# Patient Record
Sex: MALE | Race: WHITE | NOT HISPANIC OR LATINO | Employment: OTHER | URBAN - METROPOLITAN AREA
[De-identification: names, ages, dates, MRNs, and addresses within clinical notes are randomized per-mention and may not be internally consistent; named-entity substitution may affect disease eponyms.]

---

## 2022-03-26 ENCOUNTER — ANESTHESIA (OUTPATIENT)
Dept: SURGERY | Facility: CLINIC | Age: 72
DRG: 329 | End: 2022-03-26
Payer: COMMERCIAL

## 2022-03-26 ENCOUNTER — APPOINTMENT (OUTPATIENT)
Dept: GENERAL RADIOLOGY | Facility: CLINIC | Age: 72
End: 2022-03-26
Attending: EMERGENCY MEDICINE
Payer: COMMERCIAL

## 2022-03-26 ENCOUNTER — ANESTHESIA EVENT (OUTPATIENT)
Dept: SURGERY | Facility: CLINIC | Age: 72
DRG: 329 | End: 2022-03-26
Payer: COMMERCIAL

## 2022-03-26 ENCOUNTER — APPOINTMENT (OUTPATIENT)
Dept: CT IMAGING | Facility: CLINIC | Age: 72
End: 2022-03-26
Attending: EMERGENCY MEDICINE
Payer: COMMERCIAL

## 2022-03-26 ENCOUNTER — HOSPITAL ENCOUNTER (INPATIENT)
Facility: CLINIC | Age: 72
LOS: 9 days | Discharge: HOME HEALTH | DRG: 329 | End: 2022-04-05
Attending: SURGERY | Admitting: SURGERY
Payer: COMMERCIAL

## 2022-03-26 ENCOUNTER — HOSPITAL ENCOUNTER (EMERGENCY)
Facility: CLINIC | Age: 72
Discharge: ANOTHER HOSPITAL | End: 2022-03-26
Attending: EMERGENCY MEDICINE | Admitting: EMERGENCY MEDICINE
Payer: COMMERCIAL

## 2022-03-26 VITALS
SYSTOLIC BLOOD PRESSURE: 126 MMHG | BODY MASS INDEX: 18.2 KG/M2 | RESPIRATION RATE: 18 BRPM | HEART RATE: 71 BPM | DIASTOLIC BLOOD PRESSURE: 72 MMHG | OXYGEN SATURATION: 96 % | WEIGHT: 130 LBS | HEIGHT: 71 IN | TEMPERATURE: 98.3 F

## 2022-03-26 DIAGNOSIS — G89.18 ACUTE POST-OPERATIVE PAIN: ICD-10-CM

## 2022-03-26 DIAGNOSIS — K56.609 LARGE BOWEL OBSTRUCTION (H): ICD-10-CM

## 2022-03-26 DIAGNOSIS — Z93.3 S/P COLOSTOMY (H): Primary | ICD-10-CM

## 2022-03-26 DIAGNOSIS — K56.609 COLON OBSTRUCTION (H): Primary | ICD-10-CM

## 2022-03-26 DIAGNOSIS — K56.609 SMALL BOWEL OBSTRUCTION (H): ICD-10-CM

## 2022-03-26 DIAGNOSIS — K63.89 COLONIC MASS: ICD-10-CM

## 2022-03-26 DIAGNOSIS — C18.7 ADENOCARCINOMA OF SIGMOID COLON (H): ICD-10-CM

## 2022-03-26 LAB
ABO/RH(D): NORMAL
ALBUMIN SERPL-MCNC: 2.7 G/DL (ref 3.4–5)
ALP SERPL-CCNC: 62 U/L (ref 40–150)
ALT SERPL W P-5'-P-CCNC: 15 U/L (ref 0–70)
ANION GAP SERPL CALCULATED.3IONS-SCNC: 9 MMOL/L (ref 3–14)
ANTIBODY SCREEN: NEGATIVE
AST SERPL W P-5'-P-CCNC: 17 U/L (ref 0–45)
BASOPHILS # BLD AUTO: 0 10E3/UL (ref 0–0.2)
BASOPHILS NFR BLD AUTO: 0 %
BILIRUB DIRECT SERPL-MCNC: 0.2 MG/DL (ref 0–0.2)
BILIRUB SERPL-MCNC: 0.5 MG/DL (ref 0.2–1.3)
BUN SERPL-MCNC: 40 MG/DL (ref 7–30)
CALCIUM SERPL-MCNC: 9 MG/DL (ref 8.5–10.1)
CHLORIDE BLD-SCNC: 103 MMOL/L (ref 94–109)
CO2 SERPL-SCNC: 25 MMOL/L (ref 20–32)
CREAT SERPL-MCNC: 0.73 MG/DL (ref 0.66–1.25)
EOSINOPHIL # BLD AUTO: 0 10E3/UL (ref 0–0.7)
EOSINOPHIL NFR BLD AUTO: 0 %
ERYTHROCYTE [DISTWIDTH] IN BLOOD BY AUTOMATED COUNT: 13.8 % (ref 10–15)
GFR SERPL CREATININE-BSD FRML MDRD: >90 ML/MIN/1.73M2
GLUCOSE BLD-MCNC: 100 MG/DL (ref 70–99)
HCT VFR BLD AUTO: 40.5 % (ref 40–53)
HGB BLD-MCNC: 13.4 G/DL (ref 13.3–17.7)
HOLD SPECIMEN: NORMAL
HOLD SPECIMEN: NORMAL
IMM GRANULOCYTES # BLD: 0 10E3/UL
IMM GRANULOCYTES NFR BLD: 0 %
LYMPHOCYTES # BLD AUTO: 0.9 10E3/UL (ref 0.8–5.3)
LYMPHOCYTES NFR BLD AUTO: 7 %
MCH RBC QN AUTO: 30.4 PG (ref 26.5–33)
MCHC RBC AUTO-ENTMCNC: 33.1 G/DL (ref 31.5–36.5)
MCV RBC AUTO: 92 FL (ref 78–100)
MONOCYTES # BLD AUTO: 1 10E3/UL (ref 0–1.3)
MONOCYTES NFR BLD AUTO: 8 %
NEUTROPHILS # BLD AUTO: 10.5 10E3/UL (ref 1.6–8.3)
NEUTROPHILS NFR BLD AUTO: 85 %
NRBC # BLD AUTO: 0 10E3/UL
NRBC BLD AUTO-RTO: 0 /100
PLATELET # BLD AUTO: 290 10E3/UL (ref 150–450)
POTASSIUM BLD-SCNC: 4.3 MMOL/L (ref 3.4–5.3)
PROT SERPL-MCNC: 7 G/DL (ref 6.8–8.8)
RBC # BLD AUTO: 4.41 10E6/UL (ref 4.4–5.9)
SARS-COV-2 RNA RESP QL NAA+PROBE: NEGATIVE
SODIUM SERPL-SCNC: 137 MMOL/L (ref 133–144)
SPECIMEN EXPIRATION DATE: NORMAL
WBC # BLD AUTO: 12.5 10E3/UL (ref 4–11)

## 2022-03-26 PROCEDURE — 0W9G0ZX DRAINAGE OF PERITONEAL CAVITY, OPEN APPROACH, DIAGNOSTIC: ICD-10-PCS | Performed by: SURGERY

## 2022-03-26 PROCEDURE — 250N000011 HC RX IP 250 OP 636: Performed by: EMERGENCY MEDICINE

## 2022-03-26 PROCEDURE — 0DBB0ZZ EXCISION OF ILEUM, OPEN APPROACH: ICD-10-PCS | Performed by: SURGERY

## 2022-03-26 PROCEDURE — 0DBW0ZX EXCISION OF PERITONEUM, OPEN APPROACH, DIAGNOSTIC: ICD-10-PCS | Performed by: SURGERY

## 2022-03-26 PROCEDURE — 82248 BILIRUBIN DIRECT: CPT | Performed by: EMERGENCY MEDICINE

## 2022-03-26 PROCEDURE — 99285 EMERGENCY DEPT VISIT HI MDM: CPT | Performed by: INTERNAL MEDICINE

## 2022-03-26 PROCEDURE — 360N000076 HC SURGERY LEVEL 3, PER MIN: Performed by: SURGERY

## 2022-03-26 PROCEDURE — 36415 COLL VENOUS BLD VENIPUNCTURE: CPT | Performed by: FAMILY MEDICINE

## 2022-03-26 PROCEDURE — 272N000001 HC OR GENERAL SUPPLY STERILE: Performed by: SURGERY

## 2022-03-26 PROCEDURE — 88112 CYTOPATH CELL ENHANCE TECH: CPT | Mod: 26 | Performed by: PATHOLOGY

## 2022-03-26 PROCEDURE — 87205 SMEAR GRAM STAIN: CPT | Performed by: SURGERY

## 2022-03-26 PROCEDURE — 44320 COLOSTOMY: CPT | Mod: GC | Performed by: SURGERY

## 2022-03-26 PROCEDURE — 370N000017 HC ANESTHESIA TECHNICAL FEE, PER MIN: Performed by: SURGERY

## 2022-03-26 PROCEDURE — 250N000011 HC RX IP 250 OP 636: Performed by: NURSE ANESTHETIST, CERTIFIED REGISTERED

## 2022-03-26 PROCEDURE — 710N000010 HC RECOVERY PHASE 1, LEVEL 2, PER MIN: Performed by: SURGERY

## 2022-03-26 PROCEDURE — 87040 BLOOD CULTURE FOR BACTERIA: CPT | Performed by: EMERGENCY MEDICINE

## 2022-03-26 PROCEDURE — 258N000003 HC RX IP 258 OP 636: Performed by: NURSE ANESTHETIST, CERTIFIED REGISTERED

## 2022-03-26 PROCEDURE — 88342 IMHCHEM/IMCYTCHM 1ST ANTB: CPT | Mod: 26 | Performed by: PATHOLOGY

## 2022-03-26 PROCEDURE — 258N000003 HC RX IP 258 OP 636: Performed by: EMERGENCY MEDICINE

## 2022-03-26 PROCEDURE — 88341 IMHCHEM/IMCYTCHM EA ADD ANTB: CPT | Mod: 26 | Performed by: PATHOLOGY

## 2022-03-26 PROCEDURE — 250N000009 HC RX 250: Performed by: NURSE ANESTHETIST, CERTIFIED REGISTERED

## 2022-03-26 PROCEDURE — 0D1M0Z4 BYPASS DESCENDING COLON TO CUTANEOUS, OPEN APPROACH: ICD-10-PCS | Performed by: SURGERY

## 2022-03-26 PROCEDURE — 88307 TISSUE EXAM BY PATHOLOGIST: CPT | Mod: 26 | Performed by: PATHOLOGY

## 2022-03-26 PROCEDURE — 88305 TISSUE EXAM BY PATHOLOGIST: CPT | Mod: TC | Performed by: SURGERY

## 2022-03-26 PROCEDURE — 96374 THER/PROPH/DIAG INJ IV PUSH: CPT | Mod: 59 | Performed by: EMERGENCY MEDICINE

## 2022-03-26 PROCEDURE — 96375 TX/PRO/DX INJ NEW DRUG ADDON: CPT | Performed by: EMERGENCY MEDICINE

## 2022-03-26 PROCEDURE — 87075 CULTR BACTERIA EXCEPT BLOOD: CPT | Performed by: SURGERY

## 2022-03-26 PROCEDURE — 0DBN8ZX EXCISION OF SIGMOID COLON, VIA NATURAL OR ARTIFICIAL OPENING ENDOSCOPIC, DIAGNOSTIC: ICD-10-PCS | Performed by: SURGERY

## 2022-03-26 PROCEDURE — 99291 CRITICAL CARE FIRST HOUR: CPT | Mod: 25 | Performed by: EMERGENCY MEDICINE

## 2022-03-26 PROCEDURE — 74177 CT ABD & PELVIS W/CONTRAST: CPT

## 2022-03-26 PROCEDURE — 250N000009 HC RX 250: Performed by: EMERGENCY MEDICINE

## 2022-03-26 PROCEDURE — 85025 COMPLETE CBC W/AUTO DIFF WBC: CPT | Performed by: EMERGENCY MEDICINE

## 2022-03-26 PROCEDURE — 80053 COMPREHEN METABOLIC PANEL: CPT | Performed by: EMERGENCY MEDICINE

## 2022-03-26 PROCEDURE — 86850 RBC ANTIBODY SCREEN: CPT | Performed by: EMERGENCY MEDICINE

## 2022-03-26 PROCEDURE — 250N000025 HC SEVOFLURANE, PER MIN: Performed by: SURGERY

## 2022-03-26 PROCEDURE — 99223 1ST HOSP IP/OBS HIGH 75: CPT | Mod: AI | Performed by: SURGERY

## 2022-03-26 PROCEDURE — 88305 TISSUE EXAM BY PATHOLOGIST: CPT | Mod: 26 | Performed by: PATHOLOGY

## 2022-03-26 PROCEDURE — 88307 TISSUE EXAM BY PATHOLOGIST: CPT | Mod: TC | Performed by: SURGERY

## 2022-03-26 PROCEDURE — C9803 HOPD COVID-19 SPEC COLLECT: HCPCS | Performed by: EMERGENCY MEDICINE

## 2022-03-26 PROCEDURE — 999N000065 XR ABDOMEN PORT 1 VIEWS

## 2022-03-26 PROCEDURE — 45331 SIGMOIDOSCOPY AND BIOPSY: CPT | Mod: GC | Performed by: SURGERY

## 2022-03-26 PROCEDURE — 87077 CULTURE AEROBIC IDENTIFY: CPT | Performed by: SURGERY

## 2022-03-26 PROCEDURE — 99291 CRITICAL CARE FIRST HOUR: CPT | Performed by: EMERGENCY MEDICINE

## 2022-03-26 PROCEDURE — 85025 COMPLETE CBC W/AUTO DIFF WBC: CPT | Performed by: FAMILY MEDICINE

## 2022-03-26 PROCEDURE — 44120 REMOVAL OF SMALL INTESTINE: CPT | Mod: GC | Performed by: SURGERY

## 2022-03-26 PROCEDURE — 99284 EMERGENCY DEPT VISIT MOD MDM: CPT | Performed by: INTERNAL MEDICINE

## 2022-03-26 PROCEDURE — 87635 SARS-COV-2 COVID-19 AMP PRB: CPT | Performed by: EMERGENCY MEDICINE

## 2022-03-26 PROCEDURE — P9041 ALBUMIN (HUMAN),5%, 50ML: HCPCS | Performed by: NURSE ANESTHETIST, CERTIFIED REGISTERED

## 2022-03-26 PROCEDURE — 36415 COLL VENOUS BLD VENIPUNCTURE: CPT | Performed by: EMERGENCY MEDICINE

## 2022-03-26 RX ORDER — ONDANSETRON 2 MG/ML
4 INJECTION INTRAMUSCULAR; INTRAVENOUS ONCE
Status: COMPLETED | OUTPATIENT
Start: 2022-03-26 | End: 2022-03-26

## 2022-03-26 RX ORDER — MORPHINE SULFATE 2 MG/ML
2 INJECTION, SOLUTION INTRAMUSCULAR; INTRAVENOUS ONCE
Status: COMPLETED | OUTPATIENT
Start: 2022-03-26 | End: 2022-03-26

## 2022-03-26 RX ORDER — METRONIDAZOLE 500 MG/100ML
500 INJECTION, SOLUTION INTRAVENOUS
Status: CANCELLED | OUTPATIENT
Start: 2022-03-26

## 2022-03-26 RX ORDER — ONDANSETRON 4 MG/1
4 TABLET, ORALLY DISINTEGRATING ORAL EVERY 30 MIN PRN
Status: DISCONTINUED | OUTPATIENT
Start: 2022-03-26 | End: 2022-03-27 | Stop reason: HOSPADM

## 2022-03-26 RX ORDER — KETOROLAC TROMETHAMINE 15 MG/ML
15 INJECTION, SOLUTION INTRAMUSCULAR; INTRAVENOUS ONCE
Status: COMPLETED | OUTPATIENT
Start: 2022-03-26 | End: 2022-03-26

## 2022-03-26 RX ORDER — CEFAZOLIN SODIUM/WATER 2 G/20 ML
2 SYRINGE (ML) INTRAVENOUS SEE ADMIN INSTRUCTIONS
Status: DISCONTINUED | OUTPATIENT
Start: 2022-03-26 | End: 2022-03-27

## 2022-03-26 RX ORDER — CEFAZOLIN SODIUM 2 G/100ML
2 INJECTION, SOLUTION INTRAVENOUS SEE ADMIN INSTRUCTIONS
Status: CANCELLED | OUTPATIENT
Start: 2022-03-26

## 2022-03-26 RX ORDER — MORPHINE SULFATE 2 MG/ML
4 INJECTION, SOLUTION INTRAMUSCULAR; INTRAVENOUS ONCE
Status: DISCONTINUED | OUTPATIENT
Start: 2022-03-26 | End: 2022-03-26 | Stop reason: HOSPADM

## 2022-03-26 RX ORDER — CEFAZOLIN SODIUM/WATER 2 G/20 ML
2 SYRINGE (ML) INTRAVENOUS
Status: DISCONTINUED | OUTPATIENT
Start: 2022-03-26 | End: 2022-03-26

## 2022-03-26 RX ORDER — EPHEDRINE SULFATE 50 MG/ML
INJECTION, SOLUTION INTRAMUSCULAR; INTRAVENOUS; SUBCUTANEOUS PRN
Status: DISCONTINUED | OUTPATIENT
Start: 2022-03-26 | End: 2022-03-27

## 2022-03-26 RX ORDER — DIAZEPAM 10 MG/2ML
2.5 INJECTION, SOLUTION INTRAMUSCULAR; INTRAVENOUS
Status: DISCONTINUED | OUTPATIENT
Start: 2022-03-26 | End: 2022-03-27 | Stop reason: HOSPADM

## 2022-03-26 RX ORDER — IOPAMIDOL 755 MG/ML
70 INJECTION, SOLUTION INTRAVASCULAR ONCE
Status: COMPLETED | OUTPATIENT
Start: 2022-03-26 | End: 2022-03-26

## 2022-03-26 RX ORDER — SODIUM CHLORIDE, SODIUM GLUCONATE, SODIUM ACETATE, POTASSIUM CHLORIDE AND MAGNESIUM CHLORIDE 526; 502; 368; 37; 30 MG/100ML; MG/100ML; MG/100ML; MG/100ML; MG/100ML
INJECTION, SOLUTION INTRAVENOUS CONTINUOUS PRN
Status: DISCONTINUED | OUTPATIENT
Start: 2022-03-26 | End: 2022-03-27

## 2022-03-26 RX ORDER — HYDRALAZINE HYDROCHLORIDE 20 MG/ML
2.5-5 INJECTION INTRAMUSCULAR; INTRAVENOUS EVERY 10 MIN PRN
Status: DISCONTINUED | OUTPATIENT
Start: 2022-03-26 | End: 2022-03-27 | Stop reason: HOSPADM

## 2022-03-26 RX ORDER — HYDROMORPHONE HYDROCHLORIDE 1 MG/ML
0.2 INJECTION, SOLUTION INTRAMUSCULAR; INTRAVENOUS; SUBCUTANEOUS EVERY 5 MIN PRN
Status: DISCONTINUED | OUTPATIENT
Start: 2022-03-26 | End: 2022-03-27 | Stop reason: HOSPADM

## 2022-03-26 RX ORDER — ALBUMIN, HUMAN INJ 5% 5 %
SOLUTION INTRAVENOUS CONTINUOUS PRN
Status: DISCONTINUED | OUTPATIENT
Start: 2022-03-26 | End: 2022-03-27

## 2022-03-26 RX ORDER — ONDANSETRON 2 MG/ML
4 INJECTION INTRAMUSCULAR; INTRAVENOUS ONCE
Status: DISCONTINUED | OUTPATIENT
Start: 2022-03-26 | End: 2022-03-26 | Stop reason: HOSPADM

## 2022-03-26 RX ORDER — PROPOFOL 10 MG/ML
INJECTION, EMULSION INTRAVENOUS PRN
Status: DISCONTINUED | OUTPATIENT
Start: 2022-03-26 | End: 2022-03-27

## 2022-03-26 RX ORDER — LABETALOL HYDROCHLORIDE 5 MG/ML
10 INJECTION, SOLUTION INTRAVENOUS
Status: DISCONTINUED | OUTPATIENT
Start: 2022-03-26 | End: 2022-03-27 | Stop reason: HOSPADM

## 2022-03-26 RX ORDER — LIDOCAINE HYDROCHLORIDE 20 MG/ML
INJECTION, SOLUTION INFILTRATION; PERINEURAL PRN
Status: DISCONTINUED | OUTPATIENT
Start: 2022-03-26 | End: 2022-03-27

## 2022-03-26 RX ORDER — ONDANSETRON 2 MG/ML
4 INJECTION INTRAMUSCULAR; INTRAVENOUS EVERY 30 MIN PRN
Status: DISCONTINUED | OUTPATIENT
Start: 2022-03-26 | End: 2022-03-27 | Stop reason: HOSPADM

## 2022-03-26 RX ORDER — FENTANYL CITRATE 50 UG/ML
25 INJECTION, SOLUTION INTRAMUSCULAR; INTRAVENOUS EVERY 5 MIN PRN
Status: DISCONTINUED | OUTPATIENT
Start: 2022-03-26 | End: 2022-03-27 | Stop reason: HOSPADM

## 2022-03-26 RX ORDER — MEPERIDINE HYDROCHLORIDE 25 MG/ML
12.5 INJECTION INTRAMUSCULAR; INTRAVENOUS; SUBCUTANEOUS EVERY 5 MIN PRN
Status: DISCONTINUED | OUTPATIENT
Start: 2022-03-26 | End: 2022-03-27 | Stop reason: HOSPADM

## 2022-03-26 RX ORDER — SODIUM CHLORIDE, SODIUM LACTATE, POTASSIUM CHLORIDE, CALCIUM CHLORIDE 600; 310; 30; 20 MG/100ML; MG/100ML; MG/100ML; MG/100ML
INJECTION, SOLUTION INTRAVENOUS CONTINUOUS PRN
Status: DISCONTINUED | OUTPATIENT
Start: 2022-03-26 | End: 2022-03-27

## 2022-03-26 RX ORDER — DEXAMETHASONE SODIUM PHOSPHATE 4 MG/ML
INJECTION, SOLUTION INTRA-ARTICULAR; INTRALESIONAL; INTRAMUSCULAR; INTRAVENOUS; SOFT TISSUE PRN
Status: DISCONTINUED | OUTPATIENT
Start: 2022-03-26 | End: 2022-03-27

## 2022-03-26 RX ORDER — SODIUM CHLORIDE, SODIUM LACTATE, POTASSIUM CHLORIDE, CALCIUM CHLORIDE 600; 310; 30; 20 MG/100ML; MG/100ML; MG/100ML; MG/100ML
INJECTION, SOLUTION INTRAVENOUS CONTINUOUS
Status: DISCONTINUED | OUTPATIENT
Start: 2022-03-26 | End: 2022-03-27 | Stop reason: HOSPADM

## 2022-03-26 RX ORDER — CEFAZOLIN SODIUM 2 G/100ML
2 INJECTION, SOLUTION INTRAVENOUS
Status: CANCELLED | OUTPATIENT
Start: 2022-03-26

## 2022-03-26 RX ORDER — ONDANSETRON 2 MG/ML
4 INJECTION INTRAMUSCULAR; INTRAVENOUS ONCE
Status: CANCELLED | OUTPATIENT
Start: 2022-03-26 | End: 2022-03-26

## 2022-03-26 RX ORDER — ONDANSETRON 2 MG/ML
4 INJECTION INTRAMUSCULAR; INTRAVENOUS ONCE
Status: DISCONTINUED | OUTPATIENT
Start: 2022-03-26 | End: 2022-03-27

## 2022-03-26 RX ORDER — METRONIDAZOLE 500 MG/100ML
500 INJECTION, SOLUTION INTRAVENOUS
Status: DISCONTINUED | OUTPATIENT
Start: 2022-03-26 | End: 2022-03-26

## 2022-03-26 RX ORDER — HALOPERIDOL 5 MG/ML
1 INJECTION INTRAMUSCULAR
Status: DISCONTINUED | OUTPATIENT
Start: 2022-03-26 | End: 2022-03-27 | Stop reason: HOSPADM

## 2022-03-26 RX ORDER — FENTANYL CITRATE 50 UG/ML
INJECTION, SOLUTION INTRAMUSCULAR; INTRAVENOUS PRN
Status: DISCONTINUED | OUTPATIENT
Start: 2022-03-26 | End: 2022-03-27

## 2022-03-26 RX ADMIN — FENTANYL CITRATE 100 MCG: 50 INJECTION, SOLUTION INTRAMUSCULAR; INTRAVENOUS at 20:56

## 2022-03-26 RX ADMIN — PROPOFOL 60 MG: 10 INJECTION, EMULSION INTRAVENOUS at 20:57

## 2022-03-26 RX ADMIN — ALBUMIN (HUMAN): 12.5 SOLUTION INTRAVENOUS at 23:07

## 2022-03-26 RX ADMIN — Medication 2 G: at 21:05

## 2022-03-26 RX ADMIN — ENOXAPARIN SODIUM 40 MG: 40 INJECTION SUBCUTANEOUS at 21:01

## 2022-03-26 RX ADMIN — Medication 5 MG: at 21:13

## 2022-03-26 RX ADMIN — SODIUM CHLORIDE, POTASSIUM CHLORIDE, SODIUM LACTATE AND CALCIUM CHLORIDE: 600; 310; 30; 20 INJECTION, SOLUTION INTRAVENOUS at 20:42

## 2022-03-26 RX ADMIN — ALBUMIN (HUMAN): 12.5 SOLUTION INTRAVENOUS at 22:55

## 2022-03-26 RX ADMIN — ROCURONIUM BROMIDE 30 MG: 50 INJECTION, SOLUTION INTRAVENOUS at 21:21

## 2022-03-26 RX ADMIN — Medication 5 MG: at 21:32

## 2022-03-26 RX ADMIN — TAZOBACTAM SODIUM AND PIPERACILLIN SODIUM 3.38 G: 375; 3 INJECTION, SOLUTION INTRAVENOUS at 19:17

## 2022-03-26 RX ADMIN — IOPAMIDOL 70 ML: 755 INJECTION, SOLUTION INTRAVENOUS at 17:36

## 2022-03-26 RX ADMIN — Medication 100 MG: at 20:58

## 2022-03-26 RX ADMIN — SODIUM CHLORIDE, POTASSIUM CHLORIDE, SODIUM LACTATE AND CALCIUM CHLORIDE 1000 ML: 600; 310; 30; 20 INJECTION, SOLUTION INTRAVENOUS at 16:34

## 2022-03-26 RX ADMIN — SODIUM CHLORIDE 100 ML: 9 INJECTION, SOLUTION INTRAVENOUS at 17:36

## 2022-03-26 RX ADMIN — LIDOCAINE HYDROCHLORIDE 100 MG: 20 INJECTION, SOLUTION INFILTRATION; PERINEURAL at 20:56

## 2022-03-26 RX ADMIN — SODIUM CHLORIDE, SODIUM GLUCONATE, SODIUM ACETATE, POTASSIUM CHLORIDE AND MAGNESIUM CHLORIDE: 526; 502; 368; 37; 30 INJECTION, SOLUTION INTRAVENOUS at 21:56

## 2022-03-26 RX ADMIN — SODIUM CHLORIDE, SODIUM GLUCONATE, SODIUM ACETATE, POTASSIUM CHLORIDE AND MAGNESIUM CHLORIDE: 526; 502; 368; 37; 30 INJECTION, SOLUTION INTRAVENOUS at 23:56

## 2022-03-26 RX ADMIN — Medication 10 MG: at 21:01

## 2022-03-26 RX ADMIN — ONDANSETRON 4 MG: 2 INJECTION INTRAMUSCULAR; INTRAVENOUS at 17:22

## 2022-03-26 RX ADMIN — METRONIDAZOLE 500 MG: 500 INJECTION, SOLUTION INTRAVENOUS at 21:00

## 2022-03-26 RX ADMIN — SODIUM CHLORIDE, SODIUM GLUCONATE, SODIUM ACETATE, POTASSIUM CHLORIDE AND MAGNESIUM CHLORIDE: 526; 502; 368; 37; 30 INJECTION, SOLUTION INTRAVENOUS at 20:42

## 2022-03-26 RX ADMIN — MORPHINE SULFATE 2 MG: 2 INJECTION, SOLUTION INTRAMUSCULAR; INTRAVENOUS at 17:22

## 2022-03-26 RX ADMIN — KETOROLAC TROMETHAMINE 15 MG: 15 INJECTION, SOLUTION INTRAMUSCULAR; INTRAVENOUS at 16:34

## 2022-03-26 RX ADMIN — FENTANYL CITRATE 50 MCG: 50 INJECTION, SOLUTION INTRAMUSCULAR; INTRAVENOUS at 21:40

## 2022-03-26 RX ADMIN — ROCURONIUM BROMIDE 20 MG: 50 INJECTION, SOLUTION INTRAVENOUS at 22:16

## 2022-03-26 RX ADMIN — DEXAMETHASONE SODIUM PHOSPHATE 8 MG: 4 INJECTION, SOLUTION INTRA-ARTICULAR; INTRALESIONAL; INTRAMUSCULAR; INTRAVENOUS; SOFT TISSUE at 20:57

## 2022-03-26 RX ADMIN — Medication 10 MG: at 21:07

## 2022-03-26 RX ADMIN — Medication 10 MG: at 21:23

## 2022-03-26 RX ADMIN — PHENYLEPHRINE HYDROCHLORIDE 0.1 MCG/KG/MIN: 10 INJECTION INTRAVENOUS at 21:41

## 2022-03-26 ASSESSMENT — ENCOUNTER SYMPTOMS
ABDOMINAL DISTENTION: 1
CONSTIPATION: 1
BACK PAIN: 0
DIFFICULTY URINATING: 0
SHORTNESS OF BREATH: 0
CONFUSION: 0
TROUBLE SWALLOWING: 0
CONSTIPATION: 1
NAUSEA: 1
NUMBNESS: 0
HEADACHES: 0
NAUSEA: 1
FEVER: 0
NECK PAIN: 0
SHORTNESS OF BREATH: 0
FEVER: 0
ADENOPATHY: 0
APPETITE CHANGE: 1
COUGH: 0
CHILLS: 0
ABDOMINAL PAIN: 1
VOMITING: 0
WEAKNESS: 0
COUGH: 0
VOMITING: 0

## 2022-03-26 NOTE — ED PROVIDER NOTES
"  History     Chief Complaint   Patient presents with     Abdominal Pain     HPI  Kalin Shepard is a 71 year old male who presents with concern for bowel obstruction. No history of same. Pain started this Wednesday. Last BM was Tuesday.  Has abdominal pain.  No vomiting but has nausea.  No dysuria.  No fevers.  No chest pain.  No cough or shortness of breath.  He has no history of abdominal surgery. Generally healthy.         Allergies:  No Known Allergies    Problem List:    There are no problems to display for this patient.       Past Medical History:    No past medical history on file.    Past Surgical History:    No past surgical history on file.    Family History:    No family history on file.    Social History:  Marital Status:   [2]  Social History     Tobacco Use     Smoking status: Current Every Day Smoker     Packs/day: 0.50     Years: 50.00     Pack years: 25.00     Smokeless tobacco: Not on file   Substance Use Topics     Alcohol use: Not Currently     Drug use: Never        Medications:    No current outpatient medications on file.        Review of Systems   Constitutional: Negative for fever.   Respiratory: Negative for cough and shortness of breath.    Cardiovascular: Negative for chest pain.   Gastrointestinal: Positive for abdominal pain, constipation and nausea. Negative for vomiting.   Allergic/Immunologic: Negative for immunocompromised state.   All other systems reviewed and are negative.      Physical Exam   BP: 128/77  Pulse: 88  Temp: 98.3  F (36.8  C)  Resp: 18  Height: 180.3 cm (5' 11\")  Weight: 59 kg (130 lb)  SpO2: 100 %      Physical Exam  Vitals reviewed.   Constitutional:       General: He is not in acute distress.  HENT:      Head: Normocephalic.      Right Ear: External ear normal.      Left Ear: External ear normal.      Nose: No congestion.      Mouth/Throat:      Mouth: Mucous membranes are moist.   Eyes:      Comments: ?slight icterus   Cardiovascular:      Rate " and Rhythm: Normal rate.   Pulmonary:      Effort: No respiratory distress.   Abdominal:      General: Abdomen is flat.      Comments: +bs  Diffusely tender abdomen with guarding   Musculoskeletal:         General: No swelling.      Cervical back: No rigidity.   Skin:     General: Skin is warm.      Capillary Refill: Capillary refill takes less than 2 seconds.      Coloration: Skin is not jaundiced.      Comments: No jaundice   Neurological:      General: No focal deficit present.      Mental Status: He is alert.   Psychiatric:      Comments: Very nice         ED Course              ED Course as of 03/26/22 2125   Sat Mar 26, 2022   1637 Wbc count is 12.5.    1710 BUN 40. Lytes otherwise reassuring. Albumin 2.7. LFTs otherwise reassuring.    1807 Ct done   1819 1.  Nodular, masslike wall thickening in the sigmoid colon, concerning for malignancy. Resultant upstream small bowel and colonic obstruction.  2.  A 5 x 3.5 cm fluid collection adjacent to the sigmoid mass suspicious for an abscess and/or perforated malignancy.   3.  Linear peritoneal thickening and clustered appearance of small bowel loops in the pelvis is indeterminate, and could represent peritoneal carcinomatosis. Trace upper abdominal ascites.  4.  Mildly enlarged retroperitoneal lymph nodes, indeterminate and could be metastatic.  5.  Few subcentimeter lesions in the liver are too small to adequately characterize. Continued attention on follow-up.  6.  Abdominal aortic aneurysm measuring 3.1 cm.   1819 Starting Zosyn   1820 [facility]    1825 Spoke to colorectal surgery. Patient needs emergent surgery and needs to [facility] to [facility]. Put in NG tube.    1832 Accepted by Dr. Ureña at the [facility].    1832 Updated patient. Aware he needs surgery, likely tonight. Ok with NG tube.    1832 Not on blood thinners.   1832 Last colonoscopy approximately 20 years ago.      Procedures             Critical  Care time:  was 40 minutes for this patient excluding procedures.         Results for orders placed or performed during the hospital encounter of 03/26/22 (from the past 24 hour(s))   CBC with Platelets & Differential    Narrative    The following orders were created for panel order CBC with Platelets & Differential.  Procedure                               Abnormality         Status                     ---------                               -----------         ------                     CBC with platelets and d...[[id number]]  Abnormal            Final result                 Please view results for these tests on the individual orders.   Basic metabolic panel   Result Value Ref Range    Sodium 137 133 - 144 mmol/L    Potassium 4.3 3.4 - 5.3 mmol/L    Chloride 103 94 - 109 mmol/L    Carbon Dioxide (CO2) 25 20 - 32 mmol/L    Anion Gap 9 3 - 14 mmol/L    Urea Nitrogen 40 (H) 7 - 30 mg/dL    Creatinine 0.73 0.66 - 1.25 mg/dL    Calcium 9.0 8.5 - 10.1 mg/dL    Glucose 100 (H) 70 - 99 mg/dL    GFR Estimate >90 >60 mL/min/1.73m2   Hepatic function panel   Result Value Ref Range    Bilirubin Total 0.5 0.2 - 1.3 mg/dL    Bilirubin Direct 0.2 0.0 - 0.2 mg/dL    Protein Total 7.0 6.8 - 8.8 g/dL    Albumin 2.7 (L) 3.4 - 5.0 g/dL    Alkaline Phosphatase 62 40 - 150 U/L    AST 17 0 - 45 U/L    ALT 15 0 - 70 U/L   Denhoff Draw    Narrative    The following orders were created for panel order Denhoff Draw.  Procedure                               Abnormality         Status                     ---------                               -----------         ------                     Extra Blue Top Tube[[id number]]                              Final result               Extra Red Top Tube[[id number]]                               Final result                 Please view results for these tests on the individual orders.   CBC with platelets and differential   Result Value Ref Range    WBC Count 12.5 (H) 4.0 - 11.0 10e3/uL    RBC Count 4.41  4.40 - 5.90 10e6/uL    Hemoglobin 13.4 13.3 - 17.7 g/dL    Hematocrit 40.5 40.0 - 53.0 %    MCV 92 78 - 100 fL    MCH 30.4 26.5 - 33.0 pg    MCHC 33.1 31.5 - 36.5 g/dL    RDW 13.8 10.0 - 15.0 %    Platelet Count 290 150 - 450 10e3/uL    % Neutrophils 85 %    % Lymphocytes 7 %    % Monocytes 8 %    % Eosinophils 0 %    % Basophils 0 %    % Immature Granulocytes 0 %    NRBCs per 100 WBC 0 <1 /100    Absolute Neutrophils 10.5 (H) 1.6 - 8.3 10e3/uL    Absolute Lymphocytes 0.9 0.8 - 5.3 10e3/uL    Absolute Monocytes 1.0 0.0 - 1.3 10e3/uL    Absolute Eosinophils 0.0 0.0 - 0.7 10e3/uL    Absolute Basophils 0.0 0.0 - 0.2 10e3/uL    Absolute Immature Granulocytes 0.0 <=0.4 10e3/uL    Absolute NRBCs 0.0 10e3/uL   Extra Blue Top Tube   Result Value Ref Range    Hold Specimen JIC    Extra Red Top Tube   Result Value Ref Range    Hold Specimen JIC    CT Abdomen Pelvis w Contrast    Narrative    EXAM: CT ABDOMEN PELVIS W CONTRAST  LOCATION: [facility]  DATE/TIME: 3/26/2022 5:35 PM    INDICATION: Abdominal pain.  COMPARISON: None.  TECHNIQUE: CT scan of the abdomen and pelvis was performed following injection of IV contrast. Multiplanar reformats were obtained. Dose reduction techniques were used.  CONTRAST: 70 mL Isovue 370    FINDINGS:   LOWER CHEST: Normal.    HEPATOBILIARY: Subcentimeter hypodense lesions in the liver are too small to adequately characterize. For example, there is a 5 mm lesion in the right hepatic lobe at the dome (series 5, image 24) and a 4 mm lesion more inferiorly in the right lobe   (series 5, image 62). No calcified gallstones or biliary ductal dilatation.    PANCREAS: Normal.    SPLEEN: Normal.    ADRENAL GLANDS: Normal.    KIDNEYS/BLADDER: Left renal simple cyst requiring no specific follow-up. Mild right hydronephrosis. No calculi.    BOWEL: Several mildly dilated loops of small bowel dilated up to about 3 cm. Distended cecum measuring about 9 cm and mildly dilated  ascending colon, transverse colon and descending colon. Colonic distention is to the level of an area of masslike wall   thickening in the proximal sigmoid colon (series 5, image [id number] and series 3, image 59). The sigmoid colonic mass appears to extend extraluminally, best seen on the coronal image 59. Redundant sigmoid colon. Adjacent to the area of masslike wall   thickening in the sigmoid colon, there is a 5.0 x 3.5 cm collection of fluid and air (series 5, image 176) in the mid pelvis.    LYMPH NODES: Indeterminate retroperitoneal lymphadenopathy with a left para-aortic 1.5 x 1.1 cm lymph node (series 5, image 117).    VASCULATURE: Aneurysmal infrarenal abdominal aorta measuring 3.1 cm. Aortobiiliac atherosclerotic calcifications.    PELVIC ORGANS: Prostatomegaly. See discussion above regarding the sigmoid mass.    Other: Linear peritoneal thickening in the pelvis and mildly clustered appearance of small bowel loops in the pelvis are nonspecific, but could represent peritoneal carcinomatosis. Trace upper abdominal ascites.    MUSCULOSKELETAL: No suspicious lesions in the bones.      Impression    IMPRESSION:   1.  Nodular, masslike wall thickening in the sigmoid colon, concerning for malignancy. Resultant upstream small bowel and colonic obstruction.  2.  A 5 x 3.5 cm fluid collection adjacent to the sigmoid mass suspicious for an abscess and/or perforated malignancy.   3.  Linear peritoneal thickening and clustered appearance of small bowel loops in the pelvis is indeterminate, and could represent peritoneal carcinomatosis. Trace upper abdominal ascites.  4.  Mildly enlarged retroperitoneal lymph nodes, indeterminate and could be metastatic.  5.  Few subcentimeter lesions in the liver are too small to adequately characterize. Continued attention on follow-up.  6.  Abdominal aortic aneurysm measuring 3.1 cm.   Asymptomatic COVID-19 Virus (Coronavirus) by PCR Nose    Specimen: Nose; Swab   Result Value Ref  Range    SARS CoV2 PCR Negative Negative    Narrative    Testing was performed using the leonor  SARS-CoV-2 & Influenza A/B Assay on the leonor  [employer]ruti  System.  This test should be ordered for the detection of SARS-COV-2 in individuals who meet SARS-CoV-2 clinical and/or epidemiological criteria. Test performance is unknown in asymptomatic patients.  This test is for in vitro diagnostic use under the FDA EUA for laboratories certified under CLIA to perform moderate and/or high complexity testing. This test has not been FDA cleared or approved.  A negative test does not rule out the presence of PCR inhibitors in the specimen or target RNA in concentration below the limit of detection for the assay. The possibility of a false negative should be considered if the patient's recent exposure or clinical presentation suggests COVID-19.  [facility] are certified under the Clinical Laboratory Improvement Amendments of 1988 (CLIA-88) as qualified to perform moderate and/or high complexity laboratory testing.   ABO/Rh type and screen    Narrative    The following orders were created for panel order ABO/Rh type and screen.  Procedure                               Abnormality         Status                     ---------                               -----------         ------                     Adult Type and Screen[[id number]]                            Edited Result - FINAL        Please view results for these tests on the individual orders.   Adult Type and Screen   Result Value Ref Range    ABO/RH(D) A POS     Antibody Screen Negative Negative    SPECIMEN EXPIRATION DATE [id number]        Medications   ketorolac (TORADOL) injection 15 mg (15 mg Intravenous Given 3/26/22 1634)   iopamidol (ISOVUE-370) solution 70 mL (70 mLs Intravenous Given 3/26/22 1736)   sodium chloride 0.9 % bag 500mL for CT scan flush use (100 mLs Intravenous Given 3/26/22 1736)   morphine (PF) injection 2 mg (2 mg Intravenous Given  3/26/22 1722)   ondansetron (ZOFRAN) injection 4 mg (4 mg Intravenous Given 3/26/22 1722)   piperacillin-tazobactam (ZOSYN) infusion 3.375 g (3.375 g Intravenous New Bag 3/26/22 1917)       Assessments & Plan (with Medical Decision Making)     Strong suspicion for intra-abdominal pathology that may be surgical.  I am going to get a CT with contrast.  I am also can get LFTs, CBC, BMP.  Also get a UA.  New blood cultures at this time.  The patient has impressive exam I will monitor him closely.  We will also give pain control and fluids.    I have reviewed the nursing notes.    I have reviewed the findings, diagnosis, plan and need for follow up with the patient.     Critical Care Addendum    My initial assessment, based on my review of nursing observations, review of vital signs, focused history, physical exam, discussion with specialist and interpretation of labs and imaging , established that Kalin Shepard has a closed bowel obstruction with possible intraabdominal abscess which requires immediate intervention, and therefore he is critically ill.     After the initial assessment, the care team initiated multiple lab tests, initiated IV fluid administration, initiated medication therapy with antibiotics and consulted with surgery. Due to the critical nature of this patient, I reassessed vital signs, physical exam, interpretation of labs and and imaging  multiple times prior to his disposition.     Time also spent performing documentation, reviewing test results, discussion with consultants and coordination of care.     Critical care time (excluding teaching time and procedures): 40 minutes.     There are no discharge medications for this patient.      Final diagnoses:   Small bowel obstruction (H)       3/26/2022   [facility] EMERGENCY DEPT     Jeremy Johnson MD  03/26/22 2128

## 2022-03-26 NOTE — ED TRIAGE NOTES
Pt here with abdominal pain that started Wednesday. Pt states that he feels that it is a bowel obstruction. Pt's last BM was Tuesday. Pt states that he is not eating or drinking.

## 2022-03-27 PROBLEM — K56.609 LARGE BOWEL OBSTRUCTION (H): Status: ACTIVE | Noted: 2022-03-27

## 2022-03-27 PROBLEM — K63.89 COLONIC MASS: Status: ACTIVE | Noted: 2022-03-27

## 2022-03-27 LAB
ALBUMIN SERPL-MCNC: 2.2 G/DL (ref 3.4–5)
ALP SERPL-CCNC: 41 U/L (ref 40–150)
ALT SERPL W P-5'-P-CCNC: 13 U/L (ref 0–70)
ANION GAP SERPL CALCULATED.3IONS-SCNC: 11 MMOL/L (ref 3–14)
AST SERPL W P-5'-P-CCNC: 20 U/L (ref 0–45)
BILIRUB SERPL-MCNC: 0.5 MG/DL (ref 0.2–1.3)
BUN SERPL-MCNC: 32 MG/DL (ref 7–30)
CALCIUM SERPL-MCNC: 7.2 MG/DL (ref 8.5–10.1)
CEA SERPL-MCNC: 2.1 UG/L (ref 0–2.5)
CHLORIDE BLD-SCNC: 105 MMOL/L (ref 94–109)
CO2 SERPL-SCNC: 22 MMOL/L (ref 20–32)
CREAT SERPL-MCNC: 0.66 MG/DL (ref 0.66–1.25)
ERYTHROCYTE [DISTWIDTH] IN BLOOD BY AUTOMATED COUNT: 14.2 % (ref 10–15)
GFR SERPL CREATININE-BSD FRML MDRD: >90 ML/MIN/1.73M2
GLUCOSE BLD-MCNC: 106 MG/DL (ref 70–99)
GRAM STAIN RESULT: ABNORMAL
HCT VFR BLD AUTO: 39.2 % (ref 40–53)
HGB BLD-MCNC: 12.3 G/DL (ref 13.3–17.7)
MCH RBC QN AUTO: 30.4 PG (ref 26.5–33)
MCHC RBC AUTO-ENTMCNC: 31.4 G/DL (ref 31.5–36.5)
MCV RBC AUTO: 97 FL (ref 78–100)
PHOSPHATE SERPL-MCNC: 3.5 MG/DL (ref 2.5–4.5)
PLATELET # BLD AUTO: 268 10E3/UL (ref 150–450)
POTASSIUM BLD-SCNC: 4.5 MMOL/L (ref 3.4–5.3)
PROT SERPL-MCNC: 4.9 G/DL (ref 6.8–8.8)
RBC # BLD AUTO: 4.05 10E6/UL (ref 4.4–5.9)
SODIUM SERPL-SCNC: 138 MMOL/L (ref 133–144)
WBC # BLD AUTO: 13.2 10E3/UL (ref 4–11)

## 2022-03-27 PROCEDURE — 36415 COLL VENOUS BLD VENIPUNCTURE: CPT

## 2022-03-27 PROCEDURE — 80053 COMPREHEN METABOLIC PANEL: CPT | Performed by: SURGERY

## 2022-03-27 PROCEDURE — 250N000011 HC RX IP 250 OP 636: Performed by: SURGERY

## 2022-03-27 PROCEDURE — 85027 COMPLETE CBC AUTOMATED: CPT | Performed by: SURGERY

## 2022-03-27 PROCEDURE — 93005 ELECTROCARDIOGRAM TRACING: CPT

## 2022-03-27 PROCEDURE — 258N000003 HC RX IP 258 OP 636: Performed by: SURGERY

## 2022-03-27 PROCEDURE — 84100 ASSAY OF PHOSPHORUS: CPT

## 2022-03-27 PROCEDURE — 84134 ASSAY OF PREALBUMIN: CPT

## 2022-03-27 PROCEDURE — 82378 CARCINOEMBRYONIC ANTIGEN: CPT

## 2022-03-27 PROCEDURE — 250N000011 HC RX IP 250 OP 636: Performed by: NURSE ANESTHETIST, CERTIFIED REGISTERED

## 2022-03-27 PROCEDURE — 120N000002 HC R&B MED SURG/OB UMMC

## 2022-03-27 PROCEDURE — 250N000011 HC RX IP 250 OP 636: Performed by: ANESTHESIOLOGY

## 2022-03-27 PROCEDURE — 88305 TISSUE EXAM BY PATHOLOGIST: CPT | Mod: TC | Performed by: SURGERY

## 2022-03-27 PROCEDURE — 36415 COLL VENOUS BLD VENIPUNCTURE: CPT | Performed by: SURGERY

## 2022-03-27 PROCEDURE — 93010 ELECTROCARDIOGRAM REPORT: CPT | Performed by: INTERNAL MEDICINE

## 2022-03-27 PROCEDURE — 250N000009 HC RX 250: Performed by: NURSE ANESTHETIST, CERTIFIED REGISTERED

## 2022-03-27 PROCEDURE — 88305 TISSUE EXAM BY PATHOLOGIST: CPT | Mod: 26 | Performed by: PATHOLOGY

## 2022-03-27 RX ORDER — KETAMINE HYDROCHLORIDE 10 MG/ML
INJECTION INTRAMUSCULAR; INTRAVENOUS PRN
Status: DISCONTINUED | OUTPATIENT
Start: 2022-03-27 | End: 2022-03-27

## 2022-03-27 RX ORDER — SODIUM CHLORIDE 9 MG/ML
INJECTION, SOLUTION INTRAVENOUS CONTINUOUS
Status: DISCONTINUED | OUTPATIENT
Start: 2022-03-27 | End: 2022-03-30

## 2022-03-27 RX ORDER — NALOXONE HYDROCHLORIDE 0.4 MG/ML
0.2 INJECTION, SOLUTION INTRAMUSCULAR; INTRAVENOUS; SUBCUTANEOUS
Status: DISCONTINUED | OUTPATIENT
Start: 2022-03-27 | End: 2022-04-05 | Stop reason: HOSPADM

## 2022-03-27 RX ORDER — PIPERACILLIN SODIUM, TAZOBACTAM SODIUM 3; .375 G/15ML; G/15ML
3.38 INJECTION, POWDER, LYOPHILIZED, FOR SOLUTION INTRAVENOUS EVERY 6 HOURS
Status: COMPLETED | OUTPATIENT
Start: 2022-03-27 | End: 2022-03-29

## 2022-03-27 RX ORDER — OXYCODONE HYDROCHLORIDE 5 MG/1
5 TABLET ORAL EVERY 4 HOURS PRN
Status: DISCONTINUED | OUTPATIENT
Start: 2022-03-27 | End: 2022-03-27 | Stop reason: HOSPADM

## 2022-03-27 RX ORDER — ONDANSETRON 4 MG/1
4 TABLET, ORALLY DISINTEGRATING ORAL EVERY 6 HOURS PRN
Status: DISCONTINUED | OUTPATIENT
Start: 2022-03-27 | End: 2022-04-05 | Stop reason: HOSPADM

## 2022-03-27 RX ORDER — NALOXONE HYDROCHLORIDE 0.4 MG/ML
0.4 INJECTION, SOLUTION INTRAMUSCULAR; INTRAVENOUS; SUBCUTANEOUS
Status: DISCONTINUED | OUTPATIENT
Start: 2022-03-27 | End: 2022-04-05 | Stop reason: HOSPADM

## 2022-03-27 RX ORDER — ONDANSETRON 2 MG/ML
4 INJECTION INTRAMUSCULAR; INTRAVENOUS EVERY 6 HOURS PRN
Status: DISCONTINUED | OUTPATIENT
Start: 2022-03-27 | End: 2022-04-05 | Stop reason: HOSPADM

## 2022-03-27 RX ORDER — LIDOCAINE 40 MG/G
CREAM TOPICAL
Status: DISCONTINUED | OUTPATIENT
Start: 2022-03-27 | End: 2022-04-05 | Stop reason: HOSPADM

## 2022-03-27 RX ADMIN — PIPERACILLIN AND TAZOBACTAM 3.38 G: 3; .375 INJECTION, POWDER, LYOPHILIZED, FOR SOLUTION INTRAVENOUS at 22:16

## 2022-03-27 RX ADMIN — SODIUM CHLORIDE, SODIUM GLUCONATE, SODIUM ACETATE, POTASSIUM CHLORIDE AND MAGNESIUM CHLORIDE: 526; 502; 368; 37; 30 INJECTION, SOLUTION INTRAVENOUS at 01:39

## 2022-03-27 RX ADMIN — ENOXAPARIN SODIUM 40 MG: 40 INJECTION SUBCUTANEOUS at 19:36

## 2022-03-27 RX ADMIN — SODIUM CHLORIDE: 900 INJECTION INTRAVENOUS at 16:49

## 2022-03-27 RX ADMIN — Medication: at 02:40

## 2022-03-27 RX ADMIN — ROCURONIUM BROMIDE 10 MG: 50 INJECTION, SOLUTION INTRAVENOUS at 00:03

## 2022-03-27 RX ADMIN — ROCURONIUM BROMIDE 10 MG: 50 INJECTION, SOLUTION INTRAVENOUS at 01:13

## 2022-03-27 RX ADMIN — PIPERACILLIN AND TAZOBACTAM 3.38 G: 3; .375 INJECTION, POWDER, LYOPHILIZED, FOR SOLUTION INTRAVENOUS at 05:37

## 2022-03-27 RX ADMIN — SODIUM CHLORIDE: 900 INJECTION INTRAVENOUS at 02:42

## 2022-03-27 RX ADMIN — ONDANSETRON 4 MG: 2 INJECTION INTRAMUSCULAR; INTRAVENOUS at 01:30

## 2022-03-27 RX ADMIN — FENTANYL CITRATE 25 MCG: 50 INJECTION INTRAMUSCULAR; INTRAVENOUS at 02:51

## 2022-03-27 RX ADMIN — PIPERACILLIN AND TAZOBACTAM 3.38 G: 3; .375 INJECTION, POWDER, LYOPHILIZED, FOR SOLUTION INTRAVENOUS at 11:13

## 2022-03-27 RX ADMIN — Medication 40 MG: at 01:12

## 2022-03-27 RX ADMIN — SUGAMMADEX 200 MG: 100 INJECTION, SOLUTION INTRAVENOUS at 01:47

## 2022-03-27 RX ADMIN — Medication 2 G: at 01:05

## 2022-03-27 RX ADMIN — PIPERACILLIN AND TAZOBACTAM 3.38 G: 3; .375 INJECTION, POWDER, LYOPHILIZED, FOR SOLUTION INTRAVENOUS at 16:49

## 2022-03-27 ASSESSMENT — ACTIVITIES OF DAILY LIVING (ADL)
ADLS_ACUITY_SCORE: 11
ADLS_ACUITY_SCORE: 7
ADLS_ACUITY_SCORE: 11
ADLS_ACUITY_SCORE: 9
ADLS_ACUITY_SCORE: 11
ADLS_ACUITY_SCORE: 7
ADLS_ACUITY_SCORE: 11
ADLS_ACUITY_SCORE: 11

## 2022-03-27 NOTE — PLAN OF CARE
Admitted/transferred from: PACU   2 RN full skin assessment, except under colostomy appliance and incision dressings, completed by Starr Montgomery, LILLIAN and Cassandra Swenson RN.  Skin assessment finding: Blanchable redness on IT area, incisions, JPx2, NG, colostomy, jo cath. Scattered bruising on L arm and hand.     Interventions/actions: Mepilex placed on sacral area, pt positioned to offload pressure.    Will continue to monitor.

## 2022-03-27 NOTE — ANESTHESIA PROCEDURE NOTES
Airway       Patient location during procedure: OR       Procedure Start/Stop Times: 3/26/2022 8:58 PM  Staff -        CRNA: Marisol Berkowitz APRN CRNA       Performed By: CRNA  Consent for Airway        Urgency: elective  Indications and Patient Condition       Indications for airway management: april-procedural       Induction type:RSI       Mask difficulty assessment: 0 - not attempted    Final Airway Details       Final airway type: endotracheal airway       Successful airway: ETT - single  Endotracheal Airway Details        ETT size (mm): 7.5       Cuffed: yes       Successful intubation technique: direct laryngoscopy       DL Blade Type: MAC 3       Grade View of Cords: 2       Adjucts: stylet       Position: Right       Measured from: lips       Secured at (cm): 23       Bite block used: Soft    Post intubation assessment        Placement verified by: capnometry, equal breath sounds and chest rise        Number of attempts at approach: 1       Secured with: silk tape       Ease of procedure: easy       Dentition: Unchanged

## 2022-03-27 NOTE — PROGRESS NOTES
"Colorectal Surgery Progress Note  [facility]  March 27, 2022  POD#0      SUBJECTIVE  Returned from the OR early this morning. Today pain is controlled with PCA. No output from stoma. NG in place with brown/green output. Castro in place, yellow urine.    Of note, did have period of afib during case. Doesn't have any palpitations or tachycardia currently.     OBJECTIVE:    Vitals:  /63 (BP Location: Right arm)   Pulse 78   Temp 98.1  F (36.7  C) (Temporal)   Resp 12   Ht 1.778 m (5' 10\")   Wt 59 kg (130 lb)   SpO2 96%   BMI 18.65 kg/m    I/O:  I/O last 3 completed shifts:  In: 6301 [I.V.:5801]  Out: 1145 [Urine:640; Drains:105; Other:300; Blood:100]    Physical Exam:  Gen: AAOx3, NAD  Pulm: Non-labored breathing  CV:      RRR by RP, no sig lower extremity edema  Abd: Soft, moderately distended, appropriately tender, no guarding/rebound   Incision C/D/I    Stoma pink and viable no stool.    MORIS drains, serosanguinous   Ext:  Warm and well-perfused    BMPRecent Labs   Lab 03/27/22  0753 03/26/22  1554    137   POTASSIUM 4.5 4.3   CHLORIDE 105 103   CO2 22 25   BUN 32* 40*   CR 0.66 0.73   * 100*   PHOS 3.5  --      CBC  Recent Labs   Lab 03/27/22  0940 03/26/22  1554   WBC 13.2* 12.5*   HGB 12.3* 13.4   HCT 39.2* 40.5    290       ASSESSMENT:   71 year old male who presented to OSH ED with abdominal pain found to have 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 S/p exploratory laparotomy, small bowel resection, colostomy creation, flex sigmoidoscopy overnight on 3/26-3/27.     Plan:     Neuro/Pain: Dilaudid PCA  CV: RRR.  Briefly went into afib during OR, not noted since.   PULM: encourage IS.  Nutr/GI: NPO. NGT to LIS. Will consider PICC and TPN Monday. Will check prealbumin today  /Lytes/IVF: NS @ 75ml/hr. UOP adequate.   - Will check CEA. Daily labs x 3 days.   Heme: Hgb post op 12.3.   ID: IV zosyn " x 3 days.   Endocrine: No concerns   T/L/D: MORIS x2. Colostomy pink, no output.  Activity: as tolerated.  Ppx: lovenox      Patient was seen and discussed on rounds with Fellow, Dr. Groves, and staff Dr. Magdaleno.     Sharif Kenney MD  PGY-1, General Surgery  Pager: [phone]

## 2022-03-27 NOTE — PROVIDER NOTIFICATION
Surg on-call paged re: critical lab result from peritoneal fluid charlie. in OR: 4+ gram neg bacilli, 4+ gram pos cocci, 3+ gram pos bacilli, 4+ WBC seen, predominantly PMNs.

## 2022-03-27 NOTE — PLAN OF CARE
Arrived from PACU at 0400, s/p exploratory laparotomy, small bowel resection, colostomy creation, sigmoidoscopy flexible.  Lethargic, drowsy and oriented, AVSS, on 2L.  Irreg heartrate, MD aware, EKG done in PACU.  C/o abdominal pain especially with any movement.  Educated on and gave pt PCA button for 1 bump before repositioning, otherwise button has not been with patient d/t drowsiness, pt rating pain at 3, appearing to rest comfortably.  NPO, NG to LIS with red/brown drainage in tubing.   Abdominal incisions with marked dressings, JPx2, new colostomy with no output, jo cath with adequate output of tea colored urine.  Mepilex placed on sacrum with blanchable redness on ITs.  PLAN: Sit/stand at bedside, give PCA button to pt when he is not as sleepy, encourage IS/coughing.

## 2022-03-27 NOTE — ANESTHESIA CARE TRANSFER NOTE
Patient: Kalin Shepard    Procedure: Procedure(s):  exploratory laparotomy, small bowel resection, colostomy creation  Sigmoidoscopy flexible       Diagnosis: Colon obstruction (H) [K56.609]  Diagnosis Additional Information: No value filed.    Anesthesia Type:   General     Note:    Oropharynx: oropharynx clear of all foreign objects and spontaneously breathing  Level of Consciousness: drowsy  Oxygen Supplementation: face mask  Level of Supplemental Oxygen (L/min / FiO2): 10  Independent Airway: airway patency satisfactory and stable  Dentition: dentition unchanged  Vital Signs Stable: post-procedure vital signs reviewed and stable  Report to RN Given: handoff report given  Patient transferred to: PACU    Handoff Report: Identifed the Patient, Identified the Reponsible Provider, Reviewed the pertinent medical history, Discussed the surgical course, Reviewed Intra-OP anesthesia mangement and issues during anesthesia, Set expectations for post-procedure period and Allowed opportunity for questions and acknowledgement of understanding      Vitals:  Vitals Value Taken Time   /93 03/27/22 0220   Temp 36  C (96.8  F) 03/27/22 0225   Pulse 73 03/27/22 0226   Resp 22 03/27/22 0226   SpO2 100 % 03/27/22 0226   Vitals shown include unvalidated device data.    Electronically Signed By: Marisol Berkowitz CRNA, JULIUS BORJA  March 27, 2022  2:26 AM

## 2022-03-27 NOTE — ED NOTES
Bed: ED12  Expected date:   Expected time:   Means of arrival:   Comments:  Kalin Shepard  Colorectal CA  [phone]

## 2022-03-27 NOTE — BRIEF OP NOTE
[facility]    Brief Operative Note     Pre-operative diagnosis: Colon obstruction (H) [K56.609]  Post-operative diagnosis 1. Sigmoid colon obstruction secondary to fungating mass  2. Small bowel obstruction due to involved adjacent loop of terminal ileum  3. Pelvic abscess    Procedure: Procedure(s):  exploratory laparotomy, small bowel resection, colostomy creation  Sigmoidoscopy flexible  Surgeon: Surgeon(s) and Role:     * Riley Magdaleno MD - Primary   ELLA Olson Fellow  Anesthesia: General   Estimated Blood Loss: 100 mL from 3/26/2022  8:45 PM to 3/27/2022  2:16 AM      Drains: Mehran-Garcia x2 in pelvis  Specimens:   ID Type Source Tests Collected by Time Destination   1 : Peritoneal Fluid Peritoneal Fluid Peritoneum ANAEROBIC BACTERIAL CULTURE ROUTINE, GRAM STAIN, AEROBIC BACTERIAL CULTURE ROUTINE, NON-GYNECOLOGIC CYTOLOGY Riley Magdaleno MD 3/26/2022  9:41 PM    2 : Small Bowel Tissue Small Intestine SURGICAL PATHOLOGY EXAM Riley Magdaleno MD 3/26/2022 10:05 PM    3 : Peritoneal Biopsy Biopsy Peritoneum SURGICAL PATHOLOGY EXAM Riley Magdaleno MD 3/26/2022 11:10 PM    4 : Sigmoid Colon Mass Tissue Large Intestine, Colon, Sigmoid SURGICAL PATHOLOGY EXAM Riley Magdaleno MD 3/27/2022 12:00 AM      Findings:     1. 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 This required small bowel resected and primary anastomosis.  2. Tumor was too large and locally advanced to allow for an R0 resection. Also, the extent of invasion to critical anterior and inguinal structures was unable to be assessed fully, but highly concerning for extensive involvement.   3. No obvious metastatic disease, but pelvic and left lower quadrant involvement was extensive.   4. Drains x2 placed in pelvis at abscess cavity  site.    Complications: None.  Implants:  None    Received 5L IVF, 500 mL albumin. Had 340 mL urine output.     Riley Magdaleno MD  Division of Colon and Rectal Surgery  [facility]  [phone]

## 2022-03-27 NOTE — PLAN OF CARE
"Goal Outcome Evaluation:    Plan of Care Reviewed With: patient     Overall Patient Progress: improving     Reports pain is under control with dilaudid PCA. No complaints of nausea. Stood at bed side and ambulated once to door and once into [place] with assist of 2. Reports no gas or BM into new colostomy. 2 MORIS and incisions intact. Castro patent and intact with sufficient urine out. Tolerating NPO and ice chips. NG to LIS, flushed once every shift. PIV infusing normal saline. Mepilex on coccyx for prevention. Sleepy much of day. Capno on, IS to 1500 while awake, using SCDs. Vitals stable. BP 98/64 (BP Location: Right arm)   Pulse 73   Temp (!) 96.7  F (35.9  C) (Oral)   Resp 8   Ht 1.778 m (5' 10\")   Wt 59 kg (130 lb)   SpO2 93%   BMI 18.65 kg/m      Urine output trending down to 125 ml in last 4 hour. Dr. Christina notified. No orders or changes at this time.      "

## 2022-03-27 NOTE — ED PROVIDER NOTES
"ED Provider Note  [facility]      History     Chief Complaint   Patient presents with     Abdominal Pain     HPI  Kalin Shepard is a 71 year old male who presents on transfer from [facility] ED with closed loop bowel obstruction associated with obstructing sigmoid mass. He has been symptomatic for 4 days with nausea, abdominal distension and abdominal pain. He has not been passing gas or stool. He has not been able to eat or drink for 4 days. He had NG placed at [facility] and feels improved after decompression. He was transferred for urgent surgery by colon and rectal surgery.    No past medical history on file.    No past surgical history on file.    No family history on file.    Social History     Tobacco Use     Smoking status: Current Every Day Smoker     Packs/day: 0.50     Years: 50.00     Pack years: 25.00     Smokeless tobacco: Not on file   Substance Use Topics     Alcohol use: Not Currently          Review of Systems   Constitutional: Positive for appetite change. Negative for chills and fever.   HENT: Negative for congestion and trouble swallowing.    Eyes: Negative for visual disturbance.   Respiratory: Negative for cough and shortness of breath.    Cardiovascular: Negative for leg swelling.   Gastrointestinal: Positive for abdominal distention, constipation and nausea. Negative for vomiting.   Genitourinary: Negative for difficulty urinating.   Musculoskeletal: Negative for back pain and neck pain.   Skin: Negative for rash.   Neurological: Negative for weakness, numbness and headaches.   Hematological: Negative for adenopathy.   Psychiatric/Behavioral: Negative for confusion.         Physical Exam   BP: 111/74  Pulse: 61  Temp: 97.9  F (36.6  C)  Resp: 15  Height: 177.8 cm (5' 10\")  Weight: 59 kg (130 lb)  SpO2: 97 %  Physical Exam  Vitals and nursing note reviewed.   Constitutional:       General: He is not in acute distress.     Appearance: He is well-developed.   HENT: "      Head: Normocephalic and atraumatic.      Right Ear: External ear normal.      Left Ear: External ear normal.      Nose: Nose normal.      Mouth/Throat:      Mouth: Mucous membranes are dry.   Eyes:      Extraocular Movements: Extraocular movements intact.      Pupils: Pupils are equal, round, and reactive to light.   Cardiovascular:      Rate and Rhythm: Normal rate and regular rhythm.      Heart sounds: No murmur heard.  Pulmonary:      Effort: Pulmonary effort is normal.      Breath sounds: Normal breath sounds. No wheezing or rales.   Abdominal:      Tenderness: There is abdominal tenderness in the right lower quadrant and periumbilical area. There is guarding.   Musculoskeletal:      Cervical back: Normal range of motion.      Right lower leg: No edema.      Left lower leg: No edema.   Skin:     General: Skin is warm and dry.   Neurological:      General: No focal deficit present.      Mental Status: He is alert and oriented to person, place, and time.      Cranial Nerves: No cranial nerve deficit.   Psychiatric:         Mood and Affect: Mood normal.         Behavior: Behavior normal.         ED Course      Procedures                     Results for orders placed or performed during the hospital encounter of 03/26/22   CT Abdomen Pelvis w Contrast     Status: None    Narrative    EXAM: CT ABDOMEN PELVIS W CONTRAST  LOCATION: [facility]  DATE/TIME: 3/26/2022 5:35 PM    INDICATION: Abdominal pain.  COMPARISON: None.  TECHNIQUE: CT scan of the abdomen and pelvis was performed following injection of IV contrast. Multiplanar reformats were obtained. Dose reduction techniques were used.  CONTRAST: 70 mL Isovue 370    FINDINGS:   LOWER CHEST: Normal.    HEPATOBILIARY: Subcentimeter hypodense lesions in the liver are too small to adequately characterize. For example, there is a 5 mm lesion in the right hepatic lobe at the dome (series 5, image 24) and a 4 mm lesion more inferiorly in the  right lobe   (series 5, image 62). No calcified gallstones or biliary ductal dilatation.    PANCREAS: Normal.    SPLEEN: Normal.    ADRENAL GLANDS: Normal.    KIDNEYS/BLADDER: Left renal simple cyst requiring no specific follow-up. Mild right hydronephrosis. No calculi.    BOWEL: Several mildly dilated loops of small bowel dilated up to about 3 cm. Distended cecum measuring about 9 cm and mildly dilated ascending colon, transverse colon and descending colon. Colonic distention is to the level of an area of masslike wall   thickening in the proximal sigmoid colon (series 5, image 187-180 and series 3, image 59). The sigmoid colonic mass appears to extend extraluminally, best seen on the coronal image 59. Redundant sigmoid colon. Adjacent to the area of masslike wall   thickening in the sigmoid colon, there is a 5.0 x 3.5 cm collection of fluid and air (series 5, image 176) in the mid pelvis.    LYMPH NODES: Indeterminate retroperitoneal lymphadenopathy with a left para-aortic 1.5 x 1.1 cm lymph node (series 5, image 117).    VASCULATURE: Aneurysmal infrarenal abdominal aorta measuring 3.1 cm. Aortobiiliac atherosclerotic calcifications.    PELVIC ORGANS: Prostatomegaly. See discussion above regarding the sigmoid mass.    Other: Linear peritoneal thickening in the pelvis and mildly clustered appearance of small bowel loops in the pelvis are nonspecific, but could represent peritoneal carcinomatosis. Trace upper abdominal ascites.    MUSCULOSKELETAL: No suspicious lesions in the bones.      Impression    IMPRESSION:   1.  Nodular, masslike wall thickening in the sigmoid colon, concerning for malignancy. Resultant upstream small bowel and colonic obstruction.  2.  A 5 x 3.5 cm fluid collection adjacent to the sigmoid mass suspicious for an abscess and/or perforated malignancy.   3.  Linear peritoneal thickening and clustered appearance of small bowel loops in the pelvis is indeterminate, and could represent peritoneal  carcinomatosis. Trace upper abdominal ascites.  4.  Mildly enlarged retroperitoneal lymph nodes, indeterminate and could be metastatic.  5.  Few subcentimeter lesions in the liver are too small to adequately characterize. Continued attention on follow-up.  6.  Abdominal aortic aneurysm measuring 3.1 cm.   Basic metabolic panel     Status: Abnormal   Result Value Ref Range    Sodium 137 133 - 144 mmol/L    Potassium 4.3 3.4 - 5.3 mmol/L    Chloride 103 94 - 109 mmol/L    Carbon Dioxide (CO2) 25 20 - 32 mmol/L    Anion Gap 9 3 - 14 mmol/L    Urea Nitrogen 40 (H) 7 - 30 mg/dL    Creatinine 0.73 0.66 - 1.25 mg/dL    Calcium 9.0 8.5 - 10.1 mg/dL    Glucose 100 (H) 70 - 99 mg/dL    GFR Estimate >90 >60 mL/min/1.73m2   Hepatic function panel     Status: Abnormal   Result Value Ref Range    Bilirubin Total 0.5 0.2 - 1.3 mg/dL    Bilirubin Direct 0.2 0.0 - 0.2 mg/dL    Protein Total 7.0 6.8 - 8.8 g/dL    Albumin 2.7 (L) 3.4 - 5.0 g/dL    Alkaline Phosphatase 62 40 - 150 U/L    AST 17 0 - 45 U/L    ALT 15 0 - 70 U/L   [place] Draw     Status: None    Narrative    The following orders were created for panel order [place] Draw.  Procedure                               Abnormality         Status                     ---------                               -----------         ------                     Extra Blue Top Tube[[id number]]                              Final result               Extra Red Top Tube[[id number]]                               Final result                 Please view results for these tests on the individual orders.   CBC with platelets and differential     Status: Abnormal   Result Value Ref Range    WBC Count 12.5 (H) 4.0 - 11.0 10e3/uL    RBC Count 4.41 4.40 - 5.90 10e6/uL    Hemoglobin 13.4 13.3 - 17.7 g/dL    Hematocrit 40.5 40.0 - 53.0 %    MCV 92 78 - 100 fL    MCH 30.4 26.5 - 33.0 pg    MCHC 33.1 31.5 - 36.5 g/dL    RDW 13.8 10.0 - 15.0 %    Platelet Count 290 150 - 450 10e3/uL    % Neutrophils 85 %     % Lymphocytes 7 %    % Monocytes 8 %    % Eosinophils 0 %    % Basophils 0 %    % Immature Granulocytes 0 %    NRBCs per 100 WBC 0 <1 /100    Absolute Neutrophils 10.5 (H) 1.6 - 8.3 10e3/uL    Absolute Lymphocytes 0.9 0.8 - 5.3 10e3/uL    Absolute Monocytes 1.0 0.0 - 1.3 10e3/uL    Absolute Eosinophils 0.0 0.0 - 0.7 10e3/uL    Absolute Basophils 0.0 0.0 - 0.2 10e3/uL    Absolute Immature Granulocytes 0.0 <=0.4 10e3/uL    Absolute NRBCs 0.0 10e3/uL   Extra Blue Top Tube     Status: None   Result Value Ref Range    Hold Specimen JIC    Extra Red Top Tube     Status: None   Result Value Ref Range    Hold Specimen JIC    Asymptomatic COVID-19 Virus (Coronavirus) by PCR Nose     Status: Normal    Specimen: Nose; Swab   Result Value Ref Range    SARS CoV2 PCR Negative Negative    Narrative    Testing was performed using the leonor  SARS-CoV-2 & Influenza A/B Assay on the leonor  Shruti  System.  This test should be ordered for the detection of SARS-COV-2 in individuals who meet SARS-CoV-2 clinical and/or epidemiological criteria. Test performance is unknown in asymptomatic patients.  This test is for in vitro diagnostic use under the FDA EUA for laboratories certified under CLIA to perform moderate and/or high complexity testing. This test has not been FDA cleared or approved.  A negative test does not rule out the presence of PCR inhibitors in the specimen or target RNA in concentration below the limit of detection for the assay. The possibility of a false negative should be considered if the patient's recent exposure or clinical presentation suggests COVID-19.  [facility] are certified under the Clinical Laboratory Improvement Amendments of 1988 (CLIA-88) as qualified to perform moderate and/or high complexity laboratory testing.   CBC with Platelets & Differential     Status: Abnormal    Narrative    The following orders were created for panel order CBC with Platelets & Differential.  Procedure                                Abnormality         Status                     ---------                               -----------         ------                     CBC with platelets and d...[[id number]]  Abnormal            Final result                 Please view results for these tests on the individual orders.   ABO/Rh type and screen     Status: None (In process)    Narrative    The following orders were created for panel order ABO/Rh type and screen.  Procedure                               Abnormality         Status                     ---------                               -----------         ------                     Adult Type and Screen[[id number]]                            In process                   Please view results for these tests on the individual orders.     Medications - No data to display     Assessments & Plan (with Medical Decision Making)   Impression:  Older middle aged man with 4 days of abdominal distension, pain and bloating with decreased passage of gas and stool. He has a mass in the proximal sigmoid with pericolic fluid collection and proximal bowel distension. The CT is concerning for sigmoid malignancy with possible abscess and possible peritoneal carcinomatosis. He is expected by colorectal surgery and will be taken to the OR for urgent surgical management.    I have reviewed the nursing notes. I have reviewed the findings, diagnosis, plan and need for follow up with the patient.    New Prescriptions    No medications on file       Final diagnoses:   Colonic mass   Large bowel obstruction (H)       --  Chevy Duval  [facility] EMERGENCY DEPARTMENT  3/26/2022     Chevy Duval MD  03/26/22 2033

## 2022-03-27 NOTE — ANESTHESIA POSTPROCEDURE EVALUATION
Patient: Kalin Shepard    Procedure: Procedure(s):  exploratory laparotomy, small bowel resection, colostomy creation  Sigmoidoscopy flexible       Anesthesia Type:  General    Note:  Disposition: Inpatient   Postop Pain Control: Uneventful            Sign Out: Well controlled pain   PONV: No   Neuro/Psych: Uneventful            Sign Out: Acceptable/Baseline neuro status   Airway/Respiratory: Uneventful            Sign Out: Acceptable/Baseline resp. status   CV/Hemodynamics: Uneventful            Sign Out: Acceptable CV status; No obvious hypovolemia; No obvious fluid overload   Other NRE: NONE   DID A NON-ROUTINE EVENT OCCUR? No           Last vitals:  Vitals Value Taken Time   /78 03/27/22 0300   Temp 36.2  C (97.16  F) 03/27/22 0303   Pulse 76 03/27/22 0303   Resp 12 03/27/22 0303   SpO2 96 % 03/27/22 0303   Vitals shown include unvalidated device data.    Electronically Signed By: Francisco Srivastava MD  March 27, 2022  3:04 AM

## 2022-03-27 NOTE — OR NURSING
Patient started having frequent ectopy, EKG ordered and Dr. Srivastava reviewed. Ok to transfer to floor.

## 2022-03-27 NOTE — H&P
Attestation:  I have reviewed vital signs, medications, lab work, imaging. This patient has been seen and evaluated by me on rounds. I agree with the findings and plan in this note.    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 Last colonoscopy 20 years ago.  -To OR emergently for ex lap. Findings and plan discussed with patient, who agrees to proceed.    Riley Magdaleno MD  Division of Colon and Rectal Surgery  [facility]  [phone]           Colorectal Surgery Consult  2022    Kalin Shepard  : 1950    Date of Service: 3/26/2022 7:02 PM    Assessment and Plan:  Kalin Shepard is a 71 year old male who presented to [facility] ED with abdominal pain found to have 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 Also with concern for adjacent fluid collection/abscess.    -To OR for stat ex lap, likely colostomy creation.    Discussed with Dr. Rasta Bee MD  PGY-3, General Surgery    --------------------------------------------------------------------  History of Present Illness:    Kalin Shepard is a 71 year old male that presents with abdominal pain that started on 3/23. Initially concerned that he had a bowel obstruction. His last BM was Tuesday and has had minimal PO intake.     In ED, he was afebrile and hemodynamically normal. Labs were significant for WBC 12.5. He underwent CT AP with contrast and was found to have an obstructing sigmoid mass with adjacent 5 x 3.5 fluid collection concerning for perforated malignancy.     Past Medical History:  None    Past Surgical History  None    Family History:  Noncontributory    Social History:  Social History     Socioeconomic History     Marital status:      Spouse name: Not on file     Number of children: Not on file  "    Years of education: Not on file     Highest education level: Not on file   Occupational History     Not on file   Tobacco Use     Smoking status: Not on file     Smokeless tobacco: Not on file   Substance and Sexual Activity     Alcohol use: Not on file     Drug use: Not on file     Sexual activity: Not on file   Other Topics Concern     Not on file   Social History Narrative     Not on file     Social Determinants of Health     Financial Resource Strain: Not on file   Food Insecurity: Not on file   Transportation Needs: Not on file   Physical Activity: Not on file   Stress: Not on file   Social Connections: Not on file   Intimate Partner Violence: Not on file   Housing Stability: Not on file       Medications:  No current outpatient medications on file.       Allergies:   No Known Allergies    Review of Symptoms:  A 10 point review of symptoms has been conducted and is negative except for that mentioned in the above HPI.    Physical Exam:    /74   Pulse 61   Temp 97.9  F (36.6  C) (Oral)   Resp 15   Ht 1.778 m (5' 10\")   Wt 59 kg (130 lb)   SpO2 97%   BMI 18.65 kg/m    Gen:    Lying in bed in NAD, A&OX3  CV:  RRR  Pulm:  Non-labored breathing  Abd:  Distended diffusely tender, no incisions.  Ext:  Warm and well perfused, no obvious deformities    Labs:  CBC RESULTS:   Recent Labs   Lab Test 03/26/22  1554   WBC 12.5*   RBC 4.41   HGB 13.4   HCT 40.5   MCV 92   MCH 30.4   MCHC 33.1   RDW 13.8        Last Basic Metabolic Panel:  Lab Results   Component Value Date     03/26/2022      Lab Results   Component Value Date    POTASSIUM 4.3 03/26/2022     Lab Results   Component Value Date    CHLORIDE 103 03/26/2022     Lab Results   Component Value Date    ROXIE 9.0 03/26/2022     Lab Results   Component Value Date    CO2 25 03/26/2022     Lab Results   Component Value Date    BUN 40 03/26/2022     Lab Results   Component Value Date    CR 0.73 03/26/2022     Lab Results   Component Value Date    "  03/26/2022       Imaging:  CT AP  IMPRESSION:   1.  Nodular, masslike wall thickening in the sigmoid colon, concerning for malignancy. Resultant upstream small bowel and colonic obstruction.  2.  A 5 x 3.5 cm fluid collection adjacent to the sigmoid mass suspicious for an abscess and/or perforated malignancy.   3.  Linear peritoneal thickening and clustered appearance of small bowel loops in the pelvis is indeterminate, and could represent peritoneal carcinomatosis. Trace upper abdominal ascites.  4.  Mildly enlarged retroperitoneal lymph nodes, indeterminate and could be metastatic.  5.  Few subcentimeter lesions in the liver are too small to adequately characterize. Continued attention on follow-up.  6.  Abdominal aortic aneurysm measuring 3.1 cm.

## 2022-03-27 NOTE — ANESTHESIA PREPROCEDURE EVALUATION
Anesthesia Pre-Procedure Evaluation    Patient: Kalin Shepard   MRN: [id number] : 1950        Procedure : Procedure(s):  exploratory laparotomy, possible bowel resection, possible colostomy          No past medical history on file.   No past surgical history on file.   No Known Allergies   Social History     Tobacco Use     Smoking status: Not on file     Smokeless tobacco: Not on file   Substance Use Topics     Alcohol use: Not on file      Wt Readings from Last 1 Encounters:   22 59 kg (130 lb)        Anesthesia Evaluation            ROS/MED HX  ENT/Pulmonary:       Neurologic:       Cardiovascular:       METS/Exercise Tolerance:     Hematologic:       Musculoskeletal:       GI/Hepatic: Comment: Obstructing sigmoid mass, probable perforation and abscess      Renal/Genitourinary:       Endo:       Psychiatric/Substance Use:       Infectious Disease:       Malignancy: Comment: Probable colon cancer      Other:               OUTSIDE LABS:  CBC:   Lab Results   Component Value Date    WBC 12.5 (H) 2022    HGB 13.4 2022    HCT 40.5 2022     2022     BMP:   Lab Results   Component Value Date     2022    POTASSIUM 4.3 2022    CHLORIDE 103 2022    CO2 25 2022    BUN 40 (H) 2022    CR 0.73 2022     (H) 2022     COAGS: No results found for: PTT, INR, FIBR  POC: No results found for: BGM, HCG, HCGS  HEPATIC:   Lab Results   Component Value Date    ALBUMIN 2.7 (L) 2022    PROTTOTAL 7.0 2022    ALT 15 2022    AST 17 2022    ALKPHOS 62 2022    BILITOTAL 0.5 2022     OTHER:   Lab Results   Component Value Date    ROXIE 9.0 2022       Anesthesia Plan    ASA Status:  3, emergent    NPO Status:  ELEVATED Aspiration Risk/Unknown    Anesthesia Type: General.     - Airway: ETT   Induction: RSI, Intravenous, Propofol.   Maintenance: Balanced.   Techniques and Equipment:     -  Lines/Monitors: 2nd IV     Consents    Anesthesia Plan(s) and associated risks, benefits, and realistic alternatives discussed. Questions answered and patient/representative(s) expressed understanding.    - Discussed:     - Discussed with:  Patient      - Extended Intubation/Ventilatory Support Discussed: Yes.      - Patient is DNR/DNI Status: No    Use of blood products discussed: Yes.     - Discussed with: Patient.     - Consented: consented to blood products            Reason for refusal: other.     Postoperative Care    Pain management: IV analgesics.   PONV prophylaxis: Ondansetron (or other 5HT-3), Dexamethasone or Solumedrol     Comments:           H&P reviewed: Unable to attach H&P to encounter due to EHR limitations. H&P Update: appropriate H&P reviewed, patient examined. No interval changes since H&P (within 30 days).         Francisco Srivastava MD

## 2022-03-27 NOTE — OP NOTE
[facility] Colorectal Surgery Operative Report  March 27, 2022    PREOPERATIVE DIAGNOSIS:  1. Large bowel obstruction    POSTOPERATIVE DIAGNOSIS:   1. Sigmoid colon obstruction secondary to fungating mass  2. Small bowel obstruction due to involved adjacent loop of terminal ileum  3. Pelvic abscess    PROCEDURE:  1. Exploratory laparotomy  2. Small bowel resection  3. Flexible sigmoidoscopy  4. Loop descending colostomy     ANESTHESIA: General endotracheal anesthesia    SURGEON:  Riley Magdaleno M.D.    ASSISTANT(S): ELLA Olson Fellow    INDICATIONS FOR PROCEDURE  Kalin Shepard is a 71 year old male who was emergently transferred from [facility] with large bowel obstruction that was concerning for a perforated and obstructing colon cancer with cecal dilation to 9 cm. Thus, I transferred him emergently, and he was taken to the operating room for exploration. I thoroughly discussed the risks, benefits, and alternatives of operative treatment with the patient and he agreed to proceed.    General risks related to abdominal surgery were reviewed with the patient. These include, but are not limited to, death, myocardial infarction, pneumonia, urinary tract infection, deep venous thrombosis with or without pulmonary embolus, abdominal infection from bowel injury or abscess, fistula, anastomotic leak that may require reoperation and a stoma, ureteral injury, bowel obstruction, wound infection, and bleeding.    OPERATIVE PROCEDURE:  After obtaining informed consent, the patient was brought to the operating room and placed in the lithotomy position. Appropriate preoperative mechanical and chemical deep venous thrombosis prophylaxis, as well as preoperative prophylactic parenteral antibiotics were given. General endotracheal anesthesia was gently induced. Bilateral lower extremity pneumatic compression devices were applied and all pressure points were cushioned. A Castro catheter was inserted without difficulty. The  "abdomen was then preped and draped in the standard sterile fashion. After a \"time-out\" was performed, the peritoneal cavity was entered through a vertical midline incision. Upon entry, we did identify some murky fluid. This was aspirated, and sent for cytology, gram stain, aerobic and anaerobic cultures. The large bowel and small bowel was all diffusely dilated. The Uziel retractor was placed for visualization. There was a clear area of tethering at the terminal ileum to a mass in the sigmoid colon. The small bowel proximal to this site was very dilated, and distal was decompressed. This was hindering our visualization, thus, its antimesenteric attachment to the pelvic mass and abscess was bluntly broken, at which time the 5 cm abscess corresponding to the CT scan was seen. The tethered small bowel segment was clearly nonviable, thus this segment was resected. A clear healthy area proximal and distal to the attached segment was selected, mesenteric windows made, and each side transected with the ALYX 75 mm blue load stapler. The corresponding mesentery was ligated with the impact ligasure, and the specimen was passed off. This was approximately a 20 cm segment of terminal ileum. We then milked the proximal dilated small bowel with its contents up to the stomach which decompressed the small intestine to allow for improved visualization. The small bowel was run in its entirety, the stomach, liver and spleen were palpated, and there was no gross evidence of any metastatic disease present. It is also important to note that he had several uncommon congenital mesenteric attachments. Most notably, his mid to distal sigmoid colon was densely attached to the terminal ileum. These attachments were taken down to allow for adequate evaluation of the sigmoid colon.    Next, we turned our attention to the colon, which we could now evaluate. The proximal sigmoid colon was densely adhered to the left inguinal area just anterior and " medial to the left external iliac artery. The mass was approximately baseball sized, and more concerning was its depth of invasion into anterior structures. Specifically, I was concerned about potential femoral structures as well as bladder involvement. Adjacent to this mass was the 5 cm pelvic abscess that was situated at the anterior peritoneal reflection, with purulent murky drainage. This was thoroughly irrigated with warm water. We then performed a limited colonic mobilization to allow for more complete evaluation of the sigmoid colon. We performed a lateral to medial mobilization, and identified and protected the left ureter. Upon doing so. it was apparent that given the size and depth of the invasion, an R0 resection at this time was unlikely, and potentially dangerous given the depth and proximity of the mass to critical structures in the left lower quadrant. The peritoneum and tissue adjacent to the colonic mass did not appear normal, but was thickened and inflamed. This tissue was also biopsied to rule out malignancy.    To obtain visualization of the presumed malignancy, we performed a flexible sigmoidoscopy. The colonoscope was inserted in the rectum which was filed with inspissated stool. With copious irrigation and assistance from the abdomen, we were able to navigate the tortuous sigmoid colon, to identify the mass at ~60 cm from the anal verge by colonoscopy. Upon direct visualization, it appeared to be a large, obstructing, fungating cancer. The mass was extensively biopsied with endoscopic cold forceps. Several pictures were taken as well, uploaded to Epic. The endoscopy was then terminated.    The abdomen was reinspected. Notably the colon was inspected for continued viability. Given more congenital bands over the cecum and ascending colon, these were taken down and the right colon was mobilized to evaluate the cecum and ascending colon. These appeared healthy and viable. Upon inspecting the  transverse colon, there was a serosal tear, likely from the extent of dilation. This was repaired with several 3-0 PDS in Lembert fashion. The remaining colon was viable. A site at the descending colon was selected for our colostomy. A pursestring suture was placed and a colotomy was made. The colon was then decompressed. The small bowel ends were then anastomosed in side to side, functional end to end configuration. The bowel was tacked together at the anti-mesenteric aspect.  Enterotomies were made on each piece of bowel and a 75 mm Blue load ALYX stapler was fired to create the enteroenterostomy. Mesentery was not incorporated in the anastomosis. The anastomosis was evaluated without any evidence of bleeding. The common enterotomy defect was closed using TA 90 blue blue load endoGIA stapler. The crotch of the anastomosis was reinforced with 3-0 PDS x2.  Areas of bleeding were reinforced with 3-0 PDS.     Peritoneal lavage was performed using 3 L of water. The abdomen was inspected for hemostasis.  Two 19 F MORIS drains were then positioned through the right lower quadrant into the pelvic abscess cavity adjacent to the tumor. A site in the right lower quadrant was selected for the loop colostomy. A circular incision was made and skin resected. Dissection was carried down, the anterior fascia incised, muscle bluntly dissected, and posterior fascia incised to accommodate at least two finger breadths. The loop of colon was brought comfortably through the skin. The fascia was closed using 0 PDS and the subcutaneous tissue was irrigated with saline.  The skin was closed loosely with skin staples.     The loop colostomy was then matured in standard Nelia fashion, and was confirmed to be widely patent through both limbs easily down through the fascia.     Sterile dressings were applied as well as an ostomy appliance.  The patient tolerated the procedure well. I was scrubbed for all critical components of the operation. All  sponge and needle counts were correct x 2 at the end of the procedure.      COMPLICATIONS: none.    ESTIMATED BLOOD LOSS: 100 mL.    REPLACEMENT:     - Crystalloid: 5000 mL.     - Colloid: 500 mL.     - Blood products: 0.    URINE OUTPUT: 340 mL    SPECIMEN(S): small bowel, colon mass biopsies, peritoneal biopsies, peritoneal fluid    OPERATIVE COUNT: Complete.    OPERATIVE FINDINGS:   1. 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 This required small bowel resected and primary anastomosis.  2. Tumor was too large and locally advanced to allow for an R0 resection. Also, the extent of invasion to critical anterior and inguinal structures was unable to be assessed fully, but highly concerning for extensive involvement.   3. No obvious metastatic disease, but pelvic and left lower quadrant involvement was extensive.   4. Drains x2 placed in pelvis at abscess cavity site.    Riley Magdaleno MD  Division of Colon and Rectal Surgery  [facility]  [phone]

## 2022-03-27 NOTE — PROGRESS NOTES
peritoneal fluid from colo/rect surgery completed this am.  Positive for 1+ lactose fermenting gm neg bacilli and in broth only gm pos bacilli and gm pos cocci.  Paged to Dr Casey [phone]. No new orders at this time.

## 2022-03-28 ENCOUNTER — APPOINTMENT (OUTPATIENT)
Dept: OCCUPATIONAL THERAPY | Facility: CLINIC | Age: 72
DRG: 329 | End: 2022-03-28
Attending: SURGERY
Payer: COMMERCIAL

## 2022-03-28 ENCOUNTER — APPOINTMENT (OUTPATIENT)
Dept: GENERAL RADIOLOGY | Facility: CLINIC | Age: 72
DRG: 329 | End: 2022-03-28
Payer: COMMERCIAL

## 2022-03-28 LAB
ANION GAP SERPL CALCULATED.3IONS-SCNC: 7 MMOL/L (ref 3–14)
ATRIAL RATE - MUSE: 80 BPM
BUN SERPL-MCNC: 36 MG/DL (ref 7–30)
CALCIUM SERPL-MCNC: 8.3 MG/DL (ref 8.5–10.1)
CHLORIDE BLD-SCNC: 109 MMOL/L (ref 94–109)
CO2 SERPL-SCNC: 24 MMOL/L (ref 20–32)
CREAT SERPL-MCNC: 0.71 MG/DL (ref 0.66–1.25)
DIASTOLIC BLOOD PRESSURE - MUSE: NORMAL MMHG
ERYTHROCYTE [DISTWIDTH] IN BLOOD BY AUTOMATED COUNT: 14.5 % (ref 10–15)
GFR SERPL CREATININE-BSD FRML MDRD: >90 ML/MIN/1.73M2
GLUCOSE BLD-MCNC: 94 MG/DL (ref 70–99)
GLUCOSE BLDC GLUCOMTR-MCNC: 88 MG/DL (ref 70–99)
GLUCOSE BLDC GLUCOMTR-MCNC: 94 MG/DL (ref 70–99)
HCT VFR BLD AUTO: 35.7 % (ref 40–53)
HGB BLD-MCNC: 11.1 G/DL (ref 13.3–17.7)
INTERPRETATION ECG - MUSE: NORMAL
MAGNESIUM SERPL-MCNC: 2.6 MG/DL (ref 1.6–2.3)
MCH RBC QN AUTO: 29.9 PG (ref 26.5–33)
MCHC RBC AUTO-ENTMCNC: 31.1 G/DL (ref 31.5–36.5)
MCV RBC AUTO: 96 FL (ref 78–100)
P AXIS - MUSE: 81 DEGREES
PHOSPHATE SERPL-MCNC: 2.4 MG/DL (ref 2.5–4.5)
PLATELET # BLD AUTO: 315 10E3/UL (ref 150–450)
POTASSIUM BLD-SCNC: 4.5 MMOL/L (ref 3.4–5.3)
PR INTERVAL - MUSE: 152 MS
PREALB SERPL IA-MCNC: 4 MG/DL (ref 15–45)
QRS DURATION - MUSE: 108 MS
QT - MUSE: 416 MS
QTC - MUSE: 480 MS
R AXIS - MUSE: 95 DEGREES
RBC # BLD AUTO: 3.71 10E6/UL (ref 4.4–5.9)
SODIUM SERPL-SCNC: 140 MMOL/L (ref 133–144)
SYSTOLIC BLOOD PRESSURE - MUSE: NORMAL MMHG
T AXIS - MUSE: 80 DEGREES
TRIGL SERPL-MCNC: 97 MG/DL
VENTRICULAR RATE- MUSE: 80 BPM
WBC # BLD AUTO: 11.9 10E3/UL (ref 4–11)

## 2022-03-28 PROCEDURE — 97530 THERAPEUTIC ACTIVITIES: CPT | Mod: GO

## 2022-03-28 PROCEDURE — 258N000003 HC RX IP 258 OP 636: Performed by: SURGERY

## 2022-03-28 PROCEDURE — 272N000451 HC KIT SHRLOCK 5FR POWER PICC DOUBLE LUMEN

## 2022-03-28 PROCEDURE — G0463 HOSPITAL OUTPT CLINIC VISIT: HCPCS

## 2022-03-28 PROCEDURE — 272N000278 HC DEVICE 5FR SECURACATH

## 2022-03-28 PROCEDURE — 272N000019 HC KIT OPEN ENDED DOUBLE LUMEN

## 2022-03-28 PROCEDURE — 250N000009 HC RX 250: Performed by: SURGERY

## 2022-03-28 PROCEDURE — 83735 ASSAY OF MAGNESIUM: CPT

## 2022-03-28 PROCEDURE — 82310 ASSAY OF CALCIUM: CPT

## 2022-03-28 PROCEDURE — 272N000201 ZZ HC ADHESIVE SKIN CLOSURE, DERMABOND

## 2022-03-28 PROCEDURE — 999N000065 XR CHEST PORT 1 VIEW

## 2022-03-28 PROCEDURE — 250N000011 HC RX IP 250 OP 636: Performed by: SURGERY

## 2022-03-28 PROCEDURE — 36415 COLL VENOUS BLD VENIPUNCTURE: CPT

## 2022-03-28 PROCEDURE — C9113 INJ PANTOPRAZOLE SODIUM, VIA: HCPCS | Performed by: SURGERY

## 2022-03-28 PROCEDURE — 250N000009 HC RX 250

## 2022-03-28 PROCEDURE — 999N000199 HC STATISTIC WOC PT EDUCATION, 31-45 MIN

## 2022-03-28 PROCEDURE — 120N000002 HC R&B MED SURG/OB UMMC

## 2022-03-28 PROCEDURE — 71045 X-RAY EXAM CHEST 1 VIEW: CPT | Mod: 26 | Performed by: RADIOLOGY

## 2022-03-28 PROCEDURE — 97165 OT EVAL LOW COMPLEX 30 MIN: CPT | Mod: GO

## 2022-03-28 PROCEDURE — 272N000103 HC INTRODUCER MICRO SET

## 2022-03-28 PROCEDURE — 258N000003 HC RX IP 258 OP 636

## 2022-03-28 PROCEDURE — 84100 ASSAY OF PHOSPHORUS: CPT

## 2022-03-28 PROCEDURE — 85027 COMPLETE CBC AUTOMATED: CPT

## 2022-03-28 PROCEDURE — 3E0436Z INTRODUCTION OF NUTRITIONAL SUBSTANCE INTO CENTRAL VEIN, PERCUTANEOUS APPROACH: ICD-10-PCS | Performed by: SURGERY

## 2022-03-28 PROCEDURE — 84478 ASSAY OF TRIGLYCERIDES: CPT

## 2022-03-28 PROCEDURE — 36569 INSJ PICC 5 YR+ W/O IMAGING: CPT

## 2022-03-28 RX ORDER — LIDOCAINE 40 MG/G
CREAM TOPICAL
Status: ACTIVE | OUTPATIENT
Start: 2022-03-28 | End: 2022-03-31

## 2022-03-28 RX ORDER — LIDOCAINE 4 G/G
1 PATCH TOPICAL
Status: DISCONTINUED | OUTPATIENT
Start: 2022-03-28 | End: 2022-04-05 | Stop reason: HOSPADM

## 2022-03-28 RX ORDER — DEXTROSE MONOHYDRATE 100 MG/ML
INJECTION, SOLUTION INTRAVENOUS CONTINUOUS PRN
Status: DISCONTINUED | OUTPATIENT
Start: 2022-03-28 | End: 2022-04-05 | Stop reason: HOSPADM

## 2022-03-28 RX ADMIN — PANTOPRAZOLE SODIUM 40 MG: 40 INJECTION, POWDER, FOR SOLUTION INTRAVENOUS at 15:47

## 2022-03-28 RX ADMIN — PIPERACILLIN AND TAZOBACTAM 3.38 G: 3; .375 INJECTION, POWDER, LYOPHILIZED, FOR SOLUTION INTRAVENOUS at 03:41

## 2022-03-28 RX ADMIN — PIPERACILLIN AND TAZOBACTAM 3.38 G: 3; .375 INJECTION, POWDER, LYOPHILIZED, FOR SOLUTION INTRAVENOUS at 09:50

## 2022-03-28 RX ADMIN — ENOXAPARIN SODIUM 40 MG: 40 INJECTION SUBCUTANEOUS at 20:34

## 2022-03-28 RX ADMIN — I.V. FAT EMULSION 250 ML: 20 EMULSION INTRAVENOUS at 20:34

## 2022-03-28 RX ADMIN — SODIUM CHLORIDE: 900 INJECTION INTRAVENOUS at 08:03

## 2022-03-28 RX ADMIN — PIPERACILLIN AND TAZOBACTAM 3.38 G: 3; .375 INJECTION, POWDER, LYOPHILIZED, FOR SOLUTION INTRAVENOUS at 22:26

## 2022-03-28 RX ADMIN — SODIUM CHLORIDE, POTASSIUM CHLORIDE, SODIUM LACTATE AND CALCIUM CHLORIDE 1000 ML: 600; 310; 30; 20 INJECTION, SOLUTION INTRAVENOUS at 17:46

## 2022-03-28 RX ADMIN — PIPERACILLIN AND TAZOBACTAM 3.38 G: 3; .375 INJECTION, POWDER, LYOPHILIZED, FOR SOLUTION INTRAVENOUS at 15:51

## 2022-03-28 RX ADMIN — POTASSIUM PHOSPHATE, MONOBASIC AND POTASSIUM PHOSPHATE, DIBASIC 9 MMOL: 224; 236 INJECTION, SOLUTION, CONCENTRATE INTRAVENOUS at 13:11

## 2022-03-28 RX ADMIN — MAGNESIUM SULFATE HEPTAHYDRATE: 500 INJECTION, SOLUTION INTRAMUSCULAR; INTRAVENOUS at 20:23

## 2022-03-28 ASSESSMENT — ACTIVITIES OF DAILY LIVING (ADL)
ADLS_ACUITY_SCORE: 11

## 2022-03-28 NOTE — CONSULTS
Care Management Initial Consult    General Information  Assessment completed with: Care Team Member, LORI-chart review,    Type of CM/SW Visit: Initial Assessment    Primary Care Provider verified and updated as needed: Yes   Readmission within the last 30 days:        Reason for Consult: discharge planning  Advance Care Planning:          Communication Assessment  Patient's communication style: spoken language (English or Bilingual)    Hearing Difficulty or Deaf: no   Wear Glasses or Blind: no    Cognitive  Cognitive/Neuro/Behavioral: WDL  Level of Consciousness: lethargic                 Living Environment:   People in home: spouse (Irina)     Current living Arrangements: house      Able to return to prior arrangements: yes     Family/Social Support:  Supportive per report  Care provided by:    Provides care for:    Marital Status:    (Spouse, family and friends.)          Description of Support System: Supportive       Current Resources:   Patient receiving home care services: No     Employment/Financial:  Employment Status:          Financial Concerns:   None mentioned or discussed.      Lifestyle & Psychosocial Needs:  (From Chart Flow Sheet)  Social Determinants of Health     Tobacco Use: High Risk     Smoking Tobacco Use: Current Every Day Smoker     Smokeless Tobacco Use: Unknown   Alcohol Use: Not on file   Financial Resource Strain: Not on file   Food Insecurity: Not on file   Transportation Needs: Not on file   Physical Activity: Not on file   Stress: Not on file   Social Connections: Not on file   Intimate Partner Violence: Not on file   Depression: Not on file   Housing Stability: Not on file     Functional Status:  Prior to admission patient needed assistance: independent      Mental Health Status:  Mental Health Status: No Current Concerns       Chemical Dependency Status: refer to flow sheet above prn.        Values/Beliefs:  Spiritual, Cultural Beliefs, Bahai Practices, Values that affect  care: no               Additional Information:   Inpatient cares continue per MD team plans of care. WOC RN Consult Team pending for new colostomy education reinforcement.  Care Management Dept will continue to follow to assist with community home care support for new ostomy education support in home setting once stable for discharge this post operative period.    VEL Samuels.PETR., R.N., P.H.N..  Care Coordinator     Pager   [facility]

## 2022-03-28 NOTE — PLAN OF CARE
POD 1 s/p exploratory laparotomy, small bowel resection, colostomy creation, sigmoidoscopy flexible.  Lethargic, AVSS, not OOB overnight.  Abdominal pain somewhat managed with PCA.  Abdomen flat, hypo faint bowel sounds, new colostomy with serosang drainage and no gas. JPx2 with serosang drainage.  NPO+ice, MIVF at 75/hr, NG to LIS with green/brown output, flushed q shift.   Denies nausea.  Castro with marginal output, on-call MD paged, no new orders received, ok with UOP, will continue to monitor.  PLAN: Monitor I/Os, await ROBF.

## 2022-03-28 NOTE — PROCEDURES
[facility]    Double Lumen PICC Placement    Date/Time: 3/28/2022 12:32 PM  Performed by: Arnulfo Bautista RN  Authorized by: Riley Magdaleno MD   Indications: vascular access      UNIVERSAL PROTOCOL   Site Marked: Yes  Prior Images Obtained and Reviewed:  Yes  Required items: Required blood products, implants, devices and special equipment available    Patient identity confirmed:  Verbally with patient and arm band  NA - No sedation, light sedation, or local anesthesia  Confirmation Checklist:  Patient's identity using two indicators, relevant allergies, procedure was appropriate and matched the consent or emergent situation and correct equipment/implants were available  Time out: Immediately prior to the procedure a time out was called    Universal Protocol: the Joint Commission Universal Protocol was followed    Preparation: Patient was prepped and draped in usual sterile fashion       ANESTHESIA    Anesthesia: Local infiltration  Local Anesthetic:  Lidocaine 1% without epinephrine  Anesthetic Total (mL):  5      SEDATION    Patient Sedated: No        Preparation: skin prepped with ChloraPrep  Skin prep agent: skin prep agent completely dried prior to procedure  Sterile barriers: maximum sterile barriers were used: cap, mask, sterile gown, sterile gloves, and large sterile sheet  Hand hygiene: hand hygiene performed prior to central venous catheter insertion  Type of line used: PICC and Power PICC  Catheter type: double lumen  Lumen type: non-valved  Catheter size: 5 Fr  Brand: [employer]  Lot number: [id number]  Placement method: venipuncture, MST, ultrasound and tip confirmation system  Number of attempts: 1  Successful placement: yes  Orientation: left  Location: brachial vein (medial) (vd 0.53 cm)  Arm circumference: adults 10 cm  Extremity circumference: 22  Visible catheter length: 3  Total catheter length: 49  Dressing and securement: blood cleaned  with CHG, blood removed, chlorhexidine disc applied, dressing applied, glue, line secured, securement device, site cleaned and sterile dressing applied  Post procedure assessment: blood return through all ports and free fluid flow (3cg technology)  PROCEDURE   Patient Tolerance:  Patient tolerated the procedure well with no immediate complicationsDescribe Procedure: PICC ok to use

## 2022-03-28 NOTE — PLAN OF CARE
Patient ambulated in halls with 1 assist, Castro catheter discontinued at appx 12:45, no void yet. Had a PICC line placed today, will start TPN this evening. The WOC nurse changed patients colostomy bag this morning, it has no gas or stool yet. NG to LIS. 2 MORIS's to bulb suction. Sats in the 91-93 range on room air, on 1L sats are 97%, encouraged IS. PCA is managing pain. K phos being replaced.

## 2022-03-28 NOTE — PLAN OF CARE
Goal Outcome Evaluation:    Plan of Care Reviewed With: patient     Patient to start TPN with lipids this evening. Patient states that the addition of the lidocaine patch seems to be helping his pain. When asked where his pain was, patient stated near his new abdominal incision, however, patient had writer place his lidocaine patch behind his right shoulder blade. Patient is still receiving pain medication through his PCA. Patient's NG is to LIS, no complaints- will continue to monitor.

## 2022-03-28 NOTE — PROVIDER NOTIFICATION
Paged surg on-call re: low UOP.  Spoke with YADY Christina MD, ok with UOP, no new orders received.

## 2022-03-28 NOTE — PROGRESS NOTES
"Colorectal Surgery Progress Note  [facility]  March 27, 2022  POD#1      SUBJECTIVE Pain controlled with PCA. Using abdominal binder. Bowel sweat in ostomy bag only, no bowel movements or gas. NG in place with brown/green output, flushed. Castro with good urine output.      OBJECTIVE:    Vitals:  BP 99/48 (BP Location: Left arm)   Pulse 76   Temp 97.6  F (36.4  C) (Temporal)   Resp 14   Ht 1.778 m (5' 10\")   Wt 59 kg (130 lb)   SpO2 97%, 1.5 LPM NC   BMI 18.65 kg/m    I/O:  I/O last 3 completed shifts:  In: 2116 [P.O.:30; I.V.:2026; NG/GT:60]  Out: 1450 [Urine:770; Emesis/NG output:550; Drains:130]    Physical Exam:  Gen: AAOx3, NAD  Pulm: Non-labored breathing  CV:      RRR by RP, no sig lower extremity edema  Abd: Soft, moderately distended, tender, no guarding/rebound. Abdominal binder removed for exam,    Incision C/D/I    Stoma pink and viable, no stool.    MORIS drains, serosanguinous   Ext:  Warm and well-perfused    BMP  Recent Labs   Lab 03/27/22  0753 03/26/22  1554    137   POTASSIUM 4.5 4.3   CHLORIDE 105 103   CO2 22 25   BUN 32* 40*   CR 0.66 0.73   * 100*   PHOS 3.5  --      CBC  Recent Labs   Lab 03/27/22  0940 03/26/22  1554   WBC 13.2* 12.5*   HGB 12.3* 13.4   HCT 39.2* 40.5    290       ASSESSMENT:   71 year old male who presented to OSH ED with abdominal pain found to have 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 S/p exploratory laparotomy, small bowel resection, colostomy creation, flex sigmoidoscopy overnight on 3/26-3/27. Path results pending. Hemodynamically stable on POD#1. Will plan to start nutritional support today in setting of 40lb weight loss and likely ileus.     Plan:     Neuro/Pain: Dilaudid PCA  CV: RRR.  Briefly went into afib during OR, not noted since. Continue to monitor, EKG if chest pain or palpitations.   PULM: encourage IS.  Nutr/GI: NPO. NGT to LIS. Ordered PICC " and TPN   - Prealbumin pending  /Lytes/IVF: NS @ 75ml/hr. UOP adequate, continue jo.   - CEA 2.1  - Daily labs x 3 days.   Heme: Hgb post op 12.3.   ID: IV zosyn x 3 days.   Endocrine: No concerns   T/L/D: MORIS x2. Colostomy pink, no stool/gas output.  Activity: as tolerated.  Ppx: lovenox  Dispo: Pending return of bowel function, TPN, ability to tolerate PO.       Patient was seen and discussed on rounds with resident Dr. Kenney and fellow Dr. Groves who will discuss with staff.     Reema Dominique  Medical Student  [facility]     Resident Attestation   I, Sharif Kenney, was present with the medical student who participated in the service and in the documentation of the note. I have verified the history and personally performed the physical exam and medical decision making. Addenda have been made to the note as appropriate. I agree with the assessment and plan of care as documented in the note.      Sharif Kenney MD  General Surgery PGY1  Date of Service: 03/28/2022

## 2022-03-28 NOTE — PROGRESS NOTES
03/28/22 1100   Quick Adds   Type of Visit Initial Occupational Therapy Evaluation       Present no   Living Environment   People in Home child(luna), dependent;spouse   Current Living Arrangements house   Home Accessibility wheelchair accessible   Transportation Anticipated agency   Living Environment Comments [place] with his wife and two teenage children. There are no stairs to enter or within the house and there are tub showers and walk-in showers in the bathrooms.   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 He reports utilizing a driving agency to get to work. He is responsible for splitting and transporting wood and meal preparation. His wife takes care of the animals. Pt and his wife share responsibilities for other IADLs. Wife is able to provide some assistance with heavier ADLs as needed throughout recovery.    General Information   Onset of Illness/Injury or Date of Surgery 03/26/22   Referring Physician Riley Magdaleno MD   Patient/Family Therapy Goal Statement (OT) Regain ADL IND and return home safely.   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 S/p exploratory  laparotomy, small bowel resection, colostomy creation, flex sigmoidoscopy overnight on 3/26-3/27. Path results pending. Hemodynamically stable on POD#1. Will plan to start nutritional support today in setting of 40lb weight loss and likely ileus.    Existing Precautions/Restrictions abdominal   Left Upper Extremity (Weight-bearing Status) full weight-bearing (FWB)   Right Upper Extremity (Weight-bearing Status) full weight-bearing (FWB)   Left Lower Extremity (Weight-bearing Status) full weight-bearing (FWB)   Right Lower Extremity (Weight-bearing Status) full weight-bearing (FWB)   Cognitive Status Examination   Orientation Status orientation to person, place and time   Affect/Mental Status (Cognitive) WNL   Follows Commands WNL   Cognitive Status Comments Pt reports brain fog with hospitalization and a few days prior to admission.   Visual Perception   Visual Impairment/Limitations corrective lenses for reading   Sensory   Sensory Quick Adds No deficits were identified   Pain Assessment   Patient Currently in Pain Yes, see Vital Sign flowsheet   Integumentary/Edema   Integumentary/Edema no deficits were identifed   Posture   Posture not impaired   Range of Motion Comprehensive   General Range of Motion no range of motion deficits identified   Strength Comprehensive (MMT)   General Manual Muscle Testing (MMT) Assessment no strength deficits identified   Comment, General Manual Muscle Testing (MMT) Assessment Not formally assessed 2/2 abdominal precautions but appears to be WNL upon observation.   Muscle Tone Assessment   Muscle Tone Quick Adds No deficits were identified   Coordination   Upper Extremity Coordination No deficits were identified   Coordination Comments Not tested   Bed Mobility   Comment (Bed Mobility) SBA with vc for log roll technique   Transfers   Transfer Comments SBA with vc for adherance to abd prec   Balance   Balance Comments SBA   Activities of Daily Living   BADL Assessment/Intervention  bathing;lower body dressing;grooming;toileting   Bathing Assessment/Intervention   Comment, (Bathing) Ax1 within abd prec   Lower Body Dressing Assessment/Training   Comment, (Lower Body Dressing) Ax1 within abd prec   Grooming Assessment/Training   Comment, (Grooming) Ax1, unable to perform OOB this date   Toileting   Comment, (Toileting) Ax1 within abd prec   Clinical Impression   Criteria for Skilled Therapeutic Interventions Met (OT) Yes, treatment indicated   OT Diagnosis Decreased ADL/IADL IND   OT Problem List-Impairments impacting ADL problems related to;activity tolerance impaired;pain;post-surgical precautions   Assessment of Occupational Performance 5 or more Performance Deficits   Identified Performance Deficits LB dressing, LB bathing, grooming/hygiene, toileting, functional mobility, home mgmt   Planned Therapy Interventions (OT) ADL retraining;IADL retraining;home program guidelines;progressive activity/exercise;bed mobility training;transfer training   Clinical Decision Making Complexity (OT) low complexity   Anticipated Equipment Needs Upon Discharge (OT) shower chair   Risk & Benefits of therapy have been explained evaluation/treatment results reviewed;care plan/treatment goals reviewed;risks/benefits reviewed;current/potential barriers reviewed;participants voiced agreement with care plan;participants included;patient   OT Discharge Planning   OT Discharge Recommendation (DC Rec) Transitional Care Facility;home with assist   OT Rationale for DC Rec Pt below baseline level for ADL IND and functional mobility, is primarily limited by pain and post-surgical precautions this date. Pt would benefit from continued IP therapies this hospital stay to support return to PLOF for functional ax tolerance, mobility, and ADL/IADL IND. Anticipate that with continued IP therapies, pt may be able to return home safely with A for heavier IADL.   OT Brief overview of current status Ax1   Total Evaluation Time  (Minutes)   Total Evaluation Time (Minutes) 5   OT Goals   Therapy Frequency (OT) Daily   OT Predicated Duration/Target Date for Goal Attainment 04/04/22   OT Goals Hygiene/Grooming;Lower Body Dressing;Upper Body Bathing;Toilet Transfer/Toileting;Lower Body Bathing;Bed Mobility;Transfers;Meal Preparation;Home Management   OT: Hygiene/Grooming independent;while standing   OT: Lower Body Dressing Independent;Modified independent;within precautions   OT: Upper Body Bathing Independent   OT: Lower Body Bathing Modified independent   OT: Bed Mobility Independent   OT: Transfer Independent   OT: Toilet Transfer/Toileting Independent   OT: Meal Preparation Independent;with simple meal preparation   OT: Home Management Independent;with light demand household tasks

## 2022-03-28 NOTE — CONSULTS
"  WO Nurse Inpatient [facility]   WO Nurse Inpatient Adult     Initial Assessment   Assessment of new loop Colostomy Stoma complication(s) none   Mucocutaneous junction; intact   Peristomal complication(s) none   Pouch wear time:24-48 hours  Following today's visit:Patient   is  able to demonstrate;       1. How to empty their pouch? No, discussed and demonstrated how this is done      2. How to change their pouch?  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 S/p exploratory laparotomy, small bowel resection, colostomy creation, flex sigmoidoscopy overnight on 3/26-3/27. Path results pending. Hemodynamically stable on POD#1. Will plan to start nutritional support today in setting of 40lb weight loss and likely ileus.     Current Diet/Nutrition: Orders Placed This Encounter      NPO for Medical/Clinical Reasons Except for: Meds, Ice Chips     TPN no   I/O last 3 completed shifts:  In: 2116 [P.O.:30; I.V.:2026; NG/GT:60]  Out: 1450 [Urine:770; Emesis/NG output:550; Drains:130]  Labs:  Recent Labs   Lab 03/28/22  0900 03/27/22  0940 03/27/22  0753   ALBUMIN  --   --  2.2*   HGB 11.1*   < >  --    WBC 11.9*   < >  --     < > = values in this interval not displayed.        Physical Exam:  Current pouching system:Antoinette 70 falt, 2 piece fecal pouch - post op pouch  Reason for pouch change today: ostomy education  Stoma appearance: healthy, pink and protruberant  Stoma size; 1 1/2\"   Peristomal skin: intact  Stoma output :serosanguinous- bowel sweat  Abdominal  Assessment  firm , NG still in place? Yes   Surgical Site: dressing dry and intact  Pain: Dull ache  Is patient still on a PCA Yes    Interventions:  Patient's chart evaluated.  Focus of " "today's visit: initial fitting, pouch change demonstration  and verbal instruction    Participant of teaching session today patient   Orders: Written  Change made with ostomy management today: Yes- placed in Antoinette flat fecal pouch- 1 piece with 2\" adapt barrier ring  Patient/family: observing  Supplies:at bedside- will need additional 2 week supply prior to discharge    Plan:  Learning needs: refitting of appliance, pouch change return demonstration, verbal instruction , diet and hydration , pouch emptying, odor/flatus management, lifestyle adjustments and discharge instructions     Preparation for discharge: No discharge preparations started,     Needed for discharge: Supplies ordered, Completed supply list, Registered for samples from , Prescriptions or note left on chart for MD to sign/complete, Prepared for discharge to home with homecare, Discussed making a WOC Nurse outpatient appointment upon discharge, Discussed when to follow up with a WOC Nurse in the future and Discussed how/ where to order supplies  Recommend home care? yes and by home WOC nurse if possible    Discussed plan of care with Patient  Nursing to notify the Provider(s) and re-consult the WOC Nurse if new ostomy concerns or discharge planned before next planned WOC visit.    WOC Nurse will return: Daily (Mon. - Fri.)  Face to face time: 45 minutes    Amanda Baum RN, CWOCN              "

## 2022-03-28 NOTE — PROGRESS NOTES
CLINICAL NUTRITION SERVICES - ASSESSMENT NOTE     Nutrition Prescription    RECOMMENDATIONS FOR MDs/PROVIDERS TO ORDER:  - Alert RD or PharmD for TPN adjustments to volume  - Adjust or titrate IV fluids when TPN starts tonight (TPN to rule at 60 mL/hr)    Malnutrition Status:    Severe malnutrition in the context of acute illness    Recommendations already ordered by Registered Dietitian (RD):  1. Updated weight    2. Parenteral Nutrition/IV Fluids - Initiate   --Use dosing weight 55 kg  --Begin TPN, goal volume 1440 ml/day with initial 120g Dex daily (408 kcal, GIR 1.5), 83g AA daily (332 kcal), and 250 ml 20% IV lipids x 2 days/week.  Micro/Rx: multivitamin, thiamine 100 mg x 7days  --ONLY once pt receives ~100% of initial continuous PN volume with K+/Mg++/Phos WNL, advance PN dex by 50 g every 1-2 days (pending lytes/Glu and Pharm D/RD discretion) to initial goal of 265g Dex (901 kcal) to increase provisions to 1376 kcals (25 kcal/kg/day), 1.5 g PRO/kg/day, GIR 3.3 with 10% kcals from Fat.    Future/Additional Recommendations:  - Monitor TPN advancement  - Monitor labs (Mag, phos, K+) and BG, TG, LFTs  - Monitor weight trends     REASON FOR ASSESSMENT  Kalin Shepard is a/an 71 year old male assessed by the dietitian for Pharmacy/Nutrition to Start and Manage PN  - Line type: Central Line NOT in place (Line has been ordered, but not yet inserted)  - Indication for PN: Not tolerating enteral nutrition, Recent 40lb weight loss    NUTRITION HISTORY  No PMH  - Per provider notes, pt has not been able to eat or drink for 4 days PTA (since 3/22)    - Per discussion with pt (3/28), pt reported he has not been eating very much PTA because of nausea/vomiting. He reported he does not like to eat because he knows it will make him throw up. Pt reported his last meal was Tuesday (3/22). Pt reported he usually follows a regular diet high in protein. Pt reported he still has a good appetite.     CURRENT NUTRITION  "ORDERS  Diet: NPO    Intake/Tolerance: NPO since admit    LABS  Labs reviewed  - sodium: 138 (WNL, stable)  - K+: 4.5 (WNL, stable  - BUN: 32 (H, trending down)  - creatinine: 0.66 (WNL, trending down)  - magnesium: 2.6 (H)  - phos: 2.4 (L, trending down), replacement protocol ordered  - B (H, stable)    MEDICATIONS  Medications reviewed  - IV fluids @ 75 mL/hr    ANTHROPOMETRICS  Height: 177.8 cm (5' 10\")  Most Recent Weight: 55 kg (121 lb 4.8 oz)    IBW: 75.5 kg (73% IBW)  BMI: Underweight BMI <18.5  Weight History: difficult to assess weight hx without recent weights  22 : 55 kg (121 lb 4.8 oz)  22 : 59 kg (130 lb)    - Per discussion with pt (3/28), pt reported a UBW of 160# and now weighs 130#. Pt reported he has lost about 30# over 1 year (~19%).    Dosing Weight: 55 kg (actual, lowest doc weight this admit on 3/28)    ASSESSED NUTRITION NEEDS  Estimated Energy Needs: [id number] kcals/day (25 - 30 - 35 kcals/kg )  Justification: Post-op and Repletion - aim on lower end for TPN  Estimated Protein Needs: 66-83 grams protein/day (1.2 - 1.5 grams of pro/kg)  Justification: Post-op and Repletion  Estimated Fluid Needs: (1 mL/kcal)   Justification: Maintenance and Per provider pending fluid status    PHYSICAL FINDINGS  See malnutrition section below.  - Missing dentition     MALNUTRITION  % Intake: </= 50% for >/= 5 days (severe)  % Weight Loss: >20% in 1 year (severe) - 25% weight loss in 1 year, ~40# in 1 year  Subcutaneous Fat Loss: Facial region:  Mild - visual exam, pt with other cares during visit  Muscle Loss: Temporal:  moderate, Facial & jaw region:  moderate, Scapular bone:  mild and Dorsal hand:  Moderate - visual exam, pt with other cares during visit  Fluid Accumulation/Edema: None noted  Malnutrition Diagnosis: Severe malnutrition in the context of acute illness    NUTRITION DIAGNOSIS  Inadequate oral intake related to nausea/vomiting and now NPO 2/2 likely ileus as evidenced by " </= 50% for >/= 5 days (severe) food intake, 25% weight loss in 1 year, BMI of 17.40, need to initiate TPN to meet estimated nutrition needs    INTERVENTIONS  Implementation  Nutrition Education: Provided education on role of RD in care, start of TPN for nutrition   Collaboration with other providers - rounds.  Paged primary team re: above IV fluid recs  Multivitamin/mineral supplement therapy - see above  Parenteral Nutrition/IV Fluids - Initiate     Goals  Total avg nutritional intake to meet a minimum of 25 kcal/kg and 1.2 g PRO/kg daily (per dosing wt 55 kg).     Monitoring/Evaluation  Progress toward goals will be monitored and evaluated per protocol.    Thuy Goss   Dietetic Intern    I have read and agree with the above nutrition note, recs, and interventions.  I did not personally see this patient today, chart review only.     Shoshana Marie, RD, , LD  Weekday Pager: [phone]  Weekday Units covered: 7C (all beds) and 5A (beds 5201 through 5211-2)  Weekend/Holiday RD Pager: [phone]

## 2022-03-29 ENCOUNTER — APPOINTMENT (OUTPATIENT)
Dept: OCCUPATIONAL THERAPY | Facility: CLINIC | Age: 72
DRG: 329 | End: 2022-03-29
Payer: COMMERCIAL

## 2022-03-29 LAB
ALBUMIN SERPL-MCNC: 2.2 G/DL (ref 3.4–5)
ALP SERPL-CCNC: 40 U/L (ref 40–150)
ALT SERPL W P-5'-P-CCNC: 14 U/L (ref 0–70)
ANION GAP SERPL CALCULATED.3IONS-SCNC: 6 MMOL/L (ref 3–14)
AST SERPL W P-5'-P-CCNC: 22 U/L (ref 0–45)
BACTERIA PRT CULT: ABNORMAL
BACTERIA PRT CULT: ABNORMAL
BILIRUB DIRECT SERPL-MCNC: <0.1 MG/DL (ref 0–0.2)
BILIRUB SERPL-MCNC: 0.3 MG/DL (ref 0.2–1.3)
BUN SERPL-MCNC: 28 MG/DL (ref 7–30)
CALCIUM SERPL-MCNC: 7.9 MG/DL (ref 8.5–10.1)
CHLORIDE BLD-SCNC: 111 MMOL/L (ref 94–109)
CO2 SERPL-SCNC: 27 MMOL/L (ref 20–32)
CREAT SERPL-MCNC: 0.58 MG/DL (ref 0.66–1.25)
ERYTHROCYTE [DISTWIDTH] IN BLOOD BY AUTOMATED COUNT: 14.4 % (ref 10–15)
GFR SERPL CREATININE-BSD FRML MDRD: >90 ML/MIN/1.73M2
GLUCOSE BLD-MCNC: 107 MG/DL (ref 70–99)
GLUCOSE BLDC GLUCOMTR-MCNC: 105 MG/DL (ref 70–99)
GLUCOSE BLDC GLUCOMTR-MCNC: 115 MG/DL (ref 70–99)
HCT VFR BLD AUTO: 34.7 % (ref 40–53)
HGB BLD-MCNC: 10.8 G/DL (ref 13.3–17.7)
INR PPP: 1.13 (ref 0.85–1.15)
MAGNESIUM SERPL-MCNC: 2.2 MG/DL (ref 1.6–2.3)
MCH RBC QN AUTO: 30.2 PG (ref 26.5–33)
MCHC RBC AUTO-ENTMCNC: 31.1 G/DL (ref 31.5–36.5)
MCV RBC AUTO: 97 FL (ref 78–100)
PATH REPORT.COMMENTS IMP SPEC: NORMAL
PATH REPORT.FINAL DX SPEC: NORMAL
PATH REPORT.GROSS SPEC: NORMAL
PATH REPORT.MICROSCOPIC SPEC OTHER STN: NORMAL
PATH REPORT.RELEVANT HX SPEC: NORMAL
PHOSPHATE SERPL-MCNC: 1.1 MG/DL (ref 2.5–4.5)
PHOSPHATE SERPL-MCNC: 1.4 MG/DL (ref 2.5–4.5)
PLATELET # BLD AUTO: 296 10E3/UL (ref 150–450)
POTASSIUM BLD-SCNC: 3.6 MMOL/L (ref 3.4–5.3)
PROT SERPL-MCNC: 5.6 G/DL (ref 6.8–8.8)
RBC # BLD AUTO: 3.58 10E6/UL (ref 4.4–5.9)
SODIUM SERPL-SCNC: 144 MMOL/L (ref 133–144)
WBC # BLD AUTO: 10.8 10E3/UL (ref 4–11)

## 2022-03-29 PROCEDURE — 84100 ASSAY OF PHOSPHORUS: CPT

## 2022-03-29 PROCEDURE — 250N000009 HC RX 250: Performed by: SURGERY

## 2022-03-29 PROCEDURE — 85014 HEMATOCRIT: CPT

## 2022-03-29 PROCEDURE — 83735 ASSAY OF MAGNESIUM: CPT

## 2022-03-29 PROCEDURE — 97530 THERAPEUTIC ACTIVITIES: CPT | Mod: GO

## 2022-03-29 PROCEDURE — 250N000013 HC RX MED GY IP 250 OP 250 PS 637

## 2022-03-29 PROCEDURE — 120N000002 HC R&B MED SURG/OB UMMC

## 2022-03-29 PROCEDURE — 82248 BILIRUBIN DIRECT: CPT | Performed by: SURGERY

## 2022-03-29 PROCEDURE — 250N000011 HC RX IP 250 OP 636: Performed by: SURGERY

## 2022-03-29 PROCEDURE — 258N000003 HC RX IP 258 OP 636

## 2022-03-29 PROCEDURE — C9113 INJ PANTOPRAZOLE SODIUM, VIA: HCPCS | Performed by: SURGERY

## 2022-03-29 PROCEDURE — 80053 COMPREHEN METABOLIC PANEL: CPT

## 2022-03-29 PROCEDURE — 36592 COLLECT BLOOD FROM PICC: CPT | Performed by: SURGERY

## 2022-03-29 PROCEDURE — 250N000011 HC RX IP 250 OP 636

## 2022-03-29 PROCEDURE — 999N000198 HC STATISTIC WOC PT EDUCATION, 16-30 MIN

## 2022-03-29 PROCEDURE — 85610 PROTHROMBIN TIME: CPT | Performed by: SURGERY

## 2022-03-29 PROCEDURE — 84100 ASSAY OF PHOSPHORUS: CPT | Performed by: SURGERY

## 2022-03-29 PROCEDURE — 258N000003 HC RX IP 258 OP 636: Performed by: SURGERY

## 2022-03-29 RX ORDER — POTASSIUM CHLORIDE 7.45 MG/ML
10 INJECTION INTRAVENOUS ONCE
Status: COMPLETED | OUTPATIENT
Start: 2022-03-29 | End: 2022-03-29

## 2022-03-29 RX ORDER — POTASSIUM PHOS IN 0.9 % NACL 15MMOL/250
15 PLASTIC BAG, INJECTION (ML) INTRAVENOUS ONCE
Status: DISCONTINUED | OUTPATIENT
Start: 2022-03-29 | End: 2022-03-29

## 2022-03-29 RX ADMIN — PIPERACILLIN AND TAZOBACTAM 3.38 G: 3; .375 INJECTION, POWDER, LYOPHILIZED, FOR SOLUTION INTRAVENOUS at 03:36

## 2022-03-29 RX ADMIN — PIPERACILLIN AND TAZOBACTAM 3.38 G: 3; .375 INJECTION, POWDER, LYOPHILIZED, FOR SOLUTION INTRAVENOUS at 21:37

## 2022-03-29 RX ADMIN — SODIUM CHLORIDE: 900 INJECTION INTRAVENOUS at 06:49

## 2022-03-29 RX ADMIN — POTASSIUM CHLORIDE 10 MEQ: 7.46 INJECTION, SOLUTION INTRAVENOUS at 10:35

## 2022-03-29 RX ADMIN — POTASSIUM PHOSPHATE, MONOBASIC POTASSIUM PHOSPHATE, DIBASIC: 224; 236 INJECTION, SOLUTION, CONCENTRATE INTRAVENOUS at 20:26

## 2022-03-29 RX ADMIN — PANTOPRAZOLE SODIUM 40 MG: 40 INJECTION, POWDER, FOR SOLUTION INTRAVENOUS at 08:10

## 2022-03-29 RX ADMIN — Medication: at 15:39

## 2022-03-29 RX ADMIN — PIPERACILLIN AND TAZOBACTAM 3.38 G: 3; .375 INJECTION, POWDER, LYOPHILIZED, FOR SOLUTION INTRAVENOUS at 15:59

## 2022-03-29 RX ADMIN — POTASSIUM PHOSPHATE, MONOBASIC AND POTASSIUM PHOSPHATE, DIBASIC 15 MMOL: 224; 236 INJECTION, SOLUTION, CONCENTRATE INTRAVENOUS at 11:42

## 2022-03-29 RX ADMIN — PIPERACILLIN AND TAZOBACTAM 3.38 G: 3; .375 INJECTION, POWDER, LYOPHILIZED, FOR SOLUTION INTRAVENOUS at 10:21

## 2022-03-29 RX ADMIN — ENOXAPARIN SODIUM 40 MG: 40 INJECTION SUBCUTANEOUS at 20:26

## 2022-03-29 RX ADMIN — NICOTINE 1 PATCH: 7 PATCH, EXTENDED RELEASE TRANSDERMAL at 00:28

## 2022-03-29 ASSESSMENT — ACTIVITIES OF DAILY LIVING (ADL)
ADLS_ACUITY_SCORE: 13
ADLS_ACUITY_SCORE: 13
ADLS_ACUITY_SCORE: 11
ADLS_ACUITY_SCORE: 13
ADLS_ACUITY_SCORE: 11
ADLS_ACUITY_SCORE: 13
ADLS_ACUITY_SCORE: 11
ADLS_ACUITY_SCORE: 13
ADLS_ACUITY_SCORE: 11
ADLS_ACUITY_SCORE: 13

## 2022-03-29 NOTE — PROGRESS NOTES
"Colorectal Surgery Progress Note  [facility]  March 27, 2022  POD#2      SUBJECTIVE No acute events overnight. Pain controlled with PCA. Using lidocaine patches as well.  No ostomy output yet. NG in place with brown/green output; 3L output yesterday. Jo removed yesterday. Initially, low UOP after removal of jo but adequate UOP after 1L LR bolus yesterday evening. PICC placed yesterday and TPN started.     OBJECTIVE:    Vitals:  BP 99/48 (BP Location: Left arm)   Pulse 76   Temp 97.6  F (36.4  C) (Temporal)   Resp 14   Ht 1.778 m (5' 10\")   Wt 59 kg (130 lb)   SpO2 97%, on RA  BMI 18.65 kg/m    I/O:  I/O last 3 completed shifts:  In: 2971.99 [I.V.:1498.75; NG/GT:30]  Out: 4155 [Urine:930; Emesis/NG output:3200; Drains:25]    Physical Exam:  Gen: AAOx3, NAD  Pulm: Non-labored breathing on RA  CV:      RRR by RP, no sig lower extremity edema  Abd: Soft, moderately distended, tender, no guarding/rebound. Abdominal binder removed for exam,    Incision C/D/I    Stoma pink and viable, no stool. Bowel sweat in bag.    MORIS drains, serosanguinous   Ext:  Warm and well-perfused  Lines: MORIS, and PICC in place    BMP  Recent Labs   Lab 03/29/22  0349 03/28/22  2239 03/28/22  1607 03/28/22  0900 03/27/22  0753 03/26/22  1554   NA  --   --   --  140 138 137   POTASSIUM  --   --   --  4.5 4.5 4.3   CHLORIDE  --   --   --  109 105 103   CO2  --   --   --  24 22 25   BUN  --   --   --  36* 32* 40*   CR  --   --   --  0.71 0.66 0.73   * 94 88 94 106* 100*   MAG  --   --   --  2.6*  --   --    PHOS  --   --   --  2.4* 3.5  --      CBC  Recent Labs   Lab 03/28/22  0900 03/27/22  0940 03/26/22  1554   WBC 11.9* 13.2* 12.5*   HGB 11.1* 12.3* 13.4   HCT 35.7* 39.2* 40.5    268 290       ASSESSMENT:   71 year old male who presented to [facility] ED with abdominal pain found to have 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 S/p " exploratory laparotomy, small bowel resection, colostomy creation, flex sigmoidoscopy overnight on 3/26-3/27. Path results pending. Hemodynamically stable. TPN initiated yesterday to support nourishment in setting of 40lb weight loss and ileus.     Plan:     Neuro/Pain: Dilaudid PCA, increased dose to 0.3 with 10 minute lockout. Lidocaine patches.   CV: RRR.  Briefly went into afib during OR, not noted since. Continue to monitor, EKG if chest pain or palpitations.   PULM: encourage IS.  Nutr/GI:   - Continue NPO. NGT to LIS.  - PICC placed on 3/28. CXR with adequate positioning of PICC.   - Severe protein malnutrition. TPN @ 60 ml/hr; initiated on 3/28 evening. Appreciate dietician assistance.  - IV Protonix   - Triglycerides wnl on 3/28  - Prealbumin @ 4 on 3/27  /Lytes/IVF:   - NS IVF + TPN to goal of 100 ml/hr. NS currently @ 40cc/hr.   - Castro discontinued on 3/28. Initially, low UOP after removal but adequate after 1L LR bolus.   - CEA 2.1 on 3/27  - Monitor electrolytes  Heme: Hgb dip at 11.1 on 3/28 (12.3). Likely dilutional.    ID: IV zosyn x 3 days (to stop today - 3/29)  Endocrine: No concerns   T/L/D: MORIS x2. Colostomy pink, no stool/gas output. PICC.  Activity: as tolerated.  Ppx: lovenox  Dispo: Pending return of bowel function, ability to tolerate PO.       Patient was seen and discussed on rounds with resident Dr. Kenney and fellow Dr. Groves who will discuss with staff.     Benjamin Allred MS4  [facility]    Resident Attestation   I, Sharif Kenney, was present with the medical student who participated in the service and in the documentation of the note. I have verified the history and personally performed the physical exam and medical decision making. Addenda have been made to the note as appropriate. I agree with the assessment and plan of care as documented in the note.      Sharif Kenney MD  General Surgery PGY1  Date of Service: 03/29/2022

## 2022-03-29 NOTE — DISCHARGE INSTRUCTIONS
[facility]     Name: Kalin Shepard  Date: 3/29/2022    To order your ostomy supplies    The ostomy Supplier needs this supply list  to process your order. You will need to fax/deliver this list, along with your Insurance information. Your home care nurse can assist with this process.    List of Ostomy Distributors      [facility]  Ph. [phone] Fax # [phone]  [facility] [employer]   Ph. [phone]  [facility]   Ph. [phone]  [facility]   Ph. [phone]  Or Call your insurance provider for their preferred supplier    Your Medical Supplier will need your surgeon's name, phone and fax number    Clinic:                     Phone                            Fax  Colorectal Surgery:    [phone]    Verbal Order for ostomy supplies for 1 Month per:                                          Amanda Baum, RN, CWOCN                                                   Authorizing MD: Dr. Riley Magdaleno    Quantity of pouches: 20/m    Request the following supplies:      Antoinette    1 piece flat fecal with filter #[id number]      Accessories  2  barrier ring #7805 (1 per pouch)    Adapt powder #7906    No sting film barrier # 3345                          Adapt odor eliminator and lubricant 236ml bottle # [id number]     M-9 Spray room deodorizer #0828                              Change your pouch two to three times a week, more often if leaking.    If you are cutting a hole in the wafer of your pouch, recheck stoma size and adjust pouch opening as needed every week    . Call the Ostomy Nurse at [facility]   Schedule a follow-up visit in 2 to 4 weeks after your surgery, sooner if having problems Bring a complete set of pouch-changing supplies to this visit      [address] [facility], [place] : [phone]   Or    [facility] department  [address] [place]  [address]   number- [phone]      Problems you should Report  - The stoma turns blue or darker in color.  - Cuts or sores around the stoma.  - Red, raw or painful skin around the stoma.  - Any bulging of the skin around the stoma.  - A pouch that leaks every day.  - Problems making the right size hole in the pouch wafer.    Please call with any questions or concerns.

## 2022-03-29 NOTE — PLAN OF CARE
NG continues to LIS, voiding adequate urine. Colostomy starting to have gas, no stool. 2 MORIS's to bulb suction. PCA managing pain. Ambulated in halls with stand by assist. TPN and NS infusing via PICC. Potassium and phosphorus replaced per protocol.

## 2022-03-29 NOTE — PROGRESS NOTES
"  WO Nurse Inpatient [facility]   WO Nurse Inpatient Adult     Initial Assessment   Assessment of new loop Colostomy Stoma complication(s) none   Mucocutaneous junction; intact   Peristomal complication(s) none   Pouch wear time:24-48 hours  Following today's visit:Patient   is  able to demonstrate;       1. How to empty their pouch? No, discussed and demonstrated how this is done      2. How to change their pouch?  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 S/p exploratory laparotomy, small bowel resection, colostomy creation, flex sigmoidoscopy overnight on 3/26-3/27. Path results pending. Hemodynamically stable on POD#1. Will plan to start nutritional support today in setting of 40lb weight loss and likely ileus.     Current Diet/Nutrition: Orders Placed This Encounter      NPO for Medical/Clinical Reasons Except for: Meds, Ice Chips     TPN no   I/O last 3 completed shifts:  In: 2971.99 [I.V.:1498.75; NG/GT:30]  Out: 4155 [Urine:930; Emesis/NG output:3200; Drains:25]  Labs:    Recent Labs   Lab 03/29/22  0838   ALBUMIN 2.2*   HGB 10.8*   INR 1.13   WBC 10.8        Physical Exam:  Current pouching system:[place]  1 piece flat fecal pouch with 2\" Adapt barrier ring  Reason for pouch change today: pouch not changed today  Stoma appearance: healthy, pink and protruberant  Stoma size; 1 1/2\"   Peristomal skin: intact  Stoma output :serosanguinous- bowel sweat, some gas per patient  Abdominal  Assessment  firm , NG still in place? Yes   Surgical Site: dressing dry and intact  Pain: Dull ache  Is patient still on a PCA Yes    Interventions:  Patient's chart evaluated.  Focus of today's visit: verbal instruction , diet and hydration , odor/flatus " "management and lifestyle adjustments   Participant of teaching session today patient  and spouse  Orders: Reviewed  Change made with ostomy management today: No- placed in Antoinette flat fecal pouch- 1 piece with 2\" adapt barrier ring  Patient/family: observing  Supplies:at bedside- will need additional 2 week supply prior to discharge    Plan:  Learning needs: refitting of appliance, pouch change return demonstration, pouch emptying and discharge instructions     Preparation for discharge:  Prescriptions or note left on chart for MD to sign/complete, patient did not want to be registered for samples,  Discussed how/ where to order supplies      Needed for discharge: Supplies ordered, Completed supply list, Prepared for discharge to home with homecare, Discussed making a WOC Nurse outpatient appointment upon discharge, Discussed when to follow up with a WOC Nurse in the future and    Recommend home care? no and patient reports that he does not think he will need home care, and feels comfortable so far with ostomy education    Discussed plan of care with Patient and Family  Nursing to notify the Provider(s) and re-consult the WOC Nurse if new ostomy concerns or discharge planned before next planned WOC visit.    WOC Nurse will return: Daily (Mon. - Fri.)  Face to face time: 30 minutes    Amanda Baum RN, CWOCN              "

## 2022-03-30 ENCOUNTER — APPOINTMENT (OUTPATIENT)
Dept: OCCUPATIONAL THERAPY | Facility: CLINIC | Age: 72
DRG: 329 | End: 2022-03-30
Payer: COMMERCIAL

## 2022-03-30 LAB
ALBUMIN SERPL-MCNC: 2.3 G/DL (ref 3.4–5)
ALP SERPL-CCNC: 47 U/L (ref 40–150)
ALT SERPL W P-5'-P-CCNC: 17 U/L (ref 0–70)
ANION GAP SERPL CALCULATED.3IONS-SCNC: 8 MMOL/L (ref 3–14)
AST SERPL W P-5'-P-CCNC: 25 U/L (ref 0–45)
BILIRUB SERPL-MCNC: 0.4 MG/DL (ref 0.2–1.3)
BUN SERPL-MCNC: 14 MG/DL (ref 7–30)
CALCIUM SERPL-MCNC: 8.2 MG/DL (ref 8.5–10.1)
CHLORIDE BLD-SCNC: 106 MMOL/L (ref 94–109)
CO2 SERPL-SCNC: 24 MMOL/L (ref 20–32)
CREAT SERPL-MCNC: 0.48 MG/DL (ref 0.66–1.25)
ERYTHROCYTE [DISTWIDTH] IN BLOOD BY AUTOMATED COUNT: 14 % (ref 10–15)
GFR SERPL CREATININE-BSD FRML MDRD: >90 ML/MIN/1.73M2
GLUCOSE BLD-MCNC: 124 MG/DL (ref 70–99)
GLUCOSE BLDC GLUCOMTR-MCNC: 112 MG/DL (ref 70–99)
GLUCOSE BLDC GLUCOMTR-MCNC: 117 MG/DL (ref 70–99)
GLUCOSE BLDC GLUCOMTR-MCNC: 124 MG/DL (ref 70–99)
GLUCOSE BLDC GLUCOMTR-MCNC: 131 MG/DL (ref 70–99)
HCT VFR BLD AUTO: 35.7 % (ref 40–53)
HGB BLD-MCNC: 11.6 G/DL (ref 13.3–17.7)
MAGNESIUM SERPL-MCNC: 2 MG/DL (ref 1.6–2.3)
MCH RBC QN AUTO: 30.9 PG (ref 26.5–33)
MCHC RBC AUTO-ENTMCNC: 32.5 G/DL (ref 31.5–36.5)
MCV RBC AUTO: 95 FL (ref 78–100)
PHOSPHATE SERPL-MCNC: 2.1 MG/DL (ref 2.5–4.5)
PHOSPHATE SERPL-MCNC: 2.3 MG/DL (ref 2.5–4.5)
PLATELET # BLD AUTO: 334 10E3/UL (ref 150–450)
POTASSIUM BLD-SCNC: 3.6 MMOL/L (ref 3.4–5.3)
PROT SERPL-MCNC: 5.8 G/DL (ref 6.8–8.8)
RBC # BLD AUTO: 3.75 10E6/UL (ref 4.4–5.9)
SODIUM SERPL-SCNC: 138 MMOL/L (ref 133–144)
WBC # BLD AUTO: 11.5 10E3/UL (ref 4–11)

## 2022-03-30 PROCEDURE — 97530 THERAPEUTIC ACTIVITIES: CPT | Mod: GO

## 2022-03-30 PROCEDURE — 258N000003 HC RX IP 258 OP 636: Performed by: SURGERY

## 2022-03-30 PROCEDURE — 250N000009 HC RX 250: Performed by: SURGERY

## 2022-03-30 PROCEDURE — 250N000013 HC RX MED GY IP 250 OP 250 PS 637

## 2022-03-30 PROCEDURE — 84100 ASSAY OF PHOSPHORUS: CPT

## 2022-03-30 PROCEDURE — 36592 COLLECT BLOOD FROM PICC: CPT | Performed by: SURGERY

## 2022-03-30 PROCEDURE — 84100 ASSAY OF PHOSPHORUS: CPT | Performed by: SURGERY

## 2022-03-30 PROCEDURE — 85027 COMPLETE CBC AUTOMATED: CPT

## 2022-03-30 PROCEDURE — 120N000002 HC R&B MED SURG/OB UMMC

## 2022-03-30 PROCEDURE — 83735 ASSAY OF MAGNESIUM: CPT

## 2022-03-30 PROCEDURE — 250N000011 HC RX IP 250 OP 636

## 2022-03-30 PROCEDURE — 999N000196 HC STATISTIC WOC PT EDUCATION, > 60 MIN

## 2022-03-30 PROCEDURE — 80053 COMPREHEN METABOLIC PANEL: CPT

## 2022-03-30 PROCEDURE — 250N000011 HC RX IP 250 OP 636: Performed by: SURGERY

## 2022-03-30 PROCEDURE — 36592 COLLECT BLOOD FROM PICC: CPT

## 2022-03-30 RX ORDER — OXYCODONE HYDROCHLORIDE 5 MG/1
5 TABLET ORAL EVERY 4 HOURS PRN
Status: DISCONTINUED | OUTPATIENT
Start: 2022-03-30 | End: 2022-04-05 | Stop reason: HOSPADM

## 2022-03-30 RX ORDER — POTASSIUM CHLORIDE 7.45 MG/ML
10 INJECTION INTRAVENOUS ONCE
Status: COMPLETED | OUTPATIENT
Start: 2022-03-30 | End: 2022-03-30

## 2022-03-30 RX ORDER — MAGNESIUM SULFATE HEPTAHYDRATE 40 MG/ML
2 INJECTION, SOLUTION INTRAVENOUS ONCE
Status: COMPLETED | OUTPATIENT
Start: 2022-03-30 | End: 2022-03-30

## 2022-03-30 RX ORDER — HEPARIN SODIUM (PORCINE) LOCK FLUSH IV SOLN 100 UNIT/ML 100 UNIT/ML
500 SOLUTION INTRAVENOUS EVERY 8 HOURS PRN
Status: DISCONTINUED | OUTPATIENT
Start: 2022-03-30 | End: 2022-03-31

## 2022-03-30 RX ORDER — HYDROMORPHONE HCL IN WATER/PF 6 MG/30 ML
.2-.3 PATIENT CONTROLLED ANALGESIA SYRINGE INTRAVENOUS EVERY 4 HOURS PRN
Status: DISCONTINUED | OUTPATIENT
Start: 2022-03-30 | End: 2022-03-31

## 2022-03-30 RX ADMIN — ENOXAPARIN SODIUM 40 MG: 40 INJECTION SUBCUTANEOUS at 20:13

## 2022-03-30 RX ADMIN — OXYCODONE HYDROCHLORIDE 5 MG: 5 TABLET ORAL at 18:41

## 2022-03-30 RX ADMIN — OXYCODONE HYDROCHLORIDE 5 MG: 5 TABLET ORAL at 14:38

## 2022-03-30 RX ADMIN — POTASSIUM CHLORIDE 10 MEQ: 7.46 INJECTION, SOLUTION INTRAVENOUS at 10:49

## 2022-03-30 RX ADMIN — POTASSIUM PHOSPHATE, MONOBASIC AND POTASSIUM PHOSPHATE, DIBASIC 15 MMOL: 224; 236 INJECTION, SOLUTION, CONCENTRATE INTRAVENOUS at 00:41

## 2022-03-30 RX ADMIN — OXYCODONE HYDROCHLORIDE 5 MG: 5 TABLET ORAL at 22:29

## 2022-03-30 RX ADMIN — LIDOCAINE 1 PATCH: 560 PATCH PERCUTANEOUS; TOPICAL; TRANSDERMAL at 20:12

## 2022-03-30 RX ADMIN — Medication 500 UNITS: at 22:59

## 2022-03-30 RX ADMIN — NICOTINE 1 PATCH: 7 PATCH, EXTENDED RELEASE TRANSDERMAL at 08:47

## 2022-03-30 RX ADMIN — LIDOCAINE 1 PATCH: 560 PATCH PERCUTANEOUS; TOPICAL; TRANSDERMAL at 08:48

## 2022-03-30 RX ADMIN — POTASSIUM PHOSPHATE, MONOBASIC POTASSIUM PHOSPHATE, DIBASIC: 224; 236 INJECTION, SOLUTION, CONCENTRATE INTRAVENOUS at 20:13

## 2022-03-30 RX ADMIN — MAGNESIUM SULFATE IN WATER 2 G: 40 INJECTION, SOLUTION INTRAVENOUS at 14:02

## 2022-03-30 RX ADMIN — NICOTINE 1 PATCH: 7 PATCH, EXTENDED RELEASE TRANSDERMAL at 22:36

## 2022-03-30 RX ADMIN — POTASSIUM PHOSPHATE, MONOBASIC AND POTASSIUM PHOSPHATE, DIBASIC 9 MMOL: 224; 236 INJECTION, SOLUTION, CONCENTRATE INTRAVENOUS at 12:12

## 2022-03-30 ASSESSMENT — ACTIVITIES OF DAILY LIVING (ADL)
ADLS_ACUITY_SCORE: 7
DIFFICULTY_EATING/SWALLOWING: NO
ADLS_ACUITY_SCORE: 7
ADLS_ACUITY_SCORE: 7
DOING_ERRANDS_INDEPENDENTLY_DIFFICULTY: NO
DRESSING/BATHING_DIFFICULTY: NO
ADLS_ACUITY_SCORE: 7
CONCENTRATING,_REMEMBERING_OR_MAKING_DECISIONS_DIFFICULTY: NO
ADLS_ACUITY_SCORE: 7
TOILETING_ISSUES: NO
FALL_HISTORY_WITHIN_LAST_SIX_MONTHS: NO
ADLS_ACUITY_SCORE: 7
ADLS_ACUITY_SCORE: 11
ADLS_ACUITY_SCORE: 7
WALKING_OR_CLIMBING_STAIRS_DIFFICULTY: NO
ADLS_ACUITY_SCORE: 7
WEAR_GLASSES_OR_BLIND: NO
ADLS_ACUITY_SCORE: 7

## 2022-03-30 NOTE — PROGRESS NOTES
"Colorectal Surgery Progress Note  [facility]  March 27, 2022  POD#3    SUBJECTIVE Patient pulled out NGT this morning @ 0530. Desires to leave. In discussion with nursing team, phosphate infusion clamped, TPN shut off, PCA disconnected this morning.Would like to try oral pain meds. Concerned about being given lasix. No ostomy output or gas yet. Discussed medical indications for remaining in the hospital until return of bowel function and meeting post-op goals including tolerating PO intake, ambulation, pain control.     OBJECTIVE:    Vitals:  BP 99/48 (BP Location: Left arm)   Pulse 76   Temp 97.6  F (36.4  C) (Temporal)   Resp 14   Ht 1.778 m (5' 10\")   Wt 59 kg (130 lb)   SpO2 97%, on RA  BMI 18.65 kg/m    I/O:  I/O last 3 completed shifts:  In: 1933 [I.V.:966]  Out: 4365 [Urine:3950; Emesis/NG output:350; Drains:65]    Physical Exam:  Gen: AAOx3, NAD  Pulm: Non-labored breathing on RA  CV:      RRR by RP, no sig lower extremity edema  Abd: Soft, mildly distended, tender, no guarding/rebound. No longer using abdominal binder.    Incision C/D/I with bandage.    Stoma pink and viable, no stool, no gas.    2 MORIS drains, serosanguinous collections.    Ext:  Warm and well-perfused  Lines: MORIS, and PICC in place    BMP  Recent Labs   Lab 03/30/22  0648 03/30/22  0412 03/29/22  2213 03/29/22  1752 03/29/22  0838 03/28/22  1607 03/28/22  0900 03/27/22  0753     --   --   --  144  --  140 138   POTASSIUM 3.6  --   --   --  3.6  --  4.5 4.5   CHLORIDE 106  --   --   --  111*  --  109 105   CO2 24  --   --   --  27 --  24 22   BUN 14  --   --   --  28  --  36* 32*   CR 0.48*  --   --   --  0.58*  --  0.71 0.66   * 112*  --  115* 107*   < > 94 106*   MAG 2.0  --   --   --  2.2  --  2.6*  --    PHOS 2.3*  --  1.4*  --  1.1*  --  2.4* 3.5    < > = values in this interval not displayed.     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 S/p exploratory laparotomy, small bowel resection, colostomy creation, flex sigmoidoscopy overnight on 3/26-3/27. Path results pending. Hemodynamically stable. TPN initiated yesterday to support nourishment in setting of 40lb weight loss and ileus. He is adamant about wanting to go home with increase in behaviors related to this. Discussed plan with nursing, will try to minimize # of IVs/tubes, ok to trial without NG at this time, stop IVF, start PO pain management, but should remain admitted until at least bowel function resumes with stable ostomy output.      Plan:     Neuro/Pain: Stop dilaudid PCA, start PO pain regimen   - Lidocaine patches.   CV: RRR.  Briefly went into afib during OR, not noted since. Continue to monitor, EKG if chest pain or palpitations.   PULM: encourage IS.  Nutr/GI:   - Continue NPO, monitor s/p NGT removal, appreciate nursing assistance.   - PICC placed on 3/28. CXR with adequate positioning of PICC.   - Severe protein malnutrition. TPN @ 60 ml/hr; initiated on 3/28 evening. Appreciate dietician assistance.   - IV Protonix   - Triglycerides wnl on 3/28  - Prealbumin @ 4 on 3/27  /Lytes/IVF:   - Stop IVF  - Continue TPN   - CEA 2.1 on 3/27  - Monitor electrolytes  Heme: Hgb dip at 11.1 on 3/28 (12.3). Likely dilutional.    ID: No concerns.    Endocrine: No concerns   T/L/D: MORIS x2. Colostomy pink, no stool/gas output. PICC.  Activity: encourage ambulation as tolerated.   Ppx: lovenox  Dispo: Pending return of bowel function, ability to tolerate PO, pain controlled. 2-3 days estimate.       Patient was seen and discussed on rounds with resident Dr. Kenney and CHEIKH Mccloud who discussed with  "staff.     Reema Dominique  Medical Student  [facility]      Addendum:  Pt was seen and examined independent of the medical student.     S: Sitting in bed, NAD.  Would like to discharge home.  Explained reasoning behind NGT and our concerns (awaiting return of bowel function and supportive cares to ensure safety during this time), pt stated that he understood.  Asked if something had change/made pt upset or if there is something that we can do to help make his stay better, pt reported \"this is all disturbing, I'm not going to point fingers, personnel, but you figure it out.\"  RN reports pt suspicious that he has been given lasix.  No lasix given.   Pt declined team to call/update wife.     O:  Vitals, I&Os reviewed  NAD  Norm resp effort  abd soft, mildly distended.  Stoma edemtous.  One small lump of hard stool present in bag.  Some stool residue at os.    MORIS drain x2 with serosang output.   Periph IV in place  Right arm PICC line in place  No tremulousness, diaphoresis.        A/P:    - pt self-stopped IVF, PCA.  Tylenol and prn oxycodone with IV dilaudid prn break thru.   - will not replace NGT at this time  - ice chips for now  - made very good urine, will stop IVF for now  - continue TPN if pt allows for severe malnutrition  - WOCN for continued ostomy teaching  - will discuss Chest CT prior to discharge for staging.  Will need outpatient MRI.  - continue to monitor closely for infection, adverse effects of medications  Ppx:  lovenox ppx.  Stop IV protonix.   Dispo:  Pending return of bowel function, ostomy education, lovenox ppx education and generalized well being.     Doris Mccloud PA-C ..................3/30/2022   8:52 AM  Colon and Rectal Surgery    The above plan of care was performed and communicated to me by Dr. Magdaleno.                "

## 2022-03-30 NOTE — PROGRESS NOTES
"  WO Nurse Inpatient [facility]   WO Nurse Inpatient Adult     Initial Assessment   Assessment of new loop Colostomy Stoma complication(s) none   Mucocutaneous junction; intact   Peristomal complication(s) none   Pouch wear time:24-48 hours  Following today's visit:Patient   is  able to demonstrate;       1. How to empty their pouch? yes      2. How to change their pouch?  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 S/p exploratory laparotomy, small bowel resection, colostomy creation, flex sigmoidoscopy overnight on 3/26-3/27. Path results pending. Hemodynamically stable on POD#1. Will plan to start nutritional support today in setting of 40lb weight loss and likely ileus.     Current Diet/Nutrition: Orders Placed This Encounter      NPO for Medical/Clinical Reasons Except for: Meds, Ice Chips     TPN -yes  I/O last 3 completed shifts:  In: 1933 [I.V.:966]  Out: 4365 [Urine:3950; Emesis/NG output:350; Drains:65]  Labs:    Recent Labs   Lab 03/30/22  0648 03/29/22  0838   ALBUMIN 2.3* 2.2*   HGB 11.6* 10.8*   INR  --  1.13   WBC 11.5* 10.8        Physical Exam:  Current pouching system:[place]  1 piece flat fecal pouch with 2\" Adapt barrier ring  Reason for pouch change today: ostomy education  Stoma appearance: healthy, pink and protruberant  Stoma size; 1 1/2\" oval shape  Peristomal skin: intact  Stoma output :serosanguinous- bowel sweat, some gas per patient  Abdominal  Assessment  firm , NG still in place? Yes   Surgical Site: dressing dry and intact  Pain: Dull ache  Is patient still on a PCA No    Interventions:  Patient's chart evaluated.  Focus of today's visit: verbal instruction , diet and hydration , odor/flatus management and lifestyle adjustments   Participant of " "teaching session today patient  and spouse  Orders: Reviewed  Change made with ostomy management today: No- placed in Antoinette flat fecal pouch- 1 piece with 2\" adapt barrier ring  Patient/family: observing  Supplies:at bedside- 2 week supply at bedside    Plan:  Learning needs: all topics covered, would benefit from additional practice with pouch change.      Preparation for discharge:  Prescriptions or note left on chart for MD to sign/complete, patient did not want to be registered for samples,  Discussed how/ where to order supplies, Supplies ordered, Completed supply list, Discussed making a WOC Nurse outpatient appointment upon discharge, and Discussed when to follow up with a WOC Nurse in the future    Recommend home care? no and patient reports that he does not think he will need home care, and feels comfortable so far with ostomy education    Discussed plan of care with Patient and Family  Nursing to notify the Provider(s) and re-consult the WOC Nurse if new ostomy concerns or discharge planned before next planned WOC visit.    WOC Nurse will return: Tuesday/ Friday  Face to face time: > 60 minutes    Amanda Baum RN, [facility]     Name: Kalin Shepard  Date: 3/29/2022    To order your ostomy supplies    The ostomy Supplier needs this supply list  to process your order. You will need to fax/deliver this list, along with your Insurance information. Your home care nurse can assist with this process.    List of Ostomy Distributors      [facility]  Ph. [phone] Fax # [phone]  [facility]   Ph. [phone] [phone]  [employer] [facility]   Ph. [phone]  [facility]   Ph. [phone] [phone]  Or Call your insurance provider for their preferred supplier    Your Medical Supplier will need your surgeon's name, phone and fax number    Clinic:                     Phone                            Fax  Colorectal " Surgery:    [phone]    Verbal Order for ostomy supplies for 1 Month per:                                          Amanda Baum, RN, CWOCN                                                   Authorizing MD: Dr. Riley Magdaleno    Quantity of pouches: 20/m    Request the following supplies:      [place]    1 piece flat fecal with filter #8331      Accessories  2  barrier ring #9005 (1 per pouch)    Adapt powder #7906    No sting film barrier # 3345                          Adapt odor eliminator and lubricant 236ml bottle # 53467     M-9 Spray room deodorizer #7741                              Change your pouch two to three times a week, more often if leaking.    If you are cutting a hole in the wafer of your pouch, recheck stoma size and adjust pouch opening as needed every week    . Call the Ostomy Nurse at [facility]   Schedule a follow-up visit in 2 to 4 weeks after your surgery, sooner if having problems Bring a complete set of pouch-changing supplies to this visit      [address] [place], [place] : [phone]   Or    [facility] department  Formerly named [facility] [place] [address]  Ph number- [phone]      Problems you should Report  - The stoma turns blue or darker in color.  - Cuts or sores around the stoma.  - Red, raw or painful skin around the stoma.  - Any bulging of the skin around the stoma.  - A pouch that leaks every day.  - Problems making the right size hole in the pouch wafer.    Please call with any questions or concerns.

## 2022-03-30 NOTE — PLAN OF CARE
Plan of Care Note    Reason for Admission: Large bowel obstruction  Procedures: 03/26/22: exploratory laparotomy, small bowel resection, colostomy creation (N/A Abdomen); Sigmoidoscopy flexible   IV Access/Incisions/Drains/Wounds:   Peripheral IV 03/26/22 Right Lower forearm (Active)   Site Assessment [facility] 03/30/22 0100   Line Status Infusing 03/30/22 0100   Phlebitis Scale 0-->no symptoms 03/30/22 0100   Infiltration Scale 0 03/29/22 0340   Infiltration Site Treatment Method  None 03/30/22 0100   Number of days: 4       PICC Double Lumen 03/28/22 Left Brachial vein medial (Active)   Site Assessment [facility] 03/30/22 0100   External Cath Length (cm) 2 cm 03/28/22 1242   Extremity Circumference (cm) 22 cm 03/28/22 1242   Dressing Intervention Chlorhexidine patch;Transparent;Securing device 03/29/22 0340   Dressing Change Due 04/04/22 03/29/22 0900   [place]raham - Cap Change Due 03/30/22 03/30/22 0100   Purple - Status infusing 03/30/22 0100   Purple - Cap Change Due 03/30/22 03/30/22 0100   Red - Status infusing 03/30/22 0100   PICC Comment PICC ok to use 03/28/22 1242   Extravasation? No 03/30/22 0100   Line Necessity Yes, meets criteria 03/30/22 0100   Number of days: 2       Closed/Suction Drain 1 Right RLQ Bulb 19 [place] (Active)   Site Description [facility] 03/30/22 0100   Dressing Status Other (comment) 03/30/22 0100   Drainage Appearance Serosanguenous 03/30/22 0100   Status Suction-low intermittent 03/30/22 0100   Output (ml) 10 ml 03/30/22 0659   Number of days: 3       Closed/Suction Drain 2 Right RLQ Bulb 19 [place]ian (Active)   Site Description [facility] 03/30/22 0100   Dressing Status Other (comment) 03/30/22 0100   Drainage Appearance Serosanguenous 03/30/22 0100   Status To bulb suction 03/30/22 0100   Output (ml) 10 ml 03/30/22 0659   Number of days: 3       NG/OG/NJ Tube Nasogastric 18 fr Right nostril (Active)   Site Description [facility] 03/30/22 0100   Status Suction-low intermittent 03/30/22 0100   Drainage Appearance Green  "03/30/22 0100   Placement Confirmation [place] unchanged 03/30/22 0100   [place] (cm marking) at nare/mouth 63 cm 03/29/22 1600   Flush/Free Water (mL) 30 mL 03/29/22 0500   Container Amount 550 mL 03/30/22 0100   Output (ml) 0 ml 03/30/22 0100   Number of days: 4       Colostomy (Active)   Stomal Appliance Intact 03/30/22 0100   Stoma Assessment Protruding;Pink 03/30/22 0100   Peristomal Assessment UTV 03/30/22 0100   Stool Amount Other (Comment) 03/30/22 0100   Output (ml) 0 ml 03/30/22 0659   Number of days: 3       Incision/Surgical Site 03/26/22 Abdomen (Active)   Incision Assessment UTV 03/30/22 0100   Akiko-Incision Assessment UTV 03/30/22 0100   Closure MIKE 03/30/22 0100   Incision Drainage Amount Small 03/30/22 0100   Drainage Description Serosanguinous 03/30/22 0100   Dressing Intervention Dried drainage 03/30/22 0100   Number of days: 4   IVF: NS @40 ml/hr and TPN  VS: /72 (BP Location: Right arm)   Pulse 100   Temp 99.2  F (37.3  C) (Oral)   Resp 16   Ht 1.778 m (5' 10\")   Wt 55 kg (121 lb 4.8 oz)   SpO2 96%   BMI 17.40 kg/m    Diet: parenteral nutrition - ADULT compounded formula  NPO for Medical/Clinical Reasons Except for: Meds, Ice Chips    Activity: SBA  Pain Management: PCA (until he disconnected tubing)  GI/: Voiding spontaneously, -BM (via Colostomy), +Flatus (via Colostomy), -Nausea  Neuro: A&Ox4  Team: Colorectal  Pertinent Labs: None      Shift Summary  Patient slept intermittently on shift. Patient pulled out NG tube, clamped and disconnected tubing, pulled off pulse oximetry, shut off alarms on monitors. Patient had a conversation with write and Colorectal team about his care. A safety note was submitted for incidents and education on not touching things that are for staff only. Patient has agreed to stay in the hospital with hesitancy and distrust in nurses and doctors about his care. Continue POC.   "

## 2022-03-30 NOTE — PLAN OF CARE
PT-7C: PT eval & treat orders received; pt working with OT staff to address deconditioning issues - pt completing mobility tasks with SBA at this time; no skilled PT needs identified. Will complete PT orders as skilled intervention not presently indicated

## 2022-03-30 NOTE — PROGRESS NOTES
POD2 of an XL, small bowel resection, flex sig, loop descending colostomy. A&Ox4, VSS on room air. Pain is controlled with a PCA of dilaudid. LS insp/exp wheezes RML, RLL, clear/diminished in others. Cough produces clear/white sputum. IS at 1250. Phos replaced, recheck at 2200. TPN and IVM running in PICC with IV antx to complete tonight. Midline incision is CDI. Colostomy has gas, no stool. JPs have scant serosanguinous output. NG to LIS with green output. Pt up SBA with lines, ambulated x3 today. Likely clamp the NG tomorrow morning. Waiting for ROBF.

## 2022-03-31 ENCOUNTER — APPOINTMENT (OUTPATIENT)
Dept: OCCUPATIONAL THERAPY | Facility: CLINIC | Age: 72
DRG: 329 | End: 2022-03-31
Payer: COMMERCIAL

## 2022-03-31 LAB
ANION GAP SERPL CALCULATED.3IONS-SCNC: 8 MMOL/L (ref 3–14)
BACTERIA BLD CULT: NO GROWTH
BACTERIA BLD CULT: NO GROWTH
BACTERIA PRT CULT: ABNORMAL
BUN SERPL-MCNC: 17 MG/DL (ref 7–30)
CALCIUM SERPL-MCNC: 7.8 MG/DL (ref 8.5–10.1)
CHLORIDE BLD-SCNC: 110 MMOL/L (ref 94–109)
CO2 SERPL-SCNC: 21 MMOL/L (ref 20–32)
CREAT SERPL-MCNC: 0.46 MG/DL (ref 0.66–1.25)
GFR SERPL CREATININE-BSD FRML MDRD: >90 ML/MIN/1.73M2
GLUCOSE BLD-MCNC: 109 MG/DL (ref 70–99)
GLUCOSE BLDC GLUCOMTR-MCNC: 116 MG/DL (ref 70–99)
HOLD SPECIMEN: NORMAL
MAGNESIUM SERPL-MCNC: 2.3 MG/DL (ref 1.6–2.3)
PATH REPORT.ADDENDUM SPEC: ABNORMAL
PATH REPORT.COMMENTS IMP SPEC: ABNORMAL
PATH REPORT.COMMENTS IMP SPEC: YES
PATH REPORT.FINAL DX SPEC: ABNORMAL
PATH REPORT.GROSS SPEC: ABNORMAL
PATH REPORT.MICROSCOPIC SPEC OTHER STN: ABNORMAL
PATH REPORT.RELEVANT HX SPEC: ABNORMAL
PHOSPHATE SERPL-MCNC: 3 MG/DL (ref 2.5–4.5)
PHOTO IMAGE: ABNORMAL
POTASSIUM BLD-SCNC: 3.8 MMOL/L (ref 3.4–5.3)
POTASSIUM BLD-SCNC: 3.8 MMOL/L (ref 3.4–5.3)
SODIUM SERPL-SCNC: 139 MMOL/L (ref 133–144)

## 2022-03-31 PROCEDURE — 99223 1ST HOSP IP/OBS HIGH 75: CPT | Performed by: INTERNAL MEDICINE

## 2022-03-31 PROCEDURE — 250N000013 HC RX MED GY IP 250 OP 250 PS 637

## 2022-03-31 PROCEDURE — 97535 SELF CARE MNGMENT TRAINING: CPT | Mod: GO

## 2022-03-31 PROCEDURE — 120N000002 HC R&B MED SURG/OB UMMC

## 2022-03-31 PROCEDURE — 83735 ASSAY OF MAGNESIUM: CPT

## 2022-03-31 PROCEDURE — 84100 ASSAY OF PHOSPHORUS: CPT

## 2022-03-31 PROCEDURE — 250N000011 HC RX IP 250 OP 636: Performed by: SURGERY

## 2022-03-31 PROCEDURE — 250N000009 HC RX 250: Performed by: SURGERY

## 2022-03-31 PROCEDURE — 80048 BASIC METABOLIC PNL TOTAL CA: CPT

## 2022-03-31 PROCEDURE — 80048 BASIC METABOLIC PNL TOTAL CA: CPT | Performed by: SURGERY

## 2022-03-31 PROCEDURE — 36592 COLLECT BLOOD FROM PICC: CPT

## 2022-03-31 RX ORDER — HEPARIN SODIUM,PORCINE 10 UNIT/ML
5-20 VIAL (ML) INTRAVENOUS
Status: DISCONTINUED | OUTPATIENT
Start: 2022-03-31 | End: 2022-04-05 | Stop reason: HOSPADM

## 2022-03-31 RX ORDER — POTASSIUM CHLORIDE 7.45 MG/ML
10 INJECTION INTRAVENOUS ONCE
Status: COMPLETED | OUTPATIENT
Start: 2022-03-31 | End: 2022-03-31

## 2022-03-31 RX ORDER — HEPARIN SODIUM,PORCINE 10 UNIT/ML
5-20 VIAL (ML) INTRAVENOUS EVERY 24 HOURS
Status: DISCONTINUED | OUTPATIENT
Start: 2022-03-31 | End: 2022-04-05 | Stop reason: HOSPADM

## 2022-03-31 RX ADMIN — OXYCODONE HYDROCHLORIDE 5 MG: 5 TABLET ORAL at 08:27

## 2022-03-31 RX ADMIN — POTASSIUM CHLORIDE 10 MEQ: 7.46 INJECTION, SOLUTION INTRAVENOUS at 10:19

## 2022-03-31 RX ADMIN — OXYCODONE HYDROCHLORIDE 5 MG: 5 TABLET ORAL at 13:30

## 2022-03-31 RX ADMIN — ENOXAPARIN SODIUM 40 MG: 40 INJECTION SUBCUTANEOUS at 21:51

## 2022-03-31 RX ADMIN — I.V. FAT EMULSION 250 ML: 20 EMULSION INTRAVENOUS at 21:48

## 2022-03-31 RX ADMIN — POTASSIUM PHOSPHATE, MONOBASIC POTASSIUM PHOSPHATE, DIBASIC: 224; 236 INJECTION, SOLUTION, CONCENTRATE INTRAVENOUS at 21:48

## 2022-03-31 RX ADMIN — OXYCODONE HYDROCHLORIDE 5 MG: 5 TABLET ORAL at 21:51

## 2022-03-31 RX ADMIN — OXYCODONE HYDROCHLORIDE 5 MG: 5 TABLET ORAL at 17:43

## 2022-03-31 RX ADMIN — LIDOCAINE 1 PATCH: 560 PATCH PERCUTANEOUS; TOPICAL; TRANSDERMAL at 21:52

## 2022-03-31 RX ADMIN — OXYCODONE HYDROCHLORIDE 5 MG: 5 TABLET ORAL at 02:30

## 2022-03-31 ASSESSMENT — ACTIVITIES OF DAILY LIVING (ADL)
ADLS_ACUITY_SCORE: 7

## 2022-03-31 NOTE — PLAN OF CARE
Assumed cares from [id number]. VSS on RA, A&Ox4. Up with SBA, remaining in bed throughout shift. Reporting abdominal discomfort managed with PRN Oxycodone x1. Denied nausea throughout shift, currently NPO with continuous TPN through PICC. PIV CDI and saline locked. Abdomen flat, tender, bowel sounds hypoactive. Colostomy CDI with no gas or stool out. MORIS x2 to bulb suction with minimal serosanguineous output. Voiding spontaneously into urinal with adequate UOP. Continue with POC.

## 2022-03-31 NOTE — PHARMACY-ADMISSION MEDICATION HISTORY
Admission Medication History Completed by Pharmacy    See [facility] Admission Navigator for allergy information, preferred outpatient pharmacy, prior to admission medications and immunization status.     Medication History Sources:     Nurse completed patient interview, surecripts, careeverwhere    Changes made to PTA medication list (reason):    Added: None    Deleted: None    Changed: None    Additional Information:    Patient reports taking no medications    Prior to Admission medications    Not on File       Date completed: 03/31/22    Medication history completed by: Annika Aguila, PharmD

## 2022-03-31 NOTE — PROGRESS NOTES
"Colorectal Surgery Progress Note  [facility]  March 27, 2022  POD#4    SUBJECTIVE   Overall feels well. Pain is controlled. Walked 3x today yesterday. Has had some sips of water, would like to advance diet. UOP good. No gas or stool in ostomy.     OBJECTIVE:    Vitals:  BP 99/48 (BP Location: Left arm)   Pulse 76   Temp 97.6  F (36.4  C) (Temporal)   Resp 14   Ht 1.778 m (5' 10\")   Wt 59 kg (130 lb)   SpO2 97%, on RA  BMI 18.65 kg/m    I/O:  I/O last 3 completed shifts:  In: 1380 [I.V.:420]  Out: 3840 [Urine:3825; Drains:15]    Physical Exam:  Gen: AAOx3, NAD  Pulm: Non-labored breathing on RA  CV:      RRR by RP, no sig lower extremity edema  Abd: Soft, mildly distended, tender, no guarding/rebound. No longer using abdominal binder.    Incision C/D/I, staples. Bandage removed.    Stoma pink and viable, no stool, no gas.    2 MORIS drains, serosanguinous collections.    Ext:  Warm and well-perfused  Lines:  MORIS, and PICC in place     BMP  Recent Labs   Lab 03/31/22  0656 03/31/22  0156 03/30/22 2018 03/30/22  1827 03/30/22  1310 03/30/22  1112 03/30/22  0648 03/30/22  0412 03/29/22  2213 03/29/22  1752 03/29/22  0838 03/28/22  1607 03/28/22  0900     --   --   --   --   --  138  --   --   --  144  --  140   POTASSIUM 3.8  3.8  --   --   --   --   --  3.6  --   --   --  3.6  --  4.5   CHLORIDE 110*  --   --   --   --   --  106  --   --   --  111*  --  109   CO2 21  --   --   --   --   --  24  --   --   --  27  --  24   BUN 17  --   --   --   --   --  14  --   --   --  28  --  36*   CR 0.46*  --   --   --   --   --  0.48*  --   --   --  0.58*  --  0.71   * 116* 124* 117*   < >  --  124*   < >  --    < > 107*   < > 94   MAG 2.3  --   --   --   --   --  2.0  --   --   --  2.2  --  2.6*   PHOS 3.0  --   --   --   --  2.1* 2.3*  --  1.4*  --  1.1*  --  2.4*    < > = values in this interval not displayed.     CBC  Recent Labs   Lab 03/30/22  0648 03/29/22  0838 " 03/28/22  0900 03/27/22  0940   WBC 11.5* 10.8 11.9* 13.2*   HGB 11.6* 10.8* 11.1* 12.3*   HCT 35.7* 34.7* 35.7* 39.2*    296 315 268       ASSESSMENT:   71 year old male who presented to [facility] ED with abdominal pain found to have 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 S/p exploratory laparotomy, small bowel resection, colostomy creation, flex sigmoidoscopy overnight on 3/26-3/27. Path results pending. Hemodynamically stable. TPN initiated to support nourishment in setting of 40lb weight loss and ileus. Discussed plan with nursing, will try to minimize # of IVs/tubes, ok to trial without NG at this time, stop IVF, start PO pain management, but should remain admitted until at least bowel function resumes with stable ostomy output.      Plan:     Neuro/Pain: prn oxycodone, prn dilaudid, lidocaine patches.   CV: RRR.  Briefly went into afib during OR, not noted since. Continue to monitor, EKG if chest pain or palpitations.   PULM: encourage IS.  Nutr/GI:   - Start CLD   - Monitor s/p NGT removal, appreciate nursing assistance.   - PICC placed on 3/28. CXR with adequate positioning of PICC.   - Severe protein malnutrition. TPN @ 60 ml/hr; initiated on 3/28 evening. Appreciate dietician assistance.   - IV Protonix   - Triglycerides wnl on 3/28  - Prealbumin @ 4 on 3/27  /Lytes/IVF:   - Continue TPN   - CEA 2.1 on 3/27  - Monitor electrolytes  Heme: Hgb dip at 11.1 on 3/28 (12.3). Likely dilutional.    ID: No concerns.    Endocrine: No concerns   T/L/D: MORIS x2. Colostomy pink, no stool/gas output. PICC.  Activity: encourage ambulation as tolerated.   Ppx: lovenox  Dispo: Pending return of bowel function, ability to tolerate diet advancement, pain controlled. 2-3 days estimate.     Patient was seen and discussed on rounds with resident Dr. Kenney and CHEIKH Mccloud who discussed with staff.     Reema Dominique  Medical Student  [facility]    Resident  Attestation   I, Sharif Kenney, was present with the medical student who participated in the service and in the documentation of the note. I have verified the history and personally performed the physical exam and medical decision making. Addenda have been made to the note as appropriate. I agree with the assessment and plan of care as documented in the note.      Sharif Kenney MD  General Surgery PGY1  Date of Service: 03/31/2022

## 2022-03-31 NOTE — PLAN OF CARE
"Goal Outcome Evaluation:    Plan of Care Reviewed With: patient     Overall Patient Progress: improving    Reports pain well controlled with po oxycodone. No complaints of nausea. Tolerating NPO. Ambulated in [place] with standby assist. Colostomy intact with no output. JPs x2 intact with no output. Continues high volume urine output. TPN infusing in PICC. PIV saline locked. Nicotine patch on left shoulder and lidoderm patch on right scapula. Awaiting learning center apt for PICC and TPN at home. Vitals stable.  BP (!) 151/86 (BP Location: Right arm)   Pulse 85   Temp 97.6  F (36.4  C) (Oral)   Resp 16   Ht 1.778 m (5' 10\")   Wt 55 kg (121 lb 4.8 oz)   SpO2 96%   BMI 17.40 kg/m             "

## 2022-03-31 NOTE — CONSULTS
"  Hematology Consult Note   Date of Service: 03/31/2022    Patient: Kalin Shepard  MRN: [id number]  Admission Date: 3/26/2022  Hospital Day # 4   Primary Outpatient Oncologist: N/A    Reason for Consult: \"LBO 2/2 sigmoid mass. s/p ex lap, small bowel resection, colostomy.  mass not resectable. 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 He reports being otherwise healthy and without significant medical problems. He presented to [facility] ED on 3/26 with progressive abdominal pain and was found to have an obstructive sigmoid colon mass. Given concern for closed-loop obstruction and possible abscess vs perforated malignancy, he was transferred to [facility] for intervention. He underwent exploratory laparotomy (3/26) with small bowel resection and loop descending colostomy formation. He has been started on TPN given drastic weight loss prior to admission and ongoing ileus.     On visitation today, he reports feeling overall well and pain is adequately controlled. He reports scant flatus. No prominent nausea/vomiting at this time. He reports unintentional and progressive weight loss over the past year, estimated to be 30-40 pounds. He denies night sweats, fever, chills, new lumps or bumps, changes in dietary habits, changes in stools, or SOB. He reports actively smoking up to 1/2 ppd. He reports he has not seen a physician in at least 25 years and has been otherwise healthy. He lives in [place], [place] and actively practices as an outpatient psychiatrist.     Oncologic History:  - No prior personal oncologic history  - Paternal colon cancer (details unclear)     Review of Systems: Pertinent positive and negative systems described in HPI; the remainder of the 14 systems are negative    Past Medical History:  No past medical history on file.    Past Surgical History:  Past " "Surgical History:   Procedure Laterality Date    LAPAROTOMY EXPLORATORY N/A 3/26/2022    Procedure: exploratory laparotomy, small bowel resection, colostomy creation;  Surgeon: Riley Magdaleno MD;  Location: [facility] OR    PICC INSERTION - DOUBLE LUMEN Left 03/28/2022    left medial brachial 5 fr dl picc 49 cm    SIGMOIDOSCOPY FLEXIBLE N/A 3/26/2022    Procedure: Sigmoidoscopy flexible;  Surgeon: Riley Magdaleno MD;  Location: [facility] OR     Social History:  Social History     Socioeconomic History    Marital status:      Spouse name: Not on file    Number of children: Not on file    Years of education: Not on file    Highest education level: Not on file   Occupational History    Not on file   Tobacco Use    Smoking status: Current Every Day Smoker     Packs/day: 0.50     Years: 50.00     Pack years: 25.00    Smokeless tobacco: Not on file   Substance and Sexual Activity    Alcohol use: Not Currently    Drug use: Never    Sexual activity: Not on file   Other Topics Concern    Not on file   Social History Narrative    Not on file     Social Determinants of Health     Financial Resource Strain: Not on file   Food Insecurity: Not on file   Transportation Needs: Not on file   Physical Activity: Not on file   Stress: Not on file   Social Connections: Not on file   Intimate Partner Violence: Not on file   Housing Stability: Not on file      Family History  - Reports paternal colon cancer    Physical Exam:    /87 (BP Location: Right arm)   Pulse 92   Temp 99.4  F (37.4  C) (Temporal)   Resp 16   Ht 1.778 m (5' 10\")   Wt 55 kg (121 lb 4.8 oz)   SpO2 98%   BMI 17.40 kg/m    Gen: Chronically-ill appearing, mildly cachectic, sitting up in bed, in NAD  HEENT: EOMI, PERRL, MMM, poor dentition  CV: nontachycardic  Pulm: comfortable and normal work of breathing on room air  Abd: Soft, mildly distended, stoma present  Ext: Warm and well perfused. No lower extremity edema. TPN infusing via PICC.  Skin: No rash, cyanosis " or petechial lesion  Neuro: Alert and answering questions appropriately. Moving all extremities without issue or focal neurologic deficits.    Labs & Studies: I personally reviewed the following studies:  ROUTINE LABS (Last four results):  CMP  Recent Labs   Lab 03/31/22  0656 03/31/22  0156 03/30/22 2018 03/30/22  1827 03/30/22  1310 03/30/22  1112 03/30/22  0648 03/30/22  0412 03/29/22  2213 03/29/22  1752 03/29/22  0838 03/28/22  1607 03/28/22  0900 03/27/22  0753 03/26/22  1554 03/26/22  1554     --   --   --   --   --  138  --   --   --  144  --  140 138  --  137   POTASSIUM 3.8  3.8  --   --   --   --   --  3.6  --   --   --  3.6  --  4.5 4.5  --  4.3   CHLORIDE 110*  --   --   --   --   --  106  --   --   --  111*  --  109 105  --  103   CO2 21  --   --   --   --   --  24  --   --   --  27  --  24 22  --  25   ANIONGAP 8  --   --   --   --   --  8  --   --   --  6  --  7 11  --  9   * 116* 124* 117*   < >  --  124*   < >  --    < > 107*   < > 94 106*  --  100*   BUN 17  --   --   --   --   --  14  --   --   --  28  --  36* 32*  --  40*   CR 0.46*  --   --   --   --   --  0.48*  --   --   --  0.58*  --  0.71 0.66  --  0.73   GFRESTIMATED >90  --   --   --   --   --  >90  --   --   --  >90  --  >90 >90  --  >90   ROXIE 7.8*  --   --   --   --   --  8.2*  --   --   --  7.9*  --  8.3* 7.2*  --  9.0   MAG 2.3  --   --   --   --   --  2.0  --   --   --  2.2  --  2.6*  --   --   --    PHOS 3.0  --   --   --   --  2.1* 2.3*  --  1.4*  --  1.1*  --  2.4* 3.5   < >  --    PROTTOTAL  --   --   --   --   --   --  5.8*  --   --   --  5.6*  --   --  4.9*  --  7.0   ALBUMIN  --   --   --   --   --   --  2.3*  --   --   --  2.2*  --   --  2.2*  --  2.7*   BILITOTAL  --   --   --   --   --   --  0.4  --   --   --  0.3  --   --  0.5  --  0.5   ALKPHOS  --   --   --   --   --   --  47  --   --   --  40  --   --  41  --  62   AST  --   --   --   --   --   --  25  --   --   --  22  --   --  20  --  17   ALT  --   --    --   --   --   --  17  --   --   --  14  --   --  13  --  15    < > = values in this interval not displayed.     CBC  Recent Labs   Lab 03/30/22  0648 03/29/22  0838 03/28/22  0900 03/27/22  0940   WBC 11.5* 10.8 11.9* 13.2*   RBC 3.75* 3.58* 3.71* 4.05*   HGB 11.6* 10.8* 11.1* 12.3*   HCT 35.7* 34.7* 35.7* 39.2*   MCV 95 97 96 97   MCH 30.9 30.2 29.9 30.4   MCHC 32.5 31.1* 31.1* 31.4*   RDW 14.0 14.4 14.5 14.2    296 315 268     - CEA 2.1      Pathology report (3/26):   Case Report   Surgical Pathology Report                         Case: [id number]                                   Authorizing Provider:  Riley Magdaleno MD       Collected:           03/26/2022 10:05 PM      Ordering Location:      MAIN OR                 Received:            03/28/2022 08:52 AM           Pathologist:           Ceci Forbes MD                                                            Specimens:   A) - Small Intestine, Small Bowel                                                                    B) - Peritoneum, Peritoneal Biopsy                                                                   C) - Large Intestine, Colon, Sigmoid, Sigmoid Colon Mass                                Final Diagnosis   A. SMALL BOWEL, RESECTION:  Serosal adhesions and acute serositis consistent with peritonitis:   -Small bowel wall with congestion & edema; no dysplasia or malignancy    B. PERITONEUM, BIOPSY:   Positive for malignancy: intestinal-type adenocarcinoma:   -Associated acute inflammatory exudate in keeping with peritonitis   -Report of MMR (MLH1, PMS2, MSH2 & MSH6) immunohistochemistry to follow   (See Comment)    C. SIGMOID COLON MASS, BIOPSY:  Colonic mucosa with mild nonspecific inflammation but negative for malignancy  (See Comment)   Electronically signed by Ceci Forbes MD on 3/30/2022 at 10:53 AM   Comment   [facility]   There is an adenocarcinoma in the peritoneal biopsy associated with acute peritonitis. The sigmoid  "biopsy presumed directed at a sigmoid fungating mass is however negative.  The features of the peritoneal tumor are those of intestinal type adenocarcinoma and supported by positive CDX2 and CK20 and negative CK7 on appropriately controlled immunohistochemistry stains. It is therefore likely that the sigmoid mass may not have been representatively sampled in part C, with the most likely situation being peritoneal involvement by this (sigmoid mass). However, metastasis to the peritoneum from elsewhere in the large intestine or less likely, extra-colorectal, site cannot be ruled out.      The presence of peritonitis could also imply intestinal perforation either by tumor or other sites, although the resected small intestine itself shows no evidence of perforation. It appears the clinical obstruction at the level of the small intestine could be due to serosal adhesions.       Clinical Information   [facility]   Procedure: exploratory laparotomy, small bowel resection, colostomy creation  Sigmoidoscopy flexible  Pre-op Diagnosis: Colon obstruction (H) [K56.609]  Post-op Diagnosis: K56.609 - Colon obstruction (H) [ICD-10-CM]   Gross Description   [facility]   A(2). Small Intestine, Small Bowel:  The specimen is received fresh with proper patient identification labeled \"small bowel\".  The specimen consists of a a 10.5 cm in length by 3.2 cm in diameter segment of bowel received stapled at each end.  Minimal adipose tissue is received attached.  The outer surface is covered in yellow-tan exudate for 70% of the surface.  No perforation is identified.  The specimen is opened to reveal edematous mucosa with no polyps, masses, or perforations identified.  The wall is focally thinned to less than 0.1 cm.  Representative sections are submitted.    [id number] margins, en face  [id number] mucosa     B(3). Peritoneum, Peritoneal Biopsy:  The specimen is received in formalin with proper patient identification, labeled " "\"peritoneal biopsy\".  The specimen consists of 4 pieces of white-tan soft tissue ranging in size from 0.5 to 1.0 cm in greatest dimension.  The largest piece is bisected.  All pieces exhibit diffuse white-tan discoloration.  The specimen is entirely submitted in cassette B1.                   C(4). Large Intestine, Colon, Sigmoid, Sigmoid Colon Mass:  The specimen is received in formalin with proper patient identification, labeled \"sigmoid colon mass\".  The specimen consists of a 0.5 x 0.5 x 0.1 cm aggregate of red-brown soft tissue, which is filtered, wrapped, and entirely submitted in cassette C1.         Imaging:  EXAM: CT ABDOMEN PELVIS W CONTRAST  LOCATION: [facility]  DATE/TIME: 3/26/2022 5:35 PM  INDICATION: Abdominal pain.  COMPARISON: None.  TECHNIQUE: CT scan of the abdomen and pelvis was performed following injection of IV contrast. Multiplanar reformats were obtained. Dose reduction techniques were used.  CONTRAST: 70 mL Isovue 370     FINDINGS:   LOWER CHEST: Normal.  HEPATOBILIARY: Subcentimeter hypodense lesions in the liver are too small to adequately characterize. For example, there is a 5 mm lesion in the right hepatic lobe at the dome (series 5, image 24) and a 4 mm lesion more inferiorly in the right lobe   (series 5, image 62). No calcified gallstones or biliary ductal dilatation.  PANCREAS: Normal.  SPLEEN: Normal.  ADRENAL GLANDS: Normal.  KIDNEYS/BLADDER: Left renal simple cyst requiring no specific follow-up. Mild right hydronephrosis. No calculi.  BOWEL: Several mildly dilated loops of small bowel dilated up to about 3 cm. Distended cecum measuring about 9 cm and mildly dilated ascending colon, transverse colon and descending colon. Colonic distention is to the level of an area of masslike wall   thickening in the proximal sigmoid colon (series 5, image 187-180 and series 3, image 59). The sigmoid colonic mass appears to extend extraluminally, best seen on the " coronal image 59. Redundant sigmoid colon. Adjacent to the area of masslike wall   thickening in the sigmoid colon, there is a 5.0 x 3.5 cm collection of fluid and air (series 5, image 176) in the mid pelvis.  LYMPH NODES: Indeterminate retroperitoneal lymphadenopathy with a left para-aortic 1.5 x 1.1 cm lymph node (series 5, image 117).  VASCULATURE: Aneurysmal infrarenal abdominal aorta measuring 3.1 cm. Aortobiiliac atherosclerotic calcifications.  PELVIC ORGANS: Prostatomegaly. See discussion above regarding the sigmoid mass.  Other: Linear peritoneal thickening in the pelvis and mildly clustered appearance of small bowel loops in the pelvis are nonspecific, but could represent peritoneal carcinomatosis. Trace upper abdominal ascites.  MUSCULOSKELETAL: No suspicious lesions in the bones.                                                               IMPRESSION:   1.  Nodular, masslike wall thickening in the sigmoid colon, concerning for malignancy. Resultant upstream small bowel and colonic obstruction.  2.  A 5 x 3.5 cm fluid collection adjacent to the sigmoid mass suspicious for an abscess and/or perforated malignancy.   3.  Linear peritoneal thickening and clustered appearance of small bowel loops in the pelvis is indeterminate, and could represent peritoneal carcinomatosis. Trace upper abdominal ascites.  4.  Mildly enlarged retroperitoneal lymph nodes, indeterminate and could be metastatic.  5.  Few subcentimeter lesions in the liver are too small to adequately characterize. Continued attention on follow-up.  6.  Abdominal aortic aneurysm measuring 3.1 cm.      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 Per Colorectal Surgery, the primary sigmoid mass was not amenable to resection. CEA 2.1. MMR IHC is pending. MSI was not initially obtained.     His sigmoid mass is highly suspicious for malignancy, however, the biopsy results were likely non-representative. Given that peritoneal biopsy shows intestinal-type adenocarcinoma, this is suggestive of metastatic spread from the presumed sigmoid primary. It remains unclear if there is distant spread to lymph nodes or other organs at this time. Completion of formal staging and MSI testing will help guide future therapy options, which the patient is eager to pursue.    Recommendations:   - Follow path for MMR IHC, pending  - MSI requested for existing peritoneal biopsy/path (ordered)  - Will need PET/CT to complete staging, likely to be obtained as outpatient  - Following staging, will need outpatient Oncology clinic follow-up to discuss possible chemotherapy +/- immunotherapy options. This may be accomplished with Dr. Vallejo, or possibly a different provider as in [place], [place] closer to patient's home (patient's preference, pending availability/coordination).     Patient was seen and plan of care was discussed with attending physician Dr. Stockton.    We will continue to follow this patient. Please don't hesitate to contact the Fellow On-Call with questions.    Baldomero Young MD  Internal Medicine PGY3  Hematology/Oncology Consult Service        ------------------------------------------------------------------------------------  ATTENDING PHYSICIAN ATTESTATION     I, Hardeep Stockton, saw and evaluated Kalin Shepard as part of a shared visit.  I have reviewed and discussed with the fellow/ advanced practice provider their history, physical and plan.     I personally reviewed the vital signs, medications, labs and imaging.  I have ascertained key findings from the history, and I have performed key aspects  of the physical examination.      Key management decisions made by me:  He is a 71-year-old man with a new diagnosis of sigmoid mass.  He underwent surgical exploration, and peritoneal seeding was found.  He underwent diverting colostomy, as well as several intra-operative biopsies.  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  He is not a candidate for definitive surgical resection.  Management will rely on systemic therapy.  Before this begins, he will require staging with a PET scan.  We will also help to coordinate outpatient treatment in our oncology clinic with Dr Vallejo.  We will also seek testing for MMR deficiency and microsatellite instability.    I spent a total of 120 minutes on this patient on the day of the encounter, of which more than 50% of this time was used for counselling and coordination of care.     Hardeep Stockton M.D.  Date of Service (when I saw the patient): 03/31/2022

## 2022-03-31 NOTE — PROGRESS NOTES
Care Management Follow Up    Length of Stay (days): 4  Expected Discharge Date: 04/03/2022  Concerns to be Addressed:discharge planning     Patient plan of care discussed at interdisciplinary rounds: Yes    Anticipated Discharge Disposition:  Home  Anticipated Discharge Services:  BAYRON (RN - ostomy)  Anticipated Discharge DME:      Additional Information:  Per team patient ready likely this weekend. He is currently on TPN - this will be weaned prior to discharge. Email sent to [facility] to update on potential upcoming discharge       Guadalupe Fritz RN, [place]  Float Care Coordinator  Covering 7C RNCC   Pager: [phone]

## 2022-03-31 NOTE — PLAN OF CARE
Occupational Therapy Discharge Summary    Reason for therapy discharge:    All goals and outcomes met, no further needs identified.  Patient/family request discontinuation of services.    Progress towards therapy goal(s). See goals on Care Plan in [facility] electronic health record for goal details.  Goals met    Therapy recommendation(s):    No further therapy is recommended. Pt near functional baseline as he is completing all ADL independently and mobilizing in hallway with SBA and no AD, ax tolerance continues to be limited by pain and post-surgical precautions. Pt reporting no further skilled OT needs.     Goal Outcome Evaluation:

## 2022-03-31 NOTE — PLAN OF CARE
Vital signs stable. Pt up with stand by assist. Tolerating clear liquid diet. Pain controlled with oxycodone x3. Ostomy with no gas or stool. MORIS's with small amount of serosang output. Voids spont with adequate urine out put. Midline incision cloased with staples IRENE. Continue plan of care.

## 2022-04-01 LAB
ANION GAP SERPL CALCULATED.3IONS-SCNC: 7 MMOL/L (ref 3–14)
BUN SERPL-MCNC: 20 MG/DL (ref 7–30)
CALCIUM SERPL-MCNC: 8.3 MG/DL (ref 8.5–10.1)
CHLORIDE BLD-SCNC: 110 MMOL/L (ref 94–109)
CO2 SERPL-SCNC: 22 MMOL/L (ref 20–32)
CREAT SERPL-MCNC: 0.44 MG/DL (ref 0.66–1.25)
GFR SERPL CREATININE-BSD FRML MDRD: >90 ML/MIN/1.73M2
GLUCOSE BLD-MCNC: 100 MG/DL (ref 70–99)
GLUCOSE BLDC GLUCOMTR-MCNC: 105 MG/DL (ref 70–99)
HOLD SPECIMEN: NORMAL
MAGNESIUM SERPL-MCNC: 2.2 MG/DL (ref 1.6–2.3)
PHOSPHATE SERPL-MCNC: 3.4 MG/DL (ref 2.5–4.5)
POTASSIUM BLD-SCNC: 4.5 MMOL/L (ref 3.4–5.3)
SODIUM SERPL-SCNC: 139 MMOL/L (ref 133–144)

## 2022-04-01 PROCEDURE — 250N000013 HC RX MED GY IP 250 OP 250 PS 637

## 2022-04-01 PROCEDURE — 80048 BASIC METABOLIC PNL TOTAL CA: CPT

## 2022-04-01 PROCEDURE — 84100 ASSAY OF PHOSPHORUS: CPT

## 2022-04-01 PROCEDURE — 36592 COLLECT BLOOD FROM PICC: CPT

## 2022-04-01 PROCEDURE — 81301 MICROSATELLITE INSTABILITY: CPT | Performed by: INTERNAL MEDICINE

## 2022-04-01 PROCEDURE — 120N000002 HC R&B MED SURG/OB UMMC

## 2022-04-01 PROCEDURE — G0463 HOSPITAL OUTPT CLINIC VISIT: HCPCS

## 2022-04-01 PROCEDURE — 83735 ASSAY OF MAGNESIUM: CPT

## 2022-04-01 PROCEDURE — 250N000009 HC RX 250: Performed by: SURGERY

## 2022-04-01 PROCEDURE — 250N000011 HC RX IP 250 OP 636: Performed by: SURGERY

## 2022-04-01 RX ORDER — METHOCARBAMOL 500 MG/1
500 TABLET, FILM COATED ORAL 4 TIMES DAILY PRN
Status: DISCONTINUED | OUTPATIENT
Start: 2022-04-01 | End: 2022-04-05 | Stop reason: HOSPADM

## 2022-04-01 RX ADMIN — OXYCODONE HYDROCHLORIDE 5 MG: 5 TABLET ORAL at 02:25

## 2022-04-01 RX ADMIN — Medication 5 ML: at 08:22

## 2022-04-01 RX ADMIN — POTASSIUM PHOSPHATE, MONOBASIC POTASSIUM PHOSPHATE, DIBASIC: 224; 236 INJECTION, SOLUTION, CONCENTRATE INTRAVENOUS at 20:14

## 2022-04-01 RX ADMIN — OXYCODONE HYDROCHLORIDE 5 MG: 5 TABLET ORAL at 15:53

## 2022-04-01 RX ADMIN — Medication 5 ML: at 07:58

## 2022-04-01 RX ADMIN — NICOTINE 1 PATCH: 7 PATCH, EXTENDED RELEASE TRANSDERMAL at 08:27

## 2022-04-01 RX ADMIN — OXYCODONE HYDROCHLORIDE 5 MG: 5 TABLET ORAL at 23:31

## 2022-04-01 RX ADMIN — LIDOCAINE 1 PATCH: 560 PATCH PERCUTANEOUS; TOPICAL; TRANSDERMAL at 20:14

## 2022-04-01 RX ADMIN — ENOXAPARIN SODIUM 40 MG: 40 INJECTION SUBCUTANEOUS at 20:14

## 2022-04-01 RX ADMIN — OXYCODONE HYDROCHLORIDE 5 MG: 5 TABLET ORAL at 11:16

## 2022-04-01 RX ADMIN — OXYCODONE HYDROCHLORIDE 5 MG: 5 TABLET ORAL at 06:25

## 2022-04-01 ASSESSMENT — ACTIVITIES OF DAILY LIVING (ADL)
ADLS_ACUITY_SCORE: 5
ADLS_ACUITY_SCORE: 5
ADLS_ACUITY_SCORE: 7
ADLS_ACUITY_SCORE: 5
ADLS_ACUITY_SCORE: 7
ADLS_ACUITY_SCORE: 5
ADLS_ACUITY_SCORE: 5

## 2022-04-01 NOTE — PROGRESS NOTES
Hematology / Oncology  Daily Progress Note   Date of Service: 04/01/2022  Patient: Kalin Shepard  MRN: [id number]  Admission Date: 3/26/2022  Hospital Day # 5  Cancer Diagnosis: Intestinal-type Adenocarcinoma  Primary Outpatient Oncologist: CINTHIA  Current Treatment Plan: TBD    Recommendations:   - MMR IHC is normal   - MSI pending but low probably to be high  - Will need PET/CT to complete staging, requested to be obtained in outpatient  - Following staging, will need outpatient Oncology clinic follow-up to discuss possible chemotherapy +/- immunotherapy options. This may be accomplished with Dr. Vallejo, or possibly a different provider as in [facility] [place] closer to patient's home (patient's preference is to be closer to home, pending availability/coordination).      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     # Sigmoid Mass  # Peritoneal biopsy - shows intestinal type adenocarcinoma  He presented to [facility] ED on 3/26 with progressive abdominal pain and was found to have an obstructive sigmoid colon mass. Given concern for closed-loop obstruction and possible abscess vs perforated malignancy, he was transferred to [facility] for intervention. He underwent exploratory laparotomy (3/26) with small bowel resection and loop descending colostomy formation. He has been started on TPN given drastic weight loss prior to admission and ongoing ileus.   Per Colorectal Surgery, the primary sigmoid mass was not amenable to resection.  - CEA 2.1.   - MMR IHC is normal   - MSI pending though low probably to be high given normal MMR.   - His sigmoid mass  "is highly suspicious for malignancy, however, the biopsy results were likely non-representative. Given that peritoneal biopsy shows intestinal-type adenocarcinoma, this is suggestive of metastatic spread from the presumed sigmoid primary. It remains unclear if there is distant spread to lymph nodes or other organs at this time. Completion of formal staging and MSI testing will help guide future therapy options, which the patient is eager to pursue.  - Will need PET scan (ordered for outpatient)  - Will need follow up with oncologist, requested      Oncologic History:  - No prior personal oncologic history  - Paternal colon cancer (details unclear)      Patient was seen and plan of care was discussed with attending physician Dr. Stockton.    Thank you for the opportunity to partake in this patients plan of care. Please do not hesitate to page with questions. We will continue to follow with you.     Jocelyne Dutta PA-C ([place])   Hematology/Oncology   Pager: [phone]     I spent >20 minutes face-to-face and/or coordinating or discussing care plan. Over 50% of our time on the unit was spent counseling the patient and/or coordinating care    ___________________________________________________________________    Subjective & Interval History:    No acute events noted overnight.  Min is feeling better today, but still struggling to get comfortable \"with all the tubes\". States that he is uncomfortable sitting and he's uncomfortable lying. But generally feeling a little better. No new concerns or symptoms. Pain is better controlled  Denies fever, chills, mouth sores, SOB, cough, abdominal pain, diarrhea, constipation, nausea, vomiting      Physical Exam:    Blood pressure 125/84, pulse 59, temperature (!) 96.3  F (35.7  C), temperature source Temporal, resp. rate 17, height 1.778 m (5' 10\"), weight 55.4 kg (122 lb 3.2 oz), SpO2 97 %.    Constitutional: Pleasant male sitting in chair. Awake and conversational. Non- " toxic appearing. No acute distress.  HEENT: Normocephalic, atraumatic. Sclerae anicteric.   Respiratory: Breathing comfortable with no increased work on room air. No signs of respiratory distress or accessory muscle use.  Skin: Skin is clean, dry, intact. No jaundice appreciated.   Musculoskeletal/ Extremities: No redness, warmth, or swelling of the joints appreciated on limited exam  Neurologic: Alert and oriented. Speech normal. Grossly nonfocal. Memory and thought process preserved.   Neuropsychiatric: Calm, affect congruent to situation. Appropriate mood and affect. Good judgment and insight. No visual/auditory hallucinations.       Labs & Studies: I personally reviewed the following studies:  ROUTINE LABS (Last four results):  [facility]  Recent Labs   Lab 04/01/22  0803 03/31/22  0656 03/31/22  0156 03/30/22 2018 03/30/22  1310 03/30/22  1112 03/30/22  0648 03/29/22  1752 03/29/22  0838 03/28/22  0900 03/27/22  0753 03/26/22  1554    139  --   --   --   --  138  --  144   < > 138 137   POTASSIUM 4.5 3.8  3.8  --   --   --   --  3.6  --  3.6   < > 4.5 4.3   CHLORIDE 110* 110*  --   --   --   --  106  --  111*   < > 105 103   CO2 22 21  --   --   --   --  24  --  27   < > 22 25   ANIONGAP 7 8  --   --   --   --  8  --  6   < > 11 9   * 109* 116* 124*   < >  --  124*   < > 107*   < > 106* 100*   BUN 20 17  --   --   --   --  14  --  28   < > 32* 40*   CR 0.44* 0.46*  --   --   --   --  0.48*  --  0.58*   < > 0.66 0.73   GFRESTIMATED >90 >90  --   --   --   --  >90  --  >90   < > >90 >90   ROXIE 8.3* 7.8*  --   --   --   --  8.2*  --  7.9*   < > 7.2* 9.0   MAG 2.2 2.3  --   --   --   --  2.0  --  2.2   < >  --   --    PHOS 3.4 3.0  --   --   --  2.1* 2.3*   < > 1.1*   < > 3.5  --    PROTTOTAL  --   --   --   --   --   --  5.8*  --  5.6*  --  4.9* 7.0   ALBUMIN  --   --   --   --   --   --  2.3*  --  2.2*  --  2.2* 2.7*   BILITOTAL  --   --   --   --   --   --  0.4  --  0.3  --  0.5 0.5   ALKPHOS  --   --    --   --   --   --  47  --  40  --  41 62   AST  --   --   --   --   --   --  25  --  22  --  20 17   ALT  --   --   --   --   --   --  17  --  14  --  13 15    < > = values in this interval not displayed.     CBC  Recent Labs   Lab 03/30/22  0648 03/29/22  0838 03/28/22  0900 03/27/22  0940   WBC 11.5* 10.8 11.9* 13.2*   RBC 3.75* 3.58* 3.71* 4.05*   HGB 11.6* 10.8* 11.1* 12.3*   HCT 35.7* 34.7* 35.7* 39.2*   MCV 95 97 96 97   MCH 30.9 30.2 29.9 30.4   MCHC 32.5 31.1* 31.1* 31.4*   RDW 14.0 14.4 14.5 14.2    296 315 268     INR  Recent Labs   Lab 03/29/22  0838   INR 1.13       Medications list for reference:  Current Facility-Administered Medications   Medication    benzocaine-menthol (CEPACOL EXTRA STRENGTH) 15-2.6 MG lozenge 1 lozenge    dextrose 10% infusion    enoxaparin ANTICOAGULANT (LOVENOX) injection 40 mg    heparin lock flush 10 UNIT/ML injection 5-20 mL    heparin lock flush 10 UNIT/ML injection 5-20 mL    Lidocaine (LIDOCARE) 4 % Patch 1 patch    And    lidocaine patch in PLACE    lidocaine (LMX4) cream    lidocaine 1 % 0.1-1 mL    lipids (INTRALIPID) 20 % infusion 250 mL    methocarbamol (ROBAXIN) tablet 500 mg    naloxone (NARCAN) injection 0.2 mg    Or    naloxone (NARCAN) injection 0.4 mg    Or    naloxone (NARCAN) injection 0.2 mg    Or    naloxone (NARCAN) injection 0.4 mg    nicotine (NICODERM CQ) 7 MG/24HR 24 hr patch 1 patch    nicotine Patch in Place    ondansetron (ZOFRAN-ODT) ODT tab 4 mg    Or    ondansetron (ZOFRAN) injection 4 mg    oxyCODONE (ROXICODONE) tablet 5 mg    parenteral nutrition - ADULT compounded formula    parenteral nutrition - ADULT compounded formula    sodium chloride (PF) 0.9% PF flush 10-20 mL    sodium chloride (PF) 0.9% PF flush 10-40 mL    sodium chloride (PF) 0.9% PF flush 3 mL    sodium chloride (PF) 0.9% PF flush 3 mL           ------------------------------------------------------------------------------------  ATTENDING ATTESTATION     I agree with the  above.     Hardeep Stockton M.D.  04/01/2022

## 2022-04-01 NOTE — DISCHARGE SUMMARY
[facility]  Discharge Summary  Colon and Rectal Surgery     Kalin Shepard MRN# [id number]   YOB: 1950 Age: 71 year old     Date of Admission:  3/26/2022  Date of Discharge::  4/5/2022  Admitting Physician:  Riley Magdaleno MD  Discharge Physician:  Riley Magdaleno MD  Primary Care Physician:        [facility]          Admission Diagnoses:   Large bowel obstruction (H) [K56.609]  Colonic mass [K63.89]  Tobacco use          Discharge Diagnosis:   Large bowel obstruction  Sigmoid colon obstruction due to fungating mass  Small bowel obstruction due to involved adjacent loop of terminal ileum  Pelvic abscess  Note to have some atrial fibrillation intra-op  Post-operative ileus  Severe malnutrition in the context of acute illness  hypophosphatemia         Procedures:   3/26/2022:  PROCEDURE:  1. Exploratory laparotomy  2. Small bowel resection  3. Flexible sigmoidoscopy  4. Loop descending colostomy     ANESTHESIA: General endotracheal anesthesi          Consultations:   WOUND OSTOMY CONTINENCE NURSE  IP CONSULT  OCCUPATIONAL THERAPY ADULT IP CONSULT  PHYSICAL THERAPY ADULT IP CONSULT  CARE MANAGEMENT / SOCIAL WORK IP CONSULT  VASCULAR ACCESS FOR PICC PLACEMENT ADULT IP CONSULT  PHARMACY/NUTRITION TO START AND MANAGE TPN  PHARMACY IP CONSULT  CARE MANAGEMENT / SOCIAL WORK IP CONSULT  PATIENT LEARNING CENTER IP CONSULT  ONCOLOGY ADULT IP CONSULT         Imaging Studies:     Results for orders placed or performed during the hospital encounter of 03/26/22   XR Chest Port 1 View    Narrative    EXAM: XR CHEST PORT 1 VIEW  3/28/2022 5:06 PM     HISTORY:  Confirm PICC placement       COMPARISON:  None available    FINDINGS:     Portable upright view of the chest. Left upper extremity PICC tip  projects over the cavoatrial junction. Feeding tube tip courses below  the diaphragm and beyond the field of view.     Trachea is midline.  Cardiomediastinal silhouette is within normal  limits. Mild basilar atelectasis. No consolidative airspace opacity,  pleural effusion or appreciable pneumothorax. Aortic knob  calcifications.    No acute osseous abnormality. Visualized upper abdomen is  unremarkable.        Impression    IMPRESSION:   1. Left upper extremity PICC tip projects over the cavoatrial  junction.   2. No acute cardiopulmonary disease.    I have personally reviewed the examination and initial interpretation  and I agree with the findings.    CITLALI FISHMAN MD         SYSTEM ID:  [id number]            Medications Prior to Admission:     No medications prior to admission.              Discharge Medications:     Current Discharge Medication List      START taking these medications    Details   acetaminophen (TYLENOL) 500 MG tablet Take 2 tablets (1,000 mg) by mouth 4 times daily  Qty: 100 tablet, Refills: 0    Associated Diagnoses: S/P colostomy (H)      enoxaparin ANTICOAGULANT (LOVENOX) 40 MG/0.4ML syringe Inject 0.4 mLs (40 mg) Subcutaneous every 24 hours for 20 days  Qty: 8 mL, Refills: 0    Associated Diagnoses: S/P colostomy (H); Adenocarcinoma of sigmoid colon (H)      ibuprofen (ADVIL/MOTRIN) 400 MG tablet Take 1 tablet (400 mg) by mouth 3 times daily for 10 days  Qty: 30 tablet, Refills: 0    Associated Diagnoses: S/P colostomy (H)      Lidocaine (LIDOCARE) 4 % Patch Place 1 patch onto the skin every 24 hours To prevent lidocaine toxicity, patient should be patch free for 12 hrs daily.  Qty: 5 patch, Refills: 0    Associated Diagnoses: Acute post-operative pain      methocarbamol (ROBAXIN) 500 MG tablet Take 1 tablet (500 mg) by mouth 3 times daily as needed for muscle spasms  Qty: 15 tablet, Refills: 0    Associated Diagnoses: Acute post-operative pain      oxyCODONE (ROXICODONE) 5 MG tablet Take 1 tablet (5 mg) by mouth every 8 hours as needed for moderate to severe pain  Qty: 15 tablet, Refills: 0    Associated Diagnoses: Acute  post-operative pain      polyethylene glycol (MIRALAX) 17 GM/Dose powder Take 17 g by mouth daily  Qty: 510 g, Refills: 0    Associated Diagnoses: S/P colostomy (H)                      Brief History of Illness:   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  Pt was transferred to [facility] and underwent emergent exploratory laparotomy, small bowel resection, loop descending colostomy creation, flexible sigmoidoscopy overnight on 3/26-3/27.  Final pathology showed sigmoid mass that was negative for malignancy although likely sampling is non-representative and a peritoneal biopsy that is positive for malignancy - intestinal type adenocarcinoma.             Hospital Course:   Post-operatively pt was gently fluid resuscitated.  Of note, pt did have a brief episode of atrial fibrillation intra-operatively but none noted since.  Castro was removed and pt was able to void.  NGT was continued post-operatively.  PICC and TPN was started due to severe malnutrition in the context of acute illness and post-operative ileus.  Pt self-discontinue NGT.  Pt had eventual return of bowel function and was able to tolerate small amounts of a low fiber diet including nutritional supplements.  TPN was weaned off and PICC line was removed.      Post-operative MORIS drains were eventually removed.  Pt was seen by WOCN and taught how to manage his new ostomy. Pt was seen by PT/OT and deemed appropriate for discharge home.  Pt was taught how to self-inject post-operative prophylactic lovenox for which he is to do for a total of 30 days.     Oncology team was consulted.  Oncology will coordinate outpatient follow up and PET scan with [facility].     Post-operative pain was controlled with  "scheduled tylenol, ibuprofen, robaxin and prn oxycodone.  Pt was also given miralax.     Patient is to follow up in the Colon and Rectal Surgery Clinic in 2-3 week with Karla Birmingham NP and then with Dr. Miller in 2-3 weeks after.          Day of Discharge Physical Exam:   Blood pressure 110/60, pulse 57, temperature 97.5  F (36.4  C), temperature source Temporal, resp. rate 18, height 1.778 m (5' 10\"), weight 58.1 kg (128 lb 1.6 oz), SpO2 98 %.    Gen: AAOx3, NAD  Pulm: Non-labored breathing  Abd: Soft, appropriately tender, no guarding/rebound   Incision C/D/I with staples   colostomy pink and viable with thick stool and gas in bag   MORIS drain serosang  Ext:  Warm and well-perfused         Final Pathology Result:     Case Report   Surgical Pathology Report                         Case: [id number]                                   Authorizing Provider:  Riley Magdaleno MD       Collected:           03/26/2022 10:05 PM           Ordering Location:     [facility] MAIN OR                 Received:            03/28/2022 08:52 AM           Pathologist:           Ceci Forbes MD                                                            Specimens:   A) - Small Intestine, Small Bowel                                                                    B) - Peritoneum, Peritoneal Biopsy                                                                   C) - Large Intestine, Colon, Sigmoid, Sigmoid Colon Mass                                   Addendum   B. PERITONEUM, BIOPSY:      This addendum reports the results of MMR immunohistochemistry as follows:     Diagnosis:  Normal pattern of mismatch repair protein expression      NORMAL MISMATCH REPAIR PROTEIN EXPRESSION:     hMLH1    hMSH2    hMSH6    hPMS2      Present   Present    Present   Present       Low probability of MSI-H tumor status      COMMENT .  All four DNA mismatch repair proteins are expressed in the tumor nuclei, which is the pattern of normal tissue " and which strongly diminishes the likelihood of Alexis Syndrome (Hereditary Nonpolyposis Colorectal Cancer; HNPCC) due to defective DNA mismatch repair. Microsatellite instability (MSI) testing by PCR (performed by the [facility], [email or web address]) is recommended if the patient's family history meets the [place] guidelines or Revised [place] criteria for possible Alexis syndrome. If the tumor is MSI high this could indicate a false negative IHC test (reported to occur in about 2% of tested cases) or the possibility of an abnormality in one of the other genes involved in DNA mismatch repair. The possibility that the tumor in this patient is due to an inherited defect in another gene not involved in mismatch repair cannot be ruled out by negative results by either IHC or MSI testing.   Addendum electronically signed by Ceci Forbes MD on 3/31/2022 at 11:02 AM   Final Diagnosis   A. SMALL BOWEL, RESECTION:  Serosal adhesions and acute serositis consistent with peritonitis:   -Small bowel wall with congestion & edema; no dysplasia or malignancy    B. PERITONEUM, BIOPSY:   Positive for malignancy: intestinal-type adenocarcinoma:   -Associated acute inflammatory exudate in keeping with peritonitis   -Report of MMR (MLH1, PMS2, MSH2 & MSH6) immunohistochemistry to follow   (See Comment)    C. SIGMOID COLON MASS, BIOPSY:  Colonic mucosa with mild nonspecific inflammation but negative for malignancy  (See Comment)               Discharge Instructions and Follow-Up:     Discharge Procedure Orders   PET Oncology (Eyes to Thighs)   Standing Status: Future Standing Exp. Date: 03/31/23     Order Specific Question Answer Comments   Clinical Info for the Interpreting Provider new diagnosis sigmoid colon cancer    Is patient Diabetic? Not Diabetic    PET/CT with Diagnostic CT? (Contrast is used, reading provided) Chest/Abd/Pelvis CT      Oncology/Hematology Adult Referral    Standing Status: Future   Referral Priority: Routine Referral Type: Consultation   Requested Specialty: Medical Oncology   Number of Visits Requested: 1     Home Care Referral   Referral Priority: Routine: Next available opening Referral Type: Home Health Therapies & Aides   Number of Visits Requested: 1     Reason for your hospital stay   Order Comments: 3/26/2022:  PROCEDURE:  1. Exploratory laparotomy  2. Small bowel resection  3. Flexible sigmoidoscopy  4. Loop descending colostomy     ANESTHESIA: General endotracheal anesthesia     Activity   Order Comments: Your activity upon discharge:   ACTIVITY  -No lifting, pushing, pulling greater than 10 lbs and no strenuous exercise for 6 weeks   -No driving while on narcotic analgesics (i.e. Percocet, oxycodone, Vicodin)  -No driving until you are able to fully twist to both sides or slam on brakes quickly and without any pain  -We encourage walking 4-5 times per day     Order Specific Question Answer Comments   Is discharge order? Yes      Tubes and drains   Order Comments: You are going home with the following tubes or drains:   1.  MORIS drain of the abdomen.  Gently strip and empty and record total daily outputs.  Keep a record/log of total daily MORIS drain outputs and bring this log with you to your first post-operative clinic appointment.     Adult [facility] Follow-up and recommended labs and tests   Order Comments: WOUND CARE  -Inspect your wounds daily for signs of infection (increased redness, drainage, pain)  -Keep your wound clean and dry  -You may shower, but do not soak in tub or pool  -If you have staples, they will be removed in the Colorectal Clinic    NOTIFY  Please contact Rachel Hunt RN or Dory Bynum RN at [phone] for problems after discharge such as:  -Temperature > 101F, chills, rigors, dizziness  -Redness around or purulent drainage from wound  -Inability to tolerate diet, nausea or vomiting  -You stop passing gas (or your stoma stops  "functioning), develop significant bloating, abdominal pain  -Have blood in stools/vomit  -Have severe diarrhea/constipation  -Any other questions or concerns.  - At nights (after 4:30pm), on weekends, or if urgent, call [phone] and ask the  to speak with the on-call Colorectal Surgery resident or fellow      Medication Instructions  Some of your medications may have changed. Please take only prescribed and resumed medications     FOLLOW-UP  1.  You will need to follow-up with Karla Birmingham NP in the Colon and Rectal Surgery clinic in 2-3 week(s) and then with CRS Staff: Dr. Riley Magdaleno in 2-3 weeks after.  Please contact our clinic scheduler (phone # [phone]) if you have not heard from our clinic in 3 business days afer discharge to schedule a follow-up appointment.  Your staples and MORIS drain will be removed in clinic.     2.  Follow up with Oncology that is still to be re-scheduled with Dr Colorado at [facility].  We recommend an outpatient PET scan.     3.  We recommend establishing care with a primary care provider in a 1-2 weeks after discharge from hospital.       Appointments on [place] and/or [facility] (with [facility] or [facility] provider or service). Call [phone] if you haven't heard regarding these appointments within 7 days of discharge.     Miscellaneous DME Order   Order Comments: Your Medical Supplier will need your surgeon's name, phone and fax number     Clinic:                                                      Phone                                   Fax  Colorectal Surgery:                             [phone]                                                  Authorizing MD: Dr. Riley Magdaleno     Quantity of pouches: 20/m     Request the following supplies:      [place]            1 piece flat fecal with filter #1099                            Accessories     2\" barrier ring #8291 (1 per pouch)  Adapt powder " #9518  No sting film barrier # 9319  Adapt odor eliminator and lubricant 236ml bottle # [id number]   M-9 Spray room deodorizer #8758       Change your pouch two to three times a week, more often if leaking.      DME Documentation:   Describe the reason for need to support medical necessity: s/p colostomy    I, the undersigned, certify that the above prescribed supplies are medically necessary for this patient and is both reasonable and necessary in reference to accepted standards of medical and necessary in reference to accepted standards of medical practice in the treatment of this patient's condition and is not prescribed as a convenience.     Order Specific Question Answer Comments   [facility]    DME Item Needed: s/p colostomy    Length of Need: 12 months      Diet   Order Comments: DIET  -Low Residue Diet for at least 4-6 weeks unless cleared by Colorectal surgery.  No raw vegetables, fruit skins, fibrous foods that require a lot of chewing, nuts, seeds, corn, popcorn.   -Eat slowly, chew thoroughly, eat small frequent meals   -Stay well hydrated  -Recommend 2-3 nutritional supplements per day (protein premier, protein powder to make your own shakes, or whichever supplement is available to you)     Order Specific Question Answer Comments   Is discharge order? Yes             Home Health Care:     Arranged           Discharge Disposition:     Discharged to home      Condition at discharge: Stable    Doris Mccloud PA-C ..................4/5/2022   12:16 PM  Colon and Rectal Surgery    Pt seen and discussed with fellow Dr. Groves.     The above plan of care was performed and communicated to me by Dr. Magdaleno.

## 2022-04-01 NOTE — PLAN OF CARE
Goal Outcome Evaluation:    Plan of Care Reviewed With: patient      Patient was able to tolerate his full liquid breakfast this morning. Patient is receiving Oxycodone PRN for pain relief. Patient is on continuous TPN @ 60 ml/hr and Lipids on Mon and Friday's. Patient states that he has not had any stool since the small amount that was noted on the 30th- patient is having a lot of gas. VSS will continue to monitor

## 2022-04-01 NOTE — PLAN OF CARE
"Pt is Aox4, VSS, RA. Pain is managed w/ intermittent PO Oxycodone. Spouse visited. PICC to DEEPIKA CDI, no complications. TPN is running well. Ostomy dressing CDI, only gas output, stoma is pink. Midline incision is IRENE, CDI, no s/s of infection. Pt tolerating liquid diet. /77 (BP Location: Right arm)   Pulse 67   Temp 97.7  F (36.5  C) (Oral)   Resp 16   Ht 1.778 m (5' 10\")   Wt 55.4 kg (122 lb 3.2 oz)   SpO2 98%   BMI 17.53 kg/m      "

## 2022-04-01 NOTE — PROGRESS NOTES
RECORDS STATUS - ALL OTHER DIAGNOSIS      RECORDS RECEIVED FROM: [facility]   DATE RECEIVED: 4/5/2022   NOTES STATUS DETAILS   OFFICE NOTE from referring provider Complete [facility]   OFFICE NOTE from medical oncologist Complete See Hospital note below   DISCHARGE SUMMARY from hospital Complete [facility] 3/26/2022 S/P colostomy, colonic Mass    DISCHARGE REPORT from the ER Complete [facility]- See note above    OPERATIVE REPORT Complete [facility]- 3/26/2022 colostomy   MEDICATION LIST Complete [facility]   CLINICAL TRIAL TREATMENTS TO DATE     LABS     PATHOLOGY REPORTS Complete 3/26/2022    ANYTHING RELATED TO DIAGNOSIS Complete Labs last updated on 4/1/2022   GENONOMIC TESTING     TYPE:     IMAGING (NEED IMAGES & REPORT)     CT SCANS Complete CT Abdomen Pelvis 3/26/2022   Xray Chest Complete 3/28/2022   Xray abdomen  Complete 3/26/2022    MRI     MAMMO     ULTRASOUND     PET

## 2022-04-01 NOTE — PROGRESS NOTES
"Colorectal Surgery Progress Note  [facility]  March 27, 2022  POD#5    SUBJECTIVE   No acute events overnight.  Overall doing well. Reports has seen gas in ostomy. No output from ostomy yet. Tolerating clear liquid diet. Denies nausea and vomiting. Pain well controlled. Does not feel bloated. 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 Saw oncology yesterday.     OBJECTIVE:    Vitals:  BP 99/48 (BP Location: Left arm)   Pulse 76   Temp 97.6  F (36.4  C) (Temporal)   Resp 14   Ht 1.778 m (5' 10\")   Wt 59 kg (130 lb)   SpO2 97%, on RA  BMI 18.65 kg/m    I/O:  I/O last 3 completed shifts:  In: 960 [P.O.:480]  Out: 1620 [Urine:1600; Drains:20]    Physical Exam:  Gen: AAOx3, NAD  Pulm: Non-labored breathing on RA  CV:      RRR by RP, no sig lower extremity edema  Abd: Soft, mildly distended, tender, no guarding/rebound.    Incision C/D/I, staples   Stoma pink and viable, no stool, minimal amount of gas. Some ostomy sweat.   2 MORIS drains, serosanguinous collections.    Ext:  Warm and well-perfused  Lines:  MORIS, and PICC in place     BMP  Recent Labs   Lab 03/31/22  0656 03/31/22  0156 03/30/22 2018 03/30/22  1827 03/30/22  1310 03/30/22  1112 03/30/22  0648 03/30/22  0412 03/29/22  2213 03/29/22  1752 03/29/22  0838 03/28/22  1607 03/28/22  0900     --   --   --   --   --  138  --   --   --  144  --  140   POTASSIUM 3.8  3.8  --   --   --   --   --  3.6  --   --   --  3.6  --  4.5   CHLORIDE 110*  --   --   --   --   --  106  --   --   --  111*  --  109   CO2 21  --   --   --   --   --  24  --   --   --  27  --  24   BUN 17  --   --   --   --   --  14  --   --   --  28  --  36*   CR 0.46*  --   --   --   --   --  0.48*  --   --   --  0.58*  --  0.71   * 116* 124* 117*   < >  --  124*   < >  --    < > 107*   < > 94   MAG 2.3  --   --   --   --   --  2.0  --   --   --  2.2  --  2.6*   PHOS 3.0  --   --   --   --  2.1* 2.3*  --  " 1.4*  --  1.1*  --  2.4*    < > = values in this interval not displayed.     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 S/p exploratory laparotomy, small bowel resection, colostomy creation, flex sigmoidoscopy overnight on 3/26-3/27. 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 TPN initiated to support nourishment in setting of 40lb weight loss and ileus. Minimizing  # of IVs/tubes, pain well controlled with PO pain management. Should remain admitted. Awaiting for ROBF and stable ostomy output at this point.     Plan:     Neuro/Pain: prn oxycodone, prn dilaudid, lidocaine patches.   CV: RRR.  Briefly went into afib during OR, not noted since. Continue to monitor, EKG if chest pain or palpitations.   PULM: encourage IS.  Nutr/GI:   - Advance diet to FLD  - PICC placed on 3/28. CXR with adequate positioning of PICC.   - Severe protein malnutrition. TPN @ 60 ml/hr; initiated on 3/28 evening. Appreciate dietician assistance.   - Triglycerides wnl on 3/28  - Prealbumin @ 4 on 3/27  /Lytes/IVF:   - Continue TPN   - CEA 2.1 on 3/27  - Monitor electrolytes  Heme: Hgb dip at 11.1 on 3/28 (12.3). Likely dilutional.    ID: No concerns.    Endocrine: No concerns   T/L/D: MORIS x2. Colostomy pink, no stool output. PICC.  Activity: encourage ambulation as tolerated.   Ppx: lovenox  Dispo: Pending return of bowel function, ability to tolerate diet advancement, pain controlled. 2-3 days estimate.     Patient was seen and discussed on rounds with resident Dr. Kenney and  who discussed with staff.     Benjamin Allred  Medical Student  [facility]    Resident  Attestation   I, Sharif Kenney, was present with the medical student who participated in the service and in the documentation of the note. I have verified the history and personally performed the physical exam and medical decision making. Addenda have been made to the note as appropriate. I agree with the assessment and plan of care as documented in the note.      Sharif Kenney MD  General Surgery PGY1  Date of Service: 04/01/2022

## 2022-04-01 NOTE — PROGRESS NOTES
"  WO Nurse Inpatient [facility]   WO Nurse Inpatient Adult     Re Assessment   Assessment of new loop Colostomy Stoma complication(s) none   Mucocutaneous junction; intact   Peristomal complication(s) none   Pouch wear time:48+ hous  Following today's visit:Patient   is  able to demonstrate;       1. How to empty their pouch? yes      2. How to change their pouch?  Yes, with min. 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 S/p exploratory laparotomy, small bowel resection, colostomy creation, flex sigmoidoscopy overnight on 3/26-3/27.  Recent 40lb weight loss and likely ileus.     Current Diet/Nutrition: Orders Placed This Encounter      Diet      Full Liquid Diet     TPN -yes  I/O last 3 completed shifts:  In: 250 [P.O.:240; I.V.:10]  Out: 1720 [Urine:1700; Drains:20]  Labs:    Recent Labs   Lab 03/30/22  0648 03/29/22  0838   ALBUMIN 2.3* 2.2*   HGB 11.6* 10.8*   INR  --  1.13   WBC 11.5* 10.8        Physical Exam:  Current pouching system:Antoinette  1 piece flat fecal pouch with 2\" Adapt barrier ring  Reason for pouch change today: ostomy education  Stoma appearance: healthy, pink and protruberant  Stoma size; 1 3/8\" oval shape  Peristomal skin: intact  Stoma output : no serous or fecal,  some gas per patient  Abdominal  Assessment  firm , NG still in place? No  Surgical Site: open to air  Pain: Dull ache  Is patient still on a PCA No    Interventions:  Patient's chart evaluated.  Focus of today's visit: pouch change return demonstration, verbal instruction  and odor/flatus management   Preparation for discharge previously completed:  Prescriptions or note left on chart for MD to sign/complete, patient did not want to be registered for samples,  Discussed how/ where to order supplies, " "Supplies ordered, Completed supply list, Discussed making a WO Nurse outpatient appointment upon discharge, and Discussed when to follow up with a WO Nurse in the future  Participant of teaching session today patient   Orders: Reviewed  Change made with ostomy management today: No-  Antoinette flat fecal pouch- 1 piece with 2\" adapt barrier ring  Patient/family: active participation and performed with verbal cues and hands on assist   Supplies:at bedside- 2 week supply at bedside    Plan:  Learning needs: all topics covered, would benefit from additional practice with pouch change.    Preparation for discharge:  complete    Recommend home care? no and patient reports that he does not think he will need home care, and feels comfortable so far with ostomy education    Discussed plan of care with Patient  Nursing to notify the Provider(s) and re-consult the WOC Nurse if new ostomy concerns or discharge planned before next planned WOC visit.    WOC Nurse will return: Tuesday/ Friday  Face to face time: 35 minutes      [facility]     Name: Kalin Shepard  Date: 3/29/2022    To order your ostomy supplies    The ostomy Supplier needs this supply list  to process your order. You will need to fax/deliver this list, along with your Insurance information. Your home care nurse can assist with this process.    List of Ostomy Distributors      [facility]  Ph. [phone] Fax # [phone]  [facility]   Ph. [phone] [phone]  [facility]   Ph. [phone]  [facility]   Ph. [phone] [phone]  Or Call your insurance provider for their preferred supplier    Your Medical Supplier will need your surgeon's name, phone and fax number    Clinic:                     Phone                            Fax  Colorectal Surgery:    [phone]    Verbal Order for ostomy supplies for 1 Month per:                                         "  Amanda Baum, RN, CWOCN                                                   Authorizing MD: Dr. Riley Magdaleno    Quantity of pouches: 20/m    Request the following supplies:      Antoinette    1 piece flat fecal with filter #8313      Accessories  2  barrier ring #9028 (1 per pouch)    Adapt powder #7964    No sting film barrier # 3056                          Adapt odor eliminator and lubricant 236ml bottle # 22434     M-9 Spray room deodorizer #7737                              Change your pouch two to three times a week, more often if leaking.    If you are cutting a hole in the wafer of your pouch, recheck stoma size and adjust pouch opening as needed every week    . Call the Ostomy Nurse at [facility]   Schedule a follow-up visit in 2 to 4 weeks after your surgery, sooner if having problems Bring a complete set of pouch-changing supplies to this visit      [address], [place] : [phone]   Or    [facility] department  [address] [place] [address]   number- [phone]      Problems you should Report  - The stoma turns blue or darker in color.  - Cuts or sores around the stoma.  - Red, raw or painful skin around the stoma.  - Any bulging of the skin around the stoma.  - A pouch that leaks every day.  - Problems making the right size hole in the pouch wafer.    Please call with any questions or concerns.

## 2022-04-02 LAB
GLUCOSE BLDC GLUCOMTR-MCNC: 107 MG/DL (ref 70–99)
MAGNESIUM SERPL-MCNC: 2.1 MG/DL (ref 1.6–2.3)
PHOSPHATE SERPL-MCNC: 3.6 MG/DL (ref 2.5–4.5)
PLATELET # BLD AUTO: 430 10E3/UL (ref 150–450)
POTASSIUM BLD-SCNC: 4.4 MMOL/L (ref 3.4–5.3)

## 2022-04-02 PROCEDURE — 84132 ASSAY OF SERUM POTASSIUM: CPT | Performed by: SURGERY

## 2022-04-02 PROCEDURE — 250N000011 HC RX IP 250 OP 636: Performed by: SURGERY

## 2022-04-02 PROCEDURE — 99233 SBSQ HOSP IP/OBS HIGH 50: CPT | Performed by: INTERNAL MEDICINE

## 2022-04-02 PROCEDURE — 250N000009 HC RX 250: Performed by: SURGERY

## 2022-04-02 PROCEDURE — 85049 AUTOMATED PLATELET COUNT: CPT | Performed by: SURGERY

## 2022-04-02 PROCEDURE — 84100 ASSAY OF PHOSPHORUS: CPT | Performed by: SURGERY

## 2022-04-02 PROCEDURE — 250N000013 HC RX MED GY IP 250 OP 250 PS 637: Performed by: COLON & RECTAL SURGERY

## 2022-04-02 PROCEDURE — 36592 COLLECT BLOOD FROM PICC: CPT | Performed by: SURGERY

## 2022-04-02 PROCEDURE — 120N000002 HC R&B MED SURG/OB UMMC

## 2022-04-02 PROCEDURE — 83735 ASSAY OF MAGNESIUM: CPT | Performed by: SURGERY

## 2022-04-02 PROCEDURE — 250N000013 HC RX MED GY IP 250 OP 250 PS 637

## 2022-04-02 RX ORDER — ACETAMINOPHEN 500 MG
1000 TABLET ORAL 4 TIMES DAILY
Status: DISCONTINUED | OUTPATIENT
Start: 2022-04-02 | End: 2022-04-05 | Stop reason: HOSPADM

## 2022-04-02 RX ORDER — IBUPROFEN 800 MG/1
800 TABLET, FILM COATED ORAL 3 TIMES DAILY
Status: DISCONTINUED | OUTPATIENT
Start: 2022-04-02 | End: 2022-04-03

## 2022-04-02 RX ADMIN — ENOXAPARIN SODIUM 40 MG: 40 INJECTION SUBCUTANEOUS at 19:49

## 2022-04-02 RX ADMIN — ACETAMINOPHEN 1000 MG: 500 TABLET ORAL at 12:26

## 2022-04-02 RX ADMIN — POTASSIUM PHOSPHATE, MONOBASIC POTASSIUM PHOSPHATE, DIBASIC: 224; 236 INJECTION, SOLUTION, CONCENTRATE INTRAVENOUS at 19:49

## 2022-04-02 RX ADMIN — IBUPROFEN 800 MG: 800 TABLET, FILM COATED ORAL at 13:59

## 2022-04-02 RX ADMIN — OXYCODONE HYDROCHLORIDE 5 MG: 5 TABLET ORAL at 06:44

## 2022-04-02 RX ADMIN — ACETAMINOPHEN 1000 MG: 500 TABLET ORAL at 19:49

## 2022-04-02 RX ADMIN — Medication 5 ML: at 08:39

## 2022-04-02 RX ADMIN — IBUPROFEN 800 MG: 800 TABLET, FILM COATED ORAL at 19:48

## 2022-04-02 RX ADMIN — OXYCODONE HYDROCHLORIDE 5 MG: 5 TABLET ORAL at 16:02

## 2022-04-02 RX ADMIN — LIDOCAINE 1 PATCH: 560 PATCH PERCUTANEOUS; TOPICAL; TRANSDERMAL at 19:49

## 2022-04-02 RX ADMIN — ACETAMINOPHEN 1000 MG: 500 TABLET ORAL at 15:58

## 2022-04-02 ASSESSMENT — ACTIVITIES OF DAILY LIVING (ADL)
ADLS_ACUITY_SCORE: 5
ADLS_ACUITY_SCORE: 6
ADLS_ACUITY_SCORE: 5
ADLS_ACUITY_SCORE: 6
ADLS_ACUITY_SCORE: 6
ADLS_ACUITY_SCORE: 5
ADLS_ACUITY_SCORE: 6
ADLS_ACUITY_SCORE: 5
ADLS_ACUITY_SCORE: 6
ADLS_ACUITY_SCORE: 6
ADLS_ACUITY_SCORE: 5
ADLS_ACUITY_SCORE: 6
ADLS_ACUITY_SCORE: 5
ADLS_ACUITY_SCORE: 6

## 2022-04-02 NOTE — PLAN OF CARE
Colostomy with gas, no stool, started small amount of low fiber diet, on calorie counts, PICC with TPN, voiding spont. One MORIS removed by colo-rectal team this am, one left on abdomen.Tylenol/Ibuprofen for abdominal pain with relief, up ad alexandrea.

## 2022-04-02 NOTE — PROGRESS NOTES
"  Hematology Consult Note   Date of Service: 04/02/2022    Patient: Kalin Shepard  MRN: [id number]  Admission Date: 3/26/2022  Hospital Day # 6   Primary Outpatient Oncologist: N/A    Reason for Consult: \"LBO 2/2 sigmoid mass. s/p ex lap, small bowel resection, colostomy.  mass not resectable. 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 apprec eval.\"    Interval History:    On visitation today, he reports feeling overall well and pain is adequately controlled.He has not been able to have a bowel movement yet. No prominent nausea/vomiting at this time. He reports unintentional and progressive weight loss over the past year, estimated to be 30-40 pounds. He denies night sweats, fever, chills, new lumps or bumps, changes in dietary habits or SOB.     Oncologic History:  - No prior personal oncologic history  - Paternal colon cancer (details unclear)     Review of Systems: Pertinent positive and negative systems described in HPI; the remainder of the 14 systems are negative    Past Medical History:  No past medical history on file.    Past Surgical History:  Past Surgical History:   Procedure Laterality Date    LAPAROTOMY EXPLORATORY N/A 3/26/2022    Procedure: exploratory laparotomy, small bowel resection, colostomy creation;  Surgeon: Riley Magdaleno MD;  Location: [facility] OR    PICC INSERTION - DOUBLE LUMEN Left 03/28/2022    left medial brachial 5 fr dl picc 49 cm    SIGMOIDOSCOPY FLEXIBLE N/A 3/26/2022    Procedure: Sigmoidoscopy flexible;  Surgeon: Riley Magdaleno MD;  Location: [facility] OR     Social History:  Social History     Socioeconomic History    Marital status:      Spouse name: Not on file    Number of children: Not on file    Years of education: Not on file    Highest education level: Not on file   Occupational History    Not on file   Tobacco Use    Smoking status: Current Every Day Smoker     Packs/day: 0.50     Years: 50.00     Pack years: 25.00    Smokeless tobacco: Not on file " "  Substance and Sexual Activity    Alcohol use: Not Currently    Drug use: Never    Sexual activity: Not on file   Other Topics Concern    Not on file   Social History Narrative    Not on file     Social Determinants of Health     Financial Resource Strain: Not on file   Food Insecurity: Not on file   Transportation Needs: Not on file   Physical Activity: Not on file   Stress: Not on file   Social Connections: Not on file   Intimate Partner Violence: Not on file   Housing Stability: Not on file      Family History  - Reports paternal colon cancer    Physical Exam:    /78 (BP Location: Right arm)   Pulse 69   Temp 98.5  F (36.9  C) (Temporal)   Resp 16   Ht 1.778 m (5' 10\")   Wt 55.1 kg (121 lb 6.4 oz)   SpO2 96%   BMI 17.42 kg/m    Gen: Chronically-ill appearing, mildly cachectic, sitting up in bed, in NAD  HEENT: EOMI, PERRL, MMM, poor dentition  CV: nontachycardic  Pulm: comfortable and normal work of breathing on room air  Abd: Soft, mildly distended, stoma present  Ext: Warm and well perfused. No lower extremity edema. TPN infusing via PICC.  Skin: No rash, cyanosis or petechial lesion  Neuro: Alert and answering questions appropriately. Moving all extremities without issue or focal neurologic deficits.    Labs & Studies: I personally reviewed the following studies:  ROUTINE LABS (Last four results):  [facility]  Recent Labs   Lab 04/02/22  0735 04/01/22  1755 04/01/22  1306 04/01/22  0803 03/31/22  0656 03/30/22  1310 03/30/22  1112 03/30/22  0648 03/29/22  1752 03/29/22  0838 03/28/22  0900 03/27/22  0753 03/26/22  1554   NA  --   --   --  139 139  --   --  138  --  144   < > 138 137   POTASSIUM 4.4  --   --  4.5 3.8  3.8  --   --  3.6  --  3.6   < > 4.5 4.3   CHLORIDE  --   --   --  110* 110*  --   --  106  --  111*   < > 105 103   CO2  --   --   --  22 21  --   --  24  --  27   < > 22 25   ANIONGAP  --   --   --  7 8  --   --  8  --  6   < > 11 9   GLC  --  107* 105* 100* 109*   < >  --  124*   < > " 107*   < > 106* 100*   BUN  --   --   --  20 17  --   --  14  --  28   < > 32* 40*   CR  --   --   --  0.44* 0.46*  --   --  0.48*  --  0.58*   < > 0.66 0.73   GFRESTIMATED  --   --   --  >90 >90  --   --  >90  --  >90   < > >90 >90   ROXIE  --   --   --  8.3* 7.8*  --   --  8.2*  --  7.9*   < > 7.2* 9.0   MAG 2.1  --   --  2.2 2.3  --   --  2.0  --  2.2   < >  --   --    PHOS 3.6  --   --  3.4 3.0  --  2.1* 2.3*   < > 1.1*   < > 3.5  --    PROTTOTAL  --   --   --   --   --   --   --  5.8*  --  5.6*  --  4.9* 7.0   ALBUMIN  --   --   --   --   --   --   --  2.3*  --  2.2*  --  2.2* 2.7*   BILITOTAL  --   --   --   --   --   --   --  0.4  --  0.3  --  0.5 0.5   ALKPHOS  --   --   --   --   --   --   --  47  --  40  --  41 62   AST  --   --   --   --   --   --   --  25  --  22  --  20 17   ALT  --   --   --   --   --   --   --  17  --  14  --  13 15    < > = values in this interval not displayed.     CBC  Recent Labs   Lab 04/02/22  0735 03/30/22  0648 03/29/22  0838 03/28/22  0900 03/27/22  0940   WBC  --  11.5* 10.8 11.9* 13.2*   RBC  --  3.75* 3.58* 3.71* 4.05*   HGB  --  11.6* 10.8* 11.1* 12.3*   HCT  --  35.7* 34.7* 35.7* 39.2*   MCV  --  95 97 96 97   MCH  --  30.9 30.2 29.9 30.4   MCHC  --  32.5 31.1* 31.1* 31.4*   RDW  --  14.0 14.4 14.5 14.2    334 296 315 268     - CEA 2.1      Pathology report (3/26):   Case Report   Surgical Pathology Report                         Case: [id number]                                   Authorizing Provider:  Riley Magdaleno MD       Collected:           03/26/2022 10:05 PM      Ordering Location:      MAIN OR                 Received:            03/28/2022 08:52 AM           Pathologist:           Ceci Forbes MD                                                            Specimens:   A) - Small Intestine, Small Bowel                                                                    B) - Peritoneum, Peritoneal Biopsy                                                                    C) - Large Intestine, Colon, Sigmoid, Sigmoid Colon Mass                                Final Diagnosis   A. SMALL BOWEL, RESECTION:  Serosal adhesions and acute serositis consistent with peritonitis:   -Small bowel wall with congestion & edema; no dysplasia or malignancy    B. PERITONEUM, BIOPSY:   Positive for malignancy: intestinal-type adenocarcinoma:   -Associated acute inflammatory exudate in keeping with peritonitis   -Report of MMR (MLH1, PMS2, MSH2 & MSH6) immunohistochemistry to follow   (See Comment)    C. SIGMOID COLON MASS, BIOPSY:  Colonic mucosa with mild nonspecific inflammation but negative for malignancy  (See Comment)   Electronically signed by Ceci Forbes MD on 3/30/2022 at 10:53 AM   Comment   [facility]   There is an adenocarcinoma in the peritoneal biopsy associated with acute peritonitis. The sigmoid biopsy presumed directed at a sigmoid fungating mass is however negative.  The features of the peritoneal tumor are those of intestinal type adenocarcinoma and supported by positive CDX2 and CK20 and negative CK7 on appropriately controlled immunohistochemistry stains. It is therefore likely that the sigmoid mass may not have been representatively sampled in part C, with the most likely situation being peritoneal involvement by this (sigmoid mass). However, metastasis to the peritoneum from elsewhere in the large intestine or less likely, extra-colorectal, site cannot be ruled out.      The presence of peritonitis could also imply intestinal perforation either by tumor or other sites, although the resected small intestine itself shows no evidence of perforation. It appears the clinical obstruction at the level of the small intestine could be due to serosal adhesions.       Clinical Information   [facility]   Procedure: exploratory laparotomy, small bowel resection, colostomy creation  Sigmoidoscopy flexible  Pre-op Diagnosis: Colon obstruction (H) [K56.609]  Post-op Diagnosis:  "K56.609 - Colon obstruction (H) [ICD-10-CM]   Gross Description   TARSHA SPENCER(2). Small Intestine, Small Bowel:  The specimen is received fresh with proper patient identification labeled \"small bowel\".  The specimen consists of a a 10.5 cm in length by 3.2 cm in diameter segment of bowel received stapled at each end.  Minimal adipose tissue is received attached.  The outer surface is covered in yellow-tan exudate for 70% of the surface.  No perforation is identified.  The specimen is opened to reveal edematous mucosa with no polyps, masses, or perforations identified.  The wall is focally thinned to less than 0.1 cm.  Representative sections are submitted.    H7-T2-hymipki margins, en face  N9-N6-sgirkcmmrugcsy mucosa     B(3). Peritoneum, Peritoneal Biopsy:  The specimen is received in formalin with proper patient identification, labeled \"peritoneal biopsy\".  The specimen consists of 4 pieces of white-tan soft tissue ranging in size from 0.5 to 1.0 cm in greatest dimension.  The largest piece is bisected.  All pieces exhibit diffuse white-tan discoloration.  The specimen is entirely submitted in cassette B1.                   C(4). Large Intestine, Colon, Sigmoid, Sigmoid Colon Mass:  The specimen is received in formalin with proper patient identification, labeled \"sigmoid colon mass\".  The specimen consists of a 0.5 x 0.5 x 0.1 cm aggregate of red-brown soft tissue, which is filtered, wrapped, and entirely submitted in cassette C1.         Imaging:  EXAM: CT ABDOMEN PELVIS W CONTRAST  LOCATION: [facility]  DATE/TIME: 3/26/2022 5:35 PM  INDICATION: Abdominal pain.  COMPARISON: None.  TECHNIQUE: CT scan of the abdomen and pelvis was performed following injection of IV contrast. Multiplanar reformats were obtained. Dose reduction techniques were used.  CONTRAST: 70 mL Isovue 370     FINDINGS:   LOWER CHEST: Normal.  HEPATOBILIARY: Subcentimeter hypodense lesions in the liver are too small " to adequately characterize. For example, there is a 5 mm lesion in the right hepatic lobe at the dome (series 5, image 24) and a 4 mm lesion more inferiorly in the right lobe   (series 5, image 62). No calcified gallstones or biliary ductal dilatation.  PANCREAS: Normal.  SPLEEN: Normal.  ADRENAL GLANDS: Normal.  KIDNEYS/BLADDER: Left renal simple cyst requiring no specific follow-up. Mild right hydronephrosis. No calculi.  BOWEL: Several mildly dilated loops of small bowel dilated up to about 3 cm. Distended cecum measuring about 9 cm and mildly dilated ascending colon, transverse colon and descending colon. Colonic distention is to the level of an area of masslike wall   thickening in the proximal sigmoid colon (series 5, image 187-180 and series 3, image 59). The sigmoid colonic mass appears to extend extraluminally, best seen on the coronal image 59. Redundant sigmoid colon. Adjacent to the area of masslike wall   thickening in the sigmoid colon, there is a 5.0 x 3.5 cm collection of fluid and air (series 5, image 176) in the mid pelvis.  LYMPH NODES: Indeterminate retroperitoneal lymphadenopathy with a left para-aortic 1.5 x 1.1 cm lymph node (series 5, image 117).  VASCULATURE: Aneurysmal infrarenal abdominal aorta measuring 3.1 cm. Aortobiiliac atherosclerotic calcifications.  PELVIC ORGANS: Prostatomegaly. See discussion above regarding the sigmoid mass.  Other: Linear peritoneal thickening in the pelvis and mildly clustered appearance of small bowel loops in the pelvis are nonspecific, but could represent peritoneal carcinomatosis. Trace upper abdominal ascites.  MUSCULOSKELETAL: No suspicious lesions in the bones.                                                               IMPRESSION:   1.  Nodular, masslike wall thickening in the sigmoid colon, concerning for malignancy. Resultant upstream small bowel and colonic obstruction.  2.  A 5 x 3.5 cm fluid collection adjacent to the sigmoid mass suspicious for  an abscess and/or perforated malignancy.   3.  Linear peritoneal thickening and clustered appearance of small bowel loops in the pelvis is indeterminate, and could represent peritoneal carcinomatosis. Trace upper abdominal ascites.  4.  Mildly enlarged retroperitoneal lymph nodes, indeterminate and could be metastatic.  5.  Few subcentimeter lesions in the liver are too small to adequately characterize. Continued attention on follow-up.  6.  Abdominal aortic aneurysm measuring 3.1 cm.      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 Per Colorectal Surgery, the primary sigmoid mass was not amenable to resection. CEA 2.1. MMR IHC is pending. MSI was not initially obtained.     His sigmoid mass is highly suspicious for malignancy, however, the biopsy results were likely non-representative. Given that peritoneal biopsy shows intestinal-type adenocarcinoma, this is suggestive of metastatic spread from the presumed sigmoid primary. It remains unclear if there is distant spread to lymph nodes or other organs at this time. Completion of formal staging and MSI testing will help guide future therapy options, which the patient is eager to pursue.Patient still has not been able to have bowel movement which has held discharge.    Recommendations:   - Follow path for MMR IHC, pending  - MSI requested for existing peritoneal biopsy/path (ordered)  - Will need PET/CT to complete staging, likely to be obtained as outpatient following discharge  - Following staging, will need outpatient Oncology clinic follow-up to discuss possible chemotherapy +/- immunotherapy options.  This will be accomplished with Dr. Vallejo, or possibly a different provider as in [place] closer to patient's home (patient's preference, pending availability/coordination).     Patient was seen and plan of care was discussed with attending physician Dr. Stockton.    We will continue to follow this patient. Please don't hesitate to contact the Fellow On-Call with questions.    Dipika Tong MD  Hem-Onc Fellow  Pager: [phone]  Plan was discussed with attending          ------------------------------------------------------------------------------------  ATTENDING PHYSICIAN ATTESTATION     I, Hardeep Stockton, saw and evaluated Kalin Shepard as part of a shared visit.  I have reviewed and discussed with the fellow/ advanced practice provider their history, physical and plan.     I personally reviewed the vital signs, medications, labs and imaging.  I have ascertained key findings from the history, and I have performed key aspects of the physical examination.      Key management decisions made by me:  He is a 70yo man with a new diagnosis of advanced colorectal cancer with peritoneal carcinomatosis.  He has a diverting colostomy in place, and did not undergo resection of the sigmoid mass due to the presence of peritoneal seeding.  Treatment will likely involve systemic therapy alone.  Full staging evaluation is still lacking, and he will undergo a PET scan next week.  After that, we will coordinate follow-up in the colon cancer outpatient clinic for discussion of optimal chemotherapy options; this will likely involve FOLFOX.  He was given the opportunity to ask several questions today, all of which were answered to his satisfaction.     I spent a total of 60 minutes on this patient on the day of the encounter, of which more than 50% of this time was used for counselling and coordination of care.     Hardeep Stockton M.D.  Date of Service (when I saw the patient): 04/02/2022

## 2022-04-02 NOTE — PLAN OF CARE
Plan of Care Note     Reason for Admission: Large bowel obstruction  Procedures: 03/26/22: exploratory laparotomy, small bowel resection, colostomy creation (N/A Abdomen); Sigmoidoscopy flexible   IV Access/Incisions/Drains/Wounds:   Peripheral IV 03/26/22 Right Lower forearm (Active)   Site Assessment [facility] 04/01/22 2000   Line Status Saline locked 04/01/22 2000   Phlebitis Scale 0-->no symptoms 04/01/22 2000   Infiltration Scale 0 04/01/22 2000   Infiltration Site Treatment Method  None 04/01/22 1500   Number of days: 7       PICC Double Lumen 03/28/22 Left Brachial vein medial (Active)   Site Assessment [facility] 04/01/22 2000   External Cath Length (cm) 2 cm 03/28/22 1242   Extremity Circumference (cm) 22 cm 03/28/22 1242   Dressing Intervention Chlorhexidine patch;Transparent 04/01/22 2000   Dressing Change Due 04/04/22 03/29/22 0900   [place] - Cap Change Due 03/30/22 03/30/22 0100   Purple - Status infusing 04/01/22 2000   Purple - Cap Change Due 03/30/22 03/30/22 0100   Red - Status heparin locked 04/01/22 2000   PICC Comment PICC ok to use 03/28/22 1242   Extravasation? No 04/01/22 2000   Line Necessity Yes, meets criteria 04/01/22 2000   Number of days: 5       Closed/Suction Drain 1 Right RLQ Bulb 19 [place] (Active)   Site Description [facility] 04/01/22 2000   Dressing Status Normal: Clean, Dry & Intact 04/01/22 2000   Drainage Appearance Serous 04/01/22 2000   Status To bulb suction 04/01/22 2000   Output (ml) 10 ml 04/01/22 2000   Number of days: 6       Closed/Suction Drain 2 Right RLQ Bulb 19 [place] (Active)   Site Description [facility] 04/01/22 2000   Dressing Status Normal: Clean, Dry & Intact 04/01/22 2000   Drainage Appearance Serous 04/01/22 2000   Status To bulb suction 04/01/22 2000   Output (ml) 10 ml 04/01/22 2000   Number of days: 6       Colostomy (Active)   Stomal Appliance Intact;1 piece 04/01/22 2000   Stoma Assessment [place] 04/01/22 2000   Peristomal Assessment Intact 04/01/22 2000   Stool Amount Other  "(Comment) 04/01/22 2000   Output (ml) 0 ml 04/01/22 2000   Number of days: 6       Incision/Surgical Site 03/26/22 Abdomen (Active)   Incision Assessment WDL 04/01/22 2000   Akiko-Incision Assessment Warm 04/01/22 2000   Closure Staples 04/01/22 2000   Incision Drainage Amount None 04/01/22 2000   Drainage Description Serosanguinous 03/31/22 2130   Dressing Intervention Open to air / No Dressing 04/01/22 2000   Number of days: 7   IVF: TPN @ 60 ml/hr  VS: /69 (BP Location: Right arm)   Pulse 83   Temp (!) 96.7  F (35.9  C) (Oral)   Resp 19   Ht 1.778 m (5' 10\")   Wt 55.4 kg (122 lb 3.2 oz)   SpO2 97%   BMI 17.53 kg/m      Diet: Low Fiber/parenteral nutrition - ADULT compounded formula  Activity: SBA/Independent  Pain Management: Oxycodone  GI/: Voiding spontaneously, -BM (via Colostomy), +Flatus (via Colostomy), -Nausea  Neuro: A&Ox4  Team: Colorectal  Pertinent Labs: None        Shift Summary  Patient slept well on shift. Colostomy with gas and patient is burping it. Patient administered Lovenox shot on his own since he will be discharging with it. Plan for patient to discharge home once he has ROBF. Continue POC.   "

## 2022-04-02 NOTE — PROGRESS NOTES
"Colorectal Surgery Progress Note  [facility]  March 27, 2022  POD#6    SUBJECTIVE   No acute events overnight.  Overall doing well. Pain well controlled. No ostomy output yet but reports gas. No n/v. Tolerated 1x MORIS drain removal today appropriately.     OBJECTIVE:    Vitals:  BP 99/48 (BP Location: Left arm)   Pulse 76   Temp 97.6  F (36.4  C) (Temporal)   Resp 14   Ht 1.778 m (5' 10\")   Wt 59 kg (130 lb)   SpO2 97%, on RA  BMI 18.65 kg/m    I/O:  I/O last 3 completed shifts:  In: 10 [I.V.:10]  Out: 1545 [Urine:1525; Drains:20]    Physical Exam:  Gen: AAOx3, NAD  Pulm: Non-labored breathing on RA  CV:      RRR by RP, no sig lower extremity edema  Abd: Soft, mildly distended, tender, no guarding/rebound.    Incision C/D/I, staples   Stoma pink and viable, no stool, minimal amount of gas. Ostomy sweat.    2 MORIS drains, serosanguinous collections.  1x MORIS removed today.  Ext:  Warm and well-perfused  Lines:  MORIS, and PICC in place     BMP  Recent Labs   Lab 04/01/22  1306 04/01/22  0803 03/31/22  0656 03/31/22  0156 03/30/22  1310 03/30/22  1112 03/30/22  0648 03/29/22  1752 03/29/22  0838   NA  --  139 139  --   --   --  138  --  144   POTASSIUM  --  4.5 3.8  3.8  --   --   --  3.6  --  3.6   CHLORIDE  --  110* 110*  --   --   --  106  --  111*   CO2  --  22 21  --   --   --  24  --  27   BUN  --  20 17  --   --   --  14  --  28   CR  --  0.44* 0.46*  --   --   --  0.48*  --  0.58*   * 100* 109* 116*   < >  --  124*   < > 107*   MAG  --  2.2 2.3  --   --   --  2.0  --  2.2   PHOS  --  3.4 3.0  --   --  2.1* 2.3*   < > 1.1*    < > = values in this interval not displayed.     CBC  Recent Labs   Lab 03/30/22  0648 03/29/22  0838 03/28/22  0900 03/27/22  0940   WBC 11.5* 10.8 11.9* 13.2*   HGB 11.6* 10.8* 11.1* 12.3*   HCT 35.7* 34.7* 35.7* 39.2*    296 315 268       ASSESSMENT:   71 year old male who presented to [facility] ED with abdominal pain found to have obstructing " sigmoid colon mass with competent ileocecal valve resulting in closed loop obstruction. S/p exploratory laparotomy, small bowel resection, colostomy creation, flex sigmoidoscopy overnight on 3/26-3/27. 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 Oncology following and establishing care. TPN initiated to support nourishment in setting of 40lb weight loss and ileus. Minimizing  # of IVs/tubes, pain well controlled with PO pain management. Should remain admitted. Awaiting for ROBF and stable ostomy output at this point.     Plan:     Neuro/Pain: prn oxycodone, prn dilaudid, lidocaine patches.   CV: RRR.  Briefly went into afib during OR, not noted since. Continue to monitor, EKG if chest pain or palpitations.   PULM: encourage IS.  Nutr/GI:   - Advance diet to LFD.  - Ensure shakes ordered  - Calorie counts ordered.   - Severe protein malnutrition. TPN @ 60 ml/hr; initiated on 3/28 evening. Appreciate dietician assistance.   - Triglycerides wnl on 3/28  - Prealbumin @ 4 on 3/27  - PICC placed on 3/28. CXR with adequate positioning of PICC.   /Lytes/IVF:   - Continue TPN for now  - CEA 2.1 on 3/27  Heme: Hgb dip at 11.1 on 3/28 (12.3). Likely dilutional. Stable postop.  ID: No concerns.    Endocrine: No concerns   T/L/D: MORIS x1. Colostomy pink, no stool output. PICC. One MORIS removed on 4/2.  Activity: encourage ambulation as tolerated.   Ppx: lovenox  Dispo: Pending return of bowel function, ability to tolerate diet advancement, pain controlled. 2-3 days estimate.     Patient was seen and discussed on rounds with Dr. Elizabeth.    Benjamin Allred  Medical Student  [facility]    Attestation:  I, Reji Elizabeth MD, saw and evaluated Kalin Shepard as part of a shared visit. I have reviewed and discussed with the advanced practice provider their history, physical and plan.  I personally reviewed the vital signs, medications and labs.  My key history or physical exam findings:  Feeling better. Stoma with lots of gas, no stool yet.  Key management decisions made by me: LR diet (go slow), PO pain meds, removed 1 MORIS (medial one), stoma cares, DVT proph+teaching, cont TPN for now + CC (unlikely to need home TPN).    Reji Elizabeth M.D., M.Sc.    Colon and Rectal Surgery  Pager: [phone].    April 2, 2022

## 2022-04-03 LAB
ANION GAP SERPL CALCULATED.3IONS-SCNC: 6 MMOL/L (ref 3–14)
BUN SERPL-MCNC: 29 MG/DL (ref 7–30)
CALCIUM SERPL-MCNC: 8.1 MG/DL (ref 8.5–10.1)
CHLORIDE BLD-SCNC: 110 MMOL/L (ref 94–109)
CO2 SERPL-SCNC: 22 MMOL/L (ref 20–32)
CREAT SERPL-MCNC: 0.46 MG/DL (ref 0.66–1.25)
GFR SERPL CREATININE-BSD FRML MDRD: >90 ML/MIN/1.73M2
GLUCOSE BLD-MCNC: 100 MG/DL (ref 70–99)
HOLD SPECIMEN: NORMAL
MAGNESIUM SERPL-MCNC: 2.1 MG/DL (ref 1.6–2.3)
PHOSPHATE SERPL-MCNC: 3.4 MG/DL (ref 2.5–4.5)
POTASSIUM BLD-SCNC: 4.4 MMOL/L (ref 3.4–5.3)
SODIUM SERPL-SCNC: 138 MMOL/L (ref 133–144)
TRIGL SERPL-MCNC: 76 MG/DL

## 2022-04-03 PROCEDURE — 36592 COLLECT BLOOD FROM PICC: CPT | Performed by: SURGERY

## 2022-04-03 PROCEDURE — 84478 ASSAY OF TRIGLYCERIDES: CPT | Performed by: SURGERY

## 2022-04-03 PROCEDURE — 80048 BASIC METABOLIC PNL TOTAL CA: CPT | Performed by: SURGERY

## 2022-04-03 PROCEDURE — 120N000002 HC R&B MED SURG/OB UMMC

## 2022-04-03 PROCEDURE — 250N000009 HC RX 250: Performed by: SURGERY

## 2022-04-03 PROCEDURE — 250N000013 HC RX MED GY IP 250 OP 250 PS 637: Performed by: SURGERY

## 2022-04-03 PROCEDURE — 250N000011 HC RX IP 250 OP 636: Performed by: SURGERY

## 2022-04-03 PROCEDURE — 250N000013 HC RX MED GY IP 250 OP 250 PS 637

## 2022-04-03 PROCEDURE — 83735 ASSAY OF MAGNESIUM: CPT | Performed by: SURGERY

## 2022-04-03 PROCEDURE — 250N000013 HC RX MED GY IP 250 OP 250 PS 637: Performed by: COLON & RECTAL SURGERY

## 2022-04-03 PROCEDURE — 84100 ASSAY OF PHOSPHORUS: CPT | Performed by: SURGERY

## 2022-04-03 RX ORDER — IBUPROFEN 200 MG
400 TABLET ORAL 3 TIMES DAILY
Status: DISCONTINUED | OUTPATIENT
Start: 2022-04-03 | End: 2022-04-05 | Stop reason: HOSPADM

## 2022-04-03 RX ADMIN — IBUPROFEN 400 MG: 200 TABLET, FILM COATED ORAL at 20:04

## 2022-04-03 RX ADMIN — ACETAMINOPHEN 1000 MG: 500 TABLET ORAL at 08:40

## 2022-04-03 RX ADMIN — OXYCODONE HYDROCHLORIDE 5 MG: 5 TABLET ORAL at 04:35

## 2022-04-03 RX ADMIN — OXYCODONE HYDROCHLORIDE 5 MG: 5 TABLET ORAL at 19:46

## 2022-04-03 RX ADMIN — ACETAMINOPHEN 1000 MG: 500 TABLET ORAL at 19:46

## 2022-04-03 RX ADMIN — ENOXAPARIN SODIUM 40 MG: 40 INJECTION SUBCUTANEOUS at 19:46

## 2022-04-03 RX ADMIN — Medication 5 ML: at 08:45

## 2022-04-03 RX ADMIN — IBUPROFEN 800 MG: 800 TABLET, FILM COATED ORAL at 08:40

## 2022-04-03 RX ADMIN — LIDOCAINE 1 PATCH: 560 PATCH PERCUTANEOUS; TOPICAL; TRANSDERMAL at 19:52

## 2022-04-03 RX ADMIN — ACETAMINOPHEN 1000 MG: 500 TABLET ORAL at 12:41

## 2022-04-03 RX ADMIN — IBUPROFEN 800 MG: 800 TABLET, FILM COATED ORAL at 14:21

## 2022-04-03 RX ADMIN — ACETAMINOPHEN 1000 MG: 500 TABLET ORAL at 16:12

## 2022-04-03 RX ADMIN — POTASSIUM PHOSPHATE, MONOBASIC POTASSIUM PHOSPHATE, DIBASIC: 224; 236 INJECTION, SOLUTION, CONCENTRATE INTRAVENOUS at 19:51

## 2022-04-03 ASSESSMENT — ACTIVITIES OF DAILY LIVING (ADL)
ADLS_ACUITY_SCORE: 5

## 2022-04-03 NOTE — PLAN OF CARE
Colostomy with gas and small amount of stool. Tolerating low fiber diet, denies nausea, voiding spont, up ad alexandrea. MORIS taken out by colo-rectal team this shift. PICC with TPN, on calorie counts. Tylenol and Ibuprofen for incisional pain with relief.

## 2022-04-03 NOTE — PROGRESS NOTES
Calorie Count  Intake recorded for: 4/2  Total Kcals: 1011 Total Protein: 44g  Kcals from Hospital Food: 1011   Protein: 44g  Kcals from Outside Food (average):0  Protein: 0g  # Meals Ordered from Kitchen: 3  # Meals Recorded: 1 meal - 100% mashed potatoes w/ gravy, applesauce, banana, ice cream, whole milk, 50% beef pot roast w/ gravy, wheat dinner roll  # Supplements Recorded: 100% of 1 Ensure Enlive

## 2022-04-03 NOTE — PLAN OF CARE
"Plan of Care Note     Reason for Admission: Large bowel obstruction  Procedures: 03/26/22: exploratory laparotomy, small bowel resection, colostomy creation (N/A Abdomen); Sigmoidoscopy flexible   IV Access/Incisions/Drains/Wounds:   PICC Double Lumen 03/28/22 Left Brachial vein medial (Active)   Site Assessment WDL 04/03/22 0025   External Cath Length (cm) 2 cm 03/28/22 1242   Extremity Circumference (cm) 22 cm 03/28/22 1242   Dressing Intervention Chlorhexidine patch;Transparent 04/01/22 2000   Dressing Change Due 04/04/22 03/29/22 0900   Blanket - Cap Change Due 03/30/22 03/30/22 0100   Purple - Status infusing 04/03/22 0025   Purple - Cap Change Due 03/30/22 03/30/22 0100   Red - Status heparin locked 04/03/22 0025   PICC Comment PICC ok to use 03/28/22 1242   Extravasation? No 04/03/22 0025   Line Necessity Yes, meets criteria 04/03/22 0025   Number of days: 6       Closed/Suction Drain 2 Right RLQ Bulb 19 Persian (Active)   Site Description WDL 04/03/22 0025   Dressing Status Normal: Clean, Dry & Intact 04/03/22 0025   Drainage Appearance Serosanguenous 04/03/22 0025   Status To bulb suction 04/03/22 0025   Output (ml) 0 ml 04/03/22 0025   Number of days: 7       Colostomy (Active)   Stomal Appliance Intact 04/03/22 0015   Stoma Assessment Pink 04/03/22 0015   Peristomal Assessment Intact 04/03/22 0015   Stool Amount Small 04/03/22 0015   Output (ml) 0 ml 04/02/22 2243   Number of days: 7       Incision/Surgical Site 03/26/22 Abdomen (Active)   Incision Assessment WDL 04/03/22 0025   Akiko-Incision Assessment Warm 04/03/22 0025   Closure Staples 04/03/22 0025   Incision Drainage Amount None 04/03/22 0025   Drainage Description Serosanguinous 04/03/22 0025   Dressing Intervention Open to air / No Dressing 04/03/22 0025   Number of days: 8   IVF: TPN @ 60 ml/hr  VS: /70 (BP Location: Right arm)   Pulse 67   Temp 98.2  F (36.8  C) (Oral)   Resp 16   Ht 1.778 m (5' 10\")   Wt 55.1 kg (121 lb 6.4 oz)   SpO2 " 97%   BMI 17.42 kg/m       Diet: Low Fiber/parenteral nutrition - ADULT compounded formula  Activity: Independent  Pain Management: Oxycodone (on shift), Ibuprofen, and Tylenol  GI/: Voiding spontaneously, +BM (via Colostomy), +Flatus (via Colostomy), -Nausea  Neuro: A&Ox4  Team: Colorectal  Pertinent Labs: None        Shift Summary  Patient slept well on shift. Colostomy with gas and small amount of soft formed stool. Continue POC.

## 2022-04-03 NOTE — PLAN OF CARE
"/70 (BP Location: Right arm)   Pulse 67   Temp 98.2  F (36.8  C) (Oral)   Resp 16   Ht 1.778 m (5' 10\")   Wt 55.1 kg (121 lb 6.4 oz)   SpO2 97%   BMI 17.42 kg/m      Goal Outcome Evaluation:    Plan of care reviewed with:patient    Overall patient progress:improving    Time: 1425-0217  Status:POD# 6 s/p ex lap, small bowel resection, colostomy creation due to LBO 2/2 sigmoid mass.   Neuro/Pain:  A&Ox4, c/o generalized and incisional pain. PRN Oxycodone 5 mg w/Tylenol scheduled x 1 and scheduled Ibuprofen and Tylenol at bedtime. Pain managed with the current regimen.   Activity: independent   Cardiac/vs: WNL, afebrile   Respiratory: WNL  GI/: no output from colostomy yet, smear bm noted. Active bowel sound. Passing gas. Voiding spontaneously using bedside urinal.   Diet: regular, TPN 60 ml/hr continuous. Good appetite.   Skin: midline abd incision staples IRENE without s/s of infection. Colostomy on LLQ of abdomen. Blanchable redness noted on his coccyx, mepilex dressing applied.   LDAs: PICC double lumen infusing TPN and hep locked.   Labs/Imaging: reviewed.   Change this shift: none   Plan: continue on javier count, monitor intake, continuous TPN, pain management.             "

## 2022-04-03 NOTE — PROGRESS NOTES
"Colorectal Surgery Progress Note  [facility]  March 27, 2022  POD#7    SUBJECTIVE   No acute events overnight.  1000 calories PO yesterday. Denies nausea or vomiting. MORIS removed yesterday. Second MORIS with 5 mL serosanguinous output. Stool per colostomy starting.    OBJECTIVE:    Vitals:  BP 99/48 (BP Location: Left arm)   Pulse 76   Temp 97.6  F (36.4  C) (Temporal)   Resp 14   Ht 1.778 m (5' 10\")   Wt 59 kg (130 lb)   SpO2 97%, on RA  BMI 18.65 kg/m    I/O:  I/O last 3 completed shifts:  In: 1976 [P.O.:1076]  Out: 1730 [Urine:1725; Drains:5]    Physical Exam:  Gen: AAOx3, NAD  Pulm: Non-labored breathing on RA  CV:      RRR by RP, no sig lower extremity edema  Abd: Soft, mildly distended, tender, no guarding/rebound.    Incision C/D/I, staples   Stoma pink and viable, no stool, minimal amount of gas. Trace stool in appliance.   1 MORIS drains, serosanguinous.  Ext:  Warm and well-perfused  Lines:  MORIS, and PICC in place     BMP  Recent Labs   Lab 04/03/22  0916 04/02/22  0735 04/01/22  1755 04/01/22  1306 04/01/22  0803 03/31/22  0656 03/30/22  1112 03/30/22  0648     --   --   --  139 139  --  138   POTASSIUM 4.4 4.4  --   --  4.5 3.8  3.8  --  3.6   CHLORIDE 110*  --   --   --  110* 110*  --  106   CO2 22  --   --   --  22 21  --  24   BUN 29  --   --   --  20 17  --  14   CR 0.46*  --   --   --  0.44* 0.46*  --  0.48*   *  --  107* 105* 100* 109*   < > 124*   MAG 2.1 2.1  --   --  2.2 2.3  --  2.0   PHOS 3.4 3.6  --   --  3.4 3.0   < > 2.3*    < > = values in this interval not displayed.     CBC  Recent Labs   Lab 04/02/22  0735 03/30/22  0648 03/29/22  0838 03/28/22  0900   WBC  --  11.5* 10.8 11.9*   HGB  --  11.6* 10.8* 11.1*   HCT  --  35.7* 34.7* 35.7*    334 296 315       ASSESSMENT:   71 year old male who presented to OSH ED with abdominal pain found to have obstructing sigmoid colon mass with competent ileocecal valve resulting in closed loop " obstruction. S/p exploratory laparotomy, small bowel resection, colostomy creation, flex sigmoidoscopy overnight on 3/26-3/27. 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 Oncology following and establishing care. TPN initiated to support nourishment in setting of 40lb weight loss and ileus. Minimizing  # of IVs/tubes, pain well controlled with PO pain management. Should remain admitted. Awaiting for ROBF and stable ostomy output at this point.     Plan:     Neuro/Pain: prn oxycodone, prn dilaudid, lidocaine patches.   CV: RRR.  Briefly went into afib during OR, not noted since. Continue to monitor, EKG if chest pain or palpitations.   PULM: encourage IS.  Nutr/GI:   - LFD  - Ensure shakes ordered  - Calorie counts ordered.   - Severe protein malnutrition. Let TPN run out and do not renew. Appreciate dietician assistance.   - Triglycerides wnl on 3/28  - Prealbumin @ 4 on 3/27  - PICC placed on 3/28. CXR with adequate positioning of PICC.   /Lytes/IVF:   - Continue TPN for now  - CEA 2.1 on 3/27  Heme: Hgb dip at 11.1 on 3/28 (12.3). Likely dilutional. Stable postop.  ID: No concerns.    Endocrine: No concerns   T/L/D: MORIS x1. Colostomy pink, no stool output. PICC. One MORIS removed on 4/2. Removing second MORIS today.  Activity: encourage ambulation as tolerated.   Ppx: lovenox  Dispo: Pending return of bowel function, ability to tolerate diet advancement, pain controlled. Likely ready tomorrow.     Discussed with Dr. Elizabeth.    Bobbi Cope MD  Colon & Rectal Surgery Fellow  [facility]  04/03/22  10:24 AM

## 2022-04-04 LAB
ALBUMIN SERPL-MCNC: 2.3 G/DL (ref 3.4–5)
ALP SERPL-CCNC: 76 U/L (ref 40–150)
ALT SERPL W P-5'-P-CCNC: 58 U/L (ref 0–70)
ANION GAP SERPL CALCULATED.3IONS-SCNC: 6 MMOL/L (ref 3–14)
AST SERPL W P-5'-P-CCNC: 36 U/L (ref 0–45)
BILIRUB SERPL-MCNC: 0.2 MG/DL (ref 0.2–1.3)
BUN SERPL-MCNC: 28 MG/DL (ref 7–30)
CALCIUM SERPL-MCNC: 8.1 MG/DL (ref 8.5–10.1)
CHLORIDE BLD-SCNC: 110 MMOL/L (ref 94–109)
CO2 SERPL-SCNC: 24 MMOL/L (ref 20–32)
CREAT SERPL-MCNC: 0.49 MG/DL (ref 0.66–1.25)
GFR SERPL CREATININE-BSD FRML MDRD: >90 ML/MIN/1.73M2
GLUCOSE BLD-MCNC: 100 MG/DL (ref 70–99)
HOLD SPECIMEN: NORMAL
INR PPP: 1.01 (ref 0.85–1.15)
MAGNESIUM SERPL-MCNC: 2.3 MG/DL (ref 1.6–2.3)
PHOSPHATE SERPL-MCNC: 3.2 MG/DL (ref 2.5–4.5)
POTASSIUM BLD-SCNC: 4.4 MMOL/L (ref 3.4–5.3)
PREALB SERPL IA-MCNC: 20 MG/DL (ref 15–45)
PROT SERPL-MCNC: 5.8 G/DL (ref 6.8–8.8)
SARS-COV-2 RNA RESP QL NAA+PROBE: NEGATIVE
SODIUM SERPL-SCNC: 140 MMOL/L (ref 133–144)

## 2022-04-04 PROCEDURE — 250N000009 HC RX 250: Performed by: SURGERY

## 2022-04-04 PROCEDURE — 84100 ASSAY OF PHOSPHORUS: CPT

## 2022-04-04 PROCEDURE — 80053 COMPREHEN METABOLIC PANEL: CPT | Performed by: SURGERY

## 2022-04-04 PROCEDURE — 250N000013 HC RX MED GY IP 250 OP 250 PS 637: Performed by: SURGERY

## 2022-04-04 PROCEDURE — 250N000013 HC RX MED GY IP 250 OP 250 PS 637: Performed by: COLON & RECTAL SURGERY

## 2022-04-04 PROCEDURE — U0003 INFECTIOUS AGENT DETECTION BY NUCLEIC ACID (DNA OR RNA); SEVERE ACUTE RESPIRATORY SYNDROME CORONAVIRUS 2 (SARS-COV-2) (CORONAVIRUS DISEASE [COVID-19]), AMPLIFIED PROBE TECHNIQUE, MAKING USE OF HIGH THROUGHPUT TECHNOLOGIES AS DESCRIBED BY CMS-2020-01-R: HCPCS | Performed by: SURGERY

## 2022-04-04 PROCEDURE — 999N000007 HC SITE CHECK

## 2022-04-04 PROCEDURE — 36592 COLLECT BLOOD FROM PICC: CPT | Performed by: SURGERY

## 2022-04-04 PROCEDURE — 250N000011 HC RX IP 250 OP 636: Performed by: SURGERY

## 2022-04-04 PROCEDURE — 999N000044 HC STATISTIC CVC DRESSING CHANGE

## 2022-04-04 PROCEDURE — 85610 PROTHROMBIN TIME: CPT | Performed by: SURGERY

## 2022-04-04 PROCEDURE — 250N000013 HC RX MED GY IP 250 OP 250 PS 637

## 2022-04-04 PROCEDURE — 83735 ASSAY OF MAGNESIUM: CPT

## 2022-04-04 PROCEDURE — 120N000002 HC R&B MED SURG/OB UMMC

## 2022-04-04 PROCEDURE — 84134 ASSAY OF PREALBUMIN: CPT | Performed by: SURGERY

## 2022-04-04 RX ORDER — POLYETHYLENE GLYCOL 3350 17 G/17G
17 POWDER, FOR SOLUTION ORAL DAILY
Status: DISCONTINUED | OUTPATIENT
Start: 2022-04-04 | End: 2022-04-05 | Stop reason: HOSPADM

## 2022-04-04 RX ADMIN — ACETAMINOPHEN 1000 MG: 500 TABLET ORAL at 20:27

## 2022-04-04 RX ADMIN — OXYCODONE HYDROCHLORIDE 5 MG: 5 TABLET ORAL at 09:11

## 2022-04-04 RX ADMIN — POLYETHYLENE GLYCOL 3350 17 G: 17 POWDER, FOR SOLUTION ORAL at 13:24

## 2022-04-04 RX ADMIN — I.V. FAT EMULSION 250 ML: 20 EMULSION INTRAVENOUS at 20:38

## 2022-04-04 RX ADMIN — Medication 5 ML: at 09:12

## 2022-04-04 RX ADMIN — ENOXAPARIN SODIUM 40 MG: 40 INJECTION SUBCUTANEOUS at 20:27

## 2022-04-04 RX ADMIN — ACETAMINOPHEN 1000 MG: 500 TABLET ORAL at 09:10

## 2022-04-04 RX ADMIN — LIDOCAINE 1 PATCH: 560 PATCH PERCUTANEOUS; TOPICAL; TRANSDERMAL at 20:27

## 2022-04-04 RX ADMIN — ACETAMINOPHEN 1000 MG: 500 TABLET ORAL at 13:25

## 2022-04-04 RX ADMIN — IBUPROFEN 400 MG: 200 TABLET, FILM COATED ORAL at 13:24

## 2022-04-04 RX ADMIN — METHOCARBAMOL 500 MG: 500 TABLET ORAL at 17:49

## 2022-04-04 RX ADMIN — OXYCODONE HYDROCHLORIDE 5 MG: 5 TABLET ORAL at 21:46

## 2022-04-04 RX ADMIN — IBUPROFEN 400 MG: 200 TABLET, FILM COATED ORAL at 09:10

## 2022-04-04 RX ADMIN — IBUPROFEN 400 MG: 200 TABLET, FILM COATED ORAL at 20:27

## 2022-04-04 ASSESSMENT — ACTIVITIES OF DAILY LIVING (ADL)
ADLS_ACUITY_SCORE: 5
ADLS_ACUITY_SCORE: 7
ADLS_ACUITY_SCORE: 5
ADLS_ACUITY_SCORE: 7
ADLS_ACUITY_SCORE: 5
ADLS_ACUITY_SCORE: 7
ADLS_ACUITY_SCORE: 5
ADLS_ACUITY_SCORE: 7
ADLS_ACUITY_SCORE: 7
ADLS_ACUITY_SCORE: 5
ADLS_ACUITY_SCORE: 5
ADLS_ACUITY_SCORE: 7

## 2022-04-04 NOTE — PLAN OF CARE
"Plan of Care Note     Reason for Admission: Large bowel obstruction  Procedures: 03/26/22: exploratory laparotomy, small bowel resection, colostomy creation (N/A Abdomen); Sigmoidoscopy flexible   IV Access/Incisions/Drains/Wounds:   PICC Double Lumen 03/28/22 Left Brachial vein medial (Active)   Site Assessment WDL 04/04/22 0000   External Cath Length (cm) 2 cm 03/28/22 1242   Extremity Circumference (cm) 22 cm 03/28/22 1242   Dressing Intervention Chlorhexidine patch;Transparent 04/04/22 0000   Dressing Change Due 04/04/22 04/04/22 0000   Orange - Cap Change Due 03/30/22 03/30/22 0100   Purple - Status infusing 04/04/22 0000   Purple - Cap Change Due 04/06/22 04/04/22 0000   Red - Status heparin locked 04/04/22 0000   Red - Cap Change Due 04/06/22 04/04/22 0000   PICC Comment PICC ok to use 03/28/22 1242   Extravasation? No 04/04/22 0000   Line Necessity Yes, meets criteria 04/04/22 0000   Number of days: 7       Colostomy (Active)   Stomal Appliance Intact 04/04/22 0000   Stoma Assessment Pink 04/04/22 0000   Peristomal Assessment Intact 04/04/22 0000   Stool Amount Small 04/04/22 0000   Output (ml) 10 ml 04/03/22 2000   Number of days: 8       Incision/Surgical Site 03/26/22 Abdomen (Active)   Incision Assessment WDL 04/04/22 0000   Akiko-Incision Assessment Warm 04/04/22 0000   Closure [place] 04/04/22 0000   Incision Drainage Amount None 04/04/22 0000   Drainage Description Serosanguinous 04/03/22 0025   Dressing Intervention Open to air / No Dressing 04/04/22 0000   Number of days: 9   IVF: TPN @ 60 ml/hr  VS: /68 (BP Location: Right arm)   Pulse 61   Temp 98.1  F (36.7  C) (Temporal)   Resp 18   Ht 1.778 m (5' 10\")   Wt 56 kg (123 lb 8 oz)   SpO2 98%   BMI 17.72 kg/m      Diet: Low Fiber/parenteral nutrition - ADULT compounded formula  Activity: Independent  Pain Management: Oxycodone (on shift), Ibuprofen, and Tylenol  GI/: Voiding spontaneously, +BM (via Colostomy), +Flatus (via Colostomy), " -Nausea  Neuro: A&Ox4  Team: Colorectal  Pertinent Labs: None        Shift Summary  Patient slept well on shift. Colostomy with gas and small amount of soft formed stool. Plan for probable discharge today. Continue POC.

## 2022-04-04 NOTE — PLAN OF CARE
15:30-23:30    Goal Outcome Evaluation:    Plan of Care Reviewed With: patient     Overall Patient Progress: improving    Temp: 98.1  F (36.7  C) Temp src: Temporal BP: 120/68 Pulse: 61   Resp: 18 SpO2: 98 % O2 Device: None (Room air)      Status: S/p exploratory laparotomy, small bowel resection, colostomy creation, flex sigmoidoscopy overnight on 3/26-3/27 ( MD note )    -pain well-controlled with Oxycodone, Ibuprofen and Tylenol  -Lido patch applied on right upper back  -on low fiber with fair appetite and without nausea  -also on continuous TPN   -voiding adequate amount  -colostomy with small amount of loose stool ( too small to empty )  -mid line incision with staples IRENE  -Mipelex on coccyx due to blanchable redness  -up ad alexandrea    Continue with plan of care.

## 2022-04-04 NOTE — CONSULTS
Went to patient's room to do PICC and TPN teaching, but patient informed writer that TPN will be done after today's bag and PICC will come out.    Paged the primary team and they verified this information.    Class canceled and order discontinued.    Please reorder PLC if needed in the future.

## 2022-04-04 NOTE — PROGRESS NOTES
"Colorectal Surgery Progress Note  [facility]  POD#8    SUBJECTIVE   No acute events overnight. Denies nausea or vomiting.. Stool per colostomy. Eating well.     OBJECTIVE:    Vitals:  /66 (BP Location: Right arm)   Pulse 67   Temp 97.1  F (36.2  C) (Temporal)   Resp 18   Ht 1.778 m (5' 10\")   Wt 56.9 kg (125 lb 8 oz)   SpO2 97%   BMI 18.01 kg/m      I/O:  I/O last 3 completed shifts:  In: 1920 [P.O.:960]  Out: 1835 [Urine:1825; Stool:10]    Physical Exam:  Gen: AAOx3, NAD  Pulm: Non-labored breathing on RA  CV:      RRR by RP, no sig lower extremity edema  Abd: Soft, nondistended, tender, no guarding/rebound.    Incision C/D/I, staples   Stoma pink and viable, +stool and flatus   Ext:  Warm and well-perfused  Lines:  PICC in place     BMP  Recent Labs   Lab 04/04/22  0628 04/03/22  0916 04/02/22  0735 04/01/22  1755 04/01/22  1306 04/01/22  0803 03/31/22  0656    138  --   --   --  139 139   POTASSIUM 4.4 4.4 4.4  --   --  4.5 3.8  3.8   CHLORIDE 110* 110*  --   --   --  110* 110*   CO2 24 22  --   --   --  22 21   BUN 28 29  --   --   --  20 17   CR 0.49* 0.46*  --   --   --  0.44* 0.46*   * 100*  --  107* 105* 100* 109*   MAG 2.3 2.1 2.1  --   --  2.2 2.3   PHOS 3.2 3.4 3.6  --   --  3.4 3.0     CBC  Recent Labs   Lab 04/02/22  0735 03/30/22  0648 03/29/22  0838   WBC  --  11.5* 10.8   HGB  --  11.6* 10.8*   HCT  --  35.7* 34.7*    334 296       ASSESSMENT:   71 year old male who presented to OSH ED with abdominal pain found to have 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 S/p exploratory laparotomy, small bowel resection, colostomy creation, flex sigmoidoscopy overnight on 3/26-3/27. 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 Oncology following and establishing care. TPN initiated to support nourishment in setting of 40lb weight loss and ileus. Minimizing  # of " IVs/tubes, pain well controlled with PO pain management. Should remain admitted. Awaiting for ROBF and stable ostomy output at this point.     Plan:     Neuro/Pain: prn oxycodone, prn dilaudid, lidocaine patches.   CV: RRR.  Briefly went into afib during OR, not noted since. Continue to monitor, EKG if chest pain or palpitations.   PULM: encourage IS.  Nutr/GI:   - LFD  - Ensure shakes ordered  - Calorie counts ordered.   - Severe protein malnutrition. Let TPN run out and do not renew. Appreciate dietician assistance.   - Triglycerides wnl on 3/28  - Prealbumin @ 4 on 3/27  - PICC placed on 3/28. CXR with adequate positioning of PICC.   /Lytes/IVF:   - Continue TPN for now  - CEA 2.1 on 3/27  Heme: Hgb dip at 11.1 on 3/28 (12.3). Likely dilutional. Stable postop.  ID: No concerns.    Endocrine: No concerns   T/L/D: Both MORIS removed  Activity: encourage ambulation as tolerated.   Ppx: lovenox 40 every day, WILL NEED EXTENDED LOVENOX ON DISCHARGE.   Dispo: Pending return of bowel function, ability to tolerate diet advancement, pain controlled. Planning for discharge to home 4/5. No TPN needed.   Robert Groves MD  Fellow in Colon and Rectal Surgery  [facility]  Pager: [phone]

## 2022-04-04 NOTE — PROGRESS NOTES
"CLINICAL NUTRITION SERVICES - REASSESSMENT NOTE     Nutrition Prescription    RECOMMENDATIONS FOR MDs/PROVIDERS TO ORDER:  - None at this time    Malnutrition Status:    Severe malnutrition in the context of acute illness    Recommendations already ordered by Registered Dietitian (RD):  - None additional at this time    Future/Additional Recommendations:  - Monitor PO intake and diet tolerance, wt     EVALUATION OF THE PROGRESS TOWARD GOALS   Diet: Low Fiber, Ensure Enlive between meals    Nutrition Support:  3/28-___: TPN, goal volume 1440 ml/day with initial 120g Dex daily (408 kcal, GIR 1.5), 83g AA daily (332 kcal), and 250 ml 20% IV lipids x 2 days/week.  Micro/Rx: multivitamin, thiamine 100 mg x 7days   - goal of 265g Dex (901 kcal) to increase provisions to 1376 kcals (25 kcal/kg/day), 1.5 g PRO/kg/day, GIR 3.3 with 10% kcals from Fat.   3/29: held dextrose advancement d/t low phos, added more phos to TPN   3/31: advanced to 170 g Dex   4/1: advanced to 220 g   4/2: at goal dex 265 g     - Per provider note (4/3), Let TPN run out and do not renew.     PN Intake:   - Per chart review pt received TPN well with no interruptions with exception of 3/30 - TPN was shut off in AM.    PO Intake:  - Per discussion with pt (4/4), pt reported he has been eating well now that his diet has advanced over the past 2 days. Pt reported his appetite is \"pretty good\" though still smaller than it was a year ago. Pt reported he likes the ensure \"very well.\" Pt reported no N/V or diarrhea/constipation.  - Per food record, pt consuming % of meals  - Per health touch, pt receiving 2-3 good meals/day + 1 ensure supplement/day since 4/1.  - Per kcal counts (4/2-4/4),  4/3: 1918 kcal and 80 g protein (3 meals, 2 supplements)  4/2: 1011 kcal and 44 g protein (3 meals, 1 supplements)     NEW FINDINGS   Weight:  - weight gain of +1.9 kg over past week, fluctuations between 55-56.9 kg: suspect true weight trends may be hidden by fluid " shifts    Labs:  - sodium: 140 (WNL, stable)  - K: 4.4 (WNL, stable)  - BUN: 28 (WNL, stable)  - creatinine: 0.49 (L, trending up)  - magnesium: 2.3 (WNL, stable)  - phos: 3.2 (WNL, stable)  - alk phos: 76 (WNL)  - ALT: 58 (WNL)  - AST: 36 (WNL)  - direct bili: <0.1 (WNL, 3/29)  - T (WNL, 4/3)  - B (H, stable)    Medications:  - reviewed    GI:  - 0 mL/day colostomy output with exception of 10 mL on 4/3    Skin: WOC following  - assessed colostomy stoma       MALNUTRITION  % Intake: No decreased intake noted - pt meeting estimated nutrition needs via TPN  % Weight Loss: >20% in 1 year (severe) - 25% weight loss in 1 year, ~40# in 1 year  Subcutaneous Fat Loss: Facial region:  Mild - visual exam  Muscle Loss: Temporal:  severe, Facial & jaw region:  severe, and Thoracic region (clavicle, acromium bone, deltoid, trapezius, pectoral):  Moderate - visual exam  Fluid Accumulation/Edema: None noted  Malnutrition Diagnosis: Severe malnutrition in the context of acute illness    Previous Goals   Total avg nutritional intake to meet a minimum of 25 kcal/kg and 1.2 g PRO/kg daily (per dosing wt 55 kg).  Evaluation: Met    Previous Nutrition Diagnosis  Inadequate oral intake related to nausea/vomiting and now NPO 2/2 likely ileus as evidenced by </= 50% for >/= 5 days (severe) food intake, 25% weight loss in 1 year, BMI of 17.40, need to initiate TPN to meet estimated nutrition needs  Evaluation: Improving    CURRENT NUTRITION DIAGNOSIS  Predicted inadequate nutrient intake (protein, energy) related to decreased appetite, continued reliance on TPN with plan to discontinue on     INTERVENTIONS  Implementation  Collaboration with other providers - rounds, discussed plan to stop TPN after current bag runs out with pharmacist    Goals  Total avg nutritional intake to meet a minimum of 25 kcal/kg and 1.2 g PRO/kg daily (per dosing wt 55 kg).    Monitoring/Evaluation  Progress toward goals will be monitored and  evaluated per protocol.    Thuy Goss   Dietetic Intern    I have read and agree with the above nutrition note, recs, and interventions.  I did not personally see this patient today, chart review only.    Shoshana Marie RD, , LD  Weekday Pager: [phone]  Weekday Units covered: 7C (all beds) and 5A (beds 5201 through 5211-2)  Weekend/Holiday RD Pager: [phone]

## 2022-04-05 ENCOUNTER — PATIENT OUTREACH (OUTPATIENT)
Dept: ONCOLOGY | Facility: CLINIC | Age: 72
End: 2022-04-05
Payer: COMMERCIAL

## 2022-04-05 ENCOUNTER — PRE VISIT (OUTPATIENT)
Dept: ONCOLOGY | Facility: CLINIC | Age: 72
End: 2022-04-05

## 2022-04-05 VITALS
HEART RATE: 57 BPM | OXYGEN SATURATION: 98 % | HEIGHT: 70 IN | SYSTOLIC BLOOD PRESSURE: 110 MMHG | DIASTOLIC BLOOD PRESSURE: 60 MMHG | TEMPERATURE: 97.5 F | BODY MASS INDEX: 18.34 KG/M2 | RESPIRATION RATE: 18 BRPM | WEIGHT: 128.1 LBS

## 2022-04-05 PROCEDURE — 250N000013 HC RX MED GY IP 250 OP 250 PS 637: Performed by: SURGERY

## 2022-04-05 PROCEDURE — 999N000197 HC STATISTIC WOC PT EDUCATION, 0-15 MIN

## 2022-04-05 PROCEDURE — 250N000013 HC RX MED GY IP 250 OP 250 PS 637

## 2022-04-05 PROCEDURE — 250N000013 HC RX MED GY IP 250 OP 250 PS 637: Performed by: COLON & RECTAL SURGERY

## 2022-04-05 RX ORDER — POLYETHYLENE GLYCOL 3350 17 G/17G
17 POWDER, FOR SOLUTION ORAL DAILY
Qty: 510 G | Refills: 0 | Status: ON HOLD | OUTPATIENT
Start: 2022-04-05 | End: 2023-02-13

## 2022-04-05 RX ORDER — IBUPROFEN 400 MG/1
400 TABLET, FILM COATED ORAL 3 TIMES DAILY
Qty: 30 TABLET | Refills: 0 | Status: SHIPPED | OUTPATIENT
Start: 2022-04-05 | End: 2022-04-15

## 2022-04-05 RX ORDER — METHOCARBAMOL 500 MG/1
500 TABLET, FILM COATED ORAL 3 TIMES DAILY PRN
Qty: 15 TABLET | Refills: 0 | Status: SHIPPED | OUTPATIENT
Start: 2022-04-05 | End: 2022-06-20

## 2022-04-05 RX ORDER — ACETAMINOPHEN 500 MG
1000 TABLET ORAL 4 TIMES DAILY
Qty: 100 TABLET | Refills: 0 | Status: SHIPPED | OUTPATIENT
Start: 2022-04-05 | End: 2023-01-27

## 2022-04-05 RX ORDER — LIDOCAINE 4 G/G
1 PATCH TOPICAL EVERY 24 HOURS
Qty: 5 PATCH | Refills: 0 | Status: SHIPPED | OUTPATIENT
Start: 2022-04-05 | End: 2022-10-24

## 2022-04-05 RX ORDER — OXYCODONE HYDROCHLORIDE 5 MG/1
5 TABLET ORAL EVERY 8 HOURS PRN
Qty: 15 TABLET | Refills: 0 | Status: SHIPPED | OUTPATIENT
Start: 2022-04-05 | End: 2022-04-11

## 2022-04-05 RX ADMIN — IBUPROFEN 400 MG: 200 TABLET, FILM COATED ORAL at 07:56

## 2022-04-05 RX ADMIN — ACETAMINOPHEN 1000 MG: 500 TABLET ORAL at 07:56

## 2022-04-05 RX ADMIN — OXYCODONE HYDROCHLORIDE 5 MG: 5 TABLET ORAL at 10:12

## 2022-04-05 RX ADMIN — POLYETHYLENE GLYCOL 3350 17 G: 17 POWDER, FOR SOLUTION ORAL at 07:55

## 2022-04-05 ASSESSMENT — ACTIVITIES OF DAILY LIVING (ADL)
ADLS_ACUITY_SCORE: 7

## 2022-04-05 NOTE — PROGRESS NOTES
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  Pt was transferred to [facility] and underwent emergent exploratory laparotomy, small bowel resection, loop descending colostomy creation, flexible sigmoidoscopy overnight on 3/26-3/27.  Final pathology showed sigmoid mass that was negative for malignancy although likely sampling is non-representative and a peritoneal biopsy that is positive for malignancy - intestinal type adenocarcinoma.        Records Location (Care Everywhere, Media, etc.): [facility]     Records Needed: per protocol     Additional testing needed prior to consult: PET- 4/7/22 @ [facility]    I called to discuss referral with Kalin.  He was just released from the hospital today. I helped coordinate the scheduling of the PET scan on 4/7/22.  Plan for him to follow up with Dr. Colorado in consult on 4/12/22, in [place].  I gave him the clinic contact number as well as my phone number.  No other questions at this time.  I will forward on to NPS to finalize.

## 2022-04-05 NOTE — PLAN OF CARE
"Pt Aox4, VSS, RA. Pt reported cramping pain to abdomen this AM, resolved w/ PRN Oxycodone. Pt stays would prefer to manage pain w/o use of opioids and opted for PRN Robaxin in the afternoon. Midline incision CDI. Ostomy dressing intact w/ medium firm formed BM, merilax given to loose stool a little. Pt tolerating meals, and liquids PO. Pt emptied ostomy poach w/ supervision. /66 (BP Location: Right arm)   Pulse 107   Temp 97.4  F (36.3  C) (Temporal)   Resp 18   Ht 1.778 m (5' 10\")   Wt 56.9 kg (125 lb 8 oz)   SpO2 95%   BMI 18.01 kg/m      "

## 2022-04-05 NOTE — PLAN OF CARE
"Pt Aox4, VSS, RA. Pain is managed w/ Tylenol and Ibuprofen. Pt tolerating diet. Pt confident w/ taking care of Ostomy and has C w/ [facility] setup on discharge, orders faxed to [facility] agency.  PICC line to be removed prior to discharge. Voiding w/o difficulty. Midline incision CDI, approximated. Old MORIS site CDI, no drainage or s/s of infection. Pt educated on use of Lovenox injections and s/s to look out for. Discharge instructions explained w/ teach back to the patient. Discharge medications sent to pt's preferred pharmacy. Wife to pickup. /60 (BP Location: Right arm)   Pulse 57   Temp 97.5  F (36.4  C) (Temporal)   Resp 18   Ht 1.778 m (5' 10\")   Wt 58.1 kg (128 lb 1.6 oz)   SpO2 98%   BMI 18.38 kg/m        "

## 2022-04-05 NOTE — PROGRESS NOTES
Calorie Count  Intake recorded for: 4/4  Total Kcals: 0 Total Protein: 0g  Kcals from Hospital Food: 0   Protein: 0g  Kcals from Outside Food (average):0 Protein: 0g  # Meals Ordered from Kitchen: 3 meals   # Meals Recorded: no intake recorded.   # Supplements Recorded: no intake recorded.

## 2022-04-05 NOTE — PROGRESS NOTES
"WO Nurse Inpatient [facility]   WO Nurse Inpatient Adult     Re Assessment   Assessment of new loop Colostomy Stoma complication(s) none   Mucocutaneous junction; intact   Peristomal complication(s) none   Pouch wear time:48+ hous  Following today's visit:Patient  is  able to demonstrate;       1. How to empty their pouch? yes      2. How to change their pouch?  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 S/p exploratory laparotomy, small bowel resection, colostomy creation, flex sigmoidoscopy overnight on 3/26-3/27.  Recent 40lb weight loss and likely ileus.     Current Diet/Nutrition: Orders Placed This Encounter      Low Fiber Diet      Diet     TPN -yes  I/O last 3 completed shifts:  In: 2507 [P.O.:1384; I.V.:20]  Out: 1840 [Urine:1840]  Labs:    Recent Labs   Lab 04/04/22  0628 03/30/22  0648   ALBUMIN 2.3* 2.3*   HGB  --  11.6*   INR 1.01  --    WBC  --  11.5*        Physical Exam:  Current pouching system:Antoinette  1 piece flat fecal pouch with 2\" Adapt barrier ring  Reason for pouch change today patient discharged already and ready to go home.  Recommended he empty his pouch before he leave and change it when he gets home.   Stoma appearance: healthy, pink and protruberant  Stoma size; 1 3/8\" oval shape  Peristomal skin: intact  Stoma output : narrow soft linear stool in pouch   Abdominal  Assessment  firm , NG still in place? No  Surgical Site: open to air  Pain: Dull ache when he moves   Is patient still on a PCA No    Interventions:  Patient's chart evaluated.  Focus of today's visit: showering, travel and to check with his insurance company to make sure he knows who his in network insurance after home care has discharged him.  Preparation for discharge previously " "completed:  Prescriptions or note left on chart for MD to sign/complete, patient did not want to be registered for samples,  Discussed how/ where to order supplies, Supplies ordered, Completed supply list, Discussed making a WOC Nurse outpatient appointment upon discharge, and Discussed when to follow up with a WOC Nurse in the future  Participant of teaching session today patient   Orders: Reviewed  Change made with ostomy management today: No-  Antoinette flat fecal pouch- 1 piece with 2\" adapt barrier ring  Patient/family: demonstrated understanding of all teaching and is confident in his management of his ostomy  Supplies:at bedside- 2 week supply at bedside    Plan:  Learning needs: refused an offer to change his pouch before discharge.   Preparation for discharge:  complete    Recommend home care? no and patient reports that he does not think he will need home care, and feels comfortable so far with ostomy education    Discussed plan of care with Patient  Nursing to notify the Provider(s) and re-consult the WOC Nurse if new ostomy concerns or discharge planned before next planned WOC visit.    WOC Nurse will return: Tuesday/ Friday  Face to face time: 20 minutes       [facility]     Name: Kalin Shepard  Date: 3/29/2022    To order your ostomy supplies    The ostomy Supplier needs this supply list  to process your order. You will need to fax/deliver this list, along with your Insurance information. Your home care nurse can assist with this process.    List of Ostomy Distributors      [facility]  Ph. [phone] Fax # [phone]  [facility]   Ph. [phone] [phone]  [employer] [facility]   Ph. [phone]  [facility]   Ph. [phone]  Or Call your insurance provider for their preferred supplier    Your Medical Supplier will need your surgeon's name, phone and fax number    Clinic:                     Phone                "             Fax  Colorectal Surgery:    [phone]    Verbal Order for ostomy supplies for 1 Month per:                                          Amanda Baum, RN, CWOCN                                                   Authorizing MD: Dr. Riley Magdaleno    Quantity of pouches: 20/m    Request the following supplies:      Antoinette    1 piece flat fecal with filter #3932      Accessories  2  barrier ring #1055 (1 per pouch)    Adapt powder #7906    No sting film barrier # 3345                          Adapt odor eliminator and lubricant 236ml bottle # 69120     M-9 Spray room deodorizer #7769                              Change your pouch two to three times a week, more often if leaking.    If you are cutting a hole in the wafer of your pouch, recheck stoma size and adjust pouch opening as needed every week    . Call the Ostomy Nurse at [facility]   Schedule a follow-up visit in 2 to 4 weeks after your surgery, sooner if having problems Bring a complete set of pouch-changing supplies to this visit      [address] [facility] [place] : [phone]   Or    [facility] department  [facility] [place] [address]   number- [phone]      Problems you should Report  - The stoma turns blue or darker in color.  - Cuts or sores around the stoma.  - Red, raw or painful skin around the stoma.  - Any bulging of the skin around the stoma.  - A pouch that leaks every day.  - Problems making the right size hole in the pouch wafer.    Please call with any questions or concerns.

## 2022-04-05 NOTE — PROGRESS NOTES
"Lt upper arm PICC line was removed w/o complications, length of catheter was 49cm which matches the documented length. Pressure dressing was applied to the site. Pt laid flat for 30 mins after. /60 (BP Location: Right arm)   Pulse 57   Temp 97.5  F (36.4  C) (Temporal)   Resp 18   Ht 1.778 m (5' 10\")   Wt 58.1 kg (128 lb 1.6 oz)   SpO2 98%   BMI 18.38 kg/m      "

## 2022-04-05 NOTE — PLAN OF CARE
"/63 (BP Location: Right arm)   Pulse 60   Temp 98.9  F (37.2  C) (Temporal)   Resp 18   Ht 1.778 m (5' 10\")   Wt 56.9 kg (125 lb 8 oz)   SpO2 97%   BMI 18.01 kg/m       Alert and oriented x4. VSS on RA. Pain was managed with scheduled pain meds and prn oxycodone x1. TPN discontinued. Lipids infusing via L PICC line. Patient refused nicotine patch. Colostomy with medium BM. Patient would empty it later when he get up. Midline incision IRENE, CDI. Voiding spontaneously using urinal. Patient rested well overnight. Continue with plan of care.  "

## 2022-04-06 ENCOUNTER — TELEPHONE (OUTPATIENT)
Dept: FAMILY MEDICINE | Facility: CLINIC | Age: 72
End: 2022-04-06
Payer: COMMERCIAL

## 2022-04-06 ENCOUNTER — PATIENT OUTREACH (OUTPATIENT)
Dept: CARE COORDINATION | Facility: CLINIC | Age: 72
End: 2022-04-06
Payer: COMMERCIAL

## 2022-04-06 DIAGNOSIS — Z71.89 OTHER SPECIFIED COUNSELING: ICD-10-CM

## 2022-04-06 LAB
LAB DIRECTOR COMMENTS: NORMAL
LAB DIRECTOR DISCLAIMER: NORMAL
LAB DIRECTOR INTERPRETATION: NORMAL
LAB DIRECTOR METHODOLOGY: NORMAL
LAB DIRECTOR RESULTS: NORMAL
SPECIMEN DESCRIPTION: NORMAL

## 2022-04-06 PROCEDURE — 81301 MICROSATELLITE INSTABILITY: CPT | Performed by: SURGERY

## 2022-04-06 PROCEDURE — G0452 MOLECULAR PATHOLOGY INTERPR: HCPCS | Mod: 26 | Performed by: PATHOLOGY

## 2022-04-06 NOTE — TELEPHONE ENCOUNTER
Reason for Call:  Home Health Care    Sujata with [facility] called regarding (reason for call): Verbal orders    Orders are needed for this patient. Skilled nursing          Skilled Nursing: Pt will be establishing care with Dr Bentley on Monday 4/11/22. He has na new colon cancer diagnosis and new ostomy  2x a week for 2 weeks  1x a week for 2 weeks  1 every other week for 4     Pt Provider: Mc    Phone Number Homecare Nurse can be reached at:  [phone]    Can we leave a detailed message on this number? YES    Call taken on 4/6/2022 at 1:48 PM by Sherita Bernal

## 2022-04-06 NOTE — PROGRESS NOTES
Clinic Care Coordination Contact    Background: Care Coordination referral placed from [facility] discharge report for reason of patient meeting criteria for a TCM outreach call by [facility] team.    Assessment: Upon chart review, [facility] Team member will cancel/close the referral for TCM outreach due to reason below:    Patient had a follow up call with a RN from the [facility] yesterday for hospital discharge    Plan: Care Coordination referral for TCM outreach canceled.    Suyapa Ramsay MA  [facility] [place], [facility]

## 2022-04-07 ENCOUNTER — HOSPITAL ENCOUNTER (OUTPATIENT)
Dept: PET IMAGING | Facility: HOSPITAL | Age: 72
Discharge: HOME | End: 2022-04-07
Attending: PHYSICIAN ASSISTANT | Admitting: PHYSICIAN ASSISTANT
Payer: COMMERCIAL

## 2022-04-07 ENCOUNTER — PATIENT OUTREACH (OUTPATIENT)
Dept: SURGERY | Facility: CLINIC | Age: 72
End: 2022-04-07

## 2022-04-07 DIAGNOSIS — C18.7 ADENOCARCINOMA OF SIGMOID COLON (H): ICD-10-CM

## 2022-04-07 LAB — GLUCOSE BLDC GLUCOMTR-MCNC: 101 MG/DL (ref 70–99)

## 2022-04-07 PROCEDURE — 78816 PET IMAGE W/CT FULL BODY: CPT | Mod: 26 | Performed by: STUDENT IN AN ORGANIZED HEALTH CARE EDUCATION/TRAINING PROGRAM

## 2022-04-07 PROCEDURE — 74177 CT ABD & PELVIS W/CONTRAST: CPT | Mod: 26 | Performed by: STUDENT IN AN ORGANIZED HEALTH CARE EDUCATION/TRAINING PROGRAM

## 2022-04-07 PROCEDURE — 78816 PET IMAGE W/CT FULL BODY: CPT | Mod: PI

## 2022-04-07 PROCEDURE — 343N000001 HC RX 343: Performed by: PHYSICIAN ASSISTANT

## 2022-04-07 PROCEDURE — A9552 F18 FDG: HCPCS | Performed by: PHYSICIAN ASSISTANT

## 2022-04-07 PROCEDURE — 71260 CT THORAX DX C+: CPT | Mod: 26 | Performed by: STUDENT IN AN ORGANIZED HEALTH CARE EDUCATION/TRAINING PROGRAM

## 2022-04-07 PROCEDURE — 250N000011 HC RX IP 250 OP 636: Performed by: PHYSICIAN ASSISTANT

## 2022-04-07 PROCEDURE — 82962 GLUCOSE BLOOD TEST: CPT | Mod: GZ

## 2022-04-07 PROCEDURE — 74177 CT ABD & PELVIS W/CONTRAST: CPT

## 2022-04-07 RX ORDER — IOPAMIDOL 755 MG/ML
63 INJECTION, SOLUTION INTRAVASCULAR ONCE
Status: COMPLETED | OUTPATIENT
Start: 2022-04-07 | End: 2022-04-07

## 2022-04-07 RX ADMIN — FLUDEOXYGLUCOSE F-18 10.2 MCI.: 500 INJECTION, SOLUTION INTRAVENOUS at 13:17

## 2022-04-07 RX ADMIN — IOPAMIDOL 63 ML: 755 INJECTION, SOLUTION INTRAVENOUS at 14:34

## 2022-04-07 NOTE — PROGRESS NOTES
Post Op Note     Called to check on patient postoperatively after hospital discharge.       Patient is s/p ex lap with colostomy creation with Dr. Riley Magdaleno for malignant LBO causing SBO.   Admitted 3/26 and discharged on 4/5/22.      Pain is well controlled with tylenol, oxy once a day.    Patient is eating and drinking normally. Patient is on a low residue diet.  Encouraged patient to drink 8-10 glasses of water a day.     Patient's colostomy has had soft stool output.      Taking Miralax daily    Patient denies pouching difficulties    If any pouching difficulties reported, please contact [facility] nurse  to schedule appointment.     Patient does not require supplies.     Patient is voiding normally and urine is light in color.    Patient is set up with home care. Patient reports being contacted by the home care service. Patient has been seen by someone from home care.     Patient Denies nausea and vomiting.    Patient Denies any fevers or chills.    Patient's incision is stapled. Patient reminded NOT to remove any dressings over their incisions.     Patient is on a activity restriction. Lifting 10 pounds for 6 weeks.     Patient Denies needing any forms completed.     Follow up is set up with Dr. Riley Magdaleno on 4/27.  Encouraged the patient to contact the clinic in the meantime with any fevers, chills, nausea, vomiting, increased colostomy output, no colostomy output, dizziness, lightheadedness, uncontrolled pain, changes to the incisions, or with any questions or concerns.  -Special Concerns:     1.  Lovenox ppx x30 days    3.  Staples in place   4.  2-3 nutritional supplement  5.  Outpatient PET and follow up with Onc at [facility] per [facility]     Patient's questions were answered to their stated satisfaction and they are in agreement with this plan.    Dory, [phone]  Colon & Rectal Surgery Clinic  [facility]     In setting of severe malnutrition s/p post-op TPN but TPN  now discontinued.

## 2022-04-07 NOTE — PROGRESS NOTES
Action    Action Taken 4/7/2022 7:28AM LUISA      RECORDS STATUS - ALL OTHER DIAGNOSIS      RECORDS RECEIVED FROM: [facility]   DATE RECEIVED: 4/12/2022   NOTES STATUS DETAILS   OFFICE NOTE from referring provider Complete Epic   Idania Munson PA-C   OFFICE NOTE from medical oncologist Complete See Hospital Note below    DISCHARGE SUMMARY from hospital Complete [facility] 3/26/2022 S/P Colostomy, Colonic Mass    DISCHARGE REPORT from the ER Complete See note above    OPERATIVE REPORT Complete See Biopsy Report Below    MEDICATION LIST Complete [facility]   CLINICAL TRIAL TREATMENTS TO DATE     LABS     PATHOLOGY REPORTS Complete 3/26/2022   A. SMALL BOWEL, RESECTION:  Serosal adhesions and acute serositis consistent with peritonitis:   -Small bowel wall with congestion & edema; no dysplasia or malignancy    B. PERITONEUM, BIOPSY:   Positive for malignancy: intestinal-type adenocarcinoma:   -Associated acute inflammatory exudate in keeping with peritonitis   -Report of MMR (MLH1, PMS2, MSH2 & MSH6) immunohistochemistry to follow   (See Comment)    C. SIGMOID COLON MASS, BIOPSY:  Colonic mucosa with mild nonspecific inflammation but negative for malignancy  (See Comment)      ANYTHING RELATED TO DIAGNOSIS Complete Labs last updated on 4/6/2022   GENONOMIC TESTING     TYPE:     IMAGING (NEED IMAGES & REPORT)     CT SCANS Complete CT Abdomen Pelvis 3/26/2022   MRI     Xray Chest Complete 3/28/2022   MAMMO     Xray abdomen  Complete 3/26/2022   ULTRASOUND     PET Scheduled

## 2022-04-11 ENCOUNTER — OFFICE VISIT (OUTPATIENT)
Dept: FAMILY MEDICINE | Facility: CLINIC | Age: 72
End: 2022-04-11
Payer: COMMERCIAL

## 2022-04-11 VITALS
SYSTOLIC BLOOD PRESSURE: 112 MMHG | HEART RATE: 70 BPM | HEIGHT: 70 IN | DIASTOLIC BLOOD PRESSURE: 70 MMHG | BODY MASS INDEX: 18.32 KG/M2 | TEMPERATURE: 98.7 F | RESPIRATION RATE: 18 BRPM | OXYGEN SATURATION: 100 % | WEIGHT: 128 LBS

## 2022-04-11 DIAGNOSIS — Z93.3 COLOSTOMY PRESENT (H): ICD-10-CM

## 2022-04-11 DIAGNOSIS — G89.18 ACUTE POST-OPERATIVE PAIN: ICD-10-CM

## 2022-04-11 DIAGNOSIS — R64 CACHEXIA (H): ICD-10-CM

## 2022-04-11 DIAGNOSIS — C18.9 COLON ADENOCARCINOMA (H): ICD-10-CM

## 2022-04-11 PROBLEM — K63.89 COLONIC MASS: Status: RESOLVED | Noted: 2022-03-27 | Resolved: 2022-04-11

## 2022-04-11 PROBLEM — K56.609 LARGE BOWEL OBSTRUCTION (H): Status: RESOLVED | Noted: 2022-03-27 | Resolved: 2022-04-11

## 2022-04-11 PROCEDURE — 99495 TRANSJ CARE MGMT MOD F2F 14D: CPT | Performed by: FAMILY MEDICINE

## 2022-04-11 RX ORDER — OXYCODONE HYDROCHLORIDE 5 MG/1
5 TABLET ORAL EVERY 8 HOURS PRN
Qty: 60 TABLET | Refills: 0 | Status: SHIPPED | OUTPATIENT
Start: 2022-04-11 | End: 2022-05-10

## 2022-04-11 ASSESSMENT — PAIN SCALES - GENERAL: PAINLEVEL: NO PAIN (0)

## 2022-04-11 NOTE — PROGRESS NOTES
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     Has lost a lot of weight leading up to this.  Able to eat now.      Meeting with oncology soon.     Post op pain, abd pain, persistent.  Bid on oxycodone helps.      Hospital Follow-up Visit:    Hospital/Nursing Home/IP Rehab Facility: [facility]  Date of Admission: 03/26/2022  Date of Discharge: 4/5/2022   Reason(s) for Admission: Colon and Rectal Surgery       Was your hospitalization related to COVID-19? No   Problems taking medications regularly:  None  Medication changes since discharge:            START taking these medications     Details   acetaminophen (TYLENOL) 500 MG tablet Take 2 tablets (1,000 mg) by mouth 4 times daily  Qty: 100 tablet, Refills: 0     Associated Diagnoses: S/P colostomy (H)       enoxaparin ANTICOAGULANT (LOVENOX) 40 MG/0.4ML syringe Inject 0.4 mLs (40 mg) Subcutaneous every 24 hours for 20 days  Qty: 8 mL, Refills: 0     Associated Diagnoses: S/P colostomy (H); Adenocarcinoma of sigmoid colon (H)       ibuprofen (ADVIL/MOTRIN) 400 MG tablet Take 1 tablet (400 mg) by mouth 3 times daily for 10 days  Qty: 30 tablet, Refills: 0     Associated Diagnoses: S/P colostomy (H)       Lidocaine (LIDOCARE) 4 % Patch Place 1 patch onto the skin every 24 hours To prevent lidocaine toxicity, patient should be patch free for 12 hrs daily.  Qty: 5 patch, Refills: 0     Associated Diagnoses: Acute post-operative pain       methocarbamol (ROBAXIN) 500 MG tablet Take 1 tablet (500 mg) by mouth 3 times daily as needed for muscle spasms  Qty: 15 tablet, Refills: 0     Associated Diagnoses: Acute post-operative pain       oxyCODONE (ROXICODONE) 5 MG tablet Take 1 tablet (5 mg) by mouth every 8 hours as  "needed for moderate to severe pain  Qty: 15 tablet, Refills: 0     Associated Diagnoses: Acute post-operative pain       polyethylene glycol (MIRALAX) 17 GM/Dose powder Take 17 g by mouth daily  Qty: 510 g, Refills: 0     Associated Diagnoses: S/P colostomy (H)                Problems adhering to non-medication therapy:  None    Summary of hospitalization:  [facility] discharge summary reviewed  Diagnostic Tests/Treatments reviewed.  Follow up needed: oncology  Other Healthcare Providers Involved in Patient s Care:         oncology and surgery  Update since discharge: stable.       Post Discharge Medication Reconciliation: discharge medications reconciled and changed, per note/orders.  Plan of care communicated with patient and family                      Objective    /70   Pulse 70   Temp 98.7  F (37.1  C) (Tympanic)   Resp 18   Ht 1.778 m (5' 10\")   Wt 58.1 kg (128 lb)   SpO2 100%   BMI 18.37 kg/m    Body mass index is 18.37 kg/m .   GEN: Alert and oriented, in no acute distress  CV: RRR, no murmur  RESP: lungs clear bilaterally, good effort  EXT: no edema or lesions noted in lower extremities  Incision on abd looks good staples in still, as does colostomy.    Very frail, slender.  BMI under 20.     A; colon cancer       Colostomy         abd pain        Cachexia    P: work on nutrition.  60 tabs of bid oxycodone, unclear on long term needs right now .    F/u scheduled with oncology.   Long road ahead.           "

## 2022-04-11 NOTE — H&P (VIEW-ONLY)
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     Has lost a lot of weight leading up to this.  Able to eat now.      Meeting with oncology soon.     Post op pain, abd pain, persistent.  Bid on oxycodone helps.      Hospital Follow-up Visit:    Hospital/Nursing Home/IP Rehab Facility: [facility]  Date of Admission: 03/26/2022  Date of Discharge: 4/5/2022   Reason(s) for Admission: Colon and Rectal Surgery       Was your hospitalization related to COVID-19? No   Problems taking medications regularly:  None  Medication changes since discharge:            START taking these medications     Details   acetaminophen (TYLENOL) 500 MG tablet Take 2 tablets (1,000 mg) by mouth 4 times daily  Qty: 100 tablet, Refills: 0     Associated Diagnoses: S/P colostomy (H)       enoxaparin ANTICOAGULANT (LOVENOX) 40 MG/0.4ML syringe Inject 0.4 mLs (40 mg) Subcutaneous every 24 hours for 20 days  Qty: 8 mL, Refills: 0     Associated Diagnoses: S/P colostomy (H); Adenocarcinoma of sigmoid colon (H)       ibuprofen (ADVIL/MOTRIN) 400 MG tablet Take 1 tablet (400 mg) by mouth 3 times daily for 10 days  Qty: 30 tablet, Refills: 0     Associated Diagnoses: S/P colostomy (H)       Lidocaine (LIDOCARE) 4 % Patch Place 1 patch onto the skin every 24 hours To prevent lidocaine toxicity, patient should be patch free for 12 hrs daily.  Qty: 5 patch, Refills: 0     Associated Diagnoses: Acute post-operative pain       methocarbamol (ROBAXIN) 500 MG tablet Take 1 tablet (500 mg) by mouth 3 times daily as needed for muscle spasms  Qty: 15 tablet, Refills: 0     Associated Diagnoses: Acute post-operative pain       oxyCODONE (ROXICODONE) 5 MG tablet Take 1 tablet (5 mg) by mouth every 8 hours as  "needed for moderate to severe pain  Qty: 15 tablet, Refills: 0     Associated Diagnoses: Acute post-operative pain       polyethylene glycol (MIRALAX) 17 GM/Dose powder Take 17 g by mouth daily  Qty: 510 g, Refills: 0     Associated Diagnoses: S/P colostomy (H)                Problems adhering to non-medication therapy:  None    Summary of hospitalization:  [facility] discharge summary reviewed  Diagnostic Tests/Treatments reviewed.  Follow up needed: oncology  Other Healthcare Providers Involved in Patient s Care:         oncology and surgery  Update since discharge: stable.       Post Discharge Medication Reconciliation: discharge medications reconciled and changed, per note/orders.  Plan of care communicated with patient and family                      Objective    /70   Pulse 70   Temp 98.7  F (37.1  C) (Tympanic)   Resp 18   Ht 1.778 m (5' 10\")   Wt 58.1 kg (128 lb)   SpO2 100%   BMI 18.37 kg/m    Body mass index is 18.37 kg/m .   GEN: Alert and oriented, in no acute distress  CV: RRR, no murmur  RESP: lungs clear bilaterally, good effort  EXT: no edema or lesions noted in lower extremities  Incision on abd looks good staples in still, as does colostomy.    Very frail, slender.  BMI under 20.     A; colon cancer       Colostomy         abd pain        Cachexia    P: work on nutrition.  60 tabs of bid oxycodone, unclear on long term needs right now .    F/u scheduled with oncology.   Long road ahead.           "

## 2022-04-12 ENCOUNTER — PATIENT OUTREACH (OUTPATIENT)
Dept: ONCOLOGY | Facility: CLINIC | Age: 72
End: 2022-04-12
Payer: COMMERCIAL

## 2022-04-12 ENCOUNTER — ONCOLOGY VISIT (OUTPATIENT)
Dept: ONCOLOGY | Facility: CLINIC | Age: 72
End: 2022-04-12
Attending: PHYSICIAN ASSISTANT
Payer: COMMERCIAL

## 2022-04-12 ENCOUNTER — PRE VISIT (OUTPATIENT)
Dept: ONCOLOGY | Facility: CLINIC | Age: 72
End: 2022-04-12

## 2022-04-12 VITALS
DIASTOLIC BLOOD PRESSURE: 68 MMHG | SYSTOLIC BLOOD PRESSURE: 123 MMHG | HEART RATE: 64 BPM | RESPIRATION RATE: 12 BRPM | TEMPERATURE: 97.8 F | BODY MASS INDEX: 18.9 KG/M2 | OXYGEN SATURATION: 96 % | HEIGHT: 70 IN | WEIGHT: 132 LBS

## 2022-04-12 DIAGNOSIS — C18.7 ADENOCARCINOMA OF SIGMOID COLON (H): ICD-10-CM

## 2022-04-12 PROCEDURE — 99417 PROLNG OP E/M EACH 15 MIN: CPT | Performed by: INTERNAL MEDICINE

## 2022-04-12 PROCEDURE — 99215 OFFICE O/P EST HI 40 MIN: CPT | Performed by: INTERNAL MEDICINE

## 2022-04-12 PROCEDURE — G0463 HOSPITAL OUTPT CLINIC VISIT: HCPCS

## 2022-04-12 ASSESSMENT — PAIN SCALES - GENERAL: PAINLEVEL: SEVERE PAIN (7)

## 2022-04-12 NOTE — PATIENT INSTRUCTIONS
Please schedule an MRI of the liver and chest port placement. RTC in 2 weeks with labs prior, cycle 1 FOLFOX with tentative avastin (this may change based on mutation testing) to follow the same day.

## 2022-04-12 NOTE — PROGRESS NOTES
Stopped in room with patient and wife to introduce self and give Chemotherapy information packet with my personal card and direct line.     Plan for MRI, NGS testing and port placement.    Will follow up next week to go thru the education and complete assessments.     Danna Collins RN on 4/12/2022 at 12:13 PM

## 2022-04-12 NOTE — PROGRESS NOTES
"Oncology Rooming Note    April 12, 2022 11:09 AM   Kalin Shepard is a 71 year old male who presents for:    Chief Complaint   Patient presents with     Oncology Clinic Visit     New - Sigmoid adenocarcinoma      Initial Vitals: /68 (BP Location: Right arm, Patient Position: Sitting, Cuff Size: Adult Regular)   Pulse 64   Temp 97.8  F (36.6  C) (Tympanic)   Resp 12   Ht 1.79 m (5' 10.47\")   Wt 59.9 kg (132 lb)   SpO2 96%   BMI 18.69 kg/m   Estimated body mass index is 18.69 kg/m  as calculated from the following:    Height as of this encounter: 1.79 m (5' 10.47\").    Weight as of this encounter: 59.9 kg (132 lb). Body surface area is 1.73 meters squared.  Severe Pain (7) Comment: Data Unavailable   No LMP for male patient.  Allergies reviewed: Yes  Medications reviewed: Yes    Medications: Medication refills not needed today.  Pharmacy name entered into [facility]:    [id number] - [place], [place] - [address] [facility] [address]  [place], [place] - [address] [place] [address]    Clinical concerns:  None      Sara Almanzar CMA            "

## 2022-04-12 NOTE — LETTER
"    4/12/2022         RE: Kalin Shepard  [address] [facility] [address]        Dear Colleague,    Thank you for referring your patient, Kalin Shepard, to the [facility] [place]. Please see a copy of my visit note below.    Oncology Rooming Note    April 12, 2022 11:09 AM   Kalin Shepard is a 71 year old male who presents for:    Chief Complaint   Patient presents with     Oncology Clinic Visit     New - Sigmoid adenocarcinoma      Initial Vitals: /68 (BP Location: Right arm, Patient Position: Sitting, Cuff Size: Adult Regular)   Pulse 64   Temp 97.8  F (36.6  C) (Tympanic)   Resp 12   Ht 1.79 m (5' 10.47\")   Wt 59.9 kg (132 lb)   SpO2 96%   BMI 18.69 kg/m   Estimated body mass index is 18.69 kg/m  as calculated from the following:    Height as of this encounter: 1.79 m (5' 10.47\").    Weight as of this encounter: 59.9 kg (132 lb). Body surface area is 1.73 meters squared.  Severe Pain (7) Comment: Data Unavailable   No LMP for male patient.  Allergies reviewed: Yes  Medications reviewed: Yes    Medications: Medication refills not needed today.  Pharmacy name entered into [employer]:    [id number] - [place], [place] - [address]  [facility] [place], [place] - [address] [place] [address]    Clinical concerns:  None      Sara Almanzar, [facility] Hematology and Oncology Consult Note    Patient: Kalin Shepard  MRN: [id number]  Date of Service: 04/12/2022      Reason for Visit    Chief Complaint   Patient presents with     Oncology Clinic Visit     New - Sigmoid adenocarcinoma          Assessment/Plan    Problem List Items Addressed This Visit        Digestive    Adenocarcinoma of sigmoid colon (H)    Relevant Orders    MR Liver wo & w Contrast    Adult General Surg Referral    Colorectal Expanded NGS Panel        Metastatic sigmoid colon cancer, 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  Presented with obstructed sigmoid colon mass with possible perforation present laparotomy with small bowel resection and ostomy placement.  Peritoneal biopsy positive for adenocarcinoma.  There is also evidence of peritonitis which signifies possible perforation.  Sigmoid colon mass biopsy however was negative for malignancy but this could have been an sampling error.  MMR proteins intact.  This makes MSI high status unlikely.  He has not had testing for KRAS, NRAS or BRAF mutations yet.  I will add on.  PET scan showing some indeterminate liver and lung lesions.  This will be important in the future.  So I recommended getting an MRI of the liver to further evaluate liver lesions before we start treatment.  He has known synchronous peritoneal mets.  Primary surgery is not indicated.  I recommended starting treatment with chemotherapy and see how he responds to it.  Considering that he has intact mismatch proteins, FOLFOX with a VEGF on EGFR inhibitor is preferable.  MSI testing is still pending, so in case if he were to have MSI high status then we can always switch to immunotherapy.    If testing for K-aldo NRAS and BRAF takes time that I will start with FOLFOX with bevacizumab and subsequently substitute bevacizumab with status if he is K-aldo BRAF and NRAS wild-type.  I do not think he is a candidate for triple therapy with FOLFOXIRI or FOLFIRINOX considering his frail health status at this point in time.    Potentially there could be a oncologic surgery in the future but all depends upon how he responds to it.    I reviewed the potential side effects and complications associated with FOLFOX with bevacizumab today.  This includes risk of infection, perforation, bleeding, clotting, peripheral neuropathy, coronary vasospasm etc.  He understands and is agreeable to proceed with the treatment.  I encouraged him to improve oral intake with  protein supplements like boost or Ensure.  During plenty of water.    Patient Instructions   Please schedule an MRI of the liver and chest port placement. RTC in 2 weeks with labs prior, cycle 1 FOLFOX with tentative avastin (this may change based on mutation testing) to follow the same day.               ECOG Performance    1 - Can't do physically strenuous work, but fully ambyulatory and can do light sedentary work    Problem List    Patient Active Problem List   Diagnosis     Colon adenocarcinoma (H)     Colostomy present (H)     Cachexia (H)     Adenocarcinoma of sigmoid colon (H)     ______________________________________________________________________________    Staging History    Cancer Staging  No matching staging information was found for the patient.      History of presenting illness:  Kalin Shepard is a 39-year-old male, a psychiatrist by occupation previously, with recent diagnosis of metastatic sigmoid colon cancer here to discuss further management of the same.    He had significant weight loss of about 30 to 40 pounds over the last year.  He was seen in the emergency room with abdominal pain.  CT scan revealed an obstructing sigmoid colon mass along with evidence of small bowel obstruction.  He was transferred to [facility] underwent emergent exploratory laparotomy with small bowel resection and descending loop colostomy.  He also underwent flexible sigmoidoscopy on the same day which again showed an obstructing sigmoid colon mass.  Biopsy of the sigmoid colon mass was negative for malignancy but it was thought to be secondary to linger.  However peritoneal biopsy was positive for intestinal type adenocarcinoma along with evidence of peritonitis.  CEA was 2.1.  MMR proteins were intact by IHC.  He was started on TPN for significant weight loss.  A PET scan was ordered as an outpatient which showed hypermetabolic sigmoid colon mass and multiple indeterminant liver and lung lesions concerning for  metastatic disease.    He has a history of long-term tobacco use.  Denies any fever chills or night sweats.  Denies any blood in the ostomy bag.  He has some skin irritation but overall feeling okay.  Has some mild tenderness around the surgical scar.  No signs of infection.    Does have a family history of colon cancer in his father.    Path report:  Final Diagnosis   A. SMALL BOWEL, RESECTION:  Serosal adhesions and acute serositis consistent with peritonitis:   -Small bowel wall with congestion & edema; no dysplasia or malignancy    B. PERITONEUM, BIOPSY:   Positive for malignancy: intestinal-type adenocarcinoma:   -Associated acute inflammatory exudate in keeping with peritonitis   -Report of MMR (MLH1, PMS2, MSH2 & MSH6) immunohistochemistry to follow   (See Comment)    C. SIGMOID COLON MASS, BIOPSY:  Colonic mucosa with mild nonspecific inflammation but negative for malignancy  (See Comment)     Normal mismatch repair protein expression    Past History    Past Medical History:   Diagnosis Date     Cancer (H)     No family history on file.   Past Surgical History:   Procedure Laterality Date     INSERT PORT VASCULAR ACCESS Right 4/21/2022    Procedure: INSERTION, VASCULAR ACCESS PORT;  Surgeon: Marianne Schroeder MD;  Location: [facility]     LAPAROTOMY EXPLORATORY N/A 3/26/2022    Procedure: exploratory laparotomy, small bowel resection, colostomy creation;  Surgeon: Riley Magdaleno MD;  Location: [facility] OR     PICC INSERTION - DOUBLE LUMEN Left 03/28/2022    left medial brachial 5 fr dl picc 49 cm     SIGMOIDOSCOPY FLEXIBLE N/A 3/26/2022    Procedure: Sigmoidoscopy flexible;  Surgeon: Riley Magdaleno MD;  Location: [facility] OR    Social History     Socioeconomic History     Marital status:      Spouse name: Not on file     Number of children: Not on file     Years of education: Not on file     Highest education level: Not on file   Occupational History     Not on file   Tobacco Use     Smoking status: Current Every  Day Smoker     Packs/day: 0.50     Years: 50.00     Pack years: 25.00     Smokeless tobacco: Never Used   Vaping Use     Vaping Use: Never used   Substance and Sexual Activity     Alcohol use: Not Currently     Drug use: Never     Sexual activity: Not on file   Other Topics Concern     Not on file   Social History Narrative     Not on file     Social Determinants of Health     Financial Resource Strain: Not on file   Food Insecurity: Not on file   Transportation Needs: Not on file   Physical Activity: Not on file   Stress: Not on file   Social Connections: Not on file   Intimate Partner Violence: Not on file   Housing Stability: Not on file        Allergies    No Known Allergies    Review of Systems    Pertinent items are noted in HPI.      Physical Exam    Oncology Vitals 4/27/2022   Height 178 cm   Weight 63.458 kg   BSA (m2) 1.77 m2   /70   Pulse 55   Temp -   Temp src -   SpO2 99   Pain Score 0   Pain Loc -   Some recent data might be hidden       General:  HEENT:  Lungs:  CVS:  Abdomen:  Lymphatic system:  Skin:  Breast:  Neuro:    Lab Results    Recent Results (from the past 168 hour(s))   Creatinine   Result Value Ref Range    Creatinine 0.65 (L) 0.66 - 1.25 mg/dL    GFR Estimate >90 >60 mL/min/1.73m2   Bilirubin  total   Result Value Ref Range    Bilirubin Total 0.2 0.2 - 1.3 mg/dL   CBC with platelets and differential   Result Value Ref Range    WBC Count 10.2 4.0 - 11.0 10e3/uL    RBC Count 3.98 (L) 4.40 - 5.90 10e6/uL    Hemoglobin 12.3 (L) 13.3 - 17.7 g/dL    Hematocrit 38.8 (L) 40.0 - 53.0 %    MCV 98 78 - 100 fL    MCH 30.9 26.5 - 33.0 pg    MCHC 31.7 31.5 - 36.5 g/dL    RDW 16.1 (H) 10.0 - 15.0 %    Platelet Count 291 150 - 450 10e3/uL    % Neutrophils 58 %    % Lymphocytes 23 %    % Monocytes 10 %    % Eosinophils 7 %    % Basophils 2 %    % Immature Granulocytes 0 %    NRBCs per 100 WBC 0 <1 /100    Absolute Neutrophils 5.9 1.6 - 8.3 10e3/uL    Absolute Lymphocytes 2.3 0.8 - 5.3 10e3/uL     Absolute Monocytes 1.0 0.0 - 1.3 10e3/uL    Absolute Eosinophils 0.8 (H) 0.0 - 0.7 10e3/uL    Absolute Basophils 0.2 0.0 - 0.2 10e3/uL    Absolute Immature Granulocytes 0.0 <=0.4 10e3/uL    Absolute NRBCs 0.0 10e3/uL   Protein qualitative urine   Result Value Ref Range    Protein Albumin Urine Negative Negative mg/dL       Imaging Results    CT Chest/Abdomen/Pelvis w Contrast    Result Date: 4/7/2022  Combined Report of:    PET and CT on  4/7/2022 2:56 PM : 1. PET of the neck, chest, abdomen, and pelvis. 2. PET CT Fusion for Attenuation Correction and Anatomical Localization:  3. Diagnostic CT scan of the chest, abdomen, and pelvis with intravenous contrast for interpretation. 3. CT of the chest, abdomen and pelvis obtained for diagnostic interpretation. 4. 3D MIP and PET-CT fused images were processed on an independent workstation and archived to PACS and reviewed by a radiologist. Technique: 1. PET: The patient received 10.2 mCi of F-18-FDG; the serum glucose was 101 prior to administration, body weight was 58.1 kg. Images were evaluated in the axial, sagittal, and coronal planes as well as the rotational whole body MIP. Images were acquired from the Vertex to the Feet. UPTAKE WAS MEASURED AT 68 MINUTES. BACKGROUND:  Liver SUV max= 2.2,   Aorta Blood SUV Max: 1.5. 2. CT: Volumetric acquisition for clinical interpretation of the chest, abdomen, and pelvis acquired at 3 mm sections . The chest, abdomen, and pelvis were evaluated at 5 mm sections in bone, soft tissue, and lung windows.  The patient received 63 cc of Isovue 370 intravenously for the examination.  3. 3D MIP and PET-CT fused images were processed on an independent workstation and archived to PACS and reviewed by a radiologist. INDICATION: initial workup; new diagnosis sigmoid colon cancer ADDITIONAL INFORMATION OBTAINED FROM EMR: 71-year-old male with recently diagnosed sigmoid colon adenocarcinoma status post expiratory laparotomy, small bowel  resection, and loop descending colostomy due to malignant large bowel obstruction. Positive peritoneal biopsy. COMPARISON: CT abdomen and pelvis 3/26/2022 FINDINGS: HEAD/NECK: Focal FDG avid focus in the left cervical musculature at the C1 level (series 3 image 91), with no underlying findings on CT, presumed physiologic related to muscle contraction. Scattered mucosal thickening in the paranasal sinuses with layering air-fluid level in the left maxillary sinus. The mastoid air cells are clear. No pathologically enlarged lymph nodes are evident. The thyroid gland is unremarkable. CHEST: No focal suspicious FDG uptake. No cardiomegaly or significant pericardial effusion. Moderate coronary artery calcifications. Normal caliber main pulmonary artery with enlarged right (31 mm and left (28 mm) pulmonary arteries, which can be seen in pulmonary hypertension. Normal caliber thoracic aorta. Aortic atherosclerosis. Small sliding-type hiatal hernia. No suspicious lymphadenopathy. No pneumothorax or pleural effusion. Bibasilar atelectasis. Peripheral wedgelike reticular opacities in the inferolateral right upper lobe with low levels of uptake, SUV max 1.7. Few small scattered pulmonary nodules, for example a 3 mm part solid nodule in the right upper lobe (series 8 image 40), a 2 mm solid nodule in the right lower lobe (series 8 image 92) ABDOMEN AND PELVIS: Focal FDG uptake in the known sigmoid mass in the low pelvis with pericolonic stranding (series 4 image 424) and max SUV 18.0. Mild remaining diffuse bowel uptake, likely physiologic. Mild expected uptake along the laparotomy site. No other focal suspicious uptake. Similar few scattered subcentimeter hepatic hypodensities, too small to characterize, as seen on comparison CT 3/26/2022. Mild intrahepatic biliary ductal dilatation without significant extrahepatic dilatation (for age). Prominent main pancreatic duct without distal obstructing lesion. The gallbladder, spleen,  and adrenals are unremarkable. Simple-appearing left renal cyst. Circumferential bladder wall thickening, likely chronic outlet obstruction in the setting of prostatomegaly. Postoperative changes of partial small bowel resection in loop descending colostomy. Diffuse air-fluid levels throughout the small bowel without significant distention or focal transition point. No definite pneumoperitoneum. Trace postoperative subcutaneous emphysema and extraperitoneal air in the left lower quadrant/left groin. Suspect trace pelvic free fluid. No definite well-organized fluid collection. Focal infrarenal abdominal ectasia measuring 2.7 x 2.9 cm proximal to the bifurcation (series 4 image 353; coronal series 12 image 37). Scattered atherosclerotic calcifications of the abdominal aorta and major branch vessels. Focal high-grade atherosclerotic narrowing of the right external iliac artery (series 4 image 413). No definite suspicious lymphadenopathy. LOWER EXTREMITIES: No hypermetabolic lesions. BONES: There is no abnormal FDG uptake in the skeleton. There are no suspicious lytic or blastic osseous lesions. Mild degenerative changes of the spine.     IMPRESSION: In this patient with recently diagnosed sigmoid colon adenocarcinoma: 1. Hypermetabolic sigmoid mass consistent with known primary malignancy. There is surrounding pericolonic stranding suggestive of locally invasive disease, with poor delineation of the mass from multiple other loops of bowel and the left posterolateral bladder wall. 2. Indeterminant small scattered pulmonary nodules and subcentimeter hepatic hypodensities, too small to fully characterize. Recommend comparison with older studies if available, otherwise attention on follow-up. Liver MRI can be considered to further characterize the small hypodensities. 3. Postoperative changes of partial small bowel resection and descending loop colostomy. Multiple air-fluid levels throughout the small bowel without other  evidence of obstruction, favor residual ongoing adynamic ileus or resolving patulous bowel from previous obstruction. 4. Air-fluid level in the left maxillary sinus, can be seen with current or recent acute sinusitis. 5. Subpleural groundglass and reticulonodular opacity in the right upper lobe with low levels of uptake, favor infectious/inflammatory etiology, possible scarring. I have personally reviewed the examination and initial interpretation and I agree with the findings. WINIFRED CALLOWAY MD   SYSTEM ID:  [id number]    XR Chest Port 1 View    Result Date: 4/21/2022  EXAM: XR CHEST PORT 1 VIEW LOCATION: [facility] DATE/TIME: 4/21/2022 5:48 PM INDICATION: Evaluate right IJ central line placement. COMPARISON: 03/28/2022     IMPRESSION: Heart size and pulmonary vascularity normal. Calcified granuloma right base. Nodular opacity mid left lung not present on recent CT PET scan and should represent a prominent left nipple shadow. Lungs otherwise clear. No acute infiltrates or effusions. Right IJ Port-A-Cath tip in the mid SVC. No pneumothorax.    XR Surgery SUZIE Fluoro L/T 5 Min w Stills    Result Date: 4/22/2022  This exam was marked as non-reportable because it will not be read by a radiologist or a [facility] non-radiologist provider.     PET Oncology Whole Body    Result Date: 4/7/2022  Combined Report of:    PET and CT on  4/7/2022 2:56 PM : 1. PET of the neck, chest, abdomen, and pelvis. 2. PET CT Fusion for Attenuation Correction and Anatomical Localization:  3. Diagnostic CT scan of the chest, abdomen, and pelvis with intravenous contrast for interpretation. 3. CT of the chest, abdomen and pelvis obtained for diagnostic interpretation. 4. 3D MIP and PET-CT fused images were processed on an independent workstation and archived to PACS and reviewed by a radiologist. Technique: 1. PET: The patient received 10.2 mCi of F-18-FDG; the serum glucose was 101 prior to administration, body  weight was 58.1 kg. Images were evaluated in the axial, sagittal, and coronal planes as well as the rotational whole body MIP. Images were acquired from the Vertex to the Feet. UPTAKE WAS MEASURED AT 68 MINUTES. BACKGROUND:  Liver SUV max= 2.2,   Aorta Blood SUV Max: 1.5. 2. CT: Volumetric acquisition for clinical interpretation of the chest, abdomen, and pelvis acquired at 3 mm sections . The chest, abdomen, and pelvis were evaluated at 5 mm sections in bone, soft tissue, and lung windows.  The patient received 63 cc of Isovue 370 intravenously for the examination.  3. 3D MIP and PET-CT fused images were processed on an independent workstation and archived to PACS and reviewed by a radiologist. INDICATION: initial workup; new diagnosis sigmoid colon cancer ADDITIONAL INFORMATION OBTAINED FROM EMR: 71-year-old male with recently diagnosed sigmoid colon adenocarcinoma status post expiratory laparotomy, small bowel resection, and loop descending colostomy due to malignant large bowel obstruction. Positive peritoneal biopsy. COMPARISON: CT abdomen and pelvis 3/26/2022 FINDINGS: HEAD/NECK: Focal FDG avid focus in the left cervical musculature at the C1 level (series 3 image 91), with no underlying findings on CT, presumed physiologic related to muscle contraction. Scattered mucosal thickening in the paranasal sinuses with layering air-fluid level in the left maxillary sinus. The mastoid air cells are clear. No pathologically enlarged lymph nodes are evident. The thyroid gland is unremarkable. CHEST: No focal suspicious FDG uptake. No cardiomegaly or significant pericardial effusion. Moderate coronary artery calcifications. Normal caliber main pulmonary artery with enlarged right (31 mm and left (28 mm) pulmonary arteries, which can be seen in pulmonary hypertension. Normal caliber thoracic aorta. Aortic atherosclerosis. Small sliding-type hiatal hernia. No suspicious lymphadenopathy. No pneumothorax or pleural effusion.  Bibasilar atelectasis. Peripheral wedgelike reticular opacities in the inferolateral right upper lobe with low levels of uptake, SUV max 1.7. Few small scattered pulmonary nodules, for example a 3 mm part solid nodule in the right upper lobe (series 8 image 40), a 2 mm solid nodule in the right lower lobe (series 8 image 92) ABDOMEN AND PELVIS: Focal FDG uptake in the known sigmoid mass in the low pelvis with pericolonic stranding (series 4 image 424) and max SUV 18.0. Mild remaining diffuse bowel uptake, likely physiologic. Mild expected uptake along the laparotomy site. No other focal suspicious uptake. Similar few scattered subcentimeter hepatic hypodensities, too small to characterize, as seen on comparison CT 3/26/2022. Mild intrahepatic biliary ductal dilatation without significant extrahepatic dilatation (for age). Prominent main pancreatic duct without distal obstructing lesion. The gallbladder, spleen, and adrenals are unremarkable. Simple-appearing left renal cyst. Circumferential bladder wall thickening, likely chronic outlet obstruction in the setting of prostatomegaly. Postoperative changes of partial small bowel resection in loop descending colostomy. Diffuse air-fluid levels throughout the small bowel without significant distention or focal transition point. No definite pneumoperitoneum. Trace postoperative subcutaneous emphysema and extraperitoneal air in the left lower quadrant/left groin. Suspect trace pelvic free fluid. No definite well-organized fluid collection. Focal infrarenal abdominal ectasia measuring 2.7 x 2.9 cm proximal to the bifurcation (series 4 image 353; coronal series 12 image 37). Scattered atherosclerotic calcifications of the abdominal aorta and major branch vessels. Focal high-grade atherosclerotic narrowing of the right external iliac artery (series 4 image 413). No definite suspicious lymphadenopathy. LOWER EXTREMITIES: No hypermetabolic lesions. BONES: There is no abnormal  FDG uptake in the skeleton. There are no suspicious lytic or blastic osseous lesions. Mild degenerative changes of the spine.     IMPRESSION: In this patient with recently diagnosed sigmoid colon adenocarcinoma: 1. Hypermetabolic sigmoid mass consistent with known primary malignancy. There is surrounding pericolonic stranding suggestive of locally invasive disease, with poor delineation of the mass from multiple other loops of bowel and the left posterolateral bladder wall. 2. Indeterminant small scattered pulmonary nodules and subcentimeter hepatic hypodensities, too small to fully characterize. Recommend comparison with older studies if available, otherwise attention on follow-up. Liver MRI can be considered to further characterize the small hypodensities. 3. Postoperative changes of partial small bowel resection and descending loop colostomy. Multiple air-fluid levels throughout the small bowel without other evidence of obstruction, favor residual ongoing adynamic ileus or resolving patulous bowel from previous obstruction. 4. Air-fluid level in the left maxillary sinus, can be seen with current or recent acute sinusitis. 5. Subpleural groundglass and reticulonodular opacity in the right upper lobe with low levels of uptake, favor infectious/inflammatory etiology, possible scarring. I have personally reviewed the examination and initial interpretation and I agree with the findings. WINIFRED CALLOWAY MD   SYSTEM ID:  [id number]      A total of 70 minutes was spent today on this visit including face to face conversation with the patient, EMR review (labs, imaging studies, pathology reports and outside records), counseling and care co-ordination and documentation.    Signed by: Pamela Colorado MD        Again, thank you for allowing me to participate in the care of your patient.        Sincerely,        Pamela Colorado MD

## 2022-04-13 PROBLEM — C18.7 ADENOCARCINOMA OF SIGMOID COLON (H): Status: ACTIVE | Noted: 2022-04-13

## 2022-04-13 NOTE — PROGRESS NOTES
Colon and Rectal Surgery Clinic Note    RE: Kalin Shepard.  : 1950.  REGGIE: 2022.    Reason for visit: Post op emergent colostomy 3/26/22.     HPI: Kalin Shepard is a 71 year old male  who presented to [facility] ED on 3/26/22 with abdominal pain, he was found to have obstructing sigmoid colon mass and associated small bowel obstruction in the setting of unintentional weight loss over the last year, approximately 30-40 pounds. He then underwent emergent exploratory laparotomy, small bowel resection, loop descending colostomy creation, flexible sigmoidoscopy with me. Final pathology showed sigmoid mass that was negative for malignancy although likely sampling is non-representative and a peritoneal biopsy that is positive for malignancy - intestinal type adenocarcinoma. He followed up with Pamela Colorado MD with [facility] who recommended chemotherapy as anticipated. He has since started, and is doing well. No complaints. Healing well.         Surgical Pathology (3/26/22):          PET/ CT CAP (22):  IMPRESSION:   In this patient with recently diagnosed sigmoid colon adenocarcinoma:     1. Hypermetabolic sigmoid mass consistent with known primary  malignancy. There is surrounding pericolonic stranding suggestive of  locally invasive disease, with poor delineation of the mass from  multiple other loops of bowel and the left posterolateral bladder  wall.      2. Indeterminant small scattered pulmonary nodules and subcentimeter  hepatic hypodensities, too small to fully characterize. Recommend  comparison with older studies if available, otherwise attention on  follow-up. Liver MRI can be considered to further characterize the  small hypodensities.     3. Postoperative changes of partial small bowel resection and  descending loop colostomy. Multiple air-fluid levels throughout the  small bowel without other evidence of obstruction, favor residual  ongoing adynamic ileus or resolving patulous  bowel from previous  obstruction.     4. Air-fluid level in the left maxillary sinus, can be seen with  current or recent acute sinusitis.     5. Subpleural groundglass and reticulonodular opacity in the right  upper lobe with low levels of uptake, favor infectious/inflammatory  etiology, possible scarring.      Medical history:  None    Surgical history:  Past Surgical History:   Procedure Laterality Date     LAPAROTOMY EXPLORATORY N/A 3/26/2022    Procedure: exploratory laparotomy, small bowel resection, colostomy creation;  Surgeon: Riley Magdaleno MD;  Location: [facility] OR     PICC INSERTION - DOUBLE LUMEN Left 03/28/2022    left medial brachial 5 fr dl picc 49 cm     SIGMOIDOSCOPY FLEXIBLE N/A 3/26/2022    Procedure: Sigmoidoscopy flexible;  Surgeon: Riley Magdaleno MD;  Location: [facility] OR       Family history:  No Hx of IBD or GI malignancy    Medications:  Current Outpatient Medications   Medication Sig Dispense Refill     acetaminophen (TYLENOL) 500 MG tablet Take 2 tablets (1,000 mg) by mouth 4 times daily 100 tablet 0     enoxaparin ANTICOAGULANT (LOVENOX) 40 MG/0.4ML syringe Inject 0.4 mLs (40 mg) Subcutaneous every 24 hours for 20 days 8 mL 0     ibuprofen (ADVIL/MOTRIN) 400 MG tablet Take 1 tablet (400 mg) by mouth 3 times daily for 10 days 30 tablet 0     Lidocaine (LIDOCARE) 4 % Patch Place 1 patch onto the skin every 24 hours To prevent lidocaine toxicity, patient should be patch free for 12 hrs daily. 5 patch 0     methocarbamol (ROBAXIN) 500 MG tablet Take 1 tablet (500 mg) by mouth 3 times daily as needed for muscle spasms (Patient not taking: Reported on 4/12/2022) 15 tablet 0     oxyCODONE (ROXICODONE) 5 MG tablet Take 1 tablet (5 mg) by mouth as needed in the morning and 1 tablet (5 mg) as needed at noon and 1 tablet (5 mg) as needed in the evening for moderate to severe pain. 60 tablet 0     polyethylene glycol (MIRALAX) 17 GM/Dose powder Take 17 g by mouth daily 510 g 0       Allergies:  No Known  "Allergies    Social history:   Social History     Tobacco Use     Smoking status: Current Every Day Smoker     Packs/day: 0.50     Years: 50.00     Pack years: 25.00     Smokeless tobacco: Never Used   Substance Use Topics     Alcohol use: Not Currently     Marital status: .    ROS:  A complete review of systems was performed with the patient and all systems negative except as per HPI.    Physical Examination:  /70 (BP Location: Left arm, Patient Position: Sitting, Cuff Size: Adult Regular)   Pulse 55   Ht 1.778 m (5' 10\")   Wt 63.5 kg (139 lb 14.4 oz)   SpO2 99%   BMI 20.07 kg/m    General: Well hydrated. No acute distress.  Abdomen: Soft, NT, nondistended, ostomy pink and healthy, staples removed. No inguinal adenopathy palpated.  Perianal external examination:  deferred    Laboratory values reviewed:  Recent Labs   Lab Test 04/04/22  0628 04/03/22  0916 04/02/22  0735 03/31/22  0656 03/30/22  0648   WBC  --   --   --   --  11.5*   HGB  --   --   --   --  11.6*   PLT  --   --  430  --  334   CR 0.49*   < >  --    < > 0.48*   ALBUMIN 2.3*  --   --   --  2.3*   BILITOTAL 0.2  --   --   --  0.4   ALKPHOS 76  --   --   --  47   ALT 58  --   --   --  17   AST 36  --   --   --  25   INR 1.01  --   --   --   --     < > = values in this interval not displayed.       ASSESSMENT  1. Tobacco use,   2. Locally advanced sigmoid cancer, s/p small bowel resection (suspected tumor involvement) and loop sigmoid colostomy due to unresectable nature of tumor.    PLAN  1. OK to be chemo in 1-2 weeks (he will be at least 5 weeks out from surgery by then).  2. MRI Abdomen and pelvis to further delineate tumor.  3. Follow up with me following chemotherapy with restaging CT CAP and MRI. I am highly suspicious of bladder and left inguinal canal structural involvement, which will likely require a multi visceral resection involving colleagues from other specialities for definitive resection.    Risks, benefits, and " alternatives of operative treatment were thoroughly discussed with the patient, he understands these well and agrees to proceed.    Time spent: 30 minutes,>50% spent in discussing, counseling and coordinating care.    Riley Magdaleno M.D    Division of Colon and Rectal Surgery  [facility]    Referring Provider:  No referring provider defined for this encounter.     Primary Care Provider:  Jordyn, [facility]

## 2022-04-15 ENCOUNTER — OFFICE VISIT (OUTPATIENT)
Dept: SURGERY | Facility: CLINIC | Age: 72
End: 2022-04-15
Attending: INTERNAL MEDICINE
Payer: COMMERCIAL

## 2022-04-15 VITALS
TEMPERATURE: 96.6 F | DIASTOLIC BLOOD PRESSURE: 76 MMHG | BODY MASS INDEX: 18.75 KG/M2 | SYSTOLIC BLOOD PRESSURE: 126 MMHG | OXYGEN SATURATION: 99 % | WEIGHT: 131 LBS | HEART RATE: 74 BPM | HEIGHT: 70 IN

## 2022-04-15 DIAGNOSIS — Z11.59 ENCOUNTER FOR SCREENING FOR OTHER VIRAL DISEASES: Primary | ICD-10-CM

## 2022-04-15 DIAGNOSIS — C18.7 ADENOCARCINOMA OF SIGMOID COLON (H): Primary | ICD-10-CM

## 2022-04-15 DIAGNOSIS — Z93.3 COLOSTOMY PRESENT (H): ICD-10-CM

## 2022-04-15 PROCEDURE — 99204 OFFICE O/P NEW MOD 45 MIN: CPT | Performed by: SURGERY

## 2022-04-15 PROCEDURE — G0452 MOLECULAR PATHOLOGY INTERPR: HCPCS | Mod: 26 | Performed by: STUDENT IN AN ORGANIZED HEALTH CARE EDUCATION/TRAINING PROGRAM

## 2022-04-15 ASSESSMENT — PAIN SCALES - GENERAL: PAINLEVEL: SEVERE PAIN (6)

## 2022-04-15 NOTE — H&P (VIEW-ONLY)
Assessment & Plan   Problem List Items Addressed This Visit        Digestive    Adenocarcinoma of sigmoid colon (H)         I discussed about a PowerPort placement and its indication.  Patient has metastatic colon cancer; I will plan to place the PowerPort on the right internal jugular.  I explained the risks, benefits, alternatives to the patient including pneumothorax, delayed pneumothorax, signs and symptoms of pneumothorax, bleeding, infection, injury to the neurovascular bundle with in the neck.  We also discussed a positioning of the port, the port not functioning secondary to occlusion from fibrin sheath formation, and the need to replace the PowerPort or other more invasive procedures.  All of questions were answered to his/her satisfaction.        45 minutes spent on the date of the encounter doing chart review, history and exam, documentation and further activities per the note       Tobacco Cessation:   reports that he has been smoking. He has a 25.00 pack-year smoking history. He has never used smokeless tobacco.    No follow-ups on file.    ChanoDmitri Schroeder MD  [facility]    Ambrose Gagnon is a 71 year old who presents for the following health issues  accompanied by his spouse.    Patient was recently hospitalized for a bowel obstruction 3 weeks ago.  It was noted that patient has a complete obstruction of the colon secondary to metastatic colon cancer.  It involved his small bowel thus requiring a segmental small bowel resection and a descending loop colostomy.  This was done at the [facility] approximately 3 weeks ago.  Patient was in the hospital for a long course.  Patient was placed on a PICC line on his left arm for TPN.  That since been removed.  No history of DVTs or SVTs in the upper extremities or neck.  Hx of DVT - no anticogulation for 3-6 months - many years ago.  Never had a PowerPort placed or any central line placed that patient can remember.   "Besides that PICC line.  Patient is currently on daily Lovenox for DVT prevention.  Never had a heart attack.  Never stroke.    He is here for a PowerPort placement for systemic chemotherapy for his metastatic colon cancer.         Review of Systems   Constitutional, HEENT, cardiovascular, pulmonary, GI, , musculoskeletal, neuro, skin, endocrine and psych systems are negative, except as otherwise noted.      Objective    /76 (BP Location: Right arm, Patient Position: Sitting, Cuff Size: Adult Regular)   Pulse 74   Temp (!) 96.6  F (35.9  C) (Tympanic)   Ht 1.778 m (5' 10\")   Wt 59.4 kg (131 lb)   SpO2 99%   BMI 18.80 kg/m    Body mass index is 18.8 kg/m .  Physical Exam  Vitals reviewed.   Eyes:      Conjunctiva/sclera: Conjunctivae normal.   Cardiovascular:      Pulses: Normal pulses.   Pulmonary:      Effort: Pulmonary effort is normal.   Abdominal:      Palpations: Abdomen is soft.   Musculoskeletal:         General: Normal range of motion.      Cervical back: Normal range of motion.   Skin:     General: Skin is warm.      Capillary Refill: Capillary refill takes less than 2 seconds.   Neurological:      General: No focal deficit present.      Mental Status: He is alert.   Psychiatric:         Mood and Affect: Mood normal.                "

## 2022-04-15 NOTE — NURSING NOTE
"Initial /76 (BP Location: Right arm, Patient Position: Sitting, Cuff Size: Adult Regular)   Pulse 74   Temp (!) 96.6  F (35.9  C) (Tympanic)   Ht 1.778 m (5' 10\")   Wt 59.4 kg (131 lb)   SpO2 99%   BMI 18.80 kg/m   Estimated body mass index is 18.8 kg/m  as calculated from the following:    Height as of this encounter: 1.778 m (5' 10\").    Weight as of this encounter: 59.4 kg (131 lb). .    Milly Maza LPN on 4/15/2022 at 1:55 PM    "

## 2022-04-15 NOTE — PROGRESS NOTES
Assessment & Plan   Problem List Items Addressed This Visit        Digestive    Adenocarcinoma of sigmoid colon (H)         I discussed about a PowerPort placement and its indication.  Patient has metastatic colon cancer; I will plan to place the PowerPort on the right internal jugular.  I explained the risks, benefits, alternatives to the patient including pneumothorax, delayed pneumothorax, signs and symptoms of pneumothorax, bleeding, infection, injury to the neurovascular bundle with in the neck.  We also discussed a positioning of the port, the port not functioning secondary to occlusion from fibrin sheath formation, and the need to replace the PowerPort or other more invasive procedures.  All of questions were answered to his/her satisfaction.        45 minutes spent on the date of the encounter doing chart review, history and exam, documentation and further activities per the note       Tobacco Cessation:   reports that he has been smoking. He has a 25.00 pack-year smoking history. He has never used smokeless tobacco.    No follow-ups on file.    ChanoDmitri Schroeder MD  [facility]    Ambrose Gagnon is a 71 year old who presents for the following health issues  accompanied by his spouse.    Patient was recently hospitalized for a bowel obstruction 3 weeks ago.  It was noted that patient has a complete obstruction of the colon secondary to metastatic colon cancer.  It involved his small bowel thus requiring a segmental small bowel resection and a descending loop colostomy.  This was done at the [facility] approximately 3 weeks ago.  Patient was in the hospital for a long course.  Patient was placed on a PICC line on his left arm for TPN.  That since been removed.  No history of DVTs or SVTs in the upper extremities or neck.  Hx of DVT - no anticogulation for 3-6 months - many years ago.  Never had a PowerPort placed or any central line placed that patient can remember.   "Besides that PICC line.  Patient is currently on daily Lovenox for DVT prevention.  Never had a heart attack.  Never stroke.    He is here for a PowerPort placement for systemic chemotherapy for his metastatic colon cancer.         Review of Systems   Constitutional, HEENT, cardiovascular, pulmonary, GI, , musculoskeletal, neuro, skin, endocrine and psych systems are negative, except as otherwise noted.      Objective    /76 (BP Location: Right arm, Patient Position: Sitting, Cuff Size: Adult Regular)   Pulse 74   Temp (!) 96.6  F (35.9  C) (Tympanic)   Ht 1.778 m (5' 10\")   Wt 59.4 kg (131 lb)   SpO2 99%   BMI 18.80 kg/m    Body mass index is 18.8 kg/m .  Physical Exam  Vitals reviewed.   Eyes:      Conjunctiva/sclera: Conjunctivae normal.   Cardiovascular:      Pulses: Normal pulses.   Pulmonary:      Effort: Pulmonary effort is normal.   Abdominal:      Palpations: Abdomen is soft.   Musculoskeletal:         General: Normal range of motion.      Cervical back: Normal range of motion.   Skin:     General: Skin is warm.      Capillary Refill: Capillary refill takes less than 2 seconds.   Neurological:      General: No focal deficit present.      Mental Status: He is alert.   Psychiatric:         Mood and Affect: Mood normal.                "

## 2022-04-15 NOTE — LETTER
4/15/2022         RE: Kalin Shepard  [address]  [place] [address]        Dear Colleague,    Thank you for referring your patient, Kalin Shepard, to the [facility]. Please see a copy of my visit note below.      Assessment & Plan   Problem List Items Addressed This Visit        Digestive    Adenocarcinoma of sigmoid colon (H)         I discussed about a PowerPort placement and its indication.  Patient has metastatic colon cancer; I will plan to place the PowerPort on the right internal jugular.  I explained the risks, benefits, alternatives to the patient including pneumothorax, delayed pneumothorax, signs and symptoms of pneumothorax, bleeding, infection, injury to the neurovascular bundle with in the neck.  We also discussed a positioning of the port, the port not functioning secondary to occlusion from fibrin sheath formation, and the need to replace the PowerPort or other more invasive procedures.  All of questions were answered to his/her satisfaction.        45 minutes spent on the date of the encounter doing chart review, history and exam, documentation and further activities per the note       Tobacco Cessation:   reports that he has been smoking. He has a 25.00 pack-year smoking history. He has never used smokeless tobacco.    No follow-ups on file.    Marianne Schroeder MD  [facility]    Ambrose Gagnon is a 71 year old who presents for the following health issues  accompanied by his spouse.    Patient was recently hospitalized for a bowel obstruction 3 weeks ago.  It was noted that patient has a complete obstruction of the colon secondary to metastatic colon cancer.  It involved his small bowel thus requiring a segmental small bowel resection and a descending loop colostomy.  This was done at the [facility] approximately 3 weeks ago.  Patient was in the hospital for a long course.  Patient was placed on a PICC line on his  "left arm for TPN.  That since been removed.  No history of DVTs or SVTs in the upper extremities or neck.  Hx of DVT - no anticogulation for 3-6 months - many years ago.  Never had a PowerPort placed or any central line placed that patient can remember.  Besides that PICC line.  Patient is currently on daily Lovenox for DVT prevention.  Never had a heart attack.  Never stroke.    He is here for a PowerPort placement for systemic chemotherapy for his metastatic colon cancer.         Review of Systems   Constitutional, HEENT, cardiovascular, pulmonary, GI, , musculoskeletal, neuro, skin, endocrine and psych systems are negative, except as otherwise noted.      Objective    /76 (BP Location: Right arm, Patient Position: Sitting, Cuff Size: Adult Regular)   Pulse 74   Temp (!) 96.6  F (35.9  C) (Tympanic)   Ht 1.778 m (5' 10\")   Wt 59.4 kg (131 lb)   SpO2 99%   BMI 18.80 kg/m    Body mass index is 18.8 kg/m .  Physical Exam  Vitals reviewed.   Eyes:      Conjunctiva/sclera: Conjunctivae normal.   Cardiovascular:      Pulses: Normal pulses.   Pulmonary:      Effort: Pulmonary effort is normal.   Abdominal:      Palpations: Abdomen is soft.   Musculoskeletal:         General: Normal range of motion.      Cervical back: Normal range of motion.   Skin:     General: Skin is warm.      Capillary Refill: Capillary refill takes less than 2 seconds.   Neurological:      General: No focal deficit present.      Mental Status: He is alert.   Psychiatric:         Mood and Affect: Mood normal.                    Again, thank you for allowing me to participate in the care of your patient.        Sincerely,        Marianne Schroeder MD    "

## 2022-04-18 LAB
INTERPRETATION: NORMAL
LAB DIRECTOR COMMENTS: NORMAL
LAB DIRECTOR DISCLAIMER: NORMAL
LAB DIRECTOR INTERPRETATION: NORMAL
LAB DIRECTOR METHODOLOGY: NORMAL
LAB DIRECTOR RESULTS: NORMAL
SIGNIFICANT RESULTS: NORMAL
SPECIMEN DESCRIPTION: NORMAL
SPECIMEN DESCRIPTION: NORMAL
TEST DETAILS, MDL: NORMAL

## 2022-04-18 PROCEDURE — 81445 SO NEO GSAP 5-50DNA/DNA&RNA: CPT | Performed by: SURGERY

## 2022-04-19 ENCOUNTER — ANESTHESIA EVENT (OUTPATIENT)
Dept: SURGERY | Facility: CLINIC | Age: 72
End: 2022-04-19
Payer: COMMERCIAL

## 2022-04-19 ENCOUNTER — LAB (OUTPATIENT)
Dept: LAB | Facility: CLINIC | Age: 72
End: 2022-04-19
Payer: COMMERCIAL

## 2022-04-19 DIAGNOSIS — Z11.59 ENCOUNTER FOR SCREENING FOR OTHER VIRAL DISEASES: ICD-10-CM

## 2022-04-19 PROCEDURE — U0003 INFECTIOUS AGENT DETECTION BY NUCLEIC ACID (DNA OR RNA); SEVERE ACUTE RESPIRATORY SYNDROME CORONAVIRUS 2 (SARS-COV-2) (CORONAVIRUS DISEASE [COVID-19]), AMPLIFIED PROBE TECHNIQUE, MAKING USE OF HIGH THROUGHPUT TECHNOLOGIES AS DESCRIBED BY CMS-2020-01-R: HCPCS

## 2022-04-19 PROCEDURE — U0005 INFEC AGEN DETEC AMPLI PROBE: HCPCS

## 2022-04-19 ASSESSMENT — LIFESTYLE VARIABLES: TOBACCO_USE: 1

## 2022-04-19 NOTE — ANESTHESIA PREPROCEDURE EVALUATION
Anesthesia Pre-Procedure Evaluation    Patient: Kalin Shepard   MRN: [id number] : 1950        Procedure : Procedure(s):  INSERTION, VASCULAR ACCESS PORT          No past medical history on file.   Past Surgical History:   Procedure Laterality Date     LAPAROTOMY EXPLORATORY N/A 3/26/2022    Procedure: exploratory laparotomy, small bowel resection, colostomy creation;  Surgeon: Riley Magdaleno MD;  Location: [facility] OR     PICC INSERTION - DOUBLE LUMEN Left 2022    left medial brachial 5 fr dl picc 49 cm     SIGMOIDOSCOPY FLEXIBLE N/A 3/26/2022    Procedure: Sigmoidoscopy flexible;  Surgeon: Riley Magdaleno MD;  Location: [facility] OR      No Known Allergies   Social History     Tobacco Use     Smoking status: Current Every Day Smoker     Packs/day: 0.50     Years: 50.00     Pack years: 25.00     Smokeless tobacco: Never Used   Substance Use Topics     Alcohol use: Not Currently      Wt Readings from Last 1 Encounters:   04/15/22 59.4 kg (131 lb)        Anesthesia Evaluation   Pt has had prior anesthetic. Type: General and MAC.        ROS/MED HX  ENT/Pulmonary:     (+) tobacco use, Current use,     Neurologic:       Cardiovascular:     (+) -----Previous cardiac testing   Echo: Date: Results:    Stress Test: Date: Results:    ECG Reviewed: Date: 3/27/22 Results:  SR with PACs  Rightward axis  Abnormal ECG    Cath: Date: Results:      METS/Exercise Tolerance:     Hematologic:       Musculoskeletal:   (+) arthritis,     GI/Hepatic: Comment: S/P small bowel resection with colostomy 3/26/22      Renal/Genitourinary:       Endo:       Psychiatric/Substance Use:       Infectious Disease:       Malignancy:   (+) Malignancy, History of GI.GI CA  Active status post Surgery.        Other:            Physical Exam    Airway        Mallampati: II   TM distance: > 3 FB   Neck ROM: full   Mouth opening: > 3 cm    Respiratory Devices and Support         Dental       (+) missing and loose      Cardiovascular    cardiovascular exam normal          Pulmonary   pulmonary exam normal                OUTSIDE LABS:  CBC:   Lab Results   Component Value Date    WBC 11.5 (H) 03/30/2022    WBC 10.8 03/29/2022    HGB 11.6 (L) 03/30/2022    HGB 10.8 (L) 03/29/2022    HCT 35.7 (L) 03/30/2022    HCT 34.7 (L) 03/29/2022     04/02/2022     03/30/2022     BMP:   Lab Results   Component Value Date     04/04/2022     04/03/2022    POTASSIUM 4.4 04/04/2022    POTASSIUM 4.4 04/03/2022    CHLORIDE 110 (H) 04/04/2022    CHLORIDE 110 (H) 04/03/2022    CO2 24 04/04/2022    CO2 22 04/03/2022    BUN 28 04/04/2022    BUN 29 04/03/2022    CR 0.49 (L) 04/04/2022    CR 0.46 (L) 04/03/2022     (H) 04/07/2022     (H) 04/04/2022     COAGS:   Lab Results   Component Value Date    INR 1.01 04/04/2022     POC: No results found for: BGM, HCG, HCGS  HEPATIC:   Lab Results   Component Value Date    ALBUMIN 2.3 (L) 04/04/2022    PROTTOTAL 5.8 (L) 04/04/2022    ALT 58 04/04/2022    AST 36 04/04/2022    ALKPHOS 76 04/04/2022    BILITOTAL 0.2 04/04/2022     OTHER:   Lab Results   Component Value Date    ROXIE 8.1 (L) 04/04/2022    PHOS 3.2 04/04/2022    MAG 2.3 04/04/2022       Anesthesia Plan    ASA Status:  3   NPO Status:  NPO Appropriate    Anesthesia Type: General.      Maintenance: Balanced.        Consents    Anesthesia Plan(s) and associated risks, benefits, and realistic alternatives discussed. Questions answered and patient/representative(s) expressed understanding.    - Discussed:     - Discussed with:  Patient         Postoperative Care       PONV prophylaxis: Ondansetron (or other 5HT-3), Dexamethasone or Solumedrol     Comments:                JULIUS Terry CRNA

## 2022-04-20 LAB — SARS-COV-2 RNA RESP QL NAA+PROBE: NEGATIVE

## 2022-04-21 ENCOUNTER — TELEPHONE (OUTPATIENT)
Dept: FAMILY MEDICINE | Facility: CLINIC | Age: 72
End: 2022-04-21
Payer: COMMERCIAL

## 2022-04-21 ENCOUNTER — HOSPITAL ENCOUNTER (OUTPATIENT)
Facility: CLINIC | Age: 72
Discharge: HOME | End: 2022-04-21
Attending: SURGERY | Admitting: SURGERY
Payer: COMMERCIAL

## 2022-04-21 ENCOUNTER — ANESTHESIA (OUTPATIENT)
Dept: SURGERY | Facility: CLINIC | Age: 72
End: 2022-04-21
Payer: COMMERCIAL

## 2022-04-21 ENCOUNTER — APPOINTMENT (OUTPATIENT)
Dept: GENERAL RADIOLOGY | Facility: CLINIC | Age: 72
End: 2022-04-21
Attending: SURGERY
Payer: COMMERCIAL

## 2022-04-21 VITALS
DIASTOLIC BLOOD PRESSURE: 63 MMHG | WEIGHT: 131 LBS | OXYGEN SATURATION: 97 % | HEIGHT: 70 IN | BODY MASS INDEX: 18.75 KG/M2 | TEMPERATURE: 97.6 F | SYSTOLIC BLOOD PRESSURE: 126 MMHG | HEART RATE: 84 BPM | RESPIRATION RATE: 18 BRPM

## 2022-04-21 PROCEDURE — 272N000001 HC OR GENERAL SUPPLY STERILE: Performed by: SURGERY

## 2022-04-21 PROCEDURE — 76937 US GUIDE VASCULAR ACCESS: CPT | Mod: 26 | Performed by: SURGERY

## 2022-04-21 PROCEDURE — 77001 FLUOROGUIDE FOR VEIN DEVICE: CPT | Mod: 26 | Performed by: SURGERY

## 2022-04-21 PROCEDURE — 258N000003 HC RX IP 258 OP 636: Performed by: NURSE ANESTHETIST, CERTIFIED REGISTERED

## 2022-04-21 PROCEDURE — 271N000001 HC OR GENERAL SUPPLY NON-STERILE: Performed by: SURGERY

## 2022-04-21 PROCEDURE — 999N000179 XR SURGERY CARM FLUORO LESS THAN 5 MIN W STILLS

## 2022-04-21 PROCEDURE — 250N000011 HC RX IP 250 OP 636: Performed by: NURSE ANESTHETIST, CERTIFIED REGISTERED

## 2022-04-21 PROCEDURE — 999N000065 XR CHEST PORT 1 VIEW

## 2022-04-21 PROCEDURE — 999N000141 HC STATISTIC PRE-PROCEDURE NURSING ASSESSMENT: Performed by: SURGERY

## 2022-04-21 PROCEDURE — 710N000012 HC RECOVERY PHASE 2, PER MINUTE: Performed by: SURGERY

## 2022-04-21 PROCEDURE — 370N000017 HC ANESTHESIA TECHNICAL FEE, PER MIN: Performed by: SURGERY

## 2022-04-21 PROCEDURE — 250N000011 HC RX IP 250 OP 636: Performed by: SURGERY

## 2022-04-21 PROCEDURE — 250N000009 HC RX 250: Performed by: SURGERY

## 2022-04-21 PROCEDURE — 258N000003 HC RX IP 258 OP 636: Performed by: SURGERY

## 2022-04-21 PROCEDURE — C1788 PORT, INDWELLING, IMP: HCPCS | Performed by: SURGERY

## 2022-04-21 PROCEDURE — 36561 INSERT TUNNELED CV CATH: CPT | Performed by: SURGERY

## 2022-04-21 PROCEDURE — 250N000013 HC RX MED GY IP 250 OP 250 PS 637: Performed by: NURSE ANESTHETIST, CERTIFIED REGISTERED

## 2022-04-21 PROCEDURE — 360N000082 HC SURGERY LEVEL 2 W/ FLUORO, PER MIN: Performed by: SURGERY

## 2022-04-21 DEVICE — CATH PORT MRI POWERPORT 8FR SL 1808000: Type: IMPLANTABLE DEVICE | Site: NECK | Status: FUNCTIONAL

## 2022-04-21 RX ORDER — LIDOCAINE 40 MG/G
CREAM TOPICAL
Status: DISCONTINUED | OUTPATIENT
Start: 2022-04-21 | End: 2022-04-21 | Stop reason: HOSPADM

## 2022-04-21 RX ORDER — PROPOFOL 10 MG/ML
INJECTION, EMULSION INTRAVENOUS CONTINUOUS PRN
Status: DISCONTINUED | OUTPATIENT
Start: 2022-04-21 | End: 2022-04-21

## 2022-04-21 RX ORDER — FENTANYL CITRATE 50 UG/ML
INJECTION, SOLUTION INTRAMUSCULAR; INTRAVENOUS PRN
Status: DISCONTINUED | OUTPATIENT
Start: 2022-04-21 | End: 2022-04-21

## 2022-04-21 RX ORDER — MAGNESIUM SULFATE HEPTAHYDRATE 40 MG/ML
2 INJECTION, SOLUTION INTRAVENOUS ONCE
Status: COMPLETED | OUTPATIENT
Start: 2022-04-21 | End: 2022-04-21

## 2022-04-21 RX ORDER — LIDOCAINE HYDROCHLORIDE AND EPINEPHRINE 10; 10 MG/ML; UG/ML
INJECTION, SOLUTION INFILTRATION; PERINEURAL PRN
Status: DISCONTINUED | OUTPATIENT
Start: 2022-04-21 | End: 2022-04-21 | Stop reason: HOSPADM

## 2022-04-21 RX ORDER — DEXAMETHASONE SODIUM PHOSPHATE 4 MG/ML
INJECTION, SOLUTION INTRA-ARTICULAR; INTRALESIONAL; INTRAMUSCULAR; INTRAVENOUS; SOFT TISSUE PRN
Status: DISCONTINUED | OUTPATIENT
Start: 2022-04-21 | End: 2022-04-21

## 2022-04-21 RX ORDER — IBUPROFEN 400 MG/1
400 TABLET, FILM COATED ORAL EVERY 6 HOURS PRN
COMMUNITY
End: 2022-06-06

## 2022-04-21 RX ORDER — ONDANSETRON 2 MG/ML
INJECTION INTRAMUSCULAR; INTRAVENOUS PRN
Status: DISCONTINUED | OUTPATIENT
Start: 2022-04-21 | End: 2022-04-21

## 2022-04-21 RX ORDER — SODIUM CHLORIDE, SODIUM LACTATE, POTASSIUM CHLORIDE, CALCIUM CHLORIDE 600; 310; 30; 20 MG/100ML; MG/100ML; MG/100ML; MG/100ML
INJECTION, SOLUTION INTRAVENOUS CONTINUOUS
Status: DISCONTINUED | OUTPATIENT
Start: 2022-04-21 | End: 2022-04-21 | Stop reason: HOSPADM

## 2022-04-21 RX ORDER — CEFAZOLIN SODIUM/WATER 2 G/20 ML
2 SYRINGE (ML) INTRAVENOUS
Status: COMPLETED | OUTPATIENT
Start: 2022-04-21 | End: 2022-04-21

## 2022-04-21 RX ORDER — GABAPENTIN 100 MG/1
100 CAPSULE ORAL
Status: COMPLETED | OUTPATIENT
Start: 2022-04-21 | End: 2022-04-21

## 2022-04-21 RX ORDER — BUPIVACAINE HYDROCHLORIDE AND EPINEPHRINE 2.5; 5 MG/ML; UG/ML
INJECTION, SOLUTION EPIDURAL; INFILTRATION; INTRACAUDAL; PERINEURAL PRN
Status: DISCONTINUED | OUTPATIENT
Start: 2022-04-21 | End: 2022-04-21 | Stop reason: HOSPADM

## 2022-04-21 RX ORDER — ACETAMINOPHEN 325 MG/1
975 TABLET ORAL ONCE
Status: COMPLETED | OUTPATIENT
Start: 2022-04-21 | End: 2022-04-21

## 2022-04-21 RX ORDER — HEPARIN SODIUM 1000 [USP'U]/ML
INJECTION, SOLUTION INTRAVENOUS; SUBCUTANEOUS PRN
Status: DISCONTINUED | OUTPATIENT
Start: 2022-04-21 | End: 2022-04-21 | Stop reason: HOSPADM

## 2022-04-21 RX ADMIN — MIDAZOLAM 1 MG: 1 INJECTION INTRAMUSCULAR; INTRAVENOUS at 16:45

## 2022-04-21 RX ADMIN — FENTANYL CITRATE 100 MCG: 50 INJECTION, SOLUTION INTRAMUSCULAR; INTRAVENOUS at 16:28

## 2022-04-21 RX ADMIN — ONDANSETRON 4 MG: 2 INJECTION INTRAMUSCULAR; INTRAVENOUS at 16:36

## 2022-04-21 RX ADMIN — DEXAMETHASONE SODIUM PHOSPHATE 8 MG: 4 INJECTION, SOLUTION INTRA-ARTICULAR; INTRALESIONAL; INTRAMUSCULAR; INTRAVENOUS; SOFT TISSUE at 16:36

## 2022-04-21 RX ADMIN — SODIUM CHLORIDE, POTASSIUM CHLORIDE, SODIUM LACTATE AND CALCIUM CHLORIDE: 600; 310; 30; 20 INJECTION, SOLUTION INTRAVENOUS at 14:32

## 2022-04-21 RX ADMIN — MAGNESIUM SULFATE HEPTAHYDRATE 2 G: 40 INJECTION, SOLUTION INTRAVENOUS at 16:35

## 2022-04-21 RX ADMIN — GABAPENTIN 100 MG: 100 CAPSULE ORAL at 14:29

## 2022-04-21 RX ADMIN — SODIUM CHLORIDE, POTASSIUM CHLORIDE, SODIUM LACTATE AND CALCIUM CHLORIDE: 600; 310; 30; 20 INJECTION, SOLUTION INTRAVENOUS at 14:31

## 2022-04-21 RX ADMIN — PROPOFOL 100 MCG/KG/MIN: 10 INJECTION, EMULSION INTRAVENOUS at 16:28

## 2022-04-21 RX ADMIN — SODIUM CHLORIDE, POTASSIUM CHLORIDE, SODIUM LACTATE AND CALCIUM CHLORIDE: 600; 310; 30; 20 INJECTION, SOLUTION INTRAVENOUS at 16:39

## 2022-04-21 RX ADMIN — MIDAZOLAM 2 MG: 1 INJECTION INTRAMUSCULAR; INTRAVENOUS at 16:28

## 2022-04-21 RX ADMIN — Medication 2 G: at 16:23

## 2022-04-21 RX ADMIN — ACETAMINOPHEN 975 MG: 325 TABLET ORAL at 14:29

## 2022-04-21 RX ADMIN — LIDOCAINE HYDROCHLORIDE 0.1 ML: 10 INJECTION, SOLUTION EPIDURAL; INFILTRATION; INTRACAUDAL; PERINEURAL at 14:42

## 2022-04-21 NOTE — ANESTHESIA CARE TRANSFER NOTE
Patient: Kalin Shepard    Procedure: Procedure(s):  INSERTION, VASCULAR ACCESS PORT       Diagnosis: Adenocarcinoma of sigmoid colon (H) [C18.7]  Diagnosis Additional Information: No value filed.    Anesthesia Type:   General     Note:    Oropharynx: oropharynx clear of all foreign objects and spontaneously breathing  Level of Consciousness: drowsy  Oxygen Supplementation: face mask  Level of Supplemental Oxygen (L/min / FiO2): 6  Independent Airway: airway patency satisfactory and stable  Dentition: dentition unchanged  Vital Signs Stable: post-procedure vital signs reviewed and stable  Report to RN Given: handoff report given  Patient transferred to: Phase II    Handoff Report: Identifed the Patient, Identified the Reponsible Provider, Reviewed the pertinent medical history, Discussed the surgical course, Reviewed Intra-OP anesthesia mangement and issues during anesthesia, Set expectations for post-procedure period and Allowed opportunity for questions and acknowledgement of understanding      Vitals:  Vitals Value Taken Time   BP     Temp     Pulse     Resp     SpO2         Electronically Signed By: JULIUS Daniel CRNA  April 21, 2022  5:28 PM

## 2022-04-21 NOTE — OP NOTE
OPERATIVE NOTE  [facility] SURGERY    DATE:   4/21/2022    SURGEON:   Marianne Schroeder DO    FIRST ASSIST:  Chevy Del Angel PA-C;(needed for retraction, suction, and closure)    PRE-OPERATIVE DIAGNOSIS:   Adenocarcinoma of sigmoid colon (H) [C18.7]    POSTOPERATIVE DIAGNOSIS:   same    OPERATION:  Placement of right intravascular tunneled Power Port.    ANESTHESIA: Monitored anesthesia care.     INDICATIONS FOR PROCEDURE: Kalin Shepard is a 71 year old male who presented with diagnosis of metastatic adenocarcinoma and was recommended for intravascular access for chemotherapy.    PROCEDURE:   The patient was brought to the operating room and placed in the supine position upon the operating table. Nursing and anesthesia assured proper positioning prior to the procedure. A time-out was taken to assure proper patient, site and procedure with all in the operating room. Ultrasound was utilized to localize the right internal jugular vein low in the neck. Local anesthesia was instilled and finder needle used to gain access. Wire was next threaded and visualized in the superior vena cava (SVC) utilizing fluoroscopy. My attention was next turned to the chest and transverse incision created in the superior lateral chest and pocket created for the port and up to the prior neck incision in the neck after injection of local. The power port was placed and catheter tunneled to the prior neck nick incision. Fluoroscopy was used and catheter cut to appropriate length to lay in the distal SVC/right atrium at 21cm. Dilator was placed followed by catheter insertion which was noted in appropriate position.  The port was secured with 2 prolene stitches. Final fluoroscopy picture was taken and noted the catheter tip at the appropiate place and no pneumothorax was noted.   Access from the port noted it flushes and draws well.  The tubing was then heparized. The chest incision re approximated deeply with 3-0 monocryl. Skin incisions  were closed with 4-0 Monocryl in a subcuticular fashion followed by exofin.  The patient tolerated the procedure well and was transferred to the postanesthesia recovery room in stable condition.    [facility] Schroeder, [facility]

## 2022-04-21 NOTE — DISCHARGE INSTRUCTIONS
Discharge Instructions   Following Port/Tunneled Catheter Placement    Today you had a Tunneled catheter placed.    Your site(s) has been closed with: absorbable suture and Dermabond (skin glue). This thin layer will slough off in 7-10 days., and dressed with Nothing    If there is any oozing or bleeding from the site, apply direct pressure for 5-10 minutes with a gauze pad. If bleeding continues after 10 minutes call your primary doctor. If bleeding cannot be controlled with direct pressure, call 911    Call your Doctor if:  Bleeding as above  Swelling in your neck or arm  Fever greater than 100.5 degrees F  Other signs of infection such as, redness, tenderness, or drainage from the  wound  If you were given sedation:  We recommend an adult stay with you for the first 24 hours  No driving or alcoholic beverages for 24 hours     ADDITIONAL INSTRUCTIONS   No heavy lifting greater than 10 lbs. for one week   No tub bath, hot tub, or swimming until Exofin (Skin Glue) is removed   It is OK to shower and get the incision web but immediately pat it dry   If you have shortness of breath, difficulty breathing, or pain with breathing, please go to the emergency department immediately.      Procedure Physician: [facility] Schroeder,  Date of Procedure: 04/21/22                      Same Day Surgery Discharge Instructions  Special Precautions After Surgery - Adult    It is not unusual to feel lightheaded or faint, up to 24 hours after surgery or while taking pain medication.  If you have these symptoms; sit for a few minutes before standing and have someone assist you when getting up.  You should rest and relax for the next 24 hours and must have someone stay with you for at least 24 hours after your discharge.  DO NOT DRIVE any vehicle or operate mechanical equipment for 24 hours following the end of your surgery.  DO NOT DRIVE while taking narcotic pain medications that have been prescribed by your physician.  If you had a limb  operated on, you must be able to use it fully to drive.  DO NOT drink alcoholic beverages for 24 hours following surgery or while taking prescription pain medication.  Drink clear liquids (apple juice, ginger ale, broth, 7-Up, etc.).  Progress to your regular diet as you feel able.  Any questions call your physician and do not make important decisions for 24 hours.    __________________________________________________________________________________________________________________________________  IMPORTANT NUMBERS:    [facility] Main Number:  [phone], [phone]  Pharmacy:  [phone]  Same Day Surgery:  [phone], Monday - Friday until 8:30 p.m.  Urgent Care:  [phone]  Emergency Room:  [phone]      Surgery Specialty Clinic:  [phone]

## 2022-04-21 NOTE — INTERVAL H&P NOTE
I have reviewed the surgical (or preoperative) H&P that is linked to this encounter, and examined the patient. There are no significant changes    Clinical Conditions Present on Arrival:  Clinically Significant Risk Factors Present on Admission                 # Coagulation Defect: home medication list includes an anticoagulant medication      [facility] leroy

## 2022-04-21 NOTE — TELEPHONE ENCOUNTER
Called back asking for clarification of what she was asking. Writer asked if they were asking if it was okay to missed visit for missed visit tomorrow, and stated is he due for Ostomy change tomorrow?  Asked for a returned call to verify what they were asking. Left contact information.  Ana Harris RN

## 2022-04-21 NOTE — TELEPHONE ENCOUNTER
Reason for Call:  Home Care Verbal Order      Detailed comments:   VO - Ok to miss tomorrow's visit per Pt's request.  Ostomy changes to check on him    .[facility]/ [facility]  - [facility]  Nurse Rachel     Tele # [phone]    Call taken on 4/21/2022 at 1:47 PM by Carol Galicia

## 2022-04-21 NOTE — TELEPHONE ENCOUNTER
Rachel from [facility] called back, verbal authorization for pt to miss home care visit tomorrow as pt has an appointment elsewhere, orders given per [facility] RN protocols. Irina Garcia RN     Irina Garcia RN

## 2022-04-21 NOTE — ANESTHESIA POSTPROCEDURE EVALUATION
Patient: Kalin Shepard    Procedure: Procedure(s):  INSERTION, VASCULAR ACCESS PORT       Anesthesia Type:  General    Note:  Disposition: Outpatient   Postop Pain Control: Uneventful            Sign Out: Well controlled pain   PONV: No   Neuro/Psych: Uneventful            Sign Out: Acceptable/Baseline neuro status   Airway/Respiratory: Uneventful            Sign Out: Acceptable/Baseline resp. status   CV/Hemodynamics: Uneventful            Sign Out: Acceptable CV status; No obvious hypovolemia; No obvious fluid overload   Other NRE: NONE   DID A NON-ROUTINE EVENT OCCUR? No           Last vitals:  Vitals Value Taken Time   /63 04/21/22 1815   Temp 36.4  C (97.6  F) 04/21/22 1730   Pulse     Resp 18 04/21/22 1815   SpO2 94 % 04/21/22 1830   Vitals shown include unvalidated device data.    Electronically Signed By: JULIUS Terry CRNA  April 21, 2022  6:41 PM

## 2022-04-25 ENCOUNTER — PATIENT OUTREACH (OUTPATIENT)
Dept: ONCOLOGY | Facility: CLINIC | Age: 72
End: 2022-04-25
Payer: COMMERCIAL

## 2022-04-25 NOTE — PROGRESS NOTES
[facility]: Cancer Care Plan of Care Education Note                                    Discussion with Patient:                                                      Called pt to review Chemotherapy education.     Antiemetics: Zorfan, compazine  Steroid use/side effect: Dexamethasone daily x 2 days starting day after chemo        Assessment:                                                      Assessment completed with:: Patient    Current living arrangement:: I live in a private home with spouse    Plan of Care Education   Yearly learning assessment completed?: Yes (see Education tab)  Diagnosis:: Colorectal cancer  Does patient understand diagnosis?: Yes  Tx plan/regimen:: Folfox +/- Avastin  Does patient understand treatment plan/regimen?: Yes  Preparing for treatment:: Reviewed treatment preparation information with patient (vascular access, day of chemo, visitor policy, what to bring, etc.)  Vascular access:: Port  Side effect education:: Diarrhea/Constipation, Fatigue, Infection, Lab value monitoring (anemia, neutropenia, thrombocytopenia), Mouth sores, Mylosuppression, Nausea/Vomiting, Neuropathy, Sexual health, Skin changes  Supportive services:: Home infusion  Transportation means:: None  Safety/self care at home reviewed with patient:: Yes  Informal Support system:: Family  Coping - concerns/fears reviewed with patient:: Yes  Plan of Care:: MD follow-up appointment, Treatment schedule, Lab appointment  When to call provider:: Bleeding, Increased shortness of breath, New/worsening pain, Shaking chills, Temperature >100.4F, Uncontrolled diarrhea/constipation, Uncontrolled nausea/vomiting  Reasons for deferring treatment reviewed with patient:: Yes    Evaluation of Learning  Patient Education Provided: Yes  Readiness:: Acceptance  Method:: Booklet/Handout, Explanation  Response:: Verbalizes understanding        Intervention/Education provided during outreach:                                                            Patient to follow up as scheduled at next apt tomorrow with Chemo therapy to follow.     Signature:  Danna Collins RN on 4/25/2022 at 11:11 AM

## 2022-04-26 ENCOUNTER — INFUSION THERAPY VISIT (OUTPATIENT)
Dept: INFUSION THERAPY | Facility: CLINIC | Age: 72
End: 2022-04-26
Attending: INTERNAL MEDICINE
Payer: COMMERCIAL

## 2022-04-26 ENCOUNTER — ONCOLOGY VISIT (OUTPATIENT)
Dept: ONCOLOGY | Facility: CLINIC | Age: 72
End: 2022-04-26
Attending: INTERNAL MEDICINE
Payer: COMMERCIAL

## 2022-04-26 VITALS
BODY MASS INDEX: 19.37 KG/M2 | SYSTOLIC BLOOD PRESSURE: 124 MMHG | RESPIRATION RATE: 12 BRPM | WEIGHT: 135 LBS | OXYGEN SATURATION: 99 % | DIASTOLIC BLOOD PRESSURE: 66 MMHG | TEMPERATURE: 97.6 F | HEART RATE: 75 BPM

## 2022-04-26 VITALS — SYSTOLIC BLOOD PRESSURE: 124 MMHG | DIASTOLIC BLOOD PRESSURE: 74 MMHG | HEART RATE: 66 BPM

## 2022-04-26 VITALS — BODY MASS INDEX: 19.37 KG/M2 | WEIGHT: 135 LBS

## 2022-04-26 DIAGNOSIS — C18.7 ADENOCARCINOMA OF SIGMOID COLON (H): Primary | ICD-10-CM

## 2022-04-26 LAB
ALBUMIN UR-MCNC: NEGATIVE MG/DL
BASOPHILS # BLD AUTO: 0.2 10E3/UL (ref 0–0.2)
BASOPHILS NFR BLD AUTO: 2 %
BILIRUB SERPL-MCNC: 0.2 MG/DL (ref 0.2–1.3)
CREAT SERPL-MCNC: 0.65 MG/DL (ref 0.66–1.25)
EOSINOPHIL # BLD AUTO: 0.8 10E3/UL (ref 0–0.7)
EOSINOPHIL NFR BLD AUTO: 7 %
ERYTHROCYTE [DISTWIDTH] IN BLOOD BY AUTOMATED COUNT: 16.1 % (ref 10–15)
GFR SERPL CREATININE-BSD FRML MDRD: >90 ML/MIN/1.73M2
HCT VFR BLD AUTO: 38.8 % (ref 40–53)
HGB BLD-MCNC: 12.3 G/DL (ref 13.3–17.7)
IMM GRANULOCYTES # BLD: 0 10E3/UL
IMM GRANULOCYTES NFR BLD: 0 %
LYMPHOCYTES # BLD AUTO: 2.3 10E3/UL (ref 0.8–5.3)
LYMPHOCYTES NFR BLD AUTO: 23 %
MCH RBC QN AUTO: 30.9 PG (ref 26.5–33)
MCHC RBC AUTO-ENTMCNC: 31.7 G/DL (ref 31.5–36.5)
MCV RBC AUTO: 98 FL (ref 78–100)
MONOCYTES # BLD AUTO: 1 10E3/UL (ref 0–1.3)
MONOCYTES NFR BLD AUTO: 10 %
NEUTROPHILS # BLD AUTO: 5.9 10E3/UL (ref 1.6–8.3)
NEUTROPHILS NFR BLD AUTO: 58 %
NRBC # BLD AUTO: 0 10E3/UL
NRBC BLD AUTO-RTO: 0 /100
PLATELET # BLD AUTO: 291 10E3/UL (ref 150–450)
RBC # BLD AUTO: 3.98 10E6/UL (ref 4.4–5.9)
WBC # BLD AUTO: 10.2 10E3/UL (ref 4–11)

## 2022-04-26 PROCEDURE — 82565 ASSAY OF CREATININE: CPT | Performed by: INTERNAL MEDICINE

## 2022-04-26 PROCEDURE — 96375 TX/PRO/DX INJ NEW DRUG ADDON: CPT

## 2022-04-26 PROCEDURE — 96367 TX/PROPH/DG ADDL SEQ IV INF: CPT

## 2022-04-26 PROCEDURE — 258N000003 HC RX IP 258 OP 636: Performed by: INTERNAL MEDICINE

## 2022-04-26 PROCEDURE — 96368 THER/DIAG CONCURRENT INF: CPT

## 2022-04-26 PROCEDURE — 96415 CHEMO IV INFUSION ADDL HR: CPT

## 2022-04-26 PROCEDURE — 96413 CHEMO IV INFUSION 1 HR: CPT

## 2022-04-26 PROCEDURE — G0463 HOSPITAL OUTPT CLINIC VISIT: HCPCS | Mod: 25

## 2022-04-26 PROCEDURE — 82247 BILIRUBIN TOTAL: CPT | Performed by: INTERNAL MEDICINE

## 2022-04-26 PROCEDURE — G0498 CHEMO EXTEND IV INFUS W/PUMP: HCPCS

## 2022-04-26 PROCEDURE — 250N000011 HC RX IP 250 OP 636: Performed by: INTERNAL MEDICINE

## 2022-04-26 PROCEDURE — 99214 OFFICE O/P EST MOD 30 MIN: CPT | Performed by: INTERNAL MEDICINE

## 2022-04-26 PROCEDURE — 96411 CHEMO IV PUSH ADDL DRUG: CPT

## 2022-04-26 PROCEDURE — 81003 URINALYSIS AUTO W/O SCOPE: CPT | Performed by: INTERNAL MEDICINE

## 2022-04-26 PROCEDURE — 85025 COMPLETE CBC W/AUTO DIFF WBC: CPT | Performed by: INTERNAL MEDICINE

## 2022-04-26 RX ORDER — EPINEPHRINE 1 MG/ML
0.3 INJECTION, SOLUTION, CONCENTRATE INTRAVENOUS EVERY 5 MIN PRN
Status: CANCELLED | OUTPATIENT
Start: 2022-04-26

## 2022-04-26 RX ORDER — MEPERIDINE HYDROCHLORIDE 25 MG/ML
25 INJECTION INTRAMUSCULAR; INTRAVENOUS; SUBCUTANEOUS EVERY 30 MIN PRN
Status: CANCELLED | OUTPATIENT
Start: 2022-04-26

## 2022-04-26 RX ORDER — HEPARIN SODIUM,PORCINE 10 UNIT/ML
5 VIAL (ML) INTRAVENOUS
Status: CANCELLED | OUTPATIENT
Start: 2022-04-28

## 2022-04-26 RX ORDER — ONDANSETRON 2 MG/ML
8 INJECTION INTRAMUSCULAR; INTRAVENOUS ONCE
Status: COMPLETED | OUTPATIENT
Start: 2022-04-26 | End: 2022-04-26

## 2022-04-26 RX ORDER — FLUOROURACIL 50 MG/ML
400 INJECTION, SOLUTION INTRAVENOUS ONCE
Status: COMPLETED | OUTPATIENT
Start: 2022-04-26 | End: 2022-04-26

## 2022-04-26 RX ORDER — DEXAMETHASONE 4 MG/1
8 TABLET ORAL DAILY
Qty: 4 TABLET | Refills: 2 | Status: SHIPPED | OUTPATIENT
Start: 2022-04-26 | End: 2022-07-05

## 2022-04-26 RX ORDER — NALOXONE HYDROCHLORIDE 0.4 MG/ML
0.2 INJECTION, SOLUTION INTRAMUSCULAR; INTRAVENOUS; SUBCUTANEOUS
Status: CANCELLED | OUTPATIENT
Start: 2022-04-26

## 2022-04-26 RX ORDER — ONDANSETRON 2 MG/ML
8 INJECTION INTRAMUSCULAR; INTRAVENOUS ONCE
Status: CANCELLED | OUTPATIENT
Start: 2022-04-26

## 2022-04-26 RX ORDER — METHYLPREDNISOLONE SODIUM SUCCINATE 125 MG/2ML
125 INJECTION, POWDER, LYOPHILIZED, FOR SOLUTION INTRAMUSCULAR; INTRAVENOUS
Status: CANCELLED
Start: 2022-04-26

## 2022-04-26 RX ORDER — DIPHENHYDRAMINE HYDROCHLORIDE 50 MG/ML
50 INJECTION INTRAMUSCULAR; INTRAVENOUS
Status: CANCELLED
Start: 2022-04-26

## 2022-04-26 RX ORDER — ONDANSETRON 8 MG/1
8 TABLET, FILM COATED ORAL EVERY 8 HOURS PRN
Qty: 30 TABLET | Refills: 2 | Status: SHIPPED | OUTPATIENT
Start: 2022-04-26 | End: 2022-05-10

## 2022-04-26 RX ORDER — ALBUTEROL SULFATE 0.83 MG/ML
2.5 SOLUTION RESPIRATORY (INHALATION)
Status: CANCELLED | OUTPATIENT
Start: 2022-04-26

## 2022-04-26 RX ORDER — HEPARIN SODIUM,PORCINE 10 UNIT/ML
5 VIAL (ML) INTRAVENOUS
Status: CANCELLED | OUTPATIENT
Start: 2022-04-26

## 2022-04-26 RX ORDER — HEPARIN SODIUM (PORCINE) LOCK FLUSH IV SOLN 100 UNIT/ML 100 UNIT/ML
5 SOLUTION INTRAVENOUS
Status: CANCELLED | OUTPATIENT
Start: 2022-04-28

## 2022-04-26 RX ORDER — HEPARIN SODIUM (PORCINE) LOCK FLUSH IV SOLN 100 UNIT/ML 100 UNIT/ML
5 SOLUTION INTRAVENOUS
Status: CANCELLED | OUTPATIENT
Start: 2022-04-26

## 2022-04-26 RX ORDER — LORAZEPAM 2 MG/ML
0.5 INJECTION INTRAMUSCULAR EVERY 4 HOURS PRN
Status: CANCELLED | OUTPATIENT
Start: 2022-04-26

## 2022-04-26 RX ORDER — HEPARIN SODIUM (PORCINE) LOCK FLUSH IV SOLN 100 UNIT/ML 100 UNIT/ML
5 SOLUTION INTRAVENOUS
Status: DISCONTINUED | OUTPATIENT
Start: 2022-04-26 | End: 2022-04-26 | Stop reason: HOSPADM

## 2022-04-26 RX ORDER — PROCHLORPERAZINE MALEATE 10 MG
10 TABLET ORAL EVERY 6 HOURS PRN
Qty: 30 TABLET | Refills: 2 | Status: SHIPPED | OUTPATIENT
Start: 2022-04-26 | End: 2022-09-02

## 2022-04-26 RX ORDER — FLUOROURACIL 50 MG/ML
400 INJECTION, SOLUTION INTRAVENOUS ONCE
Status: CANCELLED | OUTPATIENT
Start: 2022-04-26

## 2022-04-26 RX ORDER — ALBUTEROL SULFATE 90 UG/1
1-2 AEROSOL, METERED RESPIRATORY (INHALATION)
Status: CANCELLED
Start: 2022-04-26

## 2022-04-26 RX ADMIN — ONDANSETRON 8 MG: 2 INJECTION INTRAMUSCULAR; INTRAVENOUS at 09:48

## 2022-04-26 RX ADMIN — FAMOTIDINE 20 MG: 20 INJECTION, SOLUTION INTRAVENOUS at 09:51

## 2022-04-26 RX ADMIN — OXALIPLATIN 150 MG: 100 INJECTION, SOLUTION, CONCENTRATE INTRAVENOUS at 11:18

## 2022-04-26 RX ADMIN — DEXAMETHASONE SODIUM PHOSPHATE: 10 INJECTION, SOLUTION INTRAMUSCULAR; INTRAVENOUS at 10:06

## 2022-04-26 RX ADMIN — FLUOROURACIL 690 MG: 50 INJECTION, SOLUTION INTRAVENOUS at 13:47

## 2022-04-26 RX ADMIN — SODIUM CHLORIDE 300 MG: 9 INJECTION, SOLUTION INTRAVENOUS at 10:37

## 2022-04-26 RX ADMIN — SODIUM CHLORIDE 250 ML: 9 INJECTION, SOLUTION INTRAVENOUS at 09:44

## 2022-04-26 RX ADMIN — DEXTROSE MONOHYDRATE 250 ML: 50 INJECTION, SOLUTION INTRAVENOUS at 11:22

## 2022-04-26 RX ADMIN — LEUCOVORIN CALCIUM 692 MG: 350 INJECTION, POWDER, LYOPHILIZED, FOR SOLUTION INTRAMUSCULAR; INTRAVENOUS at 11:18

## 2022-04-26 ASSESSMENT — PAIN SCALES - GENERAL: PAINLEVEL: MODERATE PAIN (4)

## 2022-04-26 NOTE — PROGRESS NOTES
"Infusion Nursing Note:  Kalin Shepard presents today for FOLFOX and Zirabev.    Patient seen by provider today: Yes: Dr. [place]   present during visit today: Not Applicable.    Note: N/A.  Intravenous Access:  Implanted port    Treatment Conditions:  Results reviewed, labs MET treatment parameters, ok to proceed with treatment.      Post Infusion Assessment:  Patient tolerated infusion without incident.  Good blood return before and after chemo administration.    No evidence of extravasations.  Prior to discharge: Port is secured in place with tegaderm and flushed with 10cc NS with positive blood return noted.  Continuous home infusion pump connected.    All connectors secured in place and clamps taped open.    Pump started, \"running\" noted on display (CADD): Not Applicable.  Patient instructed to call our clinic or [facility] with any questions or concerns at home.  Patient verbalized understanding.    Patient set up for pump disconnect Thurs 4/28 at 1200.  Pt may decide to have home disconnect after he speaks to the .  Asked him to call infusion nurses station if he chooses that option.     Discharge Plan:   Patient discharged in stable condition accompanied by: self.  Departure Mode: Ambulatory.    Kat Chaves RN      "

## 2022-04-26 NOTE — PROGRESS NOTES
"Oncology Rooming Note    April 26, 2022 9:09 AM   Kalin Shepard is a 71 year old male who presents for:    Chief Complaint   Patient presents with     Oncology Clinic Visit     Adenocarcinoma of sigmoid colon - Labs provider and infusion     Initial Vitals: /66 (BP Location: Right arm, Patient Position: Sitting, Cuff Size: Adult Regular)   Pulse 75   Temp 97.6  F (36.4  C) (Tympanic)   Resp 12   Wt 61.2 kg (135 lb)   SpO2 99%   BMI 19.37 kg/m   Estimated body mass index is 19.37 kg/m  as calculated from the following:    Height as of 4/21/22: 1.778 m (5' 10\").    Weight as of this encounter: 61.2 kg (135 lb). Body surface area is 1.74 meters squared.  Moderate Pain (4) Comment: Data Unavailable   No LMP for male patient.  Allergies reviewed: Yes  Medications reviewed: Yes    Medications: Medication refills not needed today.  Pharmacy name entered into [facility]:    [id number], [place] - [address]  [facility], [place] - [address] [place] [address]    Clinical concerns:  None      Sara Almanzar CMA            "

## 2022-04-26 NOTE — PATIENT INSTRUCTIONS
Cycle 1 folfox with kranthi today. Pump discharge on Thursday. RTC in 2 weeks to see me with labs prior, cycle 2 to follow the same day.

## 2022-04-26 NOTE — LETTER
"    4/26/2022         RE: Kalin Shepard  [address] [facility] [address]        Dear Colleague,    Thank you for referring your patient, Kalin Shepard, to the [facility]. Please see a copy of my visit note below.    Oncology Rooming Note    April 26, 2022 9:09 AM   Kalin Shepard is a 71 year old male who presents for:    Chief Complaint   Patient presents with     Oncology Clinic Visit     Adenocarcinoma of sigmoid colon - Labs provider and infusion     Initial Vitals: /66 (BP Location: Right arm, Patient Position: Sitting, Cuff Size: Adult Regular)   Pulse 75   Temp 97.6  F (36.4  C) (Tympanic)   Resp 12   Wt 61.2 kg (135 lb)   SpO2 99%   BMI 19.37 kg/m   Estimated body mass index is 19.37 kg/m  as calculated from the following:    Height as of 4/21/22: 1.778 m (5' 10\").    Weight as of this encounter: 61.2 kg (135 lb). Body surface area is 1.74 meters squared.  Moderate Pain (4) Comment: Data Unavailable   No LMP for male patient.  Allergies reviewed: Yes  Medications reviewed: Yes    Medications: Medication refills not needed today.  Pharmacy name entered into [email or web address]:    [id number] - [place], [place] - [address]  [facility], [place] - [address] [place] [address]    Clinical concerns:  None      Sara Almanzar, [facility] Hematology and Oncology Progress Note    Patient: Kalin Shepard  MRN: [id number]  Date of Service: 04/26/2022        Reason for Visit    Chief Complaint   Patient presents with     Oncology Clinic Visit     Adenocarcinoma of sigmoid colon - Labs provider and infusion         Problem List Items Addressed This Visit        Digestive    Adenocarcinoma of sigmoid colon (H) - Primary    Relevant Orders    Infusion Appointment Request (Completed)    Infusion Appointment Request            Assessment and Plan  1. 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  He is almost 5 weeks out from surgery.  I again reviewed the parental side effects and complications associated with FOLFOX and bevacizumab chemotherapy.  I expect neuro sensitivity from oxaliplatin.  Watch for bleeding issues especially with bevacizumab.  He is agreeable to proceed with the treatment.  Labs look good.  Hemoglobin is 12.3.  Normal neutrophil count.  Blood count is 291.  Creatinine is 0.65.  Bilirubin is 0.2.  He will come back on Thursday for pump disconnection.     2. Indeterminate liver lesions  He is meeting with surgery tomorrow.  If the staples come off then I think assessing for liver disease with MRI would be appropriate.       Patient Instructions   Cycle 1 folfox with amrita today. Pump discharge on Thursday. RTC in 2 weeks to see me with labs prior, cycle 2 to follow the same day.         Cancer Staging  Adenocarcinoma of sigmoid colon (H)  Staging form: Colon and Rectum, AJCC 8th Edition  - Pathologic: Stage Unknown (pTX, pNX, pM1) - Signed by Pamela Colorado MD on 5/6/2022      ECOG Performance    1 - Can't do physically strenuous work, but fully ambyulatory and can do light sedentary work         Problem List    Patient Active Problem List   Diagnosis     Colon adenocarcinoma (H)     Colostomy present (H)     Cachexia (H)     Adenocarcinoma of sigmoid colon (H)        Oncology history      Interval History   Kalin Shepard is a 71 year old with sigmoid coloncancer with peritoneal metastasis with peritoneal metastasis who is here to start chemotherapy with FOLFOX and bevacizumab.    Doing well since last visit.  He had a port placed.  I had ordered an MRI of the liver but this cannot be done since he still had surgical staples in place.  Denies any symptoms since last visit.  Denies any abdominal pain.  Ostomy is working well.  No skin infection or irritation.  His appetite is okay.  Is been trying to increase his oral  intake.    Labs reviewed today.    Review of Systems  A comprehensive review of systems was negative except for what is noted in the interval history    Current Outpatient Medications   Medication     ibuprofen (ADVIL/MOTRIN) 400 MG tablet     Lidocaine (LIDOCARE) 4 % Patch     ondansetron (ZOFRAN) 8 MG tablet     oxyCODONE (ROXICODONE) 5 MG tablet     polyethylene glycol (MIRALAX) 17 GM/Dose powder     prochlorperazine (COMPAZINE) 10 MG tablet     acetaminophen (TYLENOL) 500 MG tablet     dexamethasone (DECADRON) 4 MG tablet     methocarbamol (ROBAXIN) 500 MG tablet     No current facility-administered medications for this visit.        Physical Exam    No flowsheet data found.    General: alert and cooperative  HEENT: Head: Normal, normocephalic, atraumatic.  Eye: Normal external eye, conjunctiva, lids cornea, DANA.  Chest: Clear to auscultation bilaterally  Cardiac: S1, S2 normal, regular rate and rhythm  Abdomen: Soft and nontender, ostomy in place  Extremities: atraumatic, no peripheral edema  Skin: Ostomy site looks clean, laparotomy skin incision also looks clean without any signs of infection, staples in place  CNS: Alert and oriented x3, neurologic exam grossly normal.  Lymphatics: No bilateral cervical, axillary, supraclavicular or inguinal adenopathy noted    Lab Results    No results found for this or any previous visit (from the past 168 hour(s)).    Imaging    CT Chest/Abdomen/Pelvis w Contrast    Result Date: 4/7/2022  Combined Report of:    PET and CT on  4/7/2022 2:56 PM : 1. PET of the neck, chest, abdomen, and pelvis. 2. PET CT Fusion for Attenuation Correction and Anatomical Localization:  3. Diagnostic CT scan of the chest, abdomen, and pelvis with intravenous contrast for interpretation. 3. CT of the chest, abdomen and pelvis obtained for diagnostic interpretation. 4. 3D MIP and PET-CT fused images were processed on an independent workstation and archived to PACS and reviewed by a radiologist.  Technique: 1. PET: The patient received 10.2 mCi of F-18-FDG; the serum glucose was 101 prior to administration, body weight was 58.1 kg. Images were evaluated in the axial, sagittal, and coronal planes as well as the rotational whole body MIP. Images were acquired from the Vertex to the Feet. UPTAKE WAS MEASURED AT 68 MINUTES. BACKGROUND:  Liver SUV max= 2.2,   Aorta Blood SUV Max: 1.5. 2. CT: Volumetric acquisition for clinical interpretation of the chest, abdomen, and pelvis acquired at 3 mm sections . The chest, abdomen, and pelvis were evaluated at 5 mm sections in bone, soft tissue, and lung windows.  The patient received 63 cc of Isovue 370 intravenously for the examination.  3. 3D MIP and PET-CT fused images were processed on an independent workstation and archived to PACS and reviewed by a radiologist. INDICATION: initial workup; new diagnosis sigmoid colon cancer ADDITIONAL INFORMATION OBTAINED FROM EMR: 71-year-old male with recently diagnosed sigmoid colon adenocarcinoma status post expiratory laparotomy, small bowel resection, and loop descending colostomy due to malignant large bowel obstruction. Positive peritoneal biopsy. COMPARISON: CT abdomen and pelvis 3/26/2022 FINDINGS: HEAD/NECK: Focal FDG avid focus in the left cervical musculature at the C1 level (series 3 image 91), with no underlying findings on CT, presumed physiologic related to muscle contraction. Scattered mucosal thickening in the paranasal sinuses with layering air-fluid level in the left maxillary sinus. The mastoid air cells are clear. No pathologically enlarged lymph nodes are evident. The thyroid gland is unremarkable. CHEST: No focal suspicious FDG uptake. No cardiomegaly or significant pericardial effusion. Moderate coronary artery calcifications. Normal caliber main pulmonary artery with enlarged right (31 mm and left (28 mm) pulmonary arteries, which can be seen in pulmonary hypertension. Normal caliber thoracic aorta. Aortic  atherosclerosis. Small sliding-type hiatal hernia. No suspicious lymphadenopathy. No pneumothorax or pleural effusion. Bibasilar atelectasis. Peripheral wedgelike reticular opacities in the inferolateral right upper lobe with low levels of uptake, SUV max 1.7. Few small scattered pulmonary nodules, for example a 3 mm part solid nodule in the right upper lobe (series 8 image 40), a 2 mm solid nodule in the right lower lobe (series 8 image 92) ABDOMEN AND PELVIS: Focal FDG uptake in the known sigmoid mass in the low pelvis with pericolonic stranding (series 4 image 424) and max SUV 18.0. Mild remaining diffuse bowel uptake, likely physiologic. Mild expected uptake along the laparotomy site. No other focal suspicious uptake. Similar few scattered subcentimeter hepatic hypodensities, too small to characterize, as seen on comparison CT 3/26/2022. Mild intrahepatic biliary ductal dilatation without significant extrahepatic dilatation (for age). Prominent main pancreatic duct without distal obstructing lesion. The gallbladder, spleen, and adrenals are unremarkable. Simple-appearing left renal cyst. Circumferential bladder wall thickening, likely chronic outlet obstruction in the setting of prostatomegaly. Postoperative changes of partial small bowel resection in loop descending colostomy. Diffuse air-fluid levels throughout the small bowel without significant distention or focal transition point. No definite pneumoperitoneum. Trace postoperative subcutaneous emphysema and extraperitoneal air in the left lower quadrant/left groin. Suspect trace pelvic free fluid. No definite well-organized fluid collection. Focal infrarenal abdominal ectasia measuring 2.7 x 2.9 cm proximal to the bifurcation (series 4 image 353; coronal series 12 image 37). Scattered atherosclerotic calcifications of the abdominal aorta and major branch vessels. Focal high-grade atherosclerotic narrowing of the right external iliac artery (series 4 image  413). No definite suspicious lymphadenopathy. LOWER EXTREMITIES: No hypermetabolic lesions. BONES: There is no abnormal FDG uptake in the skeleton. There are no suspicious lytic or blastic osseous lesions. Mild degenerative changes of the spine.     IMPRESSION: In this patient with recently diagnosed sigmoid colon adenocarcinoma: 1. Hypermetabolic sigmoid mass consistent with known primary malignancy. There is surrounding pericolonic stranding suggestive of locally invasive disease, with poor delineation of the mass from multiple other loops of bowel and the left posterolateral bladder wall. 2. Indeterminant small scattered pulmonary nodules and subcentimeter hepatic hypodensities, too small to fully characterize. Recommend comparison with older studies if available, otherwise attention on follow-up. Liver MRI can be considered to further characterize the small hypodensities. 3. Postoperative changes of partial small bowel resection and descending loop colostomy. Multiple air-fluid levels throughout the small bowel without other evidence of obstruction, favor residual ongoing adynamic ileus or resolving patulous bowel from previous obstruction. 4. Air-fluid level in the left maxillary sinus, can be seen with current or recent acute sinusitis. 5. Subpleural groundglass and reticulonodular opacity in the right upper lobe with low levels of uptake, favor infectious/inflammatory etiology, possible scarring. I have personally reviewed the examination and initial interpretation and I agree with the findings. WINIFRED CALLOWAY MD   SYSTEM ID:  [id number]    XR Chest Port 1 View    Result Date: 4/21/2022  EXAM: XR CHEST PORT 1 VIEW LOCATION: [facility] DATE/TIME: 4/21/2022 5:48 PM INDICATION: Evaluate right IJ central line placement. COMPARISON: 03/28/2022     IMPRESSION: Heart size and pulmonary vascularity normal. Calcified granuloma right base. Nodular opacity mid left lung not present on recent  CT PET scan and should represent a prominent left nipple shadow. Lungs otherwise clear. No acute infiltrates or effusions. Right IJ Port-A-Cath tip in the mid SVC. No pneumothorax.    XR Surgery SUZIE Fluoro L/T 5 Min w Stills    Result Date: 4/22/2022  This exam was marked as non-reportable because it will not be read by a radiologist or a [place] non-radiologist provider.     PET Oncology Whole Body    Result Date: 4/7/2022  Combined Report of:    PET and CT on  4/7/2022 2:56 PM : 1. PET of the neck, chest, abdomen, and pelvis. 2. PET CT Fusion for Attenuation Correction and Anatomical Localization:  3. Diagnostic CT scan of the chest, abdomen, and pelvis with intravenous contrast for interpretation. 3. CT of the chest, abdomen and pelvis obtained for diagnostic interpretation. 4. 3D MIP and PET-CT fused images were processed on an independent workstation and archived to PACS and reviewed by a radiologist. Technique: 1. PET: The patient received 10.2 mCi of F-18-FDG; the serum glucose was 101 prior to administration, body weight was 58.1 kg. Images were evaluated in the axial, sagittal, and coronal planes as well as the rotational whole body MIP. Images were acquired from the Vertex to the Feet. UPTAKE WAS MEASURED AT 68 MINUTES. BACKGROUND:  Liver SUV max= 2.2,   Aorta Blood SUV Max: 1.5. 2. CT: Volumetric acquisition for clinical interpretation of the chest, abdomen, and pelvis acquired at 3 mm sections . The chest, abdomen, and pelvis were evaluated at 5 mm sections in bone, soft tissue, and lung windows.  The patient received 63 cc of Isovue 370 intravenously for the examination.  3. 3D MIP and PET-CT fused images were processed on an independent workstation and archived to PACS and reviewed by a radiologist. INDICATION: initial workup; new diagnosis sigmoid colon cancer ADDITIONAL INFORMATION OBTAINED FROM EMR: 71-year-old male with recently diagnosed sigmoid colon adenocarcinoma status post expiratory  laparotomy, small bowel resection, and loop descending colostomy due to malignant large bowel obstruction. Positive peritoneal biopsy. COMPARISON: CT abdomen and pelvis 3/26/2022 FINDINGS: HEAD/NECK: Focal FDG avid focus in the left cervical musculature at the C1 level (series 3 image 91), with no underlying findings on CT, presumed physiologic related to muscle contraction. Scattered mucosal thickening in the paranasal sinuses with layering air-fluid level in the left maxillary sinus. The mastoid air cells are clear. No pathologically enlarged lymph nodes are evident. The thyroid gland is unremarkable. CHEST: No focal suspicious FDG uptake. No cardiomegaly or significant pericardial effusion. Moderate coronary artery calcifications. Normal caliber main pulmonary artery with enlarged right (31 mm and left (28 mm) pulmonary arteries, which can be seen in pulmonary hypertension. Normal caliber thoracic aorta. Aortic atherosclerosis. Small sliding-type hiatal hernia. No suspicious lymphadenopathy. No pneumothorax or pleural effusion. Bibasilar atelectasis. Peripheral wedgelike reticular opacities in the inferolateral right upper lobe with low levels of uptake, SUV max 1.7. Few small scattered pulmonary nodules, for example a 3 mm part solid nodule in the right upper lobe (series 8 image 40), a 2 mm solid nodule in the right lower lobe (series 8 image 92) ABDOMEN AND PELVIS: Focal FDG uptake in the known sigmoid mass in the low pelvis with pericolonic stranding (series 4 image 424) and max SUV 18.0. Mild remaining diffuse bowel uptake, likely physiologic. Mild expected uptake along the laparotomy site. No other focal suspicious uptake. Similar few scattered subcentimeter hepatic hypodensities, too small to characterize, as seen on comparison CT 3/26/2022. Mild intrahepatic biliary ductal dilatation without significant extrahepatic dilatation (for age). Prominent main pancreatic duct without distal obstructing lesion.  The gallbladder, spleen, and adrenals are unremarkable. Simple-appearing left renal cyst. Circumferential bladder wall thickening, likely chronic outlet obstruction in the setting of prostatomegaly. Postoperative changes of partial small bowel resection in loop descending colostomy. Diffuse air-fluid levels throughout the small bowel without significant distention or focal transition point. No definite pneumoperitoneum. Trace postoperative subcutaneous emphysema and extraperitoneal air in the left lower quadrant/left groin. Suspect trace pelvic free fluid. No definite well-organized fluid collection. Focal infrarenal abdominal ectasia measuring 2.7 x 2.9 cm proximal to the bifurcation (series 4 image 353; coronal series 12 image 37). Scattered atherosclerotic calcifications of the abdominal aorta and major branch vessels. Focal high-grade atherosclerotic narrowing of the right external iliac artery (series 4 image 413). No definite suspicious lymphadenopathy. LOWER EXTREMITIES: No hypermetabolic lesions. BONES: There is no abnormal FDG uptake in the skeleton. There are no suspicious lytic or blastic osseous lesions. Mild degenerative changes of the spine.     IMPRESSION: In this patient with recently diagnosed sigmoid colon adenocarcinoma: 1. Hypermetabolic sigmoid mass consistent with known primary malignancy. There is surrounding pericolonic stranding suggestive of locally invasive disease, with poor delineation of the mass from multiple other loops of bowel and the left posterolateral bladder wall. 2. Indeterminant small scattered pulmonary nodules and subcentimeter hepatic hypodensities, too small to fully characterize. Recommend comparison with older studies if available, otherwise attention on follow-up. Liver MRI can be considered to further characterize the small hypodensities. 3. Postoperative changes of partial small bowel resection and descending loop colostomy. Multiple air-fluid levels throughout the  small bowel without other evidence of obstruction, favor residual ongoing adynamic ileus or resolving patulous bowel from previous obstruction. 4. Air-fluid level in the left maxillary sinus, can be seen with current or recent acute sinusitis. 5. Subpleural groundglass and reticulonodular opacity in the right upper lobe with low levels of uptake, favor infectious/inflammatory etiology, possible scarring. I have personally reviewed the examination and initial interpretation and I agree with the findings. WINIFRED CALLOWAY MD   SYSTEM ID:  [id number]    MR Abdomen w/o & w Contrast    Result Date: 5/5/2022  MR ABDOMEN WITHOUT AND WITH CONTRAST 5/4/2022 11:28 AM INDICATION: Adenocarcinoma of sigmoid colon (H). COMPARISON: 4/7/2022 TECHNIQUE: Routine MRI liver protocol including T1 in/out phase, diffusion, multiplane T2, and dynamic T1 with IV contrast.  CONTRAST: 6.4 mL Eovist FINDINGS: LIVER: No significant fatty infiltration of the liver. Liver size within normal limits. There are two observations of T2 hyperintensity in the liver measuring 0.8 and 0.6 cm respectively (series 4, images 8 and 23). No enhancement or restricted diffusion. No other liver lesions. ADDITIONAL FINDINGS: The pancreas, spleen, and adrenal glands are unremarkable. Left renal cyst does not require specific follow-up. Large volume of retained colonic stool. No adenopathy or ascites. Partial visualization of a left lower quadrant colostomy.     IMPRESSION: Tiny hypodensities seen on the prior CT correspond to subcentimeter cysts that do not require specific follow-up. BOZENA MEDINA MD   SYSTEM ID:  [id number]    A total of 30 min were spent today on this visit which included face to face conversation with the patient, EMR review, counseling and co-ordination of care and medical documentation.      Signed by: Pamela Colorado MD        Again, thank you for allowing me to participate in the care of your patient.        Sincerely,        Pamela  MD Stanislav

## 2022-04-27 ENCOUNTER — HOME INFUSION (PRE-WILLOW HOME INFUSION) (OUTPATIENT)
Dept: PHARMACY | Facility: CLINIC | Age: 72
End: 2022-04-27

## 2022-04-27 ENCOUNTER — OFFICE VISIT (OUTPATIENT)
Dept: SURGERY | Facility: CLINIC | Age: 72
End: 2022-04-27
Payer: COMMERCIAL

## 2022-04-27 VITALS
SYSTOLIC BLOOD PRESSURE: 127 MMHG | OXYGEN SATURATION: 99 % | WEIGHT: 139.9 LBS | HEIGHT: 70 IN | BODY MASS INDEX: 20.03 KG/M2 | HEART RATE: 55 BPM | DIASTOLIC BLOOD PRESSURE: 70 MMHG

## 2022-04-27 DIAGNOSIS — C18.7 ADENOCARCINOMA OF SIGMOID COLON (H): Primary | ICD-10-CM

## 2022-04-27 PROCEDURE — 99024 POSTOP FOLLOW-UP VISIT: CPT | Performed by: SURGERY

## 2022-04-27 ASSESSMENT — PAIN SCALES - GENERAL: PAINLEVEL: NO PAIN (0)

## 2022-04-27 NOTE — PATIENT INSTRUCTIONS
Follow up:  Ok to begin chemo in 1-2 weeks  Schedule MRI of pelvis and abdomen  Follow up with Dr. Magdaleno after chemo for restaging CT and MRI      Please call with any questions or concerns regarding your clinic visit today.    It is a pleasure being involved in your health care.    Contacts post-consultation depending on your need:    Radiology Appointments [phone]    Schedule Clinic Appointments [phone] # 1   M-F 7:30 - 5 pm    [facility] [phone]    Clinic Fax Number [phone]    Surgery Scheduling [phone]    My Chart is available 24 hours a day and is a secure way to access your records and communicate with your care team.  I strongly recommend signing up if you haven't already done so, if you are comfortable with computers.  If you would like to inquire about this or are having problems with My Chart access, you may call [phone] or go online at [email or web address].  Please allow at least 24 hours for a response and extra time on weekends and Holidays.

## 2022-04-27 NOTE — NURSING NOTE
"Chief Complaint   Patient presents with     Post-op Visit       Vitals:    04/27/22 0757   BP: 127/70   BP Location: Left arm   Patient Position: Sitting   Cuff Size: Adult Regular   Pulse: 55   SpO2: 99%   Weight: 139 lb 14.4 oz   Height: 5' 10\"       Body mass index is 20.07 kg/m .    Kalpana England CMA    "

## 2022-04-27 NOTE — LETTER
2022       RE: Kalin Shepard  [address]  [facility] [address]     Dear Colleague,    Thank you for referring your patient, Kalin Shepard, to the [facility] [place] [facility]. Please see a copy of my visit note below.    Colon and Rectal Surgery Clinic Note    RE: Kalin Shepard.  : 1950.  REGIGE: 2022.    Reason for visit: Post op emergent colostomy 3/26/22.     HPI: Kalin Shepard is a 71 year old male  who presented to [facility] ED on 3/26/22 with abdominal pain, he was found to have obstructing sigmoid colon mass and associated small bowel obstruction in the setting of unintentional weight loss over the last year, approximately 30-40 pounds. He then underwent emergent exploratory laparotomy, small bowel resection, loop descending colostomy creation, flexible sigmoidoscopy with me. Final pathology showed sigmoid mass that was negative for malignancy although likely sampling is non-representative and a peritoneal biopsy that is positive for malignancy - intestinal type adenocarcinoma. He followed up with Pamela Colorado MD with [facility] who recommended chemotherapy as anticipated. He has since started, and is doing well. No complaints. Healing well.         Surgical Pathology (3/26/22):          PET/ CT CAP (22):  IMPRESSION:   In this patient with recently diagnosed sigmoid colon adenocarcinoma:     1. Hypermetabolic sigmoid mass consistent with known primary  malignancy. There is surrounding pericolonic stranding suggestive of  locally invasive disease, with poor delineation of the mass from  multiple other loops of bowel and the left posterolateral bladder  wall.      2. Indeterminant small scattered pulmonary nodules and subcentimeter  hepatic hypodensities, too small to fully characterize. Recommend  comparison with older studies if available, otherwise attention  on  follow-up. Liver MRI can be considered to further characterize the  small hypodensities.     3. Postoperative changes of partial small bowel resection and  descending loop colostomy. Multiple air-fluid levels throughout the  small bowel without other evidence of obstruction, favor residual  ongoing adynamic ileus or resolving patulous bowel from previous  obstruction.     4. Air-fluid level in the left maxillary sinus, can be seen with  current or recent acute sinusitis.     5. Subpleural groundglass and reticulonodular opacity in the right  upper lobe with low levels of uptake, favor infectious/inflammatory  etiology, possible scarring.      Medical history:  None    Surgical history:  Past Surgical History:   Procedure Laterality Date     LAPAROTOMY EXPLORATORY N/A 3/26/2022    Procedure: exploratory laparotomy, small bowel resection, colostomy creation;  Surgeon: Riley Magdaleno MD;  Location: [facility] OR     PICC INSERTION - DOUBLE LUMEN Left 03/28/2022    left medial brachial 5 fr dl picc 49 cm     SIGMOIDOSCOPY FLEXIBLE N/A 3/26/2022    Procedure: Sigmoidoscopy flexible;  Surgeon: Riley Magdaleno MD;  Location: [facility] OR       Family history:  No Hx of IBD or GI malignancy    Medications:  Current Outpatient Medications   Medication Sig Dispense Refill     acetaminophen (TYLENOL) 500 MG tablet Take 2 tablets (1,000 mg) by mouth 4 times daily 100 tablet 0     enoxaparin ANTICOAGULANT (LOVENOX) 40 MG/0.4ML syringe Inject 0.4 mLs (40 mg) Subcutaneous every 24 hours for 20 days 8 mL 0     ibuprofen (ADVIL/MOTRIN) 400 MG tablet Take 1 tablet (400 mg) by mouth 3 times daily for 10 days 30 tablet 0     Lidocaine (LIDOCARE) 4 % Patch Place 1 patch onto the skin every 24 hours To prevent lidocaine toxicity, patient should be patch free for 12 hrs daily. 5 patch 0     methocarbamol (ROBAXIN) 500 MG tablet Take 1 tablet (500 mg) by mouth 3 times daily as needed for muscle spasms (Patient not taking: Reported on 4/12/2022)  "15 tablet 0     oxyCODONE (ROXICODONE) 5 MG tablet Take 1 tablet (5 mg) by mouth as needed in the morning and 1 tablet (5 mg) as needed at noon and 1 tablet (5 mg) as needed in the evening for moderate to severe pain. 60 tablet 0     polyethylene glycol (MIRALAX) 17 GM/Dose powder Take 17 g by mouth daily 510 g 0       Allergies:  No Known Allergies    Social history:   Social History     Tobacco Use     Smoking status: Current Every Day Smoker     Packs/day: 0.50     Years: 50.00     Pack years: 25.00     Smokeless tobacco: Never Used   Substance Use Topics     Alcohol use: Not Currently     Marital status: .    ROS:  A complete review of systems was performed with the patient and all systems negative except as per HPI.    Physical Examination:  /70 (BP Location: Left arm, Patient Position: Sitting, Cuff Size: Adult Regular)   Pulse 55   Ht 1.778 m (5' 10\")   Wt 63.5 kg (139 lb 14.4 oz)   SpO2 99%   BMI 20.07 kg/m    General: Well hydrated. No acute distress.  Abdomen: Soft, NT, nondistended, ostomy pink and healthy, staples removed. No inguinal adenopathy palpated.  Perianal external examination:  deferred    Laboratory values reviewed:  Recent Labs   Lab Test 04/04/22  0628 04/03/22  0916 04/02/22  0735 03/31/22  0656 03/30/22  0648   WBC  --   --   --   --  11.5*   HGB  --   --   --   --  11.6*   PLT  --   --  430  --  334   CR 0.49*   < >  --    < > 0.48*   ALBUMIN 2.3*  --   --   --  2.3*   BILITOTAL 0.2  --   --   --  0.4   ALKPHOS 76  --   --   --  47   ALT 58  --   --   --  17   AST 36  --   --   --  25   INR 1.01  --   --   --   --     < > = values in this interval not displayed.       ASSESSMENT  1. Tobacco use,   2. Locally advanced sigmoid cancer, s/p small bowel resection (suspected tumor involvement) and loop sigmoid colostomy due to unresectable nature of tumor.    PLAN  1. OK to be chemo in 1-2 weeks (he will be at least 5 weeks out from surgery by then).  2. MRI Abdomen and pelvis " to further delineate tumor.  3. Follow up with me following chemotherapy with restaging CT CAP and MRI. I am highly suspicious of bladder and left inguinal canal structural involvement, which will likely require a multi visceral resection involving colleagues from other specialities for definitive resection.    Risks, benefits, and alternatives of operative treatment were thoroughly discussed with the patient, he understands these well and agrees to proceed.    Time spent: 30 minutes,>50% spent in discussing, counseling and coordinating care.    Riley Magdaleno M.D    Division of Colon and Rectal Surgery  [facility]    Referring Provider:  No referring provider defined for this encounter.     Primary Care Provider:  [facility]

## 2022-04-28 NOTE — PROGRESS NOTES
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  Presented with obstructed sigmoid colon mass with possible perforation present laparotomy with small bowel resection and ostomy placement.  Peritoneal biopsy positive for adenocarcinoma.  There is also evidence of peritonitis which signifies possible perforation.  Sigmoid colon mass biopsy however was negative for malignancy but this could have been an sampling error.  MMR proteins intact.  This makes MSI high status unlikely.  He has not had testing for KRAS, NRAS or BRAF mutations yet.  I will add on.  PET scan showing some indeterminate liver and lung lesions.  This will be important in the future.  So I recommended getting an MRI of the liver to further evaluate liver lesions before we start treatment.  He has known synchronous peritoneal mets.  Primary surgery is not indicated.  I recommended starting treatment with chemotherapy and see how he responds to it.  Considering that he has intact mismatch proteins, FOLFOX with a VEGF on EGFR inhibitor is preferable.  MSI testing is still pending, so in case if he were to have MSI high status then we can always switch to immunotherapy.    If testing for K-aldo NRAS and BRAF takes time that I will start with FOLFOX with bevacizumab and subsequently substitute bevacizumab with status if he is K-aldo BRAF and NRAS wild-type.   I do not think he is a candidate for triple therapy with FOLFOXIRI or FOLFIRINOX considering his frail health status at this point in time.    Potentially there could be a oncologic surgery in the future but all depends upon how he responds to it.    I reviewed the potential side effects and complications associated with FOLFOX with bevacizumab today.  This includes risk of infection, perforation, bleeding, clotting, peripheral neuropathy, coronary vasospasm etc.  He understands and is agreeable to proceed with the treatment.  I encouraged him to improve oral intake with protein supplements like boost or Ensure.  During plenty of water.    Patient Instructions   Please schedule an MRI of the liver and chest port placement. RTC in 2 weeks with labs prior, cycle 1 FOLFOX with tentative avastin (this may change based on mutation testing) to follow the same day.               ECOG Performance    1 - Can't do physically strenuous work, but fully ambyulatory and can do light sedentary work    Problem List    Patient Active Problem List   Diagnosis     Colon adenocarcinoma (H)     Colostomy present (H)     Cachexia (H)     Adenocarcinoma of sigmoid colon (H)     ______________________________________________________________________________    Staging History    Cancer Staging  No matching staging information was found for the patient.      History of presenting illness:  Kalin Shepard is a 39-year-old male, a psychiatrist by occupation previously, with recent diagnosis of metastatic sigmoid colon cancer here to discuss further management of the same.    He had significant weight loss of about 30 to 40 pounds over the last year.  He was seen in the emergency room with abdominal pain.  CT scan revealed an obstructing sigmoid colon mass along with evidence of small bowel obstruction.  He was transferred to [facility] underwent emergent exploratory laparotomy with small bowel resection and descending loop colostomy.  He also  underwent flexible sigmoidoscopy on the same day which again showed an obstructing sigmoid colon mass.  Biopsy of the sigmoid colon mass was negative for malignancy but it was thought to be secondary to linger.  However peritoneal biopsy was positive for intestinal type adenocarcinoma along with evidence of peritonitis.  CEA was 2.1.  MMR proteins were intact by IHC.  He was started on TPN for significant weight loss.  A PET scan was ordered as an outpatient which showed hypermetabolic sigmoid colon mass and multiple indeterminant liver and lung lesions concerning for metastatic disease.    He has a history of long-term tobacco use.  Denies any fever chills or night sweats.  Denies any blood in the ostomy bag.  He has some skin irritation but overall feeling okay.  Has some mild tenderness around the surgical scar.  No signs of infection.    Does have a family history of colon cancer in his father.    Path report:  Final Diagnosis   A. SMALL BOWEL, RESECTION:  Serosal adhesions and acute serositis consistent with peritonitis:   -Small bowel wall with congestion & edema; no dysplasia or malignancy    B. PERITONEUM, BIOPSY:   Positive for malignancy: intestinal-type adenocarcinoma:   -Associated acute inflammatory exudate in keeping with peritonitis   -Report of MMR (MLH1, PMS2, MSH2 & MSH6) immunohistochemistry to follow   (See Comment)    C. SIGMOID COLON MASS, BIOPSY:  Colonic mucosa with mild nonspecific inflammation but negative for malignancy  (See Comment)     Normal mismatch repair protein expression    Past History    Past Medical History:   Diagnosis Date     Cancer (H)     No family history on file.   Past Surgical History:   Procedure Laterality Date     INSERT PORT VASCULAR ACCESS Right 4/21/2022    Procedure: INSERTION, VASCULAR ACCESS PORT;  Surgeon: Marianne Schroeder MD;  Location: [facility]     LAPAROTOMY EXPLORATORY N/A 3/26/2022    Procedure: exploratory laparotomy, small bowel resection, colostomy  creation;  Surgeon: Riley Magdaleno MD;  Location: [facility] OR     PICC INSERTION - DOUBLE LUMEN Left 03/28/2022    left medial brachial 5 fr dl picc 49 cm     SIGMOIDOSCOPY FLEXIBLE N/A 3/26/2022    Procedure: Sigmoidoscopy flexible;  Surgeon: Riley Magdaleno MD;  Location: [facility] OR    Social History     Socioeconomic History     Marital status:      Spouse name: Not on file     Number of children: Not on file     Years of education: Not on file     Highest education level: Not on file   Occupational History     Not on file   Tobacco Use     Smoking status: Current Every Day Smoker     Packs/day: 0.50     Years: 50.00     Pack years: 25.00     Smokeless tobacco: Never Used   Vaping Use     Vaping Use: Never used   Substance and Sexual Activity     Alcohol use: Not Currently     Drug use: Never     Sexual activity: Not on file   Other Topics Concern     Not on file   Social History Narrative     Not on file     Social Determinants of Health     Financial Resource Strain: Not on file   Food Insecurity: Not on file   Transportation Needs: Not on file   Physical Activity: Not on file   Stress: Not on file   Social Connections: Not on file   Intimate Partner Violence: Not on file   Housing Stability: Not on file        Allergies    No Known Allergies    Review of Systems    Pertinent items are noted in HPI.      Physical Exam    Oncology Vitals 4/27/2022   Height 178 cm   Weight 63.458 kg   BSA (m2) 1.77 m2   /70   Pulse 55   Temp -   Temp src -   SpO2 99   Pain Score 0   Pain Loc -   Some recent data might be hidden       General:  HEENT:  Lungs:  CVS:  Abdomen:  Lymphatic system:  Skin:  Breast:  Neuro:    Lab Results    Recent Results (from the past 168 hour(s))   Creatinine   Result Value Ref Range    Creatinine 0.65 (L) 0.66 - 1.25 mg/dL    GFR Estimate >90 >60 mL/min/1.73m2   Bilirubin  total   Result Value Ref Range    Bilirubin Total 0.2 0.2 - 1.3 mg/dL   CBC with platelets and differential   Result  Value Ref Range    WBC Count 10.2 4.0 - 11.0 10e3/uL    RBC Count 3.98 (L) 4.40 - 5.90 10e6/uL    Hemoglobin 12.3 (L) 13.3 - 17.7 g/dL    Hematocrit 38.8 (L) 40.0 - 53.0 %    MCV 98 78 - 100 fL    MCH 30.9 26.5 - 33.0 pg    MCHC 31.7 31.5 - 36.5 g/dL    RDW 16.1 (H) 10.0 - 15.0 %    Platelet Count 291 150 - 450 10e3/uL    % Neutrophils 58 %    % Lymphocytes 23 %    % Monocytes 10 %    % Eosinophils 7 %    % Basophils 2 %    % Immature Granulocytes 0 %    NRBCs per 100 WBC 0 <1 /100    Absolute Neutrophils 5.9 1.6 - 8.3 10e3/uL    Absolute Lymphocytes 2.3 0.8 - 5.3 10e3/uL    Absolute Monocytes 1.0 0.0 - 1.3 10e3/uL    Absolute Eosinophils 0.8 (H) 0.0 - 0.7 10e3/uL    Absolute Basophils 0.2 0.0 - 0.2 10e3/uL    Absolute Immature Granulocytes 0.0 <=0.4 10e3/uL    Absolute NRBCs 0.0 10e3/uL   Protein qualitative urine   Result Value Ref Range    Protein Albumin Urine Negative Negative mg/dL       Imaging Results    CT Chest/Abdomen/Pelvis w Contrast    Result Date: 4/7/2022  Combined Report of:    PET and CT on  4/7/2022 2:56 PM : 1. PET of the neck, chest, abdomen, and pelvis. 2. PET CT Fusion for Attenuation Correction and Anatomical Localization:  3. Diagnostic CT scan of the chest, abdomen, and pelvis with intravenous contrast for interpretation. 3. CT of the chest, abdomen and pelvis obtained for diagnostic interpretation. 4. 3D MIP and PET-CT fused images were processed on an independent workstation and archived to PACS and reviewed by a radiologist. Technique: 1. PET: The patient received 10.2 mCi of F-18-FDG; the serum glucose was 101 prior to administration, body weight was 58.1 kg. Images were evaluated in the axial, sagittal, and coronal planes as well as the rotational whole body MIP. Images were acquired from the Vertex to the Feet. UPTAKE WAS MEASURED AT 68 MINUTES. BACKGROUND:  Liver SUV max= 2.2,   Aorta Blood SUV Max: 1.5. 2. CT: Volumetric acquisition for clinical interpretation of the chest, abdomen,  and pelvis acquired at 3 mm sections . The chest, abdomen, and pelvis were evaluated at 5 mm sections in bone, soft tissue, and lung windows.  The patient received 63 cc of Isovue 370 intravenously for the examination.  3. 3D MIP and PET-CT fused images were processed on an independent workstation and archived to PACS and reviewed by a radiologist. INDICATION: initial workup; new diagnosis sigmoid colon cancer ADDITIONAL INFORMATION OBTAINED FROM EMR: 71-year-old male with recently diagnosed sigmoid colon adenocarcinoma status post expiratory laparotomy, small bowel resection, and loop descending colostomy due to malignant large bowel obstruction. Positive peritoneal biopsy. COMPARISON: CT abdomen and pelvis 3/26/2022 FINDINGS: HEAD/NECK: Focal FDG avid focus in the left cervical musculature at the C1 level (series 3 image 91), with no underlying findings on CT, presumed physiologic related to muscle contraction. Scattered mucosal thickening in the paranasal sinuses with layering air-fluid level in the left maxillary sinus. The mastoid air cells are clear. No pathologically enlarged lymph nodes are evident. The thyroid gland is unremarkable. CHEST: No focal suspicious FDG uptake. No cardiomegaly or significant pericardial effusion. Moderate coronary artery calcifications. Normal caliber main pulmonary artery with enlarged right (31 mm and left (28 mm) pulmonary arteries, which can be seen in pulmonary hypertension. Normal caliber thoracic aorta. Aortic atherosclerosis. Small sliding-type hiatal hernia. No suspicious lymphadenopathy. No pneumothorax or pleural effusion. Bibasilar atelectasis. Peripheral wedgelike reticular opacities in the inferolateral right upper lobe with low levels of uptake, SUV max 1.7. Few small scattered pulmonary nodules, for example a 3 mm part solid nodule in the right upper lobe (series 8 image 40), a 2 mm solid nodule in the right lower lobe (series 8 image 92) ABDOMEN AND PELVIS: Focal  FDG uptake in the known sigmoid mass in the low pelvis with pericolonic stranding (series 4 image 424) and max SUV 18.0. Mild remaining diffuse bowel uptake, likely physiologic. Mild expected uptake along the laparotomy site. No other focal suspicious uptake. Similar few scattered subcentimeter hepatic hypodensities, too small to characterize, as seen on comparison CT 3/26/2022. Mild intrahepatic biliary ductal dilatation without significant extrahepatic dilatation (for age). Prominent main pancreatic duct without distal obstructing lesion. The gallbladder, spleen, and adrenals are unremarkable. Simple-appearing left renal cyst. Circumferential bladder wall thickening, likely chronic outlet obstruction in the setting of prostatomegaly. Postoperative changes of partial small bowel resection in loop descending colostomy. Diffuse air-fluid levels throughout the small bowel without significant distention or focal transition point. No definite pneumoperitoneum. Trace postoperative subcutaneous emphysema and extraperitoneal air in the left lower quadrant/left groin. Suspect trace pelvic free fluid. No definite well-organized fluid collection. Focal infrarenal abdominal ectasia measuring 2.7 x 2.9 cm proximal to the bifurcation (series 4 image 353; coronal series 12 image 37). Scattered atherosclerotic calcifications of the abdominal aorta and major branch vessels. Focal high-grade atherosclerotic narrowing of the right external iliac artery (series 4 image 413). No definite suspicious lymphadenopathy. LOWER EXTREMITIES: No hypermetabolic lesions. BONES: There is no abnormal FDG uptake in the skeleton. There are no suspicious lytic or blastic osseous lesions. Mild degenerative changes of the spine.     IMPRESSION: In this patient with recently diagnosed sigmoid colon adenocarcinoma: 1. Hypermetabolic sigmoid mass consistent with known primary malignancy. There is surrounding pericolonic stranding suggestive of locally  invasive disease, with poor delineation of the mass from multiple other loops of bowel and the left posterolateral bladder wall. 2. Indeterminant small scattered pulmonary nodules and subcentimeter hepatic hypodensities, too small to fully characterize. Recommend comparison with older studies if available, otherwise attention on follow-up. Liver MRI can be considered to further characterize the small hypodensities. 3. Postoperative changes of partial small bowel resection and descending loop colostomy. Multiple air-fluid levels throughout the small bowel without other evidence of obstruction, favor residual ongoing adynamic ileus or resolving patulous bowel from previous obstruction. 4. Air-fluid level in the left maxillary sinus, can be seen with current or recent acute sinusitis. 5. Subpleural groundglass and reticulonodular opacity in the right upper lobe with low levels of uptake, favor infectious/inflammatory etiology, possible scarring. I have personally reviewed the examination and initial interpretation and I agree with the findings. WINIFRED CALLOWAY MD   SYSTEM ID:  [id number]    XR Chest Port 1 View    Result Date: 4/21/2022  EXAM: XR CHEST PORT 1 VIEW LOCATION: [facility] DATE/TIME: 4/21/2022 5:48 PM INDICATION: Evaluate right IJ central line placement. COMPARISON: 03/28/2022     IMPRESSION: Heart size and pulmonary vascularity normal. Calcified granuloma right base. Nodular opacity mid left lung not present on recent CT PET scan and should represent a prominent left nipple shadow. Lungs otherwise clear. No acute infiltrates or effusions. Right IJ Port-A-Cath tip in the mid SVC. No pneumothorax.    XR Surgery SUZIE Fluoro L/T 5 Min w Stills    Result Date: 4/22/2022  This exam was marked as non-reportable because it will not be read by a radiologist or a [place] non-radiologist provider.     PET Oncology Whole Body    Result Date: 4/7/2022  Combined Report of:    PET and CT on   4/7/2022 2:56 PM : 1. PET of the neck, chest, abdomen, and pelvis. 2. PET CT Fusion for Attenuation Correction and Anatomical Localization:  3. Diagnostic CT scan of the chest, abdomen, and pelvis with intravenous contrast for interpretation. 3. CT of the chest, abdomen and pelvis obtained for diagnostic interpretation. 4. 3D MIP and PET-CT fused images were processed on an independent workstation and archived to PACS and reviewed by a radiologist. Technique: 1. PET: The patient received 10.2 mCi of F-18-FDG; the serum glucose was 101 prior to administration, body weight was 58.1 kg. Images were evaluated in the axial, sagittal, and coronal planes as well as the rotational whole body MIP. Images were acquired from the Vertex to the Feet. UPTAKE WAS MEASURED AT 68 MINUTES. BACKGROUND:  Liver SUV max= 2.2,   Aorta Blood SUV Max: 1.5. 2. CT: Volumetric acquisition for clinical interpretation of the chest, abdomen, and pelvis acquired at 3 mm sections . The chest, abdomen, and pelvis were evaluated at 5 mm sections in bone, soft tissue, and lung windows.  The patient received 63 cc of Isovue 370 intravenously for the examination.  3. 3D MIP and PET-CT fused images were processed on an independent workstation and archived to PACS and reviewed by a radiologist. INDICATION: initial workup; new diagnosis sigmoid colon cancer ADDITIONAL INFORMATION OBTAINED FROM EMR: 71-year-old male with recently diagnosed sigmoid colon adenocarcinoma status post expiratory laparotomy, small bowel resection, and loop descending colostomy due to malignant large bowel obstruction. Positive peritoneal biopsy. COMPARISON: CT abdomen and pelvis 3/26/2022 FINDINGS: HEAD/NECK: Focal FDG avid focus in the left cervical musculature at the C1 level (series 3 image 91), with no underlying findings on CT, presumed physiologic related to muscle contraction. Scattered mucosal thickening in the paranasal sinuses with layering air-fluid level in the left  maxillary sinus. The mastoid air cells are clear. No pathologically enlarged lymph nodes are evident. The thyroid gland is unremarkable. CHEST: No focal suspicious FDG uptake. No cardiomegaly or significant pericardial effusion. Moderate coronary artery calcifications. Normal caliber main pulmonary artery with enlarged right (31 mm and left (28 mm) pulmonary arteries, which can be seen in pulmonary hypertension. Normal caliber thoracic aorta. Aortic atherosclerosis. Small sliding-type hiatal hernia. No suspicious lymphadenopathy. No pneumothorax or pleural effusion. Bibasilar atelectasis. Peripheral wedgelike reticular opacities in the inferolateral right upper lobe with low levels of uptake, SUV max 1.7. Few small scattered pulmonary nodules, for example a 3 mm part solid nodule in the right upper lobe (series 8 image 40), a 2 mm solid nodule in the right lower lobe (series 8 image 92) ABDOMEN AND PELVIS: Focal FDG uptake in the known sigmoid mass in the low pelvis with pericolonic stranding (series 4 image 424) and max SUV 18.0. Mild remaining diffuse bowel uptake, likely physiologic. Mild expected uptake along the laparotomy site. No other focal suspicious uptake. Similar few scattered subcentimeter hepatic hypodensities, too small to characterize, as seen on comparison CT 3/26/2022. Mild intrahepatic biliary ductal dilatation without significant extrahepatic dilatation (for age). Prominent main pancreatic duct without distal obstructing lesion. The gallbladder, spleen, and adrenals are unremarkable. Simple-appearing left renal cyst. Circumferential bladder wall thickening, likely chronic outlet obstruction in the setting of prostatomegaly. Postoperative changes of partial small bowel resection in loop descending colostomy. Diffuse air-fluid levels throughout the small bowel without significant distention or focal transition point. No definite pneumoperitoneum. Trace postoperative subcutaneous emphysema and  extraperitoneal air in the left lower quadrant/left groin. Suspect trace pelvic free fluid. No definite well-organized fluid collection. Focal infrarenal abdominal ectasia measuring 2.7 x 2.9 cm proximal to the bifurcation (series 4 image 353; coronal series 12 image 37). Scattered atherosclerotic calcifications of the abdominal aorta and major branch vessels. Focal high-grade atherosclerotic narrowing of the right external iliac artery (series 4 image 413). No definite suspicious lymphadenopathy. LOWER EXTREMITIES: No hypermetabolic lesions. BONES: There is no abnormal FDG uptake in the skeleton. There are no suspicious lytic or blastic osseous lesions. Mild degenerative changes of the spine.     IMPRESSION: In this patient with recently diagnosed sigmoid colon adenocarcinoma: 1. Hypermetabolic sigmoid mass consistent with known primary malignancy. There is surrounding pericolonic stranding suggestive of locally invasive disease, with poor delineation of the mass from multiple other loops of bowel and the left posterolateral bladder wall. 2. Indeterminant small scattered pulmonary nodules and subcentimeter hepatic hypodensities, too small to fully characterize. Recommend comparison with older studies if available, otherwise attention on follow-up. Liver MRI can be considered to further characterize the small hypodensities. 3. Postoperative changes of partial small bowel resection and descending loop colostomy. Multiple air-fluid levels throughout the small bowel without other evidence of obstruction, favor residual ongoing adynamic ileus or resolving patulous bowel from previous obstruction. 4. Air-fluid level in the left maxillary sinus, can be seen with current or recent acute sinusitis. 5. Subpleural groundglass and reticulonodular opacity in the right upper lobe with low levels of uptake, favor infectious/inflammatory etiology, possible scarring. I have personally reviewed the examination and initial  interpretation and I agree with the findings. WINIFRED CALLOWAY MD   SYSTEM ID:  [id number]      A total of 70 minutes was spent today on this visit including face to face conversation with the patient, EMR review (labs, imaging studies, pathology reports and outside records), counseling and care co-ordination and documentation.    Signed by: Pamela Colorado MD

## 2022-04-29 ENCOUNTER — MEDICAL CORRESPONDENCE (OUTPATIENT)
Dept: HEALTH INFORMATION MANAGEMENT | Facility: CLINIC | Age: 72
End: 2022-04-29
Payer: COMMERCIAL

## 2022-05-04 ENCOUNTER — HOSPITAL ENCOUNTER (OUTPATIENT)
Dept: MRI IMAGING | Facility: CLINIC | Age: 72
Discharge: HOME | End: 2022-05-04
Attending: SURGERY | Admitting: SURGERY
Payer: COMMERCIAL

## 2022-05-04 DIAGNOSIS — C18.7 ADENOCARCINOMA OF SIGMOID COLON (H): ICD-10-CM

## 2022-05-04 PROCEDURE — 255N000002 HC RX 255 OP 636: Performed by: SURGERY

## 2022-05-04 PROCEDURE — 74183 MRI ABD W/O CNTR FLWD CNTR: CPT

## 2022-05-04 PROCEDURE — A9581 GADOXETATE DISODIUM INJ: HCPCS | Performed by: SURGERY

## 2022-05-04 RX ADMIN — GADOXETATE DISODIUM 6.35 ML: 181.43 INJECTION, SOLUTION INTRAVENOUS at 11:18

## 2022-05-04 RX ADMIN — GADOXETATE DISODIUM 6.35 ML: 181.43 INJECTION, SOLUTION INTRAVENOUS at 11:19

## 2022-05-06 NOTE — PROGRESS NOTES
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 Sigmoid colon cancer with peritoneal metastasis  Here to start cycle 1 of FOLFOX with Amrita.  He is almost 5 weeks out from surgery.  I again reviewed the parental side effects and complications associated with FOLFOX and bevacizumab chemotherapy.  I expect neuro sensitivity from oxaliplatin.  Watch for bleeding issues especially with bevacizumab.  He is agreeable to proceed with the treatment.  Labs look good.  Hemoglobin is 12.3.  Normal neutrophil count.  Blood count is 291.  Creatinine is 0.65.  Bilirubin is 0.2.  He will come back on Thursday for pump disconnection.     2. Indeterminate liver lesions  He is meeting with surgery tomorrow.  If the staples come off then I think assessing for liver disease with MRI would be appropriate.       Patient Instructions   Cycle 1 folfox with amrita today. Pump discharge on Thursday. RTC in 2 weeks to see me with labs prior, cycle 2 to follow the same day.         Cancer Staging  Adenocarcinoma of sigmoid colon (H)  Staging form: Colon and Rectum, AJCC 8th Edition  - Pathologic: Stage Unknown (pTX, pNX, pM1) - Signed by Pamela Colorado MD on 5/6/2022      ECOG Performance    1 - Can't do physically strenuous work, but fully ambyulatory and can do light sedentary work         Problem List    Patient Active Problem List   Diagnosis     Colon adenocarcinoma (H)     Colostomy present (H)     Cachexia (H)     Adenocarcinoma of sigmoid colon (H)         Oncology history      Interval History   Kalin Shepard is a 71 year old with sigmoid coloncancer with peritoneal metastasis with peritoneal metastasis who is here to start chemotherapy with FOLFOX and bevacizumab.    Doing well since last visit.  He had a port placed.  I had ordered an MRI of the liver but this cannot be done since he still had surgical staples in place.  Denies any symptoms since last visit.  Denies any abdominal pain.  Ostomy is working well.  No skin infection or irritation.  His appetite is okay.  Is been trying to increase his oral intake.    Labs reviewed today.    Review of Systems  A comprehensive review of systems was negative except for what is noted in the interval history    Current Outpatient Medications   Medication     ibuprofen (ADVIL/MOTRIN) 400 MG tablet     Lidocaine (LIDOCARE) 4 % Patch     ondansetron (ZOFRAN) 8 MG tablet     oxyCODONE (ROXICODONE) 5 MG tablet     polyethylene glycol (MIRALAX) 17 GM/Dose powder     prochlorperazine (COMPAZINE) 10 MG tablet     acetaminophen (TYLENOL) 500 MG tablet     dexamethasone (DECADRON) 4 MG tablet     methocarbamol (ROBAXIN) 500 MG tablet     No current facility-administered medications for this visit.        Physical Exam    No flowsheet data found.    General: alert and cooperative  HEENT: Head: Normal, normocephalic, atraumatic.  Eye: Normal external eye, conjunctiva, lids cornea, DANA.  Chest: Clear to auscultation bilaterally  Cardiac: S1, S2 normal, regular rate and rhythm  Abdomen: Soft and nontender, ostomy in place  Extremities: atraumatic, no peripheral edema  Skin: Ostomy site looks clean, laparotomy skin incision also looks clean without any signs of infection, staples in place  CNS: Alert and oriented x3, neurologic exam grossly normal.  Lymphatics: No bilateral cervical, axillary, supraclavicular or inguinal adenopathy noted    Lab Results    No results found for this or any previous visit (from the past 168  hour(s)).    Imaging    CT Chest/Abdomen/Pelvis w Contrast    Result Date: 4/7/2022  Combined Report of:    PET and CT on  4/7/2022 2:56 PM : 1. PET of the neck, chest, abdomen, and pelvis. 2. PET CT Fusion for Attenuation Correction and Anatomical Localization:  3. Diagnostic CT scan of the chest, abdomen, and pelvis with intravenous contrast for interpretation. 3. CT of the chest, abdomen and pelvis obtained for diagnostic interpretation. 4. 3D MIP and PET-CT fused images were processed on an independent workstation and archived to PACS and reviewed by a radiologist. Technique: 1. PET: The patient received 10.2 mCi of F-18-FDG; the serum glucose was 101 prior to administration, body weight was 58.1 kg. Images were evaluated in the axial, sagittal, and coronal planes as well as the rotational whole body MIP. Images were acquired from the Vertex to the Feet. UPTAKE WAS MEASURED AT 68 MINUTES. BACKGROUND:  Liver SUV max= 2.2,   Aorta Blood SUV Max: 1.5. 2. CT: Volumetric acquisition for clinical interpretation of the chest, abdomen, and pelvis acquired at 3 mm sections . The chest, abdomen, and pelvis were evaluated at 5 mm sections in bone, soft tissue, and lung windows.  The patient received 63 cc of Isovue 370 intravenously for the examination.  3. 3D MIP and PET-CT fused images were processed on an independent workstation and archived to PACS and reviewed by a radiologist. INDICATION: initial workup; new diagnosis sigmoid colon cancer ADDITIONAL INFORMATION OBTAINED FROM EMR: 71-year-old male with recently diagnosed sigmoid colon adenocarcinoma status post expiratory laparotomy, small bowel resection, and loop descending colostomy due to malignant large bowel obstruction. Positive peritoneal biopsy. COMPARISON: CT abdomen and pelvis 3/26/2022 FINDINGS: HEAD/NECK: Focal FDG avid focus in the left cervical musculature at the C1 level (series 3 image 91), with no underlying findings on CT, presumed physiologic  related to muscle contraction. Scattered mucosal thickening in the paranasal sinuses with layering air-fluid level in the left maxillary sinus. The mastoid air cells are clear. No pathologically enlarged lymph nodes are evident. The thyroid gland is unremarkable. CHEST: No focal suspicious FDG uptake. No cardiomegaly or significant pericardial effusion. Moderate coronary artery calcifications. Normal caliber main pulmonary artery with enlarged right (31 mm and left (28 mm) pulmonary arteries, which can be seen in pulmonary hypertension. Normal caliber thoracic aorta. Aortic atherosclerosis. Small sliding-type hiatal hernia. No suspicious lymphadenopathy. No pneumothorax or pleural effusion. Bibasilar atelectasis. Peripheral wedgelike reticular opacities in the inferolateral right upper lobe with low levels of uptake, SUV max 1.7. Few small scattered pulmonary nodules, for example a 3 mm part solid nodule in the right upper lobe (series 8 image 40), a 2 mm solid nodule in the right lower lobe (series 8 image 92) ABDOMEN AND PELVIS: Focal FDG uptake in the known sigmoid mass in the low pelvis with pericolonic stranding (series 4 image 424) and max SUV 18.0. Mild remaining diffuse bowel uptake, likely physiologic. Mild expected uptake along the laparotomy site. No other focal suspicious uptake. Similar few scattered subcentimeter hepatic hypodensities, too small to characterize, as seen on comparison CT 3/26/2022. Mild intrahepatic biliary ductal dilatation without significant extrahepatic dilatation (for age). Prominent main pancreatic duct without distal obstructing lesion. The gallbladder, spleen, and adrenals are unremarkable. Simple-appearing left renal cyst. Circumferential bladder wall thickening, likely chronic outlet obstruction in the setting of prostatomegaly. Postoperative changes of partial small bowel resection in loop descending colostomy. Diffuse air-fluid levels throughout the small bowel without  significant distention or focal transition point. No definite pneumoperitoneum. Trace postoperative subcutaneous emphysema and extraperitoneal air in the left lower quadrant/left groin. Suspect trace pelvic free fluid. No definite well-organized fluid collection. Focal infrarenal abdominal ectasia measuring 2.7 x 2.9 cm proximal to the bifurcation (series 4 image 353; coronal series 12 image 37). Scattered atherosclerotic calcifications of the abdominal aorta and major branch vessels. Focal high-grade atherosclerotic narrowing of the right external iliac artery (series 4 image 413). No definite suspicious lymphadenopathy. LOWER EXTREMITIES: No hypermetabolic lesions. BONES: There is no abnormal FDG uptake in the skeleton. There are no suspicious lytic or blastic osseous lesions. Mild degenerative changes of the spine.     IMPRESSION: In this patient with recently diagnosed sigmoid colon adenocarcinoma: 1. Hypermetabolic sigmoid mass consistent with known primary malignancy. There is surrounding pericolonic stranding suggestive of locally invasive disease, with poor delineation of the mass from multiple other loops of bowel and the left posterolateral bladder wall. 2. Indeterminant small scattered pulmonary nodules and subcentimeter hepatic hypodensities, too small to fully characterize. Recommend comparison with older studies if available, otherwise attention on follow-up. Liver MRI can be considered to further characterize the small hypodensities. 3. Postoperative changes of partial small bowel resection and descending loop colostomy. Multiple air-fluid levels throughout the small bowel without other evidence of obstruction, favor residual ongoing adynamic ileus or resolving patulous bowel from previous obstruction. 4. Air-fluid level in the left maxillary sinus, can be seen with current or recent acute sinusitis. 5. Subpleural groundglass and reticulonodular opacity in the right upper lobe with low levels of  uptake, favor infectious/inflammatory etiology, possible scarring. I have personally reviewed the examination and initial interpretation and I agree with the findings. WINIFRED CALLOWAY MD   SYSTEM ID:  [id number]    XR Chest Port 1 View    Result Date: 4/21/2022  EXAM: XR CHEST PORT 1 VIEW LOCATION: [facility] DATE/TIME: 4/21/2022 5:48 PM INDICATION: Evaluate right IJ central line placement. COMPARISON: 03/28/2022     IMPRESSION: Heart size and pulmonary vascularity normal. Calcified granuloma right base. Nodular opacity mid left lung not present on recent CT PET scan and should represent a prominent left nipple shadow. Lungs otherwise clear. No acute infiltrates or effusions. Right IJ Port-A-Cath tip in the mid SVC. No pneumothorax.    XR Surgery SUZIE Fluoro L/T 5 Min w Stills    Result Date: 4/22/2022  This exam was marked as non-reportable because it will not be read by a radiologist or a [place] non-radiologist provider.     PET Oncology Whole Body    Result Date: 4/7/2022  Combined Report of:    PET and CT on  4/7/2022 2:56 PM : 1. PET of the neck, chest, abdomen, and pelvis. 2. PET CT Fusion for Attenuation Correction and Anatomical Localization:  3. Diagnostic CT scan of the chest, abdomen, and pelvis with intravenous contrast for interpretation. 3. CT of the chest, abdomen and pelvis obtained for diagnostic interpretation. 4. 3D MIP and PET-CT fused images were processed on an independent workstation and archived to PACS and reviewed by a radiologist. Technique: 1. PET: The patient received 10.2 mCi of F-18-FDG; the serum glucose was 101 prior to administration, body weight was 58.1 kg. Images were evaluated in the axial, sagittal, and coronal planes as well as the rotational whole body MIP. Images were acquired from the Vertex to the Feet. UPTAKE WAS MEASURED AT 68 MINUTES. BACKGROUND:  Liver SUV max= 2.2,   Aorta Blood SUV Max: 1.5. 2. CT: Volumetric acquisition for clinical  interpretation of the chest, abdomen, and pelvis acquired at 3 mm sections . The chest, abdomen, and pelvis were evaluated at 5 mm sections in bone, soft tissue, and lung windows.  The patient received 63 cc of Isovue 370 intravenously for the examination.  3. 3D MIP and PET-CT fused images were processed on an independent workstation and archived to PACS and reviewed by a radiologist. INDICATION: initial workup; new diagnosis sigmoid colon cancer ADDITIONAL INFORMATION OBTAINED FROM EMR: 71-year-old male with recently diagnosed sigmoid colon adenocarcinoma status post expiratory laparotomy, small bowel resection, and loop descending colostomy due to malignant large bowel obstruction. Positive peritoneal biopsy. COMPARISON: CT abdomen and pelvis 3/26/2022 FINDINGS: HEAD/NECK: Focal FDG avid focus in the left cervical musculature at the C1 level (series 3 image 91), with no underlying findings on CT, presumed physiologic related to muscle contraction. Scattered mucosal thickening in the paranasal sinuses with layering air-fluid level in the left maxillary sinus. The mastoid air cells are clear. No pathologically enlarged lymph nodes are evident. The thyroid gland is unremarkable. CHEST: No focal suspicious FDG uptake. No cardiomegaly or significant pericardial effusion. Moderate coronary artery calcifications. Normal caliber main pulmonary artery with enlarged right (31 mm and left (28 mm) pulmonary arteries, which can be seen in pulmonary hypertension. Normal caliber thoracic aorta. Aortic atherosclerosis. Small sliding-type hiatal hernia. No suspicious lymphadenopathy. No pneumothorax or pleural effusion. Bibasilar atelectasis. Peripheral wedgelike reticular opacities in the inferolateral right upper lobe with low levels of uptake, SUV max 1.7. Few small scattered pulmonary nodules, for example a 3 mm part solid nodule in the right upper lobe (series 8 image 40), a 2 mm solid nodule in the right lower lobe (series  8 image 92) ABDOMEN AND PELVIS: Focal FDG uptake in the known sigmoid mass in the low pelvis with pericolonic stranding (series 4 image 424) and max SUV 18.0. Mild remaining diffuse bowel uptake, likely physiologic. Mild expected uptake along the laparotomy site. No other focal suspicious uptake. Similar few scattered subcentimeter hepatic hypodensities, too small to characterize, as seen on comparison CT 3/26/2022. Mild intrahepatic biliary ductal dilatation without significant extrahepatic dilatation (for age). Prominent main pancreatic duct without distal obstructing lesion. The gallbladder, spleen, and adrenals are unremarkable. Simple-appearing left renal cyst. Circumferential bladder wall thickening, likely chronic outlet obstruction in the setting of prostatomegaly. Postoperative changes of partial small bowel resection in loop descending colostomy. Diffuse air-fluid levels throughout the small bowel without significant distention or focal transition point. No definite pneumoperitoneum. Trace postoperative subcutaneous emphysema and extraperitoneal air in the left lower quadrant/left groin. Suspect trace pelvic free fluid. No definite well-organized fluid collection. Focal infrarenal abdominal ectasia measuring 2.7 x 2.9 cm proximal to the bifurcation (series 4 image 353; coronal series 12 image 37). Scattered atherosclerotic calcifications of the abdominal aorta and major branch vessels. Focal high-grade atherosclerotic narrowing of the right external iliac artery (series 4 image 413). No definite suspicious lymphadenopathy. LOWER EXTREMITIES: No hypermetabolic lesions. BONES: There is no abnormal FDG uptake in the skeleton. There are no suspicious lytic or blastic osseous lesions. Mild degenerative changes of the spine.     IMPRESSION: In this patient with recently diagnosed sigmoid colon adenocarcinoma: 1. Hypermetabolic sigmoid mass consistent with known primary malignancy. There is surrounding  pericolonic stranding suggestive of locally invasive disease, with poor delineation of the mass from multiple other loops of bowel and the left posterolateral bladder wall. 2. Indeterminant small scattered pulmonary nodules and subcentimeter hepatic hypodensities, too small to fully characterize. Recommend comparison with older studies if available, otherwise attention on follow-up. Liver MRI can be considered to further characterize the small hypodensities. 3. Postoperative changes of partial small bowel resection and descending loop colostomy. Multiple air-fluid levels throughout the small bowel without other evidence of obstruction, favor residual ongoing adynamic ileus or resolving patulous bowel from previous obstruction. 4. Air-fluid level in the left maxillary sinus, can be seen with current or recent acute sinusitis. 5. Subpleural groundglass and reticulonodular opacity in the right upper lobe with low levels of uptake, favor infectious/inflammatory etiology, possible scarring. I have personally reviewed the examination and initial interpretation and I agree with the findings. WINIFRED CALLOWAY MD   SYSTEM ID:  [id number]    MR Abdomen w/o & w Contrast    Result Date: 5/5/2022  MR ABDOMEN WITHOUT AND WITH CONTRAST 5/4/2022 11:28 AM INDICATION: Adenocarcinoma of sigmoid colon (H). COMPARISON: 4/7/2022 TECHNIQUE: Routine MRI liver protocol including T1 in/out phase, diffusion, multiplane T2, and dynamic T1 with IV contrast.  CONTRAST: 6.4 mL Eovist FINDINGS: LIVER: No significant fatty infiltration of the liver. Liver size within normal limits. There are two observations of T2 hyperintensity in the liver measuring 0.8 and 0.6 cm respectively (series 4, images 8 and 23). No enhancement or restricted diffusion. No other liver lesions. ADDITIONAL FINDINGS: The pancreas, spleen, and adrenal glands are unremarkable. Left renal cyst does not require specific follow-up. Large volume of retained colonic stool. No  adenopathy or ascites. Partial visualization of a left lower quadrant colostomy.     IMPRESSION: Tiny hypodensities seen on the prior CT correspond to subcentimeter cysts that do not require specific follow-up. BOZENA MEDINA MD   SYSTEM ID:  [id number]    A total of 30 min were spent today on this visit which included face to face conversation with the patient, EMR review, counseling and co-ordination of care and medical documentation.      Signed by: Pamela Colorado MD

## 2022-05-10 ENCOUNTER — INFUSION THERAPY VISIT (OUTPATIENT)
Dept: INFUSION THERAPY | Facility: CLINIC | Age: 72
End: 2022-05-10
Attending: INTERNAL MEDICINE
Payer: COMMERCIAL

## 2022-05-10 ENCOUNTER — ONCOLOGY VISIT (OUTPATIENT)
Dept: ONCOLOGY | Facility: CLINIC | Age: 72
End: 2022-05-10
Attending: INTERNAL MEDICINE
Payer: COMMERCIAL

## 2022-05-10 VITALS
DIASTOLIC BLOOD PRESSURE: 77 MMHG | RESPIRATION RATE: 12 BRPM | SYSTOLIC BLOOD PRESSURE: 117 MMHG | WEIGHT: 131 LBS | TEMPERATURE: 98.1 F | HEART RATE: 85 BPM | OXYGEN SATURATION: 97 % | BODY MASS INDEX: 18.8 KG/M2

## 2022-05-10 VITALS — SYSTOLIC BLOOD PRESSURE: 105 MMHG | HEART RATE: 47 BPM | DIASTOLIC BLOOD PRESSURE: 68 MMHG

## 2022-05-10 DIAGNOSIS — R11.0 CHEMOTHERAPY-INDUCED NAUSEA: ICD-10-CM

## 2022-05-10 DIAGNOSIS — C18.7 ADENOCARCINOMA OF SIGMOID COLON (H): Primary | ICD-10-CM

## 2022-05-10 DIAGNOSIS — G89.18 ACUTE POST-OPERATIVE PAIN: ICD-10-CM

## 2022-05-10 DIAGNOSIS — T45.1X5A CHEMOTHERAPY-INDUCED NAUSEA: ICD-10-CM

## 2022-05-10 LAB
ALBUMIN UR-MCNC: NEGATIVE MG/DL
BASOPHILS # BLD AUTO: 0.1 10E3/UL (ref 0–0.2)
BASOPHILS NFR BLD AUTO: 1 %
EOSINOPHIL # BLD AUTO: 0.5 10E3/UL (ref 0–0.7)
EOSINOPHIL NFR BLD AUTO: 4 %
ERYTHROCYTE [DISTWIDTH] IN BLOOD BY AUTOMATED COUNT: 15.2 % (ref 10–15)
HCT VFR BLD AUTO: 40.1 % (ref 40–53)
HGB BLD-MCNC: 12.7 G/DL (ref 13.3–17.7)
IMM GRANULOCYTES # BLD: 0 10E3/UL
IMM GRANULOCYTES NFR BLD: 0 %
LYMPHOCYTES # BLD AUTO: 2.3 10E3/UL (ref 0.8–5.3)
LYMPHOCYTES NFR BLD AUTO: 21 %
MCH RBC QN AUTO: 30.6 PG (ref 26.5–33)
MCHC RBC AUTO-ENTMCNC: 31.7 G/DL (ref 31.5–36.5)
MCV RBC AUTO: 97 FL (ref 78–100)
MONOCYTES # BLD AUTO: 1.1 10E3/UL (ref 0–1.3)
MONOCYTES NFR BLD AUTO: 11 %
NEUTROPHILS # BLD AUTO: 6.6 10E3/UL (ref 1.6–8.3)
NEUTROPHILS NFR BLD AUTO: 63 %
NRBC # BLD AUTO: 0 10E3/UL
NRBC BLD AUTO-RTO: 0 /100
PLATELET # BLD AUTO: 224 10E3/UL (ref 150–450)
RBC # BLD AUTO: 4.15 10E6/UL (ref 4.4–5.9)
WBC # BLD AUTO: 10.7 10E3/UL (ref 4–11)

## 2022-05-10 PROCEDURE — G0463 HOSPITAL OUTPT CLINIC VISIT: HCPCS | Mod: 25

## 2022-05-10 PROCEDURE — 250N000011 HC RX IP 250 OP 636: Performed by: INTERNAL MEDICINE

## 2022-05-10 PROCEDURE — 99214 OFFICE O/P EST MOD 30 MIN: CPT | Performed by: INTERNAL MEDICINE

## 2022-05-10 PROCEDURE — 96367 TX/PROPH/DG ADDL SEQ IV INF: CPT

## 2022-05-10 PROCEDURE — 96417 CHEMO IV INFUS EACH ADDL SEQ: CPT

## 2022-05-10 PROCEDURE — 81003 URINALYSIS AUTO W/O SCOPE: CPT | Performed by: INTERNAL MEDICINE

## 2022-05-10 PROCEDURE — 85025 COMPLETE CBC W/AUTO DIFF WBC: CPT | Performed by: INTERNAL MEDICINE

## 2022-05-10 PROCEDURE — 96413 CHEMO IV INFUSION 1 HR: CPT

## 2022-05-10 PROCEDURE — 96409 CHEMO IV PUSH SNGL DRUG: CPT

## 2022-05-10 PROCEDURE — 96411 CHEMO IV PUSH ADDL DRUG: CPT

## 2022-05-10 PROCEDURE — G0498 CHEMO EXTEND IV INFUS W/PUMP: HCPCS

## 2022-05-10 PROCEDURE — 96415 CHEMO IV INFUSION ADDL HR: CPT

## 2022-05-10 PROCEDURE — 96375 TX/PRO/DX INJ NEW DRUG ADDON: CPT

## 2022-05-10 PROCEDURE — 258N000003 HC RX IP 258 OP 636: Performed by: INTERNAL MEDICINE

## 2022-05-10 PROCEDURE — 96368 THER/DIAG CONCURRENT INF: CPT

## 2022-05-10 RX ORDER — MEPERIDINE HYDROCHLORIDE 25 MG/ML
25 INJECTION INTRAMUSCULAR; INTRAVENOUS; SUBCUTANEOUS EVERY 30 MIN PRN
Status: CANCELLED | OUTPATIENT
Start: 2022-05-10

## 2022-05-10 RX ORDER — HEPARIN SODIUM (PORCINE) LOCK FLUSH IV SOLN 100 UNIT/ML 100 UNIT/ML
5 SOLUTION INTRAVENOUS
Status: DISCONTINUED | OUTPATIENT
Start: 2022-05-10 | End: 2022-05-10 | Stop reason: HOSPADM

## 2022-05-10 RX ORDER — ALBUTEROL SULFATE 90 UG/1
1-2 AEROSOL, METERED RESPIRATORY (INHALATION)
Status: CANCELLED
Start: 2022-05-10

## 2022-05-10 RX ORDER — LORAZEPAM 2 MG/ML
0.5 INJECTION INTRAMUSCULAR EVERY 4 HOURS PRN
Status: CANCELLED | OUTPATIENT
Start: 2022-05-10

## 2022-05-10 RX ORDER — ONDANSETRON 2 MG/ML
8 INJECTION INTRAMUSCULAR; INTRAVENOUS ONCE
Status: COMPLETED | OUTPATIENT
Start: 2022-05-10 | End: 2022-05-10

## 2022-05-10 RX ORDER — FLUOROURACIL 50 MG/ML
400 INJECTION, SOLUTION INTRAVENOUS ONCE
Status: CANCELLED | OUTPATIENT
Start: 2022-05-10

## 2022-05-10 RX ORDER — EPINEPHRINE 1 MG/ML
0.3 INJECTION, SOLUTION, CONCENTRATE INTRAVENOUS EVERY 5 MIN PRN
Status: CANCELLED | OUTPATIENT
Start: 2022-05-10

## 2022-05-10 RX ORDER — DIPHENHYDRAMINE HYDROCHLORIDE 50 MG/ML
50 INJECTION INTRAMUSCULAR; INTRAVENOUS
Status: CANCELLED
Start: 2022-05-10

## 2022-05-10 RX ORDER — FLUOROURACIL 50 MG/ML
400 INJECTION, SOLUTION INTRAVENOUS ONCE
Status: COMPLETED | OUTPATIENT
Start: 2022-05-10 | End: 2022-05-10

## 2022-05-10 RX ORDER — HEPARIN SODIUM,PORCINE 10 UNIT/ML
5 VIAL (ML) INTRAVENOUS
Status: CANCELLED | OUTPATIENT
Start: 2022-05-12

## 2022-05-10 RX ORDER — HEPARIN SODIUM,PORCINE 10 UNIT/ML
5 VIAL (ML) INTRAVENOUS
Status: CANCELLED | OUTPATIENT
Start: 2022-05-10

## 2022-05-10 RX ORDER — METHYLPREDNISOLONE SODIUM SUCCINATE 125 MG/2ML
125 INJECTION, POWDER, LYOPHILIZED, FOR SOLUTION INTRAMUSCULAR; INTRAVENOUS
Status: CANCELLED
Start: 2022-05-10

## 2022-05-10 RX ORDER — ALBUTEROL SULFATE 0.83 MG/ML
2.5 SOLUTION RESPIRATORY (INHALATION)
Status: CANCELLED | OUTPATIENT
Start: 2022-05-10

## 2022-05-10 RX ORDER — ONDANSETRON 2 MG/ML
8 INJECTION INTRAMUSCULAR; INTRAVENOUS ONCE
Status: CANCELLED | OUTPATIENT
Start: 2022-05-10

## 2022-05-10 RX ORDER — HEPARIN SODIUM (PORCINE) LOCK FLUSH IV SOLN 100 UNIT/ML 100 UNIT/ML
5 SOLUTION INTRAVENOUS
Status: CANCELLED | OUTPATIENT
Start: 2022-05-12

## 2022-05-10 RX ORDER — ONDANSETRON 8 MG/1
8 TABLET, FILM COATED ORAL EVERY 8 HOURS PRN
Qty: 30 TABLET | Refills: 2 | Status: SHIPPED | OUTPATIENT
Start: 2022-05-10 | End: 2022-06-20

## 2022-05-10 RX ORDER — NALOXONE HYDROCHLORIDE 0.4 MG/ML
0.2 INJECTION, SOLUTION INTRAMUSCULAR; INTRAVENOUS; SUBCUTANEOUS
Status: CANCELLED | OUTPATIENT
Start: 2022-05-10

## 2022-05-10 RX ORDER — OXYCODONE HYDROCHLORIDE 5 MG/1
5 TABLET ORAL EVERY 8 HOURS PRN
Qty: 60 TABLET | Refills: 0 | Status: SHIPPED | OUTPATIENT
Start: 2022-05-10 | End: 2022-06-20

## 2022-05-10 RX ADMIN — FLUOROURACIL 690 MG: 50 INJECTION, SOLUTION INTRAVENOUS at 12:40

## 2022-05-10 RX ADMIN — SODIUM CHLORIDE 300 MG: 9 INJECTION, SOLUTION INTRAVENOUS at 09:57

## 2022-05-10 RX ADMIN — ONDANSETRON 8 MG: 2 INJECTION INTRAMUSCULAR; INTRAVENOUS at 08:58

## 2022-05-10 RX ADMIN — DEXTROSE MONOHYDRATE 250 ML: 50 INJECTION, SOLUTION INTRAVENOUS at 10:34

## 2022-05-10 RX ADMIN — DEXAMETHASONE SODIUM PHOSPHATE: 10 INJECTION, SOLUTION INTRAMUSCULAR; INTRAVENOUS at 09:32

## 2022-05-10 RX ADMIN — OXALIPLATIN 150 MG: 100 INJECTION, SOLUTION, CONCENTRATE INTRAVENOUS at 10:36

## 2022-05-10 RX ADMIN — FAMOTIDINE 20 MG: 20 INJECTION, SOLUTION INTRAVENOUS at 08:59

## 2022-05-10 RX ADMIN — SODIUM CHLORIDE 250 ML: 9 INJECTION, SOLUTION INTRAVENOUS at 08:58

## 2022-05-10 RX ADMIN — LEUCOVORIN CALCIUM 692 MG: 350 INJECTION, POWDER, LYOPHILIZED, FOR SOLUTION INTRAMUSCULAR; INTRAVENOUS at 10:36

## 2022-05-10 ASSESSMENT — PAIN SCALES - GENERAL: PAINLEVEL: MODERATE PAIN (5)

## 2022-05-10 NOTE — PROGRESS NOTES
"Oncology Rooming Note    May 10, 2022 8:32 AM   Kalin Shepard is a 71 year old male who presents for:    Chief Complaint   Patient presents with     Oncology Clinic Visit     Adenocarcinoma of sigmoid colon - Labs provider and infusion     Initial Vitals: /77 (BP Location: Right arm, Patient Position: Sitting, Cuff Size: Adult Regular)   Pulse 85   Temp 98.1  F (36.7  C) (Tympanic)   Resp 12   Wt 59.4 kg (131 lb)   SpO2 97%   BMI 18.80 kg/m   Estimated body mass index is 18.8 kg/m  as calculated from the following:    Height as of 4/27/22: 1.778 m (5' 10\").    Weight as of this encounter: 59.4 kg (131 lb). Body surface area is 1.71 meters squared.  Moderate Pain (5) Comment: Data Unavailable   No LMP for male patient.  Allergies reviewed: Yes  Medications reviewed: Yes    Medications: Medication refills not needed today.  Pharmacy name entered into [facility]:    [id number] - [place], [place] - [address]  [place], [place] - [address] [place] [address]    Clinical concerns:  None      Sara Almanzar CMA            "

## 2022-05-10 NOTE — PROGRESS NOTES
Infusion Nursing Note:  Kalin Shepard presents today for FOLFOX & Zirabev.    Patient seen by provider today: Yes   present during visit today: Not Applicable.    Note: N/A.      Intravenous Access:  Implanted Port.    Treatment Conditions:  Lab Results   Component Value Date    HGB 12.7 (L) 05/10/2022    WBC 10.7 05/10/2022    ANEUTAUTO 6.6 05/10/2022     05/10/2022      Results reviewed, labs MET treatment parameters, ok to proceed with treatment.  Urine negative.  B/P within treatment parameters.      Post Infusion Assessment:  Patient tolerated infusion without incident.  Blood return noted pre and post infusion.  Site patent and intact, free from redness, edema or discomfort.  No evidence of extravasations.       Discharge Plan:   Patient discharged in stable condition accompanied by: self.  Departure Mode: Ambulatory.  [facility] to disconnect pump 5/12 at 11am.      Mitchel Loera RN

## 2022-05-10 NOTE — LETTER
"    5/10/2022         RE: Kalin Shepard  [address] [facility] [address]        Dear Colleague,    Thank you for referring your patient, Kalin Shepard, to the [facility] [place]. Please see a copy of my visit note below.    Oncology Rooming Note    May 10, 2022 8:32 AM   Kalin Shepard is a 71 year old male who presents for:    Chief Complaint   Patient presents with     Oncology Clinic Visit     Adenocarcinoma of sigmoid colon - Labs provider and infusion     Initial Vitals: /77 (BP Location: Right arm, Patient Position: Sitting, Cuff Size: Adult Regular)   Pulse 85   Temp 98.1  F (36.7  C) (Tympanic)   Resp 12   Wt 59.4 kg (131 lb)   SpO2 97%   BMI 18.80 kg/m   Estimated body mass index is 18.8 kg/m  as calculated from the following:    Height as of 4/27/22: 1.778 m (5' 10\").    Weight as of this encounter: 59.4 kg (131 lb). Body surface area is 1.71 meters squared.  Moderate Pain (5) Comment: Data Unavailable   No LMP for male patient.  Allergies reviewed: Yes  Medications reviewed: Yes    Medications: Medication refills not needed today.  Pharmacy name entered into [employer]:    [id number] - [place], [place] - [address]  [facility] [place], [place] - [address] [place] [address]    Clinical concerns:  None      Sara Almanzar, [facility] Hematology and Oncology Progress Note    Patient: Kalin Shepard  MRN: [id number]  Date of Service: 04/26/2022        Reason for Visit    Chief Complaint   Patient presents with     Oncology Clinic Visit     Adenocarcinoma of sigmoid colon - Labs provider and infusion         Problem List Items Addressed This Visit        Digestive    Adenocarcinoma of sigmoid colon (H) - Primary    Relevant Medications    ondansetron (ZOFRAN) 8 MG tablet    Other Relevant Orders    Infusion Appointment Request    Infusion Appointment Request      Other Visit Diagnoses     " Acute post-operative pain        Chemotherapy-induced nausea                Assessment and Plan  1. Sigmoid colon cancer with peritoneal metastasis  Doing well on FOLFOX with doses mild chemotherapy.  Here for cycle 2.  Labs reviewed today.  ANC is 6.6.  Hemoglobin is only mildly decreased.  Blood count is normal.  No contraindications to proceed with treatment today.  No dose reductions in chemotherapy.  For nausea I asked him to be proactive and take Zofran and Compazine as needed.  Dexamethasone tomorrow on day of tomorrow.  Keep up with fluid intake.  I will see him back in 2 weeks again with repeat labs.    2. Indeterminate liver lesions  MRI of the abdomen came back showing cysts in the liver.  No concerning lesions noted.  Has an upcoming appointment for an MRI of the pelvis.     3.  Weight loss  Courage him to supplement his intake with boost or Ensure.    Patient Instructions   Cycle 2 FOLFOX with kranthi today. Pump discharge on Thursday. RTC in 2 weeks with labs prior for cycle 3.         Cancer Staging  Adenocarcinoma of sigmoid colon (H)  Staging form: Colon and Rectum, AJCC 8th Edition  - Pathologic: Stage Unknown (pTX, pNX, pM1) - Signed by Pamela Colorado MD on 5/6/2022      ECOG Performance    1 - Can't do physically strenuous work, but fully ambyulatory and can do light sedentary work         Problem List    Patient Active Problem List   Diagnosis     Colon adenocarcinoma (H)     Colostomy present (H)     Cachexia (H)     Adenocarcinoma of sigmoid colon (H)        Oncology history      Interval History   Kalin Shepard is a 71 year old with sigmoid coloncancer with peritoneal metastasis with peritoneal metastasis who is here for cycle 2 of FOLFOX and bevacizumab.    Did well with the first cycle.  Had some nausea and some headaches.  Fatigue is perhaps his biggest side effect.  Some nerve sensitivity with oxaliplatin as expected.  No neuropathy.  Denies any diarrhea.  His appetite is okay.  He  has lost a few pounds.  Denies any abdominal pain.  Staples came off.  He also met with surgery.  He has an upcoming MRI of the pelvis next week.    Labs reviewed today.    Review of Systems  A comprehensive review of systems was negative except for what is noted in the interval history    Current Outpatient Medications   Medication     ibuprofen (ADVIL/MOTRIN) 400 MG tablet     Lidocaine (LIDOCARE) 4 % Patch     ondansetron (ZOFRAN) 8 MG tablet     oxyCODONE (ROXICODONE) 5 MG tablet     polyethylene glycol (MIRALAX) 17 GM/Dose powder     acetaminophen (TYLENOL) 500 MG tablet     dexamethasone (DECADRON) 4 MG tablet     methocarbamol (ROBAXIN) 500 MG tablet     prochlorperazine (COMPAZINE) 10 MG tablet     No current facility-administered medications for this visit.     Facility-Administered Medications Ordered in Other Visits   Medication     bevacizumab-bvzr (ZIRABEV) 300 mg in sodium chloride 0.9 % 112 mL infusion     dextrose 5% BOLUS     famotidine (PEPCID) infusion 20 mg     fluorouracil (ADRUCIL) 4,150 mg in sodium chloride 0.9 % 333 mL Home Infusion     fluorouracil (ADRUCIL) injection 690 mg     fosaprepitant (EMEND) 150 mg, dexamethasone (DECADRON) 12 mg in sodium chloride 0.9 % 251.2 mL intermittent infusion     heparin 100 UNIT/ML injection 5 mL     leucovorin calcium 692 mg in D5W 134.6 mL infusion     oxaliplatin (ELOXATIN) 150 mg in D5W 530 mL infusion     sodium chloride (PF) 0.9% PF flush 3-20 mL        Physical Exam    No flowsheet data found.    General: alert and cooperative  HEENT: Head: Normal, normocephalic, atraumatic.  Eye: Normal external eye, conjunctiva, lids cornea, DANA.  Chest: To auscultation bilaterally  Cardiac: S1, S2 normal, regular rate and rhythm  Abdomen: Soft and nontender, ostomy in place Staples are off, laparotomy scar healing really well  Extremities: atraumatic, no peripheral edema  Skin: Ostomy site looks clean  CNS: Alert and oriented x3, neurologic exam grossly  normal.    Lab Results    Recent Results (from the past 168 hour(s))   Protein qualitative urine   Result Value Ref Range    Protein Albumin Urine Negative Negative mg/dL   CBC with platelets and differential   Result Value Ref Range    WBC Count 10.7 4.0 - 11.0 10e3/uL    RBC Count 4.15 (L) 4.40 - 5.90 10e6/uL    Hemoglobin 12.7 (L) 13.3 - 17.7 g/dL    Hematocrit 40.1 40.0 - 53.0 %    MCV 97 78 - 100 fL    MCH 30.6 26.5 - 33.0 pg    MCHC 31.7 31.5 - 36.5 g/dL    RDW 15.2 (H) 10.0 - 15.0 %    Platelet Count 224 150 - 450 10e3/uL    % Neutrophils 63 %    % Lymphocytes 21 %    % Monocytes 11 %    % Eosinophils 4 %    % Basophils 1 %    % Immature Granulocytes 0 %    NRBCs per 100 WBC 0 <1 /100    Absolute Neutrophils 6.6 1.6 - 8.3 10e3/uL    Absolute Lymphocytes 2.3 0.8 - 5.3 10e3/uL    Absolute Monocytes 1.1 0.0 - 1.3 10e3/uL    Absolute Eosinophils 0.5 0.0 - 0.7 10e3/uL    Absolute Basophils 0.1 0.0 - 0.2 10e3/uL    Absolute Immature Granulocytes 0.0 <=0.4 10e3/uL    Absolute NRBCs 0.0 10e3/uL       Imaging    XR Chest Port 1 View    Result Date: 4/21/2022  EXAM: XR CHEST PORT 1 VIEW LOCATION: [facility] DATE/TIME: 4/21/2022 5:48 PM INDICATION: Evaluate right IJ central line placement. COMPARISON: 03/28/2022     IMPRESSION: Heart size and pulmonary vascularity normal. Calcified granuloma right base. Nodular opacity mid left lung not present on recent CT PET scan and should represent a prominent left nipple shadow. Lungs otherwise clear. No acute infiltrates or effusions. Right IJ Port-A-Cath tip in the mid SVC. No pneumothorax.    XR Surgery SUZIE Fluoro L/T 5 Min w Stills    Result Date: 4/22/2022  This exam was marked as non-reportable because it will not be read by a radiologist or a [facility] non-radiologist provider.     MR Abdomen w/o & w Contrast    Result Date: 5/5/2022  MR ABDOMEN WITHOUT AND WITH CONTRAST 5/4/2022 11:28 AM INDICATION: Adenocarcinoma of sigmoid colon (H).  COMPARISON: 4/7/2022 TECHNIQUE: Routine MRI liver protocol including T1 in/out phase, diffusion, multiplane T2, and dynamic T1 with IV contrast.  CONTRAST: 6.4 mL Eovist FINDINGS: LIVER: No significant fatty infiltration of the liver. Liver size within normal limits. There are two observations of T2 hyperintensity in the liver measuring 0.8 and 0.6 cm respectively (series 4, images 8 and 23). No enhancement or restricted diffusion. No other liver lesions. ADDITIONAL FINDINGS: The pancreas, spleen, and adrenal glands are unremarkable. Left renal cyst does not require specific follow-up. Large volume of retained colonic stool. No adenopathy or ascites. Partial visualization of a left lower quadrant colostomy.     IMPRESSION: Tiny hypodensities seen on the prior CT correspond to subcentimeter cysts that do not require specific follow-up. BOZENA MEDINA MD   SYSTEM ID:  [id number]    A total of 30 min were spent today on this visit which included face to face conversation with the patient, EMR review, counseling and co-ordination of care and medical documentation.      Signed by: Pamela Colorado MD      Again, thank you for allowing me to participate in the care of your patient.        Sincerely,        Pamela Colorado MD

## 2022-05-10 NOTE — PROGRESS NOTES
[facility] Hematology and Oncology Progress Note    Patient: Kalin Shepard  MRN: [id number]  Date of Service: 04/26/2022        Reason for Visit    Chief Complaint   Patient presents with     Oncology Clinic Visit     Adenocarcinoma of sigmoid colon - Labs provider and infusion         Problem List Items Addressed This Visit        Digestive    Adenocarcinoma of sigmoid colon (H) - Primary    Relevant Medications    ondansetron (ZOFRAN) 8 MG tablet    Other Relevant Orders    Infusion Appointment Request    Infusion Appointment Request      Other Visit Diagnoses     Acute post-operative pain        Chemotherapy-induced nausea                Assessment and Plan  1. Sigmoid colon cancer with peritoneal metastasis  Doing well on FOLFOX with doses mild chemotherapy.  Here for cycle 2.  Labs reviewed today.  ANC is 6.6.  Hemoglobin is only mildly decreased.  Blood count is normal.  No contraindications to proceed with treatment today.  No dose reductions in chemotherapy.  For nausea I asked him to be proactive and take Zofran and Compazine as needed.  Dexamethasone tomorrow on day of tomorrow.  Keep up with fluid intake.  I will see him back in 2 weeks again with repeat labs.    2. Indeterminate liver lesions  MRI of the abdomen came back showing cysts in the liver.  No concerning lesions noted.  Has an upcoming appointment for an MRI of the pelvis.     3.  Weight loss  Courage him to supplement his intake with boost or Ensure.    Patient Instructions   Cycle 2 FOLFOX with kranthi today. Pump discharge on Thursday. RTC in 2 weeks with labs prior for cycle 3.         Cancer Staging  Adenocarcinoma of sigmoid colon (H)  Staging form: Colon and Rectum, AJCC 8th Edition  - Pathologic: Stage Unknown (pTX, pNX, pM1) - Signed by Pamela Colorado MD on 5/6/2022      ECOG Performance    1 - Can't do physically strenuous work, but fully ambyulatory and can do light sedentary work         Problem List    Patient Active  Problem List   Diagnosis     Colon adenocarcinoma (H)     Colostomy present (H)     Cachexia (H)     Adenocarcinoma of sigmoid colon (H)        Oncology history      Interval History   Kalin Shepard is a 71 year old with sigmoid coloncancer with peritoneal metastasis with peritoneal metastasis who is here for cycle 2 of FOLFOX and bevacizumab.    Did well with the first cycle.  Had some nausea and some headaches.  Fatigue is perhaps his biggest side effect.  Some nerve sensitivity with oxaliplatin as expected.  No neuropathy.  Denies any diarrhea.  His appetite is okay.  He has lost a few pounds.  Denies any abdominal pain.  Staples came off.  He also met with surgery.  He has an upcoming MRI of the pelvis next week.    Labs reviewed today.    Review of Systems  A comprehensive review of systems was negative except for what is noted in the interval history    Current Outpatient Medications   Medication     ibuprofen (ADVIL/MOTRIN) 400 MG tablet     Lidocaine (LIDOCARE) 4 % Patch     ondansetron (ZOFRAN) 8 MG tablet     oxyCODONE (ROXICODONE) 5 MG tablet     polyethylene glycol (MIRALAX) 17 GM/Dose powder     acetaminophen (TYLENOL) 500 MG tablet     dexamethasone (DECADRON) 4 MG tablet     methocarbamol (ROBAXIN) 500 MG tablet     prochlorperazine (COMPAZINE) 10 MG tablet     No current facility-administered medications for this visit.     Facility-Administered Medications Ordered in Other Visits   Medication     bevacizumab-bvzr (ZIRABEV) 300 mg in sodium chloride 0.9 % 112 mL infusion     dextrose 5% BOLUS     famotidine (PEPCID) infusion 20 mg     fluorouracil (ADRUCIL) 4,150 mg in sodium chloride 0.9 % 333 mL Home Infusion     fluorouracil (ADRUCIL) injection 690 mg     fosaprepitant (EMEND) 150 mg, dexamethasone (DECADRON) 12 mg in sodium chloride 0.9 % 251.2 mL intermittent infusion     heparin 100 UNIT/ML injection 5 mL     leucovorin calcium 692 mg in D5W 134.6 mL infusion     oxaliplatin (ELOXATIN) 150  mg in D5W 530 mL infusion     sodium chloride (PF) 0.9% PF flush 3-20 mL        Physical Exam    No flowsheet data found.    General: alert and cooperative  HEENT: Head: Normal, normocephalic, atraumatic.  Eye: Normal external eye, conjunctiva, lids cornea, DANA.  Chest: To auscultation bilaterally  Cardiac: S1, S2 normal, regular rate and rhythm  Abdomen: Soft and nontender, ostomy in place Staples are off, laparotomy scar healing really well  Extremities: atraumatic, no peripheral edema  Skin: Ostomy site looks clean  CNS: Alert and oriented x3, neurologic exam grossly normal.    Lab Results    Recent Results (from the past 168 hour(s))   Protein qualitative urine   Result Value Ref Range    Protein Albumin Urine Negative Negative mg/dL   CBC with platelets and differential   Result Value Ref Range    WBC Count 10.7 4.0 - 11.0 10e3/uL    RBC Count 4.15 (L) 4.40 - 5.90 10e6/uL    Hemoglobin 12.7 (L) 13.3 - 17.7 g/dL    Hematocrit 40.1 40.0 - 53.0 %    MCV 97 78 - 100 fL    MCH 30.6 26.5 - 33.0 pg    MCHC 31.7 31.5 - 36.5 g/dL    RDW 15.2 (H) 10.0 - 15.0 %    Platelet Count 224 150 - 450 10e3/uL    % Neutrophils 63 %    % Lymphocytes 21 %    % Monocytes 11 %    % Eosinophils 4 %    % Basophils 1 %    % Immature Granulocytes 0 %    NRBCs per 100 WBC 0 <1 /100    Absolute Neutrophils 6.6 1.6 - 8.3 10e3/uL    Absolute Lymphocytes 2.3 0.8 - 5.3 10e3/uL    Absolute Monocytes 1.1 0.0 - 1.3 10e3/uL    Absolute Eosinophils 0.5 0.0 - 0.7 10e3/uL    Absolute Basophils 0.1 0.0 - 0.2 10e3/uL    Absolute Immature Granulocytes 0.0 <=0.4 10e3/uL    Absolute NRBCs 0.0 10e3/uL       Imaging    XR Chest Port 1 View    Result Date: 4/21/2022  EXAM: XR CHEST PORT 1 VIEW LOCATION: [facility] DATE/TIME: 4/21/2022 5:48 PM INDICATION: Evaluate right IJ central line placement. COMPARISON: 03/28/2022     IMPRESSION: Heart size and pulmonary vascularity normal. Calcified granuloma right base. Nodular opacity mid  left lung not present on recent CT PET scan and should represent a prominent left nipple shadow. Lungs otherwise clear. No acute infiltrates or effusions. Right IJ Port-A-Cath tip in the mid SVC. No pneumothorax.    XR Surgery SUZIE Fluoro L/T 5 Min w Stills    Result Date: 4/22/2022  This exam was marked as non-reportable because it will not be read by a radiologist or a [facility] non-radiologist provider.     MR Abdomen w/o & w Contrast    Result Date: 5/5/2022  MR ABDOMEN WITHOUT AND WITH CONTRAST 5/4/2022 11:28 AM INDICATION: Adenocarcinoma of sigmoid colon (H). COMPARISON: 4/7/2022 TECHNIQUE: Routine MRI liver protocol including T1 in/out phase, diffusion, multiplane T2, and dynamic T1 with IV contrast.  CONTRAST: 6.4 mL Eovist FINDINGS: LIVER: No significant fatty infiltration of the liver. Liver size within normal limits. There are two observations of T2 hyperintensity in the liver measuring 0.8 and 0.6 cm respectively (series 4, images 8 and 23). No enhancement or restricted diffusion. No other liver lesions. ADDITIONAL FINDINGS: The pancreas, spleen, and adrenal glands are unremarkable. Left renal cyst does not require specific follow-up. Large volume of retained colonic stool. No adenopathy or ascites. Partial visualization of a left lower quadrant colostomy.     IMPRESSION: Tiny hypodensities seen on the prior CT correspond to subcentimeter cysts that do not require specific follow-up. BOZENA MEDINA MD   SYSTEM ID:  [id number]    A total of 30 min were spent today on this visit which included face to face conversation with the patient, EMR review, counseling and co-ordination of care and medical documentation.      Signed by: Pamela Colorado MD

## 2022-05-10 NOTE — PATIENT INSTRUCTIONS
Cycle 2 FOLFOX with kranthi today. Pump discharge on Thursday. RTC in 2 weeks with labs prior for cycle 3.

## 2022-05-18 ENCOUNTER — PATIENT OUTREACH (OUTPATIENT)
Dept: ONCOLOGY | Facility: CLINIC | Age: 72
End: 2022-05-18
Payer: COMMERCIAL

## 2022-05-18 ENCOUNTER — TELEPHONE (OUTPATIENT)
Dept: SURGERY | Facility: CLINIC | Age: 72
End: 2022-05-18
Payer: COMMERCIAL

## 2022-05-18 ENCOUNTER — TELEPHONE (OUTPATIENT)
Dept: WOUND CARE | Facility: CLINIC | Age: 72
End: 2022-05-18
Payer: COMMERCIAL

## 2022-05-18 NOTE — TELEPHONE ENCOUNTER
[facility] Telephone Note    Patient called and informed writer that he had his ostomy prescription faxed over to [email or web address] [facility] [employer] from his doctor's office approximately 1 week ago. The company informed patient that their fax machine was down and that they never received the fax order for supplies. Patient was upset and was wanting writer to send a supplies order over to the distributor today. Writer informed patient that we are a nurse run clinic that can make recommendations but that we do not have prescribing rights. Unfortunately his verbal order for ostomy supplies from his discharge instructions are greater than one month old  and no longer valid. Writer instructed patient to contact his surgeons office in order to have a fax resent to his distributor.     Stephy Butts RN CWOCN

## 2022-05-18 NOTE — PROGRESS NOTES
"[facility]: Cancer Care Short Note                                    Discussion with Patient:                                                      Pt c/o Diarrhea x 4 days post C2 on 5/10      Assessment:                                                      Assessment completed with:: Patient       Gastrointestinal  Diarrhea: Increase of 4 - 6 stools per day over baseline OR moderate increase in ostomy output compared to baseline OR limiting instrumental ADL                  Intervention/Education provided during outreach:                                                       Diarrhea x 4 days. Peeing ok, light yellow in color. Pt drinking \"normal amounts\" water, juice and protein shakes. Eating some solid food. Pt reports gurgling prior to diarrhea but no pain. Denies fever. Pt reports he is not sure how many times per day just more in his bag.     Advised pt to start on Imodium and I will follow up with him tomorrow. If he has signs of dehydration told him to call me and we will schedule him for IV fluids.     Pt in agreement with plan.    Danna Collins RN on 5/18/2022 at 12:17 PM  "

## 2022-05-18 NOTE — TELEPHONE ENCOUNTER
[facility] Call Center    Phone Message    May a detailed message be left on voicemail: yes     Reason for Call: Other: Augustin Rodriguezin need script for his 2 ostomy supplies and has been waiting over a week. Augustin Duggan sent orders on 05/11 and has not received anything back. Please call and fax the orders back to # [phone] and Fax# [phone] . Thank you!       Action Taken: Message routed to:  [facility] ([facility] Colon and rec    Travel Screening: Not Applicable

## 2022-05-18 NOTE — TELEPHONE ENCOUNTER
Called [facility] about pts ostomy script. I will go ahead and fax these over again today. \      Kalpana England, CMA

## 2022-05-18 NOTE — TELEPHONE ENCOUNTER
[facility] Call Center    Phone Message    May a detailed message be left on voicemail: yes     Reason for Call: Other: Pt is requesting a call back from the nurse please for a status update on his ostomy supplies. please advise. Thank you!     Action Taken: Message routed to:  [facility] Colon and Rectal     Travel Screening: Not Applicable

## 2022-05-19 NOTE — PROGRESS NOTES
Called pt today and he says he took 1 imodium yesterday and it seem to slow things down. He also noted it was darker in color but not black or red, just brown. He said he will continue to watch it today and let me know if things go Array.     Danna Collins RN on 5/19/2022 at 9:59 AM

## 2022-05-24 ENCOUNTER — LAB (OUTPATIENT)
Dept: INFUSION THERAPY | Facility: CLINIC | Age: 72
End: 2022-05-24
Attending: INTERNAL MEDICINE
Payer: COMMERCIAL

## 2022-05-24 ENCOUNTER — TELEPHONE (OUTPATIENT)
Dept: SURGERY | Facility: CLINIC | Age: 72
End: 2022-05-24

## 2022-05-24 ENCOUNTER — ONCOLOGY VISIT (OUTPATIENT)
Dept: ONCOLOGY | Facility: CLINIC | Age: 72
End: 2022-05-24
Attending: INTERNAL MEDICINE
Payer: COMMERCIAL

## 2022-05-24 VITALS
WEIGHT: 130 LBS | HEART RATE: 57 BPM | BODY MASS INDEX: 18.65 KG/M2 | RESPIRATION RATE: 12 BRPM | OXYGEN SATURATION: 100 % | TEMPERATURE: 97.6 F

## 2022-05-24 DIAGNOSIS — C18.7 ADENOCARCINOMA OF SIGMOID COLON (H): Primary | ICD-10-CM

## 2022-05-24 DIAGNOSIS — T81.30XA WOUND DEHISCENCE: ICD-10-CM

## 2022-05-24 LAB
ALBUMIN UR-MCNC: NEGATIVE MG/DL
ALT SERPL W P-5'-P-CCNC: 17 U/L (ref 0–70)
ANION GAP SERPL CALCULATED.3IONS-SCNC: 5 MMOL/L (ref 3–14)
AST SERPL W P-5'-P-CCNC: 9 U/L (ref 0–45)
BASOPHILS # BLD AUTO: 0.1 10E3/UL (ref 0–0.2)
BASOPHILS NFR BLD AUTO: 1 %
BILIRUB SERPL-MCNC: 0.2 MG/DL (ref 0.2–1.3)
CHLORIDE BLD-SCNC: 108 MMOL/L (ref 94–109)
CO2 SERPL-SCNC: 28 MMOL/L (ref 20–32)
CREAT SERPL-MCNC: 0.63 MG/DL (ref 0.66–1.25)
EOSINOPHIL # BLD AUTO: 0.3 10E3/UL (ref 0–0.7)
EOSINOPHIL NFR BLD AUTO: 4 %
ERYTHROCYTE [DISTWIDTH] IN BLOOD BY AUTOMATED COUNT: 14.7 % (ref 10–15)
GFR SERPL CREATININE-BSD FRML MDRD: >90 ML/MIN/1.73M2
HCT VFR BLD AUTO: 39 % (ref 40–53)
HGB BLD-MCNC: 12.5 G/DL (ref 13.3–17.7)
IMM GRANULOCYTES # BLD: 0 10E3/UL
IMM GRANULOCYTES NFR BLD: 0 %
LYMPHOCYTES # BLD AUTO: 2 10E3/UL (ref 0.8–5.3)
LYMPHOCYTES NFR BLD AUTO: 31 %
MCH RBC QN AUTO: 30.7 PG (ref 26.5–33)
MCHC RBC AUTO-ENTMCNC: 32.1 G/DL (ref 31.5–36.5)
MCV RBC AUTO: 96 FL (ref 78–100)
MONOCYTES # BLD AUTO: 1.1 10E3/UL (ref 0–1.3)
MONOCYTES NFR BLD AUTO: 17 %
NEUTROPHILS # BLD AUTO: 3.1 10E3/UL (ref 1.6–8.3)
NEUTROPHILS NFR BLD AUTO: 47 %
NRBC # BLD AUTO: 0 10E3/UL
NRBC BLD AUTO-RTO: 0 /100
PLATELET # BLD AUTO: 240 10E3/UL (ref 150–450)
POTASSIUM BLD-SCNC: 4.7 MMOL/L (ref 3.4–5.3)
RBC # BLD AUTO: 4.07 10E6/UL (ref 4.4–5.9)
SODIUM SERPL-SCNC: 141 MMOL/L (ref 133–144)
WBC # BLD AUTO: 6.6 10E3/UL (ref 4–11)

## 2022-05-24 PROCEDURE — 84450 TRANSFERASE (AST) (SGOT): CPT | Performed by: INTERNAL MEDICINE

## 2022-05-24 PROCEDURE — 36591 DRAW BLOOD OFF VENOUS DEVICE: CPT | Performed by: INTERNAL MEDICINE

## 2022-05-24 PROCEDURE — G0463 HOSPITAL OUTPT CLINIC VISIT: HCPCS | Mod: 25

## 2022-05-24 PROCEDURE — 250N000011 HC RX IP 250 OP 636: Performed by: INTERNAL MEDICINE

## 2022-05-24 PROCEDURE — 81003 URINALYSIS AUTO W/O SCOPE: CPT | Performed by: INTERNAL MEDICINE

## 2022-05-24 PROCEDURE — 85025 COMPLETE CBC W/AUTO DIFF WBC: CPT | Performed by: INTERNAL MEDICINE

## 2022-05-24 PROCEDURE — 84460 ALANINE AMINO (ALT) (SGPT): CPT | Performed by: INTERNAL MEDICINE

## 2022-05-24 PROCEDURE — 82565 ASSAY OF CREATININE: CPT | Performed by: INTERNAL MEDICINE

## 2022-05-24 PROCEDURE — 82247 BILIRUBIN TOTAL: CPT | Performed by: INTERNAL MEDICINE

## 2022-05-24 PROCEDURE — 99214 OFFICE O/P EST MOD 30 MIN: CPT | Performed by: INTERNAL MEDICINE

## 2022-05-24 PROCEDURE — 80051 ELECTROLYTE PANEL: CPT | Performed by: INTERNAL MEDICINE

## 2022-05-24 RX ORDER — HEPARIN SODIUM (PORCINE) LOCK FLUSH IV SOLN 100 UNIT/ML 100 UNIT/ML
5 SOLUTION INTRAVENOUS ONCE
Status: COMPLETED | OUTPATIENT
Start: 2022-05-24 | End: 2022-05-24

## 2022-05-24 RX ADMIN — HEPARIN 5 ML: 100 SYRINGE at 09:04

## 2022-05-24 ASSESSMENT — PAIN SCALES - GENERAL: PAINLEVEL: SEVERE PAIN (6)

## 2022-05-24 NOTE — PROGRESS NOTES
"Oncology Rooming Note    May 24, 2022 8:20 AM   Kalin Shepard is a 71 year old male who presents for:    Chief Complaint   Patient presents with     Oncology Clinic Visit     Adenocarcinoma of sigmoid colon - Labs provider and infusion     Initial Vitals: Pulse 57   Temp 97.6  F (36.4  C) (Tympanic)   Resp 12   Wt 59 kg (130 lb)   SpO2 100%   BMI 18.65 kg/m   Estimated body mass index is 18.65 kg/m  as calculated from the following:    Height as of 4/27/22: 1.778 m (5' 10\").    Weight as of this encounter: 59 kg (130 lb). Body surface area is 1.71 meters squared.  Severe Pain (6) Comment: Data Unavailable   No LMP for male patient.  Allergies reviewed: Yes  Medications reviewed: Yes    Medications: Medication refills not needed today.  Pharmacy name entered into [employer]:    [facility] - [place], [place] - [address]  [place], [place] - [address] [place] [address]    Clinical concerns:  BP machine unable to read blood pressure. Provider states systolic is over 100.       Sara Almanzar, CARLENE            "

## 2022-05-24 NOTE — TELEPHONE ENCOUNTER
Call from Oncology infusion-  Oncology believes port wound is open at the top of suture line.    Appt scheduled tomorrow;  Please indicate if prefer pt be earlier and NPO for consult (in case taking to OR for replacement?)    Carol EAST   Specialty Clinic RN

## 2022-05-24 NOTE — PATIENT INSTRUCTIONS
Hold off on chemo today.  Please reschedule chemo to next Tuesday with labs prior (no need for visit with me).  RTC on June 14th with labs prior, see me and chemo to follow the same day.

## 2022-05-24 NOTE — TELEPHONE ENCOUNTER
Per newly completed Oncology Note:    Is here for cycle 3 of FOLFOX with bevacizumab.  Unfortunately his port site has opened up.  Incision has gaped.  No signs of infection.  Port is visible from the outside.  Recommended holding off of treatment today.  I will schedule him with surgery ASAP for possible port replacement.  I will postpone his chemo next week.   ...I think issue with the port is in part due to his weight loss.  I encouraged him to increase his protein calorie intake.  I also offered him Remeron to increase his appetite but he declined.    Carol EAST   Specialty Clinic RN

## 2022-05-24 NOTE — LETTER
5/24/2022         RE: Kalin Shepard  [address]  [facility] [address]        Dear Colleague,    Thank you for referring your patient, Kalin Shepard, to the [facility]. Please see a copy of my visit note below.    [facility] Hematology and Oncology Progress Note    Patient: Kalin Shepard  MRN: [id number]  Date of Service: 04/26/2022        Reason for Visit    Chief Complaint   Patient presents with     Oncology Clinic Visit     Adenocarcinoma of sigmoid colon - Labs provider and infusion         Problem List Items Addressed This Visit        Digestive    Adenocarcinoma of sigmoid colon (H) - Primary    Relevant Orders    Electrolyte panel (Na, K, Cl, CO2, Anion gap) (Completed)      Other Visit Diagnoses     Wound dehiscence                Assessment and Plan  1. Sigmoid colon cancer with peritoneal metastasis  Is here for cycle 3 of FOLFOX with bevacizumab.  Unfortunately his port site has opened up.  Incision has gaped.  No signs of infection.  Port is visible from the outside.  Recommended holding off of treatment today.  I will schedule him with surgery ASAP for possible port replacement.  I will postpone his chemo next week.  His labs look good today.  Total white count is 7.  Hemoglobin is 12.7 and platelet count is 224.  Creatinine is normal.  LFTs are normal.    2.  Weight loss  I think issue with the port is in part due to his weight loss.  I encouraged him to increase his protein calorie intake.  I also offered him Remeron to increase his appetite but he declined.     Patient Instructions   Hold off on chemo today.  Please reschedule chemo to next Tuesday with labs prior (no need for visit with me).  RTC on June 14th with labs prior, see me and chemo to follow the same day.         Cancer Staging  Adenocarcinoma of sigmoid colon (H)  Staging form: Colon and Rectum, AJCC 8th Edition  - Pathologic: Stage Unknown (pTX, pNX, pM1) - Signed by Stanislav  MD Pamela on 5/6/2022      ECOG Performance    1 - Can't do physically strenuous work, but fully ambyulatory and can do light sedentary work         Problem List    Patient Active Problem List   Diagnosis     Colon adenocarcinoma (H)     Colostomy present (H)     Cachexia (H)     Adenocarcinoma of sigmoid colon (H)        Interval History   Kalin Shepard is a 71 year old with sigmoid coloncancer with peritoneal metastasis who is here for cycle 3 of FOLFOX and bevacizumab.    For cycle 2 developed some diarrhea.  Taking Imodium.  Also has had some nausea no vomiting.  His appetite was very poor for the first week after chemo.  But has picked up since then.  He lost about 5 pounds initially.  He has gained it back.  Denies any fevers or chills.  He is trying to keep up with the protein calorie intake by using boost and Ensure.    Labs reviewed today.    Review of Systems  A comprehensive review of systems was negative except for what is noted in the interval history    Current Outpatient Medications   Medication     ibuprofen (ADVIL/MOTRIN) 400 MG tablet     Lidocaine (LIDOCARE) 4 % Patch     ondansetron (ZOFRAN) 8 MG tablet     oxyCODONE (ROXICODONE) 5 MG tablet     polyethylene glycol (MIRALAX) 17 GM/Dose powder     acetaminophen (TYLENOL) 500 MG tablet     dexamethasone (DECADRON) 4 MG tablet     methocarbamol (ROBAXIN) 500 MG tablet     prochlorperazine (COMPAZINE) 10 MG tablet     No current facility-administered medications for this visit.     Facility-Administered Medications Ordered in Other Visits   Medication     heparin 100 UNIT/ML injection 5 mL     sodium chloride (PF) 0.9% PF flush 20 mL        Physical Exam    No flowsheet data found.    General: alert and cooperative  HEENT: Head: Normal, normocephalic, atraumatic.  Eye: Normal external eye, conjunctiva, lids cornea, DANA.  Abdomen: Soft and nontender, ostomy in place   Extremities: atraumatic, no peripheral edema  Skin: The outside incision  has opened up and port is visible.  Fortunately there is no signs of infection.  CNS: Alert and oriented x3, neurologic exam grossly normal.    Lab Results    Recent Results (from the past 168 hour(s))   AST   Result Value Ref Range    AST 9 0 - 45 U/L   ALT   Result Value Ref Range    ALT 17 0 - 70 U/L   Creatinine   Result Value Ref Range    Creatinine 0.63 (L) 0.66 - 1.25 mg/dL    GFR Estimate >90 >60 mL/min/1.73m2   Bilirubin  total   Result Value Ref Range    Bilirubin Total 0.2 0.2 - 1.3 mg/dL   Protein qualitative urine   Result Value Ref Range    Protein Albumin Urine Negative Negative mg/dL   Electrolyte panel (Na, K, Cl, CO2, Anion gap)   Result Value Ref Range    Sodium 141 133 - 144 mmol/L    Potassium 4.7 3.4 - 5.3 mmol/L    Chloride 108 94 - 109 mmol/L    Carbon Dioxide (CO2) 28 20 - 32 mmol/L    Anion Gap 5 3 - 14 mmol/L   CBC with platelets and differential   Result Value Ref Range    WBC Count 6.6 4.0 - 11.0 10e3/uL    RBC Count 4.07 (L) 4.40 - 5.90 10e6/uL    Hemoglobin 12.5 (L) 13.3 - 17.7 g/dL    Hematocrit 39.0 (L) 40.0 - 53.0 %    MCV 96 78 - 100 fL    MCH 30.7 26.5 - 33.0 pg    MCHC 32.1 31.5 - 36.5 g/dL    RDW 14.7 10.0 - 15.0 %    Platelet Count 240 150 - 450 10e3/uL    % Neutrophils 47 %    % Lymphocytes 31 %    % Monocytes 17 %    % Eosinophils 4 %    % Basophils 1 %    % Immature Granulocytes 0 %    NRBCs per 100 WBC 0 <1 /100    Absolute Neutrophils 3.1 1.6 - 8.3 10e3/uL    Absolute Lymphocytes 2.0 0.8 - 5.3 10e3/uL    Absolute Monocytes 1.1 0.0 - 1.3 10e3/uL    Absolute Eosinophils 0.3 0.0 - 0.7 10e3/uL    Absolute Basophils 0.1 0.0 - 0.2 10e3/uL    Absolute Immature Granulocytes 0.0 <=0.4 10e3/uL    Absolute NRBCs 0.0 10e3/uL       Imaging    MR Abdomen w/o & w Contrast    Result Date: 5/5/2022  MR ABDOMEN WITHOUT AND WITH CONTRAST 5/4/2022 11:28 AM INDICATION: Adenocarcinoma of sigmoid colon (H). COMPARISON: 4/7/2022 TECHNIQUE: Routine MRI liver protocol including T1 in/out phase,  "diffusion, multiplane T2, and dynamic T1 with IV contrast.  CONTRAST: 6.4 mL Eovist FINDINGS: LIVER: No significant fatty infiltration of the liver. Liver size within normal limits. There are two observations of T2 hyperintensity in the liver measuring 0.8 and 0.6 cm respectively (series 4, images 8 and 23). No enhancement or restricted diffusion. No other liver lesions. ADDITIONAL FINDINGS: The pancreas, spleen, and adrenal glands are unremarkable. Left renal cyst does not require specific follow-up. Large volume of retained colonic stool. No adenopathy or ascites. Partial visualization of a left lower quadrant colostomy.     IMPRESSION: Tiny hypodensities seen on the prior CT correspond to subcentimeter cysts that do not require specific follow-up. BOZENA MEDINA MD   SYSTEM ID:  [id number]    A total of 25 min were spent today on this visit which included face to face conversation with the patient, EMR review, counseling and co-ordination of care and medical documentation.      Signed by: Pamela Colorado MD    Oncology Rooming Note    May 24, 2022 8:20 AM   Kalin Shepard is a 71 year old male who presents for:    Chief Complaint   Patient presents with     Oncology Clinic Visit     Adenocarcinoma of sigmoid colon - Labs provider and infusion     Initial Vitals: There were no vitals taken for this visit. Estimated body mass index is 18.8 kg/m  as calculated from the following:    Height as of 4/27/22: 1.778 m (5' 10\").    Weight as of 5/10/22: 59.4 kg (131 lb). There is no height or weight on file to calculate BSA.  Data Unavailable Comment: Data Unavailable   No LMP for male patient.  Allergies reviewed: Yes  Medications reviewed: Yes    Medications: Medication refills not needed today.  Pharmacy name entered into Occasion:    [facility] [place] - [address]  [facility], [place] - [address] [place] [address]    Clinical concerns:  None      Crystal " ELENA Almanzar CMA                Again, thank you for allowing me to participate in the care of your patient.        Sincerely,        Pamela Colorado MD

## 2022-05-24 NOTE — TELEPHONE ENCOUNTER
Health Call Center    Phone Message    May a detailed message be left on voicemail: yes     Reason for Call: Other: Layton is calling in asking for a call back. He states that he has been trying for 2 weeks to get orders sent to [employer] for his Ostomy supplies and has been unsuccessful thus far. Pt would like to speak directly with an RN to make sure that this order gets sent so that he can get his supplies. Please call back as soon as possible to discuss.     Action Taken: Message routed to:  [facility]Rectal    Travel Screening: Not Applicable

## 2022-05-24 NOTE — PROGRESS NOTES
Port accessed and labs drawn without difficulty.  After port accessed, it was noticed that incision is dehisced and port is visible.  Patient had visit with Dr. Menon today. Plan to hold chemo and be seen by surgery tomorrow for replacement.  Patient will return 5/31 for labs and chemo.  Rachel Akbar RN

## 2022-05-24 NOTE — PROGRESS NOTES
[facility] Hematology and Oncology Progress Note    Patient: Kalin Shepard  MRN: [id number]  Date of Service: 04/26/2022        Reason for Visit    Chief Complaint   Patient presents with     Oncology Clinic Visit     Adenocarcinoma of sigmoid colon - Labs provider and infusion         Problem List Items Addressed This Visit        Digestive    Adenocarcinoma of sigmoid colon (H) - Primary    Relevant Orders    Electrolyte panel (Na, K, Cl, CO2, Anion gap) (Completed)      Other Visit Diagnoses     Wound dehiscence                Assessment and Plan  1. Sigmoid colon cancer with peritoneal metastasis  Is here for cycle 3 of FOLFOX with bevacizumab.  Unfortunately his port site has opened up.  Incision has gaped.  No signs of infection.  Port is visible from the outside.  Recommended holding off of treatment today.  I will schedule him with surgery ASAP for possible port replacement.  I will postpone his chemo next week.  His labs look good today.  Total white count is 7.  Hemoglobin is 12.7 and platelet count is 224.  Creatinine is normal.  LFTs are normal.    2.  Weight loss  I think issue with the port is in part due to his weight loss.  I encouraged him to increase his protein calorie intake.  I also offered him Remeron to increase his appetite but he declined.     Patient Instructions   Hold off on chemo today.  Please reschedule chemo to next Tuesday with labs prior (no need for visit with me).  RTC on June 14th with labs prior, see me and chemo to follow the same day.         Cancer Staging  Adenocarcinoma of sigmoid colon (H)  Staging form: Colon and Rectum, AJCC 8th Edition  - Pathologic: Stage Unknown (pTX, pNX, pM1) - Signed by Pamela Colorado MD on 5/6/2022      ECOG Performance    1 - Can't do physically strenuous work, but fully ambyulatory and can do light sedentary work         Problem List    Patient Active Problem List   Diagnosis     Colon adenocarcinoma (H)     Colostomy present  (H)     Cachexia (H)     Adenocarcinoma of sigmoid colon (H)        Interval History   Kalin Shepard is a 71 year old with sigmoid coloncancer with peritoneal metastasis who is here for cycle 3 of FOLFOX and bevacizumab.    For cycle 2 developed some diarrhea.  Taking Imodium.  Also has had some nausea no vomiting.  His appetite was very poor for the first week after chemo.  But has picked up since then.  He lost about 5 pounds initially.  He has gained it back.  Denies any fevers or chills.  He is trying to keep up with the protein calorie intake by using boost and Ensure.    Labs reviewed today.    Review of Systems  A comprehensive review of systems was negative except for what is noted in the interval history    Current Outpatient Medications   Medication     ibuprofen (ADVIL/MOTRIN) 400 MG tablet     Lidocaine (LIDOCARE) 4 % Patch     ondansetron (ZOFRAN) 8 MG tablet     oxyCODONE (ROXICODONE) 5 MG tablet     polyethylene glycol (MIRALAX) 17 GM/Dose powder     acetaminophen (TYLENOL) 500 MG tablet     dexamethasone (DECADRON) 4 MG tablet     methocarbamol (ROBAXIN) 500 MG tablet     prochlorperazine (COMPAZINE) 10 MG tablet     No current facility-administered medications for this visit.     Facility-Administered Medications Ordered in Other Visits   Medication     heparin 100 UNIT/ML injection 5 mL     sodium chloride (PF) 0.9% PF flush 20 mL        Physical Exam    No flowsheet data found.    General: alert and cooperative  HEENT: Head: Normal, normocephalic, atraumatic.  Eye: Normal external eye, conjunctiva, lids cornea, DANA.  Abdomen: Soft and nontender, ostomy in place   Extremities: atraumatic, no peripheral edema  Skin: The outside incision has opened up and port is visible.  Fortunately there is no signs of infection.  CNS: Alert and oriented x3, neurologic exam grossly normal.    Lab Results    Recent Results (from the past 168 hour(s))   AST   Result Value Ref Range    AST 9 0 - 45 U/L   ALT    Result Value Ref Range    ALT 17 0 - 70 U/L   Creatinine   Result Value Ref Range    Creatinine 0.63 (L) 0.66 - 1.25 mg/dL    GFR Estimate >90 >60 mL/min/1.73m2   Bilirubin  total   Result Value Ref Range    Bilirubin Total 0.2 0.2 - 1.3 mg/dL   Protein qualitative urine   Result Value Ref Range    Protein Albumin Urine Negative Negative mg/dL   Electrolyte panel (Na, K, Cl, CO2, Anion gap)   Result Value Ref Range    Sodium 141 133 - 144 mmol/L    Potassium 4.7 3.4 - 5.3 mmol/L    Chloride 108 94 - 109 mmol/L    Carbon Dioxide (CO2) 28 20 - 32 mmol/L    Anion Gap 5 3 - 14 mmol/L   CBC with platelets and differential   Result Value Ref Range    WBC Count 6.6 4.0 - 11.0 10e3/uL    RBC Count 4.07 (L) 4.40 - 5.90 10e6/uL    Hemoglobin 12.5 (L) 13.3 - 17.7 g/dL    Hematocrit 39.0 (L) 40.0 - 53.0 %    MCV 96 78 - 100 fL    MCH 30.7 26.5 - 33.0 pg    MCHC 32.1 31.5 - 36.5 g/dL    RDW 14.7 10.0 - 15.0 %    Platelet Count 240 150 - 450 10e3/uL    % Neutrophils 47 %    % Lymphocytes 31 %    % Monocytes 17 %    % Eosinophils 4 %    % Basophils 1 %    % Immature Granulocytes 0 %    NRBCs per 100 WBC 0 <1 /100    Absolute Neutrophils 3.1 1.6 - 8.3 10e3/uL    Absolute Lymphocytes 2.0 0.8 - 5.3 10e3/uL    Absolute Monocytes 1.1 0.0 - 1.3 10e3/uL    Absolute Eosinophils 0.3 0.0 - 0.7 10e3/uL    Absolute Basophils 0.1 0.0 - 0.2 10e3/uL    Absolute Immature Granulocytes 0.0 <=0.4 10e3/uL    Absolute NRBCs 0.0 10e3/uL       Imaging    MR Abdomen w/o & w Contrast    Result Date: 5/5/2022  MR ABDOMEN WITHOUT AND WITH CONTRAST 5/4/2022 11:28 AM INDICATION: Adenocarcinoma of sigmoid colon (H). COMPARISON: 4/7/2022 TECHNIQUE: Routine MRI liver protocol including T1 in/out phase, diffusion, multiplane T2, and dynamic T1 with IV contrast.  CONTRAST: 6.4 mL Eovist FINDINGS: LIVER: No significant fatty infiltration of the liver. Liver size within normal limits. There are two observations of T2 hyperintensity in the liver measuring 0.8 and  0.6 cm respectively (series 4, images 8 and 23). No enhancement or restricted diffusion. No other liver lesions. ADDITIONAL FINDINGS: The pancreas, spleen, and adrenal glands are unremarkable. Left renal cyst does not require specific follow-up. Large volume of retained colonic stool. No adenopathy or ascites. Partial visualization of a left lower quadrant colostomy.     IMPRESSION: Tiny hypodensities seen on the prior CT correspond to subcentimeter cysts that do not require specific follow-up. BOZENA MEDINA MD   SYSTEM ID:  [id number]    A total of 25 min were spent today on this visit which included face to face conversation with the patient, EMR review, counseling and co-ordination of care and medical documentation.      Signed by: Pamela Colorado MD

## 2022-05-25 ENCOUNTER — OFFICE VISIT (OUTPATIENT)
Dept: SURGERY | Facility: CLINIC | Age: 72
End: 2022-05-25
Payer: COMMERCIAL

## 2022-05-25 VITALS
SYSTOLIC BLOOD PRESSURE: 118 MMHG | HEIGHT: 71 IN | DIASTOLIC BLOOD PRESSURE: 59 MMHG | WEIGHT: 132.2 LBS | HEART RATE: 59 BPM | TEMPERATURE: 96.5 F | BODY MASS INDEX: 18.51 KG/M2 | OXYGEN SATURATION: 100 %

## 2022-05-25 DIAGNOSIS — C18.7 ADENOCARCINOMA OF SIGMOID COLON (H): Primary | ICD-10-CM

## 2022-05-25 DIAGNOSIS — R64 CANCER CACHEXIA (H): ICD-10-CM

## 2022-05-25 DIAGNOSIS — Z11.59 ENCOUNTER FOR SCREENING FOR OTHER VIRAL DISEASES: Primary | ICD-10-CM

## 2022-05-25 DIAGNOSIS — Z95.828 PORT-A-CATH IN PLACE: ICD-10-CM

## 2022-05-25 PROCEDURE — 36590 REMOVAL TUNNELED CV CATH: CPT | Performed by: SURGERY

## 2022-05-25 PROCEDURE — 99213 OFFICE O/P EST LOW 20 MIN: CPT | Mod: 25 | Performed by: SURGERY

## 2022-05-25 NOTE — PROCEDURES
EXCISION POWERPORT PROCEDURE NOTE    Name: Kalin RODRÍGUEZ [facility]: [unfilled]   : 1950, 71 year old Room/Bed: Room/bed info not found   CSN: [id number] Admission: No admission date for patient encounter.   MRN: [id number] Today: 2022,     [facility]: [facility] Attending: No att. providers found       PROCEDURE:   1. Excision of exposed powerport      INDICATION: exposed power port, cachexia, stage 4 colon cancer    PRE-PROCEDURE     : Marianne Schroeder MD  CONSENT: signed an Informed Consent Document.      PROCEDURE     The right chest area was prepped and appropriately anesthetized with 1% lidocaine with epinephrine.   Utilizing a #15 blade scalpel, the already opened incision was extended by 3mm on each side.  I grabbed onto the PowerPort with a curved hemostat, both Prolene stitches were visualized and ligated.  I was easily able to pull the catheter tubing from its tract.  No breakage was noted.  My assistant held pressure at the venous access area.  The entire port and catheter was removed with ease.  Area was copiously irrigated.  Due to the murky discharge prior to removing this, I opted to leave the incision open and packed it with wet-to-dry.  Wound was then covered with 4 x 4's.  Care instruction was given to patient's wife.  Dry wound care supply was also given to patient's wife.  We will see the patient in 7 to 10 days.  The procedure was well tolerated and without complications.       POST-PROCEDURE     The patient tolerated the procedure well.     COMPLICATIONS: none    Plan:  1. Instructed to keep the wound dry and covered for 24-48h and clean thereafter.  2. Warning signs of infection were reviewed.    3. Recommended that the patient use OTC acetaminophen, OTC ibuprofen as needed for pain.         Electronically signed by: Marianne Schroeder MD; 2022

## 2022-05-25 NOTE — PATIENT INSTRUCTIONS
Home Care Following AMBULATORY SURGERY    Dr. Schroeder    Care of the Incision:  Dermabond dressing is in place.  No other dressing is needed.  On post-op day 1 you may shower, but don t soak in a tub or swim for at least 1 week.  Gently pat your incision dry with a freshly laundered towel.  Do not pick at your incision.  Leave your incision open to air.  Cover it only if clothing rubs or irritates it.  If gauze dressing is in place.  Remove the dressing in 24 hours (replace it earlier if it is heavily soiled).  At 24 hours you may wash it in the shower with soap and water, but do not soak in a tub or swim for at least 1 week.  Gently pat your incision dry with a freshly laundered towel.  Either replace the dressing or leave it open to air according to your doctor's instructions.  ______________________________________________________  Activity:  Gradually increase your activity.  Walk short distances several times each day and increase the distance as your strength allows.  To promote circulation, do not cross your legs while sitting.  No strenuous lifting (20 pounds) or straining for 6 weeks (2 weeks for laparoscopic surgery).    Return to work will be determined by the type of work you do and should be discussed with your physician.  By 2 weeks after surgery, you may do any non-strenuous activity you feel like doing as long as you STOP IF IT HURTS.  Do not drive or operate equipment while taking prescription pain medicines.  You may drive 1 week after surgery if you have stopped taking prescription pain medicines and are pain-free enough to make an emergency stop if necessary.    Diet:  Return to the diet you were on before surgery.  Drink plenty of  water and fruit juices.  Avoid foods that cause constipation.    REMEMBER--most prescription pain pills cause constipation.  Walking, extra fluids, and increased fiber (fresh fruits and vegetables, etc.) are natural remedies for constipation.  Ask your doctor about a  stool softener such as Colace or Metamucil.    Call Your Physician if You Have:  Redness, increased swelling or cloudy drainage from your incision.  A temperature of more than 101 degrees F.  Worsening pain in your incision not relieved by your prescription pain pills and/or a short rest.  Abdominal distention (stomach getting very large)  Swelling in your legs  Productive cough  Burning with urination  Any questions or concerns about your recovery, please call Business hours ([phone]     After hours [phone] Nurse Advice Line (24 hours a day)    Follow-up Care:  Make an appointment 1-2 week after your surgery.  Call [phone].

## 2022-05-25 NOTE — LETTER
5/25/2022         RE: Kalin Shepard  [address]  [facility] [address]        Dear Colleague,    Thank you for referring your patient, Kalin Shepard, to the [facility]. Please see a copy of my visit note below.      Assessment & Plan   Problem List Items Addressed This Visit        Digestive    Adenocarcinoma of sigmoid colon (H) - Primary    Relevant Orders    IR Chest Port Placement > 5 Yrs of Age      Other Visit Diagnoses     Cancer cachexia (H)             71-year-old male status post PowerPort placement in the right IJ 1 month ago.  -The port unfortunately has eroded through the skin at the incision.  This is likely due to the fact that patient is cachectic and malnourished.  -Port was removed today.  The wound cavity is packed.  Patient's wife to change the packing daily.  I will follow-up with the patient in 1 to 2 weeks to evaluate wound healing.  Please see the procedure note for further details.  -I wrote for a IR PowerPort placement -they have lower profile port and different types of port and IR that can be placed on the contralateral chest that hopefully will not erode through.  -All questions were answered.      30 minutes spent on the date of the encounter doing chart review, history and exam, documentation and further activities per the note       Tobacco Cessation:   reports that he has been smoking. He has a 25.00 pack-year smoking history. He has never used smokeless tobacco.      No follow-ups on file.    Marianne Schroeder MD  [facility]    Ambrose Traylor is a 71 year old who presents for the following health issues:    Power port eroded through skin; now exposed.   No fevers; no chills  powerport has been functioning with no issues  No purulent discharge           Review of Systems   Constitutional, HEENT, cardiovascular, pulmonary, gi and gu systems are negative, except as otherwise noted.      Objective    /59 (BP Location:  "Right arm, Patient Position: Sitting, Cuff Size: Adult Regular)   Pulse 59   Temp (!) 96.5  F (35.8  C) (Tympanic)   Ht 1.803 m (5' 11\")   Wt 60 kg (132 lb 3.2 oz)   SpO2 100%   BMI 18.44 kg/m    Body mass index is 18.44 kg/m .  Physical Exam   Right chest wall - port exposed with incision open with some ischemia.  Some serous fluid with no obvious purulent discharge.                Again, thank you for allowing me to participate in the care of your patient.        Sincerely,        ChanoDmitri Schroeder MD    "

## 2022-05-25 NOTE — PROGRESS NOTES
"  Assessment & Plan   Problem List Items Addressed This Visit        Digestive    Adenocarcinoma of sigmoid colon (H) - Primary    Relevant Orders    IR Chest Port Placement > 5 Yrs of Age      Other Visit Diagnoses     Cancer cachexia (H)             71-year-old male status post PowerPort placement in the right IJ 1 month ago.  -The port unfortunately has eroded through the skin at the incision.  This is likely due to the fact that patient is cachectic and malnourished.  -Port was removed today.  The wound cavity is packed.  Patient's wife to change the packing daily.  I will follow-up with the patient in 1 to 2 weeks to evaluate wound healing.  Please see the procedure note for further details.  -I wrote for a IR PowerPort placement -they have lower profile port and different types of port and IR that can be placed on the contralateral chest that hopefully will not erode through.  -All questions were answered.      30 minutes spent on the date of the encounter doing chart review, history and exam, documentation and further activities per the note       Tobacco Cessation:   reports that he has been smoking. He has a 25.00 pack-year smoking history. He has never used smokeless tobacco.      No follow-ups on file.    Marianne Schroeder MD  [facility]lor is a 71 year old who presents for the following health issues:    Power port eroded through skin; now exposed.   No fevers; no chills  powerport has been functioning with no issues  No purulent discharge           Review of Systems   Constitutional, HEENT, cardiovascular, pulmonary, gi and gu systems are negative, except as otherwise noted.      Objective    /59 (BP Location: Right arm, Patient Position: Sitting, Cuff Size: Adult Regular)   Pulse 59   Temp (!) 96.5  F (35.8  C) (Tympanic)   Ht 1.803 m (5' 11\")   Wt 60 kg (132 lb 3.2 oz)   SpO2 100%   BMI 18.44 kg/m    Body mass index is 18.44 kg/m .  Physical Exam   Right " chest wall - port exposed with incision open with some ischemia.  Some serous fluid with no obvious purulent discharge.

## 2022-05-25 NOTE — TELEPHONE ENCOUNTER
Per chart review it looks like we have faxed over the supply form, which I told the patient. He states he has called his pharmacy 15 times and they have not received anything. He has been out of supplies for 2 weeks. Escalated to my manager to help resolve faxing issue. Called [facility] to resolve ostomy supply issue.     Walker Ruggiero: ph# [phone] and Fax# [phone]    [facility] and asked if we could send it to an email instead as it seems the fax is not working. She stated that they do not have secure email to send it to but we could try a different fax number- [phone]. I confirmed with the pharmacy that they received the fax. Updated pt.

## 2022-05-25 NOTE — NURSING NOTE
"Initial /59 (BP Location: Right arm, Patient Position: Sitting, Cuff Size: Adult Regular)   Pulse 59   Temp (!) 96.5  F (35.8  C) (Tympanic)   Ht 1.803 m (5' 11\")   Wt 60 kg (132 lb 3.2 oz)   SpO2 100%   BMI 18.44 kg/m   Estimated body mass index is 18.44 kg/m  as calculated from the following:    Height as of this encounter: 1.803 m (5' 11\").    Weight as of this encounter: 60 kg (132 lb 3.2 oz). .    Mireya Stafford MA    "

## 2022-05-31 ENCOUNTER — LAB (OUTPATIENT)
Dept: INFUSION THERAPY | Facility: CLINIC | Age: 72
End: 2022-05-31
Attending: INTERNAL MEDICINE
Payer: COMMERCIAL

## 2022-05-31 ENCOUNTER — PATIENT OUTREACH (OUTPATIENT)
Dept: ONCOLOGY | Facility: CLINIC | Age: 72
End: 2022-05-31

## 2022-05-31 DIAGNOSIS — Z11.59 ENCOUNTER FOR SCREENING FOR OTHER VIRAL DISEASES: ICD-10-CM

## 2022-05-31 LAB — SARS-COV-2 RNA RESP QL NAA+PROBE: NEGATIVE

## 2022-05-31 PROCEDURE — 87635 SARS-COV-2 COVID-19 AMP PRB: CPT | Performed by: SURGERY

## 2022-05-31 NOTE — PROGRESS NOTES
Pt does not have a port. Plan is for placement Thursday 6/2. COVID test done today for pt. Unable to give treatment. Danna is speaking with Dr. Colorado to reschedule treatment.    Todd SNYDER

## 2022-06-01 ENCOUNTER — ANESTHESIA EVENT (OUTPATIENT)
Dept: SURGERY | Facility: AMBULATORY SURGERY CENTER | Age: 72
End: 2022-06-01
Payer: COMMERCIAL

## 2022-06-01 NOTE — PROGRESS NOTES
Pt came into clinic for chemo but port was removed last week due to open incision. Plan for IR placement 6/2. Chemo rescheduled for 6/7. Pt in agreement with plan.    Danna Collins RN on 6/1/2022 at 9:13 AM

## 2022-06-02 ENCOUNTER — ANESTHESIA (OUTPATIENT)
Dept: SURGERY | Facility: AMBULATORY SURGERY CENTER | Age: 72
End: 2022-06-02
Payer: COMMERCIAL

## 2022-06-02 ENCOUNTER — HOSPITAL ENCOUNTER (OUTPATIENT)
Facility: AMBULATORY SURGERY CENTER | Age: 72
Discharge: HOME | End: 2022-06-02
Attending: RADIOLOGY
Payer: COMMERCIAL

## 2022-06-02 VITALS
OXYGEN SATURATION: 97 % | HEART RATE: 72 BPM | BODY MASS INDEX: 18.2 KG/M2 | SYSTOLIC BLOOD PRESSURE: 113 MMHG | TEMPERATURE: 97.1 F | RESPIRATION RATE: 16 BRPM | DIASTOLIC BLOOD PRESSURE: 73 MMHG | HEIGHT: 71 IN | WEIGHT: 130 LBS

## 2022-06-02 LAB — INR PPP: 0.97 (ref 0.85–1.15)

## 2022-06-02 PROCEDURE — 85610 PROTHROMBIN TIME: CPT | Performed by: PATHOLOGY

## 2022-06-02 PROCEDURE — 36561 INSERT TUNNELED CV CATH: CPT

## 2022-06-02 DEVICE — CATH PORT POWERPORT CLEARVUE SLIM 8FR 5618000: Type: IMPLANTABLE DEVICE | Site: CHEST | Status: FUNCTIONAL

## 2022-06-02 RX ORDER — FENTANYL CITRATE 50 UG/ML
25 INJECTION, SOLUTION INTRAMUSCULAR; INTRAVENOUS
Status: DISCONTINUED | OUTPATIENT
Start: 2022-06-02 | End: 2022-06-03 | Stop reason: HOSPADM

## 2022-06-02 RX ORDER — FENTANYL CITRATE 50 UG/ML
25 INJECTION, SOLUTION INTRAMUSCULAR; INTRAVENOUS EVERY 5 MIN PRN
Status: DISCONTINUED | OUTPATIENT
Start: 2022-06-02 | End: 2022-06-03 | Stop reason: HOSPADM

## 2022-06-02 RX ORDER — ONDANSETRON 2 MG/ML
INJECTION INTRAMUSCULAR; INTRAVENOUS PRN
Status: DISCONTINUED | OUTPATIENT
Start: 2022-06-02 | End: 2022-06-02

## 2022-06-02 RX ORDER — PROPOFOL 10 MG/ML
INJECTION, EMULSION INTRAVENOUS PRN
Status: DISCONTINUED | OUTPATIENT
Start: 2022-06-02 | End: 2022-06-02

## 2022-06-02 RX ORDER — PROPOFOL 10 MG/ML
INJECTION, EMULSION INTRAVENOUS CONTINUOUS PRN
Status: DISCONTINUED | OUTPATIENT
Start: 2022-06-02 | End: 2022-06-02

## 2022-06-02 RX ORDER — HEPARIN SODIUM,PORCINE 10 UNIT/ML
5-10 VIAL (ML) INTRAVENOUS
Status: DISCONTINUED | OUTPATIENT
Start: 2022-06-02 | End: 2022-06-03 | Stop reason: HOSPADM

## 2022-06-02 RX ORDER — DEXTROSE MONOHYDRATE 25 G/50ML
25-50 INJECTION, SOLUTION INTRAVENOUS
Status: DISCONTINUED | OUTPATIENT
Start: 2022-06-02 | End: 2022-06-03 | Stop reason: HOSPADM

## 2022-06-02 RX ORDER — OXYCODONE HYDROCHLORIDE 5 MG/1
5 TABLET ORAL EVERY 4 HOURS PRN
Status: DISCONTINUED | OUTPATIENT
Start: 2022-06-02 | End: 2022-06-03 | Stop reason: HOSPADM

## 2022-06-02 RX ORDER — HEPARIN SODIUM,PORCINE 10 UNIT/ML
5-10 VIAL (ML) INTRAVENOUS EVERY 24 HOURS
Status: DISCONTINUED | OUTPATIENT
Start: 2022-06-02 | End: 2022-06-03 | Stop reason: HOSPADM

## 2022-06-02 RX ORDER — LIDOCAINE 40 MG/G
CREAM TOPICAL
Status: DISCONTINUED | OUTPATIENT
Start: 2022-06-02 | End: 2022-06-03 | Stop reason: HOSPADM

## 2022-06-02 RX ORDER — ONDANSETRON 2 MG/ML
4 INJECTION INTRAMUSCULAR; INTRAVENOUS EVERY 30 MIN PRN
Status: DISCONTINUED | OUTPATIENT
Start: 2022-06-02 | End: 2022-06-03 | Stop reason: HOSPADM

## 2022-06-02 RX ORDER — CEFAZOLIN SODIUM 2 G/50ML
2 SOLUTION INTRAVENOUS
Status: COMPLETED | OUTPATIENT
Start: 2022-06-02 | End: 2022-06-02

## 2022-06-02 RX ORDER — LIDOCAINE HYDROCHLORIDE 20 MG/ML
INJECTION, SOLUTION INFILTRATION; PERINEURAL PRN
Status: DISCONTINUED | OUTPATIENT
Start: 2022-06-02 | End: 2022-06-02

## 2022-06-02 RX ORDER — HYDROMORPHONE HYDROCHLORIDE 1 MG/ML
0.2 INJECTION, SOLUTION INTRAMUSCULAR; INTRAVENOUS; SUBCUTANEOUS EVERY 5 MIN PRN
Status: DISCONTINUED | OUTPATIENT
Start: 2022-06-02 | End: 2022-06-03 | Stop reason: HOSPADM

## 2022-06-02 RX ORDER — SODIUM CHLORIDE 9 MG/ML
INJECTION, SOLUTION INTRAVENOUS CONTINUOUS
Status: DISCONTINUED | OUTPATIENT
Start: 2022-06-02 | End: 2022-06-03 | Stop reason: HOSPADM

## 2022-06-02 RX ORDER — SODIUM CHLORIDE, SODIUM LACTATE, POTASSIUM CHLORIDE, CALCIUM CHLORIDE 600; 310; 30; 20 MG/100ML; MG/100ML; MG/100ML; MG/100ML
INJECTION, SOLUTION INTRAVENOUS CONTINUOUS
Status: DISCONTINUED | OUTPATIENT
Start: 2022-06-02 | End: 2022-06-03 | Stop reason: HOSPADM

## 2022-06-02 RX ORDER — HEPARIN SODIUM (PORCINE) LOCK FLUSH IV SOLN 100 UNIT/ML 100 UNIT/ML
5-10 SOLUTION INTRAVENOUS
Status: DISCONTINUED | OUTPATIENT
Start: 2022-06-02 | End: 2022-06-03 | Stop reason: HOSPADM

## 2022-06-02 RX ORDER — NICOTINE POLACRILEX 4 MG
15-30 LOZENGE BUCCAL
Status: DISCONTINUED | OUTPATIENT
Start: 2022-06-02 | End: 2022-06-03 | Stop reason: HOSPADM

## 2022-06-02 RX ORDER — ONDANSETRON 4 MG/1
4 TABLET, ORALLY DISINTEGRATING ORAL EVERY 30 MIN PRN
Status: DISCONTINUED | OUTPATIENT
Start: 2022-06-02 | End: 2022-06-03 | Stop reason: HOSPADM

## 2022-06-02 RX ORDER — MEPERIDINE HYDROCHLORIDE 25 MG/ML
12.5 INJECTION INTRAMUSCULAR; INTRAVENOUS; SUBCUTANEOUS
Status: DISCONTINUED | OUTPATIENT
Start: 2022-06-02 | End: 2022-06-03 | Stop reason: HOSPADM

## 2022-06-02 RX ADMIN — CEFAZOLIN SODIUM 2 G: 2 SOLUTION INTRAVENOUS at 09:35

## 2022-06-02 RX ADMIN — Medication 100 MCG: at 10:05

## 2022-06-02 RX ADMIN — SODIUM CHLORIDE, SODIUM LACTATE, POTASSIUM CHLORIDE, CALCIUM CHLORIDE: 600; 310; 30; 20 INJECTION, SOLUTION INTRAVENOUS at 09:27

## 2022-06-02 RX ADMIN — PROPOFOL 20 MG: 10 INJECTION, EMULSION INTRAVENOUS at 09:45

## 2022-06-02 RX ADMIN — PROPOFOL 200 MCG/KG/MIN: 10 INJECTION, EMULSION INTRAVENOUS at 09:30

## 2022-06-02 RX ADMIN — Medication 100 MCG: at 09:56

## 2022-06-02 RX ADMIN — PROPOFOL 25 MG: 10 INJECTION, EMULSION INTRAVENOUS at 09:43

## 2022-06-02 RX ADMIN — ONDANSETRON 4 MG: 2 INJECTION INTRAMUSCULAR; INTRAVENOUS at 09:35

## 2022-06-02 RX ADMIN — Medication 100 MCG: at 10:01

## 2022-06-02 RX ADMIN — LIDOCAINE HYDROCHLORIDE 40 MG: 20 INJECTION, SOLUTION INFILTRATION; PERINEURAL at 09:30

## 2022-06-02 RX ADMIN — PROPOFOL 20 MG: 10 INJECTION, EMULSION INTRAVENOUS at 09:35

## 2022-06-02 ASSESSMENT — LIFESTYLE VARIABLES: TOBACCO_USE: 1

## 2022-06-02 NOTE — ANESTHESIA PREPROCEDURE EVALUATION
Anesthesia Pre-Procedure Evaluation    Patient: Kalin Shepard   MRN: [id number] : 1950        Procedure : Procedure(s):  INSERTION, VASCULAR ACCESS PORT          Past Medical History:   Diagnosis Date     Cancer (H)       Past Surgical History:   Procedure Laterality Date     INSERT PORT VASCULAR ACCESS Right 2022    Procedure: INSERTION, VASCULAR ACCESS PORT;  Surgeon: Marianne Schroeder MD;  Location: [facility]     LAPAROTOMY EXPLORATORY N/A 3/26/2022    Procedure: exploratory laparotomy, small bowel resection, colostomy creation;  Surgeon: Riley Magdaleno MD;  Location: [facility] OR     PICC INSERTION - DOUBLE LUMEN Left 2022    left medial brachial 5 fr dl picc 49 cm     SIGMOIDOSCOPY FLEXIBLE N/A 3/26/2022    Procedure: Sigmoidoscopy flexible;  Surgeon: Riley Magdaleno MD;  Location: [facility] OR      No Known Allergies   Social History     Tobacco Use     Smoking status: Current Every Day Smoker     Packs/day: 0.50     Years: 50.00     Pack years: 25.00     Smokeless tobacco: Never Used   Substance Use Topics     Alcohol use: Not Currently      Wt Readings from Last 1 Encounters:   22 59 kg (130 lb)        Anesthesia Evaluation            ROS/MED HX  ENT/Pulmonary:     (+) tobacco use, Current use,     Neurologic:       Cardiovascular:       METS/Exercise Tolerance:     Hematologic:       Musculoskeletal:       GI/Hepatic:       Renal/Genitourinary:       Endo:       Psychiatric/Substance Use:       Infectious Disease:       Malignancy:   (+) Malignancy, History of GI.GI CA  Active status post Chemo.        Other:            Physical Exam    Airway  airway exam normal           Respiratory Devices and Support         Dental     Comment: Decayed dentition, numerous missing, patient denies any of the remaining loose        Cardiovascular   cardiovascular exam normal          Pulmonary   pulmonary exam normal                OUTSIDE LABS:  CBC:   Lab Results   Component Value Date    WBC 6.6 2022     WBC 10.7 05/10/2022    HGB 12.5 (L) 05/24/2022    HGB 12.7 (L) 05/10/2022    HCT 39.0 (L) 05/24/2022    HCT 40.1 05/10/2022     05/24/2022     05/10/2022     BMP:   Lab Results   Component Value Date     05/24/2022     04/04/2022    POTASSIUM 4.7 05/24/2022    POTASSIUM 4.4 04/04/2022    CHLORIDE 108 05/24/2022    CHLORIDE 110 (H) 04/04/2022    CO2 28 05/24/2022    CO2 24 04/04/2022    BUN 28 04/04/2022    BUN 29 04/03/2022    CR 0.63 (L) 05/24/2022    CR 0.65 (L) 04/26/2022     (H) 04/07/2022     (H) 04/04/2022     COAGS:   Lab Results   Component Value Date    INR 0.97 06/02/2022     POC: No results found for: BGM, HCG, HCGS  HEPATIC:   Lab Results   Component Value Date    ALBUMIN 2.3 (L) 04/04/2022    PROTTOTAL 5.8 (L) 04/04/2022    ALT 17 05/24/2022    AST 9 05/24/2022    ALKPHOS 76 04/04/2022    BILITOTAL 0.2 05/24/2022     OTHER:   Lab Results   Component Value Date    ROXIE 8.1 (L) 04/04/2022    PHOS 3.2 04/04/2022    MAG 2.3 04/04/2022       Anesthesia Plan    ASA Status:  3   NPO Status:  Will be NPO Appropriate at ... 6/2/2022 9:30 AM   Anesthesia Type: MAC.     - Reason for MAC: straight local not clinically adequate   Induction: Intravenous.   Maintenance: TIVA.        Consents    Anesthesia Plan(s) and associated risks, benefits, and realistic alternatives discussed. Questions answered and patient/representative(s) expressed understanding.    - Discussed:     - Discussed with:  Patient      - Extended Intubation/Ventilatory Support Discussed: No.      - Patient is DNR/DNI Status: No    Use of blood products discussed: No .     Postoperative Care    Pain management: Multi-modal analgesia.   PONV prophylaxis: Background Propofol Infusion     Comments:           H&P reviewed: Unable to attach H&P to encounter due to EHR limitations. H&P Update: appropriate H&P reviewed, patient examined. No interval changes since H&P (within 30 days).         Boo Mendes MD

## 2022-06-02 NOTE — PROCEDURES
Kalin Shepard  [id number]    Completed placement of an 8 Zimbabwean, 28 cm, Bard ClearVUE Slim brand, single lumen venous chest power-injectable port via LIJV (RIGHT chest wound).  Tip lying in the right atrium. PORT is ready for immediate use.  Dx:  Colon cancer.  Josette/Binh.  АННА

## 2022-06-02 NOTE — ANESTHESIA CARE TRANSFER NOTE
Patient: Kalin Shepard    Procedure: Procedure(s):  INSERTION, VASCULAR ACCESS PORT       Diagnosis: Adenocarcinoma of sigmoid colon (H) [C18.7]  Diagnosis Additional Information: No value filed.    Anesthesia Type:   MAC     Note:    Oropharynx: oropharynx clear of all foreign objects and spontaneously breathing  Level of Consciousness: awake  Oxygen Supplementation: room air    Independent Airway: airway patency satisfactory and stable  Dentition: dentition unchanged  Vital Signs Stable: post-procedure vital signs reviewed and stable  Report to RN Given: handoff report given  Patient transferred to: Phase II    Handoff Report: Identifed the Patient, Identified the Reponsible Provider, Reviewed the pertinent medical history, Discussed the surgical course, Reviewed Intra-OP anesthesia mangement and issues during anesthesia, Set expectations for post-procedure period and Allowed opportunity for questions and acknowledgement of understanding      Vitals:  Vitals Value Taken Time   /61 06/02/22 1014   Temp 36  C (96.8  F) 06/02/22 1014   Pulse 66 06/02/22 1014   Resp 16    SpO2 99 % 06/02/22 1014       Electronically Signed By: JULIUS Neri CRNA  June 2, 2022  10:17 AM

## 2022-06-02 NOTE — DISCHARGE INSTRUCTIONS
A collaboration between [facility] and [facility]  Experts in minimally invasive, targeted treatments performed using imaging guidance    Venous Access Device,  Port Catheter or Tunneled or Non-Tunneled Central Line Placement    Today you had a procedure today to install a venous access device; either a tunneled central vein catheter or a subcutaneous port catheter.    After you go home:  Drink plenty of fluids.  Generally 6-8 (8 ounce) glasses a day is recommended.  Resume your regular diet unless otherwise ordered by a medical provider.  Keep any applied tape/gauze dressings clean and dry.  Change tape/gauze dressings if they get wet or soiled.  You may shower the following day after procedure, however cover and protect from moisture any tape/gauze dressings.  You may let water hit and run over dried skin glue, but do not scrub.  Pat the area dry after showering.  Port placement incisions are closed with absorbable suture, meaning they do not need to be removed at a later date, and a topical skin adhesive (skin glue).  This glue will wear off in 7-14 days.  Do not remove before this time.  If 14 days have passed and residual glue is present, you may gently remove it.  Do not apply gels, lotions, or ointments to the glue site for the first 10 days as this may cause the glue to prematurely soften and fail.  Do not perform strenuous activities or lift greater than 10 pounds for the next three days.  If there is bleeding or oozing from the procedure site, apply firm pressure to the area for 5-10 minutes.  If the bleeding continues seek medical advice at the numbers below.  Mild procedure site discomfort can be treated with an ice pack and over-the-counter pain relievers.        For 24 hours after any sedation used:  Relax and take it easy.  No strenuous activities.  Do not drive or operate machines at home or at work.  No alcohol consumption.  Do not make any  important or legal decisions.    Call our Interventional Radiology (IR) service if:  If you start bleeding from the procedure site.  If you do start to bleed from the site, lie down and hold some pressure on the site.  Our radiology provider can help you decide if you need to return to the hospital.  If you have new or worsening pain related to the procedure.  If you have concerning swelling at the procedure site.  If you develop persistent nausea or vomiting.  If you develop hives or a rash or any unexplained itching.  If you have a fever of greater than 100.5  F and chills in the first 5 days after procedure.  Any other concerns related to your procedure.      [facility]  Interventional Radiology (IR)  [address]  [address] [facility] [place] [address]    Contact Number:  [phone]  (IR control desk)  Monday - Friday 8:00 am - 4:30 pm    After hours for urgent concerns:  [phone]  After 4:30 pm Monday - Friday, Weekends and Holidays.   Ask for Interventional Radiology on-call.  Someone is available 24 hours a day.  [facility] toll free number:  [phone]

## 2022-06-02 NOTE — ANESTHESIA POSTPROCEDURE EVALUATION
Patient: Kalin Shepard    Procedure: Procedure(s):  INSERTION, VASCULAR ACCESS PORT       Anesthesia Type:  MAC    Note:  Disposition: Outpatient   Postop Pain Control: Uneventful            Sign Out: Well controlled pain   PONV: No   Neuro/Psych: Uneventful            Sign Out: Acceptable/Baseline neuro status   Airway/Respiratory: Uneventful            Sign Out: Acceptable/Baseline resp. status   CV/Hemodynamics: Uneventful            Sign Out: Acceptable CV status; No obvious hypovolemia; No obvious fluid overload   Other NRE: NONE   DID A NON-ROUTINE EVENT OCCUR? No           Last vitals:  Vitals Value Taken Time   /73 06/02/22 1053   Temp 36.2  C (97.1  F) 06/02/22 1053   Pulse 72 06/02/22 1053   Resp 16 06/02/22 1053   SpO2 97 % 06/02/22 1053       Electronically Signed By: Boo Mendes MD  June 2, 2022  11:41 AM

## 2022-06-03 ENCOUNTER — TELEPHONE (OUTPATIENT)
Dept: FAMILY MEDICINE | Facility: CLINIC | Age: 72
End: 2022-06-03
Payer: COMMERCIAL

## 2022-06-03 NOTE — TELEPHONE ENCOUNTER
matthew RN with [facility] calling to request home care orders:  Skilled nursing every other week for 9 weeks. Chemo disconnects.     Med discrepancies:     Centrum silver not listed  Ibuprofen 400 mg 3x daily as needed      Wife is preforming wound care on right chest dehisced port site. Wound care involves cleansing with  saline packing with moist gauze strip, and cover with dry dressing daily, rn will assess.  and wife will continue changes.       Osmani BALDWIN  Station

## 2022-06-05 ENCOUNTER — MEDICAL CORRESPONDENCE (OUTPATIENT)
Dept: HEALTH INFORMATION MANAGEMENT | Facility: CLINIC | Age: 72
End: 2022-06-05

## 2022-06-06 RX ORDER — IBUPROFEN 400 MG/1
400 TABLET, FILM COATED ORAL EVERY 8 HOURS PRN
Qty: 60 TABLET | Refills: 0 | Status: ON HOLD | COMMUNITY
Start: 2022-06-06 | End: 2022-08-31

## 2022-06-06 RX ORDER — MULTIVIT WITH MINERALS/LUTEIN
1 TABLET ORAL DAILY
Qty: 30 TABLET | Refills: 0 | COMMUNITY
Start: 2022-06-06 | End: 2022-06-06

## 2022-06-06 RX ORDER — MULTIVIT WITH MINERALS/LUTEIN
1 TABLET ORAL EVERY MORNING
Qty: 30 TABLET | COMMUNITY
Start: 2022-06-06

## 2022-06-07 ENCOUNTER — LAB (OUTPATIENT)
Dept: INFUSION THERAPY | Facility: CLINIC | Age: 72
End: 2022-06-07
Attending: INTERNAL MEDICINE
Payer: COMMERCIAL

## 2022-06-07 ENCOUNTER — HOME INFUSION (PRE-WILLOW HOME INFUSION) (OUTPATIENT)
Dept: PHARMACY | Facility: CLINIC | Age: 72
End: 2022-06-07

## 2022-06-07 VITALS
SYSTOLIC BLOOD PRESSURE: 111 MMHG | DIASTOLIC BLOOD PRESSURE: 73 MMHG | HEART RATE: 103 BPM | BODY MASS INDEX: 18.44 KG/M2 | WEIGHT: 132.2 LBS

## 2022-06-07 VITALS — HEART RATE: 53 BPM | SYSTOLIC BLOOD PRESSURE: 130 MMHG | DIASTOLIC BLOOD PRESSURE: 73 MMHG

## 2022-06-07 DIAGNOSIS — C18.7 ADENOCARCINOMA OF SIGMOID COLON (H): Primary | ICD-10-CM

## 2022-06-07 LAB
ALBUMIN SERPL-MCNC: 3.3 G/DL (ref 3.4–5)
ALBUMIN UR-MCNC: NEGATIVE MG/DL
ALP SERPL-CCNC: 58 U/L (ref 40–150)
ALT SERPL W P-5'-P-CCNC: 19 U/L (ref 0–70)
ANION GAP SERPL CALCULATED.3IONS-SCNC: 6 MMOL/L (ref 3–14)
AST SERPL W P-5'-P-CCNC: 15 U/L (ref 0–45)
BASOPHILS # BLD AUTO: 0.1 10E3/UL (ref 0–0.2)
BASOPHILS NFR BLD AUTO: 1 %
BILIRUB SERPL-MCNC: 0.2 MG/DL (ref 0.2–1.3)
BUN SERPL-MCNC: 18 MG/DL (ref 7–30)
CALCIUM SERPL-MCNC: 9.6 MG/DL (ref 8.5–10.1)
CHLORIDE BLD-SCNC: 109 MMOL/L (ref 94–109)
CO2 SERPL-SCNC: 27 MMOL/L (ref 20–32)
CREAT SERPL-MCNC: 0.69 MG/DL (ref 0.66–1.25)
EOSINOPHIL # BLD AUTO: 0.3 10E3/UL (ref 0–0.7)
EOSINOPHIL NFR BLD AUTO: 3 %
ERYTHROCYTE [DISTWIDTH] IN BLOOD BY AUTOMATED COUNT: 15.4 % (ref 10–15)
GFR SERPL CREATININE-BSD FRML MDRD: >90 ML/MIN/1.73M2
GLUCOSE BLD-MCNC: 122 MG/DL (ref 70–99)
HCT VFR BLD AUTO: 42.9 % (ref 40–53)
HGB BLD-MCNC: 13.9 G/DL (ref 13.3–17.7)
IMM GRANULOCYTES # BLD: 0 10E3/UL
IMM GRANULOCYTES NFR BLD: 0 %
LYMPHOCYTES # BLD AUTO: 2.8 10E3/UL (ref 0.8–5.3)
LYMPHOCYTES NFR BLD AUTO: 25 %
MCH RBC QN AUTO: 30.3 PG (ref 26.5–33)
MCHC RBC AUTO-ENTMCNC: 32.4 G/DL (ref 31.5–36.5)
MCV RBC AUTO: 94 FL (ref 78–100)
MONOCYTES # BLD AUTO: 1.2 10E3/UL (ref 0–1.3)
MONOCYTES NFR BLD AUTO: 10 %
NEUTROPHILS # BLD AUTO: 7.1 10E3/UL (ref 1.6–8.3)
NEUTROPHILS NFR BLD AUTO: 61 %
NRBC # BLD AUTO: 0 10E3/UL
NRBC BLD AUTO-RTO: 0 /100
PLATELET # BLD AUTO: 275 10E3/UL (ref 150–450)
POTASSIUM BLD-SCNC: 4.3 MMOL/L (ref 3.4–5.3)
PROT SERPL-MCNC: 7.4 G/DL (ref 6.8–8.8)
RBC # BLD AUTO: 4.59 10E6/UL (ref 4.4–5.9)
SODIUM SERPL-SCNC: 142 MMOL/L (ref 133–144)
WBC # BLD AUTO: 11.5 10E3/UL (ref 4–11)

## 2022-06-07 PROCEDURE — G0498 CHEMO EXTEND IV INFUS W/PUMP: HCPCS

## 2022-06-07 PROCEDURE — 96415 CHEMO IV INFUSION ADDL HR: CPT

## 2022-06-07 PROCEDURE — 96367 TX/PROPH/DG ADDL SEQ IV INF: CPT

## 2022-06-07 PROCEDURE — 250N000011 HC RX IP 250 OP 636: Performed by: INTERNAL MEDICINE

## 2022-06-07 PROCEDURE — 36591 DRAW BLOOD OFF VENOUS DEVICE: CPT

## 2022-06-07 PROCEDURE — 96375 TX/PRO/DX INJ NEW DRUG ADDON: CPT

## 2022-06-07 PROCEDURE — 82040 ASSAY OF SERUM ALBUMIN: CPT | Performed by: INTERNAL MEDICINE

## 2022-06-07 PROCEDURE — 258N000003 HC RX IP 258 OP 636: Performed by: INTERNAL MEDICINE

## 2022-06-07 PROCEDURE — 96417 CHEMO IV INFUS EACH ADDL SEQ: CPT

## 2022-06-07 PROCEDURE — 96413 CHEMO IV INFUSION 1 HR: CPT

## 2022-06-07 PROCEDURE — 81003 URINALYSIS AUTO W/O SCOPE: CPT | Performed by: INTERNAL MEDICINE

## 2022-06-07 PROCEDURE — 85025 COMPLETE CBC W/AUTO DIFF WBC: CPT | Performed by: INTERNAL MEDICINE

## 2022-06-07 PROCEDURE — 96368 THER/DIAG CONCURRENT INF: CPT

## 2022-06-07 PROCEDURE — 96411 CHEMO IV PUSH ADDL DRUG: CPT

## 2022-06-07 PROCEDURE — 80053 COMPREHEN METABOLIC PANEL: CPT | Performed by: INTERNAL MEDICINE

## 2022-06-07 RX ORDER — ALBUTEROL SULFATE 0.83 MG/ML
2.5 SOLUTION RESPIRATORY (INHALATION)
Status: CANCELLED | OUTPATIENT
Start: 2022-06-07

## 2022-06-07 RX ORDER — FLUOROURACIL 50 MG/ML
400 INJECTION, SOLUTION INTRAVENOUS ONCE
Status: CANCELLED | OUTPATIENT
Start: 2022-06-07

## 2022-06-07 RX ORDER — MEPERIDINE HYDROCHLORIDE 25 MG/ML
25 INJECTION INTRAMUSCULAR; INTRAVENOUS; SUBCUTANEOUS EVERY 30 MIN PRN
Status: CANCELLED | OUTPATIENT
Start: 2022-06-07

## 2022-06-07 RX ORDER — ALBUTEROL SULFATE 90 UG/1
1-2 AEROSOL, METERED RESPIRATORY (INHALATION)
Status: CANCELLED
Start: 2022-06-07

## 2022-06-07 RX ORDER — DIPHENHYDRAMINE HYDROCHLORIDE 50 MG/ML
50 INJECTION INTRAMUSCULAR; INTRAVENOUS
Status: CANCELLED
Start: 2022-06-07

## 2022-06-07 RX ORDER — ONDANSETRON 2 MG/ML
8 INJECTION INTRAMUSCULAR; INTRAVENOUS ONCE
Status: COMPLETED | OUTPATIENT
Start: 2022-06-07 | End: 2022-06-07

## 2022-06-07 RX ORDER — ONDANSETRON 2 MG/ML
8 INJECTION INTRAMUSCULAR; INTRAVENOUS ONCE
Status: CANCELLED | OUTPATIENT
Start: 2022-06-07

## 2022-06-07 RX ORDER — EPINEPHRINE 1 MG/ML
0.3 INJECTION, SOLUTION, CONCENTRATE INTRAVENOUS EVERY 5 MIN PRN
Status: CANCELLED | OUTPATIENT
Start: 2022-06-07

## 2022-06-07 RX ORDER — METHYLPREDNISOLONE SODIUM SUCCINATE 125 MG/2ML
125 INJECTION, POWDER, LYOPHILIZED, FOR SOLUTION INTRAMUSCULAR; INTRAVENOUS
Status: CANCELLED
Start: 2022-06-07

## 2022-06-07 RX ORDER — HEPARIN SODIUM (PORCINE) LOCK FLUSH IV SOLN 100 UNIT/ML 100 UNIT/ML
5 SOLUTION INTRAVENOUS
Status: CANCELLED | OUTPATIENT
Start: 2022-06-07

## 2022-06-07 RX ORDER — HEPARIN SODIUM (PORCINE) LOCK FLUSH IV SOLN 100 UNIT/ML 100 UNIT/ML
5 SOLUTION INTRAVENOUS
Status: CANCELLED | OUTPATIENT
Start: 2022-06-09

## 2022-06-07 RX ORDER — FLUOROURACIL 50 MG/ML
400 INJECTION, SOLUTION INTRAVENOUS ONCE
Status: COMPLETED | OUTPATIENT
Start: 2022-06-07 | End: 2022-06-07

## 2022-06-07 RX ORDER — NALOXONE HYDROCHLORIDE 0.4 MG/ML
0.2 INJECTION, SOLUTION INTRAMUSCULAR; INTRAVENOUS; SUBCUTANEOUS
Status: CANCELLED | OUTPATIENT
Start: 2022-06-07

## 2022-06-07 RX ORDER — LORAZEPAM 2 MG/ML
0.5 INJECTION INTRAMUSCULAR EVERY 4 HOURS PRN
Status: CANCELLED | OUTPATIENT
Start: 2022-06-07

## 2022-06-07 RX ADMIN — SODIUM CHLORIDE 250 ML: 9 INJECTION, SOLUTION INTRAVENOUS at 09:04

## 2022-06-07 RX ADMIN — SODIUM CHLORIDE 300 MG: 9 INJECTION, SOLUTION INTRAVENOUS at 09:32

## 2022-06-07 RX ADMIN — ONDANSETRON 8 MG: 2 INJECTION INTRAMUSCULAR; INTRAVENOUS at 09:04

## 2022-06-07 RX ADMIN — DEXAMETHASONE SODIUM PHOSPHATE: 10 INJECTION, SOLUTION INTRAMUSCULAR; INTRAVENOUS at 09:10

## 2022-06-07 RX ADMIN — OXALIPLATIN 150 MG: 100 INJECTION, SOLUTION, CONCENTRATE INTRAVENOUS at 10:06

## 2022-06-07 RX ADMIN — DEXTROSE MONOHYDRATE 250 ML: 50 INJECTION, SOLUTION INTRAVENOUS at 10:07

## 2022-06-07 RX ADMIN — FLUOROURACIL 690 MG: 50 INJECTION, SOLUTION INTRAVENOUS at 12:13

## 2022-06-07 RX ADMIN — FAMOTIDINE 20 MG: 10 INJECTION INTRAVENOUS at 09:04

## 2022-06-07 RX ADMIN — LEUCOVORIN CALCIUM 692 MG: 350 INJECTION, POWDER, LYOPHILIZED, FOR SOLUTION INTRAMUSCULAR; INTRAVENOUS at 10:05

## 2022-06-07 ASSESSMENT — PAIN SCALES - GENERAL: PAINLEVEL: SEVERE PAIN (6)

## 2022-06-07 NOTE — PROGRESS NOTES
"Infusion Nursing Note:  Kalin RODRÍGUEZ Devyn presents today for Zirabev/FOLFOX C3D1    Patient seen by provider today: No   present during visit today: Not Applicable.    Note: N/A.    Intravenous Access:  Implanted Port.    Treatment Conditions:  Lab Results   Component Value Date    HGB 13.9 06/07/2022    WBC 11.5 (H) 06/07/2022    ANEUTAUTO 7.1 06/07/2022     06/07/2022      Results reviewed, labs MET treatment parameters, ok to proceed with treatment.  Urine Negative.    Post Infusion Assessment:  Patient tolerated infusion without incident.  Blood return noted pre and post infusion.  Blood return noted during administration every 2-3 cc.  Site patent and intact, free from redness, edema or discomfort.  No evidence of extravasations.     Prior to discharge: Port is secured in place with tegaderm and flushed with 10cc NS with positive blood return noted.  Continuous home infusion CADD pump connected.    All connectors secured in place and clamps taped open.    Pump started, \"running\" noted on display (CADD): YES.  Pump Connection double checked with Alix RAMOS RN.  Patient instructed to call our clinic or [facility] with any questions or concerns at home.  Patient verbalized understanding.    Patient set up for pump disconnect at home with [facility] on 6/9/2022 at 1030. [facility] called and notified of time.      Discharge Plan:   Discharge instructions reviewed with: Patient.  Patient and/or family verbalized understanding of discharge instructions and all questions answered.  Copy of AVS reviewed with patient and/or family.  Patient will return 6/21/2022 for next appointment.  Patient discharged in stable condition accompanied by: self.  Departure Mode: Ambulatory.    Rachel Akbar RN  "

## 2022-06-08 NOTE — PROGRESS NOTES
This is a recent snapshot of the patient's [facility] medical record.  For current drug dose and complete information and questions, call [phone] or In [facility] pool, fv home infusion ([id number])  CSN Number:  [id number]

## 2022-06-13 ENCOUNTER — ANCILLARY PROCEDURE (OUTPATIENT)
Dept: MRI IMAGING | Facility: CLINIC | Age: 72
End: 2022-06-13
Attending: SURGERY
Payer: COMMERCIAL

## 2022-06-13 DIAGNOSIS — C18.7 ADENOCARCINOMA OF SIGMOID COLON (H): ICD-10-CM

## 2022-06-13 DIAGNOSIS — Z95.828 PORT-A-CATH IN PLACE: Primary | ICD-10-CM

## 2022-06-13 PROCEDURE — 72197 MRI PELVIS W/O & W/DYE: CPT | Performed by: RADIOLOGY

## 2022-06-13 PROCEDURE — A9585 GADOBUTROL INJECTION: HCPCS | Performed by: RADIOLOGY

## 2022-06-13 RX ORDER — HEPARIN SODIUM (PORCINE) LOCK FLUSH IV SOLN 100 UNIT/ML 100 UNIT/ML
500 SOLUTION INTRAVENOUS ONCE
Status: COMPLETED | OUTPATIENT
Start: 2022-06-13 | End: 2022-06-13

## 2022-06-13 RX ORDER — GADOBUTROL 604.72 MG/ML
7.5 INJECTION INTRAVENOUS ONCE
Status: COMPLETED | OUTPATIENT
Start: 2022-06-13 | End: 2022-06-13

## 2022-06-13 RX ADMIN — GADOBUTROL 6 ML: 604.72 INJECTION INTRAVENOUS at 07:59

## 2022-06-13 RX ADMIN — HEPARIN SODIUM (PORCINE) LOCK FLUSH IV SOLN 100 UNIT/ML 500 UNITS: 100 SOLUTION at 08:30

## 2022-06-13 NOTE — DISCHARGE INSTRUCTIONS
MRI Contrast Discharge Instructions    The IV contrast you received today will pass out of your body in your  urine. This will happen in the next 24 hours. You will not feel this process.  Your urine will not change color.    Drink at least 4 extra glasses of water or juice today (unless your doctor  has restricted your fluids). This reduces the stress on your kidneys.  You may take your regular medicines.    If you are on dialysis: It is best to have dialysis today.    If you have a reaction: Most reactions happen right away. If you have  any new symptoms after leaving the hospital (such as hives or swelling),  call your hospital at the correct number below. Or call your family doctor.  If you have breathing distress or wheezing, call 911.    Special instructions: ***    I have read and understand the above information.    Signature:______________________________________ Date:___________    Staff:__________________________________________ Date:___________     Time:__________    [facility] Departments:    ___[place]: [phone]  ___[facility]: [phone]  ___[place]: [phone] ___[facility]: [phone]  ___[facility]: [phone]  ___[facility]: [phone]  ___[phone]  ___[facility]: [phone]  ___[phone]

## 2022-06-20 ENCOUNTER — OFFICE VISIT (OUTPATIENT)
Dept: FAMILY MEDICINE | Facility: CLINIC | Age: 72
End: 2022-06-20
Payer: COMMERCIAL

## 2022-06-20 VITALS
OXYGEN SATURATION: 100 % | RESPIRATION RATE: 16 BRPM | DIASTOLIC BLOOD PRESSURE: 76 MMHG | BODY MASS INDEX: 18.2 KG/M2 | TEMPERATURE: 97.6 F | HEIGHT: 71 IN | HEART RATE: 76 BPM | SYSTOLIC BLOOD PRESSURE: 112 MMHG | WEIGHT: 130 LBS

## 2022-06-20 DIAGNOSIS — C18.7 ADENOCARCINOMA OF SIGMOID COLON (H): ICD-10-CM

## 2022-06-20 DIAGNOSIS — G89.3 CANCER ASSOCIATED PAIN: ICD-10-CM

## 2022-06-20 DIAGNOSIS — R64 CACHEXIA (H): Primary | ICD-10-CM

## 2022-06-20 DIAGNOSIS — R11.0 NAUSEA: ICD-10-CM

## 2022-06-20 PROCEDURE — 99214 OFFICE O/P EST MOD 30 MIN: CPT | Performed by: FAMILY MEDICINE

## 2022-06-20 RX ORDER — ONDANSETRON 8 MG/1
8 TABLET, FILM COATED ORAL EVERY 8 HOURS PRN
Qty: 30 TABLET | Refills: 2 | Status: SHIPPED | OUTPATIENT
Start: 2022-06-20 | End: 2022-09-02

## 2022-06-20 RX ORDER — OXYCODONE HYDROCHLORIDE 5 MG/1
5 TABLET ORAL EVERY 8 HOURS PRN
Qty: 60 TABLET | Refills: 0 | Status: SHIPPED | OUTPATIENT
Start: 2022-06-20 | End: 2022-06-20

## 2022-06-20 RX ORDER — OXYCODONE HYDROCHLORIDE 5 MG/1
5 TABLET ORAL EVERY 8 HOURS PRN
Qty: 60 TABLET | Refills: 0 | Status: SHIPPED | OUTPATIENT
Start: 2022-07-20 | End: 2022-07-14

## 2022-06-20 ASSESSMENT — PAIN SCALES - GENERAL: PAINLEVEL: NO PAIN (0)

## 2022-06-20 NOTE — PROGRESS NOTES
"S :Kalin Shepard is a 71 year old male with colon cancer, unable to surgically resect, metastases.     Bid on oxycodone. Needs fills.       Chronic opioid use, ongoing    Cachexia: stable weight, trying to maintain.   Nausea limits him    Nausea: fills on zofran.     O;/76   Pulse 76   Temp 97.6  F (36.4  C) (Tympanic)   Resp 16   Ht 1.803 m (5' 11\")   Wt 59 kg (130 lb)   SpO2 100%   BMI 18.13 kg/m    GEN: Alert and oriented, in no acute distress  Cachetic, but stable  Walking OK  Not driving    A: colon cancer, metastatic, ongoing      Cancer pain, opioid refills      Cachexia, stable       Nausea, ongoing    P: fills for 2 months on bid oxycodone.  zofran fills too.    Back in 2 months.  Continue chemo.  Eat as best he can      "

## 2022-06-21 ENCOUNTER — HOME INFUSION (PRE-WILLOW HOME INFUSION) (OUTPATIENT)
Dept: PHARMACY | Facility: CLINIC | Age: 72
End: 2022-06-21

## 2022-06-21 ENCOUNTER — ONCOLOGY VISIT (OUTPATIENT)
Dept: ONCOLOGY | Facility: CLINIC | Age: 72
End: 2022-06-21
Attending: INTERNAL MEDICINE
Payer: COMMERCIAL

## 2022-06-21 ENCOUNTER — LAB (OUTPATIENT)
Dept: INFUSION THERAPY | Facility: CLINIC | Age: 72
End: 2022-06-21
Attending: INTERNAL MEDICINE
Payer: COMMERCIAL

## 2022-06-21 VITALS
DIASTOLIC BLOOD PRESSURE: 61 MMHG | SYSTOLIC BLOOD PRESSURE: 120 MMHG | WEIGHT: 132 LBS | OXYGEN SATURATION: 99 % | RESPIRATION RATE: 12 BRPM | TEMPERATURE: 98.2 F | HEART RATE: 53 BPM | BODY MASS INDEX: 18.41 KG/M2

## 2022-06-21 VITALS — DIASTOLIC BLOOD PRESSURE: 72 MMHG | SYSTOLIC BLOOD PRESSURE: 121 MMHG | RESPIRATION RATE: 16 BRPM | HEART RATE: 60 BPM

## 2022-06-21 DIAGNOSIS — C18.7 ADENOCARCINOMA OF SIGMOID COLON (H): ICD-10-CM

## 2022-06-21 DIAGNOSIS — C18.7 ADENOCARCINOMA OF SIGMOID COLON (H): Primary | ICD-10-CM

## 2022-06-21 DIAGNOSIS — R11.0 CHEMOTHERAPY-INDUCED NAUSEA: Primary | ICD-10-CM

## 2022-06-21 DIAGNOSIS — T45.1X5A CHEMOTHERAPY-INDUCED NAUSEA: Primary | ICD-10-CM

## 2022-06-21 LAB
ALBUMIN UR-MCNC: NEGATIVE MG/DL
ALT SERPL W P-5'-P-CCNC: 25 U/L (ref 0–70)
AST SERPL W P-5'-P-CCNC: 19 U/L (ref 0–45)
BASOPHILS # BLD AUTO: 0.1 10E3/UL (ref 0–0.2)
BASOPHILS NFR BLD AUTO: 1 %
BILIRUB SERPL-MCNC: 0.3 MG/DL (ref 0.2–1.3)
CREAT SERPL-MCNC: 0.64 MG/DL (ref 0.66–1.25)
EOSINOPHIL # BLD AUTO: 0.5 10E3/UL (ref 0–0.7)
EOSINOPHIL NFR BLD AUTO: 6 %
ERYTHROCYTE [DISTWIDTH] IN BLOOD BY AUTOMATED COUNT: 15.3 % (ref 10–15)
GFR SERPL CREATININE-BSD FRML MDRD: >90 ML/MIN/1.73M2
HCT VFR BLD AUTO: 39.3 % (ref 40–53)
HGB BLD-MCNC: 12.6 G/DL (ref 13.3–17.7)
IMM GRANULOCYTES # BLD: 0 10E3/UL
IMM GRANULOCYTES NFR BLD: 0 %
LYMPHOCYTES # BLD AUTO: 3 10E3/UL (ref 0.8–5.3)
LYMPHOCYTES NFR BLD AUTO: 33 %
MCH RBC QN AUTO: 30.4 PG (ref 26.5–33)
MCHC RBC AUTO-ENTMCNC: 32.1 G/DL (ref 31.5–36.5)
MCV RBC AUTO: 95 FL (ref 78–100)
MONOCYTES # BLD AUTO: 0.9 10E3/UL (ref 0–1.3)
MONOCYTES NFR BLD AUTO: 10 %
NEUTROPHILS # BLD AUTO: 4.5 10E3/UL (ref 1.6–8.3)
NEUTROPHILS NFR BLD AUTO: 50 %
NRBC # BLD AUTO: 0 10E3/UL
NRBC BLD AUTO-RTO: 0 /100
PLATELET # BLD AUTO: 194 10E3/UL (ref 150–450)
RBC # BLD AUTO: 4.15 10E6/UL (ref 4.4–5.9)
WBC # BLD AUTO: 9 10E3/UL (ref 4–11)

## 2022-06-21 PROCEDURE — 85025 COMPLETE CBC W/AUTO DIFF WBC: CPT | Performed by: INTERNAL MEDICINE

## 2022-06-21 PROCEDURE — 99214 OFFICE O/P EST MOD 30 MIN: CPT | Performed by: INTERNAL MEDICINE

## 2022-06-21 PROCEDURE — 96417 CHEMO IV INFUS EACH ADDL SEQ: CPT

## 2022-06-21 PROCEDURE — 96413 CHEMO IV INFUSION 1 HR: CPT

## 2022-06-21 PROCEDURE — 258N000003 HC RX IP 258 OP 636: Performed by: INTERNAL MEDICINE

## 2022-06-21 PROCEDURE — 96411 CHEMO IV PUSH ADDL DRUG: CPT

## 2022-06-21 PROCEDURE — 96368 THER/DIAG CONCURRENT INF: CPT

## 2022-06-21 PROCEDURE — 81003 URINALYSIS AUTO W/O SCOPE: CPT | Performed by: INTERNAL MEDICINE

## 2022-06-21 PROCEDURE — 82247 BILIRUBIN TOTAL: CPT | Performed by: INTERNAL MEDICINE

## 2022-06-21 PROCEDURE — 84450 TRANSFERASE (AST) (SGOT): CPT | Performed by: INTERNAL MEDICINE

## 2022-06-21 PROCEDURE — 96367 TX/PROPH/DG ADDL SEQ IV INF: CPT

## 2022-06-21 PROCEDURE — 82565 ASSAY OF CREATININE: CPT | Performed by: INTERNAL MEDICINE

## 2022-06-21 PROCEDURE — 96375 TX/PRO/DX INJ NEW DRUG ADDON: CPT

## 2022-06-21 PROCEDURE — G0498 CHEMO EXTEND IV INFUS W/PUMP: HCPCS

## 2022-06-21 PROCEDURE — G0463 HOSPITAL OUTPT CLINIC VISIT: HCPCS | Mod: 25

## 2022-06-21 PROCEDURE — 250N000011 HC RX IP 250 OP 636: Performed by: INTERNAL MEDICINE

## 2022-06-21 PROCEDURE — 84460 ALANINE AMINO (ALT) (SGPT): CPT | Performed by: INTERNAL MEDICINE

## 2022-06-21 PROCEDURE — G0463 HOSPITAL OUTPT CLINIC VISIT: HCPCS

## 2022-06-21 PROCEDURE — 96415 CHEMO IV INFUSION ADDL HR: CPT

## 2022-06-21 RX ORDER — ALBUTEROL SULFATE 90 UG/1
1-2 AEROSOL, METERED RESPIRATORY (INHALATION)
Status: CANCELLED
Start: 2022-06-21

## 2022-06-21 RX ORDER — NALOXONE HYDROCHLORIDE 0.4 MG/ML
0.2 INJECTION, SOLUTION INTRAMUSCULAR; INTRAVENOUS; SUBCUTANEOUS
Status: CANCELLED | OUTPATIENT
Start: 2022-06-21

## 2022-06-21 RX ORDER — MEPERIDINE HYDROCHLORIDE 25 MG/ML
25 INJECTION INTRAMUSCULAR; INTRAVENOUS; SUBCUTANEOUS EVERY 30 MIN PRN
Status: CANCELLED | OUTPATIENT
Start: 2022-06-21

## 2022-06-21 RX ORDER — ONDANSETRON 2 MG/ML
8 INJECTION INTRAMUSCULAR; INTRAVENOUS ONCE
Status: CANCELLED | OUTPATIENT
Start: 2022-06-21

## 2022-06-21 RX ORDER — HEPARIN SODIUM (PORCINE) LOCK FLUSH IV SOLN 100 UNIT/ML 100 UNIT/ML
5 SOLUTION INTRAVENOUS
Status: CANCELLED | OUTPATIENT
Start: 2022-06-21

## 2022-06-21 RX ORDER — LORAZEPAM 2 MG/ML
0.5 INJECTION INTRAMUSCULAR EVERY 4 HOURS PRN
Status: CANCELLED | OUTPATIENT
Start: 2022-06-21

## 2022-06-21 RX ORDER — ALBUTEROL SULFATE 0.83 MG/ML
2.5 SOLUTION RESPIRATORY (INHALATION)
Status: CANCELLED | OUTPATIENT
Start: 2022-06-21

## 2022-06-21 RX ORDER — EPINEPHRINE 1 MG/ML
0.3 INJECTION, SOLUTION, CONCENTRATE INTRAVENOUS EVERY 5 MIN PRN
Status: CANCELLED | OUTPATIENT
Start: 2022-06-21

## 2022-06-21 RX ORDER — DIPHENHYDRAMINE HYDROCHLORIDE 50 MG/ML
50 INJECTION INTRAMUSCULAR; INTRAVENOUS
Status: CANCELLED
Start: 2022-06-21

## 2022-06-21 RX ORDER — HEPARIN SODIUM (PORCINE) LOCK FLUSH IV SOLN 100 UNIT/ML 100 UNIT/ML
5 SOLUTION INTRAVENOUS
Status: CANCELLED | OUTPATIENT
Start: 2022-06-23

## 2022-06-21 RX ORDER — FLUOROURACIL 50 MG/ML
400 INJECTION, SOLUTION INTRAVENOUS ONCE
Status: CANCELLED | OUTPATIENT
Start: 2022-06-21

## 2022-06-21 RX ORDER — ONDANSETRON 2 MG/ML
8 INJECTION INTRAMUSCULAR; INTRAVENOUS ONCE
Status: COMPLETED | OUTPATIENT
Start: 2022-06-21 | End: 2022-06-21

## 2022-06-21 RX ORDER — OLANZAPINE 5 MG/1
5 TABLET ORAL AT BEDTIME
Qty: 4 TABLET | Refills: 3 | Status: SHIPPED | OUTPATIENT
Start: 2022-06-21 | End: 2022-08-02

## 2022-06-21 RX ORDER — FLUOROURACIL 50 MG/ML
400 INJECTION, SOLUTION INTRAVENOUS ONCE
Status: COMPLETED | OUTPATIENT
Start: 2022-06-21 | End: 2022-06-21

## 2022-06-21 RX ORDER — METHYLPREDNISOLONE SODIUM SUCCINATE 125 MG/2ML
125 INJECTION, POWDER, LYOPHILIZED, FOR SOLUTION INTRAMUSCULAR; INTRAVENOUS
Status: CANCELLED
Start: 2022-06-21

## 2022-06-21 RX ADMIN — LEUCOVORIN CALCIUM 692 MG: 350 INJECTION, POWDER, LYOPHILIZED, FOR SOLUTION INTRAMUSCULAR; INTRAVENOUS at 10:10

## 2022-06-21 RX ADMIN — OXALIPLATIN 150 MG: 100 INJECTION, SOLUTION, CONCENTRATE INTRAVENOUS at 10:05

## 2022-06-21 RX ADMIN — DEXAMETHASONE SODIUM PHOSPHATE: 10 INJECTION, SOLUTION INTRAMUSCULAR; INTRAVENOUS at 09:09

## 2022-06-21 RX ADMIN — FAMOTIDINE 20 MG: 10 INJECTION, SOLUTION INTRAVENOUS at 08:55

## 2022-06-21 RX ADMIN — DEXTROSE MONOHYDRATE 250 ML: 50 INJECTION, SOLUTION INTRAVENOUS at 10:05

## 2022-06-21 RX ADMIN — SODIUM CHLORIDE 250 ML: 9 INJECTION, SOLUTION INTRAVENOUS at 08:45

## 2022-06-21 RX ADMIN — SODIUM CHLORIDE 300 MG: 9 INJECTION, SOLUTION INTRAVENOUS at 09:32

## 2022-06-21 RX ADMIN — ONDANSETRON 8 MG: 2 INJECTION INTRAMUSCULAR; INTRAVENOUS at 08:45

## 2022-06-21 RX ADMIN — FLUOROURACIL 690 MG: 50 INJECTION, SOLUTION INTRAVENOUS at 12:15

## 2022-06-21 ASSESSMENT — PAIN SCALES - GENERAL: PAINLEVEL: MODERATE PAIN (4)

## 2022-06-21 NOTE — PROGRESS NOTES
PAC labs drawn and sent. Port accessed without difficulty. Good blood return noted.     Silvana Ware RN

## 2022-06-21 NOTE — PROGRESS NOTES
"Infusion Nursing Note:  Kalin Shepard presents today for C4 D1 FOLFOX and Zirabev.    Patient seen by provider today: Yes:       Dr. Colorado   present during visit today: Not Applicable.    Note: Pt denies any new health concerns at this time.    Intravenous Access:  Implanted Port.    Treatment Conditions:  Lab Results   Component Value Date    HGB 12.6 (L) 06/21/2022    WBC 9.0 06/21/2022    ANEUTAUTO 4.5 06/21/2022     06/21/2022      Lab Results   Component Value Date     06/07/2022    POTASSIUM 4.3 06/07/2022    MAG 2.3 04/04/2022    CR 0.64 (L) 06/21/2022    ROXIE 9.6 06/07/2022    BILITOTAL 0.3 06/21/2022    ALBUMIN 3.3 (L) 06/07/2022    ALT 25 06/21/2022    AST 19 06/21/2022     Results reviewed, labs MET treatment parameters, ok to proceed with treatment.  Urine negative protein.    Post Infusion Assessment:  Patient tolerated infusion without incident.  Patient tolerated injection without incident.  Blood return noted pre and post infusion.  Blood return noted during administration every 3 cc.  Site patent and intact, free from redness, edema or discomfort.  No evidence of extravasations.    Prior to discharge: Port is secured in place with tegaderm and flushed with 10cc NS with positive blood return noted.  Continuous home infusion CADD pump connected.    All connectors secured in place and clamps taped open.    Pump started, \"running\" noted on display (CADD): YES.  Pump Connection double checked with Ni ADAM RN.  Patient instructed to call our clinic or [facility] with any questions or concerns at home.  Patient verbalized understanding.    Patient set up for pump disconnect at home with [facility] on 6/23/22@1130.           Discharge Plan:   Discharge instructions reviewed with: Patient.  Patient and/or family verbalized understanding of discharge instructions and all questions answered.  Patient discharged in stable condition accompanied by: " self.  Departure Mode: Ambulatory.      Silvana Ware RN

## 2022-06-21 NOTE — LETTER
"    6/21/2022         RE: Kalin Shepard  [address] [facility] [address]        Dear Colleague,    Thank you for referring your patient, Kalin Shepard, to the [facility]. Please see a copy of my visit note below.    Oncology Rooming Note    June 21, 2022 8:12 AM   Kalin Shepard is a 71 year old male who presents for:    Chief Complaint   Patient presents with     Oncology Clinic Visit     Adenocarcinoma of sigmoid colon - Labs provider and infusion     Initial Vitals: /61 (BP Location: Right arm, Patient Position: Sitting, Cuff Size: Adult Regular)   Pulse 53   Temp 98.2  F (36.8  C) (Tympanic)   Resp 12   Wt 59.9 kg (132 lb)   SpO2 99%   BMI 18.41 kg/m   Estimated body mass index is 18.41 kg/m  as calculated from the following:    Height as of 6/20/22: 1.803 m (5' 11\").    Weight as of this encounter: 59.9 kg (132 lb). Body surface area is 1.73 meters squared.  Moderate Pain (4) Comment: Data Unavailable   No LMP for male patient.  Allergies reviewed: Yes  Medications reviewed: Yes    Medications: Medication refills not needed today.  Pharmacy name entered into [employer]:    [id number] - [place], [place] - [address] [facility] [address]  [facility], [place] - [address] [place] [address]    Clinical concerns:        Sara Almanzar, [facility] On[facility] Progress Note    Patient: Kalin Shepard  MRN: [id number]  Date of Service: 04/26/2022        Reason for Visit    Chief Complaint   Patient presents with     Oncology Clinic Visit     Adenocarcinoma of sigmoid colon - Labs provider and infusion         Problem List Items Addressed This Visit        Digestive    Adenocarcinoma of sigmoid colon (H)    Relevant Orders    Infusion Appointment Request    Infusion Appointment Request      Other Visit Diagnoses     Chemotherapy-induced nausea    -  Primary    Relevant Medications    OLANZapine " (ZYPREXA) 5 MG tablet            Assessment and Plan  1. Sigmoid colon cancer with peritoneal metastasis  Here for cycle 4 of FOLFOX with bevacizumab.  Tolerating treatment pretty well with expected side effects.  Labs reviewed today.  Total white count is 9.  Hemoglobin is 12.6.  Platelet count is 194.  No contraindications to proceed with cycle 4 FOLFOX with no symptomatic today.  Pump DC on Thursday.  Come back in 2 weeks with repeat labs.  Cycle 5 to follow the same day.    2. Chemotherapy-induced nausea  Has been taking Zofran premedicine a daily basis.  Also takes dexamethasone on days 2 and 3 of each chemotherapy.  Despite this he continues to have significant nausea that is resulting in decreased appetite and weight gain issues.  Recommended starting Zyprexa 5 mg daily for 4 days after each chemo at bedtime.  Reviewed the potential side effects and complications.  He is agreeable to try this.  I will send prescription today.    3.  Weight loss  Continue aggressive protein calorie supplementation.     Patient Instructions   Proceed with cycle 4 FOLFOX today. Pump discharge on thursday. RTC in 2 weeks with labs prior for cycle 5.         Cancer Staging  Adenocarcinoma of sigmoid colon (H)  Staging form: Colon and Rectum, AJCC 8th Edition  - Pathologic: Stage Unknown (pTX, pNX, pM1) - Signed by Pamela Colorado MD on 5/6/2022      ECOG Performance    1 - Can't do physically strenuous work, but fully ambyulatory and can do light sedentary work         Problem List    Patient Active Problem List   Diagnosis     Colon adenocarcinoma (H)     Colostomy present (H)     Cachexia (H)     Adenocarcinoma of sigmoid colon (H)     Cancer associated pain        Interval History   Kalin Shepard is a 71 year old with sigmoid coloncancer with peritoneal metastasis who is here for cycle 4 of FOLFOX and bevacizumab.    After cycle 2 of chemo he had 1 dehiscence in his port area and had to remove it.  He had a new port  placed after that.  Completed cycle 3 without any major issues.  Perhaps his biggest side effect is nausea and fatigue.  He takes Zofran pretty much on a daily basis.  It is affected his appetite.  He is not gaining weight.  Also has significant fatigue and lethargy starting day 4 after each chemo up to day 7.  Feeling better today.  Denies any major peripheral neuropathy issues.  He does have expected oxaliplatin induced neuro sensitivity.  He also had an MRI of the pelvis last week.  It showed known sigmoid colon malignancy with extension beyond the colon into the adjacent peritoneal reflection and possible left seminal vesicle.  No fevers or chills.    Labs reviewed today.    Review of Systems  A comprehensive review of systems was negative except for what is noted in the interval history    Current Outpatient Medications   Medication     dexamethasone (DECADRON) 4 MG tablet     ibuprofen (ADVIL/MOTRIN) 400 MG tablet     Lidocaine (LIDOCARE) 4 % Patch     multivitamin (CENTRUM SILVER) tablet     OLANZapine (ZYPREXA) 5 MG tablet     ondansetron (ZOFRAN) 8 MG tablet     [START ON 7/20/2022] oxyCODONE (ROXICODONE) 5 MG tablet     polyethylene glycol (MIRALAX) 17 GM/Dose powder     acetaminophen (TYLENOL) 500 MG tablet     prochlorperazine (COMPAZINE) 10 MG tablet     No current facility-administered medications for this visit.     Facility-Administered Medications Ordered in Other Visits   Medication     bevacizumab-bvzr (ZIRABEV) 300 mg in sodium chloride 0.9 % 112 mL infusion     dextrose 5% BOLUS     fluorouracil (ADRUCIL) 4,150 mg in sodium chloride 0.9 % 333 mL Home Infusion     fluorouracil (ADRUCIL) injection 690 mg     leucovorin calcium 692 mg in D5W 134.6 mL infusion     oxaliplatin (ELOXATIN) 150 mg in D5W 530 mL infusion        Physical Exam    No flowsheet data found.    General: alert and cooperative  HEENT: Head: Normal, normocephalic, atraumatic.  Eye: Normal external eye, conjunctiva, lids cornea,  DANA.  Abdomen: Soft and nontender, ostomy in place   Extremities: atraumatic, no peripheral edema  Skin: The outside incision has opened up and port is visible.  Fortunately there is no signs of infection.  CNS: Alert and oriented x3, neurologic exam grossly normal.    Lab Results    Recent Results (from the past 168 hour(s))   AST   Result Value Ref Range    AST 19 0 - 45 U/L   ALT   Result Value Ref Range    ALT 25 0 - 70 U/L   Creatinine   Result Value Ref Range    Creatinine 0.64 (L) 0.66 - 1.25 mg/dL    GFR Estimate >90 >60 mL/min/1.73m2   Bilirubin  total   Result Value Ref Range    Bilirubin Total 0.3 0.2 - 1.3 mg/dL   Protein qualitative urine   Result Value Ref Range    Protein Albumin Urine Negative Negative mg/dL   CBC with platelets and differential   Result Value Ref Range    WBC Count 9.0 4.0 - 11.0 10e3/uL    RBC Count 4.15 (L) 4.40 - 5.90 10e6/uL    Hemoglobin 12.6 (L) 13.3 - 17.7 g/dL    Hematocrit 39.3 (L) 40.0 - 53.0 %    MCV 95 78 - 100 fL    MCH 30.4 26.5 - 33.0 pg    MCHC 32.1 31.5 - 36.5 g/dL    RDW 15.3 (H) 10.0 - 15.0 %    Platelet Count 194 150 - 450 10e3/uL    % Neutrophils 50 %    % Lymphocytes 33 %    % Monocytes 10 %    % Eosinophils 6 %    % Basophils 1 %    % Immature Granulocytes 0 %    NRBCs per 100 WBC 0 <1 /100    Absolute Neutrophils 4.5 1.6 - 8.3 10e3/uL    Absolute Lymphocytes 3.0 0.8 - 5.3 10e3/uL    Absolute Monocytes 0.9 0.0 - 1.3 10e3/uL    Absolute Eosinophils 0.5 0.0 - 0.7 10e3/uL    Absolute Basophils 0.1 0.0 - 0.2 10e3/uL    Absolute Immature Granulocytes 0.0 <=0.4 10e3/uL    Absolute NRBCs 0.0 10e3/uL       Imaging    MR Pelvis (Intrapelvic Organs) wo&w Contrast    Result Date: 6/13/2022  EXAMINATION: MR PELVIS (INTRAPELVIC ORGANS) W/O & W CONTRAST, 6/13/2022 9:01 AM COMPARISON: MRI abdomen 5/4/2022, PET/CT 4/7/2022, CT 3/26/2022 TECHNIQUE:  Images were acquired without and with intravenous contrast through the pelvis. The following MR images were acquired without  contrast: multiplanar T1, multiplanar T2, axial diffusion-weighted and axial apparent diffusion coefficient. T1-weighted images with fat saturation were acquired at the following intervals relative to intravenous contrast administration: pre-contrast, immediate post contrast, 1 minute, 3 minutes and delayed.  Contrast dose: 6 ml Gadavist HISTORY: History of obstructing sigmoid mass with associated small bowel obstruction, status post small bowel resection and descending loop colostomy. Final pathology showed sigmoid mass that was negative for malignancy although likely sampling is non-representative and a peritoneal biopsy that is positive for malignancy - intestinal type adenocarcinoma. FINDINGS: Patient has known malignancy of the sigmoid colon best seen on images 18 through 23 of series 8. About 5.7 cm long segment of sigmoid colon is involvement by malignancy. The tumor extends beyond the serosa, involves the peritoneal reflection (image 22 series 8), and extends inferiorly with abutment of the left seminal vesicle (images 25 through 28 of series 8). The sigmoid colon is very redundant. There is tethering of the adjacent more distal aspect of sigmoid colon as seen on image 22 series 8 and a possibility of serosal invasion of this particular mass is a concern. No bowel obstruction is present. Postoperative changes of the descending loop colonoscopy. No evidence of bowel obstruction the visualized lower abdomen/pelvis. No suspicious abdominal adenopathy. No suspicious soft tissue or osseous lesions. Urinary bladder is nearly decompressed. Prostatomegaly.     IMPRESSION: In this patient with presumed intestinal type adenocarcinoma of the sigmoid colon: 1. Circumferential wall irregularity and hyperenhancement within the proximal sigmoid colon, in keeping with patient's known malignancy. There is significant extension beyond the sigmoid colon wall with concern for involvement of additional adjacent segment of  sigmoid colon, and left pericardial reflection. Abutment of left seminal vesicle without clear invasion. 1a. Please also refer to recent PET/CT and CT examination reports. 2. Left lower quadrant diverting colostomy. No evidence of obstruction. 3. No suspicious adenopathy in the pelvis. I have personally reviewed the examination and initial interpretation and I agree with the findings. SG VILLALPANDO MD   SYSTEM ID:  [id number]    IR Chest Port Placement > 5 Yrs of Age    Result Date: 6/2/2022  Procedures 6/2/2022: 1. Ultrasound guidance for venous access 2. Tunneled port (age > 5 yrs.) placement 3. Fluoroscopic guidance placement History: Colon cancer. Recently placed right chest port was removed due to wound dehiscence. Plan is to place a left-sided port. Comparisons: 4/7/2022. Operators: Paresh Medications: Monitored anesthesia care. Vital signs and oxygenation continuously monitored. The patient remained stable throughout the procedure. Fluoroscopy time: 13 seconds Findings/procedure: Prior to the procedure, both verbal and written informed consent obtained from the patient. Limited jugular ultrasound documented jugular vein patency. The left neck and upper chest prepped and draped in the usual sterile fashion. Buffered 1% Lidocaine used for local analgesia. Under ultrasound guidance, left internal jugular venotomy made with a micropuncture needle. Image documenting the vein within the vein saved. Micropuncture needle exchanged over guidewire for the micropuncture sheath. Catheter length measured with the 0.018 guidewire. Micropuncture sheath saline locked. Subcutaneous pocket created for the port reservoir over the left anterior chest using a combination of sharp and blunt dissection. Pocket liberally irrigated with saline. 8 Armenian by 28 cm single-lumen catheter subcutaneously tunneled from the left anterior chest pocket to the left internal jugular venotomy site. Catheter cut to length.  Micropuncture sheath exchanged over guidewire for the peel-away sheath. Catheter placed through the peel-away sheath and advanced under fluoroscopic guidance to the SVC. Peel-away sheath removed. Port aspirated and flushed adequately. Port heparin locked with 100:1 heparin solution. The left anterior chest incision closed with three 3-0 Vicryl interrupted sutures, a running 4-0 Monocryl subcuticular suture, and Dermabond. Left internal jugular venotomy site closed with Dermabond. No immediate complication.     Impression: Uncomplicated image guided placement of left internal jugular venous central venous catheter with port. Catheter tip overlies the SVC. Port flushed, heparin locked, ready for immediate use. [employer]   SYSTEM ID:  [id number]    A total of 30 min were spent today on this visit which included face to face conversation with the patient, EMR review, counseling and co-ordination of care and medical documentation.      Signed by: Pamela Colorado MD      Again, thank you for allowing me to participate in the care of your patient.        Sincerely,        Pamela Colorado MD

## 2022-06-21 NOTE — PROGRESS NOTES
[facility] Hematology and Oncology Progress Note    Patient: Kalin Shepard  MRN: [id number]  Date of Service: 04/26/2022        Reason for Visit    Chief Complaint   Patient presents with     Oncology Clinic Visit     Adenocarcinoma of sigmoid colon - Labs provider and infusion         Problem List Items Addressed This Visit        Digestive    Adenocarcinoma of sigmoid colon (H)    Relevant Orders    Infusion Appointment Request    Infusion Appointment Request      Other Visit Diagnoses     Chemotherapy-induced nausea    -  Primary    Relevant Medications    OLANZapine (ZYPREXA) 5 MG tablet            Assessment and Plan  1. Sigmoid colon cancer with peritoneal metastasis  Here for cycle 4 of FOLFOX with bevacizumab.  Tolerating treatment pretty well with expected side effects.  Labs reviewed today.  Total white count is 9.  Hemoglobin is 12.6.  Platelet count is 194.  No contraindications to proceed with cycle 4 FOLFOX with no symptomatic today.  Pump DC on Thursday.  Come back in 2 weeks with repeat labs.  Cycle 5 to follow the same day.    2. Chemotherapy-induced nausea  Has been taking Zofran premedicine a daily basis.  Also takes dexamethasone on days 2 and 3 of each chemotherapy.  Despite this he continues to have significant nausea that is resulting in decreased appetite and weight gain issues.  Recommended starting Zyprexa 5 mg daily for 4 days after each chemo at bedtime.  Reviewed the potential side effects and complications.  He is agreeable to try this.  I will send prescription today.    3.  Weight loss  Continue aggressive protein calorie supplementation.     Patient Instructions   Proceed with cycle 4 FOLFOX today. Pump discharge on thursday. RTC in 2 weeks with labs prior for cycle 5.         Cancer Staging  Adenocarcinoma of sigmoid colon (H)  Staging form: Colon and Rectum, AJCC 8th Edition  - Pathologic: Stage Unknown (pTX, pNX, pM1) - Signed by Pamela Colorado MD on  5/6/2022      ECOG Performance    1 - Can't do physically strenuous work, but fully ambyulatory and can do light sedentary work         Problem List    Patient Active Problem List   Diagnosis     Colon adenocarcinoma (H)     Colostomy present (H)     Cachexia (H)     Adenocarcinoma of sigmoid colon (H)     Cancer associated pain        Interval History   Kalin Shepard is a 71 year old with sigmoid coloncancer with peritoneal metastasis who is here for cycle 4 of FOLFOX and bevacizumab.    After cycle 2 of chemo he had 1 dehiscence in his port area and had to remove it.  He had a new port placed after that.  Completed cycle 3 without any major issues.  Perhaps his biggest side effect is nausea and fatigue.  He takes Zofran pretty much on a daily basis.  It is affected his appetite.  He is not gaining weight.  Also has significant fatigue and lethargy starting day 4 after each chemo up to day 7.  Feeling better today.  Denies any major peripheral neuropathy issues.  He does have expected oxaliplatin induced neuro sensitivity.  He also had an MRI of the pelvis last week.  It showed known sigmoid colon malignancy with extension beyond the colon into the adjacent peritoneal reflection and possible left seminal vesicle.  No fevers or chills.    Labs reviewed today.    Review of Systems  A comprehensive review of systems was negative except for what is noted in the interval history    Current Outpatient Medications   Medication     dexamethasone (DECADRON) 4 MG tablet     ibuprofen (ADVIL/MOTRIN) 400 MG tablet     Lidocaine (LIDOCARE) 4 % Patch     multivitamin (CENTRUM SILVER) tablet     OLANZapine (ZYPREXA) 5 MG tablet     ondansetron (ZOFRAN) 8 MG tablet     [START ON 7/20/2022] oxyCODONE (ROXICODONE) 5 MG tablet     polyethylene glycol (MIRALAX) 17 GM/Dose powder     acetaminophen (TYLENOL) 500 MG tablet     prochlorperazine (COMPAZINE) 10 MG tablet     No current facility-administered medications for this visit.      Facility-Administered Medications Ordered in Other Visits   Medication     bevacizumab-bvzr (ZIRABEV) 300 mg in sodium chloride 0.9 % 112 mL infusion     dextrose 5% BOLUS     fluorouracil (ADRUCIL) 4,150 mg in sodium chloride 0.9 % 333 mL Home Infusion     fluorouracil (ADRUCIL) injection 690 mg     leucovorin calcium 692 mg in D5W 134.6 mL infusion     oxaliplatin (ELOXATIN) 150 mg in D5W 530 mL infusion        Physical Exam    No flowsheet data found.    General: alert and cooperative  HEENT: Head: Normal, normocephalic, atraumatic.  Eye: Normal external eye, conjunctiva, lids cornea, DANA.  Abdomen: Soft and nontender, ostomy in place   Extremities: atraumatic, no peripheral edema  Skin: The outside incision has opened up and port is visible.  Fortunately there is no signs of infection.  CNS: Alert and oriented x3, neurologic exam grossly normal.    Lab Results    Recent Results (from the past 168 hour(s))   AST   Result Value Ref Range    AST 19 0 - 45 U/L   ALT   Result Value Ref Range    ALT 25 0 - 70 U/L   Creatinine   Result Value Ref Range    Creatinine 0.64 (L) 0.66 - 1.25 mg/dL    GFR Estimate >90 >60 mL/min/1.73m2   Bilirubin  total   Result Value Ref Range    Bilirubin Total 0.3 0.2 - 1.3 mg/dL   Protein qualitative urine   Result Value Ref Range    Protein Albumin Urine Negative Negative mg/dL   CBC with platelets and differential   Result Value Ref Range    WBC Count 9.0 4.0 - 11.0 10e3/uL    RBC Count 4.15 (L) 4.40 - 5.90 10e6/uL    Hemoglobin 12.6 (L) 13.3 - 17.7 g/dL    Hematocrit 39.3 (L) 40.0 - 53.0 %    MCV 95 78 - 100 fL    MCH 30.4 26.5 - 33.0 pg    MCHC 32.1 31.5 - 36.5 g/dL    RDW 15.3 (H) 10.0 - 15.0 %    Platelet Count 194 150 - 450 10e3/uL    % Neutrophils 50 %    % Lymphocytes 33 %    % Monocytes 10 %    % Eosinophils 6 %    % Basophils 1 %    % Immature Granulocytes 0 %    NRBCs per 100 WBC 0 <1 /100    Absolute Neutrophils 4.5 1.6 - 8.3 10e3/uL    Absolute Lymphocytes 3.0 0.8 -  5.3 10e3/uL    Absolute Monocytes 0.9 0.0 - 1.3 10e3/uL    Absolute Eosinophils 0.5 0.0 - 0.7 10e3/uL    Absolute Basophils 0.1 0.0 - 0.2 10e3/uL    Absolute Immature Granulocytes 0.0 <=0.4 10e3/uL    Absolute NRBCs 0.0 10e3/uL       Imaging    MR Pelvis (Intrapelvic Organs) wo&w Contrast    Result Date: 6/13/2022  EXAMINATION: MR PELVIS (INTRAPELVIC ORGANS) W/O & W CONTRAST, 6/13/2022 9:01 AM COMPARISON: MRI abdomen 5/4/2022, PET/CT 4/7/2022, CT 3/26/2022 TECHNIQUE:  Images were acquired without and with intravenous contrast through the pelvis. The following MR images were acquired without contrast: multiplanar T1, multiplanar T2, axial diffusion-weighted and axial apparent diffusion coefficient. T1-weighted images with fat saturation were acquired at the following intervals relative to intravenous contrast administration: pre-contrast, immediate post contrast, 1 minute, 3 minutes and delayed.  Contrast dose: 6 ml Gadavist HISTORY: History of obstructing sigmoid mass with associated small bowel obstruction, status post small bowel resection and descending loop colostomy. Final pathology showed sigmoid mass that was negative for malignancy although likely sampling is non-representative and a peritoneal biopsy that is positive for malignancy - intestinal type adenocarcinoma. FINDINGS: Patient has known malignancy of the sigmoid colon best seen on images 18 through 23 of series 8. About 5.7 cm long segment of sigmoid colon is involvement by malignancy. The tumor extends beyond the serosa, involves the peritoneal reflection (image 22 series 8), and extends inferiorly with abutment of the left seminal vesicle (images 25 through 28 of series 8). The sigmoid colon is very redundant. There is tethering of the adjacent more distal aspect of sigmoid colon as seen on image 22 series 8 and a possibility of serosal invasion of this particular mass is a concern. No bowel obstruction is present. Postoperative changes of the  descending loop colonoscopy. No evidence of bowel obstruction the visualized lower abdomen/pelvis. No suspicious abdominal adenopathy. No suspicious soft tissue or osseous lesions. Urinary bladder is nearly decompressed. Prostatomegaly.     IMPRESSION: In this patient with presumed intestinal type adenocarcinoma of the sigmoid colon: 1. Circumferential wall irregularity and hyperenhancement within the proximal sigmoid colon, in keeping with patient's known malignancy. There is significant extension beyond the sigmoid colon wall with concern for involvement of additional adjacent segment of sigmoid colon, and left pericardial reflection. Abutment of left seminal vesicle without clear invasion. 1a. Please also refer to recent PET/CT and CT examination reports. 2. Left lower quadrant diverting colostomy. No evidence of obstruction. 3. No suspicious adenopathy in the pelvis. I have personally reviewed the examination and initial interpretation and I agree with the findings. SG VILLALPANDO MD   SYSTEM ID:  [id number]    IR Chest Port Placement > 5 Yrs of Age    Result Date: 6/2/2022  Procedures 6/2/2022: 1. Ultrasound guidance for venous access 2. Tunneled port (age > 5 yrs.) placement 3. Fluoroscopic guidance placement History: Colon cancer. Recently placed right chest port was removed due to wound dehiscence. Plan is to place a left-sided port. Comparisons: 4/7/2022. Operators: Paresh Medications: Monitored anesthesia care. Vital signs and oxygenation continuously monitored. The patient remained stable throughout the procedure. Fluoroscopy time: 13 seconds Findings/procedure: Prior to the procedure, both verbal and written informed consent obtained from the patient. Limited jugular ultrasound documented jugular vein patency. The left neck and upper chest prepped and draped in the usual sterile fashion. Buffered 1% Lidocaine used for local analgesia. Under ultrasound guidance, left internal jugular venotomy  made with a micropuncture needle. Image documenting the vein within the vein saved. Micropuncture needle exchanged over guidewire for the micropuncture sheath. Catheter length measured with the 0.018 guidewire. Micropuncture sheath saline locked. Subcutaneous pocket created for the port reservoir over the left anterior chest using a combination of sharp and blunt dissection. Pocket liberally irrigated with saline. 8 Cameroonian by 28 cm single-lumen catheter subcutaneously tunneled from the left anterior chest pocket to the left internal jugular venotomy site. Catheter cut to length. Micropuncture sheath exchanged over guidewire for the peel-away sheath. Catheter placed through the peel-away sheath and advanced under fluoroscopic guidance to the SVC. Peel-away sheath removed. Port aspirated and flushed adequately. Port heparin locked with 100:1 heparin solution. The left anterior chest incision closed with three 3-0 Vicryl interrupted sutures, a running 4-0 Monocryl subcuticular suture, and Dermabond. Left internal jugular venotomy site closed with Dermabond. No immediate complication.     Impression: Uncomplicated image guided placement of left internal jugular venous central venous catheter with port. Catheter tip overlies the SVC. Port flushed, heparin locked, ready for immediate use. [facility]   SYSTEM ID:  [id number]    A total of 30 min were spent today on this visit which included face to face conversation with the patient, EMR review, counseling and co-ordination of care and medical documentation.      Signed by: Pamela Colorado MD

## 2022-06-21 NOTE — PROGRESS NOTES
"Oncology Rooming Note    June 21, 2022 8:12 AM   Kalin Shepard is a 71 year old male who presents for:    Chief Complaint   Patient presents with     Oncology Clinic Visit     Adenocarcinoma of sigmoid colon - Labs provider and infusion     Initial Vitals: /61 (BP Location: Right arm, Patient Position: Sitting, Cuff Size: Adult Regular)   Pulse 53   Temp 98.2  F (36.8  C) (Tympanic)   Resp 12   Wt 59.9 kg (132 lb)   SpO2 99%   BMI 18.41 kg/m   Estimated body mass index is 18.41 kg/m  as calculated from the following:    Height as of 6/20/22: 1.803 m (5' 11\").    Weight as of this encounter: 59.9 kg (132 lb). Body surface area is 1.73 meters squared.  Moderate Pain (4) Comment: Data Unavailable   No LMP for male patient.  Allergies reviewed: Yes  Medications reviewed: Yes    Medications: Medication refills not needed today.  Pharmacy name entered into [facility]:    [id number] - [place], [place] - [address]  [facility] [place], [place] - [address] [place] [address]    Clinical concerns:        Sara Almanzar CMA            "

## 2022-06-21 NOTE — PATIENT INSTRUCTIONS
Proceed with cycle 4 FOLFOX today. Pump discharge on thursday. RTC in 2 weeks with labs prior for cycle 5.

## 2022-06-23 DIAGNOSIS — Z53.9 ERRONEOUS ENCOUNTER--DISREGARD: Primary | ICD-10-CM

## 2022-07-05 ENCOUNTER — INFUSION THERAPY VISIT (OUTPATIENT)
Dept: INFUSION THERAPY | Facility: CLINIC | Age: 72
End: 2022-07-05
Attending: INTERNAL MEDICINE
Payer: COMMERCIAL

## 2022-07-05 ENCOUNTER — ONCOLOGY VISIT (OUTPATIENT)
Dept: ONCOLOGY | Facility: CLINIC | Age: 72
End: 2022-07-05
Attending: INTERNAL MEDICINE
Payer: COMMERCIAL

## 2022-07-05 ENCOUNTER — HOME INFUSION (PRE-WILLOW HOME INFUSION) (OUTPATIENT)
Dept: PHARMACY | Facility: CLINIC | Age: 72
End: 2022-07-05

## 2022-07-05 VITALS
WEIGHT: 131.3 LBS | RESPIRATION RATE: 18 BRPM | HEART RATE: 71 BPM | TEMPERATURE: 97.6 F | SYSTOLIC BLOOD PRESSURE: 137 MMHG | OXYGEN SATURATION: 97 % | BODY MASS INDEX: 18.31 KG/M2 | DIASTOLIC BLOOD PRESSURE: 76 MMHG

## 2022-07-05 VITALS — SYSTOLIC BLOOD PRESSURE: 100 MMHG | HEART RATE: 50 BPM | RESPIRATION RATE: 16 BRPM | DIASTOLIC BLOOD PRESSURE: 66 MMHG

## 2022-07-05 DIAGNOSIS — C18.7 ADENOCARCINOMA OF SIGMOID COLON (H): Primary | ICD-10-CM

## 2022-07-05 DIAGNOSIS — E43 SEVERE PROTEIN-CALORIE MALNUTRITION (H): ICD-10-CM

## 2022-07-05 DIAGNOSIS — R63.0 POOR APPETITE: ICD-10-CM

## 2022-07-05 LAB
ALBUMIN UR-MCNC: NEGATIVE MG/DL
ALT SERPL W P-5'-P-CCNC: 21 U/L (ref 0–70)
ANION GAP SERPL CALCULATED.3IONS-SCNC: 4 MMOL/L (ref 3–14)
AST SERPL W P-5'-P-CCNC: 18 U/L (ref 0–45)
BASOPHILS # BLD AUTO: 0.1 10E3/UL (ref 0–0.2)
BASOPHILS NFR BLD AUTO: 2 %
BILIRUB SERPL-MCNC: 0.2 MG/DL (ref 0.2–1.3)
CHLORIDE BLD-SCNC: 109 MMOL/L (ref 94–109)
CO2 SERPL-SCNC: 25 MMOL/L (ref 20–32)
CREAT SERPL-MCNC: 0.56 MG/DL (ref 0.66–1.25)
EOSINOPHIL # BLD AUTO: 0.2 10E3/UL (ref 0–0.7)
EOSINOPHIL NFR BLD AUTO: 3 %
ERYTHROCYTE [DISTWIDTH] IN BLOOD BY AUTOMATED COUNT: 15.6 % (ref 10–15)
GFR SERPL CREATININE-BSD FRML MDRD: >90 ML/MIN/1.73M2
HCT VFR BLD AUTO: 40.3 % (ref 40–53)
HGB BLD-MCNC: 13 G/DL (ref 13.3–17.7)
IMM GRANULOCYTES # BLD: 0 10E3/UL
IMM GRANULOCYTES NFR BLD: 0 %
LYMPHOCYTES # BLD AUTO: 2 10E3/UL (ref 0.8–5.3)
LYMPHOCYTES NFR BLD AUTO: 35 %
MCH RBC QN AUTO: 30.9 PG (ref 26.5–33)
MCHC RBC AUTO-ENTMCNC: 32.3 G/DL (ref 31.5–36.5)
MCV RBC AUTO: 96 FL (ref 78–100)
MONOCYTES # BLD AUTO: 1 10E3/UL (ref 0–1.3)
MONOCYTES NFR BLD AUTO: 17 %
NEUTROPHILS # BLD AUTO: 2.5 10E3/UL (ref 1.6–8.3)
NEUTROPHILS NFR BLD AUTO: 43 %
NRBC # BLD AUTO: 0 10E3/UL
NRBC BLD AUTO-RTO: 0 /100
PLATELET # BLD AUTO: 194 10E3/UL (ref 150–450)
POTASSIUM BLD-SCNC: 4 MMOL/L (ref 3.4–5.3)
RBC # BLD AUTO: 4.21 10E6/UL (ref 4.4–5.9)
SODIUM SERPL-SCNC: 138 MMOL/L (ref 133–144)
WBC # BLD AUTO: 5.9 10E3/UL (ref 4–11)

## 2022-07-05 PROCEDURE — 81003 URINALYSIS AUTO W/O SCOPE: CPT | Performed by: INTERNAL MEDICINE

## 2022-07-05 PROCEDURE — 96417 CHEMO IV INFUS EACH ADDL SEQ: CPT

## 2022-07-05 PROCEDURE — 250N000011 HC RX IP 250 OP 636: Performed by: INTERNAL MEDICINE

## 2022-07-05 PROCEDURE — 84450 TRANSFERASE (AST) (SGOT): CPT | Performed by: INTERNAL MEDICINE

## 2022-07-05 PROCEDURE — 96411 CHEMO IV PUSH ADDL DRUG: CPT

## 2022-07-05 PROCEDURE — 96415 CHEMO IV INFUSION ADDL HR: CPT

## 2022-07-05 PROCEDURE — 82565 ASSAY OF CREATININE: CPT | Performed by: INTERNAL MEDICINE

## 2022-07-05 PROCEDURE — 258N000003 HC RX IP 258 OP 636: Performed by: INTERNAL MEDICINE

## 2022-07-05 PROCEDURE — 84460 ALANINE AMINO (ALT) (SGPT): CPT | Performed by: INTERNAL MEDICINE

## 2022-07-05 PROCEDURE — 82247 BILIRUBIN TOTAL: CPT | Performed by: INTERNAL MEDICINE

## 2022-07-05 PROCEDURE — 96375 TX/PRO/DX INJ NEW DRUG ADDON: CPT

## 2022-07-05 PROCEDURE — 82374 ASSAY BLOOD CARBON DIOXIDE: CPT | Performed by: INTERNAL MEDICINE

## 2022-07-05 PROCEDURE — 96367 TX/PROPH/DG ADDL SEQ IV INF: CPT

## 2022-07-05 PROCEDURE — G0463 HOSPITAL OUTPT CLINIC VISIT: HCPCS | Mod: 25

## 2022-07-05 PROCEDURE — 99214 OFFICE O/P EST MOD 30 MIN: CPT | Performed by: INTERNAL MEDICINE

## 2022-07-05 PROCEDURE — 96413 CHEMO IV INFUSION 1 HR: CPT

## 2022-07-05 PROCEDURE — 96368 THER/DIAG CONCURRENT INF: CPT

## 2022-07-05 PROCEDURE — G0498 CHEMO EXTEND IV INFUS W/PUMP: HCPCS

## 2022-07-05 PROCEDURE — 36591 DRAW BLOOD OFF VENOUS DEVICE: CPT | Performed by: INTERNAL MEDICINE

## 2022-07-05 PROCEDURE — 85025 COMPLETE CBC W/AUTO DIFF WBC: CPT | Performed by: INTERNAL MEDICINE

## 2022-07-05 RX ORDER — NALOXONE HYDROCHLORIDE 0.4 MG/ML
0.2 INJECTION, SOLUTION INTRAMUSCULAR; INTRAVENOUS; SUBCUTANEOUS
Status: CANCELLED | OUTPATIENT
Start: 2022-07-05

## 2022-07-05 RX ORDER — FLUOROURACIL 50 MG/ML
400 INJECTION, SOLUTION INTRAVENOUS ONCE
Status: CANCELLED | OUTPATIENT
Start: 2022-07-05

## 2022-07-05 RX ORDER — ONDANSETRON 2 MG/ML
8 INJECTION INTRAMUSCULAR; INTRAVENOUS ONCE
Status: CANCELLED | OUTPATIENT
Start: 2022-07-05

## 2022-07-05 RX ORDER — ONDANSETRON 2 MG/ML
8 INJECTION INTRAMUSCULAR; INTRAVENOUS ONCE
Status: COMPLETED | OUTPATIENT
Start: 2022-07-05 | End: 2022-07-05

## 2022-07-05 RX ORDER — METHYLPREDNISOLONE SODIUM SUCCINATE 125 MG/2ML
125 INJECTION, POWDER, LYOPHILIZED, FOR SOLUTION INTRAMUSCULAR; INTRAVENOUS
Status: CANCELLED
Start: 2022-07-05

## 2022-07-05 RX ORDER — LORAZEPAM 2 MG/ML
0.5 INJECTION INTRAMUSCULAR EVERY 4 HOURS PRN
Status: CANCELLED | OUTPATIENT
Start: 2022-07-05

## 2022-07-05 RX ORDER — ALBUTEROL SULFATE 90 UG/1
1-2 AEROSOL, METERED RESPIRATORY (INHALATION)
Status: CANCELLED
Start: 2022-07-05

## 2022-07-05 RX ORDER — ALBUTEROL SULFATE 0.83 MG/ML
2.5 SOLUTION RESPIRATORY (INHALATION)
Status: CANCELLED | OUTPATIENT
Start: 2022-07-05

## 2022-07-05 RX ORDER — EPINEPHRINE 1 MG/ML
0.3 INJECTION, SOLUTION, CONCENTRATE INTRAVENOUS EVERY 5 MIN PRN
Status: CANCELLED | OUTPATIENT
Start: 2022-07-05

## 2022-07-05 RX ORDER — DEXAMETHASONE 4 MG/1
8 TABLET ORAL DAILY
Qty: 4 TABLET | Refills: 2 | Status: SHIPPED | OUTPATIENT
Start: 2022-07-05 | End: 2022-07-19

## 2022-07-05 RX ORDER — DIPHENHYDRAMINE HYDROCHLORIDE 50 MG/ML
50 INJECTION INTRAMUSCULAR; INTRAVENOUS
Status: CANCELLED
Start: 2022-07-05

## 2022-07-05 RX ORDER — MIRTAZAPINE 7.5 MG/1
7.5 TABLET, FILM COATED ORAL AT BEDTIME
Qty: 30 TABLET | Refills: 1 | Status: SHIPPED | OUTPATIENT
Start: 2022-07-05 | End: 2022-08-02

## 2022-07-05 RX ORDER — HEPARIN SODIUM (PORCINE) LOCK FLUSH IV SOLN 100 UNIT/ML 100 UNIT/ML
5 SOLUTION INTRAVENOUS
Status: CANCELLED | OUTPATIENT
Start: 2022-07-05

## 2022-07-05 RX ORDER — MEPERIDINE HYDROCHLORIDE 25 MG/ML
25 INJECTION INTRAMUSCULAR; INTRAVENOUS; SUBCUTANEOUS EVERY 30 MIN PRN
Status: CANCELLED | OUTPATIENT
Start: 2022-07-05

## 2022-07-05 RX ORDER — HEPARIN SODIUM (PORCINE) LOCK FLUSH IV SOLN 100 UNIT/ML 100 UNIT/ML
5 SOLUTION INTRAVENOUS
Status: CANCELLED | OUTPATIENT
Start: 2022-07-07

## 2022-07-05 RX ORDER — FLUOROURACIL 50 MG/ML
400 INJECTION, SOLUTION INTRAVENOUS ONCE
Status: COMPLETED | OUTPATIENT
Start: 2022-07-05 | End: 2022-07-05

## 2022-07-05 RX ADMIN — SODIUM CHLORIDE 300 MG: 9 INJECTION, SOLUTION INTRAVENOUS at 09:32

## 2022-07-05 RX ADMIN — SODIUM CHLORIDE 250 ML: 9 INJECTION, SOLUTION INTRAVENOUS at 08:44

## 2022-07-05 RX ADMIN — ONDANSETRON 8 MG: 2 INJECTION INTRAMUSCULAR; INTRAVENOUS at 08:51

## 2022-07-05 RX ADMIN — OXALIPLATIN 150 MG: 100 INJECTION, SOLUTION, CONCENTRATE INTRAVENOUS at 10:09

## 2022-07-05 RX ADMIN — DEXTROSE MONOHYDRATE 250 ML: 50 INJECTION, SOLUTION INTRAVENOUS at 10:09

## 2022-07-05 RX ADMIN — FAMOTIDINE 20 MG: 10 INJECTION INTRAVENOUS at 08:57

## 2022-07-05 RX ADMIN — LEUCOVORIN CALCIUM 692 MG: 350 INJECTION, POWDER, LYOPHILIZED, FOR SUSPENSION INTRAMUSCULAR; INTRAVENOUS at 10:31

## 2022-07-05 RX ADMIN — FLUOROURACIL 690 MG: 50 INJECTION, SOLUTION INTRAVENOUS at 12:23

## 2022-07-05 RX ADMIN — DEXAMETHASONE SODIUM PHOSPHATE: 10 INJECTION, SOLUTION INTRAMUSCULAR; INTRAVENOUS at 09:07

## 2022-07-05 ASSESSMENT — PAIN SCALES - GENERAL: PAINLEVEL: MODERATE PAIN (5)

## 2022-07-05 NOTE — PROGRESS NOTES
"Infusion Nursing Note:  Kalin Shepard presents today for C5 D1 FOLFOX + Zirabev.    Patient seen by provider today: Yes: Dr. Colorado   present during visit today: Not Applicable.    Note: Pt denies any new health concerns.    Intravenous Access:  Implanted Port.    Treatment Conditions:  Lab Results   Component Value Date    HGB 13.0 (L) 07/05/2022    WBC 5.9 07/05/2022    ANEUTAUTO 2.5 07/05/2022     07/05/2022      Lab Results   Component Value Date     07/05/2022    POTASSIUM 4.0 07/05/2022    MAG 2.3 04/04/2022    CR 0.56 (L) 07/05/2022    ORXIE 9.6 06/07/2022    BILITOTAL 0.2 07/05/2022    ALBUMIN 3.3 (L) 06/07/2022    ALT 21 07/05/2022    AST 18 07/05/2022     Results reviewed, labs MET treatment parameters, ok to proceed with treatment.  Urine Negative protein.    Post Infusion Assessment:  Patient tolerated infusion without incident.  Patient tolerated injection without incident.  Blood return noted pre and post infusion.  Blood return noted during administration every 3 cc.  Site patent and intact, free from redness, edema or discomfort.  No evidence of extravasations.    Prior to discharge: Port is secured in place with tegaderm and flushed with 10cc NS with positive blood return noted.  Continuous home infusion CADD pump connected.    All connectors secured in place and clamps taped open.    Pump started, \"running\" noted on display (CADD): YES.  Pump Connection double checked with Zoey MERRILL RN.  Patient instructed to call our clinic or [facility] with any questions or concerns at home.  Patient verbalized understanding.    Patient set up for pump disconnect at home with [facility] on 7/7 @ 1100 am.           Discharge Plan:   Discharge instructions reviewed with: Patient.  Patient and/or family verbalized understanding of discharge instructions and all questions answered.  Patient discharged in stable condition accompanied by: self.  Departure Mode: " Ambulatory.      Silvana Ware RN

## 2022-07-05 NOTE — LETTER
7/5/2022         RE: Kalin Shepard  [address]  [facility] [address]        Dear Colleague,    Thank you for referring your patient, Kalin Shepard, to the [facility]. Please see a copy of my visit note below.    [facility] Hematology and Oncology Progress Note    Patient: Kalin Shepard  MRN: [id number]  Date of Service: 04/26/2022        Reason for Visit    Chief Complaint   Patient presents with     Oncology Clinic Visit     Colon cancer follow up - chemo to follow         Problem List Items Addressed This Visit        Digestive    Adenocarcinoma of sigmoid colon (H) - Primary    Relevant Medications    mirtazapine (REMERON) 7.5 MG tablet    Other Relevant Orders    Infusion Appointment Request    Infusion Appointment Request      Other Visit Diagnoses     Poor appetite        Relevant Medications    mirtazapine (REMERON) 7.5 MG tablet    Severe protein-calorie malnutrition (H)                Assessment and Plan  1. Sigmoid colon cancer with peritoneal metastasis  Here for cycle 5 of FOLFOX with bevacizumab.  Continues to do well on chemotherapy.  No major side effects so far.  Labs reviewed today.  Total white count is 5.9.  Hemoglobin is 13 and platelet count is 194.  LFTs are normal.  No contraindications to proceed with treatment today.  He will get cycle 5 FOLFOX with bevacizumab.  Return in 2 weeks with labs and cycle 6 to follow the same day.    2. Chemotherapy-induced nausea  I prescribed Zyprexa 5 mg daily for 4 days after each chemo last time.  He thinks it has not worked well for him.  No side effects though.  Continue Zofran and Compazine as needed.    3.  Weight loss  Continue aggressive protein calorie supplementation.  Will make a nutrition referral.  We discussed about appetite stimulants.  He is willing to try some low-dose Remeron.  We will start him on Remeron 7.5 mg daily at bedtime.  3 to 4 days after pump disconnection are the days  where he feels that his appetite is pretty low.  Potentially we could do low-dose dexamethasone on these days.  We will try Remeron first.     Patient Instructions   Cycle 5 FOLFOX with kranthi today. RTC in 2 weeks with labs prior, cycle 6 to follow the same day.         Cancer Staging  Adenocarcinoma of sigmoid colon (H)  Staging form: Colon and Rectum, AJCC 8th Edition  - Pathologic: Stage Unknown (pTX, pNX, pM1) - Signed by Pamela Colorado MD on 5/6/2022      ECOG Performance    1 - Can't do physically strenuous work, but fully ambyulatory and can do light sedentary work         Problem List    Patient Active Problem List   Diagnosis     Colon adenocarcinoma (H)     Colostomy present (H)     Cachexia (H)     Adenocarcinoma of sigmoid colon (H)     Cancer associated pain        Interval History   Kalin Shepard is a 71 year old with sigmoid coloncancer with peritoneal metastasis who is here for cycle 5 of FOLFOX and bevacizumab.    Blood since last visit.  Denies any major issues today.  He does have oxaliplatin used sensitivity but no peripheral neuropathy so far.  Continues to have issues with weight gain.  He is trying to eat as much as he can but has some abdominal distress after eating.  Its not pain but just discomfort.  Does have some loose stools after each chemotherapy.  We did prescribe the pain last time for chemotherapy-induced nausea but he thinks it has not helped him a whole lot.  No fevers or chills.    Labs reviewed today.    Review of Systems  A comprehensive review of systems was negative except for what is noted in the interval history    Current Outpatient Medications   Medication     dexamethasone (DECADRON) 4 MG tablet     ibuprofen (ADVIL/MOTRIN) 400 MG tablet     Lidocaine (LIDOCARE) 4 % Patch     mirtazapine (REMERON) 7.5 MG tablet     multivitamin (CENTRUM SILVER) tablet     OLANZapine (ZYPREXA) 5 MG tablet     ondansetron (ZOFRAN) 8 MG tablet     [START ON 7/20/2022] oxyCODONE  (ROXICODONE) 5 MG tablet     polyethylene glycol (MIRALAX) 17 GM/Dose powder     prochlorperazine (COMPAZINE) 10 MG tablet     acetaminophen (TYLENOL) 500 MG tablet     No current facility-administered medications for this visit.     Facility-Administered Medications Ordered in Other Visits   Medication     bevacizumab-bvzr (ZIRABEV) 300 mg in sodium chloride 0.9 % 112 mL infusion     dextrose 5% BOLUS     fluorouracil (ADRUCIL) 4,150 mg in sodium chloride 0.9 % 333 mL Home Infusion     fluorouracil (ADRUCIL) injection 690 mg     fosaprepitant (EMEND) 150 mg, dexamethasone (DECADRON) 12 mg in sodium chloride 0.9 % 251.2 mL intermittent infusion     leucovorin calcium 692 mg in D5W 134.6 mL infusion     oxaliplatin (ELOXATIN) 150 mg in D5W 530 mL infusion        Physical Exam    No flowsheet data found.    General: alert and cooperative  HEENT: Head: Normal, normocephalic, atraumatic.  Eye: Normal external eye, conjunctiva, lids cornea, DANA.  Abdomen: Soft and nontender, ostomy in place   Extremities: atraumatic, no peripheral edema  Skin: Port site looks good.  CNS: Alert and oriented x3, neurologic exam grossly normal.    Lab Results    Recent Results (from the past 168 hour(s))   AST   Result Value Ref Range    AST 18 0 - 45 U/L   ALT   Result Value Ref Range    ALT 21 0 - 70 U/L   Creatinine   Result Value Ref Range    Creatinine 0.56 (L) 0.66 - 1.25 mg/dL    GFR Estimate >90 >60 mL/min/1.73m2   Bilirubin  total   Result Value Ref Range    Bilirubin Total 0.2 0.2 - 1.3 mg/dL   Protein qualitative urine   Result Value Ref Range    Protein Albumin Urine Negative Negative mg/dL   Electrolyte panel (Na, K, Cl, CO2, Anion gap)   Result Value Ref Range    Sodium 138 133 - 144 mmol/L    Potassium 4.0 3.4 - 5.3 mmol/L    Chloride 109 94 - 109 mmol/L    Carbon Dioxide (CO2) 25 20 - 32 mmol/L    Anion Gap 4 3 - 14 mmol/L   CBC with platelets and differential   Result Value Ref Range    WBC Count 5.9 4.0 - 11.0 10e3/uL     RBC Count 4.21 (L) 4.40 - 5.90 10e6/uL    Hemoglobin 13.0 (L) 13.3 - 17.7 g/dL    Hematocrit 40.3 40.0 - 53.0 %    MCV 96 78 - 100 fL    MCH 30.9 26.5 - 33.0 pg    MCHC 32.3 31.5 - 36.5 g/dL    RDW 15.6 (H) 10.0 - 15.0 %    Platelet Count 194 150 - 450 10e3/uL    % Neutrophils 43 %    % Lymphocytes 35 %    % Monocytes 17 %    % Eosinophils 3 %    % Basophils 2 %    % Immature Granulocytes 0 %    NRBCs per 100 WBC 0 <1 /100    Absolute Neutrophils 2.5 1.6 - 8.3 10e3/uL    Absolute Lymphocytes 2.0 0.8 - 5.3 10e3/uL    Absolute Monocytes 1.0 0.0 - 1.3 10e3/uL    Absolute Eosinophils 0.2 0.0 - 0.7 10e3/uL    Absolute Basophils 0.1 0.0 - 0.2 10e3/uL    Absolute Immature Granulocytes 0.0 <=0.4 10e3/uL    Absolute NRBCs 0.0 10e3/uL       Imaging    MR Pelvis (Intrapelvic Organs) wo&w Contrast    Result Date: 6/13/2022  EXAMINATION: MR PELVIS (INTRAPELVIC ORGANS) W/O & W CONTRAST, 6/13/2022 9:01 AM COMPARISON: MRI abdomen 5/4/2022, PET/CT 4/7/2022, CT 3/26/2022 TECHNIQUE:  Images were acquired without and with intravenous contrast through the pelvis. The following MR images were acquired without contrast: multiplanar T1, multiplanar T2, axial diffusion-weighted and axial apparent diffusion coefficient. T1-weighted images with fat saturation were acquired at the following intervals relative to intravenous contrast administration: pre-contrast, immediate post contrast, 1 minute, 3 minutes and delayed.  Contrast dose: 6 ml Gadavist HISTORY: History of obstructing sigmoid mass with associated small bowel obstruction, status post small bowel resection and descending loop colostomy. Final pathology showed sigmoid mass that was negative for malignancy although likely sampling is non-representative and a peritoneal biopsy that is positive for malignancy - intestinal type adenocarcinoma. FINDINGS: Patient has known malignancy of the sigmoid colon best seen on images 18 through 23 of series 8. About 5.7 cm long segment of sigmoid  colon is involvement by malignancy. The tumor extends beyond the serosa, involves the peritoneal reflection (image 22 series 8), and extends inferiorly with abutment of the left seminal vesicle (images 25 through 28 of series 8). The sigmoid colon is very redundant. There is tethering of the adjacent more distal aspect of sigmoid colon as seen on image 22 series 8 and a possibility of serosal invasion of this particular mass is a concern. No bowel obstruction is present. Postoperative changes of the descending loop colonoscopy. No evidence of bowel obstruction the visualized lower abdomen/pelvis. No suspicious abdominal adenopathy. No suspicious soft tissue or osseous lesions. Urinary bladder is nearly decompressed. Prostatomegaly.     IMPRESSION: In this patient with presumed intestinal type adenocarcinoma of the sigmoid colon: 1. Circumferential wall irregularity and hyperenhancement within the proximal sigmoid colon, in keeping with patient's known malignancy. There is significant extension beyond the sigmoid colon wall with concern for involvement of additional adjacent segment of sigmoid colon, and left pericardial reflection. Abutment of left seminal vesicle without clear invasion. 1a. Please also refer to recent PET/CT and CT examination reports. 2. Left lower quadrant diverting colostomy. No evidence of obstruction. 3. No suspicious adenopathy in the pelvis. I have personally reviewed the examination and initial interpretation and I agree with the findings. SG VILLALPANDO MD   SYSTEM ID:  [id number]    A total of 25 min were spent today on this visit which included face to face conversation with the patient, EMR review, counseling and co-ordination of care and medical documentation.      Signed by: Pamela Colorado MD    Oncology Rooming Note    July 5, 2022 8:17 AM   Kalin Shepard is a 71 year old male who presents for:    Chief Complaint   Patient presents with     Oncology Clinic Visit     Colon  "cancer follow up - chemo to follow     Initial Vitals: /76 (BP Location: Right arm, Patient Position: Sitting, Cuff Size: Adult Regular)   Pulse 71   Temp 97.6  F (36.4  C) (Axillary)   Resp 18   Wt 59.6 kg (131 lb 4.8 oz)   SpO2 97%   BMI 18.31 kg/m   Estimated body mass index is 18.31 kg/m  as calculated from the following:    Height as of 6/20/22: 1.803 m (5' 11\").    Weight as of this encounter: 59.6 kg (131 lb 4.8 oz). Body surface area is 1.73 meters squared.  Moderate Pain (5) Comment: Data Unavailable   No LMP for male patient.  Allergies reviewed: Yes  Medications reviewed: Yes    Medications: Medication refills not needed today.  Pharmacy name entered into [facility]:    [id number] - [place], [place] - [address] [facility] [address]  [place], [place] - [address] [place] [address]    Clinical concerns: Colon cancer follow up with chemo to follow. Weight loss concerns.        Danna Collins RN                  Again, thank you for allowing me to participate in the care of your patient.        Sincerely,        Pamela Colorado MD    "

## 2022-07-05 NOTE — PROGRESS NOTES
"Oncology Rooming Note    July 5, 2022 8:17 AM   Kalin Shepard is a 71 year old male who presents for:    Chief Complaint   Patient presents with     Oncology Clinic Visit     Colon cancer follow up - chemo to follow     Initial Vitals: /76 (BP Location: Right arm, Patient Position: Sitting, Cuff Size: Adult Regular)   Pulse 71   Temp 97.6  F (36.4  C) (Axillary)   Resp 18   Wt 59.6 kg (131 lb 4.8 oz)   SpO2 97%   BMI 18.31 kg/m   Estimated body mass index is 18.31 kg/m  as calculated from the following:    Height as of 6/20/22: 1.803 m (5' 11\").    Weight as of this encounter: 59.6 kg (131 lb 4.8 oz). Body surface area is 1.73 meters squared.  Moderate Pain (5) Comment: Data Unavailable   No LMP for male patient.  Allergies reviewed: Yes  Medications reviewed: Yes    Medications: Medication refills not needed today.  Pharmacy name entered into [facility]:    [facility] - [place], [place] - [address] [facility] [address]  [place], [place] - [address] [place] [address]    Clinical concerns: Colon cancer follow up with chemo to follow. Weight loss concerns.        Danna Collins RN              "

## 2022-07-05 NOTE — PROGRESS NOTES
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 Sigmoid colon cancer with peritoneal metastasis  Here for cycle 5 of FOLFOX with bevacizumab.  Continues to do well on chemotherapy.  No major side effects so far.  Labs reviewed today.  Total white count is 5.9.  Hemoglobin is 13 and platelet count is 194.  LFTs are normal.  No contraindications to proceed with treatment today.  He will get cycle 5 FOLFOX with bevacizumab.  Return in 2 weeks with labs and cycle 6 to follow the same day.    2. Chemotherapy-induced nausea  I prescribed Zyprexa 5 mg daily for 4 days after each chemo last time.  He thinks it has not worked well for him.  No side effects though.  Continue Zofran and Compazine as needed.    3.  Weight loss  Continue aggressive protein calorie supplementation.  Will make a nutrition referral.  We discussed about appetite stimulants.  He is willing to try some low-dose Remeron.  We will start him on Remeron 7.5 mg daily at bedtime.  3 to 4 days after pump disconnection are the days where he feels that his appetite is pretty low.  Potentially we could do low-dose dexamethasone on these days.  We will try Remeron first.     Patient Instructions   Cycle 5 FOLFOX with kranthi today. RTC in 2 weeks with labs prior, cycle 6 to follow the same day.          Cancer Staging  Adenocarcinoma of sigmoid colon (H)  Staging form: Colon and Rectum, AJCC 8th Edition  - Pathologic: Stage Unknown (pTX, pNX, pM1) - Signed by Pamela Colorado MD on 5/6/2022      ECOG Performance    1 - Can't do physically strenuous work, but fully ambyulatory and can do light sedentary work         Problem List    Patient Active Problem List   Diagnosis     Colon adenocarcinoma (H)     Colostomy present (H)     Cachexia (H)     Adenocarcinoma of sigmoid colon (H)     Cancer associated pain      Oncologic history:  3/26/2022: Presented with abdominal pain secondary to bowel obstruction.  CT scan showed mass in sigmoid colon with upstream small bowel and colonic obstruction.  Underwent ostomy placement.      Peritoneal biopsy positive for adenocarcinoma.  No evidence of distant metastasis based on PET scan done on 4/7/2022.    BRAF V600E positive.  K-aldo and NRAS negative.  Proficient MMR.    Chemotherapy started for potentially resectable metachronous peritoneal metastasis with FOLFOX and bevacizumab on 4/26/2022    Interval History   Kalin Shepard is a 71 year old with sigmoid coloncancer with peritoneal metastasis who is here for cycle 5 of FOLFOX and bevacizumab.    Blood since last visit.  Denies any major issues today.  He does have oxaliplatin used sensitivity but no peripheral neuropathy so far.  Continues to have issues with weight gain.  He is trying to eat as much as he can but has some abdominal distress after eating.  Its not pain but just discomfort.  Does have some loose stools after each chemotherapy.  We did prescribe the pain last time for chemotherapy-induced nausea but he thinks it has not helped him a whole lot.  No fevers or chills.    Labs reviewed today.    Review of Systems  A comprehensive review of systems was negative except for what is noted in the interval history    Current Outpatient Medications   Medication     dexamethasone (DECADRON) 4 MG tablet      ibuprofen (ADVIL/MOTRIN) 400 MG tablet     Lidocaine (LIDOCARE) 4 % Patch     mirtazapine (REMERON) 7.5 MG tablet     multivitamin (CENTRUM SILVER) tablet     OLANZapine (ZYPREXA) 5 MG tablet     ondansetron (ZOFRAN) 8 MG tablet     [START ON 7/20/2022] oxyCODONE (ROXICODONE) 5 MG tablet     polyethylene glycol (MIRALAX) 17 GM/Dose powder     prochlorperazine (COMPAZINE) 10 MG tablet     acetaminophen (TYLENOL) 500 MG tablet     No current facility-administered medications for this visit.     Facility-Administered Medications Ordered in Other Visits   Medication     bevacizumab-bvzr (ZIRABEV) 300 mg in sodium chloride 0.9 % 112 mL infusion     dextrose 5% BOLUS     fluorouracil (ADRUCIL) 4,150 mg in sodium chloride 0.9 % 333 mL Home Infusion     fluorouracil (ADRUCIL) injection 690 mg     fosaprepitant (EMEND) 150 mg, dexamethasone (DECADRON) 12 mg in sodium chloride 0.9 % 251.2 mL intermittent infusion     leucovorin calcium 692 mg in D5W 134.6 mL infusion     oxaliplatin (ELOXATIN) 150 mg in D5W 530 mL infusion        Physical Exam    No flowsheet data found.    General: alert and cooperative  HEENT: Head: Normal, normocephalic, atraumatic.  Eye: Normal external eye, conjunctiva, lids cornea, DANA.  Abdomen: Soft and nontender, ostomy in place   Extremities: atraumatic, no peripheral edema  Skin: Port site looks good.  CNS: Alert and oriented x3, neurologic exam grossly normal.    Lab Results    Recent Results (from the past 168 hour(s))   AST   Result Value Ref Range    AST 18 0 - 45 U/L   ALT   Result Value Ref Range    ALT 21 0 - 70 U/L   Creatinine   Result Value Ref Range    Creatinine 0.56 (L) 0.66 - 1.25 mg/dL    GFR Estimate >90 >60 mL/min/1.73m2   Bilirubin  total   Result Value Ref Range    Bilirubin Total 0.2 0.2 - 1.3 mg/dL   Protein qualitative urine   Result Value Ref Range    Protein Albumin Urine Negative Negative mg/dL   Electrolyte panel (Na, K, Cl, CO2, Anion gap)   Result Value Ref Range     Sodium 138 133 - 144 mmol/L    Potassium 4.0 3.4 - 5.3 mmol/L    Chloride 109 94 - 109 mmol/L    Carbon Dioxide (CO2) 25 20 - 32 mmol/L    Anion Gap 4 3 - 14 mmol/L   CBC with platelets and differential   Result Value Ref Range    WBC Count 5.9 4.0 - 11.0 10e3/uL    RBC Count 4.21 (L) 4.40 - 5.90 10e6/uL    Hemoglobin 13.0 (L) 13.3 - 17.7 g/dL    Hematocrit 40.3 40.0 - 53.0 %    MCV 96 78 - 100 fL    MCH 30.9 26.5 - 33.0 pg    MCHC 32.3 31.5 - 36.5 g/dL    RDW 15.6 (H) 10.0 - 15.0 %    Platelet Count 194 150 - 450 10e3/uL    % Neutrophils 43 %    % Lymphocytes 35 %    % Monocytes 17 %    % Eosinophils 3 %    % Basophils 2 %    % Immature Granulocytes 0 %    NRBCs per 100 WBC 0 <1 /100    Absolute Neutrophils 2.5 1.6 - 8.3 10e3/uL    Absolute Lymphocytes 2.0 0.8 - 5.3 10e3/uL    Absolute Monocytes 1.0 0.0 - 1.3 10e3/uL    Absolute Eosinophils 0.2 0.0 - 0.7 10e3/uL    Absolute Basophils 0.1 0.0 - 0.2 10e3/uL    Absolute Immature Granulocytes 0.0 <=0.4 10e3/uL    Absolute NRBCs 0.0 10e3/uL       Imaging    MR Pelvis (Intrapelvic Organs) wo&w Contrast    Result Date: 6/13/2022  EXAMINATION: MR PELVIS (INTRAPELVIC ORGANS) W/O & W CONTRAST, 6/13/2022 9:01 AM COMPARISON: MRI abdomen 5/4/2022, PET/CT 4/7/2022, CT 3/26/2022 TECHNIQUE:  Images were acquired without and with intravenous contrast through the pelvis. The following MR images were acquired without contrast: multiplanar T1, multiplanar T2, axial diffusion-weighted and axial apparent diffusion coefficient. T1-weighted images with fat saturation were acquired at the following intervals relative to intravenous contrast administration: pre-contrast, immediate post contrast, 1 minute, 3 minutes and delayed.  Contrast dose: 6 ml Gadavist HISTORY: History of obstructing sigmoid mass with associated small bowel obstruction, status post small bowel resection and descending loop colostomy. Final pathology showed sigmoid mass that was negative for malignancy although likely  sampling is non-representative and a peritoneal biopsy that is positive for malignancy - intestinal type adenocarcinoma. FINDINGS: Patient has known malignancy of the sigmoid colon best seen on images 18 through 23 of series 8. About 5.7 cm long segment of sigmoid colon is involvement by malignancy. The tumor extends beyond the serosa, involves the peritoneal reflection (image 22 series 8), and extends inferiorly with abutment of the left seminal vesicle (images 25 through 28 of series 8). The sigmoid colon is very redundant. There is tethering of the adjacent more distal aspect of sigmoid colon as seen on image 22 series 8 and a possibility of serosal invasion of this particular mass is a concern. No bowel obstruction is present. Postoperative changes of the descending loop colonoscopy. No evidence of bowel obstruction the visualized lower abdomen/pelvis. No suspicious abdominal adenopathy. No suspicious soft tissue or osseous lesions. Urinary bladder is nearly decompressed. Prostatomegaly.     IMPRESSION: In this patient with presumed intestinal type adenocarcinoma of the sigmoid colon: 1. Circumferential wall irregularity and hyperenhancement within the proximal sigmoid colon, in keeping with patient's known malignancy. There is significant extension beyond the sigmoid colon wall with concern for involvement of additional adjacent segment of sigmoid colon, and left pericardial reflection. Abutment of left seminal vesicle without clear invasion. 1a. Please also refer to recent PET/CT and CT examination reports. 2. Left lower quadrant diverting colostomy. No evidence of obstruction. 3. No suspicious adenopathy in the pelvis. I have personally reviewed the examination and initial interpretation and I agree with the findings. SG VILLALPANDO MD   SYSTEM ID:  [id number]    A total of 25 min were spent today on this visit which included face to face conversation with the patient, EMR review, counseling and co-ordination of  care and medical documentation.      Signed by: Pamela Colorado MD

## 2022-07-05 NOTE — PROGRESS NOTES
PAC labs drawn and sent. Port accessed without difficulty. Good blood return noted.     [email or web address]

## 2022-07-05 NOTE — PATIENT INSTRUCTIONS
Cycle 5 FOLFOX with kranthi today. RTC in 2 weeks with labs prior, cycle 6 to follow the same day.

## 2022-07-07 NOTE — PROGRESS NOTES
This is a recent snapshot of the patient's [facility] medical record.  For current drug dose and complete information and questions, call [phone] or In Basket pool, [facility] ([id number])  CSN Number:  [id number]

## 2022-07-13 DIAGNOSIS — C18.7 ADENOCARCINOMA OF SIGMOID COLON (H): ICD-10-CM

## 2022-07-13 DIAGNOSIS — G89.3 CANCER ASSOCIATED PAIN: Primary | ICD-10-CM

## 2022-07-13 NOTE — TELEPHONE ENCOUNTER
Call received from pt requesting a refill of Oxycodone on behalf of self.  Last refill: 6/20/2022  # 60 with 0 refills at [facility].  Last office visit:  07/05/22   Next office visit:  07/19/22    This is an appropriate refill, and has been routed to Dr. Colorado to be e-prescribed. Danna Collins RN, BSN, OCN

## 2022-07-14 DIAGNOSIS — C18.7 ADENOCARCINOMA OF SIGMOID COLON (H): ICD-10-CM

## 2022-07-14 DIAGNOSIS — G89.3 CANCER ASSOCIATED PAIN: ICD-10-CM

## 2022-07-14 RX ORDER — OXYCODONE HYDROCHLORIDE 5 MG/1
5 TABLET ORAL EVERY 8 HOURS PRN
Qty: 60 TABLET | Refills: 0 | Status: SHIPPED | OUTPATIENT
Start: 2022-07-20 | End: 2022-07-18

## 2022-07-14 RX ORDER — OXYCODONE HYDROCHLORIDE 5 MG/1
5 TABLET ORAL EVERY 8 HOURS PRN
Qty: 60 TABLET | Refills: 0 | Status: SHIPPED | OUTPATIENT
Start: 2022-07-20 | End: 2022-07-14

## 2022-07-18 DIAGNOSIS — C18.7 ADENOCARCINOMA OF SIGMOID COLON (H): ICD-10-CM

## 2022-07-18 DIAGNOSIS — G89.3 CANCER ASSOCIATED PAIN: ICD-10-CM

## 2022-07-18 RX ORDER — OXYCODONE HYDROCHLORIDE 5 MG/1
5 TABLET ORAL EVERY 8 HOURS PRN
Qty: 60 TABLET | Refills: 0 | Status: ON HOLD | OUTPATIENT
Start: 2022-07-18 | End: 2022-08-31

## 2022-07-18 NOTE — PROGRESS NOTES
[facility] Hematology and Oncology Outpatient Progress Note    Patient: Kalin Shepard  MRN: [id number]  Date of Service: Jul 19, 2022          Reason for Visit    1. Stage IV colon cancer (peritoneal mets)    Primary Hematologist/Oncologist: Dr. Colorado      Assessment/Plan  1. Stage IV colon cancer (peritoneal mets)  Tolerating chemo well, so far.  Labwork  Hgb 12.4 (stable), rest CBC WNL. Creatinine 0.9 (baseline 0.6), LFTs, lytes WNL. Trace urine protein    Plan:  -Proceed with cycle 6 at same doses.  -Return in 2 weeks for cycle 7 and in 4 weeks for cycle 8  -I will clarify with Dr. Colorado timing of reimaging  -Restaging and consideration of resection of primary tumor and metachronous peritoneal mets pending response and PS.    ADDENDUM:  Dr. Colorado clarifies plan would be to try to complete all chemo neoadjuvantly, as possible since he has pretty advanced localized disease. He will need 6 months april-operative treatment standardly. So, he recommends to continue chemo as long as he is tolerating it well and do imaging if there are any significant dose limiting side effect or evidence of disease progression or close to 12 cycles.     2. Chemo-induced nausea  3. Weight loss  Zyprexa was not helpful, so he's stopped taking this.   Continue with compazine and Zofran, alternating.   Started Remeron 7.5 mg a few weeks ago, so far this is has worked quite well for his appetite.  Continue Dex 8 mg daily x 2 days after day 1 chemo. We can consider elongating this for additional days as another intervention with future cycles; we opted to defer this for now.     4.   Cancer pain  He ran out of oxycodone last week and there was a delay in him getting his refill so he has not felt well off of it. Rx is ready at pharmacy, he will pick this up today. Taking oxycodone 5 mg TID.     5.   Acute nausea/diarrhea, resolved  48 hrs ago had acute onset of nausea/vomiting, increased ostomy output and abd pain. His wife  had similar symptoms so they felt it was related to something they ate. Symptoms completely resolved and he's doing well past 24-36 hrs. This should not be chemo-related nausea given delayed onset.     He feels at baseline and did not want to delay chemo for more recovery time.     Plan:  -IVF today and later in week, if needed as he may still be a bit on dehydrated side from that episode (based on mild elevation of creatinine)  -He will monitor closely. If recurrent, would recommend CT abd to rule out bowel obstruction proximal to ostomy.   ______________________________________________________________________________    History of Present Illness/ Interval History    Mr. Kalin Shepard  is a 71 year old returning for consideration of cycle 6 FOLFOX + bevacizumab.     Tolerated last cycle of chemo generally well. The week after chemo intermittent tongue tingling with cold exposure. N/T in hands/feet only with cold exposure, not consistent. Minimal nausea after chemo, managed with antiemetics. He started Remeron a few weeks ago and noted a lot of improvement in his appetite on this.     This past Sunday (2 days ago), he had an acute onset n/v and diarrhea (increased output in colostomy) with abd cramping x 24 hrs. No fever. No blood in stool. Occurred after a eating rich meal and improved with Peptobismol. His wife had some GI upset as well that day after eating same meal. Back to baseline x 24 hrs.     Oxycodone 5 mg three times/day and ran out late last wek. There was a barrier in getting it refilled so has been out since late last week. He feels unwell off of it.      ECOG Performance    1      Oncology History/Treatment  Diagnosis/Stage:   3/26/2022:   -Presented with abdominal pain secondary to bowel obstruction.  CT scan showed mass in sigmoid colon with upstream small bowel and colonic obstruction.  Underwent ostomy placement.    -Peritoneal biopsy positive for adenocarcinoma.    -No evidence of other  distant metastasis based on PET scan.  -BRAF V600E positive.  K-aldo and NRAS negative.  Proficient MMR.      Treatment:  Induction/neoadjuvant chemotherapy started for potentially resectable metachronous peritoneal metastasis    4/26/2022 - present: FOLFOX and bevacizumab       Physical Exam    GENERAL: Alert and oriented to time place and person. Seated comfortably. In no distress. Cachectic. Wife accompanies.  HEAD: Atraumatic and normocephalic. Mild alopecia (hair thinning per patient).  EYES: MARA, EOMI. No erythema. No icterus.  LYMPH NODES: No palpable supraclavicular, cervicallymphadenopathy.  CHEST: clear to auscultation bilaterally. Resonant to percussion throughout bilaterally. Symmetrical breath movements bilaterally.  CVS: RRR  ABDOMEN: Soft. Not tender. Not distended. No palpable hepatomegaly or splenomegaly. No other mass palpable. Bowel sounds present. Ostomy bag in place.  EXTREMITIES: Warm. No peripheral edema.  SKIN: no rash, or bruising or purpura.   NEURO: No gross deficit noted. Non-antalgic gait.      Lab Results    Recent Results (from the past 168 hour(s))   AST   Result Value Ref Range    AST 13 0 - 45 U/L   ALT   Result Value Ref Range    ALT 20 0 - 70 U/L   Creatinine   Result Value Ref Range    Creatinine 0.91 0.66 - 1.25 mg/dL    GFR Estimate 90 >60 mL/min/1.73m2   Bilirubin  total   Result Value Ref Range    Bilirubin Total 0.4 0.2 - 1.3 mg/dL   Protein qualitative urine   Result Value Ref Range    Protein Albumin Urine Trace (A) Negative mg/dL   Electrolyte panel (Na, K, Cl, CO2, Anion gap)   Result Value Ref Range    Sodium 140 133 - 144 mmol/L    Potassium 4.0 3.4 - 5.3 mmol/L    Chloride 107 94 - 109 mmol/L    Carbon Dioxide (CO2) 28 20 - 32 mmol/L    Anion Gap 5 3 - 14 mmol/L   CBC with platelets and differential   Result Value Ref Range    WBC Count 5.5 4.0 - 11.0 10e3/uL    RBC Count 4.05 (L) 4.40 - 5.90 10e6/uL    Hemoglobin 12.4 (L) 13.3 - 17.7 g/dL    Hematocrit 38.5 (L) 40.0  - 53.0 %    MCV 95 78 - 100 fL    MCH 30.6 26.5 - 33.0 pg    MCHC 32.2 31.5 - 36.5 g/dL    RDW 15.6 (H) 10.0 - 15.0 %    Platelet Count 185 150 - 450 10e3/uL    % Neutrophils 31 %    % Lymphocytes 49 %    % Monocytes 16 %    % Eosinophils 3 %    % Basophils 1 %    % Immature Granulocytes 0 %    NRBCs per 100 WBC 0 <1 /100    Absolute Neutrophils 1.7 1.6 - 8.3 10e3/uL    Absolute Lymphocytes 2.7 0.8 - 5.3 10e3/uL    Absolute Monocytes 0.9 0.0 - 1.3 10e3/uL    Absolute Eosinophils 0.2 0.0 - 0.7 10e3/uL    Absolute Basophils 0.1 0.0 - 0.2 10e3/uL    Absolute Immature Granulocytes 0.0 <=0.4 10e3/uL    Absolute NRBCs 0.0 10e3/uL       Imaging    No results found.    Billing  Total time 40 minutes, to include face to face visit, review of EMR, ordering, documentation and coordination of care on date of service    Signed by: Olga Lidia Ya, NP

## 2022-07-18 NOTE — TELEPHONE ENCOUNTER
Oxy script from last week had a start date of 07.20 so pharmacy would not fill. Changed date and sent new RX to Dr. Colorado to sign.     Danna Collins RN on 7/18/2022 at 10:35 AM

## 2022-07-19 ENCOUNTER — INFUSION THERAPY VISIT (OUTPATIENT)
Dept: INFUSION THERAPY | Facility: CLINIC | Age: 72
End: 2022-07-19
Attending: INTERNAL MEDICINE
Payer: COMMERCIAL

## 2022-07-19 ENCOUNTER — HOME INFUSION (PRE-WILLOW HOME INFUSION) (OUTPATIENT)
Dept: PHARMACY | Facility: CLINIC | Age: 72
End: 2022-07-19

## 2022-07-19 ENCOUNTER — ONCOLOGY VISIT (OUTPATIENT)
Dept: ONCOLOGY | Facility: CLINIC | Age: 72
End: 2022-07-19
Attending: NURSE PRACTITIONER
Payer: COMMERCIAL

## 2022-07-19 VITALS
SYSTOLIC BLOOD PRESSURE: 121 MMHG | RESPIRATION RATE: 12 BRPM | WEIGHT: 128 LBS | OXYGEN SATURATION: 99 % | BODY MASS INDEX: 17.85 KG/M2 | DIASTOLIC BLOOD PRESSURE: 74 MMHG | TEMPERATURE: 98.1 F | HEART RATE: 62 BPM

## 2022-07-19 VITALS — SYSTOLIC BLOOD PRESSURE: 115 MMHG | DIASTOLIC BLOOD PRESSURE: 56 MMHG | HEART RATE: 50 BPM

## 2022-07-19 DIAGNOSIS — C18.7 ADENOCARCINOMA OF SIGMOID COLON (H): Primary | ICD-10-CM

## 2022-07-19 DIAGNOSIS — E86.0 DEHYDRATION: ICD-10-CM

## 2022-07-19 DIAGNOSIS — G89.3 CANCER ASSOCIATED PAIN: ICD-10-CM

## 2022-07-19 DIAGNOSIS — R11.2 NAUSEA AND VOMITING, INTRACTABILITY OF VOMITING NOT SPECIFIED, UNSPECIFIED VOMITING TYPE: ICD-10-CM

## 2022-07-19 LAB
ALBUMIN UR-MCNC: ABNORMAL MG/DL
ALT SERPL W P-5'-P-CCNC: 20 U/L (ref 0–70)
ANION GAP SERPL CALCULATED.3IONS-SCNC: 5 MMOL/L (ref 3–14)
AST SERPL W P-5'-P-CCNC: 13 U/L (ref 0–45)
BASOPHILS # BLD AUTO: 0.1 10E3/UL (ref 0–0.2)
BASOPHILS NFR BLD AUTO: 1 %
BILIRUB SERPL-MCNC: 0.4 MG/DL (ref 0.2–1.3)
CHLORIDE BLD-SCNC: 107 MMOL/L (ref 94–109)
CO2 SERPL-SCNC: 28 MMOL/L (ref 20–32)
CREAT SERPL-MCNC: 0.91 MG/DL (ref 0.66–1.25)
EOSINOPHIL # BLD AUTO: 0.2 10E3/UL (ref 0–0.7)
EOSINOPHIL NFR BLD AUTO: 3 %
ERYTHROCYTE [DISTWIDTH] IN BLOOD BY AUTOMATED COUNT: 15.6 % (ref 10–15)
GFR SERPL CREATININE-BSD FRML MDRD: 90 ML/MIN/1.73M2
HCT VFR BLD AUTO: 38.5 % (ref 40–53)
HGB BLD-MCNC: 12.4 G/DL (ref 13.3–17.7)
IMM GRANULOCYTES # BLD: 0 10E3/UL
IMM GRANULOCYTES NFR BLD: 0 %
LYMPHOCYTES # BLD AUTO: 2.7 10E3/UL (ref 0.8–5.3)
LYMPHOCYTES NFR BLD AUTO: 49 %
MCH RBC QN AUTO: 30.6 PG (ref 26.5–33)
MCHC RBC AUTO-ENTMCNC: 32.2 G/DL (ref 31.5–36.5)
MCV RBC AUTO: 95 FL (ref 78–100)
MONOCYTES # BLD AUTO: 0.9 10E3/UL (ref 0–1.3)
MONOCYTES NFR BLD AUTO: 16 %
NEUTROPHILS # BLD AUTO: 1.7 10E3/UL (ref 1.6–8.3)
NEUTROPHILS NFR BLD AUTO: 31 %
NRBC # BLD AUTO: 0 10E3/UL
NRBC BLD AUTO-RTO: 0 /100
PLATELET # BLD AUTO: 185 10E3/UL (ref 150–450)
POTASSIUM BLD-SCNC: 4 MMOL/L (ref 3.4–5.3)
RBC # BLD AUTO: 4.05 10E6/UL (ref 4.4–5.9)
SODIUM SERPL-SCNC: 140 MMOL/L (ref 133–144)
WBC # BLD AUTO: 5.5 10E3/UL (ref 4–11)

## 2022-07-19 PROCEDURE — 85025 COMPLETE CBC W/AUTO DIFF WBC: CPT | Performed by: INTERNAL MEDICINE

## 2022-07-19 PROCEDURE — 80051 ELECTROLYTE PANEL: CPT | Performed by: INTERNAL MEDICINE

## 2022-07-19 PROCEDURE — 84450 TRANSFERASE (AST) (SGOT): CPT | Performed by: INTERNAL MEDICINE

## 2022-07-19 PROCEDURE — 82247 BILIRUBIN TOTAL: CPT | Performed by: INTERNAL MEDICINE

## 2022-07-19 PROCEDURE — G0498 CHEMO EXTEND IV INFUS W/PUMP: HCPCS

## 2022-07-19 PROCEDURE — 96411 CHEMO IV PUSH ADDL DRUG: CPT

## 2022-07-19 PROCEDURE — 250N000011 HC RX IP 250 OP 636: Performed by: NURSE PRACTITIONER

## 2022-07-19 PROCEDURE — 96415 CHEMO IV INFUSION ADDL HR: CPT

## 2022-07-19 PROCEDURE — 96375 TX/PRO/DX INJ NEW DRUG ADDON: CPT

## 2022-07-19 PROCEDURE — 258N000003 HC RX IP 258 OP 636: Performed by: NURSE PRACTITIONER

## 2022-07-19 PROCEDURE — 96413 CHEMO IV INFUSION 1 HR: CPT

## 2022-07-19 PROCEDURE — G0463 HOSPITAL OUTPT CLINIC VISIT: HCPCS | Mod: 25

## 2022-07-19 PROCEDURE — 81003 URINALYSIS AUTO W/O SCOPE: CPT | Performed by: INTERNAL MEDICINE

## 2022-07-19 PROCEDURE — 96367 TX/PROPH/DG ADDL SEQ IV INF: CPT

## 2022-07-19 PROCEDURE — 84460 ALANINE AMINO (ALT) (SGPT): CPT | Performed by: INTERNAL MEDICINE

## 2022-07-19 PROCEDURE — 96417 CHEMO IV INFUS EACH ADDL SEQ: CPT

## 2022-07-19 PROCEDURE — 96368 THER/DIAG CONCURRENT INF: CPT

## 2022-07-19 PROCEDURE — 82565 ASSAY OF CREATININE: CPT | Performed by: INTERNAL MEDICINE

## 2022-07-19 PROCEDURE — 36591 DRAW BLOOD OFF VENOUS DEVICE: CPT

## 2022-07-19 PROCEDURE — 99215 OFFICE O/P EST HI 40 MIN: CPT | Performed by: NURSE PRACTITIONER

## 2022-07-19 RX ORDER — NALOXONE HYDROCHLORIDE 0.4 MG/ML
0.2 INJECTION, SOLUTION INTRAMUSCULAR; INTRAVENOUS; SUBCUTANEOUS
Status: CANCELLED | OUTPATIENT
Start: 2022-07-19

## 2022-07-19 RX ORDER — HEPARIN SODIUM,PORCINE 10 UNIT/ML
5 VIAL (ML) INTRAVENOUS
Status: CANCELLED | OUTPATIENT
Start: 2022-07-19

## 2022-07-19 RX ORDER — DIPHENHYDRAMINE HYDROCHLORIDE 50 MG/ML
50 INJECTION INTRAMUSCULAR; INTRAVENOUS
Status: CANCELLED
Start: 2022-07-19

## 2022-07-19 RX ORDER — ALBUTEROL SULFATE 0.83 MG/ML
2.5 SOLUTION RESPIRATORY (INHALATION)
Status: CANCELLED | OUTPATIENT
Start: 2022-07-19

## 2022-07-19 RX ORDER — ALBUTEROL SULFATE 90 UG/1
1-2 AEROSOL, METERED RESPIRATORY (INHALATION)
Status: CANCELLED
Start: 2022-07-19

## 2022-07-19 RX ORDER — ONDANSETRON 2 MG/ML
8 INJECTION INTRAMUSCULAR; INTRAVENOUS ONCE
Status: COMPLETED | OUTPATIENT
Start: 2022-07-19 | End: 2022-07-19

## 2022-07-19 RX ORDER — FLUOROURACIL 50 MG/ML
400 INJECTION, SOLUTION INTRAVENOUS ONCE
Status: COMPLETED | OUTPATIENT
Start: 2022-07-19 | End: 2022-07-19

## 2022-07-19 RX ORDER — HEPARIN SODIUM (PORCINE) LOCK FLUSH IV SOLN 100 UNIT/ML 100 UNIT/ML
5 SOLUTION INTRAVENOUS
Status: CANCELLED | OUTPATIENT
Start: 2022-07-19

## 2022-07-19 RX ORDER — HEPARIN SODIUM (PORCINE) LOCK FLUSH IV SOLN 100 UNIT/ML 100 UNIT/ML
5 SOLUTION INTRAVENOUS
Status: CANCELLED | OUTPATIENT
Start: 2022-07-21

## 2022-07-19 RX ORDER — LORAZEPAM 2 MG/ML
0.5 INJECTION INTRAMUSCULAR EVERY 4 HOURS PRN
Status: CANCELLED | OUTPATIENT
Start: 2022-07-19

## 2022-07-19 RX ORDER — DEXAMETHASONE 4 MG/1
8 TABLET ORAL DAILY
Qty: 4 TABLET | Refills: 2 | Status: SHIPPED | OUTPATIENT
Start: 2022-07-19 | End: 2022-09-27

## 2022-07-19 RX ORDER — EPINEPHRINE 1 MG/ML
0.3 INJECTION, SOLUTION, CONCENTRATE INTRAVENOUS EVERY 5 MIN PRN
Status: CANCELLED | OUTPATIENT
Start: 2022-07-19

## 2022-07-19 RX ORDER — METHYLPREDNISOLONE SODIUM SUCCINATE 125 MG/2ML
125 INJECTION, POWDER, LYOPHILIZED, FOR SOLUTION INTRAMUSCULAR; INTRAVENOUS
Status: CANCELLED
Start: 2022-07-19

## 2022-07-19 RX ORDER — MEPERIDINE HYDROCHLORIDE 25 MG/ML
25 INJECTION INTRAMUSCULAR; INTRAVENOUS; SUBCUTANEOUS EVERY 30 MIN PRN
Status: CANCELLED | OUTPATIENT
Start: 2022-07-19

## 2022-07-19 RX ORDER — FLUOROURACIL 50 MG/ML
400 INJECTION, SOLUTION INTRAVENOUS ONCE
Status: CANCELLED | OUTPATIENT
Start: 2022-07-19

## 2022-07-19 RX ORDER — ONDANSETRON 2 MG/ML
8 INJECTION INTRAMUSCULAR; INTRAVENOUS ONCE
Status: CANCELLED | OUTPATIENT
Start: 2022-07-19

## 2022-07-19 RX ADMIN — SODIUM CHLORIDE 300 MG: 9 INJECTION, SOLUTION INTRAVENOUS at 11:16

## 2022-07-19 RX ADMIN — DEXTROSE MONOHYDRATE 250 ML: 50 INJECTION, SOLUTION INTRAVENOUS at 11:59

## 2022-07-19 RX ADMIN — FLUOROURACIL 690 MG: 50 INJECTION, SOLUTION INTRAVENOUS at 13:58

## 2022-07-19 RX ADMIN — LEUCOVORIN CALCIUM 692 MG: 350 INJECTION, POWDER, LYOPHILIZED, FOR SOLUTION INTRAMUSCULAR; INTRAVENOUS at 11:55

## 2022-07-19 RX ADMIN — ONDANSETRON 8 MG: 2 INJECTION INTRAMUSCULAR; INTRAVENOUS at 10:00

## 2022-07-19 RX ADMIN — SODIUM CHLORIDE 1000 ML: 9 INJECTION, SOLUTION INTRAVENOUS at 09:45

## 2022-07-19 RX ADMIN — FAMOTIDINE 20 MG: 10 INJECTION INTRAVENOUS at 09:57

## 2022-07-19 RX ADMIN — OXALIPLATIN 150 MG: 100 INJECTION, SOLUTION, CONCENTRATE INTRAVENOUS at 11:51

## 2022-07-19 RX ADMIN — DEXAMETHASONE SODIUM PHOSPHATE: 10 INJECTION, SOLUTION INTRAMUSCULAR; INTRAVENOUS at 10:20

## 2022-07-19 ASSESSMENT — PAIN SCALES - GENERAL: PAINLEVEL: MODERATE PAIN (5)

## 2022-07-19 NOTE — PATIENT INSTRUCTIONS
Proceed with cycle 6 chemo today.   Give 1 liter NS today with chemo (in therapy plan).   Pt will push oral fluids next few days, but if needed can get IVF later in week (either through [facility] with pump disconnect on Thurs if feasible or schedule back here in Infusion - he will call if he thinks needed)     oxycodone Rx today     Continue Remeron    Return in 2 weeks with Vimal and cycle 7  Return in 4 weeks with Dr. Colorado, cycle 8. (Suspect scan after that cycle, will verify with Dr. Colorado)

## 2022-07-19 NOTE — LETTER
7/19/2022         RE: Kalin Shepard  [address]  [place] [address]        Dear Colleague,    Thank you for referring your patient, Kalin Shepard, to the [facility]. Please see a copy of my visit note below.    [facility] Hematology and Oncology Outpatient Progress Note    Patient: Kalin Shepard  MRN: [id number]  Date of Service: Jul 19, 2022          Reason for Visit    1. Stage IV colon cancer (peritoneal mets)    Primary Hematologist/Oncologist: Dr. Colorado      Assessment/Plan  1. Stage IV colon cancer (peritoneal mets)  Tolerating chemo well, so far.  Labwork  Hgb 12.4 (stable), rest CBC WNL. Creatinine 0.9 (baseline 0.6), LFTs, lytes WNL. Trace urine protein    Plan:  -Proceed with cycle 6 at same doses.  -Return in 2 weeks for cycle 7 and in 4 weeks for cycle 8  -I will clarify with Dr. Colorado timing of reimaging  -Restaging and consideration of resection of primary tumor and metachronous peritoneal mets pending response and PS.    2. Chemo-induced nausea  3. Weight loss  Zyprexa was not helpful, so he's stopped taking this.   Continue with compazine and Zofran, alternating.   Started Remeron 7.5 mg a few weeks ago, so far this is has worked quite well for his appetite.  Continue Dex 8 mg daily x 2 days after day 1 chemo. We can consider elongating this for additional days as another intervention with future cycles; we opted to defer this for now.     4.   Cancer pain  He ran out of oxycodone last week and there was a delay in him getting his refill so he has not felt well off of it. Rx is ready at pharmacy, he will pick this up today. Taking oxycodone 5 mg TID.     5.   Acute nausea/diarrhea, resolved  48 hrs ago had acute onset of nausea/vomiting, increased ostomy output and abd pain. His wife had similar symptoms so they felt it was related to something they ate. Symptoms completely resolved and he's doing well past 24-36 hrs. This should not  be chemo-related nausea given delayed onset.     He feels at baseline and did not want to delay chemo for more recovery time.     Plan:  -IVF today and later in week, if needed as he may still be a bit on dehydrated side from that episode (based on mild elevation of creatinine)  -He will monitor closely. If recurrent, would recommend CT abd to rule out bowel obstruction proximal to ostomy.   ______________________________________________________________________________    History of Present Illness/ Interval History    Mr. Kalin Shepard  is a 71 year old returning for consideration of cycle 6 FOLFOX + bevacizumab.     Tolerated last cycle of chemo generally well. The week after chemo intermittent tongue tingling with cold exposure. N/T in hands/feet only with cold exposure, not consistent. Minimal nausea after chemo, managed with antiemetics. He started Remeron a few weeks ago and noted a lot of improvement in his appetite on this.     This past Sunday (2 days ago), he had an acute onset n/v and diarrhea (increased output in colostomy) with abd cramping x 24 hrs. No fever. No blood in stool. Occurred after a eating rich meal and improved with Peptobismol. His wife had some GI upset as well that day after eating same meal. Back to baseline x 24 hrs.     Oxycodone 5 mg three times/day and ran out late last wek. There was a barrier in getting it refilled so has been out since late last week. He feels unwell off of it.      ECOG Performance    1      Oncology History/Treatment  Diagnosis/Stage:   3/26/2022:   -Presented with abdominal pain secondary to bowel obstruction.  CT scan showed mass in sigmoid colon with upstream small bowel and colonic obstruction.  Underwent ostomy placement.    -Peritoneal biopsy positive for adenocarcinoma.    -No evidence of other distant metastasis based on PET scan.  -BRAF V600E positive.  K-aldo and NRAS negative.  Proficient MMR.      Treatment:  Induction/neoadjuvant chemotherapy  started for potentially resectable metachronous peritoneal metastasis    4/26/2022 - present: FOLFOX and bevacizumab       Physical Exam    GENERAL: Alert and oriented to time place and person. Seated comfortably. In no distress. Cachectic. Wife accompanies.  HEAD: Atraumatic and normocephalic. Mild alopecia (hair thinning per patient).  EYES: MARA, EOMI. No erythema. No icterus.  LYMPH NODES: No palpable supraclavicular, cervicallymphadenopathy.  CHEST: clear to auscultation bilaterally. Resonant to percussion throughout bilaterally. Symmetrical breath movements bilaterally.  CVS: RRR  ABDOMEN: Soft. Not tender. Not distended. No palpable hepatomegaly or splenomegaly. No other mass palpable. Bowel sounds present. Ostomy bag in place.  EXTREMITIES: Warm. No peripheral edema.  SKIN: no rash, or bruising or purpura.   NEURO: No gross deficit noted. Non-antalgic gait.      Lab Results    Recent Results (from the past 168 hour(s))   AST   Result Value Ref Range    AST 13 0 - 45 U/L   ALT   Result Value Ref Range    ALT 20 0 - 70 U/L   Creatinine   Result Value Ref Range    Creatinine 0.91 0.66 - 1.25 mg/dL    GFR Estimate 90 >60 mL/min/1.73m2   Bilirubin  total   Result Value Ref Range    Bilirubin Total 0.4 0.2 - 1.3 mg/dL   Protein qualitative urine   Result Value Ref Range    Protein Albumin Urine Trace (A) Negative mg/dL   Electrolyte panel (Na, K, Cl, CO2, Anion gap)   Result Value Ref Range    Sodium 140 133 - 144 mmol/L    Potassium 4.0 3.4 - 5.3 mmol/L    Chloride 107 94 - 109 mmol/L    Carbon Dioxide (CO2) 28 20 - 32 mmol/L    Anion Gap 5 3 - 14 mmol/L   CBC with platelets and differential   Result Value Ref Range    WBC Count 5.5 4.0 - 11.0 10e3/uL    RBC Count 4.05 (L) 4.40 - 5.90 10e6/uL    Hemoglobin 12.4 (L) 13.3 - 17.7 g/dL    Hematocrit 38.5 (L) 40.0 - 53.0 %    MCV 95 78 - 100 fL    MCH 30.6 26.5 - 33.0 pg    MCHC 32.2 31.5 - 36.5 g/dL    RDW 15.6 (H) 10.0 - 15.0 %    Platelet Count 185 150 - 450  "10e3/uL    % Neutrophils 31 %    % Lymphocytes 49 %    % Monocytes 16 %    % Eosinophils 3 %    % Basophils 1 %    % Immature Granulocytes 0 %    NRBCs per 100 WBC 0 <1 /100    Absolute Neutrophils 1.7 1.6 - 8.3 10e3/uL    Absolute Lymphocytes 2.7 0.8 - 5.3 10e3/uL    Absolute Monocytes 0.9 0.0 - 1.3 10e3/uL    Absolute Eosinophils 0.2 0.0 - 0.7 10e3/uL    Absolute Basophils 0.1 0.0 - 0.2 10e3/uL    Absolute Immature Granulocytes 0.0 <=0.4 10e3/uL    Absolute NRBCs 0.0 10e3/uL       Imaging    No results found.    Billing  Total time 40 minutes, to include face to face visit, review of EMR, ordering, documentation and coordination of care on date of service    Signed by: Olga Lidia Ya NP      Oncology Rooming Note    July 19, 2022 8:52 AM   Kalin Shepard is a 71 year old male who presents for:    Chief Complaint   Patient presents with     Oncology Clinic Visit     Adenocarcinoma of sigmoid colon - Labs provider and infusion     Initial Vitals: /74 (BP Location: Right arm, Patient Position: Sitting, Cuff Size: Adult Regular)   Pulse 62   Temp 98.1  F (36.7  C) (Tympanic)   Resp 12   Wt 58.1 kg (128 lb)   SpO2 99%   BMI 17.85 kg/m   Estimated body mass index is 17.85 kg/m  as calculated from the following:    Height as of 6/20/22: 1.803 m (5' 11\").    Weight as of this encounter: 58.1 kg (128 lb). Body surface area is 1.71 meters squared.  Moderate Pain (5) Comment: Data Unavailable   No LMP for male patient.  Allergies reviewed: Yes  Medications reviewed: Yes    Medications: Medication refills not needed today.  Pharmacy name entered into [facility]:    [id number] [place], [place] - [address]  [place], [place] - [address] [place] [address]    Clinical concerns:  None      Sara Almanzar [facility]                Again, thank you for allowing me to participate in the care of your patient.        Sincerely,        Olga Lidia Ya NP    "

## 2022-07-19 NOTE — PROGRESS NOTES
"Infusion Nursing Note:  Kalin Shepard presents today for FOLFOX & Zirabev.    Patient seen by provider today: Yes: Olga Lidia Ya NP   present during visit today: Not Applicable.    Note: N/A.    Intravenous Access:  Implanted Port.    Treatment Conditions:  Lab Results   Component Value Date    HGB 12.4 (L) 07/19/2022    WBC 5.5 07/19/2022    ANEUTAUTO 1.7 07/19/2022     07/19/2022      Lab Results   Component Value Date     07/19/2022    POTASSIUM 4.0 07/19/2022    MAG 2.3 04/04/2022    CR 0.91 07/19/2022    ROXIE 9.6 06/07/2022    BILITOTAL 0.4 07/19/2022    ALBUMIN 3.3 (L) 06/07/2022    ALT 20 07/19/2022    AST 13 07/19/2022     Results reviewed, labs MET treatment parameters, ok to proceed with treatment.    Post Infusion Assessment:  Patient tolerated infusion without incident.  Blood return noted pre and post infusion.  Site patent and intact, free from redness, edema or discomfort.  No evidence of extravasations.  Access discontinued per protocol.     Prior to discharge: Port is secured in place with tegaderm and flushed with 10cc NS with positive blood return noted.  Continuous home infusion CADD pump connected.    All connectors secured in place and clamps taped open.    Pump started, \"running\" noted on display (CADD): YES.  Pump Connection double checked with Mitchel Loera RN.  Patient instructed to call our clinic or [facility] with any questions or concerns at home.  Patient verbalized understanding.    Patient set up for pump disconnect at home with [facility] on 7/21/22 at 1200.      Discharge Plan:   Patient discharged in stable condition accompanied by: self.  Departure Mode: Ambulatory.      Zen Murphy RN                      "

## 2022-07-19 NOTE — PROGRESS NOTES
"Oncology Rooming Note    July 19, 2022 8:52 AM   Kalin Shepard is a 71 year old male who presents for:    Chief Complaint   Patient presents with     Oncology Clinic Visit     Adenocarcinoma of sigmoid colon - Labs provider and infusion     Initial Vitals: /74 (BP Location: Right arm, Patient Position: Sitting, Cuff Size: Adult Regular)   Pulse 62   Temp 98.1  F (36.7  C) (Tympanic)   Resp 12   Wt 58.1 kg (128 lb)   SpO2 99%   BMI 17.85 kg/m   Estimated body mass index is 17.85 kg/m  as calculated from the following:    Height as of 6/20/22: 1.803 m (5' 11\").    Weight as of this encounter: 58.1 kg (128 lb). Body surface area is 1.71 meters squared.  Moderate Pain (5) Comment: Data Unavailable   No LMP for male patient.  Allergies reviewed: Yes  Medications reviewed: Yes    Medications: Medication refills not needed today.  Pharmacy name entered into [facility]:    [id number] - [place], [place] - [address]  [place], [place] - [address] [place] [address]    Clinical concerns:  None      Sara Almanzar CMA            "

## 2022-08-02 ENCOUNTER — INFUSION THERAPY VISIT (OUTPATIENT)
Dept: INFUSION THERAPY | Facility: CLINIC | Age: 72
End: 2022-08-02
Attending: INTERNAL MEDICINE
Payer: COMMERCIAL

## 2022-08-02 ENCOUNTER — ONCOLOGY VISIT (OUTPATIENT)
Dept: ONCOLOGY | Facility: CLINIC | Age: 72
End: 2022-08-02
Attending: NURSE PRACTITIONER
Payer: COMMERCIAL

## 2022-08-02 ENCOUNTER — LAB (OUTPATIENT)
Dept: INFUSION THERAPY | Facility: CLINIC | Age: 72
End: 2022-08-02
Attending: NURSE PRACTITIONER
Payer: COMMERCIAL

## 2022-08-02 VITALS
BODY MASS INDEX: 17.85 KG/M2 | SYSTOLIC BLOOD PRESSURE: 110 MMHG | WEIGHT: 128 LBS | HEART RATE: 75 BPM | TEMPERATURE: 99.5 F | OXYGEN SATURATION: 98 % | DIASTOLIC BLOOD PRESSURE: 69 MMHG | RESPIRATION RATE: 12 BRPM

## 2022-08-02 DIAGNOSIS — E43 SEVERE PROTEIN-CALORIE MALNUTRITION (H): ICD-10-CM

## 2022-08-02 DIAGNOSIS — T45.1X5A CHEMOTHERAPY-INDUCED NEUTROPENIA (H): ICD-10-CM

## 2022-08-02 DIAGNOSIS — L03.116 CELLULITIS OF LEFT LOWER EXTREMITY: ICD-10-CM

## 2022-08-02 DIAGNOSIS — R63.0 POOR APPETITE: ICD-10-CM

## 2022-08-02 DIAGNOSIS — C18.7 ADENOCARCINOMA OF SIGMOID COLON (H): Primary | ICD-10-CM

## 2022-08-02 DIAGNOSIS — C18.7 ADENOCARCINOMA OF SIGMOID COLON (H): ICD-10-CM

## 2022-08-02 DIAGNOSIS — D70.1 CHEMOTHERAPY-INDUCED NEUTROPENIA (H): ICD-10-CM

## 2022-08-02 DIAGNOSIS — E86.0 DEHYDRATION: ICD-10-CM

## 2022-08-02 LAB
ALBUMIN UR-MCNC: NEGATIVE MG/DL
ALT SERPL W P-5'-P-CCNC: 21 U/L (ref 0–70)
ANION GAP SERPL CALCULATED.3IONS-SCNC: 7 MMOL/L (ref 3–14)
AST SERPL W P-5'-P-CCNC: 13 U/L (ref 0–45)
BASOPHILS # BLD MANUAL: 0.1 10E3/UL (ref 0–0.2)
BASOPHILS NFR BLD MANUAL: 4 %
BILIRUB SERPL-MCNC: 0.3 MG/DL (ref 0.2–1.3)
CHLORIDE BLD-SCNC: 110 MMOL/L (ref 94–109)
CO2 SERPL-SCNC: 23 MMOL/L (ref 20–32)
CREAT SERPL-MCNC: 0.65 MG/DL (ref 0.66–1.25)
EOSINOPHIL # BLD MANUAL: 0 10E3/UL (ref 0–0.7)
EOSINOPHIL NFR BLD MANUAL: 1 %
ERYTHROCYTE [DISTWIDTH] IN BLOOD BY AUTOMATED COUNT: 16.2 % (ref 10–15)
GFR SERPL CREATININE-BSD FRML MDRD: >90 ML/MIN/1.73M2
HCT VFR BLD AUTO: 37.1 % (ref 40–53)
HGB BLD-MCNC: 11.8 G/DL (ref 13.3–17.7)
LYMPHOCYTES # BLD MANUAL: 1.5 10E3/UL (ref 0.8–5.3)
LYMPHOCYTES NFR BLD MANUAL: 63 %
MCH RBC QN AUTO: 30.6 PG (ref 26.5–33)
MCHC RBC AUTO-ENTMCNC: 31.8 G/DL (ref 31.5–36.5)
MCV RBC AUTO: 96 FL (ref 78–100)
MONOCYTES # BLD MANUAL: 0.3 10E3/UL (ref 0–1.3)
MONOCYTES NFR BLD MANUAL: 11 %
NEUTROPHILS # BLD MANUAL: 0.5 10E3/UL (ref 1.6–8.3)
NEUTROPHILS NFR BLD MANUAL: 21 %
PLAT MORPH BLD: ABNORMAL
PLATELET # BLD AUTO: 138 10E3/UL (ref 150–450)
POTASSIUM BLD-SCNC: 4.1 MMOL/L (ref 3.4–5.3)
RBC # BLD AUTO: 3.85 10E6/UL (ref 4.4–5.9)
RBC MORPH BLD: ABNORMAL
SODIUM SERPL-SCNC: 140 MMOL/L (ref 133–144)
VARIANT LYMPHS BLD QL SMEAR: PRESENT
WBC # BLD AUTO: 2.4 10E3/UL (ref 4–11)

## 2022-08-02 PROCEDURE — 85027 COMPLETE CBC AUTOMATED: CPT | Performed by: NURSE PRACTITIONER

## 2022-08-02 PROCEDURE — 84450 TRANSFERASE (AST) (SGOT): CPT | Performed by: NURSE PRACTITIONER

## 2022-08-02 PROCEDURE — 82565 ASSAY OF CREATININE: CPT | Performed by: NURSE PRACTITIONER

## 2022-08-02 PROCEDURE — 99215 OFFICE O/P EST HI 40 MIN: CPT | Performed by: NURSE PRACTITIONER

## 2022-08-02 PROCEDURE — 36591 DRAW BLOOD OFF VENOUS DEVICE: CPT

## 2022-08-02 PROCEDURE — G0463 HOSPITAL OUTPT CLINIC VISIT: HCPCS | Mod: 25

## 2022-08-02 PROCEDURE — 250N000011 HC RX IP 250 OP 636: Performed by: NURSE PRACTITIONER

## 2022-08-02 PROCEDURE — 80051 ELECTROLYTE PANEL: CPT | Performed by: NURSE PRACTITIONER

## 2022-08-02 PROCEDURE — 81003 URINALYSIS AUTO W/O SCOPE: CPT | Performed by: NURSE PRACTITIONER

## 2022-08-02 PROCEDURE — 85007 BL SMEAR W/DIFF WBC COUNT: CPT | Performed by: NURSE PRACTITIONER

## 2022-08-02 PROCEDURE — 84460 ALANINE AMINO (ALT) (SGPT): CPT | Performed by: NURSE PRACTITIONER

## 2022-08-02 PROCEDURE — G0463 HOSPITAL OUTPT CLINIC VISIT: HCPCS

## 2022-08-02 PROCEDURE — 82247 BILIRUBIN TOTAL: CPT | Performed by: NURSE PRACTITIONER

## 2022-08-02 RX ORDER — HEPARIN SODIUM (PORCINE) LOCK FLUSH IV SOLN 100 UNIT/ML 100 UNIT/ML
5 SOLUTION INTRAVENOUS
Status: CANCELLED | OUTPATIENT
Start: 2022-08-11

## 2022-08-02 RX ORDER — LORAZEPAM 2 MG/ML
0.5 INJECTION INTRAMUSCULAR EVERY 4 HOURS PRN
Status: CANCELLED | OUTPATIENT
Start: 2022-08-09

## 2022-08-02 RX ORDER — MIRTAZAPINE 15 MG/1
15 TABLET, FILM COATED ORAL AT BEDTIME
Qty: 30 TABLET | Refills: 3 | Status: SHIPPED | OUTPATIENT
Start: 2022-08-02 | End: 2022-09-02

## 2022-08-02 RX ORDER — ALBUTEROL SULFATE 90 UG/1
1-2 AEROSOL, METERED RESPIRATORY (INHALATION)
Status: CANCELLED
Start: 2022-08-09

## 2022-08-02 RX ORDER — HEPARIN SODIUM (PORCINE) LOCK FLUSH IV SOLN 100 UNIT/ML 100 UNIT/ML
5 SOLUTION INTRAVENOUS ONCE
Status: COMPLETED | OUTPATIENT
Start: 2022-08-02 | End: 2022-08-02

## 2022-08-02 RX ORDER — DIPHENHYDRAMINE HYDROCHLORIDE 50 MG/ML
50 INJECTION INTRAMUSCULAR; INTRAVENOUS
Status: CANCELLED
Start: 2022-08-09

## 2022-08-02 RX ORDER — EPINEPHRINE 1 MG/ML
0.3 INJECTION, SOLUTION, CONCENTRATE INTRAVENOUS EVERY 5 MIN PRN
Status: CANCELLED | OUTPATIENT
Start: 2022-08-09

## 2022-08-02 RX ORDER — HEPARIN SODIUM (PORCINE) LOCK FLUSH IV SOLN 100 UNIT/ML 100 UNIT/ML
5 SOLUTION INTRAVENOUS
Status: CANCELLED | OUTPATIENT
Start: 2022-08-09

## 2022-08-02 RX ORDER — METHYLPREDNISOLONE SODIUM SUCCINATE 125 MG/2ML
125 INJECTION, POWDER, LYOPHILIZED, FOR SOLUTION INTRAMUSCULAR; INTRAVENOUS
Status: CANCELLED
Start: 2022-08-09

## 2022-08-02 RX ORDER — ALBUTEROL SULFATE 0.83 MG/ML
2.5 SOLUTION RESPIRATORY (INHALATION)
Status: CANCELLED | OUTPATIENT
Start: 2022-08-09

## 2022-08-02 RX ORDER — NALOXONE HYDROCHLORIDE 0.4 MG/ML
0.2 INJECTION, SOLUTION INTRAMUSCULAR; INTRAVENOUS; SUBCUTANEOUS
Status: CANCELLED | OUTPATIENT
Start: 2022-08-09

## 2022-08-02 RX ORDER — ONDANSETRON 2 MG/ML
8 INJECTION INTRAMUSCULAR; INTRAVENOUS ONCE
Status: CANCELLED | OUTPATIENT
Start: 2022-08-09

## 2022-08-02 RX ORDER — CEPHALEXIN 500 MG/1
500 CAPSULE ORAL 2 TIMES DAILY
Qty: 14 CAPSULE | Refills: 0 | Status: SHIPPED | OUTPATIENT
Start: 2022-08-02 | End: 2022-08-09

## 2022-08-02 RX ORDER — FLUOROURACIL 50 MG/ML
400 INJECTION, SOLUTION INTRAVENOUS ONCE
Status: CANCELLED | OUTPATIENT
Start: 2022-08-09

## 2022-08-02 RX ORDER — MEPERIDINE HYDROCHLORIDE 25 MG/ML
25 INJECTION INTRAMUSCULAR; INTRAVENOUS; SUBCUTANEOUS EVERY 30 MIN PRN
Status: CANCELLED | OUTPATIENT
Start: 2022-08-09

## 2022-08-02 RX ADMIN — Medication 5 ML: at 11:42

## 2022-08-02 ASSESSMENT — PAIN SCALES - GENERAL: PAINLEVEL: MODERATE PAIN (5)

## 2022-08-02 NOTE — PROGRESS NOTES
Infusion Nursing Note:  Kalin Shepard presents today for C7D1.    Patient seen by provider today: Yes   present during visit today: Not Applicable.    Note: N/A.    Intravenous Access:  Implanted Port.    Treatment Conditions:  Treatment held today- please see provider notes.    Post Infusion Assessment:  Site patent and intact, free from redness, edema or discomfort.  No evidence of extravasations.  Access discontinued per protocol.     Discharge Plan:   Patient discharged in stable condition accompanied by: self.  Departure Mode: Ambulatory.  Will return next week for C7D1      Mitchel Loera RN

## 2022-08-02 NOTE — LETTER
8/2/2022         RE: Kalin Shepard  [address]  [facility] [address]        Dear Colleague,    Thank you for referring your patient, Kalin Shepard, to the [facility]. Please see a copy of my visit note below.    [facility] Hematology and Oncology Outpatient Progress Note    Patient: Kalin Shepard  MRN: [id number]  Date of Service: Aug 2, 2022          Reason for Visit    1. Stage IV colon cancer (peritoneal mets)    Primary Hematologist/Oncologist: Dr. Colorado      Assessment/Plan  1. Stage IV colon cancer (peritoneal mets)  2. Chemo-induced neutropenia (gr 3), anemia and thrombocytopenia (gr 1)  Tolerating chemo fair. More cumulative fatigue this past cycle.   Also, has some increased cytopenias, namely neutropenia ANC 0.5. Mild anemia (hgb 11.8) and thrombocytopenia (138K).  Creatinine, lytes and LFTs stable.     Plan:  -Delay huong 7 x 1 week for neutropenia  -Return next week and proceed with cycle 7 if labs permit. Will add Neulasta to next cycle.   -Return every 2 weeks with chemo. If developing other cumulative cytopenias/side effects, will consider some dose reduction with future cycles.   -Dr. Colorado would like to try to complete all chemo neoadjuvantly given advanced disease (up to 6 months, pending tolerance). Will repeat imaging after 12 cycles if tolerating and no signs of clinical progression sooner. At that time, if responding, would try to continue an additional 3 months with restaging.  -Restaging and consideration of resection of primary tumor and metachronous peritoneal mets pending response and PS.    3.   Left LE abrasion, cellulitis  Mild surrounding cellulitis in setting of neutropenia.    Plan:  -Keflex Rx sent  -Call clinic if area worsening  -ED with Fevers    4.   Chemo-induced nausea  5.   Weight loss secondary to anorexia/dysgeusia  Zyprexa was not helpful, so he's stopped taking this.   Continue with compazine and Zofran,  alternating as needed.   Remeron 7.5 mg worked some for his appetite.     Plan:  -Increase Remeron to 15 mg  -Referral to Dietician  -Continue Dex 8 mg daily x 2 days after day 1 chemo. We can consider elongating this for additional days as another intervention with future cycles; we opted to defer this for now.     6.   Cancer pain  Stable. Taking oxycodone 5 mg TID. He will notify us when refill needed.     ______________________________________________________________________________    History of Present Illness/ Interval History    Mr. Kalin Shepard  is a 71 year old returning for consideration of cycle 7 FOLFOX + bevacizumab.     Tolerated last cycle of chemo fair, cumulative fatigue more notable. The week after chemo intermittent tongue tingling with cold exposure. N/T in hands/feet only with cold exposure, not consistent. Minimal nausea after chemo, managed with antiemetics. He started Remeron 7.5 mg a few weeks ago and noted a lot of improvement in his appetite initially but a bit lesser benefit more recently. Keeping weight stable with Ensure supplements.    Bowels regular via ostomy. No blood in stools.   Stable abd discomfort, managed with Oxycodone 5 mg three times/day    He hit his left shin on a trailer hitch last week resulting in skin abrasion. He has covered with band-aid but has noted a bit more redness around the area. Draining yellow bloody drainage. No fevers. No pain.      ECOG Performance    1      Oncology History/Treatment  Diagnosis/Stage:   3/26/2022:   -Presented with abdominal pain secondary to bowel obstruction.  CT scan showed mass in sigmoid colon with upstream small bowel and colonic obstruction.  Underwent ostomy placement.    -Peritoneal biopsy positive for adenocarcinoma.    -No evidence of other distant metastasis based on PET scan.  -BRAF V600E positive.  K-aldo and NRAS negative.  Proficient MMR.      Treatment:  Induction/neoadjuvant chemotherapy started for potentially  resectable metachronous peritoneal metastasis    4/26/2022 - present: FOLFOX and bevacizumab       Physical Exam    GENERAL: Alert and oriented to time place and person. Seated comfortably. In no distress. Cachectic. Wife accompanies.  HEAD: Atraumatic and normocephalic. Mild alopecia (hair thinning per patient).  EYES: MARA, EOMI. No erythema. No icterus.  LYMPH NODES: No palpable supraclavicular, cervical lymphadenopathy.  CHEST: clear to auscultation bilaterally. Resonant to percussion throughout bilaterally. Symmetrical breath movements bilaterally.  CVS: RRR  ABDOMEN: Soft. Not tender. Not distended. No palpable hepatomegaly or splenomegaly. No other mass palpable. Bowel sounds present. Ostomy bag in place.  EXTREMITIES: Warm. No peripheral edema. Left shin abrasion with surrounding skin erythema, bloody/yellow drainage on band-aid.   SKIN: no rash, or bruising or purpura.   NEURO: No gross deficit noted. Non-antalgic gait.      Lab Results    Recent Results (from the past 168 hour(s))   AST   Result Value Ref Range    AST 13 0 - 45 U/L   ALT   Result Value Ref Range    ALT 21 0 - 70 U/L   Creatinine   Result Value Ref Range    Creatinine 0.65 (L) 0.66 - 1.25 mg/dL    GFR Estimate >90 >60 mL/min/1.73m2   Bilirubin  total   Result Value Ref Range    Bilirubin Total 0.3 0.2 - 1.3 mg/dL   Protein qualitative urine   Result Value Ref Range    Protein Albumin Urine Negative Negative mg/dL   Electrolyte panel (Na, K, Cl, CO2, Anion gap)   Result Value Ref Range    Sodium 140 133 - 144 mmol/L    Potassium 4.1 3.4 - 5.3 mmol/L    Chloride 110 (H) 94 - 109 mmol/L    Carbon Dioxide (CO2) 23 20 - 32 mmol/L    Anion Gap 7 3 - 14 mmol/L   CBC with platelets and differential   Result Value Ref Range    WBC Count 2.4 (L) 4.0 - 11.0 10e3/uL    RBC Count 3.85 (L) 4.40 - 5.90 10e6/uL    Hemoglobin 11.8 (L) 13.3 - 17.7 g/dL    Hematocrit 37.1 (L) 40.0 - 53.0 %    MCV 96 78 - 100 fL    MCH 30.6 26.5 - 33.0 pg    MCHC 31.8 31.5 -  "36.5 g/dL    RDW 16.2 (H) 10.0 - 15.0 %    Platelet Count 138 (L) 150 - 450 10e3/uL   Manual Differential   Result Value Ref Range    % Neutrophils 21 %    % Lymphocytes 63 %    % Monocytes 11 %    % Eosinophils 1 %    % Basophils 4 %    Absolute Neutrophils 0.5 (L) 1.6 - 8.3 10e3/uL    Absolute Lymphocytes 1.5 0.8 - 5.3 10e3/uL    Absolute Monocytes 0.3 0.0 - 1.3 10e3/uL    Absolute Eosinophils 0.0 0.0 - 0.7 10e3/uL    Absolute Basophils 0.1 0.0 - 0.2 10e3/uL    RBC Morphology Confirmed RBC Indices     Platelet Assessment  Automated Count Confirmed. Platelet morphology is normal.     Automated Count Confirmed. Platelet morphology is normal.    Reactive Lymphocytes Present (A) None Seen       Imaging    No results found.    Billing  Total time 40 minutes, to include face to face visit, review of EMR, ordering, documentation and coordination of care on date of service    Signed by: Olga Lidia Ya NP      Oncology Rooming Note    August 2, 2022 10:57 AM   Kalin Shepard is a 71 year old male who presents for:    Chief Complaint   Patient presents with     Oncology Clinic Visit     Adenocarcinoma of sigmoid colon - Labs provider and infusion     Initial Vitals: /69 (BP Location: Right arm, Patient Position: Sitting, Cuff Size: Adult Small)   Pulse 75   Temp 99.5  F (37.5  C) (Tympanic)   Resp 12   Wt 58.1 kg (128 lb)   SpO2 98%   BMI 17.85 kg/m   Estimated body mass index is 17.85 kg/m  as calculated from the following:    Height as of 6/20/22: 1.803 m (5' 11\").    Weight as of this encounter: 58.1 kg (128 lb). Body surface area is 1.71 meters squared.  Moderate Pain (5) Comment: Data Unavailable   No LMP for male patient.  Allergies reviewed: Yes  Medications reviewed: Yes    Medications: Medication refills not needed today.  Pharmacy name entered into [employer]:    [id number] - [place], [place] - [address]  [place], [place] - [address]    Clinical concerns:  None      Sara Almanzar, [facility]                Again, thank you for allowing me to participate in the care of your patient.        Sincerely,        Olga Lidia Ya, NP

## 2022-08-02 NOTE — PATIENT INSTRUCTIONS
Defer chemo today for low WBC and left leg cellulitis  Keflex Rx    1 week - return for chemo if labs meet parameter  Add Neulasta on day 3 with pump disconnect (make sure [facility] RN can do this)    2 weeks later (~8/23), see Stanislav/Olga Lidia for next cycle chemo. Need to reschedule 8/16 visit back a week    Dietician referral - pt states this was previously ordered and he hasn't heard from anyone?    Increase remeron to 15 mg - new Rx sent

## 2022-08-02 NOTE — PROGRESS NOTES
"Oncology Rooming Note    August 2, 2022 10:57 AM   Kalin Shepard is a 71 year old male who presents for:    Chief Complaint   Patient presents with     Oncology Clinic Visit     Adenocarcinoma of sigmoid colon - Labs provider and infusion     Initial Vitals: /69 (BP Location: Right arm, Patient Position: Sitting, Cuff Size: Adult Small)   Pulse 75   Temp 99.5  F (37.5  C) (Tympanic)   Resp 12   Wt 58.1 kg (128 lb)   SpO2 98%   BMI 17.85 kg/m   Estimated body mass index is 17.85 kg/m  as calculated from the following:    Height as of 6/20/22: 1.803 m (5' 11\").    Weight as of this encounter: 58.1 kg (128 lb). Body surface area is 1.71 meters squared.  Moderate Pain (5) Comment: Data Unavailable   No LMP for male patient.  Allergies reviewed: Yes  Medications reviewed: Yes    Medications: Medication refills not needed today.  Pharmacy name entered into [facility]:    [id number], [place] - [address]  [place], [place] - [address] [place] [address]    Clinical concerns:  None      Sara Almanzar CMA            "

## 2022-08-02 NOTE — PROGRESS NOTES
[facility] Hematology and Oncology Outpatient Progress Note    Patient: Kalin Shepard  MRN: [id number]  Date of Service: Aug 2, 2022          Reason for Visit    1. Stage IV colon cancer (peritoneal mets)    Primary Hematologist/Oncologist: Dr. Colorado      Assessment/Plan  1. Stage IV colon cancer (peritoneal mets)  2. Chemo-induced neutropenia (gr 3), anemia and thrombocytopenia (gr 1)  Tolerating chemo fair. More cumulative fatigue this past cycle.   Also, has some increased cytopenias, namely neutropenia ANC 0.5. Mild anemia (hgb 11.8) and thrombocytopenia (138K).  Creatinine, lytes and LFTs stable.     Plan:  -Delay huong 7 x 1 week for neutropenia  -Return next week and proceed with cycle 7 if labs permit. Will add Neulasta to next cycle.   -Return every 2 weeks with chemo. If developing other cumulative cytopenias/side effects, will consider some dose reduction with future cycles.   -Dr. Colorado would like to try to complete all chemo neoadjuvantly given advanced disease (up to 6 months, pending tolerance). Will repeat imaging after 12 cycles if tolerating and no signs of clinical progression sooner. At that time, if responding, would try to continue an additional 3 months with restaging.  -Restaging and consideration of resection of primary tumor and metachronous peritoneal mets pending response and PS.    3.   Left LE abrasion, cellulitis  Mild surrounding cellulitis in setting of neutropenia.    Plan:  -Keflex Rx sent  -Call clinic if area worsening  -ED with Fevers    4.   Chemo-induced nausea  5.   Weight loss secondary to anorexia/dysgeusia  Zyprexa was not helpful, so he's stopped taking this.   Continue with compazine and Zofran, alternating as needed.   Remeron 7.5 mg worked some for his appetite.     Plan:  -Increase Remeron to 15 mg  -Referral to Dietician  -Continue Dex 8 mg daily x 2 days after day 1 chemo. We can consider elongating this for additional days as another intervention  with future cycles; we opted to defer this for now.     6.   Cancer pain  Stable. Taking oxycodone 5 mg TID. He will notify us when refill needed.     ______________________________________________________________________________    History of Present Illness/ Interval History    Mr. Kalin Shepard  is a 71 year old returning for consideration of cycle 7 FOLFOX + bevacizumab.     Tolerated last cycle of chemo fair, cumulative fatigue more notable. The week after chemo intermittent tongue tingling with cold exposure. N/T in hands/feet only with cold exposure, not consistent. Minimal nausea after chemo, managed with antiemetics. He started Remeron 7.5 mg a few weeks ago and noted a lot of improvement in his appetite initially but a bit lesser benefit more recently. Keeping weight stable with Ensure supplements.    Bowels regular via ostomy. No blood in stools.   Stable abd discomfort, managed with Oxycodone 5 mg three times/day    He hit his left shin on a trailer hitch last week resulting in skin abrasion. He has covered with band-aid but has noted a bit more redness around the area. Draining yellow bloody drainage. No fevers. No pain.      ECOG Performance    1      Oncology History/Treatment  Diagnosis/Stage:   3/26/2022:   -Presented with abdominal pain secondary to bowel obstruction.  CT scan showed mass in sigmoid colon with upstream small bowel and colonic obstruction.  Underwent ostomy placement.    -Peritoneal biopsy positive for adenocarcinoma.    -No evidence of other distant metastasis based on PET scan.  -BRAF V600E positive.  K-aldo and NRAS negative.  Proficient MMR.      Treatment:  Induction/neoadjuvant chemotherapy started for potentially resectable metachronous peritoneal metastasis    4/26/2022 - present: FOLFOX and bevacizumab       Physical Exam    GENERAL: Alert and oriented to time place and person. Seated comfortably. In no distress. Cachectic. Wife accompanies.  HEAD: Atraumatic and  normocephalic. Mild alopecia (hair thinning per patient).  EYES: MARA, EOMI. No erythema. No icterus.  LYMPH NODES: No palpable supraclavicular, cervical lymphadenopathy.  CHEST: clear to auscultation bilaterally. Resonant to percussion throughout bilaterally. Symmetrical breath movements bilaterally.  CVS: RRR  ABDOMEN: Soft. Not tender. Not distended. No palpable hepatomegaly or splenomegaly. No other mass palpable. Bowel sounds present. Ostomy bag in place.  EXTREMITIES: Warm. No peripheral edema. Left shin abrasion with surrounding skin erythema, bloody/yellow drainage on band-aid.   SKIN: no rash, or bruising or purpura.   NEURO: No gross deficit noted. Non-antalgic gait.      Lab Results    Recent Results (from the past 168 hour(s))   AST   Result Value Ref Range    AST 13 0 - 45 U/L   ALT   Result Value Ref Range    ALT 21 0 - 70 U/L   Creatinine   Result Value Ref Range    Creatinine 0.65 (L) 0.66 - 1.25 mg/dL    GFR Estimate >90 >60 mL/min/1.73m2   Bilirubin  total   Result Value Ref Range    Bilirubin Total 0.3 0.2 - 1.3 mg/dL   Protein qualitative urine   Result Value Ref Range    Protein Albumin Urine Negative Negative mg/dL   Electrolyte panel (Na, K, Cl, CO2, Anion gap)   Result Value Ref Range    Sodium 140 133 - 144 mmol/L    Potassium 4.1 3.4 - 5.3 mmol/L    Chloride 110 (H) 94 - 109 mmol/L    Carbon Dioxide (CO2) 23 20 - 32 mmol/L    Anion Gap 7 3 - 14 mmol/L   CBC with platelets and differential   Result Value Ref Range    WBC Count 2.4 (L) 4.0 - 11.0 10e3/uL    RBC Count 3.85 (L) 4.40 - 5.90 10e6/uL    Hemoglobin 11.8 (L) 13.3 - 17.7 g/dL    Hematocrit 37.1 (L) 40.0 - 53.0 %    MCV 96 78 - 100 fL    MCH 30.6 26.5 - 33.0 pg    MCHC 31.8 31.5 - 36.5 g/dL    RDW 16.2 (H) 10.0 - 15.0 %    Platelet Count 138 (L) 150 - 450 10e3/uL   Manual Differential   Result Value Ref Range    % Neutrophils 21 %    % Lymphocytes 63 %    % Monocytes 11 %    % Eosinophils 1 %    % Basophils 4 %    Absolute  Neutrophils 0.5 (L) 1.6 - 8.3 10e3/uL    Absolute Lymphocytes 1.5 0.8 - 5.3 10e3/uL    Absolute Monocytes 0.3 0.0 - 1.3 10e3/uL    Absolute Eosinophils 0.0 0.0 - 0.7 10e3/uL    Absolute Basophils 0.1 0.0 - 0.2 10e3/uL    RBC Morphology Confirmed RBC Indices     Platelet Assessment  Automated Count Confirmed. Platelet morphology is normal.     Automated Count Confirmed. Platelet morphology is normal.    Reactive Lymphocytes Present (A) None Seen       Imaging    No results found.    Billing  Total time 40 minutes, to include face to face visit, review of EMR, ordering, documentation and coordination of care on date of service    Signed by: Olga Lidia Ya NP

## 2022-08-03 ENCOUNTER — TELEPHONE (OUTPATIENT)
Dept: FAMILY MEDICINE | Facility: CLINIC | Age: 72
End: 2022-08-03

## 2022-08-03 NOTE — TELEPHONE ENCOUNTER
Reason for Call:  Home Health Care    Clara with [facility] called regarding (reason for call): Verbal    Orders are needed for this patient.     Skilled Nursing: every other week for 60 days for chemo therapy.    Phone Number Homecare Nurse can be reached at: [phone]    Can we leave a detailed message on this number? YES    Phone number patient can be reached at: Home number on file [phone] (home)    Best Time: any    Call taken on 8/3/2022 at 5:01 PM by Mary Quezada

## 2022-08-09 ENCOUNTER — INFUSION THERAPY VISIT (OUTPATIENT)
Dept: INFUSION THERAPY | Facility: HOSPITAL | Age: 72
End: 2022-08-09
Attending: INTERNAL MEDICINE
Payer: COMMERCIAL

## 2022-08-09 ENCOUNTER — PATIENT OUTREACH (OUTPATIENT)
Dept: SURGERY | Facility: CLINIC | Age: 72
End: 2022-08-09

## 2022-08-09 VITALS
SYSTOLIC BLOOD PRESSURE: 132 MMHG | HEART RATE: 59 BPM | TEMPERATURE: 98.4 F | DIASTOLIC BLOOD PRESSURE: 79 MMHG | RESPIRATION RATE: 16 BRPM | OXYGEN SATURATION: 98 %

## 2022-08-09 DIAGNOSIS — Z93.3 S/P COLOSTOMY (H): Primary | ICD-10-CM

## 2022-08-09 DIAGNOSIS — T45.1X5A CHEMOTHERAPY-INDUCED NEUTROPENIA (H): Primary | ICD-10-CM

## 2022-08-09 DIAGNOSIS — C18.7 ADENOCARCINOMA OF SIGMOID COLON (H): ICD-10-CM

## 2022-08-09 DIAGNOSIS — D70.1 CHEMOTHERAPY-INDUCED NEUTROPENIA (H): Primary | ICD-10-CM

## 2022-08-09 LAB
ALBUMIN UR-MCNC: NEGATIVE MG/DL
ALT SERPL W P-5'-P-CCNC: 14 U/L (ref 0–45)
ANION GAP SERPL CALCULATED.3IONS-SCNC: 8 MMOL/L (ref 5–18)
AST SERPL W P-5'-P-CCNC: 17 U/L (ref 0–40)
BASOPHILS # BLD MANUAL: 0.2 10E3/UL (ref 0–0.2)
BASOPHILS NFR BLD MANUAL: 2 %
BILIRUB SERPL-MCNC: 0.2 MG/DL (ref 0–1)
CHLORIDE BLD-SCNC: 104 MMOL/L (ref 98–107)
CO2 SERPL-SCNC: 28 MMOL/L (ref 22–31)
CREAT SERPL-MCNC: 0.72 MG/DL (ref 0.7–1.3)
EOSINOPHIL # BLD MANUAL: 0.3 10E3/UL (ref 0–0.7)
EOSINOPHIL NFR BLD MANUAL: 4 %
ERYTHROCYTE [DISTWIDTH] IN BLOOD BY AUTOMATED COUNT: 17 % (ref 10–15)
GFR SERPL CREATININE-BSD FRML MDRD: >90 ML/MIN/1.73M2
HCT VFR BLD AUTO: 37.7 % (ref 40–53)
HGB BLD-MCNC: 12 G/DL (ref 13.3–17.7)
LYMPHOCYTES # BLD MANUAL: 3 10E3/UL (ref 0.8–5.3)
LYMPHOCYTES NFR BLD MANUAL: 38 %
MCH RBC QN AUTO: 31 PG (ref 26.5–33)
MCHC RBC AUTO-ENTMCNC: 31.8 G/DL (ref 31.5–36.5)
MCV RBC AUTO: 97 FL (ref 78–100)
METAMYELOCYTES # BLD MANUAL: 0.2 10E3/UL
METAMYELOCYTES NFR BLD MANUAL: 2 %
MONOCYTES # BLD MANUAL: 1.5 10E3/UL (ref 0–1.3)
MONOCYTES NFR BLD MANUAL: 19 %
MYELOCYTES # BLD MANUAL: 0.2 10E3/UL
MYELOCYTES NFR BLD MANUAL: 2 %
NEUTROPHILS # BLD MANUAL: 2.6 10E3/UL (ref 1.6–8.3)
NEUTROPHILS NFR BLD MANUAL: 33 %
PLAT MORPH BLD: ABNORMAL
PLATELET # BLD AUTO: 288 10E3/UL (ref 150–450)
POTASSIUM BLD-SCNC: 4.7 MMOL/L (ref 3.5–5)
RBC # BLD AUTO: 3.87 10E6/UL (ref 4.4–5.9)
RBC MORPH BLD: ABNORMAL
SODIUM SERPL-SCNC: 140 MMOL/L (ref 136–145)
WBC # BLD AUTO: 7.9 10E3/UL (ref 4–11)

## 2022-08-09 PROCEDURE — 96415 CHEMO IV INFUSION ADDL HR: CPT

## 2022-08-09 PROCEDURE — 96411 CHEMO IV PUSH ADDL DRUG: CPT

## 2022-08-09 PROCEDURE — 96367 TX/PROPH/DG ADDL SEQ IV INF: CPT

## 2022-08-09 PROCEDURE — G0498 CHEMO EXTEND IV INFUS W/PUMP: HCPCS

## 2022-08-09 PROCEDURE — 250N000011 HC RX IP 250 OP 636: Performed by: NURSE PRACTITIONER

## 2022-08-09 PROCEDURE — 82247 BILIRUBIN TOTAL: CPT | Performed by: NURSE PRACTITIONER

## 2022-08-09 PROCEDURE — 96375 TX/PRO/DX INJ NEW DRUG ADDON: CPT

## 2022-08-09 PROCEDURE — 258N000003 HC RX IP 258 OP 636: Performed by: NURSE PRACTITIONER

## 2022-08-09 PROCEDURE — 85027 COMPLETE CBC AUTOMATED: CPT | Performed by: NURSE PRACTITIONER

## 2022-08-09 PROCEDURE — 85007 BL SMEAR W/DIFF WBC COUNT: CPT | Performed by: NURSE PRACTITIONER

## 2022-08-09 PROCEDURE — 96368 THER/DIAG CONCURRENT INF: CPT

## 2022-08-09 PROCEDURE — 80051 ELECTROLYTE PANEL: CPT

## 2022-08-09 PROCEDURE — 82565 ASSAY OF CREATININE: CPT | Performed by: NURSE PRACTITIONER

## 2022-08-09 PROCEDURE — 84450 TRANSFERASE (AST) (SGOT): CPT | Performed by: NURSE PRACTITIONER

## 2022-08-09 PROCEDURE — 81003 URINALYSIS AUTO W/O SCOPE: CPT | Performed by: NURSE PRACTITIONER

## 2022-08-09 PROCEDURE — 84460 ALANINE AMINO (ALT) (SGPT): CPT | Performed by: NURSE PRACTITIONER

## 2022-08-09 PROCEDURE — 96417 CHEMO IV INFUS EACH ADDL SEQ: CPT

## 2022-08-09 PROCEDURE — 36591 DRAW BLOOD OFF VENOUS DEVICE: CPT

## 2022-08-09 PROCEDURE — 96413 CHEMO IV INFUSION 1 HR: CPT

## 2022-08-09 RX ORDER — METHYLPREDNISOLONE SODIUM SUCCINATE 125 MG/2ML
125 INJECTION, POWDER, LYOPHILIZED, FOR SOLUTION INTRAMUSCULAR; INTRAVENOUS
Status: DISCONTINUED | OUTPATIENT
Start: 2022-08-09 | End: 2022-08-09 | Stop reason: HOSPADM

## 2022-08-09 RX ORDER — ONDANSETRON 2 MG/ML
8 INJECTION INTRAMUSCULAR; INTRAVENOUS ONCE
Status: COMPLETED | OUTPATIENT
Start: 2022-08-09 | End: 2022-08-09

## 2022-08-09 RX ORDER — DIPHENHYDRAMINE HYDROCHLORIDE 50 MG/ML
50 INJECTION INTRAMUSCULAR; INTRAVENOUS
Status: DISCONTINUED | OUTPATIENT
Start: 2022-08-09 | End: 2022-08-09 | Stop reason: HOSPADM

## 2022-08-09 RX ORDER — FLUOROURACIL 50 MG/ML
400 INJECTION, SOLUTION INTRAVENOUS ONCE
Status: COMPLETED | OUTPATIENT
Start: 2022-08-09 | End: 2022-08-09

## 2022-08-09 RX ADMIN — FLUOROURACIL 690 MG: 50 INJECTION, SOLUTION INTRAVENOUS at 13:02

## 2022-08-09 RX ADMIN — SODIUM CHLORIDE 250 ML: 9 INJECTION, SOLUTION INTRAVENOUS at 09:41

## 2022-08-09 RX ADMIN — FLUOROURACIL 4150 MG: 50 INJECTION, SOLUTION INTRAVENOUS at 13:03

## 2022-08-09 RX ADMIN — SODIUM CHLORIDE 300 MG: 9 INJECTION, SOLUTION INTRAVENOUS at 10:13

## 2022-08-09 RX ADMIN — LEUCOVORIN CALCIUM 692 MG: 350 INJECTION, POWDER, LYOPHILIZED, FOR SUSPENSION INTRAMUSCULAR; INTRAVENOUS at 10:53

## 2022-08-09 RX ADMIN — DEXAMETHASONE SODIUM PHOSPHATE: 10 INJECTION, SOLUTION INTRAMUSCULAR; INTRAVENOUS at 09:41

## 2022-08-09 RX ADMIN — ONDANSETRON 8 MG: 2 INJECTION INTRAMUSCULAR; INTRAVENOUS at 10:10

## 2022-08-09 RX ADMIN — OXALIPLATIN 150 MG: 5 INJECTION, SOLUTION INTRAVENOUS at 10:55

## 2022-08-09 RX ADMIN — DEXTROSE MONOHYDRATE 250 ML: 50 INJECTION, SOLUTION INTRAVENOUS at 10:49

## 2022-08-09 NOTE — PROGRESS NOTES
Pt here for port lab draw and treatment. No problem with port access and labs met parameters. Treatment administered as directed without incident. CADD pump reviewed with pt and he is aware to return to [facility] for pump d.c and neulasta on Thursday at 1045. Pt ad ambulatory to lobby alone.

## 2022-08-10 ENCOUNTER — TELEPHONE (OUTPATIENT)
Dept: FAMILY MEDICINE | Facility: CLINIC | Age: 72
End: 2022-08-10

## 2022-08-10 NOTE — TELEPHONE ENCOUNTER
Reason for Call:  Home Health Care    Clara  with [facility] called regarding (reason for call): Please call with verbal orders - OK to leave detailed message    Orders are needed for this patient.     Skilled Nursing: Cancel nurse visit this week due to prakash gin for chemo disconnect    Pt Provider: Mc    Phone Number Homecare Nurse can be reached at: [phone]    Can we leave a detailed message on this number? YES    Phone number patient can be reached at: Home number on file [phone] (home)    Best Time: any    Call taken on 8/10/2022 at 3:51 PM by Karla Tate

## 2022-08-11 ENCOUNTER — INFUSION THERAPY VISIT (OUTPATIENT)
Dept: INFUSION THERAPY | Facility: HOSPITAL | Age: 72
End: 2022-08-11
Attending: INTERNAL MEDICINE
Payer: COMMERCIAL

## 2022-08-11 VITALS
RESPIRATION RATE: 18 BRPM | SYSTOLIC BLOOD PRESSURE: 143 MMHG | OXYGEN SATURATION: 97 % | HEART RATE: 60 BPM | TEMPERATURE: 97.8 F | DIASTOLIC BLOOD PRESSURE: 78 MMHG

## 2022-08-11 DIAGNOSIS — T45.1X5A CHEMOTHERAPY-INDUCED NEUTROPENIA (H): ICD-10-CM

## 2022-08-11 DIAGNOSIS — D70.1 CHEMOTHERAPY-INDUCED NEUTROPENIA (H): ICD-10-CM

## 2022-08-11 DIAGNOSIS — C18.7 ADENOCARCINOMA OF SIGMOID COLON (H): Primary | ICD-10-CM

## 2022-08-11 PROCEDURE — 96372 THER/PROPH/DIAG INJ SC/IM: CPT | Performed by: NURSE PRACTITIONER

## 2022-08-11 PROCEDURE — 250N000011 HC RX IP 250 OP 636: Performed by: NURSE PRACTITIONER

## 2022-08-11 RX ORDER — HEPARIN SODIUM (PORCINE) LOCK FLUSH IV SOLN 100 UNIT/ML 100 UNIT/ML
5 SOLUTION INTRAVENOUS
Status: DISCONTINUED | OUTPATIENT
Start: 2022-08-11 | End: 2022-08-11 | Stop reason: HOSPADM

## 2022-08-11 RX ADMIN — PEGFILGRASTIM 6 MG: 6 INJECTION SUBCUTANEOUS at 10:58

## 2022-08-11 RX ADMIN — Medication 5 ML: at 10:58

## 2022-08-11 NOTE — PROGRESS NOTES
Layton came to clinic for day 3 pump removal upon completion of his 46 hour home infusion of 5FU and neulasta. VSS.  Pt assessed.  He was educated on Neulasta and this was given subcutaneous into his left upper arm.  Pump removed.  Port flushed with ns, heparinized then de-accessed and site covered.  Layton left clinic ambulatory.

## 2022-08-13 ENCOUNTER — HEALTH MAINTENANCE LETTER (OUTPATIENT)
Age: 72
End: 2022-08-13

## 2022-08-15 DIAGNOSIS — E43 SEVERE PROTEIN-CALORIE MALNUTRITION (H): ICD-10-CM

## 2022-08-15 DIAGNOSIS — C18.7 ADENOCARCINOMA OF SIGMOID COLON (H): Primary | ICD-10-CM

## 2022-08-15 DIAGNOSIS — R63.0 POOR APPETITE: ICD-10-CM

## 2022-08-21 ENCOUNTER — HOSPITAL ENCOUNTER (INPATIENT)
Facility: CLINIC | Age: 72
LOS: 10 days | Discharge: HOME HEALTH | DRG: 335 | End: 2022-08-31
Attending: FAMILY MEDICINE | Admitting: FAMILY MEDICINE
Payer: COMMERCIAL

## 2022-08-21 ENCOUNTER — APPOINTMENT (OUTPATIENT)
Dept: CT IMAGING | Facility: CLINIC | Age: 72
DRG: 335 | End: 2022-08-21
Attending: FAMILY MEDICINE
Payer: COMMERCIAL

## 2022-08-21 ENCOUNTER — APPOINTMENT (OUTPATIENT)
Dept: GENERAL RADIOLOGY | Facility: CLINIC | Age: 72
DRG: 335 | End: 2022-08-21
Attending: FAMILY MEDICINE
Payer: COMMERCIAL

## 2022-08-21 DIAGNOSIS — K56.609 SBO (SMALL BOWEL OBSTRUCTION) (H): ICD-10-CM

## 2022-08-21 DIAGNOSIS — K65.0: Primary | ICD-10-CM

## 2022-08-21 DIAGNOSIS — Z93.3 COLOSTOMY PRESENT (H): ICD-10-CM

## 2022-08-21 DIAGNOSIS — Z11.52 ENCOUNTER FOR SCREENING LABORATORY TESTING FOR SEVERE ACUTE RESPIRATORY SYNDROME CORONAVIRUS 2 (SARS-COV-2): ICD-10-CM

## 2022-08-21 PROBLEM — K21.9 ESOPHAGEAL REFLUX: Status: ACTIVE | Noted: 2022-08-21

## 2022-08-21 PROBLEM — G25.81 RESTLESS LEG SYNDROME: Status: ACTIVE | Noted: 2022-08-21

## 2022-08-21 PROBLEM — R64 CANCER CACHEXIA (H): Status: ACTIVE | Noted: 2022-08-21

## 2022-08-21 LAB
ALBUMIN SERPL-MCNC: 4.2 G/DL (ref 3.4–5)
ALP SERPL-CCNC: 181 U/L (ref 40–150)
ALT SERPL W P-5'-P-CCNC: 31 U/L (ref 0–70)
ANION GAP SERPL CALCULATED.3IONS-SCNC: 9 MMOL/L (ref 3–14)
AST SERPL W P-5'-P-CCNC: 30 U/L (ref 0–45)
BASOPHILS # BLD AUTO: 0.1 10E3/UL (ref 0–0.2)
BASOPHILS NFR BLD AUTO: 1 %
BILIRUB SERPL-MCNC: 0.5 MG/DL (ref 0.2–1.3)
BUN SERPL-MCNC: 21 MG/DL (ref 7–30)
CALCIUM SERPL-MCNC: 10.3 MG/DL (ref 8.5–10.1)
CHLORIDE BLD-SCNC: 104 MMOL/L (ref 94–109)
CO2 SERPL-SCNC: 26 MMOL/L (ref 20–32)
CREAT SERPL-MCNC: 0.62 MG/DL (ref 0.66–1.25)
EOSINOPHIL # BLD AUTO: 0 10E3/UL (ref 0–0.7)
EOSINOPHIL NFR BLD AUTO: 0 %
ERYTHROCYTE [DISTWIDTH] IN BLOOD BY AUTOMATED COUNT: 18.4 % (ref 10–15)
GFR SERPL CREATININE-BSD FRML MDRD: >90 ML/MIN/1.73M2
GLUCOSE BLD-MCNC: 133 MG/DL (ref 70–99)
HCT VFR BLD AUTO: 50.8 % (ref 40–53)
HGB BLD-MCNC: 16.6 G/DL (ref 13.3–17.7)
HOLD SPECIMEN: NORMAL
IMM GRANULOCYTES # BLD: 0.1 10E3/UL
IMM GRANULOCYTES NFR BLD: 1 %
LACTATE SERPL-SCNC: 1.5 MMOL/L (ref 0.7–2)
LIPASE SERPL-CCNC: 95 U/L (ref 73–393)
LYMPHOCYTES # BLD AUTO: 3.1 10E3/UL (ref 0.8–5.3)
LYMPHOCYTES NFR BLD AUTO: 18 %
MCH RBC QN AUTO: 30.5 PG (ref 26.5–33)
MCHC RBC AUTO-ENTMCNC: 32.7 G/DL (ref 31.5–36.5)
MCV RBC AUTO: 93 FL (ref 78–100)
MONOCYTES # BLD AUTO: 1.6 10E3/UL (ref 0–1.3)
MONOCYTES NFR BLD AUTO: 9 %
NEUTROPHILS # BLD AUTO: 12.5 10E3/UL (ref 1.6–8.3)
NEUTROPHILS NFR BLD AUTO: 71 %
NRBC # BLD AUTO: 0 10E3/UL
NRBC BLD AUTO-RTO: 0 /100
PLATELET # BLD AUTO: 244 10E3/UL (ref 150–450)
POTASSIUM BLD-SCNC: 4.9 MMOL/L (ref 3.4–5.3)
PROT SERPL-MCNC: 9.1 G/DL (ref 6.8–8.8)
RBC # BLD AUTO: 5.45 10E6/UL (ref 4.4–5.9)
SARS-COV-2 RNA RESP QL NAA+PROBE: NEGATIVE
SODIUM SERPL-SCNC: 139 MMOL/L (ref 133–144)
WBC # BLD AUTO: 17.5 10E3/UL (ref 4–11)

## 2022-08-21 PROCEDURE — 99285 EMERGENCY DEPT VISIT HI MDM: CPT | Mod: 25 | Performed by: FAMILY MEDICINE

## 2022-08-21 PROCEDURE — 258N000003 HC RX IP 258 OP 636: Performed by: FAMILY MEDICINE

## 2022-08-21 PROCEDURE — 85025 COMPLETE CBC W/AUTO DIFF WBC: CPT | Performed by: FAMILY MEDICINE

## 2022-08-21 PROCEDURE — 250N000009 HC RX 250: Performed by: FAMILY MEDICINE

## 2022-08-21 PROCEDURE — 250N000009 HC RX 250

## 2022-08-21 PROCEDURE — 83690 ASSAY OF LIPASE: CPT | Performed by: FAMILY MEDICINE

## 2022-08-21 PROCEDURE — 83605 ASSAY OF LACTIC ACID: CPT | Performed by: FAMILY MEDICINE

## 2022-08-21 PROCEDURE — 99223 1ST HOSP IP/OBS HIGH 75: CPT | Mod: AI | Performed by: PHYSICIAN ASSISTANT

## 2022-08-21 PROCEDURE — 96361 HYDRATE IV INFUSION ADD-ON: CPT | Performed by: FAMILY MEDICINE

## 2022-08-21 PROCEDURE — 96375 TX/PRO/DX INJ NEW DRUG ADDON: CPT | Performed by: FAMILY MEDICINE

## 2022-08-21 PROCEDURE — 250N000013 HC RX MED GY IP 250 OP 250 PS 637: Performed by: PHYSICIAN ASSISTANT

## 2022-08-21 PROCEDURE — 74177 CT ABD & PELVIS W/CONTRAST: CPT

## 2022-08-21 PROCEDURE — C9803 HOPD COVID-19 SPEC COLLECT: HCPCS | Performed by: FAMILY MEDICINE

## 2022-08-21 PROCEDURE — 36415 COLL VENOUS BLD VENIPUNCTURE: CPT | Performed by: FAMILY MEDICINE

## 2022-08-21 PROCEDURE — 250N000011 HC RX IP 250 OP 636: Performed by: PHYSICIAN ASSISTANT

## 2022-08-21 PROCEDURE — 96374 THER/PROPH/DIAG INJ IV PUSH: CPT | Mod: 59 | Performed by: FAMILY MEDICINE

## 2022-08-21 PROCEDURE — 80053 COMPREHEN METABOLIC PANEL: CPT | Performed by: FAMILY MEDICINE

## 2022-08-21 PROCEDURE — 250N000011 HC RX IP 250 OP 636: Performed by: FAMILY MEDICINE

## 2022-08-21 PROCEDURE — 120N000001 HC R&B MED SURG/OB

## 2022-08-21 PROCEDURE — 99285 EMERGENCY DEPT VISIT HI MDM: CPT | Performed by: FAMILY MEDICINE

## 2022-08-21 PROCEDURE — 87635 SARS-COV-2 COVID-19 AMP PRB: CPT | Performed by: FAMILY MEDICINE

## 2022-08-21 PROCEDURE — 999N000065 XR CHEST PORT 1 VIEW

## 2022-08-21 RX ORDER — ACETAMINOPHEN 500 MG
1000 TABLET ORAL EVERY 6 HOURS PRN
Status: DISCONTINUED | OUTPATIENT
Start: 2022-08-21 | End: 2022-08-31 | Stop reason: HOSPADM

## 2022-08-21 RX ORDER — PROCHLORPERAZINE MALEATE 5 MG
5 TABLET ORAL EVERY 6 HOURS PRN
Status: DISCONTINUED | OUTPATIENT
Start: 2022-08-21 | End: 2022-08-24

## 2022-08-21 RX ORDER — HYDROMORPHONE HCL IN WATER/PF 6 MG/30 ML
.2-.5 PATIENT CONTROLLED ANALGESIA SYRINGE INTRAVENOUS
Status: DISCONTINUED | OUTPATIENT
Start: 2022-08-21 | End: 2022-08-24

## 2022-08-21 RX ORDER — LIDOCAINE 40 MG/G
CREAM TOPICAL
Status: DISCONTINUED | OUTPATIENT
Start: 2022-08-21 | End: 2022-08-24

## 2022-08-21 RX ORDER — NALOXONE HYDROCHLORIDE 0.4 MG/ML
0.4 INJECTION, SOLUTION INTRAMUSCULAR; INTRAVENOUS; SUBCUTANEOUS
Status: DISCONTINUED | OUTPATIENT
Start: 2022-08-21 | End: 2022-08-31 | Stop reason: HOSPADM

## 2022-08-21 RX ORDER — ONDANSETRON 2 MG/ML
4 INJECTION INTRAMUSCULAR; INTRAVENOUS ONCE
Status: COMPLETED | OUTPATIENT
Start: 2022-08-21 | End: 2022-08-21

## 2022-08-21 RX ORDER — FLUOROURACIL 50 MG/ML
INJECTION, SOLUTION INTRAVENOUS
COMMUNITY
Start: 2022-07-15 | End: 2023-01-27

## 2022-08-21 RX ORDER — NALOXONE HYDROCHLORIDE 0.4 MG/ML
0.2 INJECTION, SOLUTION INTRAMUSCULAR; INTRAVENOUS; SUBCUTANEOUS
Status: DISCONTINUED | OUTPATIENT
Start: 2022-08-21 | End: 2022-08-31 | Stop reason: HOSPADM

## 2022-08-21 RX ORDER — ONDANSETRON 4 MG/1
4 TABLET, ORALLY DISINTEGRATING ORAL EVERY 6 HOURS PRN
Status: DISCONTINUED | OUTPATIENT
Start: 2022-08-21 | End: 2022-08-24

## 2022-08-21 RX ORDER — PROCHLORPERAZINE 25 MG
12.5 SUPPOSITORY, RECTAL RECTAL EVERY 12 HOURS PRN
Status: DISCONTINUED | OUTPATIENT
Start: 2022-08-21 | End: 2022-08-31 | Stop reason: HOSPADM

## 2022-08-21 RX ORDER — ENOXAPARIN SODIUM 100 MG/ML
30 INJECTION SUBCUTANEOUS EVERY 24 HOURS
Status: DISCONTINUED | OUTPATIENT
Start: 2022-08-21 | End: 2022-08-23

## 2022-08-21 RX ORDER — LIDOCAINE 4 G/G
1 PATCH TOPICAL EVERY 24 HOURS
Status: DISCONTINUED | OUTPATIENT
Start: 2022-08-21 | End: 2022-08-31 | Stop reason: HOSPADM

## 2022-08-21 RX ORDER — SODIUM CHLORIDE 9 MG/ML
INJECTION, SOLUTION INTRAVENOUS CONTINUOUS
Status: DISCONTINUED | OUTPATIENT
Start: 2022-08-21 | End: 2022-08-22

## 2022-08-21 RX ORDER — ONDANSETRON 2 MG/ML
4 INJECTION INTRAMUSCULAR; INTRAVENOUS EVERY 6 HOURS PRN
Status: DISCONTINUED | OUTPATIENT
Start: 2022-08-21 | End: 2022-08-24

## 2022-08-21 RX ORDER — LORAZEPAM 2 MG/ML
0.5 INJECTION INTRAMUSCULAR ONCE
Status: COMPLETED | OUTPATIENT
Start: 2022-08-21 | End: 2022-08-21

## 2022-08-21 RX ORDER — HYDROMORPHONE HYDROCHLORIDE 1 MG/ML
0.5 INJECTION, SOLUTION INTRAMUSCULAR; INTRAVENOUS; SUBCUTANEOUS
Status: DISCONTINUED | OUTPATIENT
Start: 2022-08-21 | End: 2022-08-21

## 2022-08-21 RX ORDER — LIDOCAINE HYDROCHLORIDE 20 MG/ML
JELLY TOPICAL
Status: COMPLETED
Start: 2022-08-21 | End: 2022-08-21

## 2022-08-21 RX ORDER — MIRTAZAPINE 15 MG/1
15 TABLET, FILM COATED ORAL AT BEDTIME
Status: DISCONTINUED | OUTPATIENT
Start: 2022-08-21 | End: 2022-08-31 | Stop reason: HOSPADM

## 2022-08-21 RX ORDER — ONDANSETRON 2 MG/ML
4 INJECTION INTRAMUSCULAR; INTRAVENOUS EVERY 30 MIN PRN
Status: DISCONTINUED | OUTPATIENT
Start: 2022-08-21 | End: 2022-08-21

## 2022-08-21 RX ORDER — IOPAMIDOL 755 MG/ML
61 INJECTION, SOLUTION INTRAVASCULAR ONCE
Status: COMPLETED | OUTPATIENT
Start: 2022-08-21 | End: 2022-08-21

## 2022-08-21 RX ORDER — LIDOCAINE HYDROCHLORIDE 20 MG/ML
JELLY TOPICAL ONCE
Status: COMPLETED | OUTPATIENT
Start: 2022-08-21 | End: 2022-08-21

## 2022-08-21 RX ORDER — OXYCODONE HYDROCHLORIDE 5 MG/1
5 TABLET ORAL EVERY 8 HOURS PRN
Status: DISCONTINUED | OUTPATIENT
Start: 2022-08-21 | End: 2022-08-24 | Stop reason: DRUGHIGH

## 2022-08-21 RX ADMIN — SODIUM CHLORIDE 55 ML: 9 INJECTION, SOLUTION INTRAVENOUS at 15:21

## 2022-08-21 RX ADMIN — ONDANSETRON 4 MG: 2 INJECTION INTRAMUSCULAR; INTRAVENOUS at 21:48

## 2022-08-21 RX ADMIN — ENOXAPARIN SODIUM 30 MG: 30 INJECTION SUBCUTANEOUS at 21:51

## 2022-08-21 RX ADMIN — LIDOCAINE HYDROCHLORIDE: 20 JELLY TOPICAL at 18:23

## 2022-08-21 RX ADMIN — IOPAMIDOL 61 ML: 755 INJECTION, SOLUTION INTRAVENOUS at 15:21

## 2022-08-21 RX ADMIN — LORAZEPAM 0.5 MG: 2 INJECTION INTRAMUSCULAR; INTRAVENOUS at 17:38

## 2022-08-21 RX ADMIN — HYDROMORPHONE HYDROCHLORIDE 0.5 MG: 1 INJECTION, SOLUTION INTRAMUSCULAR; INTRAVENOUS; SUBCUTANEOUS at 14:57

## 2022-08-21 RX ADMIN — PROCHLORPERAZINE EDISYLATE 5 MG: 5 INJECTION INTRAMUSCULAR; INTRAVENOUS at 19:47

## 2022-08-21 RX ADMIN — LIDOCAINE 1 PATCH: 560 PATCH PERCUTANEOUS; TOPICAL; TRANSDERMAL at 21:39

## 2022-08-21 RX ADMIN — SODIUM CHLORIDE 1000 ML: 9 INJECTION, SOLUTION INTRAVENOUS at 14:37

## 2022-08-21 RX ADMIN — SODIUM CHLORIDE 1000 ML: 9 INJECTION, SOLUTION INTRAVENOUS at 17:37

## 2022-08-21 RX ADMIN — HYDROMORPHONE HYDROCHLORIDE 0.4 MG: 0.2 INJECTION, SOLUTION INTRAMUSCULAR; INTRAVENOUS; SUBCUTANEOUS at 21:36

## 2022-08-21 RX ADMIN — HYDROMORPHONE HYDROCHLORIDE 0.1 MG: 0.2 INJECTION, SOLUTION INTRAMUSCULAR; INTRAVENOUS; SUBCUTANEOUS at 21:46

## 2022-08-21 RX ADMIN — SODIUM CHLORIDE: 9 INJECTION, SOLUTION INTRAVENOUS at 19:01

## 2022-08-21 RX ADMIN — ONDANSETRON 4 MG: 2 INJECTION INTRAMUSCULAR; INTRAVENOUS at 14:38

## 2022-08-21 RX ADMIN — HYDROMORPHONE HYDROCHLORIDE 0.4 MG: 0.2 INJECTION, SOLUTION INTRAMUSCULAR; INTRAVENOUS; SUBCUTANEOUS at 19:40

## 2022-08-21 ASSESSMENT — ENCOUNTER SYMPTOMS
CONSTIPATION: 0
FREQUENCY: 0
DIARRHEA: 0
HEADACHES: 0
CHILLS: 0
DYSURIA: 0
WHEEZING: 0
SINUS PRESSURE: 0
COUGH: 0
PALPITATIONS: 0
FEVER: 0
DIAPHORESIS: 0
SORE THROAT: 0
NAUSEA: 1
BLOOD IN STOOL: 0
VOMITING: 1
SHORTNESS OF BREATH: 0
ABDOMINAL PAIN: 1

## 2022-08-21 ASSESSMENT — ACTIVITIES OF DAILY LIVING (ADL)
ADLS_ACUITY_SCORE: 35
ADLS_ACUITY_SCORE: 20
ADLS_ACUITY_SCORE: 20
ADLS_ACUITY_SCORE: 22
ADLS_ACUITY_SCORE: 35

## 2022-08-21 NOTE — H&P
"[facility]    History and Physical - Hospitalist Service       Date of Admission:  8/21/2022    Assessment & Plan      Kalin Shepard is a 72 year old male admitted on 8/21/2022. He presented to the emergency department for evaluation of abdominal pain, nausea, and vomiting and was found to have a small bowel obstruction for which he is being admitted for further evaluation and treatment.    SBO (small bowel obstruction)  CT abdomen & pelvis demonstrates \"Small bowel obstruction with transition point in the right midabdomen, presumably secondary to adhesion. No evidence of overt bowel ischemia or perforation. 2.  Small volume ascites. 3.  Known sigmoid colonic malignancy again noted.\" Occurs in the setting of known colon cancer with current colostomy. No prior history of small bowel obstruction other than the obstruction associated with his colon mass.   Case discussed between emergency department and General Surgery.  - General Surgery did not feel consult is warranted at this time, is willing to consult if patient is not improving. If patient were to need surgery, surgery could likely be done at [facility] and would not require transfer (per emergency department summarization of their discussion with surgery)  - NPO  - NG placed in emergency department 8/21/2022, continue  - Analgesia: IV dilaudid   - Antiemetics available    Leukocytosis with left shift  Admit WBC 17.5, ANC 12.5. Afebrile. No evidence of infection by history or exam. Patient recently had neutropenia per oncology notes 8/2/22. Received Neulasta injection on 8/11 with his most recent round of chemo. Unclear etiology.  - No antibiotics indicated at this time, but continue to monitor for signs/symptoms of infection and pursue further work-up if warranted  - CBC with diff in am    Hypercalcemia, mild  Admit serum calcium 10.3, asymptomatic. Most likely due to dehydration, but could be related to his known cancer.  - " "IV fluids, recheck in am  - If not normalized, check ionized calcium     Colon adenocarcinoma (stage IV with peritoneal mets), s/p colostomy   Follows with oncology Dr. [place]. Diagnosed on CT 3/26/22, mass of sigmoid colon causing small bowel and colonic obstruction. Hospitalized at [facility], s/p colostomy. Currently on chemo with FOLFOX and bevacizumab, last infusion 8/11/22.   - Colostomy cares  - Continue with outpatient oncology management    Cancer associated pain  Managed prior to admission with scheduled acetaminophen 1g qid (although taking prn), lidocaine patch, and prn oxycodone.  - Continue lidocaine patch  - Hold acetaminophen and oxycodone while NPO    Esophageal reflux  Managed prior to admission with omeprazole 20 mg daily, hold while NPO.    Cachexia and anorexia  Admit BMI 17.65. Secondary to cancer and chemotherapy. Managed prior to admission with mirtazapine 15 mg q hs.   - Hold mirtazapine while NPO       Diet: NPO for Medical/Clinical Reasons Except for: Meds  DVT Prophylaxis: Enoxaparin (Lovenox) SQ  Castro Catheter: Not present  Central Lines: PRESENT       Cardiac Monitoring: None  Code Status: Full Code  - discussed at time of admission       Disposition Plan      Expected Discharge Date: 08/23/2022                The patient's care was discussed with the Attending Physician, Dr. John Bro, Patient and Patient's Family.    Cheryl Fabian PA-C  Hospitalist Service  [facility]  Securely message with the [employer] Web Console (learn more here)  Text page via [facility] Paging/Directory         ______________________________________________________________________    Chief Complaint   \"I started having abdominal pain last night.\"    History is obtained from the patient and his wife, review of EMR, and emergency department sign out from Dr. Herberth Garza.    History of Present Illness   Kalin Shepard is a 72 year old male who " "presented to the emergency department for evaluation of abdominal pain.    Patient has a prior history of colon cancer with a large sigmoid mass and is currently receiving chemotherapy.  He has a colostomy in place.  He ran out of ostomy supplies last week due to he was taking orders, had been taking only liquid nutrition for 5 days when he was without supplies.  Four days ago he advanced slowly to solid foods.    Last night around 9 PM he developed abdominal pain that has worsened since onset.  He had associated nausea and vomiting, has been unable to keep down water, food, or medications.  He feels bloated.  He has not had any output from his ostomy since yesterday at 5 PM, very minimal flatus as well.  Because the pain was worsening and becoming intolerable, he presented to the emergency department and was found to have a small bowel obstruction.  He has not been diagnosed with any other small bowel obstructions (other than the obstruction that was noted with his colon mass), but says similar symptoms occurred about a month ago but were not as severe or prolonged and resolved on their own without evaluation.    Pain is located in his entire abdomen but worse on the right with occasional radiation to his right flank.  Pain is described as a constant cramping sensation that waxes and wanes, with intermittent stabbing.  Pain rated 2/10 at best, 8/10 at worst.    Pain improves temporarily with pain medication and after vomiting.  Pain is worse with any movement.    Review of systems:  Has had a poor appetite since his cancer diagnosis, notes \"I have good days and bad days.\"  Has some broken sleep at baseline, sleep was poor last night due to pain.  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  He is feeling generally weak and fatigued.  The remainder review of systems is negative.      Review of Systems    The 10 point Review of Systems is negative other than noted in the HPI or here.     Past Medical " History    I have reviewed this patient's medical history and updated it with pertinent information if needed.   Past Medical History:   Diagnosis Date     Cancer (H)        Past Surgical History   I have reviewed this patient's surgical history and updated it with pertinent information if needed.  Past Surgical History:   Procedure Laterality Date     INSERT PORT VASCULAR ACCESS Right 4/21/2022    Procedure: INSERTION, VASCULAR ACCESS PORT;  Surgeon: Marianne Schroeder MD;  Location: [facility] OR     INSERT PORT VASCULAR ACCESS Left 6/2/2022    Procedure: INSERTION, VASCULAR ACCESS PORT;  Surgeon: Phong Finch MD;  Location: [facility] OR     IR CHEST PORT PLACEMENT > 5 YRS OF AGE  6/2/2022     LAPAROTOMY EXPLORATORY N/A 3/26/2022    Procedure: exploratory laparotomy, small bowel resection, colostomy creation;  Surgeon: Riley Magdaleno MD;  Location: [facility] OR     PICC INSERTION - DOUBLE LUMEN Left 03/28/2022    left medial brachial 5 fr dl picc 49 cm     SIGMOIDOSCOPY FLEXIBLE N/A 3/26/2022    Procedure: Sigmoidoscopy flexible;  Surgeon: Riley Magdaleno MD;  Location: [facility] OR       Social History   I have reviewed this patient's social history and updated it with pertinent information if needed.  Social History     Tobacco Use     Smoking status: Current Every Day Smoker     Packs/day: 0.50     Years: 50.00     Pack years: 25.00     Smokeless tobacco: Never Used   Vaping Use     Vaping Use: Never used   Substance Use Topics     Alcohol use: Not Currently     Drug use: Never       Family History   I have reviewed this patient's family history and updated it with pertinent information if needed.  Family History   Problem Relation Age of Onset     Colon Cancer Father        Prior to Admission Medications   Prior to Admission Medications   Prescriptions Last Dose Informant Patient Reported? Taking?   Lidocaine (LIDOCARE) 4 % Patch 8/20/2022 at off Self No Yes   Sig: Place 1 patch onto the skin every 24 hours To prevent lidocaine  "toxicity, patient should be patch free for 12 hrs daily.   Ostomy Supplies MISC  Self No No   Si each Every Mon, Wed, Fri Morning Hollister1 piece flat fecal with filter #9687 20 pouches per month   2\" barrier ring #5107 (1 per pouch)   Adapt powder #7944   No sting film barrier # 3595   Adapt odor eliminator and lubricant 236ml bottle # 07025   M-9 Spray room deodorizer #4763       acetaminophen (TYLENOL) 500 MG tablet 2022 at am Self No Yes   Sig: Take 2 tablets (1,000 mg) by mouth 4 times daily   Patient taking differently: Take 1,000 mg by mouth every 6 hours as needed   dexamethasone (DECADRON) 4 MG tablet 2022 Self No Yes   Sig: Take 2 tablets (8 mg) by mouth daily Take for 2 days, starting the day after chemo. Take with food.   fluorouracil (ADRUCIL) 2.5 GM/50ML SOLN injection 2022 Self Yes Yes   ibuprofen (ADVIL/MOTRIN) 400 MG tablet 2022 at am Self Yes Yes   Sig: Take 400 mg by mouth every 8 hours as needed for moderate pain   mirtazapine (REMERON) 15 MG tablet 2022 at hs Self No Yes   Sig: Take 1 tablet (15 mg) by mouth At Bedtime   multivitamin (CENTRUM SILVER) tablet 2022 at am Self Yes Yes   Sig: Take 1 tablet by mouth daily   omeprazole (PRILOSEC) 20 MG DR capsule 2022 at am threw up Self Yes Yes   Sig: Take 20 mg by mouth 2 times daily   ondansetron (ZOFRAN) 8 MG tablet Past Month at Unknown time Self No Yes   Sig: Take 1 tablet (8 mg) by mouth every 8 hours as needed for nausea (vomiting)   oxyCODONE (ROXICODONE) 5 MG tablet Past Week at Unknown time Self No Yes   Sig: Take 1 tablet (5 mg) by mouth every 8 hours as needed for moderate to severe pain   polyethylene glycol (MIRALAX) 17 GM/Dose powder 2022 at am Self No Yes   Sig: Take 17 g by mouth daily   prochlorperazine (COMPAZINE) 10 MG tablet More than a month at Unknown time Self No Yes   Sig: Take 1 tablet (10 mg) by mouth every 6 hours as needed for nausea or vomiting      Facility-Administered " Medications: None     Allergies   No Known Allergies    Physical Exam   Vital Signs: Temp: 97.6  F (36.4  C) Temp src: Oral BP: 118/80 Pulse: 78   Resp: 14 SpO2: 94 %      Weight: 123 lbs 0 oz    Constitutional: Alert, oriented, cooperative. Appears uncomfortable but not acutely distressed. Appears nontoxic. Speaking in full coherent sentences.     Eyes: Eyes are clear, pupils are reactive. No scleral icterus.    HEENT: Oropharynx is clear and moist, no lesions. Normocephalic, no evidence of cranial trauma.      Cardiovascular: Regular rhythm and rate, normal S1 and S2. No murmur, rubs, or gallops. Peripheral pulses intact bilaterally. No lower extremity edema.    Respiratory: Non-labored breathing on room air.  Lung sounds are clear to auscultation bilaterally without wheezes, rhonchi, or crackles.    GI: NG in place draining brown-green fluid. Tympanic percussion in upper quadrants, dull in lower quadrants. Soft, non-distended. Tender to palpation of right lower quadrant > right upper quadrant > left quadrants. No rebound or guarding. No hepatosplenomegaly or masses appreciated. Hypoactive bowel sounds. Presence of colostomy in left lower quadrant, bag empty of stool or flatus.    Musculoskeletal: Without obvious deformity, normal range of motion.  Diminished muscle bulk and tone. Distal CMS intact.      Skin: Warm and dry, no rashes or ecchymoses. No mottling of skin. Healing lesion present on left shin with eschar present, but no erythema or exudate.     Neurologic: Patient moves all extremities. Gross strength and sensation are equal bilaterally.    Genitourinary: Deferred      Data   Data reviewed today: I reviewed all medications, new labs and imaging results over the last 24 hours. I personally reviewed CT abdomen and pelvis which shows significantly distended loops of small bowel consistent with a small bowel obstruction, as well as a large sigmoid mass.    Recent Labs   Lab 08/21/22  1434   WBC 17.5*   HGB  16.6   MCV 93         POTASSIUM 4.9   CHLORIDE 104   CO2 26   BUN 21   CR 0.62*   ANIONGAP 9   ROXIE 10.3*   *   ALBUMIN 4.2   PROTTOTAL 9.1*   BILITOTAL 0.5   ALKPHOS 181*   ALT 31   AST 30   LIPASE 95     Recent Results (from the past 24 hour(s))   CT Abdomen Pelvis w Contrast    Narrative    EXAM: CT ABDOMEN PELVIS W CONTRAST  LOCATION: [facility]  DATE/TIME: 8/21/2022 3:30 PM    INDICATION: abdominal pain s p colectomy, suspect SBO  COMPARISON: MRI 06/13/2022, 05/04/2022  TECHNIQUE: CT scan of the abdomen and pelvis was performed following injection of IV contrast. Multiplanar reformats were obtained. Dose reduction techniques were used.  CONTRAST: 61 mL Isovue 370    FINDINGS:   LOWER CHEST: Unremarkable.    HEPATOBILIARY: Normal.    PANCREAS: Normal.    SPLEEN: Normal.    ADRENAL GLANDS: Normal.    KIDNEYS/BLADDER: No hydronephrosis. Left renal cyst, no specific follow-up recommended. Urinary bladder is unremarkable.    BOWEL: New dilated proximal small bowel with focal transition point in the central mesentery just to the right of the midline (series 2 image 105, series 3 image 26). Distal small bowel and colon are decompressed. No evidence of contreras bowel wall   hypoenhancement, pneumatosis or free intraperitoneal gas. Mild mesenteric edema and trace ascites. Postsurgical changes of right lower quadrant small bowel enterotomy as well as left lower quadrant loop colostomy. Likely sigmoid colonic mass again noted,   better seen on prior MRI (series 2 image 167).    LYMPH NODES: No suspicious lymphadenopathy.    VASCULATURE: Severe calcified atherosclerosis with ectatic abdominal aorta measuring up to 2.9 cm (series 2 image 97).    PELVIC ORGANS: Normal.    MUSCULOSKELETAL: No acute bony abnormality.      Impression    IMPRESSION:   1.  Small bowel obstruction with transition point in the right midabdomen, presumably secondary to adhesion. No evidence of overt bowel  ischemia or perforation.  2.  Small volume ascites.  3.  Known sigmoid colonic malignancy again noted.   XR Chest Port 1 View    Narrative    EXAM: XR CHEST PORT 1 VIEW  LOCATION: [facility]  DATE/TIME: 8/21/2022 7:03 PM    INDICATION: NG placement  COMPARISON: Chest radiograph 04/21/2022, same day abdominal CT      Impression    IMPRESSION: Nasogastric tube with tip and sidehole overlying the stomach. Normal cardiac silhouette with left chest port tip overlying the superior vena cava. No evidence of acute cardiopulmonary disease. Stable size of 1.3 cm nodule overlying the left   upper lung without definite correlate on prior PET, consider chest CT for further evaluation on a nonemergent basis. Persistent bowel obstruction, better seen on same day CT.

## 2022-08-21 NOTE — ED TRIAGE NOTES
Pt here with vomiting and dehydration. Hx of bowel obstructions.     Triage Assessment     Row Name 08/21/22 3616       Triage Assessment (Adult)    Airway WDL WDL       Respiratory WDL    Respiratory WDL WDL       Skin Circulation/Temperature WDL    Skin Circulation/Temperature WDL WDL       Cardiac WDL    Cardiac WDL WDL       Peripheral/Neurovascular WDL    Peripheral Neurovascular WDL WDL       Cognitive/Neuro/Behavioral WDL    Cognitive/Neuro/Behavioral WDL WDL

## 2022-08-21 NOTE — ED NOTES
"[facility]   ED Nurse to Floor Handoff     Kalin Shepard, a 72 year old, male from with a spouse,  in a home  They arrived in the ED by     ED Chief Complaint: abdominal pain.      Final diagnoses:   Patient Active Problem List    Diagnosis Date Noted     SBO (small bowel obstruction) (H) 08/21/2022     Priority: Medium     Chemotherapy-induced neutropenia (H) 08/02/2022     Priority: Medium     Dehydration 07/19/2022     Priority: Medium     Cancer associated pain 06/20/2022     Priority: Medium     Oxycodone 5mg bid.  2 month supply given 6/20/22       Adenocarcinoma of sigmoid colon (H) 04/13/2022     Priority: Medium     Colon adenocarcinoma (H) 04/11/2022     Priority: Medium     Peritoneal involvement.  Resection of obstruction, small intestine adherent to mass.  Unable to resect sigmoid mass, extensive involvement.         Colostomy present (H) 04/11/2022     Priority: Medium     Cachexia (H) 04/11/2022     Priority: Medium    (K56.609) SBO (small bowel obstruction) (H)  Comment:   Plan:         Needed?: No    Allergies:  No Known Allergies    Past Medical Hx:   Past Medical History:   Diagnosis Date     Cancer (H)        Vital Signs:  /83   Pulse 77   Temp 97.3  F (36.3  C) (Oral)   Resp 18   Ht 1.778 m (5' 10\")   Wt 55.8 kg (123 lb)   SpO2 99%   BMI 17.65 kg/m       Elimination Status: Continent: Yes     Activity Level: Independent    Baseline Mental status: WDL  Current Mental Status changes: at basesline    Infection present or suspected this encounter: no  Sepsis suspected: No  Isolation type: no  Patient tested for COVID 19 prior to admission: YES     Activity level - Baseline/Home:  Independent  Activity Level - Current:   Stand with Assist    Bariatric equipment needed?: No    In the ED these meds were given:   Medications   0.9% sodium chloride BOLUS (1,000 mLs Intravenous New Bag 8/21/22 1120)     Followed by   sodium chloride 0.9% infusion " (has no administration in time range)   ondansetron (ZOFRAN) injection 4 mg (has no administration in time range)   HYDROmorphone (PF) (DILAUDID) injection 0.5 mg (0.5 mg Intravenous Given 8/21/22 1457)   0.9% sodium chloride BOLUS (0 mLs Intravenous Stopped 8/21/22 1737)   ondansetron (ZOFRAN) injection 4 mg (4 mg Intravenous Given 8/21/22 1438)   iopamidol (ISOVUE-370) solution 61 mL (61 mLs Intravenous Given 8/21/22 1521)   sodium chloride 0.9 % bag 500mL for CT scan flush use (55 mLs Intravenous Given 8/21/22 1521)   LORazepam (ATIVAN) injection 0.5 mg (0.5 mg Intravenous Given 8/21/22 1738)   lidocaine (XYLOCAINE) 2 % external gel ( Topical Given 8/21/22 1823)       Drips running?  No    Home pump  No    Current LDAs: (Line status:198934)    Labs results:   Labs Ordered and Resulted from Time of ED Arrival Up to the Time of Departure from the ED  Results for orders placed or performed during the hospital encounter of 08/21/22 (from the past 24 hour(s))   [place] Draw    Narrative    The following orders were created for panel order [place] Draw.  Procedure                               Abnormality         Status                     ---------                               -----------         ------                     Extra Blue Top Tube[[id number]]                                                         Extra Red Top Tube[[id number]]                               Final result               Extra Green Top (Lithium...[[id number]]                      Final result               Extra Purple Top Tube[[id number]]                            Final result                 Please view results for these tests on the individual orders.   Extra Red Top Tube   Result Value Ref Range    Hold Specimen JIC    Extra Green Top (Lithium Heparin) Tube   Result Value Ref Range    Hold Specimen JIC    Extra Purple Top Tube   Result Value Ref Range    Hold Specimen JIC    CBC with platelets differential    Narrative    The following  orders were created for panel order CBC with platelets differential.  Procedure                               Abnormality         Status                     ---------                               -----------         ------                     CBC with platelets and d...[[id number]]  Abnormal            Final result                 Please view results for these tests on the individual orders.   Comprehensive metabolic panel   Result Value Ref Range    Sodium 139 133 - 144 mmol/L    Potassium 4.9 3.4 - 5.3 mmol/L    Chloride 104 94 - 109 mmol/L    Carbon Dioxide (CO2) 26 20 - 32 mmol/L    Anion Gap 9 3 - 14 mmol/L    Urea Nitrogen 21 7 - 30 mg/dL    Creatinine 0.62 (L) 0.66 - 1.25 mg/dL    Calcium 10.3 (H) 8.5 - 10.1 mg/dL    Glucose 133 (H) 70 - 99 mg/dL    Alkaline Phosphatase 181 (H) 40 - 150 U/L    AST 30 0 - 45 U/L    ALT 31 0 - 70 U/L    Protein Total 9.1 (H) 6.8 - 8.8 g/dL    Albumin 4.2 3.4 - 5.0 g/dL    Bilirubin Total 0.5 0.2 - 1.3 mg/dL    GFR Estimate >90 >60 mL/min/1.73m2   Lipase   Result Value Ref Range    Lipase 95 73 - 393 U/L   CBC with platelets and differential   Result Value Ref Range    WBC Count 17.5 (H) 4.0 - 11.0 10e3/uL    RBC Count 5.45 4.40 - 5.90 10e6/uL    Hemoglobin 16.6 13.3 - 17.7 g/dL    Hematocrit 50.8 40.0 - 53.0 %    MCV 93 78 - 100 fL    MCH 30.5 26.5 - 33.0 pg    MCHC 32.7 31.5 - 36.5 g/dL    RDW 18.4 (H) 10.0 - 15.0 %    Platelet Count 244 150 - 450 10e3/uL    % Neutrophils 71 %    % Lymphocytes 18 %    % Monocytes 9 %    % Eosinophils 0 %    % Basophils 1 %    % Immature Granulocytes 1 %    NRBCs per 100 WBC 0 <1 /100    Absolute Neutrophils 12.5 (H) 1.6 - 8.3 10e3/uL    Absolute Lymphocytes 3.1 0.8 - 5.3 10e3/uL    Absolute Monocytes 1.6 (H) 0.0 - 1.3 10e3/uL    Absolute Eosinophils 0.0 0.0 - 0.7 10e3/uL    Absolute Basophils 0.1 0.0 - 0.2 10e3/uL    Absolute Immature Granulocytes 0.1 <=0.4 10e3/uL    Absolute NRBCs 0.0 10e3/uL   Lactic acid whole blood   Result Value Ref  Range    Lactic Acid 1.5 0.7 - 2.0 mmol/L   Asymptomatic COVID-19 Virus (Coronavirus) by PCR Nose    Specimen: Nose; Swab   Result Value Ref Range    SARS CoV2 PCR Negative Negative    Narrative    Testing was performed using the leonor  SARS-CoV-2 & Influenza A/B Assay on the leonor  Shruti  System.  This test should be ordered for the detection of SARS-COV-2 in individuals who meet SARS-CoV-2 clinical and/or epidemiological criteria. Test performance is unknown in asymptomatic patients.  This test is for in vitro diagnostic use under the FDA EUA for laboratories certified under CLIA to perform moderate and/or high complexity testing. This test has not been FDA cleared or approved.  A negative test does not rule out the presence of PCR inhibitors in the specimen or target RNA in concentration below the limit of detection for the assay. The possibility of a false negative should be considered if the patient's recent exposure or clinical presentation suggests COVID-19.  [facility] are certified under the Clinical Laboratory Improvement Amendments of 1988 (CLIA-88) as qualified to perform moderate and/or high complexity laboratory testing.   CT Abdomen Pelvis w Contrast    Narrative    EXAM: CT ABDOMEN PELVIS W CONTRAST  LOCATION: [facility]  DATE/TIME: 8/21/2022 3:30 PM    INDICATION: abdominal pain s p colectomy, suspect SBO  COMPARISON: MRI 06/13/2022, 05/04/2022  TECHNIQUE: CT scan of the abdomen and pelvis was performed following injection of IV contrast. Multiplanar reformats were obtained. Dose reduction techniques were used.  CONTRAST: 61 mL Isovue 370    FINDINGS:   LOWER CHEST: Unremarkable.    HEPATOBILIARY: Normal.    PANCREAS: Normal.    SPLEEN: Normal.    ADRENAL GLANDS: Normal.    KIDNEYS/BLADDER: No hydronephrosis. Left renal cyst, no specific follow-up recommended. Urinary bladder is unremarkable.    BOWEL: New dilated proximal small bowel with focal  transition point in the central mesentery just to the right of the midline (series 2 image 105, series 3 image 26). Distal small bowel and colon are decompressed. No evidence of contreras bowel wall   hypoenhancement, pneumatosis or free intraperitoneal gas. Mild mesenteric edema and trace ascites. Postsurgical changes of right lower quadrant small bowel enterotomy as well as left lower quadrant loop colostomy. Likely sigmoid colonic mass again noted,   better seen on prior MRI (series 2 image 167).    LYMPH NODES: No suspicious lymphadenopathy.    VASCULATURE: Severe calcified atherosclerosis with ectatic abdominal aorta measuring up to 2.9 cm (series 2 image 97).    PELVIC ORGANS: Normal.    MUSCULOSKELETAL: No acute bony abnormality.      Impression    IMPRESSION:   1.  Small bowel obstruction with transition point in the right midabdomen, presumably secondary to adhesion. No evidence of overt bowel ischemia or perforation.  2.  Small volume ascites.  3.  Known sigmoid colonic malignancy again noted.       Imaging Studies:   Recent Results (from the past 24 hour(s))  @[id number]@    Recent vital signs: BP: [unfilled], T: 97.3, HR: 77, R: 18     [place] Coma Scale Score:    GCS:   Motor 6=Obeys commands   Verbal 5=Oriented   Eye Opening 4=Spontaneous   Total: 15        Risk for violent behavior: No       Pt needs tele? No  Skin/wound Issues: None    Code Status:     Pain control: good    Nausea control: fair    Abnormal labs/tests/findings requiring intervention:     Family present during ED course? Yes   Family Comments/Social Situation comments:     Tasks needing completion: None    Rosy Wood RN

## 2022-08-21 NOTE — ED PROVIDER NOTES
History     Chief Complaint   Patient presents with     Vomiting     HPI  Kalin Shepard is a 72 year old male who presents with a history of colonic adenocarcinoma the sigmoid colon with colostomy.  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  His ANC has been 0.5 mild anemia to 11.8 and thrombocytopenia to 130 8K.  He has had stable creatinine electrolytes and liver function tests.    He is on 46 hours of 5-FU and Neulasta.  Followed by oncology at [place] last seen August second.  At the time of his last visit they were delaying his seventh cycle of chemotherapy by a week for neutropenia.  Then they intended to perform chemo every 2 weeks with consideration of reduced doses in the future.  He has had chronic anorexia and dysgeusia without effect with Zyprexa and continued on Compazine and Zofran and Remeron.  He is also been on Decadron he takes oxycodone 3 times a day for pain.  At the last visit he also had findings of cellulitis with neutropenia and was started on Keflex    He arrives here with onset last night around 9 PM of vomiting and inability to hold anything down brownish emesis.  Unable to take any of his pain meds yesterday arrives here with 7 out of 10 pain.  No ostomy output since about that time.  Still urinating.  Arrives here with a heart rate in the 120s.  No associated fever.  Feels that his abdomen is more firm.    Allergies:  No Known Allergies    Problem List:    Patient Active Problem List    Diagnosis Date Noted     Chemotherapy-induced neutropenia (H) 08/02/2022     Priority: Medium     Dehydration 07/19/2022     Priority: Medium     Cancer associated pain 06/20/2022     Priority: Medium     Oxycodone 5mg bid.  2 month supply given 6/20/22       Adenocarcinoma of sigmoid colon (H) 04/13/2022     Priority: Medium     Colon adenocarcinoma (H) 04/11/2022     Priority: Medium     Peritoneal involvement.   Resection of obstruction, small intestine adherent to mass.  Unable to resect sigmoid mass, extensive involvement.         Colostomy present (H) 04/11/2022     Priority: Medium     Cachexia (H) 04/11/2022     Priority: Medium        Past Medical History:    Past Medical History:   Diagnosis Date     Cancer (H)        Past Surgical History:    Past Surgical History:   Procedure Laterality Date     INSERT PORT VASCULAR ACCESS Right 4/21/2022    Procedure: INSERTION, VASCULAR ACCESS PORT;  Surgeon: Marianne Schroeder MD;  Location: [facility] OR     INSERT PORT VASCULAR ACCESS Left 6/2/2022    Procedure: INSERTION, VASCULAR ACCESS PORT;  Surgeon: Phong Finch MD;  Location: [facility] OR     IR CHEST PORT PLACEMENT > 5 YRS OF AGE  6/2/2022     LAPAROTOMY EXPLORATORY N/A 3/26/2022    Procedure: exploratory laparotomy, small bowel resection, colostomy creation;  Surgeon: Riley Magdaleno MD;  Location: [facility] OR     PICC INSERTION - DOUBLE LUMEN Left 03/28/2022    left medial brachial 5 fr dl picc 49 cm     SIGMOIDOSCOPY FLEXIBLE N/A 3/26/2022    Procedure: Sigmoidoscopy flexible;  Surgeon: Riley Magdaleno MD;  Location: [facility] OR       Family History:    No family history on file.    Social History:  Marital Status:   [2]  Social History     Tobacco Use     Smoking status: Current Every Day Smoker     Packs/day: 0.50     Years: 50.00     Pack years: 25.00     Smokeless tobacco: Never Used   Vaping Use     Vaping Use: Never used   Substance Use Topics     Alcohol use: Not Currently     Drug use: Never        Medications:    acetaminophen (TYLENOL) 500 MG tablet  dexamethasone (DECADRON) 4 MG tablet  ibuprofen (ADVIL/MOTRIN) 400 MG tablet  Lidocaine (LIDOCARE) 4 % Patch  mirtazapine (REMERON) 15 MG tablet  multivitamin (CENTRUM SILVER) tablet  omeprazole (PRILOSEC) 20 MG DR capsule  ondansetron (ZOFRAN) 8 MG tablet  Ostomy Supplies MISC  oxyCODONE (ROXICODONE) 5 MG tablet  polyethylene glycol (MIRALAX) 17 GM/Dose  "powder  prochlorperazine (COMPAZINE) 10 MG tablet          Review of Systems   Constitutional: Negative for chills, diaphoresis and fever.   HENT: Negative for ear pain, sinus pressure and sore throat.    Eyes: Negative for visual disturbance.   Respiratory: Negative for cough, shortness of breath and wheezing.    Cardiovascular: Negative for chest pain and palpitations.   Gastrointestinal: Positive for abdominal pain, nausea and vomiting. Negative for blood in stool, constipation and diarrhea.   Genitourinary: Negative for dysuria, frequency and urgency.   Skin: Negative for rash.   Neurological: Negative for headaches.   All other systems reviewed and are negative.      Physical Exam   BP: (!) 124/109  Pulse: (!) 126  Temp: 97.3  F (36.3  C)  Resp: 20  Height: 177.8 cm (5' 10\")  Weight: 55.8 kg (123 lb)  SpO2: 100 %      Physical Exam  Constitutional:       General: He is in acute distress.      Appearance: He is not diaphoretic.   HENT:      Head: Atraumatic.   Eyes:      Conjunctiva/sclera: Conjunctivae normal.   Cardiovascular:      Rate and Rhythm: Normal rate and regular rhythm.      Heart sounds: No murmur heard.  Pulmonary:      Effort: Pulmonary effort is normal. No respiratory distress.      Breath sounds: Normal breath sounds. No stridor. No wheezing or rhonchi.   Abdominal:      General: Abdomen is flat. There is distension.      Palpations: There is no mass.      Tenderness: There is abdominal tenderness. There is no guarding.   Musculoskeletal:      Cervical back: Neck supple.      Right lower leg: No edema.      Left lower leg: No edema.   Skin:     Coloration: Skin is not pale.      Findings: No rash.   Neurological:      General: No focal deficit present.      Mental Status: He is alert.      Sensory: No sensory deficit.      Motor: No weakness.         ED Course                 Procedures              Critical Care time:  none               Results for orders placed or performed during the " hospital encounter of 08/21/22 (from the past 24 hour(s))   [place] Draw    Narrative    The following orders were created for panel order [place] Draw.  Procedure                               Abnormality         Status                     ---------                               -----------         ------                     Extra Blue Top Tube[[id number]]                                                         Extra Red Top Tube[[id number]]                               Final result               Extra Green Top (Lithium...[[id number]]                      Final result               Extra Purple Top Tube[[id number]]                            Final result                 Please view results for these tests on the individual orders.   Extra Red Top Tube   Result Value Ref Range    Hold Specimen JIC    Extra Green Top (Lithium Heparin) Tube   Result Value Ref Range    Hold Specimen JIC    Extra Purple Top Tube   Result Value Ref Range    Hold Specimen JIC    CBC with platelets differential    Narrative    The following orders were created for panel order CBC with platelets differential.  Procedure                               Abnormality         Status                     ---------                               -----------         ------                     CBC with platelets and d...[[id number]]  Abnormal            Final result                 Please view results for these tests on the individual orders.   Comprehensive metabolic panel   Result Value Ref Range    Sodium 139 133 - 144 mmol/L    Potassium 4.9 3.4 - 5.3 mmol/L    Chloride 104 94 - 109 mmol/L    Carbon Dioxide (CO2) 26 20 - 32 mmol/L    Anion Gap 9 3 - 14 mmol/L    Urea Nitrogen 21 7 - 30 mg/dL    Creatinine 0.62 (L) 0.66 - 1.25 mg/dL    Calcium 10.3 (H) 8.5 - 10.1 mg/dL    Glucose 133 (H) 70 - 99 mg/dL    Alkaline Phosphatase 181 (H) 40 - 150 U/L    AST 30 0 - 45 U/L    ALT 31 0 - 70 U/L    Protein Total 9.1 (H) 6.8 - 8.8 g/dL    Albumin 4.2 3.4 - 5.0  g/dL    Bilirubin Total 0.5 0.2 - 1.3 mg/dL    GFR Estimate >90 >60 mL/min/1.73m2   Lipase   Result Value Ref Range    Lipase 95 73 - 393 U/L   CBC with platelets and differential   Result Value Ref Range    WBC Count 17.5 (H) 4.0 - 11.0 10e3/uL    RBC Count 5.45 4.40 - 5.90 10e6/uL    Hemoglobin 16.6 13.3 - 17.7 g/dL    Hematocrit 50.8 40.0 - 53.0 %    MCV 93 78 - 100 fL    MCH 30.5 26.5 - 33.0 pg    MCHC 32.7 31.5 - 36.5 g/dL    RDW 18.4 (H) 10.0 - 15.0 %    Platelet Count 244 150 - 450 10e3/uL    % Neutrophils 71 %    % Lymphocytes 18 %    % Monocytes 9 %    % Eosinophils 0 %    % Basophils 1 %    % Immature Granulocytes 1 %    NRBCs per 100 WBC 0 <1 /100    Absolute Neutrophils 12.5 (H) 1.6 - 8.3 10e3/uL    Absolute Lymphocytes 3.1 0.8 - 5.3 10e3/uL    Absolute Monocytes 1.6 (H) 0.0 - 1.3 10e3/uL    Absolute Eosinophils 0.0 0.0 - 0.7 10e3/uL    Absolute Basophils 0.1 0.0 - 0.2 10e3/uL    Absolute Immature Granulocytes 0.1 <=0.4 10e3/uL    Absolute NRBCs 0.0 10e3/uL   Lactic acid whole blood   Result Value Ref Range    Lactic Acid 1.5 0.7 - 2.0 mmol/L   Asymptomatic COVID-19 Virus (Coronavirus) by PCR Nose    Specimen: Nose; Swab   Result Value Ref Range    SARS CoV2 PCR Negative Negative    Narrative    Testing was performed using the leonor  SARS-CoV-2 & Influenza A/B Assay on the leonor  Shruti  System.  This test should be ordered for the detection of SARS-COV-2 in individuals who meet SARS-CoV-2 clinical and/or epidemiological criteria. Test performance is unknown in asymptomatic patients.  This test is for in vitro diagnostic use under the FDA EUA for laboratories certified under CLIA to perform moderate and/or high complexity testing. This test has not been FDA cleared or approved.  A negative test does not rule out the presence of PCR inhibitors in the specimen or target RNA in concentration below the limit of detection for the assay. The possibility of a false negative should be considered if the patient's  recent exposure or clinical presentation suggests COVID-19.  [facility] are certified under the Clinical Laboratory Improvement Amendments of 1988 (CLIA-88) as qualified to perform moderate and/or high complexity laboratory testing.   CT Abdomen Pelvis w Contrast    Narrative    EXAM: CT ABDOMEN PELVIS W CONTRAST  LOCATION: [facility]  DATE/TIME: 8/21/2022 3:30 PM    INDICATION: abdominal pain s p colectomy, suspect SBO  COMPARISON: MRI 06/13/2022, 05/04/2022  TECHNIQUE: CT scan of the abdomen and pelvis was performed following injection of IV contrast. Multiplanar reformats were obtained. Dose reduction techniques were used.  CONTRAST: 61 mL Isovue 370    FINDINGS:   LOWER CHEST: Unremarkable.    HEPATOBILIARY: Normal.    PANCREAS: Normal.    SPLEEN: Normal.    ADRENAL GLANDS: Normal.    KIDNEYS/BLADDER: No hydronephrosis. Left renal cyst, no specific follow-up recommended. Urinary bladder is unremarkable.    BOWEL: New dilated proximal small bowel with focal transition point in the central mesentery just to the right of the midline (series 2 image 105, series 3 image 26). Distal small bowel and colon are decompressed. No evidence of contreras bowel wall   hypoenhancement, pneumatosis or free intraperitoneal gas. Mild mesenteric edema and trace ascites. Postsurgical changes of right lower quadrant small bowel enterotomy as well as left lower quadrant loop colostomy. Likely sigmoid colonic mass again noted,   better seen on prior MRI (series 2 image 167).    LYMPH NODES: No suspicious lymphadenopathy.    VASCULATURE: Severe calcified atherosclerosis with ectatic abdominal aorta measuring up to 2.9 cm (series 2 image 97).    PELVIC ORGANS: Normal.    MUSCULOSKELETAL: No acute bony abnormality.      Impression    IMPRESSION:   1.  Small bowel obstruction with transition point in the right midabdomen, presumably secondary to adhesion. No evidence of overt bowel ischemia or  perforation.  2.  Small volume ascites.  3.  Known sigmoid colonic malignancy again noted.   XR Chest Port 1 View    Narrative    EXAM: XR CHEST PORT 1 VIEW  LOCATION: [facility]  DATE/TIME: 8/21/2022 7:03 PM    INDICATION: NG placement  COMPARISON: Chest radiograph 04/21/2022, same day abdominal CT      Impression    IMPRESSION: Nasogastric tube with tip and sidehole overlying the stomach. Normal cardiac silhouette with left chest port tip overlying the superior vena cava. No evidence of acute cardiopulmonary disease. Stable size of 1.3 cm nodule overlying the left   upper lung without definite correlate on prior PET, consider chest CT for further evaluation on a nonemergent basis. Persistent bowel obstruction, better seen on same day CT.       Medications - No data to display    Assessments & Plan (with Medical Decision Making)     MDM: Kalin Shepard is a 72 year old male who presents with findings suggestive of small bowel obstruction history of colostomy done about 5 months ago for history of sigmoid adenocarcinoma.  Onset of symptoms abruptly at around 9:00 last night.  He did have a break in his intake recently.  About a week ago there was a mishap with his supply of ostomy bags.  [facility] to another company that had told him all of his prescriptions would have carried over and then when he came to refilling this they did not have them.  He then had arranged for those to be sent and they were not sent and ultimately he switched to [email or web address] in [place] and now has his ostomy bags.  During this time however he had switched over to a liquid diet to try and reduce his use of ostomy bags and just restarted trials of food earlier last week.  1 month ago he did have an episode of small bowel obstruction like symptoms but this cleared at home without being seen.    Findings on imaging was consistent with small bowel obstruction.  Nasogastric tube  placed.  Ordered Ativan as needed before this.  Discussed plan with the patient.  Discussed with Dr. Schroeder who asked that we not place a consult unless the patient needs to be seen at this time.  She would recommend conservative management initially and should there be a more refractory course to consult surgery.          ED to Inpatient Handoff:    Discussed with Cheryl Fabian   Patient accepted for Inpatient Stay  Pending studies include none  Code Status: Not Addressed             I have reviewed the nursing notes.    I have reviewed the findings, diagnosis, plan and need for follow up with the patient.       New Prescriptions    No medications on file       Final diagnoses:   SBO (small bowel obstruction) (H)       8/21/2022   [facility] EMERGENCY DEPT     Herberth Garza MD  08/22/22 0033

## 2022-08-22 ENCOUNTER — APPOINTMENT (OUTPATIENT)
Dept: GENERAL RADIOLOGY | Facility: CLINIC | Age: 72
DRG: 335 | End: 2022-08-22
Attending: HOSPITALIST
Payer: COMMERCIAL

## 2022-08-22 LAB
ANION GAP SERPL CALCULATED.3IONS-SCNC: 4 MMOL/L (ref 3–14)
BASOPHILS # BLD AUTO: 0.1 10E3/UL (ref 0–0.2)
BASOPHILS NFR BLD AUTO: 1 %
BUN SERPL-MCNC: 29 MG/DL (ref 7–30)
CALCIUM SERPL-MCNC: 8.4 MG/DL (ref 8.5–10.1)
CHLORIDE BLD-SCNC: 110 MMOL/L (ref 94–109)
CO2 SERPL-SCNC: 29 MMOL/L (ref 20–32)
CREAT SERPL-MCNC: 0.62 MG/DL (ref 0.66–1.25)
EOSINOPHIL # BLD AUTO: 0 10E3/UL (ref 0–0.7)
EOSINOPHIL NFR BLD AUTO: 0 %
ERYTHROCYTE [DISTWIDTH] IN BLOOD BY AUTOMATED COUNT: 18.1 % (ref 10–15)
GFR SERPL CREATININE-BSD FRML MDRD: >90 ML/MIN/1.73M2
GLUCOSE BLD-MCNC: 111 MG/DL (ref 70–99)
HCT VFR BLD AUTO: 41.5 % (ref 40–53)
HGB BLD-MCNC: 13.2 G/DL (ref 13.3–17.7)
IMM GRANULOCYTES # BLD: 0.1 10E3/UL
IMM GRANULOCYTES NFR BLD: 1 %
LYMPHOCYTES # BLD AUTO: 1.4 10E3/UL (ref 0.8–5.3)
LYMPHOCYTES NFR BLD AUTO: 14 %
MCH RBC QN AUTO: 30.7 PG (ref 26.5–33)
MCHC RBC AUTO-ENTMCNC: 31.8 G/DL (ref 31.5–36.5)
MCV RBC AUTO: 97 FL (ref 78–100)
MONOCYTES # BLD AUTO: 1.2 10E3/UL (ref 0–1.3)
MONOCYTES NFR BLD AUTO: 13 %
NEUTROPHILS # BLD AUTO: 6.9 10E3/UL (ref 1.6–8.3)
NEUTROPHILS NFR BLD AUTO: 71 %
NRBC # BLD AUTO: 0 10E3/UL
NRBC BLD AUTO-RTO: 0 /100
PLATELET # BLD AUTO: 178 10E3/UL (ref 150–450)
POTASSIUM BLD-SCNC: 3.9 MMOL/L (ref 3.4–5.3)
RBC # BLD AUTO: 4.3 10E6/UL (ref 4.4–5.9)
SODIUM SERPL-SCNC: 143 MMOL/L (ref 133–144)
WBC # BLD AUTO: 9.7 10E3/UL (ref 4–11)

## 2022-08-22 PROCEDURE — 258N000003 HC RX IP 258 OP 636: Performed by: PHYSICIAN ASSISTANT

## 2022-08-22 PROCEDURE — 250N000011 HC RX IP 250 OP 636: Performed by: HOSPITALIST

## 2022-08-22 PROCEDURE — 99233 SBSQ HOSP IP/OBS HIGH 50: CPT | Performed by: HOSPITALIST

## 2022-08-22 PROCEDURE — 80048 BASIC METABOLIC PNL TOTAL CA: CPT | Performed by: PHYSICIAN ASSISTANT

## 2022-08-22 PROCEDURE — 120N000001 HC R&B MED SURG/OB

## 2022-08-22 PROCEDURE — 250N000011 HC RX IP 250 OP 636: Performed by: PHYSICIAN ASSISTANT

## 2022-08-22 PROCEDURE — 74018 RADEX ABDOMEN 1 VIEW: CPT

## 2022-08-22 PROCEDURE — 36415 COLL VENOUS BLD VENIPUNCTURE: CPT | Performed by: PHYSICIAN ASSISTANT

## 2022-08-22 PROCEDURE — 85025 COMPLETE CBC W/AUTO DIFF WBC: CPT | Performed by: PHYSICIAN ASSISTANT

## 2022-08-22 PROCEDURE — 250N000013 HC RX MED GY IP 250 OP 250 PS 637: Performed by: PHYSICIAN ASSISTANT

## 2022-08-22 RX ORDER — SODIUM CHLORIDE AND POTASSIUM CHLORIDE 150; 450 MG/100ML; MG/100ML
INJECTION, SOLUTION INTRAVENOUS CONTINUOUS
Status: DISCONTINUED | OUTPATIENT
Start: 2022-08-22 | End: 2022-08-31 | Stop reason: HOSPADM

## 2022-08-22 RX ORDER — SODIUM CHLORIDE AND POTASSIUM CHLORIDE 150; 450 MG/100ML; MG/100ML
INJECTION, SOLUTION INTRAVENOUS CONTINUOUS
Status: DISPENSED | OUTPATIENT
Start: 2022-08-22 | End: 2022-08-22

## 2022-08-22 RX ADMIN — HYDROMORPHONE HYDROCHLORIDE 0.5 MG: 0.2 INJECTION, SOLUTION INTRAMUSCULAR; INTRAVENOUS; SUBCUTANEOUS at 18:25

## 2022-08-22 RX ADMIN — ENOXAPARIN SODIUM 30 MG: 30 INJECTION SUBCUTANEOUS at 21:03

## 2022-08-22 RX ADMIN — LIDOCAINE 1 PATCH: 560 PATCH PERCUTANEOUS; TOPICAL; TRANSDERMAL at 21:04

## 2022-08-22 RX ADMIN — HYDROMORPHONE HYDROCHLORIDE 0.5 MG: 0.2 INJECTION, SOLUTION INTRAMUSCULAR; INTRAVENOUS; SUBCUTANEOUS at 00:19

## 2022-08-22 RX ADMIN — HYDROMORPHONE HYDROCHLORIDE 0.5 MG: 0.2 INJECTION, SOLUTION INTRAMUSCULAR; INTRAVENOUS; SUBCUTANEOUS at 22:32

## 2022-08-22 RX ADMIN — SODIUM CHLORIDE: 9 INJECTION, SOLUTION INTRAVENOUS at 03:09

## 2022-08-22 RX ADMIN — HYDROMORPHONE HYDROCHLORIDE 0.4 MG: 0.2 INJECTION, SOLUTION INTRAMUSCULAR; INTRAVENOUS; SUBCUTANEOUS at 04:06

## 2022-08-22 RX ADMIN — POTASSIUM CHLORIDE AND SODIUM CHLORIDE: 450; 150 INJECTION, SOLUTION INTRAVENOUS at 21:25

## 2022-08-22 RX ADMIN — HYDROMORPHONE HYDROCHLORIDE 0.5 MG: 0.2 INJECTION, SOLUTION INTRAMUSCULAR; INTRAVENOUS; SUBCUTANEOUS at 20:29

## 2022-08-22 RX ADMIN — POTASSIUM CHLORIDE AND SODIUM CHLORIDE: 450; 150 INJECTION, SOLUTION INTRAVENOUS at 14:04

## 2022-08-22 RX ADMIN — HYDROMORPHONE HYDROCHLORIDE 0.4 MG: 0.2 INJECTION, SOLUTION INTRAMUSCULAR; INTRAVENOUS; SUBCUTANEOUS at 16:27

## 2022-08-22 RX ADMIN — HYDROMORPHONE HYDROCHLORIDE 0.5 MG: 0.2 INJECTION, SOLUTION INTRAMUSCULAR; INTRAVENOUS; SUBCUTANEOUS at 14:20

## 2022-08-22 RX ADMIN — ONDANSETRON 4 MG: 2 INJECTION INTRAMUSCULAR; INTRAVENOUS at 04:06

## 2022-08-22 RX ADMIN — POTASSIUM CHLORIDE AND SODIUM CHLORIDE: 450; 150 INJECTION, SOLUTION INTRAVENOUS at 08:57

## 2022-08-22 RX ADMIN — HYDROMORPHONE HYDROCHLORIDE 0.2 MG: 0.2 INJECTION, SOLUTION INTRAMUSCULAR; INTRAVENOUS; SUBCUTANEOUS at 12:22

## 2022-08-22 ASSESSMENT — ACTIVITIES OF DAILY LIVING (ADL)
ADLS_ACUITY_SCORE: 22

## 2022-08-22 NOTE — PLAN OF CARE
NG output 2200 ml overnight, continues to have large output, notified attending MD - ordered increased rate of IVF, administered per orders.  Urine output low/jose.      Patient states he feels better this morning, asking for breakfast! Patient denies nausea, denies pain currently, bowel sounds present.  Colostomy bag intact.

## 2022-08-22 NOTE — PLAN OF CARE
"Pt A&O x3. Pt stand by assist. Pts wife here most of shift and comforting pt. Pt experiencing abdominal pain and nausea, PRN's given via IV. NS running at 125 mL/hr. Pt has colostomy bag, no supplies with at this time. NG tube at low intermittent suction with brown/clear output.     /80   Pulse 78   Temp 97.6  F (36.4  C) (Oral)   Resp 14   Ht 1.778 m (5' 10\")   Wt 55.8 kg (123 lb)   SpO2 94%   BMI 17.65 kg/m        "

## 2022-08-22 NOTE — PLAN OF CARE
Patient is alert and oriented, vitals stable.  Patient c/ abdominal pain, rating 6-9/10.  PRN IV dilaudid given for pain which has been effective in decreasing pain to a tolerable level.  PRN IV zofran and compazine given for nausea.  NG intact with large amount of green/brown output, over 1100ml out overnight.  Lung sounds clear.  Oxygen sats WNL on room air.   Bowel sounds hypoactive.  Colostomy pouch intact.  Lidocaine patch on patient's left upper back.

## 2022-08-22 NOTE — PROGRESS NOTES
"[facility] Medicine Progress Note  Date of Service: 08/22/2022    Assessment & Plan          Kalin Shepard is a 72 year old male admitted on 8/21/2022. He presented to the emergency department for evaluation of abdominal pain, nausea, and vomiting and was found to have a small bowel obstruction for which he is being admitted for further evaluation and treatment.    SBO (small bowel obstruction)  CT abdomen & pelvis demonstrates \"Small bowel obstruction with transition point in the right midabdomen, presumably secondary to adhesion. No evidence of overt bowel ischemia or perforation. 2.  Small volume ascites. 3.  Known sigmoid colonic malignancy again noted.\" Occurs in the setting of known colon cancer with current colostomy. No prior history of small bowel obstruction other than the obstruction associated with his colon mass.   Case discussed between emergency department and General Surgery, who advised medical management here.  Had over 2 L of NG output over the past 16 hours.  - We will proceed with Gastrografin challenge with x-ray and 68 hours  - Clamp NG tube  - half NS +20 of K at 200 cc/h x 5 hours, then decrease to 150 an hour  - Repeat BMP tomorrow morning  - Continue hydromorphone 0.2-0.5 mg every 2 hours as needed IV    Leukocytosis with left shift  Admit WBC 17.5, ANC 12.5. Afebrile. No evidence of infection by history or exam. Patient recently had neutropenia per oncology notes 8/2/22. Received Neulasta injection on 8/11 with his most recent round of chemo.  On following morning 8/22 WBC improved to 9.7.  Suspect may have been due to some stress demargination.  -Monitor off antibiotics    Hypercalcemia, mild  Admit serum calcium 10.3, asymptomatic. Most likely due to dehydration, but could be related to his known cancer.  Calcium improved to 8.4 this morning.  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 [place]. Diagnosed on CT 3/26/22, mass of sigmoid colon causing small bowel and colonic obstruction. Hospitalized at [facility], s/p colostomy. Currently on chemo with FOLFOX and bevacizumab, last infusion 8/11/22.   - Colostomy cares  - Continue with outpatient oncology management    Cancer associated pain  Managed prior to admission with scheduled acetaminophen 1g qid (although taking prn), lidocaine patch, and prn oxycodone.  - Continue lidocaine patch  - Hold acetaminophen and oxycodone while NPO    Esophageal reflux  Managed prior to admission with omeprazole 20 mg daily, hold while NPO.    Cachexia and anorexia  Admit BMI 17.65. Secondary to cancer and chemotherapy. Managed prior to admission with mirtazapine 15 mg q hs.   - Hold mirtazapine while NPO      Diet: NPO for Medical/Clinical Reasons Except for: Meds    DVT Prophylaxis: Enoxaparin (Lovenox) SQ  Castro Catheter: Not present  Central Lines: PRESENT     Code Status: Full Code           Discussion: await gastrograffin - seems to be improving thus far but not much stool output yet    Disposition: Anticipate discharge tomorrow?     Attestation:  I have reviewed today's vital signs, notes, medications, labs and imaging.    Edy Adams MD       Interval History   Patient had a large amount of NG tube output.  He is feeling much better.  His pain was previously continual and now it is only every 5 to 10 minutes and is fleeting in nature.    Physical Exam   Temp:  [97.3  F (36.3  C)-98.2  F (36.8  C)] 98.2  F (36.8  C)  Pulse:  [] 81  Resp:  [13-56] 18  BP: (111-154)/() 127/83  SpO2:  [94 %-100 %] 95 %    Weights:   Vitals:    08/21/22 1422 08/22/22 0538   Weight: 55.8 kg (123 lb) 57 kg (125 lb 10.6 oz)    Body mass index is 18.03 kg/m .    Constitutional: Well-appearing with NG tube in place  CV: Regular  Respiratory: CTA bilaterally  GI: Soft, nontender,  nondistended currently.  Faint bowel sounds present.  Colostomy present left lower quadrant.  Skin: Warm and dry      Data   Recent Labs   Lab 08/22/22  0509 08/21/22  1434   WBC 9.7 17.5*   HGB 13.2* 16.6   MCV 97 93    244    139   POTASSIUM 3.9 4.9   CHLORIDE 110* 104   CO2 29 26   BUN 29 21   CR 0.62* 0.62*   ANIONGAP 4 9   ROXIE 8.4* 10.3*   * 133*   ALBUMIN  --  4.2   PROTTOTAL  --  9.1*   BILITOTAL  --  0.5   ALKPHOS  --  181*   ALT  --  31   AST  --  30   LIPASE  --  95       Recent Labs   Lab 08/22/22  0509 08/21/22  1434   * 133*        Unresulted Labs Ordered in the Past 30 Days of this Admission     No orders found for last 31 day(s).           Imaging  Recent Results (from the past 24 hour(s))   CT Abdomen Pelvis w Contrast    Narrative    EXAM: CT ABDOMEN PELVIS W CONTRAST  LOCATION: [facility]  DATE/TIME: 8/21/2022 3:30 PM    INDICATION: abdominal pain s p colectomy, suspect SBO  COMPARISON: MRI 06/13/2022, 05/04/2022  TECHNIQUE: CT scan of the abdomen and pelvis was performed following injection of IV contrast. Multiplanar reformats were obtained. Dose reduction techniques were used.  CONTRAST: 61 mL Isovue 370    FINDINGS:   LOWER CHEST: Unremarkable.    HEPATOBILIARY: Normal.    PANCREAS: Normal.    SPLEEN: Normal.    ADRENAL GLANDS: Normal.    KIDNEYS/BLADDER: No hydronephrosis. Left renal cyst, no specific follow-up recommended. Urinary bladder is unremarkable.    BOWEL: New dilated proximal small bowel with focal transition point in the central mesentery just to the right of the midline (series 2 image 105, series 3 image 26). Distal small bowel and colon are decompressed. No evidence of contreras bowel wall   hypoenhancement, pneumatosis or free intraperitoneal gas. Mild mesenteric edema and trace ascites. Postsurgical changes of right lower quadrant small bowel enterotomy as well as left lower quadrant loop colostomy. Likely sigmoid colonic mass  again noted,   better seen on prior MRI (series 2 image 167).    LYMPH NODES: No suspicious lymphadenopathy.    VASCULATURE: Severe calcified atherosclerosis with ectatic abdominal aorta measuring up to 2.9 cm (series 2 image 97).    PELVIC ORGANS: Normal.    MUSCULOSKELETAL: No acute bony abnormality.      Impression    IMPRESSION:   1.  Small bowel obstruction with transition point in the right midabdomen, presumably secondary to adhesion. No evidence of overt bowel ischemia or perforation.  2.  Small volume ascites.  3.  Known sigmoid colonic malignancy again noted.   XR Chest Port 1 View    Narrative    EXAM: XR CHEST PORT 1 VIEW  LOCATION: [facility]  DATE/TIME: 8/21/2022 7:03 PM    INDICATION: NG placement  COMPARISON: Chest radiograph 04/21/2022, same day abdominal CT      Impression    IMPRESSION: Nasogastric tube with tip and sidehole overlying the stomach. Normal cardiac silhouette with left chest port tip overlying the superior vena cava. No evidence of acute cardiopulmonary disease. Stable size of 1.3 cm nodule overlying the left   upper lung without definite correlate on prior PET, consider chest CT for further evaluation on a nonemergent basis. Persistent bowel obstruction, better seen on same day CT.        I reviewed all new labs and imaging results over the last 24 hours. I personally reviewed no images or EKG's today.    Medications     0.45% sodium chloride + KCl 20 mEq/L 200 mL/hr at 08/22/22 0857       diatrizoate meglumine-sodium  75 mL Per NG tube Once     enoxaparin ANTICOAGULANT  30 mg Subcutaneous Q24H     Lidocaine  1 patch Transdermal Q24H     lidocaine   Transdermal Q8H DIMAS     [Held by provider] mirtazapine  15 mg Oral At Bedtime     [Held by provider] omeprazole  20 mg Oral BID     sodium chloride (PF)  3 mL Intracatheter Q8H       Edy Adams MD

## 2022-08-22 NOTE — PROGRESS NOTES
"CLINICAL NUTRITION SERVICES - ASSESSMENT NOTE     Nutrition Prescription    RECOMMENDATIONS FOR MDs/PROVIDERS TO ORDER:  None at this time    Malnutrition Status:    Severe malnutrition in the context of acute on chronic illness    Recommendations already ordered by Registered Dietitian (RD):  Provide high protein and high calorie nutrition education  Monitor patient for diet adv. Order supplements as able.    Future/Additional Recommendations:  Monitor patient for diet adv  Monitor patient intakes, weights, labs, GI/BM     REASON FOR ASSESSMENT  Kalin Shepard is a/an 72 year old male assessed by the dietitian for Admission Nutrition Risk Screen for positive    NUTRITION HISTORY  Per chart review  Patient presents with SBO, leukocytosis, hypercalcemia, colon adenocarcinoma s/p colon ostomy, cancer related pain, esophageal reflux, cachexia and anorexia    Per patient interview  -Patient noted weight loss and decreased appetite related to pain. Patient stated appetite to be very variable. No new GI concerns identified at this time  -Patient typically consumed 2-3 small meals per day; he uses Boost at home. Patient was open to receiving ensure at meal times TID once he can tolerate PO.   -Patient requesting education on how to gain weight. RD provided education and added literature to patient discharge. RD also provide contact information to patient.    CURRENT NUTRITION ORDERS  Diet: Orders Placed This Encounter      NPO for Medical/Clinical Reasons Except for: Meds      Intake/Tolerance: No recorded intakes patient is currently NPO    LABS  Labs reviewed  Glucose slightly elevated-111 mg/dL    MEDICATIONS  Medications reviewed  Scheduled: Prilosec, Zofran  Continuous: sodium chloride infusion 125 ml/hr  PRN: dilaudid, zofran    ANTHROPOMETRICS  Height: 177.8 cm (5' 10\")  Most Recent Weight: 57 kg (125 lb 10.6 oz) IBW: 75.5 kg  BMI: Underweight BMI <18.5  Weight History:   Wt Readings from Last 15 Encounters: "   08/22/22 57 kg (125 lb 10.6 oz)   08/02/22 58.1 kg (128 lb)   07/19/22 58.1 kg (128 lb)   07/05/22 59.6 kg (131 lb 4.8 oz)   06/21/22 59.9 kg (132 lb)   06/20/22 59 kg (130 lb)   06/07/22 60 kg (132 lb 3.2 oz)   06/02/22 59 kg (130 lb)   05/25/22 60 kg (132 lb 3.2 oz)   05/24/22 59 kg (130 lb)   05/10/22 59.4 kg (131 lb)   04/27/22 63.5 kg (139 lb 14.4 oz)   04/26/22 61.2 kg (135 lb)   04/26/22 61.2 kg (135 lb)   04/21/22 59.4 kg (131 lb)   10.1 % significant weight loss noted within 6 months.     Dosing Weight: 57 kg-actual BW used    ASSESSED NUTRITION NEEDS  Estimated Energy Needs: 1,710-1,995 kcals/day (30 - 35 kcals/kg )  Justification: Increased needs  Estimated Protein Needs: 68-86 grams protein/day (1.2 - 1.5 grams of pro/kg)  Justification: Increased needs  Estimated Fluid Needs: 1,710-1,995 mL/day (1 mL/kcal)   Justification: Per provider pending fluid status    PHYSICAL FINDINGS  See malnutrition section below.    MALNUTRITION   % Intake: <75% for >/= 1 week   % Weight Loss: >10% in 6 months (moderate)  Subcutaneous Fat Loss: Facial region:  Severe and Upper arm:  severe  Muscle Loss: Temporal:  severe, Facial & jaw region:  severe and Thoracic region (clavicle, acromium bone, deltoid, trapezius, pectoral):  severe  Fluid Accumulation/Edema: None noted  Malnutrition Diagnosis: Severe malnutrition in the context of acute on chronic illness    NUTRITION DIAGNOSIS  Malnutrition related to increased needs and inadequate oral intake as evidenced by Severe muscle and fat loss 10.1% weight loss within 6 months, and less than 75% intakes for one week.      INTERVENTIONS  Implementation  Collaboration with other providers-IDT rounds     Goals  Diet adv v nutrition support within 2-3 days.     Monitoring/Evaluation  Progress toward goals will be monitored and evaluated per protocol.    Jaclyn Brownlee RDN, JULIENNE  Clinical Dietitian  Office: [phone]  Weekend pager: [phone]

## 2022-08-22 NOTE — PROGRESS NOTES
"Time: Assumed care of patient from     Reason for Admission: SBO    Activity: SBA    Neuro: A/O X4    GI/: NG tub in place. 1400 clamped for gastrographen, 850 ml brown tan drainage. intermitted nausea with no emesis. Patient states they feel like they are slightly bloated but is tolerating. Nurse told patient that if the bloating continues the NG tube can be connected again.        Skin: scab on left shin, non accessed port left clavicle area.      Diet: NPO    IV Access: 0.45% sodium chloride + KCl 20 150 ml/hr    Vitals: /79 (BP Location: Left arm)   Pulse 90   Temp 98.1  F (36.7  C) (Oral)   Resp 18   Ht 1.778 m (5' 10\")   Wt 57 kg (125 lb 10.6 oz)   SpO2 97%   BMI 18.03 kg/m      Pain: 7-8/10, 0.5 mg dilaudid given effective for about 1.5 hours.    Plan:   X-ray taken at 2200, another scheduled for 0700 on 8/23/2022  Stephy Bianchi RN       "

## 2022-08-22 NOTE — PROGRESS NOTES
"[place] [facility] ADMISSION NOTE    Patient admitted to room 2304 at approximately 635 pm via cart from emergency room. Patient was accompanied by spouse.     Verbal SBAR report received from Rosy prior to patient arrival.     Patient ambulated to bed with stand-by assist. Patient alert and oriented X 3. Pain is not well controlled.  Medication(s) being used: narcotic analgesics including hydromorphone (Dilaudid).  . Admission vital signs: Blood pressure 118/80, pulse 78, temperature 97.6  F (36.4  C), temperature source Oral, resp. rate 14, height 1.778 m (5' 10\"), weight 55.8 kg (123 lb), SpO2 94 %. Patient was oriented to plan of care, call light, bed controls, tv, telephone, bathroom and visiting hours.     Risk Assessment    The following safety risks were identified during admission: fall. Yellow risk band applied: YES.     Skin Initial Assessment    This writer admitted this patient and completed a full skin assessment and Wyatt score in the Adult PCS flowsheet. Appropriate interventions initiated as needed.     Secondary skin check completed by [place].    Wyatt Risk Assessment  Sensory Perception: 4-->no impairment  Moisture: 4-->rarely moist  Activity: 3-->walks occasionally  Mobility: 4-->no limitation  Nutrition: 3-->adequate  Friction and Shear: 3-->no apparent problem  Wyatt Score: 21  Mattress: Standard Hospital Mattress (Foam)  Bed Frame: Standard width and length    Education    Patient has a [place] to Observation order: No  Observation education completed and documented: N/A      Shorty Garcia RN    "

## 2022-08-23 ENCOUNTER — APPOINTMENT (OUTPATIENT)
Dept: GENERAL RADIOLOGY | Facility: CLINIC | Age: 72
DRG: 335 | End: 2022-08-23
Attending: HOSPITALIST
Payer: COMMERCIAL

## 2022-08-23 LAB
ANION GAP SERPL CALCULATED.3IONS-SCNC: 7 MMOL/L (ref 3–14)
BUN SERPL-MCNC: 44 MG/DL (ref 7–30)
CALCIUM SERPL-MCNC: 8.7 MG/DL (ref 8.5–10.1)
CHLORIDE BLD-SCNC: 105 MMOL/L (ref 94–109)
CO2 SERPL-SCNC: 27 MMOL/L (ref 20–32)
CREAT SERPL-MCNC: 0.63 MG/DL (ref 0.66–1.25)
GFR SERPL CREATININE-BSD FRML MDRD: >90 ML/MIN/1.73M2
GLUCOSE BLD-MCNC: 107 MG/DL (ref 70–99)
HOLD SPECIMEN: NORMAL
POTASSIUM BLD-SCNC: 4.3 MMOL/L (ref 3.4–5.3)
SODIUM SERPL-SCNC: 139 MMOL/L (ref 133–144)

## 2022-08-23 PROCEDURE — 250N000013 HC RX MED GY IP 250 OP 250 PS 637: Performed by: PHYSICIAN ASSISTANT

## 2022-08-23 PROCEDURE — 99233 SBSQ HOSP IP/OBS HIGH 50: CPT | Performed by: HOSPITALIST

## 2022-08-23 PROCEDURE — 250N000011 HC RX IP 250 OP 636: Performed by: HOSPITALIST

## 2022-08-23 PROCEDURE — 99221 1ST HOSP IP/OBS SF/LOW 40: CPT | Mod: 57 | Performed by: SURGERY

## 2022-08-23 PROCEDURE — 74018 RADEX ABDOMEN 1 VIEW: CPT

## 2022-08-23 PROCEDURE — 36415 COLL VENOUS BLD VENIPUNCTURE: CPT | Performed by: HOSPITALIST

## 2022-08-23 PROCEDURE — 120N000001 HC R&B MED SURG/OB

## 2022-08-23 PROCEDURE — 82310 ASSAY OF CALCIUM: CPT | Performed by: HOSPITALIST

## 2022-08-23 PROCEDURE — 250N000011 HC RX IP 250 OP 636: Performed by: PHYSICIAN ASSISTANT

## 2022-08-23 RX ORDER — CEFAZOLIN SODIUM 2 G/100ML
2 INJECTION, SOLUTION INTRAVENOUS SEE ADMIN INSTRUCTIONS
Status: CANCELLED | OUTPATIENT
Start: 2022-08-23

## 2022-08-23 RX ORDER — CEFAZOLIN SODIUM 2 G/100ML
2 INJECTION, SOLUTION INTRAVENOUS
Status: CANCELLED | OUTPATIENT
Start: 2022-08-23

## 2022-08-23 RX ORDER — HEPARIN SODIUM 5000 [USP'U]/.5ML
5000 INJECTION, SOLUTION INTRAVENOUS; SUBCUTANEOUS
Status: CANCELLED | OUTPATIENT
Start: 2022-08-23

## 2022-08-23 RX ADMIN — PROCHLORPERAZINE EDISYLATE 5 MG: 5 INJECTION INTRAMUSCULAR; INTRAVENOUS at 15:36

## 2022-08-23 RX ADMIN — DIATRIZOATE MEGLUMINE AND DIATRIZOATE SODIUM 75 ML: 660; 100 SOLUTION ORAL; RECTAL at 09:00

## 2022-08-23 RX ADMIN — PROCHLORPERAZINE EDISYLATE 5 MG: 5 INJECTION INTRAMUSCULAR; INTRAVENOUS at 02:29

## 2022-08-23 RX ADMIN — ONDANSETRON 4 MG: 2 INJECTION INTRAMUSCULAR; INTRAVENOUS at 12:45

## 2022-08-23 RX ADMIN — ONDANSETRON 4 MG: 2 INJECTION INTRAMUSCULAR; INTRAVENOUS at 20:24

## 2022-08-23 RX ADMIN — HYDROMORPHONE HYDROCHLORIDE 0.5 MG: 0.2 INJECTION, SOLUTION INTRAMUSCULAR; INTRAVENOUS; SUBCUTANEOUS at 08:34

## 2022-08-23 RX ADMIN — POTASSIUM CHLORIDE AND SODIUM CHLORIDE: 450; 150 INJECTION, SOLUTION INTRAVENOUS at 11:59

## 2022-08-23 RX ADMIN — HYDROMORPHONE HYDROCHLORIDE 0.5 MG: 0.2 INJECTION, SOLUTION INTRAMUSCULAR; INTRAVENOUS; SUBCUTANEOUS at 04:29

## 2022-08-23 RX ADMIN — HYDROMORPHONE HYDROCHLORIDE 0.4 MG: 0.2 INJECTION, SOLUTION INTRAMUSCULAR; INTRAVENOUS; SUBCUTANEOUS at 15:32

## 2022-08-23 RX ADMIN — POTASSIUM CHLORIDE AND SODIUM CHLORIDE: 450; 150 INJECTION, SOLUTION INTRAVENOUS at 21:33

## 2022-08-23 RX ADMIN — ONDANSETRON 4 MG: 2 INJECTION INTRAMUSCULAR; INTRAVENOUS at 06:31

## 2022-08-23 RX ADMIN — POTASSIUM CHLORIDE AND SODIUM CHLORIDE: 450; 150 INJECTION, SOLUTION INTRAVENOUS at 04:43

## 2022-08-23 RX ADMIN — HYDROMORPHONE HYDROCHLORIDE 0.5 MG: 0.2 INJECTION, SOLUTION INTRAMUSCULAR; INTRAVENOUS; SUBCUTANEOUS at 23:51

## 2022-08-23 RX ADMIN — HYDROMORPHONE HYDROCHLORIDE 0.5 MG: 0.2 INJECTION, SOLUTION INTRAMUSCULAR; INTRAVENOUS; SUBCUTANEOUS at 00:26

## 2022-08-23 RX ADMIN — HYDROMORPHONE HYDROCHLORIDE 0.4 MG: 0.2 INJECTION, SOLUTION INTRAMUSCULAR; INTRAVENOUS; SUBCUTANEOUS at 20:27

## 2022-08-23 RX ADMIN — HYDROMORPHONE HYDROCHLORIDE 0.5 MG: 0.2 INJECTION, SOLUTION INTRAMUSCULAR; INTRAVENOUS; SUBCUTANEOUS at 06:31

## 2022-08-23 RX ADMIN — HYDROMORPHONE HYDROCHLORIDE 0.5 MG: 0.2 INJECTION, SOLUTION INTRAMUSCULAR; INTRAVENOUS; SUBCUTANEOUS at 02:29

## 2022-08-23 RX ADMIN — LIDOCAINE 1 PATCH: 560 PATCH PERCUTANEOUS; TOPICAL; TRANSDERMAL at 20:06

## 2022-08-23 RX ADMIN — HYDROMORPHONE HYDROCHLORIDE 0.4 MG: 0.2 INJECTION, SOLUTION INTRAMUSCULAR; INTRAVENOUS; SUBCUTANEOUS at 12:45

## 2022-08-23 RX ADMIN — ONDANSETRON 4 MG: 2 INJECTION INTRAMUSCULAR; INTRAVENOUS at 00:41

## 2022-08-23 RX ADMIN — HYDROMORPHONE HYDROCHLORIDE 0.4 MG: 0.2 INJECTION, SOLUTION INTRAMUSCULAR; INTRAVENOUS; SUBCUTANEOUS at 17:57

## 2022-08-23 RX ADMIN — PROCHLORPERAZINE EDISYLATE 5 MG: 5 INJECTION INTRAMUSCULAR; INTRAVENOUS at 08:37

## 2022-08-23 RX ADMIN — HYDROMORPHONE HYDROCHLORIDE 0.4 MG: 0.2 INJECTION, SOLUTION INTRAMUSCULAR; INTRAVENOUS; SUBCUTANEOUS at 10:38

## 2022-08-23 ASSESSMENT — ACTIVITIES OF DAILY LIVING (ADL)
ADLS_ACUITY_SCORE: 22

## 2022-08-23 NOTE — PROGRESS NOTES
Neuro: A&Ox4.  Cardiac: WNL  Respiratory: WNL  GI/: No new stool/gas in colostomy. NG clamped for Gastrogaffin study. Emesis x1 after dilaudid administration. Pt gets nauseous w/ IV dilaudid administration, have been premedicating w/ zofran or compazine Hypoactive BS. Hooked back up to intermittent suction at 0600 due to patient discomfort and distension.   Mobility: SBA.   Diet: NPO   Pain: C/O intermittent cramps, 8/10. 0.5mg PRN IV dilaudid given around every 2 hours.  Skin: Scab L shin.  LDA: PIV infusing 0.45 NS w/ K20 @ 150ml/hr. NG to LIS    VS: VSS on RA. Afebrile.      Plan: Went down for second gastrograffin x ray this AM, awaiting results    .

## 2022-08-23 NOTE — PROGRESS NOTES
"[facility] Medicine Progress Note  Date of Service: 08/23/2022    Assessment & Plan          Kalin Shepard is a 72 year old male admitted on 8/21/2022. He presented to the emergency department for evaluation of abdominal pain, nausea, and vomiting and was found to have a small bowel obstruction for which he is being admitted for further evaluation and treatment.    SBO (small bowel obstruction)  CT abdomen & pelvis demonstrates \"Small bowel obstruction with transition point in the right midabdomen, presumably secondary to adhesion. No evidence of overt bowel ischemia or perforation. 2.  Small volume ascites. 3.  Known sigmoid colonic malignancy again noted.\" Occurs in the setting of known colon cancer with current colostomy. No prior history of small bowel obstruction other than the obstruction associated with his colon mass.   NG tube was placed in the ER 8/21.  He initially had about 2 L of output.  A Gastrografin study was attempted 8/22 but only had minimal contrast passing into the colon by 16 hours on 8/23/2022.  Patient did initially tolerate being off suction during the Gastrografin study, but morning of 8/23 he developed increasing pain prompting replacement of suction with about 500 cc out at that time.  He has not had any additional output since then.  Continues to have significant nausea although this seems to mostly correlate with his Dilaudid dosing.  -I have consulted Dr. Adam of general surgery to evaluate   -Continue half NS +20 of K, but can decrease to 125 an hour  - Repeat BMP tomorrow morning  - Continue hydromorphone 0.2-0.5 mg every 2 hours as needed IV  -- hold DVT prophylaxis pending surgery input (had lovenox at 9pm yesterday)    Leukocytosis with left shift  Admit WBC 17.5, ANC 12.5. Afebrile. No evidence of infection by history or exam. Patient recently had neutropenia per oncology notes 8/2/22. Received Neulasta injection on 8/11 with his " most recent round of chemo.  On following morning 8/22 WBC improved to 9.7.  Suspect may have been due to some stress demargination.  -Monitor off antibiotics    Hypercalcemia, mild  Admit serum calcium 10.3, asymptomatic. Most likely due to dehydration, but could be related to his known cancer.  Calcium improved to 8.4.  -Continue IV fluids.      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 [place]. Diagnosed on CT 3/26/22, mass of sigmoid colon causing small bowel and colonic obstruction. Hospitalized at [facility], s/p colostomy. Currently on chemo with FOLFOX and bevacizumab, last infusion 8/11/22.   - Colostomy cares  - Continue with outpatient oncology management    Cancer associated pain  Managed prior to admission with scheduled acetaminophen 1g qid (although taking prn), lidocaine patch, and prn oxycodone.  - Continue lidocaine patch  - Hold acetaminophen and oxycodone while NPO    Esophageal reflux  Managed prior to admission with omeprazole 20 mg daily, hold while NPO.    Cachexia and anorexia  Admit BMI 17.65. Secondary to cancer and chemotherapy. Managed prior to admission with mirtazapine 15 mg q hs.   - Hold mirtazapine while NPO      Diet: NPO for Medical/Clinical Reasons Except for: Meds    DVT Prophylaxis: Enoxaparin (Lovenox) SQ  Castro Catheter: Not present  Central Lines: PRESENT       Code Status: Full Code           Discussion: Await input from surgery.    Attestation:  I have reviewed today's vital signs, notes, medications, labs and imaging.    Edy Adams MD       Interval History   Patient is not yet feeling better.  He is having some nausea after getting some pain medicine.    Physical Exam   Temp:  [97.3  F (36.3  C)-98.1  F (36.7  C)] 97.4  F (36.3  C)  Pulse:  [76-90] 76  Resp:  [16-18] 18  BP: (130-151)/(79-91) 151/79  SpO2:  [92 %-97 %] 96 %    Weights:   Vitals:    08/21/22 1422 08/22/22 0538  08/23/22 0617   Weight: 55.8 kg (123 lb) 57 kg (125 lb 10.6 oz) 58.3 kg (128 lb 8.5 oz)    Body mass index is 18.44 kg/m .    Constitutional: Well-appearing though fatigued with NG tube in place  CV: Regular  Respiratory: CTA bilaterally  GI: Soft, nontender, nondistended currently.  Extremely faint bowel sounds present.  Colostomy present left lower quadrant.  Skin: Warm and dry      Data   Recent Labs   Lab 08/23/22  0506 08/22/22  0509 08/21/22  1434   WBC  --  9.7 17.5*   HGB  --  13.2* 16.6   MCV  --  97 93   PLT  --  178 244    143 139   POTASSIUM 4.3 3.9 4.9   CHLORIDE 105 110* 104   CO2 27 29 26   BUN 44* 29 21   CR 0.63* 0.62* 0.62*   ANIONGAP 7 4 9   ROXIE 8.7 8.4* 10.3*   * 111* 133*   ALBUMIN  --   --  4.2   PROTTOTAL  --   --  9.1*   BILITOTAL  --   --  0.5   ALKPHOS  --   --  181*   ALT  --   --  31   AST  --   --  30   LIPASE  --   --  95       Recent Labs   Lab 08/23/22  0506 08/22/22  0509 08/21/22  1434   * 111* 133*        Unresulted Labs Ordered in the Past 30 Days of this Admission     No orders found from 7/22/2022 to 8/22/2022.           Imaging  Recent Results (from the past 24 hour(s))   XR Gastrografin Challenge    Narrative    EXAM: XR GASTROGRAFIN CHALLENGE  LOCATION: [facility]  DATE/TIME: 8/23/2022 8:57 AM    INDICATION: Small Bowel Obstruction  COMPARISON: CT 08/21/2022  TECHNIQUE: Routine water soluble contrast follow-through challenge.    FINDINGS:  NUMBER OF FLUOROSCOPIC IMAGES: 2 images taken at 08/22/2022 at 2205 and at 06 10 this morning.    NG tube is in adequate position.     Gastrografin is seen predominantly in the stomach with a small amount having passed into the right colon on this morning's film.    Markedly dilated small bowel loops again noted indicating an incomplete, small bowel obstruction.    Contrast seen in the bladder from recent CT.     Findings discussed with his nurse Zen at 09 26. NG tube is attached to suction.         I reviewed all new labs and imaging results over the last 24 hours. I personally reviewed Gastrografin follow-through images with some small amount of contrast in the colon    Medications     0.45% sodium chloride + KCl 20 mEq/L 150 mL/hr at 08/23/22 1159       enoxaparin ANTICOAGULANT  30 mg Subcutaneous Q24H     Lidocaine  1 patch Transdermal Q24H     lidocaine   Transdermal Q8H DIMAS     [Held by provider] mirtazapine  15 mg Oral At Bedtime     [Held by provider] omeprazole  20 mg Oral BID     sodium chloride (PF)  3 mL Intracatheter Q8H       Edy Adams MD

## 2022-08-23 NOTE — CONSULTS
"Dear Dr Adams  I have seen Kalinkg Shepard and as you know his chief complaint is small bowel obstruction.  has had  colon cancer stage iv   Has had several small bowel obstruction but they have usually revolved with conservative treatment.   His last chemo was 2 weeks ago.  Is supposed to get next dose about today.   This started last week and felt like it was improving then came back.  Has not had any stool or air out for several days.        10 Point review of systems all others are negative.   BP (!) 151/79   Pulse 76   Temp 97.4  F (36.3  C) (Oral)   Resp 18   Ht 1.778 m (5' 10\")   Wt 58.3 kg (128 lb 8.5 oz)   SpO2 96%   BMI 18.44 kg/m      Past Medical History:   Diagnosis Date     Cancer (H)        Past Surgical History:   Procedure Laterality Date     INSERT PORT VASCULAR ACCESS Right 4/21/2022    Procedure: INSERTION, VASCULAR ACCESS PORT;  Surgeon: Marianne Schroeder MD;  Location: [facility] OR     INSERT PORT VASCULAR ACCESS Left 6/2/2022    Procedure: INSERTION, VASCULAR ACCESS PORT;  Surgeon: Phong Finch MD;  Location: [facility] OR     IR CHEST PORT PLACEMENT > 5 YRS OF AGE  6/2/2022     LAPAROTOMY EXPLORATORY N/A 3/26/2022    Procedure: exploratory laparotomy, small bowel resection, colostomy creation;  Surgeon: Riley Magdaleno MD;  Location: [facility] OR     PICC INSERTION - DOUBLE LUMEN Left 03/28/2022    left medial brachial 5 fr dl picc 49 cm     SIGMOIDOSCOPY FLEXIBLE N/A 3/26/2022    Procedure: Sigmoidoscopy flexible;  Surgeon: Riley Magdaleno MD;  Location: [facility] OR       Social History     Socioeconomic History     Marital status:      Spouse name: Not on file     Number of children: Not on file     Years of education: Not on file     Highest education level: Not on file   Occupational History     Not on file   Tobacco Use     Smoking status: Current Every Day Smoker     Packs/day: 0.50     Years: 50.00     Pack years: 25.00     Smokeless tobacco: Never Used   Vaping Use     Vaping Use: " "Never used   Substance and Sexual Activity     Alcohol use: Not Currently     Drug use: Never     Sexual activity: Not on file   Other Topics Concern     Not on file   Social History Narrative     Not on file     Social Determinants of Health     Financial Resource Strain: Not on file   Food Insecurity: Not on file   Transportation Needs: Not on file   Physical Activity: Not on file   Stress: Not on file   Social Connections: Not on file   Intimate Partner Violence: Not on file   Housing Stability: Not on file       No current outpatient medications on file.       Above was reviewed  Physical exam: BP (!) 151/79   Pulse 76   Temp 97.4  F (36.3  C) (Oral)   Resp 18   Ht 1.778 m (5' 10\")   Wt 58.3 kg (128 lb 8.5 oz)   SpO2 96%   BMI 18.44 kg/m     Patient able to sit up with out difficulty.   Patient is alert and orientated.   Head eyes, nose and mouth within normal limits.  Lungs are clear to auscultation  Heart is regular rate and rhythm with no murmur or thrills noted.  No costal vertebral angle tenderness noted.  Abdomen is abdomen is soft with some  Tenderness in the right lower quadrant.  No bowel sounds.   Ostomy looks good.      Skin was warm and pink  Normal Affect      Lower extremity edema is not present.    Labs show:   0 Result Notes      Component Ref Range & Units  5:06 AM   (8/23/22) 1 d ago   (8/22/22) 2 d ago   (8/21/22) 2 wk ago   (8/9/22) 2 wk ago   (8/9/22) 3 wk ago   (8/2/22) 3 wk ago   (8/2/22)    Sodium 133 - 144 mmol/L 139  143  139   140 R   140     Potassium 3.4 - 5.3 mmol/L 4.3  3.9  4.9 CM   4.7 R   4.1     Chloride 94 - 109 mmol/L 105  110 High   104   104 R   110 High      Carbon Dioxide (CO2) 20 - 32 mmol/L 27  29  26   28 R   23     Anion Gap 3 - 14 mmol/L 7  4  9   8 R   7     Urea Nitrogen 7 - 30 mg/dL 44 High   29  21         Creatinine 0.66 - 1.25 mg/dL 0.63 Low   0.62 Low   0.62 Low   0.72 R   0.65 Low       Calcium 8.5 - 10.1 mg/dL 8.7  8.4 Low   10.3 High          Glucose " 70 - 99 mg/dL 107 High   111 High   133 High          GFR Estimate >60 mL/min/1.73m2 >90  >90 CM  >90 CM  >90 CM   >90 CM     Comment: Effective December 21, 2021 eGFRcr in adults is calculated using the 2021 CKD-EPI creatinine equation which includes age and gender (Fabio ruvalcaba al., NEJM, DOI: 10.1056/RSTBkg8054125)   Resulting Agency                  Assessment: patient is getting chemo for his stage IV colon cancer.    Looking at the abdomen xrays shows some contrast in the right colon.    He had an Nasal gastric tube  that pulled out and then  Placed a new one and he had 1.5 liters out immediately and feels less distended per patient.   Plan to do will watch at this time.  We did discuss risk of surgery including damage to bowel, infection, not being able to complete the procedure due to cancer. And hernias and recurrence.       Time spent with the patient with greater that 50% of the time in discussion was 30 minutes.  In discussing the plan.      Segundo Hill MD

## 2022-08-23 NOTE — PROGRESS NOTES
Pt alert and oriented.VSS.Pt had cramping abdominal pain for most of the day. IV dilaudid given Q2.Nausea intermittent.Gave IV zofran X1 and compazine X2. NG tube came out.New NG tube put back in and placed on intermittent suction, pt had 1800 ml out within one hour. Able to make needs known. Active bowel sounds. NPO.

## 2022-08-24 ENCOUNTER — ANESTHESIA EVENT (OUTPATIENT)
Dept: SURGERY | Facility: CLINIC | Age: 72
DRG: 335 | End: 2022-08-24
Payer: COMMERCIAL

## 2022-08-24 ENCOUNTER — APPOINTMENT (OUTPATIENT)
Dept: GENERAL RADIOLOGY | Facility: CLINIC | Age: 72
DRG: 335 | End: 2022-08-24
Attending: HOSPITALIST
Payer: COMMERCIAL

## 2022-08-24 ENCOUNTER — ANESTHESIA (OUTPATIENT)
Dept: SURGERY | Facility: CLINIC | Age: 72
DRG: 335 | End: 2022-08-24
Payer: COMMERCIAL

## 2022-08-24 PROBLEM — K56.609 SMALL BOWEL OBSTRUCTION (H): Status: ACTIVE | Noted: 2022-08-24

## 2022-08-24 LAB
ANION GAP SERPL CALCULATED.3IONS-SCNC: 8 MMOL/L (ref 3–14)
BUN SERPL-MCNC: 50 MG/DL (ref 7–30)
CALCIUM SERPL-MCNC: 9 MG/DL (ref 8.5–10.1)
CHLORIDE BLD-SCNC: 97 MMOL/L (ref 94–109)
CO2 SERPL-SCNC: 30 MMOL/L (ref 20–32)
CREAT SERPL-MCNC: 0.64 MG/DL (ref 0.66–1.25)
ERYTHROCYTE [DISTWIDTH] IN BLOOD BY AUTOMATED COUNT: 17.8 % (ref 10–15)
GFR SERPL CREATININE-BSD FRML MDRD: >90 ML/MIN/1.73M2
GLUCOSE BLD-MCNC: 104 MG/DL (ref 70–99)
HCT VFR BLD AUTO: 41 % (ref 40–53)
HGB BLD-MCNC: 13.2 G/DL (ref 13.3–17.7)
MCH RBC QN AUTO: 30.4 PG (ref 26.5–33)
MCHC RBC AUTO-ENTMCNC: 32.2 G/DL (ref 31.5–36.5)
MCV RBC AUTO: 95 FL (ref 78–100)
PLATELET # BLD AUTO: 233 10E3/UL (ref 150–450)
POTASSIUM BLD-SCNC: 3.9 MMOL/L (ref 3.4–5.3)
RBC # BLD AUTO: 4.34 10E6/UL (ref 4.4–5.9)
SODIUM SERPL-SCNC: 135 MMOL/L (ref 133–144)
WBC # BLD AUTO: 18.2 10E3/UL (ref 4–11)

## 2022-08-24 PROCEDURE — 250N000011 HC RX IP 250 OP 636: Performed by: SURGERY

## 2022-08-24 PROCEDURE — 85014 HEMATOCRIT: CPT | Performed by: HOSPITALIST

## 2022-08-24 PROCEDURE — 250N000011 HC RX IP 250 OP 636: Performed by: NURSE ANESTHETIST, CERTIFIED REGISTERED

## 2022-08-24 PROCEDURE — 258N000003 HC RX IP 258 OP 636: Performed by: NURSE ANESTHETIST, CERTIFIED REGISTERED

## 2022-08-24 PROCEDURE — 250N000011 HC RX IP 250 OP 636: Performed by: HOSPITALIST

## 2022-08-24 PROCEDURE — C9290 INJ, BUPIVACAINE LIPOSOME: HCPCS | Performed by: NURSE ANESTHETIST, CERTIFIED REGISTERED

## 2022-08-24 PROCEDURE — 370N000017 HC ANESTHESIA TECHNICAL FEE, PER MIN: Performed by: SURGERY

## 2022-08-24 PROCEDURE — 250N000025 HC SEVOFLURANE, PER MIN: Performed by: SURGERY

## 2022-08-24 PROCEDURE — 271N000001 HC OR GENERAL SUPPLY NON-STERILE: Performed by: SURGERY

## 2022-08-24 PROCEDURE — 44005 FREEING OF BOWEL ADHESION: CPT | Performed by: SURGERY

## 2022-08-24 PROCEDURE — 250N000013 HC RX MED GY IP 250 OP 250 PS 637: Performed by: SURGERY

## 2022-08-24 PROCEDURE — 360N000076 HC SURGERY LEVEL 3, PER MIN: Performed by: SURGERY

## 2022-08-24 PROCEDURE — 999N000141 HC STATISTIC PRE-PROCEDURE NURSING ASSESSMENT: Performed by: SURGERY

## 2022-08-24 PROCEDURE — 36415 COLL VENOUS BLD VENIPUNCTURE: CPT | Performed by: HOSPITALIST

## 2022-08-24 PROCEDURE — 0DNW0ZZ RELEASE PERITONEUM, OPEN APPROACH: ICD-10-PCS | Performed by: SURGERY

## 2022-08-24 PROCEDURE — 250N000009 HC RX 250: Performed by: NURSE ANESTHETIST, CERTIFIED REGISTERED

## 2022-08-24 PROCEDURE — 272N000001 HC OR GENERAL SUPPLY STERILE: Performed by: SURGERY

## 2022-08-24 PROCEDURE — 120N000001 HC R&B MED SURG/OB

## 2022-08-24 PROCEDURE — 250N000011 HC RX IP 250 OP 636: Performed by: PHYSICIAN ASSISTANT

## 2022-08-24 PROCEDURE — 99232 SBSQ HOSP IP/OBS MODERATE 35: CPT | Performed by: HOSPITALIST

## 2022-08-24 PROCEDURE — 710N000010 HC RECOVERY PHASE 1, LEVEL 2, PER MIN: Performed by: SURGERY

## 2022-08-24 PROCEDURE — 74019 RADEX ABDOMEN 2 VIEWS: CPT

## 2022-08-24 PROCEDURE — 80048 BASIC METABOLIC PNL TOTAL CA: CPT | Performed by: HOSPITALIST

## 2022-08-24 RX ORDER — DEXAMETHASONE SODIUM PHOSPHATE 4 MG/ML
INJECTION, SOLUTION INTRA-ARTICULAR; INTRALESIONAL; INTRAMUSCULAR; INTRAVENOUS; SOFT TISSUE PRN
Status: DISCONTINUED | OUTPATIENT
Start: 2022-08-24 | End: 2022-08-24

## 2022-08-24 RX ORDER — OXYCODONE HYDROCHLORIDE 5 MG/1
5 TABLET ORAL EVERY 4 HOURS PRN
Status: DISCONTINUED | OUTPATIENT
Start: 2022-08-24 | End: 2022-08-24 | Stop reason: HOSPADM

## 2022-08-24 RX ORDER — KETAMINE HYDROCHLORIDE 10 MG/ML
INJECTION INTRAMUSCULAR; INTRAVENOUS PRN
Status: DISCONTINUED | OUTPATIENT
Start: 2022-08-24 | End: 2022-08-24

## 2022-08-24 RX ORDER — SODIUM CHLORIDE, SODIUM LACTATE, POTASSIUM CHLORIDE, CALCIUM CHLORIDE 600; 310; 30; 20 MG/100ML; MG/100ML; MG/100ML; MG/100ML
INJECTION, SOLUTION INTRAVENOUS CONTINUOUS PRN
Status: DISCONTINUED | OUTPATIENT
Start: 2022-08-24 | End: 2022-08-24

## 2022-08-24 RX ORDER — AMOXICILLIN 250 MG
1 CAPSULE ORAL 2 TIMES DAILY
Status: DISCONTINUED | OUTPATIENT
Start: 2022-08-24 | End: 2022-08-31 | Stop reason: HOSPADM

## 2022-08-24 RX ORDER — BUPIVACAINE HYDROCHLORIDE 2.5 MG/ML
INJECTION, SOLUTION EPIDURAL; INFILTRATION; INTRACAUDAL PRN
Status: DISCONTINUED | OUTPATIENT
Start: 2022-08-24 | End: 2022-08-24

## 2022-08-24 RX ORDER — PROPOFOL 10 MG/ML
INJECTION, EMULSION INTRAVENOUS PRN
Status: DISCONTINUED | OUTPATIENT
Start: 2022-08-24 | End: 2022-08-24

## 2022-08-24 RX ORDER — SODIUM CHLORIDE, SODIUM LACTATE, POTASSIUM CHLORIDE, CALCIUM CHLORIDE 600; 310; 30; 20 MG/100ML; MG/100ML; MG/100ML; MG/100ML
INJECTION, SOLUTION INTRAVENOUS CONTINUOUS
Status: DISCONTINUED | OUTPATIENT
Start: 2022-08-24 | End: 2022-08-24 | Stop reason: HOSPADM

## 2022-08-24 RX ORDER — HYDROMORPHONE HCL IN WATER/PF 6 MG/30 ML
0.4 PATIENT CONTROLLED ANALGESIA SYRINGE INTRAVENOUS
Status: DISCONTINUED | OUTPATIENT
Start: 2022-08-24 | End: 2022-08-31 | Stop reason: HOSPADM

## 2022-08-24 RX ORDER — FENTANYL CITRATE 50 UG/ML
INJECTION, SOLUTION INTRAMUSCULAR; INTRAVENOUS PRN
Status: DISCONTINUED | OUTPATIENT
Start: 2022-08-24 | End: 2022-08-24

## 2022-08-24 RX ORDER — FENTANYL CITRATE 50 UG/ML
25 INJECTION, SOLUTION INTRAMUSCULAR; INTRAVENOUS EVERY 5 MIN PRN
Status: DISCONTINUED | OUTPATIENT
Start: 2022-08-24 | End: 2022-08-24 | Stop reason: HOSPADM

## 2022-08-24 RX ORDER — ONDANSETRON 4 MG/1
4 TABLET, ORALLY DISINTEGRATING ORAL EVERY 30 MIN PRN
Status: DISCONTINUED | OUTPATIENT
Start: 2022-08-24 | End: 2022-08-24 | Stop reason: HOSPADM

## 2022-08-24 RX ORDER — PHENYLEPHRINE HYDROCHLORIDE 10 MG/ML
INJECTION INTRAVENOUS PRN
Status: DISCONTINUED | OUTPATIENT
Start: 2022-08-24 | End: 2022-08-24

## 2022-08-24 RX ORDER — HEPARIN SODIUM 5000 [USP'U]/.5ML
5000 INJECTION, SOLUTION INTRAVENOUS; SUBCUTANEOUS
Status: COMPLETED | OUTPATIENT
Start: 2022-08-24 | End: 2022-08-24

## 2022-08-24 RX ORDER — ONDANSETRON 2 MG/ML
4 INJECTION INTRAMUSCULAR; INTRAVENOUS EVERY 6 HOURS PRN
Status: DISCONTINUED | OUTPATIENT
Start: 2022-08-24 | End: 2022-08-31 | Stop reason: HOSPADM

## 2022-08-24 RX ORDER — ONDANSETRON 2 MG/ML
4 INJECTION INTRAMUSCULAR; INTRAVENOUS EVERY 30 MIN PRN
Status: DISCONTINUED | OUTPATIENT
Start: 2022-08-24 | End: 2022-08-24 | Stop reason: HOSPADM

## 2022-08-24 RX ORDER — ACETAMINOPHEN 325 MG/1
975 TABLET ORAL EVERY 8 HOURS
Status: DISCONTINUED | OUTPATIENT
Start: 2022-08-24 | End: 2022-08-25 | Stop reason: DRUGHIGH

## 2022-08-24 RX ORDER — HEPARIN SODIUM 5000 [USP'U]/.5ML
5000 INJECTION, SOLUTION INTRAVENOUS; SUBCUTANEOUS EVERY 8 HOURS
Status: DISCONTINUED | OUTPATIENT
Start: 2022-08-25 | End: 2022-08-26

## 2022-08-24 RX ORDER — HYDROMORPHONE HCL IN WATER/PF 6 MG/30 ML
0.2 PATIENT CONTROLLED ANALGESIA SYRINGE INTRAVENOUS EVERY 5 MIN PRN
Status: DISCONTINUED | OUTPATIENT
Start: 2022-08-24 | End: 2022-08-24 | Stop reason: HOSPADM

## 2022-08-24 RX ORDER — CEFAZOLIN SODIUM/WATER 2 G/20 ML
2 SYRINGE (ML) INTRAVENOUS SEE ADMIN INSTRUCTIONS
Status: DISCONTINUED | OUTPATIENT
Start: 2022-08-24 | End: 2022-08-24 | Stop reason: HOSPADM

## 2022-08-24 RX ORDER — ACETAMINOPHEN 325 MG/1
650 TABLET ORAL EVERY 4 HOURS PRN
Status: DISCONTINUED | OUTPATIENT
Start: 2022-08-27 | End: 2022-08-31 | Stop reason: HOSPADM

## 2022-08-24 RX ORDER — LIDOCAINE 40 MG/G
CREAM TOPICAL
Status: DISCONTINUED | OUTPATIENT
Start: 2022-08-24 | End: 2022-08-31 | Stop reason: HOSPADM

## 2022-08-24 RX ORDER — OXYCODONE HYDROCHLORIDE 5 MG/1
5 TABLET ORAL EVERY 4 HOURS PRN
Status: DISCONTINUED | OUTPATIENT
Start: 2022-08-24 | End: 2022-08-31 | Stop reason: HOSPADM

## 2022-08-24 RX ORDER — PROCHLORPERAZINE MALEATE 5 MG
5 TABLET ORAL EVERY 6 HOURS PRN
Status: DISCONTINUED | OUTPATIENT
Start: 2022-08-24 | End: 2022-08-31 | Stop reason: HOSPADM

## 2022-08-24 RX ORDER — ONDANSETRON 2 MG/ML
INJECTION INTRAMUSCULAR; INTRAVENOUS PRN
Status: DISCONTINUED | OUTPATIENT
Start: 2022-08-24 | End: 2022-08-24

## 2022-08-24 RX ORDER — ONDANSETRON 4 MG/1
4 TABLET, ORALLY DISINTEGRATING ORAL EVERY 6 HOURS PRN
Status: DISCONTINUED | OUTPATIENT
Start: 2022-08-24 | End: 2022-08-31 | Stop reason: HOSPADM

## 2022-08-24 RX ORDER — BISACODYL 10 MG
10 SUPPOSITORY, RECTAL RECTAL DAILY PRN
Status: DISCONTINUED | OUTPATIENT
Start: 2022-08-24 | End: 2022-08-31 | Stop reason: HOSPADM

## 2022-08-24 RX ORDER — POLYETHYLENE GLYCOL 3350 17 G/17G
17 POWDER, FOR SOLUTION ORAL DAILY
Status: DISCONTINUED | OUTPATIENT
Start: 2022-08-25 | End: 2022-08-31 | Stop reason: HOSPADM

## 2022-08-24 RX ORDER — HYDROMORPHONE HCL IN WATER/PF 6 MG/30 ML
0.2 PATIENT CONTROLLED ANALGESIA SYRINGE INTRAVENOUS
Status: DISCONTINUED | OUTPATIENT
Start: 2022-08-24 | End: 2022-08-31 | Stop reason: HOSPADM

## 2022-08-24 RX ORDER — OXYCODONE HYDROCHLORIDE 5 MG/1
10 TABLET ORAL EVERY 4 HOURS PRN
Status: DISCONTINUED | OUTPATIENT
Start: 2022-08-24 | End: 2022-08-31 | Stop reason: HOSPADM

## 2022-08-24 RX ORDER — CEFAZOLIN SODIUM/WATER 2 G/20 ML
2 SYRINGE (ML) INTRAVENOUS
Status: COMPLETED | OUTPATIENT
Start: 2022-08-24 | End: 2022-08-24

## 2022-08-24 RX ORDER — LIDOCAINE HYDROCHLORIDE 10 MG/ML
INJECTION, SOLUTION INFILTRATION; PERINEURAL PRN
Status: DISCONTINUED | OUTPATIENT
Start: 2022-08-24 | End: 2022-08-24

## 2022-08-24 RX ADMIN — BUPIVACAINE HYDROCHLORIDE 10 ML: 2.5 INJECTION, SOLUTION EPIDURAL; INFILTRATION; INTRACAUDAL at 16:12

## 2022-08-24 RX ADMIN — FENTANYL CITRATE 25 MCG: 50 INJECTION, SOLUTION INTRAMUSCULAR; INTRAVENOUS at 17:22

## 2022-08-24 RX ADMIN — HYDROMORPHONE HYDROCHLORIDE 0.2 MG: 0.2 INJECTION, SOLUTION INTRAMUSCULAR; INTRAVENOUS; SUBCUTANEOUS at 17:08

## 2022-08-24 RX ADMIN — HYDROMORPHONE HYDROCHLORIDE 0.5 MG: 0.2 INJECTION, SOLUTION INTRAMUSCULAR; INTRAVENOUS; SUBCUTANEOUS at 02:59

## 2022-08-24 RX ADMIN — PROPOFOL 150 MG: 10 INJECTION, EMULSION INTRAVENOUS at 14:24

## 2022-08-24 RX ADMIN — BUPIVACAINE 10 ML: 13.3 INJECTION, SUSPENSION, LIPOSOMAL INFILTRATION at 16:12

## 2022-08-24 RX ADMIN — HYDROMORPHONE HYDROCHLORIDE 0.4 MG: 0.2 INJECTION, SOLUTION INTRAMUSCULAR; INTRAVENOUS; SUBCUTANEOUS at 11:56

## 2022-08-24 RX ADMIN — HEPARIN SODIUM 5000 UNITS: 10000 INJECTION, SOLUTION INTRAVENOUS; SUBCUTANEOUS at 14:40

## 2022-08-24 RX ADMIN — KETAMINE HYDROCHLORIDE 50 MG: 10 INJECTION, SOLUTION INTRAMUSCULAR; INTRAVENOUS at 14:38

## 2022-08-24 RX ADMIN — PROCHLORPERAZINE EDISYLATE 5 MG: 5 INJECTION INTRAMUSCULAR; INTRAVENOUS at 11:55

## 2022-08-24 RX ADMIN — FENTANYL CITRATE 25 MCG: 50 INJECTION, SOLUTION INTRAMUSCULAR; INTRAVENOUS at 17:16

## 2022-08-24 RX ADMIN — PHENYLEPHRINE HYDROCHLORIDE 100 MCG: 10 INJECTION INTRAVENOUS at 15:18

## 2022-08-24 RX ADMIN — POTASSIUM CHLORIDE AND SODIUM CHLORIDE: 450; 150 INJECTION, SOLUTION INTRAVENOUS at 18:39

## 2022-08-24 RX ADMIN — FENTANYL CITRATE 100 MCG: 50 INJECTION, SOLUTION INTRAMUSCULAR; INTRAVENOUS at 14:24

## 2022-08-24 RX ADMIN — FENTANYL CITRATE 100 MCG: 50 INJECTION, SOLUTION INTRAMUSCULAR; INTRAVENOUS at 15:51

## 2022-08-24 RX ADMIN — POTASSIUM CHLORIDE AND SODIUM CHLORIDE: 450; 150 INJECTION, SOLUTION INTRAVENOUS at 05:39

## 2022-08-24 RX ADMIN — ROCURONIUM BROMIDE 5 MG: 50 INJECTION, SOLUTION INTRAVENOUS at 14:24

## 2022-08-24 RX ADMIN — ONDANSETRON 4 MG: 2 INJECTION INTRAMUSCULAR; INTRAVENOUS at 07:54

## 2022-08-24 RX ADMIN — MIDAZOLAM 2 MG: 1 INJECTION INTRAMUSCULAR; INTRAVENOUS at 14:20

## 2022-08-24 RX ADMIN — Medication 2 G: at 14:15

## 2022-08-24 RX ADMIN — ACETAMINOPHEN 975 MG: 325 TABLET, COATED ORAL at 20:10

## 2022-08-24 RX ADMIN — PHENYLEPHRINE HYDROCHLORIDE 200 MCG: 10 INJECTION INTRAVENOUS at 14:29

## 2022-08-24 RX ADMIN — PROCHLORPERAZINE EDISYLATE 5 MG: 5 INJECTION INTRAMUSCULAR; INTRAVENOUS at 02:59

## 2022-08-24 RX ADMIN — DEXAMETHASONE SODIUM PHOSPHATE 4 MG: 4 INJECTION, SOLUTION INTRA-ARTICULAR; INTRALESIONAL; INTRAMUSCULAR; INTRAVENOUS; SOFT TISSUE at 14:24

## 2022-08-24 RX ADMIN — FENTANYL CITRATE 25 MCG: 50 INJECTION, SOLUTION INTRAMUSCULAR; INTRAVENOUS at 17:10

## 2022-08-24 RX ADMIN — LIDOCAINE HYDROCHLORIDE 40 MG: 10 INJECTION, SOLUTION INFILTRATION; PERINEURAL at 14:24

## 2022-08-24 RX ADMIN — HYDROMORPHONE HYDROCHLORIDE 0.2 MG: 0.2 INJECTION, SOLUTION INTRAMUSCULAR; INTRAVENOUS; SUBCUTANEOUS at 16:49

## 2022-08-24 RX ADMIN — SODIUM CHLORIDE, POTASSIUM CHLORIDE, SODIUM LACTATE AND CALCIUM CHLORIDE: 600; 310; 30; 20 INJECTION, SOLUTION INTRAVENOUS at 14:20

## 2022-08-24 RX ADMIN — BUPIVACAINE HYDROCHLORIDE 10 ML: 2.5 INJECTION, SOLUTION EPIDURAL; INFILTRATION; INTRACAUDAL at 16:08

## 2022-08-24 RX ADMIN — HYDROMORPHONE HYDROCHLORIDE 0.4 MG: 0.2 INJECTION, SOLUTION INTRAMUSCULAR; INTRAVENOUS; SUBCUTANEOUS at 21:49

## 2022-08-24 RX ADMIN — SUCCINYLCHOLINE CHLORIDE 100 MG: 20 INJECTION, SOLUTION INTRAMUSCULAR; INTRAVENOUS at 14:24

## 2022-08-24 RX ADMIN — BUPIVACAINE 10 ML: 13.3 INJECTION, SUSPENSION, LIPOSOMAL INFILTRATION at 16:08

## 2022-08-24 RX ADMIN — ONDANSETRON 4 MG: 2 INJECTION INTRAMUSCULAR; INTRAVENOUS at 14:24

## 2022-08-24 RX ADMIN — HYDROMORPHONE HYDROCHLORIDE 0.4 MG: 0.2 INJECTION, SOLUTION INTRAMUSCULAR; INTRAVENOUS; SUBCUTANEOUS at 07:51

## 2022-08-24 RX ADMIN — POTASSIUM CHLORIDE AND SODIUM CHLORIDE: 450; 150 INJECTION, SOLUTION INTRAVENOUS at 19:35

## 2022-08-24 RX ADMIN — HYDROMORPHONE HYDROCHLORIDE 0.2 MG: 0.2 INJECTION, SOLUTION INTRAMUSCULAR; INTRAVENOUS; SUBCUTANEOUS at 16:57

## 2022-08-24 RX ADMIN — SENNOSIDES AND DOCUSATE SODIUM 1 TABLET: 50; 8.6 TABLET ORAL at 20:10

## 2022-08-24 ASSESSMENT — ACTIVITIES OF DAILY LIVING (ADL)
ADLS_ACUITY_SCORE: 22

## 2022-08-24 ASSESSMENT — LIFESTYLE VARIABLES: TOBACCO_USE: 1

## 2022-08-24 NOTE — ANESTHESIA POSTPROCEDURE EVALUATION
Patient: Kalin Shepard    Procedure: Procedure(s):  LAPAROTOMY, EXPLORATORY, WITH LYSIS OF ADHESIONS       Anesthesia Type:  General    Note:  Disposition: Inpatient   Postop Pain Control: Uneventful            Sign Out: Well controlled pain   PONV: No   Neuro/Psych: Uneventful            Sign Out: Acceptable/Baseline neuro status   Airway/Respiratory: Uneventful            Sign Out: Acceptable/Baseline resp. status   CV/Hemodynamics: Uneventful            Sign Out: Acceptable CV status; No obvious hypovolemia; No obvious fluid overload   Other NRE: NONE   DID A NON-ROUTINE EVENT OCCUR? No           Last vitals:  Vitals Value Taken Time   /95 08/24/22 1745   Temp     Pulse 96 08/24/22 1750   Resp 17 08/24/22 1750   SpO2 90 % 08/24/22 1750   Vitals shown include unvalidated device data.    Electronically Signed By: JULIUS Griffiths CRNA  August 24, 2022  5:51 PM

## 2022-08-24 NOTE — PROGRESS NOTES
Patient continued to have c/o abdominal pain and gas discomfort. NG tube set to low-int suction which did not have much output until this morning. Colostomy bag intact with no BM or gas developing. Patient had abdominal xray's this morning.

## 2022-08-24 NOTE — PROGRESS NOTES
[facility] TRANSPORT NOTE  Data:   Reason for Transport:  Surgery     Kalin Shepard was transported to \[facility]\"" via wheel chair at 1315.  Patient was accompanied by Registered Nurse. Equipment used for transport: None. Family was aware of reason for transport: yes    Action:  Report: given to Shobha SNYDER    Response:  Patient's condition when transferred off unit was stable.    Maulik Henning RN

## 2022-08-24 NOTE — ANESTHESIA CARE TRANSFER NOTE
Patient: Kalin Shepard    Procedure: Procedure(s):  LAPAROTOMY, EXPLORATORY, WITH LYSIS OF ADHESIONS       Diagnosis: SBO (small bowel obstruction) (H) [K56.609]  Diagnosis Additional Information: No value filed.    Anesthesia Type:   General     Note:    Oropharynx: oral airway in place, nasal gatric tube in place and spontaneously breathing  Level of Consciousness: drowsy  Oxygen Supplementation: face mask  Level of Supplemental Oxygen (L/min / FiO2): 5  Independent Airway: airway patency not satisfactory and stable  Dentition: dentition unchanged  Vital Signs Stable: post-procedure vital signs reviewed and stable  Report to RN Given: handoff report given  Patient transferred to: PACU    Handoff Report: Identifed the Patient, Identified the Reponsible Provider, Reviewed the pertinent medical history, Discussed the surgical course, Reviewed Intra-OP anesthesia mangement and issues during anesthesia, Set expectations for post-procedure period and Allowed opportunity for questions and acknowledgement of understanding      Vitals:  Vitals Value Taken Time   BP     Temp     Pulse     Resp     SpO2         Electronically Signed By: Helio Berry CRNA, APRN CRNA  August 24, 2022  4:29 PM

## 2022-08-24 NOTE — OP NOTE
[facility]     Date of Service: 8/24/2022     Surgeon:  Segundo Hill MD     PREOPERATIVE DIAGNOSIS:    Small bowel obstruction      POSTOPERATIVE DIAGNOSIS:   Same with multiple adhesions in the right lower quadrant.      PROCEDURE PERFORMED:  open lapartomy with adhesionolysis .         ANESTHESIA:  General anesthetic with post op El[place] block     ESTIMATED BLOOD LOSS:  Less than   20 mL.     INDICATIONS:  The patient is a 72 year old male  With small bowel obstruction that did not resolve and although had some contrast in the right colon his xrays were looking more distended and his pain was increasing.    We talked about with his stage IV colon cancer there may be cancer in the way of fixing the obstruction so we may try to to an ostomy above the obstruction if possible.        We discussed risks and complications including bleeding, infection,  damage to bowel, hernias,  damage to ureters, possible ostomy, and what to expect afterwards.The patient understands and would like to proceed.and risk of General anesthesia.      DESCRIPTION OF PROCEDURE:  The patient was brought to the OR, placed in the supine position.  After receiving general anesthesia, the abdomen was prepped and draped in a sterile manner.  Incision was made in the midline large enough for us to see the bowel and what we needed to see,  with a knife, on the skin and cautery to enter the abdomen carefully.  We then proceeded to free up the bowel form multiple adhesions in the right lower quadrant.      I was able to free these up with blunt, finger and sharp dissection and some cautery where safe to do so to get all the bowel out of the abdomen and then ran the bowel form the cecum to the ligament of treitz.    I then tried to push all the contents into the stomach to decompress the bowel to close the abdomen safely.        I irrigated the area multiple times, irrigated the rest of the abdomen as needed, and suctioned  up as much of the fluid as possible. I  looked at the area multiple times and again, there was no evidence of any more bleeding.    The fascia was closed with 0 PDS looped and one started superior and the other inferior and tied in the midline.    The subcutaneous fat was brought together with a 3-0 vicryl and the skin was closed with a running 4-0 monocryl.    covered with mastisol, Steri-Strips and Tegaderm, along with an abdominal binder.  The patient did receive antibiotics preoperatively.     The plan is to  admit  the patient and to await bowel function.  Then after discharge to  follow up with me in 2 weeks in clinic.  The patient did receive antibiotics prior to the procedure.   Needle and instrument count was correct.            Segundo Hill MD .

## 2022-08-24 NOTE — ANESTHESIA PREPROCEDURE EVALUATION
Anesthesia Pre-Procedure Evaluation    Patient: Kalin Shepard   MRN: [id number] : 1950        Procedure : Procedure(s):  LAPAROTOMY, EXPLORATORY, WITH LYSIS OF ADHESIONS          Past Medical History:   Diagnosis Date     Cancer (H)       Past Surgical History:   Procedure Laterality Date     INSERT PORT VASCULAR ACCESS Right 2022    Procedure: INSERTION, VASCULAR ACCESS PORT;  Surgeon: Marianne Schroeder MD;  Location: [facility] OR     INSERT PORT VASCULAR ACCESS Left 2022    Procedure: INSERTION, VASCULAR ACCESS PORT;  Surgeon: Phong Finch MD;  Location: [facility] OR     IR CHEST PORT PLACEMENT > 5 YRS OF AGE  2022     LAPAROTOMY EXPLORATORY N/A 3/26/2022    Procedure: exploratory laparotomy, small bowel resection, colostomy creation;  Surgeon: Riley Magdaleno MD;  Location: [facility]     PICC INSERTION - DOUBLE LUMEN Left 2022    left medial brachial 5 fr dl picc 49 cm     SIGMOIDOSCOPY FLEXIBLE N/A 3/26/2022    Procedure: Sigmoidoscopy flexible;  Surgeon: Riley Magdaleno MD;  Location: [facility] OR      No Known Allergies   Social History     Tobacco Use     Smoking status: Current Every Day Smoker     Packs/day: 0.50     Years: 50.00     Pack years: 25.00     Smokeless tobacco: Never Used   Substance Use Topics     Alcohol use: Not Currently      Wt Readings from Last 1 Encounters:   22 58.3 kg (128 lb 8.5 oz)        Anesthesia Evaluation   Pt has had prior anesthetic. Type: General and MAC.    No history of anesthetic complications       ROS/MED HX  ENT/Pulmonary:     (+) tobacco use, Current use,     Neurologic:  - neg neurologic ROS     Cardiovascular:     (+) -----Previous cardiac testing   Echo: Date: Results:    Stress Test: Date: Results:    ECG Reviewed: Date: 3/26/22 Results:  Sinus rhythm with Premature atrial complexes   Rightward axis   Abnormal ECG   No previous ECGs available   Cath: Date: Results:      METS/Exercise Tolerance: 3 - Able to walk 1-2 blocks without stopping     Hematologic:  - neg hematologic  ROS     Musculoskeletal: Comment: Cancer associated pain  Cachexia and anorexia      GI/Hepatic: Comment: 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 [place]. Diagnosed on CT 3/26/22, mass of sigmoid colon causing small bowel and colonic obstruction. Hospitalized at [facility], s/p colostomy. Currently on chemo with FOLFOX and bevacizumab, last infusion 8/11/22.   - Colostomy cares  - Continue with outpatient oncology management      8/21/22 CT:  IMPRESSION:   1.  Small bowel obstruction with transition point in the right midabdomen, presumably secondary to adhesion. No evidence of overt bowel ischemia or perforation.  2.  Small volume ascites.  3.  Known sigmoid colonic malignancy again noted      (+) GERD,     Renal/Genitourinary:  - neg Renal ROS     Endo:  - neg endo ROS     Psychiatric/Substance Use:  - neg psychiatric ROS     Infectious Disease:  - neg infectious disease ROS     Malignancy: Comment: Colon adenocarcinoma (H)  Colostomy present (      (+) Malignancy, History of GI.GI CA  Active status post Surgery and Chemo.        Other:  - neg other ROS    (+) , H/O Chronic Pain,        Physical Exam    Airway  airway exam normal      Mallampati: I   TM distance: > 3 FB   Neck ROM: full   Mouth opening: > 3 cm    Respiratory Devices and Support         Dental       (+) missing      Cardiovascular   cardiovascular exam normal       Rhythm and rate: regular and normal     Pulmonary   pulmonary exam normal        breath sounds clear to auscultation           OUTSIDE LABS:  CBC:   Lab Results   Component Value Date    WBC 18.2 (H) 08/24/2022    WBC 9.7 08/22/2022    HGB 13.2 (L) 08/24/2022    HGB 13.2 (L) 08/22/2022    HCT 41.0 08/24/2022    HCT 41.5 08/22/2022     08/24/2022     08/22/2022     BMP:   Lab Results   Component Value Date     08/24/2022     08/23/2022     POTASSIUM 3.9 08/24/2022    POTASSIUM 4.3 08/23/2022    CHLORIDE 97 08/24/2022    CHLORIDE 105 08/23/2022    CO2 30 08/24/2022    CO2 27 08/23/2022    BUN 50 (H) 08/24/2022    BUN 44 (H) 08/23/2022    CR 0.64 (L) 08/24/2022    CR 0.63 (L) 08/23/2022     (H) 08/24/2022     (H) 08/23/2022     COAGS:   Lab Results   Component Value Date    INR 0.97 06/02/2022     POC: No results found for: BGM, HCG, HCGS  HEPATIC:   Lab Results   Component Value Date    ALBUMIN 4.2 08/21/2022    PROTTOTAL 9.1 (H) 08/21/2022    ALT 31 08/21/2022    AST 30 08/21/2022    ALKPHOS 181 (H) 08/21/2022    BILITOTAL 0.5 08/21/2022     OTHER:   Lab Results   Component Value Date    LACT 1.5 08/21/2022    ROXIE 9.0 08/24/2022    PHOS 3.2 04/04/2022    MAG 2.3 04/04/2022    LIPASE 95 08/21/2022       Anesthesia Plan    ASA Status:  3   NPO Status:  ELEVATED Aspiration Risk/Unknown    Anesthesia Type: General.     - Airway: ETT   Induction: RSI, Intravenous, Propofol.   Maintenance: Balanced.   Techniques and Equipment:     - Airway: Video-Laryngoscope         Consents    Anesthesia Plan(s) and associated risks, benefits, and realistic alternatives discussed. Questions answered and patient/representative(s) expressed understanding.     - Discussed: Risks, Benefits and Alternatives for BOTH SEDATION and the PROCEDURE were discussed     - Discussed with:  Patient      - Extended Intubation/Ventilatory Support Discussed: Yes.      - Patient is DNR/DNI Status: No    Use of blood products discussed: Yes.     - Discussed with: Patient.     - Consented: consented to blood products            Reason for refusal: other.     Postoperative Care    Pain management: Oral pain medications, IV analgesics, Peripheral nerve block (Single Shot), Multi-modal analgesia.   PONV prophylaxis: Ondansetron (or other 5HT-3), Dexamethasone or Solumedrol     Comments:                Helio Berry CRNA, APRN JONH

## 2022-08-24 NOTE — ANESTHESIA PROCEDURE NOTES
TAP Procedure Note    Pre-Procedure   Staff -        CRNA: Helio Berry APRN CRNA       Performed By: CRNA       Location: OR       Pre-Anesthestic Checklist: patient identified, IV checked, site marked, risks and benefits discussed, informed consent, monitors and equipment checked, pre-op evaluation, at physician/surgeon's request and post-op pain management  Timeout:       Correct Patient: Yes        Correct Procedure: Yes        Correct Site: Yes        Correct Position: Yes        Correct Laterality: Yes        Site Marked: Yes  Procedure Documentation  Procedure: TAP       Laterality: bilateral       Patient Position: supine       Patient Prep/Sterile Barriers: sterile gloves, mask       Skin prep: Chloraprep       Ultrasound guided       1. Ultrasound was used to identify targeted nerve, plexus, vascular marker, or fascial plane and place a needle adjacent to it in real-time.       2. Ultrasound was used to visualize the spread of anesthetic in close proximity to the above referenced structure.       3. A permanent image is entered into the patient's record.       4. The visualized anatomic structures appeared normal.       5. There were no apparent abnormal pathologic findings.    Assessment/Narrative         The placement was negative for: blood aspirated and painful injection       Paresthesias: No.       Bolus given via needle..        Secured via.        Insertion/Infusion Method: Single Shot       Complications: none       Injection made incrementally with aspirations every 5 mL.

## 2022-08-25 ENCOUNTER — APPOINTMENT (OUTPATIENT)
Dept: GENERAL RADIOLOGY | Facility: CLINIC | Age: 72
DRG: 335 | End: 2022-08-25
Attending: SURGERY
Payer: COMMERCIAL

## 2022-08-25 LAB
ALBUMIN SERPL-MCNC: 3.3 G/DL (ref 3.4–5)
ALP SERPL-CCNC: 106 U/L (ref 40–150)
ALT SERPL W P-5'-P-CCNC: 18 U/L (ref 0–70)
ANION GAP SERPL CALCULATED.3IONS-SCNC: 11 MMOL/L (ref 3–14)
ANION GAP SERPL CALCULATED.3IONS-SCNC: 11 MMOL/L (ref 3–14)
AST SERPL W P-5'-P-CCNC: 25 U/L (ref 0–45)
BASOPHILS # BLD AUTO: 0.1 10E3/UL (ref 0–0.2)
BASOPHILS NFR BLD AUTO: 0 %
BILIRUB SERPL-MCNC: 0.6 MG/DL (ref 0.2–1.3)
BUN SERPL-MCNC: 53 MG/DL (ref 7–30)
BUN SERPL-MCNC: 56 MG/DL (ref 7–30)
CALCIUM SERPL-MCNC: 9.1 MG/DL (ref 8.5–10.1)
CALCIUM SERPL-MCNC: 9.2 MG/DL (ref 8.5–10.1)
CHLORIDE BLD-SCNC: 95 MMOL/L (ref 94–109)
CHLORIDE BLD-SCNC: 95 MMOL/L (ref 94–109)
CO2 SERPL-SCNC: 32 MMOL/L (ref 20–32)
CO2 SERPL-SCNC: 33 MMOL/L (ref 20–32)
CREAT SERPL-MCNC: 0.66 MG/DL (ref 0.66–1.25)
CREAT SERPL-MCNC: 0.7 MG/DL (ref 0.66–1.25)
EOSINOPHIL # BLD AUTO: 0 10E3/UL (ref 0–0.7)
EOSINOPHIL NFR BLD AUTO: 0 %
ERYTHROCYTE [DISTWIDTH] IN BLOOD BY AUTOMATED COUNT: 17.7 % (ref 10–15)
GFR SERPL CREATININE-BSD FRML MDRD: >90 ML/MIN/1.73M2
GFR SERPL CREATININE-BSD FRML MDRD: >90 ML/MIN/1.73M2
GLUCOSE BLD-MCNC: 116 MG/DL (ref 70–99)
GLUCOSE BLD-MCNC: 116 MG/DL (ref 70–99)
GLUCOSE BLD-MCNC: 140 MG/DL (ref 70–99)
GLUCOSE BLDC GLUCOMTR-MCNC: 118 MG/DL (ref 70–99)
GLUCOSE BLDC GLUCOMTR-MCNC: 136 MG/DL (ref 70–99)
HCT VFR BLD AUTO: 41.8 % (ref 40–53)
HGB BLD-MCNC: 13.8 G/DL (ref 13.3–17.7)
IMM GRANULOCYTES # BLD: 0.3 10E3/UL
IMM GRANULOCYTES NFR BLD: 1 %
LYMPHOCYTES # BLD AUTO: 2 10E3/UL (ref 0.8–5.3)
LYMPHOCYTES NFR BLD AUTO: 9 %
MAGNESIUM SERPL-MCNC: 2.6 MG/DL (ref 1.6–2.3)
MCH RBC QN AUTO: 30.7 PG (ref 26.5–33)
MCHC RBC AUTO-ENTMCNC: 33 G/DL (ref 31.5–36.5)
MCV RBC AUTO: 93 FL (ref 78–100)
MONOCYTES # BLD AUTO: 2.1 10E3/UL (ref 0–1.3)
MONOCYTES NFR BLD AUTO: 9 %
NEUTROPHILS # BLD AUTO: 18.6 10E3/UL (ref 1.6–8.3)
NEUTROPHILS NFR BLD AUTO: 81 %
NRBC # BLD AUTO: 0 10E3/UL
NRBC BLD AUTO-RTO: 0 /100
PHOSPHATE SERPL-MCNC: 3.3 MG/DL (ref 2.5–4.5)
PLATELET # BLD AUTO: 287 10E3/UL (ref 150–450)
POTASSIUM BLD-SCNC: 3.3 MMOL/L (ref 3.4–5.3)
POTASSIUM BLD-SCNC: 3.3 MMOL/L (ref 3.4–5.3)
POTASSIUM BLD-SCNC: 3.4 MMOL/L (ref 3.4–5.3)
POTASSIUM BLD-SCNC: 3.7 MMOL/L (ref 3.4–5.3)
PROT SERPL-MCNC: 7.4 G/DL (ref 6.8–8.8)
RBC # BLD AUTO: 4.49 10E6/UL (ref 4.4–5.9)
SODIUM SERPL-SCNC: 138 MMOL/L (ref 133–144)
SODIUM SERPL-SCNC: 139 MMOL/L (ref 133–144)
TRIGL SERPL-MCNC: 104 MG/DL
WBC # BLD AUTO: 23.1 10E3/UL (ref 4–11)

## 2022-08-25 PROCEDURE — 250N000011 HC RX IP 250 OP 636: Performed by: SURGERY

## 2022-08-25 PROCEDURE — 120N000001 HC R&B MED SURG/OB

## 2022-08-25 PROCEDURE — 250N000013 HC RX MED GY IP 250 OP 250 PS 637: Performed by: INTERNAL MEDICINE

## 2022-08-25 PROCEDURE — 36415 COLL VENOUS BLD VENIPUNCTURE: CPT | Performed by: INTERNAL MEDICINE

## 2022-08-25 PROCEDURE — 99232 SBSQ HOSP IP/OBS MODERATE 35: CPT | Performed by: INTERNAL MEDICINE

## 2022-08-25 PROCEDURE — 250N000013 HC RX MED GY IP 250 OP 250 PS 637: Performed by: SURGERY

## 2022-08-25 PROCEDURE — 36415 COLL VENOUS BLD VENIPUNCTURE: CPT | Performed by: HOSPITALIST

## 2022-08-25 PROCEDURE — 84478 ASSAY OF TRIGLYCERIDES: CPT | Performed by: INTERNAL MEDICINE

## 2022-08-25 PROCEDURE — 82947 ASSAY GLUCOSE BLOOD QUANT: CPT | Performed by: INTERNAL MEDICINE

## 2022-08-25 PROCEDURE — 83735 ASSAY OF MAGNESIUM: CPT | Performed by: INTERNAL MEDICINE

## 2022-08-25 PROCEDURE — 250N000011 HC RX IP 250 OP 636: Performed by: HOSPITALIST

## 2022-08-25 PROCEDURE — 250N000013 HC RX MED GY IP 250 OP 250 PS 637: Performed by: PHYSICIAN ASSISTANT

## 2022-08-25 PROCEDURE — 250N000009 HC RX 250: Performed by: INTERNAL MEDICINE

## 2022-08-25 PROCEDURE — 80053 COMPREHEN METABOLIC PANEL: CPT | Performed by: HOSPITALIST

## 2022-08-25 PROCEDURE — 3E0436Z INTRODUCTION OF NUTRITIONAL SUBSTANCE INTO CENTRAL VEIN, PERCUTANEOUS APPROACH: ICD-10-PCS | Performed by: INTERNAL MEDICINE

## 2022-08-25 PROCEDURE — 250N000011 HC RX IP 250 OP 636: Performed by: INTERNAL MEDICINE

## 2022-08-25 PROCEDURE — 84100 ASSAY OF PHOSPHORUS: CPT | Performed by: INTERNAL MEDICINE

## 2022-08-25 PROCEDURE — 85041 AUTOMATED RBC COUNT: CPT | Performed by: SURGERY

## 2022-08-25 PROCEDURE — 84132 ASSAY OF SERUM POTASSIUM: CPT | Performed by: INTERNAL MEDICINE

## 2022-08-25 PROCEDURE — 71046 X-RAY EXAM CHEST 2 VIEWS: CPT

## 2022-08-25 RX ORDER — DEXTROSE MONOHYDRATE 100 MG/ML
INJECTION, SOLUTION INTRAVENOUS CONTINUOUS PRN
Status: DISCONTINUED | OUTPATIENT
Start: 2022-08-25 | End: 2022-08-31 | Stop reason: HOSPADM

## 2022-08-25 RX ORDER — POTASSIUM CHLORIDE 1500 MG/1
40 TABLET, EXTENDED RELEASE ORAL ONCE
Status: DISCONTINUED | OUTPATIENT
Start: 2022-08-25 | End: 2022-08-25

## 2022-08-25 RX ORDER — POTASSIUM CHLORIDE 1500 MG/1
40 TABLET, EXTENDED RELEASE ORAL ONCE
Status: COMPLETED | OUTPATIENT
Start: 2022-08-25 | End: 2022-08-25

## 2022-08-25 RX ORDER — CALCIUM CARBONATE 500 MG/1
500 TABLET, CHEWABLE ORAL 2 TIMES DAILY PRN
Status: DISCONTINUED | OUTPATIENT
Start: 2022-08-25 | End: 2022-08-31

## 2022-08-25 RX ORDER — METHOCARBAMOL 500 MG/1
500 TABLET, FILM COATED ORAL 4 TIMES DAILY
Status: DISCONTINUED | OUTPATIENT
Start: 2022-08-25 | End: 2022-08-31 | Stop reason: HOSPADM

## 2022-08-25 RX ADMIN — CALCIUM CARBONATE (ANTACID) CHEW TAB 500 MG 500 MG: 500 CHEW TAB at 00:28

## 2022-08-25 RX ADMIN — POTASSIUM CHLORIDE 40 MEQ: 20 TABLET, EXTENDED RELEASE ORAL at 18:36

## 2022-08-25 RX ADMIN — SENNOSIDES AND DOCUSATE SODIUM 1 TABLET: 50; 8.6 TABLET ORAL at 08:55

## 2022-08-25 RX ADMIN — ACETAMINOPHEN 975 MG: 160 SUSPENSION ORAL at 18:38

## 2022-08-25 RX ADMIN — OXYCODONE HYDROCHLORIDE 5 MG: 5 TABLET ORAL at 03:55

## 2022-08-25 RX ADMIN — HYDROMORPHONE HYDROCHLORIDE 0.4 MG: 0.2 INJECTION, SOLUTION INTRAMUSCULAR; INTRAVENOUS; SUBCUTANEOUS at 09:08

## 2022-08-25 RX ADMIN — METHOCARBAMOL 500 MG: 500 TABLET ORAL at 11:52

## 2022-08-25 RX ADMIN — METHOCARBAMOL 500 MG: 500 TABLET ORAL at 08:54

## 2022-08-25 RX ADMIN — SENNOSIDES AND DOCUSATE SODIUM 1 TABLET: 50; 8.6 TABLET ORAL at 19:42

## 2022-08-25 RX ADMIN — POTASSIUM CHLORIDE AND SODIUM CHLORIDE: 450; 150 INJECTION, SOLUTION INTRAVENOUS at 03:51

## 2022-08-25 RX ADMIN — LIDOCAINE 1 PATCH: 560 PATCH PERCUTANEOUS; TOPICAL; TRANSDERMAL at 21:50

## 2022-08-25 RX ADMIN — POTASSIUM CHLORIDE 40 MEQ: 20 TABLET, EXTENDED RELEASE ORAL at 11:52

## 2022-08-25 RX ADMIN — POLYETHYLENE GLYCOL 3350 17 G: 17 POWDER, FOR SOLUTION ORAL at 08:55

## 2022-08-25 RX ADMIN — METHOCARBAMOL 500 MG: 500 TABLET ORAL at 21:50

## 2022-08-25 RX ADMIN — TAZOBACTAM SODIUM AND PIPERACILLIN SODIUM 3.38 G: 375; 3 INJECTION, SOLUTION INTRAVENOUS at 11:49

## 2022-08-25 RX ADMIN — ACETAMINOPHEN 975 MG: 325 TABLET, COATED ORAL at 03:55

## 2022-08-25 RX ADMIN — OXYCODONE HYDROCHLORIDE 5 MG: 5 TABLET ORAL at 08:55

## 2022-08-25 RX ADMIN — ASCORBIC ACID, VITAMIN A PALMITATE, CHOLECALCIFEROL, THIAMINE HYDROCHLORIDE, RIBOFLAVIN-5 PHOSPHATE SODIUM, PYRIDOXINE HYDROCHLORIDE, NIACINAMIDE, DEXPANTHENOL, ALPHA-TOCOPHEROL ACETATE, VITAMIN K1, FOLIC ACID, BIOTIN, CYANOCOBALAMIN: 200; 3300; 200; 6; 3.6; 6; 40; 15; 10; 150; 600; 60; 5 INJECTION, SOLUTION INTRAVENOUS at 21:27

## 2022-08-25 RX ADMIN — OXYCODONE HYDROCHLORIDE 5 MG: 5 TABLET ORAL at 19:42

## 2022-08-25 RX ADMIN — METHOCARBAMOL 500 MG: 500 TABLET ORAL at 17:39

## 2022-08-25 RX ADMIN — TAZOBACTAM SODIUM AND PIPERACILLIN SODIUM 3.38 G: 375; 3 INJECTION, SOLUTION INTRAVENOUS at 17:44

## 2022-08-25 RX ADMIN — I.V. FAT EMULSION 250 ML: 20 EMULSION INTRAVENOUS at 21:27

## 2022-08-25 RX ADMIN — HYDROMORPHONE HYDROCHLORIDE 0.4 MG: 0.2 INJECTION, SOLUTION INTRAMUSCULAR; INTRAVENOUS; SUBCUTANEOUS at 02:32

## 2022-08-25 RX ADMIN — POTASSIUM CHLORIDE AND SODIUM CHLORIDE: 450; 150 INJECTION, SOLUTION INTRAVENOUS at 21:12

## 2022-08-25 ASSESSMENT — ACTIVITIES OF DAILY LIVING (ADL)
ADLS_ACUITY_SCORE: 23

## 2022-08-25 NOTE — PROGRESS NOTES
Patient complains of  Some spasms on the wound site.   not passing flatus  Pain mostly  under control  Oral pain medications worked better.     vitals  Patient Vitals for the past 24 hrs:   BP Temp Temp src Pulse Resp SpO2 Weight   08/25/22 0537 -- -- -- -- -- 90 % --   08/25/22 0519 (!) 147/88 98.1  F (36.7  C) Oral 82 18 (!) 87 % 53.5 kg (117 lb 15.1 oz)   08/24/22 2239 (!) 145/87 98.5  F (36.9  C) Oral 92 18 94 % --   08/24/22 1840 127/82 -- -- 90 20 91 % --   08/24/22 1805 -- -- -- 98 22 92 % --   08/24/22 1800 133/84 -- -- 94 8 91 % --   08/24/22 1755 -- -- -- 94 12 92 % --   08/24/22 1750 -- -- -- 96 17 90 % --   08/24/22 1745 (!) 143/95 -- -- 98 14 90 % --   08/24/22 1730 (!) 126/92 -- -- 95 21 92 % --   08/24/22 1715 (!) 138/95 -- -- 96 14 92 % --   08/24/22 1700 (!) 144/98 -- -- 86 23 92 % --   08/24/22 1645 (!) 155/98 -- -- 80 22 95 % --   08/24/22 1630 (!) 146/97 -- -- 80 16 96 % --   08/24/22 1620 (!) 142/94 97.9  F (36.6  C) Axillary 82 16 98 % --     Hospital problem list  Patient Active Problem List   Diagnosis     Colon adenocarcinoma (H)     Colostomy present (H)     Cachexia (H)     Adenocarcinoma of sigmoid colon (H)     Cancer associated pain     Dehydration     Chemotherapy-induced neutropenia (H)     SBO (small bowel obstruction) (H)     Cancer cachexia (H)     Esophageal reflux     Small bowel obstruction (H)     Physical exam:  Lungs clear on the left  side sounds like a few rhonchi on the \right  side.    Abdomen wound with out evidence of infection   No Lower extremity edema   Still having a lot of Nasal gastric tube fluid.   Assessment/ Plan:  Use incentive spirometer.   Ambulate. Increase iv fluids as urine looks dark.  Ostomy bag with some blood in it most likely form just closing the site for surgery with a suture.   robixan for muscle spasms.   Discontinue jo    *.      Segundo Hill MD

## 2022-08-25 NOTE — PLAN OF CARE
Goal Outcome Evaluation:    Temp: 97.6  F (36.4  C) Temp src: Oral BP: (!) 139/93 Pulse: 92   Resp: 18 SpO2: 91 % O2 Device: Nasal cannula Oxygen Delivery: 2 LPM         Patient continues to have NG tube placed at 65 and it is hooked up to low intermittent suction. Moderate amount of brown bile noted in canister. Patient has been dealing with pain-rated 4-6/10 but he has denied any PRN pain medications. Ice packs have been helpful. Scheduled tylenol switched to liquid per patients request. Bowel sounds hypoactive in right quadrants. Colostomy has had no output, but a small amount of blood is noted in bag. Surgical site intact, dressing clean dry and intact-small amount of blood noted. Castro was removed and patient is due to void. Lung sounds diminished in bases with faint crackles noted in left lower lobe. Patient educated on use of Incentive spirometer-able to obtain 750 mL. Patient encouraged to deep breath. Patient continues to be on potassium replacement-TPN to be started tonight. Sariah Oates RN on 8/25/2022 at 6:33 PM

## 2022-08-25 NOTE — PROGRESS NOTES
[facility] TRANSPORT NOTE  Data:   Reason for Transport:  Surgery    Kalin Shepard was transported from surgery via cart at 1830.  Patient was accompanied by Nursing Assistant. Equipment used for transport: None. Family was aware of reason for transport: yes    Action:  Report: received from Bert SNYDER    Response:  Patient's condition upon return was stable.    Maulik Henning RN

## 2022-08-25 NOTE — PROGRESS NOTES
Patient has been resting on and off tonight, reports unable to get much sleep. He has been complaining of abdominal pain that he describes as sharp and intermittent. Pain managed with IV dilaudid, oral oxycodone and tylenol. Surgical dressings remain intact with small amount of bloody drainage. No gas or BM forming in colostomy at this time.

## 2022-08-25 NOTE — PROGRESS NOTES
Nutrition Note Brief    Patient History  -Patient SBO remains unresolved; bowels sounds hypoactive;faint. Patient does have a colostomy bag.  -RD consulted to start TPN; RD already following patient see full assessment 8/22  -Patient potassium low today-replace and redraw before starting TPN. Replacement scheduled today at 1200  -Patient TG, Sodium, Magnesium and Phos are WNL.  -Medications reviewed; patient currently on no IV fluids  -Patient current weight 53.5-used for TPN GIR and formula breakdown    Dosing Weight: 57 kg-actual BW from initial assessment used     ASSESSED NUTRITION NEEDS  Estimated Energy Needs: 1,710-1,995 kcals/day (30 - 35 kcals/kg )  Justification: Increased needs  Estimated Protein Needs: 68-86 grams protein/day (1.2 - 1.5 grams of pro/kg)  Justification: Increased needs  Estimated Fluid Needs: 1,710-1,995 mL/day (1 mL/kcal)   Justification: Per provider pending fluid status       TPN recommendations  -Obtain baseline labs: TG, Mg, Phos, and K+.  -Replace Mg, K+ and Phos if < WNL. Recheck lytes and ensure Mg and K+ WNL and Phos >/= 2.0 prior to starting and advancing TPN.    1) Start TPN @ 35 mL/hr x 24 hrs. Run 20% lipids @ 20.8 mL/hr x 12 hrs . This provides 840 mL, 1096 kcal (20 kcal/kg), 126 g dextrose, 42 g AA (.78 g pro/kg), and 250 mL of 20% IV lipids. GIR = 1.64 mg/kg/min    2) Recheck lytes. If acceptable, advance TPN to 55 mL/hr x 24 hrs. Run 20% lipids as above. This provides 1,320 mL, 1437 kcal (27 kcal/kg), 198 g dextrose, 66 g AA (1.2 g pro/kg), and 250 mL of 20% IV lipids. GIR = 2.57 mg/kg/min    3) Recheck lytes. If acceptable, advance TPN to goal of 75 mL/hr x 24 hrs. Run 20% lipids as above. This provides 1800 mL, 1778 kcal (33 kcal/kg), 270 g dextrose, 90 g AA (1.6 g pro/kg), and 250 mL of 20% IV lipids (28% kcal from fat). GIR = 3.5 mg/kg/min    Jaclyn Brownlee RDN, JULIENNE  Clinical Dietitian  Office: [phone]  Weekend pager: [phone]

## 2022-08-25 NOTE — PROGRESS NOTES
"[facility] Internal Medicine Progress Note     Date of Service (when I saw the patient): 08/25/2022    REASON FOR ADMISSION / INTERVAL HISTORY:  Kalin Shepard is a 72 year old male admitted on 8/21/2022. He presented to the emergency department for evaluation of abdominal pain, nausea, and vomiting and was found to have a small bowel obstruction. S/p surgery-see details below.   Continues to have mod-sev pain. Has pain on coughing/ any movement in bed.    ASSESSMENT/PLAN:     SBO (small bowel obstruction)  Possible peritonitis  CT abdomen & pelvis demonstrates \"Small bowel obstruction with transition point in the right midabdomen, presumably secondary to adhesion. No evidence of overt bowel ischemia or perforation. 2.  Small volume ascites. 3.  Known sigmoid colonic malignancy again noted.\" Occurs in the setting of known colon cancer with current colostomy. No prior history of small bowel obstruction other than the obstruction associated with his colon mass.   NG tube was placed in the ER 8/21.  He initially had about 2 L of output.  A Gastrografin study was attempted 8/22 but only had minimal contrast passing into the colon by 16 hours on 8/23/2022.  Patient did initially tolerate being off suction during the Gastrografin study, but morning of 8/23 he developed increasing pain prompting replacement of suction with about 500 cc out at that time. Output then dropped off. NGT removed accidentlaly. IT was replaced with immediate 1.5L out.    8/ 24 patient was minimally improved and x-ray showed continued distention. S/p surgery 8/24-open lapartomy with adhesionolysis.  Has increased pain today-pain with coughing and minimal movement in bed. WBC 24k with L shift. Could have peritonitis.  -start iv zosyn, follow WBC. Continue hydromorphone 0.2-0.5 mg every 2 hours as needed IV     Hypercalcemia, mild  Admit serum calcium 10.3, asymptomatic. Most likely due to dehydration, but could be related to his known " "cancer.  Calcium improved to 8.4.  -Continue IV fluids. 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 Colorado. Diagnosed on CT 3/26/22, mass of sigmoid colon causing small bowel and colonic obstruction. Hospitalized at [facility], s/p colostomy. Currently on chemo with FOLFOX and bevacizumab, last infusion 8/11/22.   - Colostomy cares  - Continue with outpatient oncology management     Cancer associated pain  Managed prior to admission with scheduled acetaminophen 1g qid (although taking prn), lidocaine patch, and prn oxycodone.  - Continue lidocaine patch  - Hold acetaminophen and oxycodone while NPO     Esophageal reflux  Managed prior to admission with omeprazole 20 mg daily, hold while NPO.     Cachexia and anorexia  Malnutrition Diagnosis: Severe malnutrition in the context of acute on chronic illness  Admit BMI 17.65. Secondary to cancer and chemotherapy. Managed prior to admission with mirtazapine 15 mg q hs.   - Hold mirtazapine while NPO  -start TPN    DISPO  Will be in hospital 2-3 days       GAIL SOTO MD   Pg [phone]    DVT Prhylaxis: Heparin SQ  Code Status: Full Code    ROS:  As described in A/P and Exam.  Otherwise ALL are  negative.    PHYSICAL EXAM:  All vitals have been reviewed    Blood pressure (!) 147/88, pulse 82, temperature 98.1  F (36.7  C), temperature source Oral, resp. rate 18, height 1.778 m (5' 10\"), weight 53.5 kg (117 lb 15.1 oz), SpO2 90 %.    No intake/output data recorded.    GENERAL APPEARANCE: cachectic, alert and no distress  EYES: conjunctiva clear, eyes grossly normal  HENT: external ears and nose normal   RESP: lungs clear to auscultation - no rales, rhonchi or wheezes  CV: regular rate and rhythm, normal S1 S2, no S3 or S4 and no murmur, click or rub   ABDOMEN: soft, mod-sev tenderness diffucsely, no HSM or masses and bowel sounds normal  MS: no clubbing, cyanosis; no " edema  SKIN: clear without significant rashes or lesions  NEURO: -non-focal moves all 4 extr    ROUTINE  LABS (Last four results)  CMP  Recent Labs   Lab 08/25/22  0514 08/24/22  0432 08/23/22  0506 08/22/22  0509 08/21/22  1434    135 139 143 139   POTASSIUM 3.7 3.9 4.3 3.9 4.9   CHLORIDE 95 97 105 110* 104   CO2 32 30 27 29 26   ANIONGAP 11 8 7 4 9   *  116* 104* 107* 111* 133*   BUN 53* 50* 44* 29 21   CR 0.70 0.64* 0.63* 0.62* 0.62*   GFRESTIMATED >90 >90 >90 >90 >90   ROXIE 9.1 9.0 8.7 8.4* 10.3*   PROTTOTAL  --   --   --   --  9.1*   ALBUMIN  --   --   --   --  4.2   BILITOTAL  --   --   --   --  0.5   ALKPHOS  --   --   --   --  181*   AST  --   --   --   --  30   ALT  --   --   --   --  31     CBC  Recent Labs   Lab 08/25/22  0514 08/24/22  0432 08/22/22  0509 08/21/22  1434   WBC 23.1* 18.2* 9.7 17.5*   RBC 4.49 4.34* 4.30* 5.45   HGB 13.8 13.2* 13.2* 16.6   HCT 41.8 41.0 41.5 50.8   MCV 93 95 97 93   MCH 30.7 30.4 30.7 30.5   MCHC 33.0 32.2 31.8 32.7   RDW 17.7* 17.8* 18.1* 18.4*    233 178 244     INRNo lab results found in last 7 days.  Arterial Blood GasNo lab results found in last 7 days.    Recent Results (from the past 24 hour(s))   POC US Guidance Needle Placement    Impression    Normal ultrasound guided TAP block   Chest 2 Views*    Narrative    CHEST TWO VIEWS  8/25/2022 9:47 AM    HISTORY: Elevated white blood cell count and rhonchi in the right  lung. History of small bowel obstruction, status post open laparotomy  with lysis of adhesions.    COMPARISON: 8/24/2022, 8/21/2022, 4/7/2022    FINDINGS/    Impression    IMPRESSION: An enteric catheter is seen with the tip in the region of  the gastroesophageal junction. The side port is seen at the level of  the distal esophagus. Recommend advancement and repeat imaging to  document optimal positioning. There is moderate to marked free air  beneath the diaphragm, consistent with pneumoperitoneum, which is  likely related to recent  open laparotomy. A viscus perforation is,  however, not excluded. A small amount of high dense material/contrast  is seen in the left upper quadrant, presumably in the stomach.    The lungs are mildly hyperinflated with flattening of the diaphragms,  which can be seen with pulmonary emphysema. A round nodule in the left  upper lung projects over the posterior aspect of the left seventh rib  measuring 17 x 14 mm, which is similar to comparison chest x-ray on  8/21/2022, but new compared to the previous CT from 4/7/2022. Consider  further evaluation with chest CT. A few additional subcentimeter  bilateral round pulmonary nodules are also noted, which can be  assessed on follow-up CT as well. A wedge-shaped opacity is seen in  the retrocardiac region of the medial left lower lung, which is  suggestive of atelectasis. Pneumonia is, however, not entirely  excluded. No sign of pneumothorax or significant pleural effusion.    Heart size and pulmonary vasculature are normal appearing. A left  chest wall Port-A-Cath is in similar position with the tip in the  region of the SVC. No acute osseous abnormality.

## 2022-08-25 NOTE — PROGRESS NOTES
Spoke with Dr. Adam regarding administration of Heparin as patient has sanguinous drainage in colostomy bag, small amount under dressing near incision, and some blood noted in Urine. Plan is to hold heparin and encourage ambulation. Faint crackles noted by MD and chest ray indicates to advance the NG tube. MD advised to advance 7 cm.

## 2022-08-25 NOTE — PROGRESS NOTES
"[facility] Medicine Progress Note  Date of Service: 08/24/2022    Assessment & Plan          Kalin Shepard is a 72 year old male admitted on 8/21/2022. He presented to the emergency department for evaluation of abdominal pain, nausea, and vomiting and was found to have a small bowel obstruction for which he is being admitted for further evaluation and treatment.    SBO (small bowel obstruction)  CT abdomen & pelvis demonstrates \"Small bowel obstruction with transition point in the right midabdomen, presumably secondary to adhesion. No evidence of overt bowel ischemia or perforation. 2.  Small volume ascites. 3.  Known sigmoid colonic malignancy again noted.\" Occurs in the setting of known colon cancer with current colostomy. No prior history of small bowel obstruction other than the obstruction associated with his colon mass.   NG tube was placed in the ER 8/21.  He initially had about 2 L of output.  A Gastrografin study was attempted 8/22 but only had minimal contrast passing into the colon by 16 hours on 8/23/2022.  Patient did initially tolerate being off suction during the Gastrografin study, but morning of 8/23 he developed increasing pain prompting replacement of suction with about 500 cc out at that time. Output then dropped off. NGT removed accidentlaly. IT was replaced with immediate 1.5L out. Then this morning 8/ 24 patient was minimally improved and x-ray showed continued distention.  -IDrDaniele Hill of general surgery has plans for OR this afternoon.  -Continue half NS +20 of K, but can decrease to 125 an hour  - Repeat BMP tomorrow morning  - Continue hydromorphone 0.2-0.5 mg every 2 hours as needed IV  -- hold DVT prophylaxis pending surgery later today    Leukocytosis with left shift  Admit WBC 17.5, ANC 12.5. Afebrile. No evidence of infection by history or exam. Patient recently had neutropenia per oncology notes 8/2/22. Received Neulasta injection on " 8/11 with his most recent round of chemo.  On following morning 8/22 WBC improved to 9.7.  Suspect may have been due to some stress demargination.  -Monitor off antibiotics    Hypercalcemia, mild  Admit serum calcium 10.3, asymptomatic. Most likely due to dehydration, but could be related to his known cancer.  Calcium improved to 8.4.  -Continue IV fluids.      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 Colorado. Diagnosed on CT 3/26/22, mass of sigmoid colon causing small bowel and colonic obstruction. Hospitalized at [facility], s/p colostomy. Currently on chemo with FOLFOX and bevacizumab, last infusion 8/11/22.   - Colostomy cares  - Continue with outpatient oncology management    Cancer associated pain  Managed prior to admission with scheduled acetaminophen 1g qid (although taking prn), lidocaine patch, and prn oxycodone.  - Continue lidocaine patch  - Hold acetaminophen and oxycodone while NPO    Esophageal reflux  Managed prior to admission with omeprazole 20 mg daily, hold while NPO.    Cachexia and anorexia  Admit BMI 17.65. Secondary to cancer and chemotherapy. Managed prior to admission with mirtazapine 15 mg q hs.   - Hold mirtazapine while NPO      Diet: NPO for Medical/Clinical Reasons Except for: Meds, Ice Chips    DVT Prophylaxis: Enoxaparin (Lovenox) SQ  Castro Catheter: PRESENT, indication: Strict 1-2 Hour I&O  Central Lines: PRESENT       Code Status: Full Code           Discussion: Likely surgery later today    Attestation:  I have reviewed today's vital signs, notes, medications, labs and imaging.    Edy Adams MD       Interval History   Patient feels about the same today.    Physical Exam   Temp:  [97.8  F (36.6  C)-98.3  F (36.8  C)] 97.9  F (36.6  C)  Pulse:  [57-98] 90  Resp:  [8-23] 20  BP: (126-155)/(69-98) 127/82  SpO2:  [90 %-98 %] 91 %    Weights:   Vitals:    08/21/22 1422 08/22/22 0538 08/23/22  0617   Weight: 55.8 kg (123 lb) 57 kg (125 lb 10.6 oz) 58.3 kg (128 lb 8.5 oz)    Body mass index is 18.44 kg/m .    Constitutional: Well-appearing though fatigued with NG tube in place  CV: Regular  Respiratory: CTA bilaterally  GI: Soft, nontender, nondistended currently.  Extremely faint bowel sounds present.  Colostomy present left lower quadrant.  Skin: Warm and dry      Data   Recent Labs   Lab 08/24/22  0432 08/23/22  0506 08/22/22  0509 08/21/22  1434   WBC 18.2*  --  9.7 17.5*   HGB 13.2*  --  13.2* 16.6   MCV 95  --  97 93     --  178 244    139 143 139   POTASSIUM 3.9 4.3 3.9 4.9   CHLORIDE 97 105 110* 104   CO2 30 27 29 26   BUN 50* 44* 29 21   CR 0.64* 0.63* 0.62* 0.62*   ANIONGAP 8 7 4 9   ROXIE 9.0 8.7 8.4* 10.3*   * 107* 111* 133*   ALBUMIN  --   --   --  4.2   PROTTOTAL  --   --   --  9.1*   BILITOTAL  --   --   --  0.5   ALKPHOS  --   --   --  181*   ALT  --   --   --  31   AST  --   --   --  30   LIPASE  --   --   --  95       Recent Labs   Lab 08/24/22  0432 08/23/22  0506 08/22/22  0509 08/21/22  1434   * 107* 111* 133*        Unresulted Labs Ordered in the Past 30 Days of this Admission     No orders found from 7/22/2022 to 8/22/2022.           Imaging  Recent Results (from the past 24 hour(s))   XR Abdomen 2 Views    Narrative    EXAM: XR ABDOMEN 2 VIEWS  LOCATION: [facility]  DATE/TIME: 08/24/2022, 6:24 AM    INDICATION: SBO.  COMPARISON: 08/23/2022.      Impression    IMPRESSION: Enteric tube with tip in the stomach. This has been advanced compared with the prior study. Prominent air-fluid level in the stomach as well as some high density material in the stomach. Again seen are multiple dilated loops of small bowel   with multiple air-fluid levels present compatible with mechanical small bowel obstruction. The degree of bowel dilatation is similar to slightly increased compared to 08/23/2022. Again seen is some enteric contrast material in  "the right hemicolon. No   free air.      POC US Guidance Needle Placement    Impression    Normal ultrasound guided TAP block        I reviewed all new labs and imaging results over the last 24 hours. I personally reviewed abdominal x-ray showing continued air-fluid levels  Medications     0.45% sodium chloride + KCl 20 mEq/L 125 mL/hr at 08/24/22 1935     bupivacaine liposome (EXPAREL) LA inj was given in the infiltration site to produce post-op analgesia. Duration of action is up to 72 hours. Other \"julia\" meds should not be given for 96 hours except for lidocaine 4% patch. This is for INFORMATION ONLY.         acetaminophen  975 mg Oral Q8H     [START ON 8/25/2022] heparin ANTICOAGULANT  5,000 Units Subcutaneous Q8H     Lidocaine  1 patch Transdermal Q24H     lidocaine   Transdermal Q8H DIMAS     [Held by provider] mirtazapine  15 mg Oral At Bedtime     [Held by provider] omeprazole  20 mg Oral BID     [START ON 8/25/2022] polyethylene glycol  17 g Oral Daily     senna-docusate  1 tablet Oral BID     sodium chloride (PF)  3 mL Intracatheter Q8H     sodium chloride (PF)  3 mL Intracatheter Q8H       Edy Adams MD             "

## 2022-08-26 ENCOUNTER — APPOINTMENT (OUTPATIENT)
Dept: CT IMAGING | Facility: CLINIC | Age: 72
DRG: 335 | End: 2022-08-26
Attending: SURGERY
Payer: COMMERCIAL

## 2022-08-26 LAB
ALBUMIN SERPL-MCNC: 3 G/DL (ref 3.4–5)
ALP SERPL-CCNC: 100 U/L (ref 40–150)
ALT SERPL W P-5'-P-CCNC: 17 U/L (ref 0–70)
ANION GAP SERPL CALCULATED.3IONS-SCNC: 8 MMOL/L (ref 3–14)
AST SERPL W P-5'-P-CCNC: 21 U/L (ref 0–45)
BASOPHILS # BLD AUTO: 0.1 10E3/UL (ref 0–0.2)
BASOPHILS NFR BLD AUTO: 0 %
BILIRUB DIRECT SERPL-MCNC: 0.2 MG/DL (ref 0–0.2)
BILIRUB SERPL-MCNC: 0.6 MG/DL (ref 0.2–1.3)
BUN SERPL-MCNC: 39 MG/DL (ref 7–30)
CALCIUM SERPL-MCNC: 8.9 MG/DL (ref 8.5–10.1)
CHLORIDE BLD-SCNC: 97 MMOL/L (ref 94–109)
CO2 SERPL-SCNC: 33 MMOL/L (ref 20–32)
CREAT SERPL-MCNC: 0.56 MG/DL (ref 0.66–1.25)
EOSINOPHIL # BLD AUTO: 0 10E3/UL (ref 0–0.7)
EOSINOPHIL NFR BLD AUTO: 0 %
ERYTHROCYTE [DISTWIDTH] IN BLOOD BY AUTOMATED COUNT: 17.8 % (ref 10–15)
GFR SERPL CREATININE-BSD FRML MDRD: >90 ML/MIN/1.73M2
GLUCOSE BLD-MCNC: 134 MG/DL (ref 70–99)
GLUCOSE BLD-MCNC: 149 MG/DL (ref 70–99)
GLUCOSE BLDC GLUCOMTR-MCNC: 132 MG/DL (ref 70–99)
HCT VFR BLD AUTO: 40.3 % (ref 40–53)
HGB BLD-MCNC: 13.1 G/DL (ref 13.3–17.7)
IMM GRANULOCYTES # BLD: 0.4 10E3/UL
IMM GRANULOCYTES NFR BLD: 2 %
INR PPP: 1.08 (ref 0.85–1.15)
LYMPHOCYTES # BLD AUTO: 2.5 10E3/UL (ref 0.8–5.3)
LYMPHOCYTES NFR BLD AUTO: 10 %
MAGNESIUM SERPL-MCNC: 2.3 MG/DL (ref 1.6–2.3)
MCH RBC QN AUTO: 30.6 PG (ref 26.5–33)
MCHC RBC AUTO-ENTMCNC: 32.5 G/DL (ref 31.5–36.5)
MCV RBC AUTO: 94 FL (ref 78–100)
MONOCYTES # BLD AUTO: 1.7 10E3/UL (ref 0–1.3)
MONOCYTES NFR BLD AUTO: 7 %
NEUTROPHILS # BLD AUTO: 21 10E3/UL (ref 1.6–8.3)
NEUTROPHILS NFR BLD AUTO: 81 %
NRBC # BLD AUTO: 0 10E3/UL
NRBC BLD AUTO-RTO: 0 /100
PHOSPHATE SERPL-MCNC: 1.2 MG/DL (ref 2.5–4.5)
PHOSPHATE SERPL-MCNC: 1.6 MG/DL (ref 2.5–4.5)
PLATELET # BLD AUTO: 291 10E3/UL (ref 150–450)
POTASSIUM BLD-SCNC: 2.9 MMOL/L (ref 3.4–5.3)
POTASSIUM BLD-SCNC: 3.2 MMOL/L (ref 3.4–5.3)
PROT SERPL-MCNC: 7.3 G/DL (ref 6.8–8.8)
RBC # BLD AUTO: 4.28 10E6/UL (ref 4.4–5.9)
SODIUM SERPL-SCNC: 138 MMOL/L (ref 133–144)
WBC # BLD AUTO: 25.7 10E3/UL (ref 4–11)

## 2022-08-26 PROCEDURE — 85025 COMPLETE CBC W/AUTO DIFF WBC: CPT | Performed by: INTERNAL MEDICINE

## 2022-08-26 PROCEDURE — 250N000013 HC RX MED GY IP 250 OP 250 PS 637: Performed by: INTERNAL MEDICINE

## 2022-08-26 PROCEDURE — 82947 ASSAY GLUCOSE BLOOD QUANT: CPT | Performed by: INTERNAL MEDICINE

## 2022-08-26 PROCEDURE — 85610 PROTHROMBIN TIME: CPT | Performed by: INTERNAL MEDICINE

## 2022-08-26 PROCEDURE — 84100 ASSAY OF PHOSPHORUS: CPT | Performed by: INTERNAL MEDICINE

## 2022-08-26 PROCEDURE — 250N000011 HC RX IP 250 OP 636: Performed by: INTERNAL MEDICINE

## 2022-08-26 PROCEDURE — 84134 ASSAY OF PREALBUMIN: CPT | Performed by: INTERNAL MEDICINE

## 2022-08-26 PROCEDURE — 250N000013 HC RX MED GY IP 250 OP 250 PS 637: Performed by: PHYSICIAN ASSISTANT

## 2022-08-26 PROCEDURE — 36415 COLL VENOUS BLD VENIPUNCTURE: CPT | Performed by: INTERNAL MEDICINE

## 2022-08-26 PROCEDURE — 83735 ASSAY OF MAGNESIUM: CPT | Performed by: INTERNAL MEDICINE

## 2022-08-26 PROCEDURE — 120N000001 HC R&B MED SURG/OB

## 2022-08-26 PROCEDURE — 84132 ASSAY OF SERUM POTASSIUM: CPT | Performed by: INTERNAL MEDICINE

## 2022-08-26 PROCEDURE — 82248 BILIRUBIN DIRECT: CPT | Performed by: INTERNAL MEDICINE

## 2022-08-26 PROCEDURE — 74177 CT ABD & PELVIS W/CONTRAST: CPT

## 2022-08-26 PROCEDURE — 80053 COMPREHEN METABOLIC PANEL: CPT | Performed by: INTERNAL MEDICINE

## 2022-08-26 PROCEDURE — 258N000003 HC RX IP 258 OP 636: Performed by: INTERNAL MEDICINE

## 2022-08-26 PROCEDURE — 250N000009 HC RX 250: Performed by: INTERNAL MEDICINE

## 2022-08-26 PROCEDURE — 250N000013 HC RX MED GY IP 250 OP 250 PS 637: Performed by: SURGERY

## 2022-08-26 PROCEDURE — 99232 SBSQ HOSP IP/OBS MODERATE 35: CPT | Performed by: INTERNAL MEDICINE

## 2022-08-26 RX ORDER — POTASSIUM CHLORIDE 1500 MG/1
40 TABLET, EXTENDED RELEASE ORAL ONCE
Status: COMPLETED | OUTPATIENT
Start: 2022-08-26 | End: 2022-08-26

## 2022-08-26 RX ORDER — POTASSIUM CHLORIDE 1.5 G/1.58G
20 POWDER, FOR SOLUTION ORAL ONCE
Status: COMPLETED | OUTPATIENT
Start: 2022-08-26 | End: 2022-08-26

## 2022-08-26 RX ORDER — POTASSIUM CHLORIDE 1.5 G/1.58G
40 POWDER, FOR SOLUTION ORAL ONCE
Status: COMPLETED | OUTPATIENT
Start: 2022-08-26 | End: 2022-08-26

## 2022-08-26 RX ORDER — IOPAMIDOL 755 MG/ML
52 INJECTION, SOLUTION INTRAVASCULAR ONCE
Status: COMPLETED | OUTPATIENT
Start: 2022-08-26 | End: 2022-08-26

## 2022-08-26 RX ADMIN — LIDOCAINE 1 PATCH: 560 PATCH PERCUTANEOUS; TOPICAL; TRANSDERMAL at 21:09

## 2022-08-26 RX ADMIN — POTASSIUM CHLORIDE 40 MEQ: 1.5 FOR SOLUTION ORAL at 15:18

## 2022-08-26 RX ADMIN — METHOCARBAMOL 500 MG: 500 TABLET ORAL at 12:30

## 2022-08-26 RX ADMIN — TAZOBACTAM SODIUM AND PIPERACILLIN SODIUM 3.38 G: 375; 3 INJECTION, SOLUTION INTRAVENOUS at 00:15

## 2022-08-26 RX ADMIN — ACETAMINOPHEN 975 MG: 160 SUSPENSION ORAL at 12:30

## 2022-08-26 RX ADMIN — POTASSIUM CHLORIDE 20 MEQ: 1.5 FOR SOLUTION ORAL at 17:44

## 2022-08-26 RX ADMIN — METHOCARBAMOL 500 MG: 500 TABLET ORAL at 08:18

## 2022-08-26 RX ADMIN — OXYCODONE HYDROCHLORIDE 5 MG: 5 TABLET ORAL at 21:15

## 2022-08-26 RX ADMIN — POTASSIUM CHLORIDE 40 MEQ: 1.5 POWDER, FOR SOLUTION ORAL at 08:18

## 2022-08-26 RX ADMIN — OXYCODONE HYDROCHLORIDE 5 MG: 5 TABLET ORAL at 02:47

## 2022-08-26 RX ADMIN — POTASSIUM CHLORIDE 40 MEQ: 20 TABLET, EXTENDED RELEASE ORAL at 02:35

## 2022-08-26 RX ADMIN — POTASSIUM CHLORIDE AND SODIUM CHLORIDE: 450; 150 INJECTION, SOLUTION INTRAVENOUS at 18:53

## 2022-08-26 RX ADMIN — Medication: at 08:27

## 2022-08-26 RX ADMIN — METHOCARBAMOL 500 MG: 500 TABLET ORAL at 21:09

## 2022-08-26 RX ADMIN — I.V. FAT EMULSION 250 ML: 20 EMULSION INTRAVENOUS at 20:40

## 2022-08-26 RX ADMIN — OXYCODONE HYDROCHLORIDE 5 MG: 5 TABLET ORAL at 15:27

## 2022-08-26 RX ADMIN — SODIUM PHOSPHATE, MONOBASIC, MONOHYDRATE 15 MMOL: 276; 142 INJECTION, SOLUTION INTRAVENOUS at 23:20

## 2022-08-26 RX ADMIN — TAZOBACTAM SODIUM AND PIPERACILLIN SODIUM 3.38 G: 375; 3 INJECTION, SOLUTION INTRAVENOUS at 12:36

## 2022-08-26 RX ADMIN — POLYETHYLENE GLYCOL 3350 17 G: 17 POWDER, FOR SOLUTION ORAL at 08:19

## 2022-08-26 RX ADMIN — TAZOBACTAM SODIUM AND PIPERACILLIN SODIUM 3.38 G: 375; 3 INJECTION, SOLUTION INTRAVENOUS at 17:46

## 2022-08-26 RX ADMIN — METHOCARBAMOL 500 MG: 500 TABLET ORAL at 17:44

## 2022-08-26 RX ADMIN — SODIUM PHOSPHATE, MONOBASIC, MONOHYDRATE 15 MMOL: 276; 142 INJECTION, SOLUTION INTRAVENOUS at 10:29

## 2022-08-26 RX ADMIN — IOPAMIDOL 52 ML: 755 INJECTION, SOLUTION INTRAVENOUS at 09:30

## 2022-08-26 RX ADMIN — POTASSIUM CHLORIDE 40 MEQ: 1.5 FOR SOLUTION ORAL at 22:27

## 2022-08-26 RX ADMIN — SENNOSIDES AND DOCUSATE SODIUM 1 TABLET: 50; 8.6 TABLET ORAL at 20:53

## 2022-08-26 RX ADMIN — OXYCODONE HYDROCHLORIDE 5 MG: 5 TABLET ORAL at 08:16

## 2022-08-26 RX ADMIN — TAZOBACTAM SODIUM AND PIPERACILLIN SODIUM 3.38 G: 375; 3 INJECTION, SOLUTION INTRAVENOUS at 22:47

## 2022-08-26 RX ADMIN — SODIUM CHLORIDE 54 ML: 9 INJECTION, SOLUTION INTRAVENOUS at 09:31

## 2022-08-26 RX ADMIN — SENNOSIDES AND DOCUSATE SODIUM 1 TABLET: 50; 8.6 TABLET ORAL at 08:18

## 2022-08-26 RX ADMIN — ASCORBIC ACID, VITAMIN A PALMITATE, CHOLECALCIFEROL, THIAMINE HYDROCHLORIDE, RIBOFLAVIN-5 PHOSPHATE SODIUM, PYRIDOXINE HYDROCHLORIDE, NIACINAMIDE, DEXPANTHENOL, ALPHA-TOCOPHEROL ACETATE, VITAMIN K1, FOLIC ACID, BIOTIN, CYANOCOBALAMIN: 200; 3300; 200; 6; 3.6; 6; 40; 15; 10; 150; 600; 60; 5 INJECTION, SOLUTION INTRAVENOUS at 20:48

## 2022-08-26 RX ADMIN — POTASSIUM CHLORIDE 20 MEQ: 1.5 FOR SOLUTION ORAL at 10:27

## 2022-08-26 RX ADMIN — TAZOBACTAM SODIUM AND PIPERACILLIN SODIUM 3.38 G: 375; 3 INJECTION, SOLUTION INTRAVENOUS at 06:02

## 2022-08-26 ASSESSMENT — ACTIVITIES OF DAILY LIVING (ADL)
ADLS_ACUITY_SCORE: 22
ADLS_ACUITY_SCORE: 23
ADLS_ACUITY_SCORE: 22
ADLS_ACUITY_SCORE: 23
ADLS_ACUITY_SCORE: 22

## 2022-08-26 NOTE — PROGRESS NOTES
TPN Recommendation Brief Note  1) Replace potassium and Phosphorus. Recheck lytes and ensure Mg and K+ WNL and Phos >/= 2.0 prior to starting and advancing TPN.     2) Continue TPN @ 35 mL/hr x 24 hrs. Run 20% lipids @ 20.8 mL/hr x 12 hrs . This provides 840 mL, 1096 kcal (20 kcal/kg), 126 g dextrose, 42 g AA (.78 g pro/kg), and 250 mL of 20% IV lipids. GIR = 1.64 mg/kg/min.    RD spoke to patient RN about lytes replacement and pharmacy about current rate and lytes. RD to reassess lytes and TPN advancement 8/27.    New findings  Patient TPN currently infusing at 35 mL/hr with 20.8 mL/hr Lipids. Patient Phos low today 1.2 mg/dL-needs replacement. Potassium low today 2.9 mg/dL-being replaced. Recommend not advancing TPN until Potassium and Phosphorus replaced and WNL (see rec's above). Patient noted to have abdominal discomfort and LUQ hyperactive bowel sounds/ LLQ hypoactive bowel sounds. NG continued on intermittent suction; brown drainage noted. No colon ostomy output noted.    Jaclyn Brownlee RDN, LD  Clinical Dietitian  Office: [phone]  Weekend pager: [phone]

## 2022-08-26 NOTE — PROGRESS NOTES
"Patient alert, oriented, assist of 1 with walker. :NG output 600 overnight. NG in place and on intermittent suction.  Pt had small unmeasurable output not drained from colostomy. Dressing over abdominal incision site is intact. Moderate amount of drainage, not draining through dressing. TPN infusing. IV Zosyn given x 2. Heparin held per Dr. Adam note and report. Left message for clarification of order this am. Pt denies nausea. LS diminished with crackles in LLL. Blood glucose 118, 136. K+ replaced overnight. Recheck in am. BP (!) 163/81   Pulse 54   Temp 97.9  F (36.6  C) (Oral)   Resp 16   Ht 1.778 m (5' 10\")   Wt 53.5 kg (117 lb 15.1 oz)   SpO2 91%   BMI 16.92 kg/m      "

## 2022-08-26 NOTE — PLAN OF CARE
Goal Outcome Evaluation:  Progressing towards goals.  NG has been clamped as patient has received PO meds throughout the evening, however, re-attached to LIS now- container amt still at 500 mL.   Pain from 8/10 down to 2/10 after PO oxycodone.  Port-a-cath accessed and TPN (35 mL/hr) and lipids (20.8 mL/hr) started @ 2130 and maintenance IV fluids decreased to 45 mL/hr for total admin rate of 100 mL hr.  Small amt of bloody stool in colostomy.  Abdominal incision with steri-strips in place covered by large tegaderm over entire incision.  Steri-strips with moderate amt of serosanguineous drainage throughout but no leaking through Tegaderm.

## 2022-08-26 NOTE — PROGRESS NOTES
Patient complains of  Right lower quadrant pain   passing flatus  Pain mostly under control    vitals  Patient Vitals for the past 24 hrs:   BP Temp Temp src Pulse Resp SpO2   08/26/22 0630 (!) 125/90 97.9  F (36.6  C) Oral 91 16 91 %   08/26/22 0408 -- -- -- 54 -- --   08/26/22 0324 (!) 163/81 97.9  F (36.6  C) Oral (!) 47 16 91 %   08/25/22 2228 131/77 98.2  F (36.8  C) Oral 86 19 92 %   08/25/22 1947 (!) 158/89 97.8  F (36.6  C) Oral 84 16 93 %   08/25/22 1551 (!) 139/93 97.6  F (36.4  C) Oral 92 18 91 %   08/25/22 1057 (!) 145/83 97.4  F (36.3  C) Oral 104 18 90 %   08/25/22 0908 -- -- -- -- 18 --     Hospital problem list  Patient Active Problem List   Diagnosis     Colon adenocarcinoma (H)     Colostomy present (H)     Cachexia (H)     Adenocarcinoma of sigmoid colon (H)     Cancer associated pain     Dehydration     Chemotherapy-induced neutropenia (H)     SBO (small bowel obstruction) (H)     Cancer cachexia (H)     Esophageal reflux     Small bowel obstruction (H)     Physical exam:  Lungs clear to auscultation   Abdomen wound with out evidence of infection   No Lower extremity edema    Status: Final result     Visible to patient: Yes (not seen)     0 Result Notes      Component Ref Range & Units  7:00 AM 2 mo ago 4 mo ago 5 mo ago    INR 0.85 - 1.15 1.08  0.97  1.01  1.13    Resulting Agency              : Final result     Visible to patient: No (scheduled for 8/26/2022  8:44 AM)     0 Result Notes      Component Ref Range & Units  7:00 AM   (8/26/22)  7:00 AM   (8/26/22) 1 d ago   (8/25/22) 1 d ago   (8/25/22) 1 d ago   (8/25/22) 1 d ago   (8/25/22) 1 d ago   (8/25/22)    Sodium 133 - 144 mmol/L 138     139  138      Potassium 3.4 - 5.3 mmol/L 2.9 Low   2.9 Low   3.3 Low   3.4  3.3 Low   3.7      Chloride 94 - 109 mmol/L 97     95  95      Carbon Dioxide (CO2) 20 - 32 mmol/L 33 High      33 High   32      Anion Gap 3 - 14 mmol/L 8     11  11      Urea Nitrogen 7 - 30 mg/dL 39 High      56 High   53 High        Creatinine 0.66 - 1.25 mg/dL 0.56 Low      0.66  0.70      Calcium 8.5 - 10.1 mg/dL 8.9     9.2  9.1      Glucose 70 - 99 mg/dL 149 High      140 High   116 High   116 High      Alkaline Phosphatase 40 - 150 U/L 100     106       AST 0 - 45 U/L 21     25       ALT 0 - 70 U/L 17     18       Protein Total 6.8 - 8.8 g/dL 7.3     7.4       Albumin 3.4 - 5.0 g/dL 3.0 Low      3.3 Low        Bilirubin Total 0.2 - 1.3 mg/dL 0.6     0.6       GFR Estimate >60 mL/min/1.73m2 >90     >90 CM  >90 CM     Comment: Effective         Contains abnormal data CBC with platelets and differential  Order: [id number] - Part of Panel Order [id number]   Status: Final result     Visible to patient: Yes (not seen)     0 Result Notes      Component Ref Range & Units  7:00 AM   (8/26/22) 1 d ago   (8/25/22) 2 d ago   (8/24/22) 4 d ago   (8/22/22) 5 d ago   (8/21/22) 2 wk ago   (8/9/22) 2 wk ago   (8/9/22)    WBC Count 4.0 - 11.0 10e3/uL 25.7 High   23.1 High   18.2 High   9.7  17.5 High    7.9 CM     RBC Count 4.40 - 5.90 10e6/uL 4.28 Low   4.49  4.34 Low   4.30 Low   5.45   3.87 Low      Hemoglobin 13.3 - 17.7 g/dL 13.1 Low   13.8  13.2 Low   13.2 Low   16.6   12.0 Low      Hematocrit 40.0 - 53.0 % 40.3  41.8  41.0  41.5  50.8   37.7 Low      MCV 78 - 100 fL 94  93  95  97  93   97     MCH 26.5 - 33.0 pg 30.6  30.7  30.4  30.7  30.5   31.0     MCHC 31.5 - 36.5 g/dL 32.5  33.0  32.2  31.8  32.7   31.8     RDW 10.0 - 15.0 % 17.8 High   17.7 High   17.8 High   18.1 High   18.4 High    17.0 High      Platelet Count 150 - 450 10e3/uL 291  287  233  178  244   288     % Neutrophils % 81  81   71  71  33      % Lymphocytes % 10  9   14  18  38      % Monocytes % 7  9   13  9  19      % Eosinophils % 0  0   0  0  4      % Basophils % 0  0   1  1  2      % Immature Granulocytes % 2  1   1  1       NRBCs per 100 WBC <1 /100 0  0   0  0       Absolute Neutrophils 1.6 - 8.3 10e3/uL 21.0 High   18.6 High    6.9  12.5 High        Absolute Lymphocytes 0.8  - 5.3 10e3/uL 2.5  2.0   1.4  3.1       Absolute Monocytes 0.0 - 1.3 10e3/uL 1.7 High   2.1 High    1.2  1.6 High        Absolute Eosinophils 0.0 - 0.7 10e3/uL 0.0  0.0   0.0  0.0       Absolute Basophils 0.0 - 0.2 10e3/uL 0.1  0.1   0.1  0.1       Absolute Immature Granulocytes <=0.4 10e3/uL 0.4  0.3   0.1  0.1       Absolute NRBCs 10e3/uL 0.0  0.0   0.0  0.0      Resulting Agency  [facility]              Specimen Collected: 08/26/22  7:00 AM               Study Result    Narrative & Impression   CT CHEST/ABDOMEN/PELVIS WITH CONTRAST 8/26/2022 9:47 AM     CLINICAL HISTORY: Elevated white blood cell count.     TECHNIQUE: CT scan of the chest, abdomen, and pelvis was performed  following injection of IV contrast. Multiplanar reformats were  obtained. Dose reduction techniques were used.   CONTRAST: 52 mL Isovue 370     COMPARISON: 8/25/2022, 8/21/2022     FINDINGS:   LUNGS AND PLEURA: Bibasilar airspace consolidations are present. Mild  mucous plugging is seen in the lower lobes. Large central airways are  patent. A 4 mm noncalcified pulmonary nodule is seen in the anterior  right upper lobe (3-100). The previously described nodular density in  the left upper lung on prior chest x-rays corresponds to a sclerotic  nonaggressive appearing lesion in the scapula measuring 15 x 7 mm  (2-70).     MEDIASTINUM/AXILLAE: A left chest wall Port-A-Cath is present with the  tip in SVC. The heart size is normal. Mild coronary artery  atherosclerosis. No pericardial effusion. There is mild fusiform  ectasia of the ascending thoracic aorta measuring 4.2 cm in AP  dimension. Moderate thoracic aortic atherosclerosis is present. There  are several prominent borderline enlarged mediastinal and hilar lymph  nodes present. Examples are as follows:  -AP window lymph node measuring 22 x 13 mm (2-54).  -Right hilar lymph node measuring 17 x 15 mm (2-82).     HEPATOBILIARY: Normal.     PANCREAS:  Normal.     SPLEEN: Normal.     ADRENAL GLANDS: Normal.     KIDNEYS/BLADDER: A simple-appearing left renal cortical cyst measures  2.5 cm. No follow-up is necessary. No nephrolithiasis or  hydronephrosis.     BOWEL: Moderate pneumoperitoneum is seen with a majority of free air  seen in the anterior upper abdomen. High dense material is seen in the  gastric fundus. There are several prominent loops of large and small  bowel without a transition point, suggestive of a mild ileus. No wall  thickening or inflammatory changes of the bowel. Postsurgical changes  of small bowel are seen in the right lower quadrant. A left lower  quadrant colostomy is present. An enteric catheter terminates in the  proximal stomach. High dense material is also seen in the colon. No  intra-abdominal or intrapelvic fluid collection or abscess.     PELVIC ORGANS: The prostate gland is enlarged.     ADDITIONAL FINDINGS: Mild ascites. Poststenotic dilation, which can be  seen with median arcuate ligament syndrome. There is ectasia of the  infrarenal abdominal aorta measuring up to 2.8 cm in AP dimension.     MUSCULOSKELETAL: As mentioned above, there is a nonaggressive  appearing sclerotic lesion in the scapula, which is favored to  represent a bone island.                                                                      IMPRESSION:  1.  Bibasilar opacities, which may represent atelectasis versus  pneumonia.  2.  Prominent and mildly enlarged mediastinal and hilar lymph nodes,  likely reactive in nature.  3.  Prominent, but not technically dilated, air-filled bowel loops  without a transition point, favoring a mild adynamic ileus.  4.  Left lower quadrant colostomy. No signs of bowel inflammation.  5.  Moderate pneumoperitoneum, most likely postsurgical in nature.  6.  Nonaggressive appearing sclerotic lesion in the left scapula  accounting for a previously described left upper lung pulmonary  nodule, favoring a bone island.        Assessment/ Plan:  Wbc is going up.   Not sure why will get ct scan chsat abdomen pelvis.   Ct scan shows some air in the abdomen and he tender in the right lower quadrant. Where most of the work happened.  But no rebound tenderness. Abdomen is soft.   Patient is back on zosyn  Cbc in am.   Will clamp Nasal gastric tube and see how he does.   Stool in the colostomy bag.     Time spent with the patient with greater that 50% of the time in discussion was 30 minutes.  In discussing the plan and looking up ct scan and labs. .      Segundo Hill MD

## 2022-08-26 NOTE — PROGRESS NOTES
"[facility] Internal Medicine Progress Note     Date of Service (when I saw the patient): 08/26/2022    REASON FOR ADMISSION / INTERVAL HISTORY:  Kalin Shepard is a 72 year old male admitted on 8/21/2022. He presented to the emergency department for evaluation of abdominal pain, nausea, and vomiting and was found to have a small bowel obstruction. S/p surgery 8/24-see details below.   Continues to have mod-sev pain. Continues to have pain on coughing/ any movement in bed.    ASSESSMENT/PLAN:     SBO (small bowel obstruction)  Possible peritonitis  CT abdomen & pelvis demonstrates \"Small bowel obstruction with transition point in the right midabdomen, presumably secondary to adhesion. No evidence of overt bowel ischemia or perforation. 2.  Small volume ascites. 3.  Known sigmoid colonic malignancy again noted.\" Occurs in the setting of known colon cancer with current colostomy. No prior history of small bowel obstruction other than the obstruction associated with his colon mass.   NG tube was placed in the ER 8/21.  He initially had about 2 L of output.  A Gastrografin study was attempted 8/22 but only had minimal contrast passing into the colon by 16 hours on 8/23/2022.  Patient did initially tolerate being off suction during the Gastrografin study, but morning of 8/23 he developed increasing pain prompting replacement of suction with about 500 cc out at that time. Output then dropped off. NGT removed accidentlaly. IT was replaced with immediate 1.5L out.    8/ 24 patient was minimally improved and x-ray showed continued distention. S/p surgery 8/24-open lapartomy with adhesionolysis.  Continues to have  increased pain post-op -pain with coughing and minimal movement in bed. WBC was  24k with L shift on 8/25-hence started on zosyn for possible  Peritonitis.  WBC 25k today. Still significant pain. Minimal colostomy output. NG 1200 ml in 24 hrs  -continue  zosyn, follow WBC. Continue hydromorphone 0.2-0.5 " "mg every 2 hours as needed IV. Surgery to follow     Hypercalcemia, mild  Admit serum calcium 10.3, asymptomatic. Most likely due to dehydration, but could be related to his known cancer.  Calcium improved to 8.4.  -Continue IV fluids.     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 [place]. Diagnosed on CT 3/26/22, mass of sigmoid colon causing small bowel and colonic obstruction. Hospitalized at [facility], s/p colostomy. Currently on chemo with FOLFOX and bevacizumab, last infusion 8/11/22.   - Colostomy cares  - Continue with outpatient oncology management     Cancer associated pain  Managed prior to admission with scheduled acetaminophen 1g qid (although taking prn), lidocaine patch, and prn oxycodone.  - Continue lidocaine patch  - Hold acetaminophen and oxycodone while NPO     Esophageal reflux  Managed prior to admission with omeprazole 20 mg daily, hold while NPO.     Cachexia and anorexia  Malnutrition Diagnosis: Severe malnutrition in the context of acute on chronic illness  Admit BMI 17.65. Secondary to cancer and chemotherapy. Managed prior to admission with mirtazapine 15 mg q hs.   - Hold mirtazapine while NPO  -start TPN    DISPO  Will be in hospital 2-3 days atleast       GAIL SOTO MD   Pg [phone]    DVT Prhylaxis: Heparin SQ  Code Status: Full Code    ROS:  As described in A/P and Exam.  Otherwise ALL are  negative.    PHYSICAL EXAM:  All vitals have been reviewed    Blood pressure 139/79, pulse 99, temperature 97.6  F (36.4  C), temperature source Oral, resp. rate 16, height 1.778 m (5' 10\"), weight 53.5 kg (117 lb 15.1 oz), SpO2 90 %.    I/O this shift:  In: 623 [I.V.:400]  Out: 475 [Urine:475]    GENERAL APPEARANCE: cachectic, alert and no distress  EYES: conjunctiva clear, eyes grossly normal  HENT: external ears and nose normal   RESP: lungs clear to auscultation - no rales, rhonchi or wheezes  CV: regular " rate and rhythm, normal S1 S2, no S3 or S4 and no murmur, click or rub   ABDOMEN: soft, mod-sev tenderness diffucsely, no HSM or masses and bowel sounds normal  MS: no clubbing, cyanosis; no edema  SKIN: clear without significant rashes or lesions  NEURO: -non-focal moves all 4 extr    ROUTINE  LABS (Last four results)  CMP  Recent Labs   Lab 08/26/22  0700 08/26/22  0313 08/25/22  2320 08/25/22  2135 08/25/22  1708 08/25/22  1635 08/25/22  1103 08/25/22  0514 08/24/22  0432 08/22/22  0509 08/21/22  1434     --   --   --   --   --  139 138 135   < > 139   POTASSIUM 2.9*  2.9*  --  3.3*  --   --  3.4 3.3* 3.7 3.9   < > 4.9   CHLORIDE 97  --   --   --   --   --  95 95 97   < > 104   CO2 33*  --   --   --   --   --  33* 32 30   < > 26   ANIONGAP 8  --   --   --   --   --  11 11 8   < > 9   * 132*  --  136* 118*  --  140* 116*  116* 104*   < > 133*   BUN 39*  --   --   --   --   --  56* 53* 50*   < > 21   CR 0.56*  --   --   --   --   --  0.66 0.70 0.64*   < > 0.62*   GFRESTIMATED >90  --   --   --   --   --  >90 >90 >90   < > >90   ROXIE 8.9  --   --   --   --   --  9.2 9.1 9.0   < > 10.3*   MAG 2.3  --   --   --   --   --  2.6*  --   --   --   --    PHOS 1.2*  --   --   --   --   --  3.3  --   --   --   --    PROTTOTAL 7.3  --   --   --   --   --  7.4  --   --   --  9.1*   ALBUMIN 3.0*  --   --   --   --   --  3.3*  --   --   --  4.2   BILITOTAL 0.6  --   --   --   --   --  0.6  --   --   --  0.5   ALKPHOS 100  --   --   --   --   --  106  --   --   --  181*   AST 21  --   --   --   --   --  25  --   --   --  30   ALT 17  --   --   --   --   --  18  --   --   --  31    < > = values in this interval not displayed.     CBC  Recent Labs   Lab 08/26/22  0700 08/25/22  0514 08/24/22  0432 08/22/22  0509   WBC 25.7* 23.1* 18.2* 9.7   RBC 4.28* 4.49 4.34* 4.30*   HGB 13.1* 13.8 13.2* 13.2*   HCT 40.3 41.8 41.0 41.5   MCV 94 93 95 97   MCH 30.6 30.7 30.4 30.7   MCHC 32.5 33.0 32.2 31.8   RDW 17.8* 17.7* 17.8*  18.1*    287 233 178     INR  Recent Labs   Lab 08/26/22  0700   INR 1.08     Arterial Blood GasNo lab results found in last 7 days.    Recent Results (from the past 24 hour(s))   CT Chest/Abdomen/Pelvis w Contrast    Narrative    CT CHEST/ABDOMEN/PELVIS WITH CONTRAST 8/26/2022 9:47 AM    CLINICAL HISTORY: Elevated white blood cell count.    TECHNIQUE: CT scan of the chest, abdomen, and pelvis was performed  following injection of IV contrast. Multiplanar reformats were  obtained. Dose reduction techniques were used.   CONTRAST: 52 mL Isovue 370    COMPARISON: 8/25/2022, 8/21/2022    FINDINGS:   LUNGS AND PLEURA: Bibasilar airspace consolidations are present. Mild  mucous plugging is seen in the lower lobes. Large central airways are  patent. A 4 mm noncalcified pulmonary nodule is seen in the anterior  right upper lobe (3-100). The previously described nodular density in  the left upper lung on prior chest x-rays corresponds to a sclerotic  nonaggressive appearing lesion in the scapula measuring 15 x 7 mm  (2-70).    MEDIASTINUM/AXILLAE: A left chest wall Port-A-Cath is present with the  tip in SVC. The heart size is normal. Mild coronary artery  atherosclerosis. No pericardial effusion. There is mild fusiform  ectasia of the ascending thoracic aorta measuring 4.2 cm in AP  dimension. Moderate thoracic aortic atherosclerosis is present. There  are several prominent borderline enlarged mediastinal and hilar lymph  nodes present. Examples are as follows:  -AP window lymph node measuring 22 x 13 mm (2-54).  -Right hilar lymph node measuring 17 x 15 mm (2-82).    HEPATOBILIARY: Normal.    PANCREAS: Normal.    SPLEEN: Normal.    ADRENAL GLANDS: Normal.    KIDNEYS/BLADDER: A simple-appearing left renal cortical cyst measures  2.5 cm. No follow-up is necessary. No nephrolithiasis or  hydronephrosis.    BOWEL: Moderate pneumoperitoneum is seen with a majority of free air  seen in the anterior upper abdomen. High  dense material is seen in the  gastric fundus. There are several prominent loops of large and small  bowel without a transition point, suggestive of a mild ileus. No wall  thickening or inflammatory changes of the bowel. Postsurgical changes  of small bowel are seen in the right lower quadrant. A left lower  quadrant colostomy is present. An enteric catheter terminates in the  proximal stomach. High dense material is also seen in the colon. No  intra-abdominal or intrapelvic fluid collection or abscess.    PELVIC ORGANS: The prostate gland is enlarged.    ADDITIONAL FINDINGS: Mild ascites. Poststenotic dilation, which can be  seen with median arcuate ligament syndrome. There is ectasia of the  infrarenal abdominal aorta measuring up to 2.8 cm in AP dimension.    MUSCULOSKELETAL: As mentioned above, there is a nonaggressive  appearing sclerotic lesion in the scapula, which is favored to  represent a bone island.      Impression    IMPRESSION:  1.  Bibasilar opacities, which may represent atelectasis versus  pneumonia.  2.  Prominent and mildly enlarged mediastinal and hilar lymph nodes,  likely reactive in nature.  3.  Prominent, but not technically dilated, air-filled bowel loops  without a transition point, favoring a mild adynamic ileus.  4.  Left lower quadrant colostomy. No signs of bowel inflammation.  5.  Moderate pneumoperitoneum, most likely postsurgical in nature.  6.  Nonaggressive appearing sclerotic lesion in the left scapula  accounting for a previously described left upper lung pulmonary  nodule, favoring a bone island.    REFERENCE:  Guidelines for Management of Incidental Pulmonary Nodules Detected on  CT Images: From the Fleischner Society 2017.   Guidelines apply to incidental nodules in patients who are 35 years or  older.  Guidelines do not apply to lung cancer screening, patients with  immunosuppression, or patients with known primary cancer.    SINGLE NODULE  Nodule size <6 mm  Low-risk  patients: No follow-up needed.  High-risk patients: Optional follow-up at 12 months.    Consider referral to lung nodule clinic.    YAN TURK MD         SYSTEM ID:  [id number]

## 2022-08-26 NOTE — PLAN OF CARE
Goal Outcome Evaluation:    Temp: 97.8  F (36.6  C) Temp src: Oral BP: 121/73 Pulse: 68   Resp: 18 SpO2: 93 % O2 Device: None (Room air)     Patient continues on maintenance fluids and TPN. Lipids stopped this am. Patient is tolerating sips of water with meds and ice chips. Patients NG tube is currently clamped-patient had 1200 ml of Brown and bile like output when hooked to low intermittent suction. Colostomy bag remains intact and is stooling/filling with gas. Bowel sounds are active. Nausea has been better-patient still reports occasional indigestion/fullness/hiccup feeling. Blood sugars stable and urinary output ok. Potassium was replaced-redraw 2.9-replaced again. Phosphorus replaced as well. Roxicodone 5mg administered x2 with relief noted-ice packs effective. Patient exhibits dyspnea on exertion and has crackles noted in the lower lobes. Ambulation, sitting up, incentive spirometer (800 mL) use, and deep breathing encouraged. Dressing remains intact-surgeon visualized today. Patient comfortable. Sariah Oates RN on 8/26/2022 at 6:41 PM

## 2022-08-27 LAB
CREAT SERPL-MCNC: 0.51 MG/DL (ref 0.66–1.25)
ERYTHROCYTE [DISTWIDTH] IN BLOOD BY AUTOMATED COUNT: 18 % (ref 10–15)
GFR SERPL CREATININE-BSD FRML MDRD: >90 ML/MIN/1.73M2
GLUCOSE BLD-MCNC: 138 MG/DL (ref 70–99)
GLUCOSE BLDC GLUCOMTR-MCNC: 130 MG/DL (ref 70–99)
GLUCOSE BLDC GLUCOMTR-MCNC: 138 MG/DL (ref 70–99)
HCT VFR BLD AUTO: 39.3 % (ref 40–53)
HGB BLD-MCNC: 12.7 G/DL (ref 13.3–17.7)
MAGNESIUM SERPL-MCNC: 2.4 MG/DL (ref 1.6–2.3)
MCH RBC QN AUTO: 30.8 PG (ref 26.5–33)
MCHC RBC AUTO-ENTMCNC: 32.3 G/DL (ref 31.5–36.5)
MCV RBC AUTO: 95 FL (ref 78–100)
PHOSPHATE SERPL-MCNC: 2 MG/DL (ref 2.5–4.5)
PLATELET # BLD AUTO: 300 10E3/UL (ref 150–450)
POTASSIUM BLD-SCNC: 3.1 MMOL/L (ref 3.4–5.3)
POTASSIUM BLD-SCNC: 3.2 MMOL/L (ref 3.4–5.3)
POTASSIUM BLD-SCNC: 3.2 MMOL/L (ref 3.4–5.3)
RBC # BLD AUTO: 4.13 10E6/UL (ref 4.4–5.9)
WBC # BLD AUTO: 21.2 10E3/UL (ref 4–11)

## 2022-08-27 PROCEDURE — 250N000011 HC RX IP 250 OP 636: Performed by: INTERNAL MEDICINE

## 2022-08-27 PROCEDURE — 250N000009 HC RX 250: Performed by: INTERNAL MEDICINE

## 2022-08-27 PROCEDURE — 84100 ASSAY OF PHOSPHORUS: CPT | Performed by: INTERNAL MEDICINE

## 2022-08-27 PROCEDURE — 250N000013 HC RX MED GY IP 250 OP 250 PS 637: Performed by: SURGERY

## 2022-08-27 PROCEDURE — 82565 ASSAY OF CREATININE: CPT | Performed by: PHYSICIAN ASSISTANT

## 2022-08-27 PROCEDURE — 85014 HEMATOCRIT: CPT | Performed by: SURGERY

## 2022-08-27 PROCEDURE — 84132 ASSAY OF SERUM POTASSIUM: CPT | Performed by: INTERNAL MEDICINE

## 2022-08-27 PROCEDURE — 250N000013 HC RX MED GY IP 250 OP 250 PS 637: Performed by: INTERNAL MEDICINE

## 2022-08-27 PROCEDURE — 258N000003 HC RX IP 258 OP 636: Performed by: INTERNAL MEDICINE

## 2022-08-27 PROCEDURE — 82947 ASSAY GLUCOSE BLOOD QUANT: CPT | Performed by: INTERNAL MEDICINE

## 2022-08-27 PROCEDURE — 83735 ASSAY OF MAGNESIUM: CPT | Performed by: INTERNAL MEDICINE

## 2022-08-27 PROCEDURE — 99232 SBSQ HOSP IP/OBS MODERATE 35: CPT | Performed by: INTERNAL MEDICINE

## 2022-08-27 PROCEDURE — 120N000001 HC R&B MED SURG/OB

## 2022-08-27 RX ORDER — POTASSIUM CHLORIDE 1.5 G/1.58G
40 POWDER, FOR SOLUTION ORAL ONCE
Status: COMPLETED | OUTPATIENT
Start: 2022-08-27 | End: 2022-08-27

## 2022-08-27 RX ORDER — POTASSIUM CHLORIDE 20MEQ/15ML
40 LIQUID (ML) ORAL ONCE
Status: COMPLETED | OUTPATIENT
Start: 2022-08-27 | End: 2022-08-27

## 2022-08-27 RX ORDER — POTASSIUM CHLORIDE 7.45 MG/ML
10 INJECTION INTRAVENOUS
Status: COMPLETED | OUTPATIENT
Start: 2022-08-27 | End: 2022-08-28

## 2022-08-27 RX ADMIN — OXYCODONE HYDROCHLORIDE 5 MG: 5 TABLET ORAL at 12:31

## 2022-08-27 RX ADMIN — METHOCARBAMOL 500 MG: 500 TABLET ORAL at 13:01

## 2022-08-27 RX ADMIN — POTASSIUM CHLORIDE 10 MEQ: 7.46 INJECTION, SOLUTION INTRAVENOUS at 23:16

## 2022-08-27 RX ADMIN — TAZOBACTAM SODIUM AND PIPERACILLIN SODIUM 3.38 G: 375; 3 INJECTION, SOLUTION INTRAVENOUS at 12:26

## 2022-08-27 RX ADMIN — SENNOSIDES AND DOCUSATE SODIUM 1 TABLET: 50; 8.6 TABLET ORAL at 19:55

## 2022-08-27 RX ADMIN — ACETAMINOPHEN 975 MG: 160 SUSPENSION ORAL at 11:36

## 2022-08-27 RX ADMIN — TAZOBACTAM SODIUM AND PIPERACILLIN SODIUM 3.38 G: 375; 3 INJECTION, SOLUTION INTRAVENOUS at 17:51

## 2022-08-27 RX ADMIN — METHOCARBAMOL 500 MG: 500 TABLET ORAL at 22:09

## 2022-08-27 RX ADMIN — I.V. FAT EMULSION 250 ML: 20 EMULSION INTRAVENOUS at 20:06

## 2022-08-27 RX ADMIN — TAZOBACTAM SODIUM AND PIPERACILLIN SODIUM 3.38 G: 375; 3 INJECTION, SOLUTION INTRAVENOUS at 06:22

## 2022-08-27 RX ADMIN — SENNOSIDES AND DOCUSATE SODIUM 1 TABLET: 50; 8.6 TABLET ORAL at 09:46

## 2022-08-27 RX ADMIN — OXYCODONE HYDROCHLORIDE 5 MG: 5 TABLET ORAL at 06:45

## 2022-08-27 RX ADMIN — SODIUM PHOSPHATE, MONOBASIC, MONOHYDRATE 9 MMOL: 276; 142 INJECTION, SOLUTION INTRAVENOUS at 13:07

## 2022-08-27 RX ADMIN — ASCORBIC ACID, VITAMIN A PALMITATE, CHOLECALCIFEROL, THIAMINE HYDROCHLORIDE, RIBOFLAVIN-5 PHOSPHATE SODIUM, PYRIDOXINE HYDROCHLORIDE, NIACINAMIDE, DEXPANTHENOL, ALPHA-TOCOPHEROL ACETATE, VITAMIN K1, FOLIC ACID, BIOTIN, CYANOCOBALAMIN: 200; 3300; 200; 6; 3.6; 6; 40; 15; 10; 150; 600; 60; 5 INJECTION, SOLUTION INTRAVENOUS at 20:09

## 2022-08-27 RX ADMIN — METHOCARBAMOL 500 MG: 500 TABLET ORAL at 17:47

## 2022-08-27 RX ADMIN — POTASSIUM CHLORIDE 40 MEQ: 1.5 SOLUTION ORAL at 15:08

## 2022-08-27 RX ADMIN — POTASSIUM CHLORIDE 40 MEQ: 1.5 FOR SOLUTION ORAL at 06:44

## 2022-08-27 RX ADMIN — METHOCARBAMOL 500 MG: 500 TABLET ORAL at 09:45

## 2022-08-27 RX ADMIN — OXYCODONE HYDROCHLORIDE 5 MG: 5 TABLET ORAL at 17:49

## 2022-08-27 RX ADMIN — POLYETHYLENE GLYCOL 3350 17 G: 17 POWDER, FOR SOLUTION ORAL at 09:46

## 2022-08-27 RX ADMIN — ACETAMINOPHEN 975 MG: 160 SUSPENSION ORAL at 02:58

## 2022-08-27 RX ADMIN — POTASSIUM CHLORIDE 10 MEQ: 7.46 INJECTION, SOLUTION INTRAVENOUS at 22:04

## 2022-08-27 ASSESSMENT — ACTIVITIES OF DAILY LIVING (ADL)
ADLS_ACUITY_SCORE: 22
ADLS_ACUITY_SCORE: 26
ADLS_ACUITY_SCORE: 22
ADLS_ACUITY_SCORE: 28
ADLS_ACUITY_SCORE: 22
ADLS_ACUITY_SCORE: 22
ADLS_ACUITY_SCORE: 26
ADLS_ACUITY_SCORE: 26
ADLS_ACUITY_SCORE: 22
ADLS_ACUITY_SCORE: 26

## 2022-08-27 NOTE — PROGRESS NOTES
LS: Wheezes bilateral bases. Encouraging use of IS. Patient states he uses an albuterol inhaler at home, but not on home medication list. Will web page Dr Francois to see if she will order an inhaler for patient.

## 2022-08-27 NOTE — PROGRESS NOTES
Patient was asking if he could have his ompeprazole reordered. Dr Stover web paged with this patient request.

## 2022-08-27 NOTE — PROGRESS NOTES
"[facility] Internal Medicine Progress Note     Date of Service (when I saw the patient): 08/27/2022    REASON FOR ADMISSION / INTERVAL HISTORY:  Kalin Shepard is a 72 year old male admitted on 8/21/2022. He presented to the emergency department for evaluation of abdominal pain, nausea, and vomiting and was found to have a small bowel obstruction. S/p surgery 8/24-see details below.   Pain better.     ASSESSMENT/PLAN:     SBO (small bowel obstruction)  Possible peritonitis  CT abdomen & pelvis demonstrates \"Small bowel obstruction with transition point in the right midabdomen, presumably secondary to adhesion. No evidence of overt bowel ischemia or perforation. 2.  Small volume ascites. 3.  Known sigmoid colonic malignancy again noted.\" Occurs in the setting of known colon cancer with current colostomy. No prior history of small bowel obstruction other than the obstruction associated with his colon mass.   NG tube was placed in the ER 8/21.  He initially had about 2 L of output.  A Gastrografin study was attempted 8/22 but only had minimal contrast passing into the colon by 16 hours on 8/23/2022.  Patient did initially tolerate being off suction during the Gastrografin study, but morning of 8/23 he developed increasing pain prompting replacement of suction with about 500 cc out at that time. Output then dropped off. NGT removed accidentlaly. IT was replaced with immediate 1.5L out.    8/ 24 patient was minimally improved and x-ray showed continued distention. S/p surgery 8/24-open lapartomy with adhesionolysis.  Continues to have  increased pain post-op -pain with coughing and minimal movement in bed. WBC was  24k with L shift on 8/25-hence started on zosyn for possible  Peritonitis.  WBC 21k today. Pain better. Colostomy output 150cc-some stool. NG 1200 ml in 24 hrs. NG clamped last evening. Surgery following-recommend to continue clamping NG-start clears and if tolerating, remove NG.   -continue  zosyn, " "follow WBC.      Hypercalcemia, mild  Admit serum calcium 10.3, asymptomatic. Most likely due to dehydration, but could be related to his known cancer.   Resolved.      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 [place]. Diagnosed on CT 3/26/22, mass of sigmoid colon causing small bowel and colonic obstruction. Hospitalized at [facility], s/p colostomy. Currently on chemo with FOLFOX and bevacizumab, last infusion 8/11/22.   - Colostomy cares  - Continue with outpatient oncology management     Cancer associated pain  Managed prior to admission with scheduled acetaminophen 1g qid (although taking prn), lidocaine patch, and prn oxycodone.  - Continue lidocaine patch  - Hold acetaminophen and oxycodone while NPO     Esophageal reflux  Managed prior to admission with omeprazole 20 mg daily, hold while NPO.     Cachexia and anorexia  Malnutrition Diagnosis: Severe malnutrition in the context of acute on chronic illness  Admit BMI 17.65. Secondary to cancer and chemotherapy. Managed prior to admission with mirtazapine 15 mg q hs.   - Hold mirtazapine while NPO  -cont TPN    DISPO  Will be in hospital 2-3 days atleast       GAIL SOTO MD   Pg [phone]    DVT Prhylaxis: Heparin SQ  Code Status: Full Code    ROS:  As described in A/P and Exam.  Otherwise ALL are  negative.    PHYSICAL EXAM:  All vitals have been reviewed    Blood pressure 134/83, pulse 54, temperature (!) 96.7  F (35.9  C), temperature source Oral, resp. rate 16, height 1.778 m (5' 10\"), weight 54.6 kg (120 lb 5.9 oz), SpO2 93 %.    I/O this shift:  In: -   Out: 450 [Urine:300; Stool:150]    GENERAL APPEARANCE: cachectic, alert and no distress  EYES: conjunctiva clear, eyes grossly normal  HENT: external ears and nose normal   RESP: lungs clear to auscultation - no rales, rhonchi or wheezes  CV: regular rate and rhythm, normal S1 S2, no S3 or S4 and no murmur, click or rub "   ABDOMEN: soft, mild-mod tenderness diffucsely, no HSM or masses and bowel sounds normal  MS: no clubbing, cyanosis; no edema  SKIN: clear without significant rashes or lesions  NEURO: -non-focal moves all 4 extr    ROUTINE  LABS (Last four results)  CMP  Recent Labs   Lab 08/27/22  0920 08/27/22  0630 08/27/22  0305 08/26/22  2131 08/26/22  1424 08/26/22  0700 08/25/22  1635 08/25/22  1103 08/25/22  0514 08/24/22  0432 08/22/22  0509 08/21/22  1434   NA  --   --   --   --   --  138  --  139 138 135   < > 139   POTASSIUM  --   --  3.1* 3.2* 2.9* 2.9*  2.9*   < > 3.3* 3.7 3.9   < > 4.9   CHLORIDE  --   --   --   --   --  97  --  95 95 97   < > 104   CO2  --   --   --   --   --  33*  --  33* 32 30   < > 26   ANIONGAP  --   --   --   --   --  8  --  11 11 8   < > 9   *  --  138* 134*  --  149*   < > 140* 116*  116* 104*   < > 133*   BUN  --   --   --   --   --  39*  --  56* 53* 50*   < > 21   CR  --  0.51*  --   --   --  0.56*  --  0.66 0.70 0.64*   < > 0.62*   GFRESTIMATED  --  >90  --   --   --  >90  --  >90 >90 >90   < > >90   ROXIE  --   --   --   --   --  8.9  --  9.2 9.1 9.0   < > 10.3*   MAG  --  2.4*  --   --   --  2.3  --  2.6*  --   --   --   --    PHOS  --  2.0*  --  1.6*  --  1.2*  --  3.3  --   --   --   --    PROTTOTAL  --   --   --   --   --  7.3  --  7.4  --   --   --  9.1*   ALBUMIN  --   --   --   --   --  3.0*  --  3.3*  --   --   --  4.2   BILITOTAL  --   --   --   --   --  0.6  --  0.6  --   --   --  0.5   ALKPHOS  --   --   --   --   --  100  --  106  --   --   --  181*   AST  --   --   --   --   --  21  --  25  --   --   --  30   ALT  --   --   --   --   --  17  --  18  --   --   --  31    < > = values in this interval not displayed.     CBC  Recent Labs   Lab 08/27/22  0630 08/26/22  0700 08/25/22  0514 08/24/22  0432   WBC 21.2* 25.7* 23.1* 18.2*   RBC 4.13* 4.28* 4.49 4.34*   HGB 12.7* 13.1* 13.8 13.2*   HCT 39.3* 40.3 41.8 41.0   MCV 95 94 93 95   MCH 30.8 30.6 30.7 30.4   MCHC 32.3  32.5 33.0 32.2   RDW 18.0* 17.8* 17.7* 17.8*    291 287 233     INR  Recent Labs   Lab 08/26/22  0700   INR 1.08     Arterial Blood GasNo lab results found in last 7 days.    No results found for this or any previous visit (from the past 24 hour(s)).

## 2022-08-27 NOTE — PROGRESS NOTES
Surgery POD #3    Subjective:  Pt is feeling better.  (+)Stool and gas in bag.  NGT clamped last night.  No nausea vomiting      Objective:    Vitals:    08/26/22 2300 08/27/22 0309 08/27/22 0645 08/27/22 0915   BP: 131/77 134/83     BP Location:  Right arm     Pulse: 58 69  54   Resp: 18 16  16   Temp: 98.2  F (36.8  C) 98.2  F (36.8  C)  (!) 96.7  F (35.9  C)   TempSrc: Oral Oral  Oral   SpO2: 92% 94%  93%   Weight:   54.6 kg (120 lb 5.9 oz)    Height:           Intake/Output Summary (Last 24 hours) at 8/27/2022 1138  Last data filed at 8/27/2022 0931  Gross per 24 hour   Intake 1215.3 ml   Output 2100 ml   Net -884.7 ml     Abd: soft, mild distention    Results for orders placed or performed during the hospital encounter of 08/21/22 (from the past 24 hour(s))   Potassium   Result Value Ref Range    Potassium 2.9 (L) 3.4 - 5.3 mmol/L   Potassium   Result Value Ref Range    Potassium 3.2 (L) 3.4 - 5.3 mmol/L   Phosphorus   Result Value Ref Range    Phosphorus 1.6 (L) 2.5 - 4.5 mg/dL   Glucose   Result Value Ref Range    Glucose 134 (H) 70 - 99 mg/dL   Potassium   Result Value Ref Range    Potassium 3.1 (L) 3.4 - 5.3 mmol/L   Glucose   Result Value Ref Range    Glucose 138 (H) 70 - 99 mg/dL   Creatinine   Result Value Ref Range    Creatinine 0.51 (L) 0.66 - 1.25 mg/dL    GFR Estimate >90 >60 mL/min/1.73m2   Magnesium   Result Value Ref Range    Magnesium 2.4 (H) 1.6 - 2.3 mg/dL   Phosphorus   Result Value Ref Range    Phosphorus 2.0 (L) 2.5 - 4.5 mg/dL   CBC with platelets   Result Value Ref Range    WBC Count 21.2 (H) 4.0 - 11.0 10e3/uL    RBC Count 4.13 (L) 4.40 - 5.90 10e6/uL    Hemoglobin 12.7 (L) 13.3 - 17.7 g/dL    Hematocrit 39.3 (L) 40.0 - 53.0 %    MCV 95 78 - 100 fL    MCH 30.8 26.5 - 33.0 pg    MCHC 32.3 31.5 - 36.5 g/dL    RDW 18.0 (H) 10.0 - 15.0 %    Platelet Count 300 150 - 450 10e3/uL   Glucose by meter   Result Value Ref Range    GLUCOSE BY METER POCT 130 (H) 70 - 99 mg/dL     Assessment/Plan:  Pt  s/p ex lap for SBO.  Ileus resolving.  Dunia NGT clamping.  Good UO. Low K.  Will leave NGT clamped and start clears.  Replace K.  Cont OOB, Ambulate.  NGT can removed if dunia clears.    Maciej Lopez MD, FACS

## 2022-08-27 NOTE — PLAN OF CARE
"GI: Patient producing liquid brown stool in colostomy. Denies nausea. Tolerating clears, but volume is low.  Pain: Having some pain in abdomen mid-day \"aching\", oxycodone 5 mg po give with relief.  Activity: Up in [place] with assist of one to ambulate                      "

## 2022-08-27 NOTE — PROGRESS NOTES
CLINICAL NUTRITION SERVICES - BRIEF NOTE     Nutrition Prescription    Recommendations already ordered by Registered Dietitian (RD):  Spoke to pharmacy to continue TPN @ rate of 35mL/hr x24 hours.  --Continue 20% Lipids @20.8 mL/hr x12 hours.  --Replacement of lytes (Potassium and Phos)       EVALUATION OF THE PROGRESS TOWARD GOALS   Diet: Clear Liquid Diet  Nutrition Support: TPN @ 35 mL/hr x 24 hrs. Run 20% lipids @ 20.8 mL/hr x 12 hrs . This provides 840 mL, 1096 kcal (20 kcal/kg), 126 g dextrose, 42 g AA (.78 g pro/kg), and 250 mL of 20% IV lipids. GIR = 1.64 mg/kg/min.     NEW FINDINGS   Labs 8/27:  Potassium 3.1L  Mag 2.4H  Phos 2.0L    INTERVENTIONS  Continue TPN @35mL/hr with 20.8 mL/hr Lipids and replacement of lytes (Potassium and Phos).    Monitoring/Evaluation  Progress toward goals will be monitored and evaluated per protocol.     Sariah Henderson, MPH, RDN, LD  Behavioral Clinical Dietitian  On Call Pager (weekends only): [phone]

## 2022-08-28 ENCOUNTER — APPOINTMENT (OUTPATIENT)
Dept: GENERAL RADIOLOGY | Facility: CLINIC | Age: 72
DRG: 335 | End: 2022-08-28
Attending: INTERNAL MEDICINE
Payer: COMMERCIAL

## 2022-08-28 LAB
ANION GAP SERPL CALCULATED.3IONS-SCNC: 6 MMOL/L (ref 3–14)
BASOPHILS # BLD AUTO: 0.1 10E3/UL (ref 0–0.2)
BASOPHILS NFR BLD AUTO: 1 %
BUN SERPL-MCNC: 30 MG/DL (ref 7–30)
CALCIUM SERPL-MCNC: 8.6 MG/DL (ref 8.5–10.1)
CHLORIDE BLD-SCNC: 101 MMOL/L (ref 94–109)
CO2 SERPL-SCNC: 28 MMOL/L (ref 20–32)
CREAT SERPL-MCNC: 0.44 MG/DL (ref 0.66–1.25)
EOSINOPHIL # BLD AUTO: 0.9 10E3/UL (ref 0–0.7)
EOSINOPHIL NFR BLD AUTO: 3 %
ERYTHROCYTE [DISTWIDTH] IN BLOOD BY AUTOMATED COUNT: 17.7 % (ref 10–15)
GFR SERPL CREATININE-BSD FRML MDRD: >90 ML/MIN/1.73M2
GLUCOSE BLD-MCNC: 115 MG/DL (ref 70–99)
GLUCOSE BLDC GLUCOMTR-MCNC: 131 MG/DL (ref 70–99)
HCT VFR BLD AUTO: 38.6 % (ref 40–53)
HGB BLD-MCNC: 12.6 G/DL (ref 13.3–17.7)
IMM GRANULOCYTES # BLD: 1 10E3/UL
IMM GRANULOCYTES NFR BLD: 4 %
LYMPHOCYTES # BLD AUTO: 3.2 10E3/UL (ref 0.8–5.3)
LYMPHOCYTES NFR BLD AUTO: 12 %
MAGNESIUM SERPL-MCNC: 2.3 MG/DL (ref 1.6–2.3)
MCH RBC QN AUTO: 31 PG (ref 26.5–33)
MCHC RBC AUTO-ENTMCNC: 32.6 G/DL (ref 31.5–36.5)
MCV RBC AUTO: 95 FL (ref 78–100)
MONOCYTES # BLD AUTO: 1.6 10E3/UL (ref 0–1.3)
MONOCYTES NFR BLD AUTO: 6 %
NEUTROPHILS # BLD AUTO: 20.6 10E3/UL (ref 1.6–8.3)
NEUTROPHILS NFR BLD AUTO: 74 %
NRBC # BLD AUTO: 0 10E3/UL
NRBC BLD AUTO-RTO: 0 /100
PHOSPHATE SERPL-MCNC: 1.7 MG/DL (ref 2.5–4.5)
PHOSPHATE SERPL-MCNC: 2.6 MG/DL (ref 2.5–4.5)
PLATELET # BLD AUTO: 318 10E3/UL (ref 150–450)
POTASSIUM BLD-SCNC: 3.6 MMOL/L (ref 3.4–5.3)
POTASSIUM BLD-SCNC: 3.6 MMOL/L (ref 3.4–5.3)
PROCALCITONIN SERPL IA-MCNC: 0.21 NG/ML
RBC # BLD AUTO: 4.06 10E6/UL (ref 4.4–5.9)
SARS-COV-2 RNA RESP QL NAA+PROBE: NEGATIVE
SODIUM SERPL-SCNC: 135 MMOL/L (ref 133–144)
WBC # BLD AUTO: 27.4 10E3/UL (ref 4–11)

## 2022-08-28 PROCEDURE — 80048 BASIC METABOLIC PNL TOTAL CA: CPT | Performed by: INTERNAL MEDICINE

## 2022-08-28 PROCEDURE — 71045 X-RAY EXAM CHEST 1 VIEW: CPT

## 2022-08-28 PROCEDURE — 250N000013 HC RX MED GY IP 250 OP 250 PS 637: Performed by: INTERNAL MEDICINE

## 2022-08-28 PROCEDURE — 120N000001 HC R&B MED SURG/OB

## 2022-08-28 PROCEDURE — 83735 ASSAY OF MAGNESIUM: CPT | Performed by: INTERNAL MEDICINE

## 2022-08-28 PROCEDURE — 87635 SARS-COV-2 COVID-19 AMP PRB: CPT | Performed by: SURGERY

## 2022-08-28 PROCEDURE — 84132 ASSAY OF SERUM POTASSIUM: CPT | Performed by: INTERNAL MEDICINE

## 2022-08-28 PROCEDURE — 250N000011 HC RX IP 250 OP 636: Performed by: INTERNAL MEDICINE

## 2022-08-28 PROCEDURE — 84100 ASSAY OF PHOSPHORUS: CPT | Performed by: INTERNAL MEDICINE

## 2022-08-28 PROCEDURE — 99232 SBSQ HOSP IP/OBS MODERATE 35: CPT | Performed by: INTERNAL MEDICINE

## 2022-08-28 PROCEDURE — 84145 PROCALCITONIN (PCT): CPT | Performed by: INTERNAL MEDICINE

## 2022-08-28 PROCEDURE — 258N000003 HC RX IP 258 OP 636: Performed by: INTERNAL MEDICINE

## 2022-08-28 PROCEDURE — 250N000013 HC RX MED GY IP 250 OP 250 PS 637: Performed by: SURGERY

## 2022-08-28 PROCEDURE — 85025 COMPLETE CBC W/AUTO DIFF WBC: CPT | Performed by: INTERNAL MEDICINE

## 2022-08-28 PROCEDURE — 250N000013 HC RX MED GY IP 250 OP 250 PS 637: Performed by: PHYSICIAN ASSISTANT

## 2022-08-28 PROCEDURE — 250N000009 HC RX 250: Performed by: INTERNAL MEDICINE

## 2022-08-28 RX ORDER — ALBUTEROL SULFATE 90 UG/1
2 AEROSOL, METERED RESPIRATORY (INHALATION) EVERY 4 HOURS PRN
Status: DISCONTINUED | OUTPATIENT
Start: 2022-08-28 | End: 2022-08-31 | Stop reason: HOSPADM

## 2022-08-28 RX ADMIN — I.V. FAT EMULSION 250 ML: 20 EMULSION INTRAVENOUS at 20:48

## 2022-08-28 RX ADMIN — METHOCARBAMOL 500 MG: 500 TABLET ORAL at 21:55

## 2022-08-28 RX ADMIN — TAZOBACTAM SODIUM AND PIPERACILLIN SODIUM 3.38 G: 375; 3 INJECTION, SOLUTION INTRAVENOUS at 00:30

## 2022-08-28 RX ADMIN — POTASSIUM CHLORIDE 10 MEQ: 7.46 INJECTION, SOLUTION INTRAVENOUS at 01:13

## 2022-08-28 RX ADMIN — ASCORBIC ACID, VITAMIN A PALMITATE, CHOLECALCIFEROL, THIAMINE HYDROCHLORIDE, RIBOFLAVIN-5 PHOSPHATE SODIUM, PYRIDOXINE HYDROCHLORIDE, NIACINAMIDE, DEXPANTHENOL, ALPHA-TOCOPHEROL ACETATE, VITAMIN K1, FOLIC ACID, BIOTIN, CYANOCOBALAMIN: 200; 3300; 200; 6; 3.6; 6; 40; 15; 10; 150; 600; 60; 5 INJECTION, SOLUTION INTRAVENOUS at 20:48

## 2022-08-28 RX ADMIN — POTASSIUM CHLORIDE 10 MEQ: 7.46 INJECTION, SOLUTION INTRAVENOUS at 03:21

## 2022-08-28 RX ADMIN — TAZOBACTAM SODIUM AND PIPERACILLIN SODIUM 3.38 G: 375; 3 INJECTION, SOLUTION INTRAVENOUS at 06:00

## 2022-08-28 RX ADMIN — METHOCARBAMOL 500 MG: 500 TABLET ORAL at 09:33

## 2022-08-28 RX ADMIN — OXYCODONE HYDROCHLORIDE 5 MG: 5 TABLET ORAL at 12:55

## 2022-08-28 RX ADMIN — SENNOSIDES AND DOCUSATE SODIUM 1 TABLET: 50; 8.6 TABLET ORAL at 09:34

## 2022-08-28 RX ADMIN — SODIUM PHOSPHATE, MONOBASIC, MONOHYDRATE 15 MMOL: 276; 142 INJECTION, SOLUTION INTRAVENOUS at 09:34

## 2022-08-28 RX ADMIN — POLYETHYLENE GLYCOL 3350 17 G: 17 POWDER, FOR SOLUTION ORAL at 09:34

## 2022-08-28 RX ADMIN — ALBUTEROL SULFATE 2 PUFF: 90 AEROSOL, METERED RESPIRATORY (INHALATION) at 14:36

## 2022-08-28 RX ADMIN — OXYCODONE HYDROCHLORIDE 5 MG: 5 TABLET ORAL at 05:57

## 2022-08-28 RX ADMIN — POTASSIUM CHLORIDE AND SODIUM CHLORIDE: 450; 150 INJECTION, SOLUTION INTRAVENOUS at 06:46

## 2022-08-28 RX ADMIN — SENNOSIDES AND DOCUSATE SODIUM 1 TABLET: 50; 8.6 TABLET ORAL at 20:29

## 2022-08-28 RX ADMIN — TAZOBACTAM SODIUM AND PIPERACILLIN SODIUM 3.38 G: 375; 3 INJECTION, SOLUTION INTRAVENOUS at 20:32

## 2022-08-28 RX ADMIN — LIDOCAINE 1 PATCH: 560 PATCH PERCUTANEOUS; TOPICAL; TRANSDERMAL at 20:36

## 2022-08-28 RX ADMIN — TAZOBACTAM SODIUM AND PIPERACILLIN SODIUM 3.38 G: 375; 3 INJECTION, SOLUTION INTRAVENOUS at 14:36

## 2022-08-28 RX ADMIN — OXYCODONE HYDROCHLORIDE 5 MG: 5 TABLET ORAL at 21:59

## 2022-08-28 ASSESSMENT — ACTIVITIES OF DAILY LIVING (ADL)
ADLS_ACUITY_SCORE: 28

## 2022-08-28 NOTE — PLAN OF CARE
Patients wife was visiting with patient doing crosswords. Patient has been calm through shift but not talking much. Colostomy bag was empty with an output of 50 mL of stool. Patients NG was clamped all of shift. Patient able to take oral medications but has a hard time due to the NG tube. He is an assist x1 for transfers and continent with urine. Diet of clear liquids. Patient is on potassium replacement. TPN and lipids are infusing as well. He refused his lidocaine patch this evening.

## 2022-08-28 NOTE — PLAN OF CARE
Goal Outcome Evaluation:        Patient alert and oriented.  Uses call light appropriately.  Makes needs known.  No pain all shift until 0600.  Patient rated abdominal pain and back pain 7/10.  5 mg oxycodone given.  NG tub clamped and in place.  Lipids and TPN infusing.

## 2022-08-28 NOTE — PROGRESS NOTES
"[facility] Internal Medicine Progress Note     Date of Service (when I saw the patient): 08/28/2022    REASON FOR ADMISSION / INTERVAL HISTORY:  Kalin Shepard is a 72 year old male admitted on 8/21/2022. He presented to the emergency department for evaluation of abdominal pain, nausea, and vomiting and was found to have a small bowel obstruction. S/p surgery 8/24-see details below.   Pain better. Some \"congestion\" in lungs    ASSESSMENT/PLAN:     SBO (small bowel obstruction)  Possible peritonitis  CT abdomen & pelvis demonstrates \"Small bowel obstruction with transition point in the right midabdomen, presumably secondary to adhesion. No evidence of overt bowel ischemia or perforation. 2.  Small volume ascites. 3.  Known sigmoid colonic malignancy again noted.\" Occurs in the setting of known colon cancer with current colostomy. No prior history of small bowel obstruction other than the obstruction associated with his colon mass.   NG tube was placed in the ER 8/21.  He initially had about 2 L of output.  A Gastrografin study was attempted 8/22 but only had minimal contrast passing into the colon by 16 hours on 8/23/2022.  Patient did initially tolerate being off suction during the Gastrografin study, but morning of 8/23 he developed increasing pain prompting replacement of suction with about 500 cc out at that time. Output then dropped off. NGT removed accidentlaly. IT was replaced with immediate 1.5L out.    8/ 24 patient was minimally improved and x-ray showed continued distention.   S/p surgery 8/24-open lapartomy with adhesionolysis.  Continues to have  increased pain post-op -pain with coughing and minimal movement in bed. WBC was  24k with L shift on 8/25-hence started on zosyn for possible  Peritonitis.  WBC 27k today. Pain better. Colostomy output 550cc. NG clamped since 8/26 evening. Tolerating clears. Surgery following-recommend to continue clamping NG-start regular diet and if tolerating, " "remove NG.   -continue  zosyn, follow WBC.     Leucocytosis  Persistent. Initially thought peritonitis but now pain better and tolerating some po. CXR today negative for pneumonia. CT chest 8/26  had shown bibasilar atelectasis. PCT-P  Continue zosyn. Follow WBC in AM.     Hypercalcemia, mild  Admit serum calcium 10.3, asymptomatic. Most likely due to dehydration, but could be related to his known cancer.   Resolved.      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 [place]. Diagnosed on CT 3/26/22, mass of sigmoid colon causing small bowel and colonic obstruction. Hospitalized at [facility], s/p colostomy. Currently on chemo with FOLFOX and bevacizumab, last infusion 8/11/22.   - Colostomy cares  - Continue with outpatient oncology management     Cancer associated pain  Managed prior to admission with scheduled acetaminophen 1g qid (although taking prn), lidocaine patch, and prn oxycodone.  - Continue lidocaine patch  - Hold acetaminophen and oxycodone while NPO     Esophageal reflux  Managed prior to admission with omeprazole 20 mg daily, hold while NPO.     Cachexia and anorexia  Malnutrition Diagnosis: Severe malnutrition in the context of acute on chronic illness  Admit BMI 17.65. Secondary to cancer and chemotherapy. Managed prior to admission with mirtazapine 15 mg q hs.   - Hold mirtazapine while NPO  -cont TPN    DISPO  Will be in hospital 1-2 days atleast       GAIL SOTO MD   Pg [phone]    DVT Prhylaxis: Heparin SQ  Code Status: Full Code    ROS:  As described in A/P and Exam.  Otherwise ALL are  negative.    PHYSICAL EXAM:  All vitals have been reviewed    Blood pressure 129/74, pulse 58, temperature 97.5  F (36.4  C), temperature source Oral, resp. rate 18, height 1.778 m (5' 10\"), weight 54.3 kg (119 lb 11.4 oz), SpO2 97 %.    I/O this shift:  In: -   Out: 150 [Urine:150]    GENERAL APPEARANCE: cachectic, alert and no " distress  EYES: conjunctiva clear, eyes grossly normal  HENT: external ears and nose normal   RESP: lungs clear to auscultation - no rales, rhonchi or wheezes  CV: regular rate and rhythm, normal S1 S2, no S3 or S4 and no murmur, click or rub   ABDOMEN: soft, mild-mod tenderness diffucsely, no HSM or masses and bowel sounds normal  MS: no clubbing, cyanosis; no edema  SKIN: clear without significant rashes or lesions  NEURO: -non-focal moves all 4 extr    ROUTINE  LABS (Last four results)  CMP  Recent Labs   Lab 08/28/22  0606 08/28/22  0222 08/27/22 2001 08/27/22  1707 08/27/22  1302 08/27/22  0920 08/27/22  0630 08/27/22  0305 08/26/22  2131 08/26/22  1424 08/26/22  0700 08/25/22  1635 08/25/22  1103 08/25/22  0514 08/25/22  0514 08/22/22  0509 08/21/22  1434     --   --   --   --   --   --   --   --   --  138  --  139  --  138   < > 139   POTASSIUM 3.6  3.6  --  3.2*  --  3.2*  --   --  3.1* 3.2*   < > 2.9*  2.9*   < > 3.3*  --  3.7   < > 4.9   CHLORIDE 101  --   --   --   --   --   --   --   --   --  97  --  95  --  95   < > 104   CO2 28  --   --   --   --   --   --   --   --   --  33*  --  33*  --  32   < > 26   ANIONGAP 6  --   --   --   --   --   --   --   --   --  8  --  11  --  11   < > 9   * 131*  --  138*  --  130*  --  138* 134*  --  149*   < > 140*  --  116*  116*   < > 133*   BUN 30  --   --   --   --   --   --   --   --   --  39*  --  56*  --  53*   < > 21   CR 0.44*  --   --   --   --   --  0.51*  --   --   --  0.56*  --  0.66  --  0.70   < > 0.62*   GFRESTIMATED >90  --   --   --   --   --  >90  --   --   --  >90  --  >90  --  >90   < > >90   ROXIE 8.6  --   --   --   --   --   --   --   --   --  8.9  --  9.2  --  9.1   < > 10.3*   MAG 2.3  --   --   --   --   --  2.4*  --   --   --  2.3  --  2.6*  --   --   --   --    PHOS 1.7*  --   --   --   --   --  2.0*  --  1.6*  --  1.2*  --  3.3   < >  --   --   --    PROTTOTAL  --   --   --   --   --   --   --   --   --   --  7.3  --  7.4  --    --   --  9.1*   ALBUMIN  --   --   --   --   --   --   --   --   --   --  3.0*  --  3.3*  --   --   --  4.2   BILITOTAL  --   --   --   --   --   --   --   --   --   --  0.6  --  0.6  --   --   --  0.5   ALKPHOS  --   --   --   --   --   --   --   --   --   --  100  --  106  --   --   --  181*   AST  --   --   --   --   --   --   --   --   --   --  21  --  25  --   --   --  30   ALT  --   --   --   --   --   --   --   --   --   --  17  --  18  --   --   --  31    < > = values in this interval not displayed.     CBC  Recent Labs   Lab 08/28/22  0606 08/27/22  0630 08/26/22  0700 08/25/22  0514   WBC 27.4* 21.2* 25.7* 23.1*   RBC 4.06* 4.13* 4.28* 4.49   HGB 12.6* 12.7* 13.1* 13.8   HCT 38.6* 39.3* 40.3 41.8   MCV 95 95 94 93   MCH 31.0 30.8 30.6 30.7   MCHC 32.6 32.3 32.5 33.0   RDW 17.7* 18.0* 17.8* 17.7*    300 291 287     INR  Recent Labs   Lab 08/26/22  0700   INR 1.08     Arterial Blood GasNo lab results found in last 7 days.    Recent Results (from the past 24 hour(s))   XR Chest Port 1 View    Narrative    EXAM: XR CHEST PORT 1 VIEW  LOCATION: [facility]  DATE/TIME: 8/28/2022 11:00 AM    INDICATION: Leukocytosis. Cough.  COMPARISON: 8/21/2022      Impression    IMPRESSION:   1. Left chest port with the tip in the lower SVC. Gastric drainage tube passing the diaphragm. The sidehole is just past the gastroesophageal junction.  2. Right lung base calcified granuloma. Unchanged 1.4 cm nodular opacity projecting over the left midlung. Basal interstitial opacities may reflect areas of chronic scarring or acute pneumonitis. No pleural effusion or pneumothorax.  3. Normal cardiomediastinal silhouette.  4. Gas below the diaphragm is better assessed on the CT.

## 2022-08-28 NOTE — PROGRESS NOTES
Surgery POD #4    Subjective:  Pt is feeling better.  (+)Stool and gas in bag.  NGT clamped last night.  No nausea vomiting.  Shea clears    Objective:    Vitals:    08/27/22 1700 08/28/22 0023 08/28/22 0224 08/28/22 0800   BP: 124/74 136/81 139/73 129/74   BP Location: Right arm Right arm Left arm Left arm   Pulse: 52 56 50 58   Resp: 17 18 20 18   Temp: 98.1  F (36.7  C) 98  F (36.7  C) 98.2  F (36.8  C) 97.5  F (36.4  C)   TempSrc: Oral Oral Oral Oral   SpO2: 92% 93% 94% 97%   Weight:    54.3 kg (119 lb 11.4 oz)   Height:           Intake/Output Summary (Last 24 hours) at 8/28/2022 1035  Last data filed at 8/28/2022 0646  Gross per 24 hour   Intake 2681 ml   Output 4375 ml   Net -1694 ml           Abd: soft, mild distention - slightly increased from yesterday.    Results for orders placed or performed during the hospital encounter of 08/21/22 (from the past 24 hour(s))   Potassium   Result Value Ref Range    Potassium 3.2 (L) 3.4 - 5.3 mmol/L   Glucose by meter   Result Value Ref Range    GLUCOSE BY METER POCT 138 (H) 70 - 99 mg/dL   Potassium   Result Value Ref Range    Potassium 3.2 (L) 3.4 - 5.3 mmol/L   Glucose by meter   Result Value Ref Range    GLUCOSE BY METER POCT 131 (H) 70 - 99 mg/dL   CBC with Platelets & Differential    Narrative    The following orders were created for panel order CBC with Platelets & Differential.  Procedure                               Abnormality         Status                     ---------                               -----------         ------                     CBC with platelets and d...[[id number]]  Abnormal            Final result                 Please view results for these tests on the individual orders.   CBC with platelets and differential   Result Value Ref Range    WBC Count 27.4 (H) 4.0 - 11.0 10e3/uL    RBC Count 4.06 (L) 4.40 - 5.90 10e6/uL    Hemoglobin 12.6 (L) 13.3 - 17.7 g/dL    Hematocrit 38.6 (L) 40.0 - 53.0 %    MCV 95 78 - 100 fL    MCH 31.0 26.5 - 33.0  pg    MCHC 32.6 31.5 - 36.5 g/dL    RDW 17.7 (H) 10.0 - 15.0 %    Platelet Count 318 150 - 450 10e3/uL    % Neutrophils 74 %    % Lymphocytes 12 %    % Monocytes 6 %    % Eosinophils 3 %    % Basophils 1 %    % Immature Granulocytes 4 %    NRBCs per 100 WBC 0 <1 /100    Absolute Neutrophils 20.6 (H) 1.6 - 8.3 10e3/uL    Absolute Lymphocytes 3.2 0.8 - 5.3 10e3/uL    Absolute Monocytes 1.6 (H) 0.0 - 1.3 10e3/uL    Absolute Eosinophils 0.9 (H) 0.0 - 0.7 10e3/uL    Absolute Basophils 0.1 0.0 - 0.2 10e3/uL    Absolute Immature Granulocytes 1.0 (H) <=0.4 10e3/uL    Absolute NRBCs 0.0 10e3/uL   Potassium   Result Value Ref Range    Potassium 3.6 3.4 - 5.3 mmol/L   Magnesium   Result Value Ref Range    Magnesium 2.3 1.6 - 2.3 mg/dL   Phosphorus   Result Value Ref Range    Phosphorus 1.7 (L) 2.5 - 4.5 mg/dL   Basic metabolic panel   Result Value Ref Range    Sodium 135 133 - 144 mmol/L    Potassium 3.6 3.4 - 5.3 mmol/L    Chloride 101 94 - 109 mmol/L    Carbon Dioxide (CO2) 28 20 - 32 mmol/L    Anion Gap 6 3 - 14 mmol/L    Urea Nitrogen 30 7 - 30 mg/dL    Creatinine 0.44 (L) 0.66 - 1.25 mg/dL    Calcium 8.6 8.5 - 10.1 mg/dL    Glucose 115 (H) 70 - 99 mg/dL    GFR Estimate >90 >60 mL/min/1.73m2     Assessment/Plan:  Pt s/p ex lap for SBO.  Ileus resolving.  Dunia NGT clamping.  Good UO. Low K. Elevated WBC of unclear etiology.  Doubt intra-abdominal source as pt is dunia diet and ostomy functioning.    Will leave NGT clamped and ADAT.  Replace K.  Cont OOB, Ambulate.  NGT can removed if reg diet.    Maciej Lopez MD, FACS

## 2022-08-29 PROBLEM — K65.0 PERITONITIS, ACUTE GENERALIZED (H): Status: ACTIVE | Noted: 2022-08-29

## 2022-08-29 LAB
ALBUMIN SERPL-MCNC: 2.4 G/DL (ref 3.4–5)
ALP SERPL-CCNC: 127 U/L (ref 40–150)
ALT SERPL W P-5'-P-CCNC: 31 U/L (ref 0–70)
ANION GAP SERPL CALCULATED.3IONS-SCNC: 7 MMOL/L (ref 3–14)
AST SERPL W P-5'-P-CCNC: 39 U/L (ref 0–45)
BASOPHILS # BLD MANUAL: 0 10E3/UL (ref 0–0.2)
BASOPHILS NFR BLD MANUAL: 0 %
BILIRUB SERPL-MCNC: 0.4 MG/DL (ref 0.2–1.3)
BUN SERPL-MCNC: 20 MG/DL (ref 7–30)
CALCIUM SERPL-MCNC: 8.3 MG/DL (ref 8.5–10.1)
CHLORIDE BLD-SCNC: 104 MMOL/L (ref 94–109)
CO2 SERPL-SCNC: 25 MMOL/L (ref 20–32)
CREAT SERPL-MCNC: 0.51 MG/DL (ref 0.66–1.25)
EOSINOPHIL # BLD MANUAL: 1 10E3/UL (ref 0–0.7)
EOSINOPHIL NFR BLD MANUAL: 4 %
ERYTHROCYTE [DISTWIDTH] IN BLOOD BY AUTOMATED COUNT: 17.6 % (ref 10–15)
GFR SERPL CREATININE-BSD FRML MDRD: >90 ML/MIN/1.73M2
GLUCOSE BLD-MCNC: 105 MG/DL (ref 70–99)
HCT VFR BLD AUTO: 35.4 % (ref 40–53)
HGB BLD-MCNC: 11.7 G/DL (ref 13.3–17.7)
INR PPP: 0.99 (ref 0.85–1.15)
LYMPHOCYTES # BLD MANUAL: 5.3 10E3/UL (ref 0.8–5.3)
LYMPHOCYTES NFR BLD MANUAL: 21 %
MAGNESIUM SERPL-MCNC: 2.2 MG/DL (ref 1.6–2.3)
MCH RBC QN AUTO: 30.9 PG (ref 26.5–33)
MCHC RBC AUTO-ENTMCNC: 33.1 G/DL (ref 31.5–36.5)
MCV RBC AUTO: 93 FL (ref 78–100)
MONOCYTES # BLD MANUAL: 0.8 10E3/UL (ref 0–1.3)
MONOCYTES NFR BLD MANUAL: 3 %
NEUTROPHILS # BLD MANUAL: 18.1 10E3/UL (ref 1.6–8.3)
NEUTROPHILS NFR BLD MANUAL: 72 %
PHOSPHATE SERPL-MCNC: 2.5 MG/DL (ref 2.5–4.5)
PLAT MORPH BLD: ABNORMAL
PLATELET # BLD AUTO: 314 10E3/UL (ref 150–450)
POTASSIUM BLD-SCNC: 3.5 MMOL/L (ref 3.4–5.3)
PREALB SERPL IA-MCNC: 13 MG/DL (ref 15–45)
PROT SERPL-MCNC: 6.6 G/DL (ref 6.8–8.8)
RBC # BLD AUTO: 3.79 10E6/UL (ref 4.4–5.9)
RBC MORPH BLD: ABNORMAL
SODIUM SERPL-SCNC: 136 MMOL/L (ref 133–144)
WBC # BLD AUTO: 25.2 10E3/UL (ref 4–11)

## 2022-08-29 PROCEDURE — 83735 ASSAY OF MAGNESIUM: CPT | Performed by: INTERNAL MEDICINE

## 2022-08-29 PROCEDURE — 250N000009 HC RX 250: Performed by: INTERNAL MEDICINE

## 2022-08-29 PROCEDURE — 80053 COMPREHEN METABOLIC PANEL: CPT | Performed by: INTERNAL MEDICINE

## 2022-08-29 PROCEDURE — 99232 SBSQ HOSP IP/OBS MODERATE 35: CPT | Performed by: INTERNAL MEDICINE

## 2022-08-29 PROCEDURE — 120N000001 HC R&B MED SURG/OB

## 2022-08-29 PROCEDURE — 85027 COMPLETE CBC AUTOMATED: CPT | Performed by: INTERNAL MEDICINE

## 2022-08-29 PROCEDURE — 85007 BL SMEAR W/DIFF WBC COUNT: CPT | Performed by: INTERNAL MEDICINE

## 2022-08-29 PROCEDURE — 82040 ASSAY OF SERUM ALBUMIN: CPT | Performed by: INTERNAL MEDICINE

## 2022-08-29 PROCEDURE — 250N000011 HC RX IP 250 OP 636: Performed by: INTERNAL MEDICINE

## 2022-08-29 PROCEDURE — 250N000013 HC RX MED GY IP 250 OP 250 PS 637: Performed by: SURGERY

## 2022-08-29 PROCEDURE — 84100 ASSAY OF PHOSPHORUS: CPT | Performed by: INTERNAL MEDICINE

## 2022-08-29 PROCEDURE — 85610 PROTHROMBIN TIME: CPT | Performed by: INTERNAL MEDICINE

## 2022-08-29 RX ADMIN — TAZOBACTAM SODIUM AND PIPERACILLIN SODIUM 3.38 G: 375; 3 INJECTION, SOLUTION INTRAVENOUS at 02:11

## 2022-08-29 RX ADMIN — SENNOSIDES AND DOCUSATE SODIUM 1 TABLET: 50; 8.6 TABLET ORAL at 07:59

## 2022-08-29 RX ADMIN — ASCORBIC ACID, VITAMIN A PALMITATE, CHOLECALCIFEROL, THIAMINE HYDROCHLORIDE, RIBOFLAVIN-5 PHOSPHATE SODIUM, PYRIDOXINE HYDROCHLORIDE, NIACINAMIDE, DEXPANTHENOL, ALPHA-TOCOPHEROL ACETATE, VITAMIN K1, FOLIC ACID, BIOTIN, CYANOCOBALAMIN: 200; 3300; 200; 6; 3.6; 6; 40; 15; 10; 150; 600; 60; 5 INJECTION, SOLUTION INTRAVENOUS at 20:13

## 2022-08-29 RX ADMIN — OXYCODONE HYDROCHLORIDE 5 MG: 5 TABLET ORAL at 07:58

## 2022-08-29 RX ADMIN — POTASSIUM CHLORIDE AND SODIUM CHLORIDE: 450; 150 INJECTION, SOLUTION INTRAVENOUS at 05:32

## 2022-08-29 RX ADMIN — TAZOBACTAM SODIUM AND PIPERACILLIN SODIUM 3.38 G: 375; 3 INJECTION, SOLUTION INTRAVENOUS at 14:44

## 2022-08-29 RX ADMIN — POTASSIUM CHLORIDE AND SODIUM CHLORIDE: 450; 150 INJECTION, SOLUTION INTRAVENOUS at 20:48

## 2022-08-29 RX ADMIN — TAZOBACTAM SODIUM AND PIPERACILLIN SODIUM 3.38 G: 375; 3 INJECTION, SOLUTION INTRAVENOUS at 20:07

## 2022-08-29 RX ADMIN — OXYCODONE HYDROCHLORIDE 5 MG: 5 TABLET ORAL at 20:07

## 2022-08-29 RX ADMIN — I.V. FAT EMULSION 250 ML: 20 EMULSION INTRAVENOUS at 20:11

## 2022-08-29 RX ADMIN — POLYETHYLENE GLYCOL 3350 17 G: 17 POWDER, FOR SOLUTION ORAL at 07:58

## 2022-08-29 RX ADMIN — OXYCODONE HYDROCHLORIDE 5 MG: 5 TABLET ORAL at 14:42

## 2022-08-29 RX ADMIN — TAZOBACTAM SODIUM AND PIPERACILLIN SODIUM 3.38 G: 375; 3 INJECTION, SOLUTION INTRAVENOUS at 08:41

## 2022-08-29 RX ADMIN — METHOCARBAMOL 500 MG: 500 TABLET ORAL at 11:25

## 2022-08-29 RX ADMIN — SENNOSIDES AND DOCUSATE SODIUM 1 TABLET: 50; 8.6 TABLET ORAL at 20:07

## 2022-08-29 ASSESSMENT — ACTIVITIES OF DAILY LIVING (ADL)
ADLS_ACUITY_SCORE: 28
ADLS_ACUITY_SCORE: 28
ADLS_ACUITY_SCORE: 24
ADLS_ACUITY_SCORE: 28
ADLS_ACUITY_SCORE: 24
ADLS_ACUITY_SCORE: 28
ADLS_ACUITY_SCORE: 28
ADLS_ACUITY_SCORE: 24
ADLS_ACUITY_SCORE: 28
ADLS_ACUITY_SCORE: 24
ADLS_ACUITY_SCORE: 28
ADLS_ACUITY_SCORE: 28

## 2022-08-29 NOTE — PROGRESS NOTES
Neuro: A&Ox4.  Cardiac: WNL  Respiratory: WNL  GI/: Colostomy, stooling. Voiding WNL.   Mobility: A1 GB walker, pt declined to take a walk this evening.   Diet: Full liquid   Pain: Mild abdominal cramping.   Skin: L shin dressing CDI. Abdominal incision dressing CDI  LDA: Port infusing TPN and lipids. PIV infusing IVF.      VS: VSS on RA. Afebrile.    Labs: K, mg, phos protocol, no replacement needed    Plan: Advancing diet

## 2022-08-29 NOTE — PROGRESS NOTES
Patient complains of  No pain   passing flatus and stool  Pain  under control   tolerating liquids well  vitals  Patient Vitals for the past 24 hrs:   BP Temp Temp src Pulse Resp SpO2 Weight   08/29/22 0214 109/75 97.8  F (36.6  C) Oral 67 16 97 % --   08/28/22 2030 131/75 98.1  F (36.7  C) Oral 65 16 97 % --   08/28/22 1439 121/69 97.8  F (36.6  C) Oral 66 16 96 % --   08/28/22 0800 129/74 97.5  F (36.4  C) Oral 58 18 97 % 54.3 kg (119 lb 11.4 oz)     Hospital problem list  Patient Active Problem List   Diagnosis     Colon adenocarcinoma (H)     Colostomy present (H)     Cachexia (H)     Adenocarcinoma of sigmoid colon (H)     Cancer associated pain     Dehydration     Chemotherapy-induced neutropenia (H)     SBO (small bowel obstruction) (H)     Cancer cachexia (H)     Esophageal reflux     Small bowel obstruction (H)     Physical exam:    Abdomen wound with out evidence of infection   No Lower extremity edema     Assessment/ Plan:  Discontinue Nasal gastric tube and advance diet as tolerated    If doing wlll ok to go home per surgery  Follow up in 2 weeks in clinic.   Please call [phone], for  [place] clinic or  for [facility] clinic, to schedule a follow up appointment in 2 weeks.           Segundo Hill MD

## 2022-08-29 NOTE — PLAN OF CARE
Goal Outcome Evaluation:        Problem: Malnutrition  Goal: Improved Nutritional Intake  Outcome: Ongoing, Progressing     Problem: Parenteral Nutrition  Goal: Effective Intravenous Nutrition Therapy Delivery  Outcome: Ongoing, Progressing        Patient TPN advancing to 55 mL/hr; patient diet advancement to full liquids; tolerating. Patient meeting an estimated 75% of needs with TPN and oral intakes.     Jaclyn Brownlee RDN, JULIENNE  Clinical Dietitian  Office: [phone]  [facility] pager: [phone]

## 2022-08-29 NOTE — DISCHARGE INSTRUCTIONS
Follow up in 2 weeks in clinic.   Please call [phone], for  [facility] or  for [facility], to schedule a follow up appointment in 2 weeks.   HERNIORRHAPHY DISCHARGE INSTRUCTIONS  DR. OLIVERIO BLEDSOE    1. You may resume your regular diet when you feel you are ready to. DO NOT drink alcoholic beverages for 24 hours or while you are taking prescription medication.    2. Limit your activities for the first 48 hours. Gradually, increase them as tolerated. You may use stairs. I encourage you to walk as tolerated. No lifting greater that 20 pounds for 8 weeks.    3. You will have some discomfort at the incision sites. This is expected. This should improve over the next 2-3 days. Ice and pain medication will help with this pain. Use prescribed pain medication as instructed.    4. Bruising and mild swelling is normal after surgery. For males it is common to have bruising going into the penis and scrotum. The area below and around the incision(s) will be hard and elevated. This is normal. I call it the healing ridge. This will resolve slowly over the next several months. If you feel the pain is increasing and cannot explain it by increasing activity please call us at [phone].    5. The dressing will often have some blood on it. You may shower 24 hours after surgery. No baths for 2 weeks after surgery. Clean gently over incision site. If clear plastic covering or steri-strip comes off and there is still some bleeding or drainage then cover with gauze or band-aid. If no bleeding, there is no reason to cover site. The abdominal binder may be removed after 24 hours after surgery. You may continue to wear it however for comfort. I suggest  you wear an old t-shirt under the abdominal binder for a more comfortable wear.    6. Avoid Aspirin for the first 72 hours after the procedure. This medication may increase the tendency to bleed.    7. Use the following medications (in addition  to your normal meds) as shown:  a. Percocet 5 mg 1-2 every 6 hours as needed for severe pain. This contains 325 mg of Tylenol (acetaminophen) per tablet.  Please do not take more than 4 grams of Tylenol (acetaminophen) per day. For example, you may take 1 Percocet and 1 Tylenol, or 2 Percocet and no Tylenol, or 2 Tylenol and no Percocet every 6 hours.  b. Tylenol (acetaminophen) 500 mg every 6 hours as needed for mild pain. Do not take more than 1000 mg every 6 hours. (see above).  c. Motrin (ibuprofen) 200-800 mg every 6 hours as needed for mild to moderate pain. Take with food.     8. Notify Dr. Hill's clinic at [phone], for  [facility] or  for [facility], to schedule a follow up appointment in 2 weeks.   if:  Your discomfort is not relieved by your pain medication.  You have signs of infection such as temperature above 100.5 degrees orally, chills, or increasing daily discomfort.  Incision site is becoming more red and/or there is purulent drainage.  You have questions or concerns.    9. Please call [phone], for  [facility] or  for [facility], to schedule a follow up appointment in 2 weeks.   to schedule a follow up appointment in about 2 weeks.    10. When taking narcotics (pain medication more than Tylenol [acetaminophen] and Motrin [ibuprofen]) it is important to keep your stools soft to avoid constipation and pain with straining. This is best done by drinking fluids (non-alcoholic and non-caffeinated) and taking a stool softener (i.e. Metamucil or milk of magnesia). You may be able to use non-narcotics for pain relief especially by the 3rd post- operative day. Tylenol (acetaminophen) 500 mg every 6 hours and/or Motrin (ibuprofen) 200-800 mg every 6 hours. Please do not take more than 4 grams of Tylenol (acetaminophen) per day. Remember your Percocet does have Tylenol (acetaminophen) already in it. Please take Motrin  (ibuprofen) with food to help protect the stomach. If you have a history of stomach ulcers or stomach problems, do not take Motrin (ibuprofen).     11. Do not drive or operate heavy machinery for 24 hours after surgery or when taking narcotics. You may resume driving when feel that you can safely avoid an accident and are not taking narcotics. This is usually 5 to 7 days after surgery. You should not be alone for 24 hours after surgery.    12. Have milk of magnesia available at home so that when you take the pain medications you take 1-2 tablepoons a day, to help reduce problems with constipation.      13. If you have questions after your procedure/surgery please contact Dr Hill's primary clinic in [place]. You can call [phone], your other option is to send us a [employer] message through your [employer] portal to Dr Hill's team. For urgent and non urgent matters these options are best. If your symptoms are emergent or cannot wait, please proceed to [facility] and let them know who you had surgery with.    Follow up with me in 2 weeks or so to make sure he is doing well and if able to start chemo.

## 2022-08-29 NOTE — PROGRESS NOTES
CLINICAL NUTRITION SERVICES - REASSESSMENT NOTE     Nutrition Prescription    RECOMMENDATIONS FOR MDs/PROVIDERS TO ORDER:  None at this time    Malnutrition Status:    Severe malnutrition in the context of acute on chronic illness    Recommendations already ordered by Registered Dietitian (RD):  Spoke to pharmacy to advance TPN @ rate of 55mL/hr x24 hours.  --Continue 20% Lipids @20.8 mL/hr x12 hours.    Future/Additional Recommendations:  Monitor patient for continued diet adv  Monitor patient intakes, weights, labs, GI/BM     EVALUATION OF THE PROGRESS TOWARD GOALS   Diet:Orders Placed This Encounter      Advance Diet as Tolerated: Full Liquid Diet    Nutrition Support: TPN infusing at 35 mL/hr. 20% Lipids @20.8 mL/hr x12 hours.     Intake:  One documented intake of 25%      NEW FINDINGS   Per chart review  -Patient remained at 35 mL/hr TPN rate over the weekend. Labs reviewed; Sodium, Phosphorus, Potassium and Magnesium WNL today  -Patient having stool present in colostomy bag  -Per surgery note patient passing flatus, tolerating liquids well; NG tube discontinued and recommend advancing diet as able; Per RN note on 8/29 patient on full liquids; patient consuming around 25%  -Patient advanced to clear liquids on 8/27 and full liquids on 8/28    MALNUTRITION  % Intake: Decreased intake does not meet criteria  % Weight Loss: >10% in 6 months (moderate)  Subcutaneous Fat Loss: Facial region:  Severe and Upper arm:  severe  Muscle Loss: Temporal:  severe, Facial & jaw region:  severe and Thoracic region (clavicle, acromium bone, deltoid, trapezius, pectoral):  severe  Fluid Accumulation/Edema: None noted  Malnutrition Diagnosis: Severe malnutrition in the context of acute on chronic illness    Previous Goals   Diet adv v nutrition support within 2-3 days.  Evaluation: Met    Previous Nutrition Diagnosis  Malnutrition related to increased needs and inadequate oral intake as evidenced by Severe muscle and fat loss  10.1% weight loss within 6 months, and less than 75% intakes for one week  Evaluation: Improving    CURRENT NUTRITION DIAGNOSIS  Malnutrition related to increased needs and inadequate oral intake as evidenced by Severe muscle and fat loss and 10.1% weight loss within 6 months      INTERVENTIONS  Implementation  Collaboration with other providers  Parenteral Nutrition/IV Fluids - see rec's above    Goals  Total avg nutritional intake to meet a minimum of 1,710 kcal/kg and 68 g PRO/kg daily (per dosing wt 57 kg).    Monitoring/Evaluation  Progress toward goals will be monitored and evaluated per protocol.    Jaclyn Brownlee RDN, JULIENNE  Clinical Dietitian  Office: [phone]  Weekend pager: [phone]

## 2022-08-29 NOTE — PLAN OF CARE
"Goal Outcome Evaluation:    /73 (BP Location: Left arm)   Pulse 64   Temp 98.6  F (37  C) (Oral)   Resp 16   Ht 1.778 m (5' 10\")   Wt 54.3 kg (119 lb 11.4 oz)   SpO2 96%   BMI 17.18 kg/m        Patient remains on continuous maintenance fluids at 65 mL/hr with TPN running at 35 mL/hr. Lipids stopped this am. TPN to be increased tonight. Patients lung sounds are clear and he exhibits dyspnea on exertion. Patient reports a productive occasional cough. IS use encouraged and patient able to obtain 750 mL. PRN Roxicodone 5mg administered x2 with relief noted. Colostomy bag was changed and patient had ~100 ml of stool and is passing gas. A eduard bath was completed and the dressing on his left shin was changed. Area cleansed and a small amount of drainage and sloughing noted. New dressing applied. I&O's monitored patient has had 800 mL of urinary output. Scheduled antibiotics administered- patient remains afebrile. NG Tube removed this am and patient has tolerated a small amount of fluids and nutritional supplement.  Ambulation and sitting up in the chair encouraged.                     "

## 2022-08-29 NOTE — PLAN OF CARE
Pt complains of some abdominal discomfort, managed with oxycodone.  He denies nausea.  Colostomy with small amounts of stool, gas.  Advanced to full liquid diet per pt request.  Taking small amounts at meals, drinking ensure.  He wants NG to remain in place for now, no further advancing in diet tonight.  TPN infusing in port.  Phos replaced per protocol, recheck WDL.

## 2022-08-29 NOTE — PROGRESS NOTES
[facility]    Medicine Progress Note - Hospitalist Service    Date of Admission:  8/21/2022    Assessment & Plan       Principal Problem:    SBO (small bowel obstruction) (H)    Assessment: Occurs in the setting of known colon cancer with current colostomy. No prior history of small bowel obstruction other than the obstruction associated with his colon mass.  NG tube was placed in the ER 8/21/22.  He initially had about 2 L of output.  A Gastrografin study was attempted 8/22/22 but only had minimal contrast passing into the colon by 16 hours on 8/23/22.  Patient did initially tolerate being off suction during the Gastrografin study, but morning of 8/23/22 he developed increasing pain prompting replacement of suction with about 500 cc out at that time. Output then dropped off. NGT removed accidentlaly. It was replaced with immediate 1.5L out. On 8/24/22 patient was minimally improved and x-ray showed continued distention. Open laparotomy with lysis of adhesions on 8/24/22. NG tube was discontinued by surgery this morning. Slowly improving.    Plan:  1. Continue TPN.   2. Advance diet as tolerated to full liquid.    Active Problems:    Peritonitis, acute generalized (H)    Assessment: Slowly improving.    Plan: Continue piperacillin-tazobactam.      Colon adenocarcinoma (stage IV) (H)    Assessment: Follows with oncology, Dr. [place]. Diagnosed on CT 3/26/22, mass of sigmoid colon causing small bowel and colonic obstruction. Hospitalized at [facility], s/p colostomy. Currently on chemo with FOLFOX and bevacizumab, last infusion 8/11/22.     Plan: Colostomy cares.   Continue with outpatient oncology management.      Cancer associated pain    Assessment: Controlled.    Plan: Continue current analgesics.      Cancer cachexia (H)    Assessment: Secondary to cancer and chemotherapy.    Plan: TPN for now as above.      Esophageal reflux    Assessment: On PPI prior to  admission.    Plan: PPI on hold for now.       Diet: parenteral nutrition - Clinimix E  Advance Diet as Tolerated: Full Liquid Diet  parenteral nutrition - Clinimix E  parenteral nutrition - Clinimix E    DVT Prophylaxis: Pneumatic Compression Devices  Castro Catheter: Not present  Central Lines: PRESENT     Cardiac Monitoring: None  Code Status: Full Code      Disposition Plan      Expected Discharge Date: 08/31/2022                The patient's care was discussed with the Bedside Nurse, Care Coordinator/ and Patient.    Truong Valentine MD  Hospitalist Service  [facility]  Securely message with the Vocera Web Console (learn more here)  Text page via [employer] Paging/Directory         Clinically Significant Risk Factors Present on Admission                      ______________________________________________________________________    Interval History   NG tube was discontinued this morning by surgery. He is tolerating small amounts of clear liquids. On TPN. No vomiting. Mild abdominal discomfort. No shortness of breath or chest pain. Passing mostly gas. Minimal stool output through colostomy.    Data reviewed today: I reviewed all medications, new labs and imaging results over the last 24 hours. I personally reviewed no images or EKG's today.    Physical Exam   Vital Signs: Temp: 98.6  F (37  C) Temp src: Oral BP: 117/73 Pulse: 64   Resp: 16 SpO2: 96 % O2 Device: None (Room air)    Weight: 119 lbs 11.36 oz  Constitutional: awake, alert, cooperative, no apparent distress, appears stated age, cachectic  Eyes: Lids and lashes normal, pupils equal, round and reactive to light, extra ocular muscles intact, sclera clear, conjunctiva normal  ENT: Normocephalic, without obvious abnormality, atraumatic, external ears without lesions  Respiratory: No increased work of breathing, good air exchange, clear to auscultation bilaterally, no crackles or wheezing  Cardiovascular: Normal apical  impulse, regular rate and rhythm, normal S1 and S2, no S3 or S4, and no murmur noted  GI: Normal bowel sounds, soft, minimally distended, mildly tender, no guarding or rebound, no masses palpated, no hepatosplenomegally, colostomy bag with gas and small amount of liquid stool  Skin: no bruising or bleeding and no rashes  Musculoskeletal: There is no redness, warmth, or swelling of the joints.  Motor strength is 5 out of 5 all extremities bilaterally.  Tone is normal.  Neurologic: Awake, alert, oriented to name, place and time.  Cranial nerves II-XII are grossly intact.  Motor is 5 out of 5 bilaterally.  Sensory is grossly intact.    Data   Recent Labs   Lab 08/29/22  0541 08/28/22  0606 08/28/22  0222 08/27/22 2001 08/27/22  0920 08/27/22  0630 08/26/22  1424 08/26/22  0700   WBC 25.2* 27.4*  --   --   --  21.2*  --  25.7*   HGB 11.7* 12.6*  --   --   --  12.7*  --  13.1*   MCV 93 95  --   --   --  95  --  94    318  --   --   --  300  --  291   INR 0.99  --   --   --   --   --   --  1.08    135  --   --   --   --   --  138   POTASSIUM 3.5 3.6  3.6  --  3.2*   < >  --    < > 2.9*  2.9*   CHLORIDE 104 101  --   --   --   --   --  97   CO2 25 28  --   --   --   --   --  33*   BUN 20 30  --   --   --   --   --  39*   CR 0.51* 0.44*  --   --   --  0.51*  --  0.56*   ANIONGAP 7 6  --   --   --   --   --  8   ROXIE 8.3* 8.6  --   --   --   --   --  8.9   * 115* 131*  --    < >  --    < > 149*   ALBUMIN 2.4*  --   --   --   --   --   --  3.0*   PROTTOTAL 6.6*  --   --   --   --   --   --  7.3   BILITOTAL 0.4  --   --   --   --   --   --  0.6   ALKPHOS 127  --   --   --   --   --   --  100   ALT 31  --   --   --   --   --   --  17   AST 39  --   --   --   --   --   --  21    < > = values in this interval not displayed.     No results found for this or any previous visit (from the past 24 hour(s)).  Medications     0.45% sodium chloride + KCl 20 mEq/L 65 mL/hr at 08/29/22 0900     dextrose        parenteral nutrition - Clinimix E       parenteral nutrition - Clinimix E 35 mL/hr at 08/28/22 2048       lipids  250 mL Intravenous Q24H    And     - MEDICATION INSTRUCTIONS -   Does not apply Q24H     Lidocaine  1 patch Transdermal Q24H     lidocaine   Transdermal Q8H DIMAS     methocarbamol  500 mg Oral 4x Daily     [Held by provider] mirtazapine  15 mg Oral At Bedtime     [Held by provider] omeprazole  20 mg Oral BID     piperacillin-tazobactam  3.375 g Intravenous Q6H     polyethylene glycol  17 g Oral Daily     senna-docusate  1 tablet Oral BID     sodium chloride (PF)  3 mL Intracatheter Q8H     sodium chloride (PF)  3 mL Intracatheter Q8H

## 2022-08-30 LAB
CREAT SERPL-MCNC: 0.52 MG/DL (ref 0.66–1.25)
GFR SERPL CREATININE-BSD FRML MDRD: >90 ML/MIN/1.73M2
GLUCOSE BLD-MCNC: 91 MG/DL (ref 70–99)
GLUCOSE BLDC GLUCOMTR-MCNC: 112 MG/DL (ref 70–99)
PLATELET # BLD AUTO: 331 10E3/UL (ref 150–450)

## 2022-08-30 PROCEDURE — 120N000001 HC R&B MED SURG/OB

## 2022-08-30 PROCEDURE — 99232 SBSQ HOSP IP/OBS MODERATE 35: CPT | Performed by: INTERNAL MEDICINE

## 2022-08-30 PROCEDURE — 250N000013 HC RX MED GY IP 250 OP 250 PS 637: Performed by: INTERNAL MEDICINE

## 2022-08-30 PROCEDURE — 250N000013 HC RX MED GY IP 250 OP 250 PS 637: Performed by: SURGERY

## 2022-08-30 PROCEDURE — 250N000009 HC RX 250: Performed by: INTERNAL MEDICINE

## 2022-08-30 PROCEDURE — 250N000011 HC RX IP 250 OP 636: Performed by: INTERNAL MEDICINE

## 2022-08-30 PROCEDURE — 85049 AUTOMATED PLATELET COUNT: CPT | Performed by: PHYSICIAN ASSISTANT

## 2022-08-30 PROCEDURE — 82565 ASSAY OF CREATININE: CPT | Performed by: PHYSICIAN ASSISTANT

## 2022-08-30 PROCEDURE — 250N000013 HC RX MED GY IP 250 OP 250 PS 637: Performed by: PHYSICIAN ASSISTANT

## 2022-08-30 RX ORDER — PANTOPRAZOLE SODIUM 40 MG/1
40 TABLET, DELAYED RELEASE ORAL
Status: DISCONTINUED | OUTPATIENT
Start: 2022-08-30 | End: 2022-08-31 | Stop reason: HOSPADM

## 2022-08-30 RX ADMIN — OXYCODONE HYDROCHLORIDE 5 MG: 5 TABLET ORAL at 16:39

## 2022-08-30 RX ADMIN — TAZOBACTAM SODIUM AND PIPERACILLIN SODIUM 3.38 G: 375; 3 INJECTION, SOLUTION INTRAVENOUS at 01:37

## 2022-08-30 RX ADMIN — SENNOSIDES AND DOCUSATE SODIUM 1 TABLET: 50; 8.6 TABLET ORAL at 20:43

## 2022-08-30 RX ADMIN — SENNOSIDES AND DOCUSATE SODIUM 1 TABLET: 50; 8.6 TABLET ORAL at 08:29

## 2022-08-30 RX ADMIN — LIDOCAINE 1 PATCH: 560 PATCH PERCUTANEOUS; TOPICAL; TRANSDERMAL at 20:46

## 2022-08-30 RX ADMIN — POTASSIUM CHLORIDE AND SODIUM CHLORIDE: 450; 150 INJECTION, SOLUTION INTRAVENOUS at 08:35

## 2022-08-30 RX ADMIN — POLYETHYLENE GLYCOL 3350 17 G: 17 POWDER, FOR SOLUTION ORAL at 08:29

## 2022-08-30 RX ADMIN — OXYCODONE HYDROCHLORIDE 10 MG: 5 TABLET ORAL at 01:45

## 2022-08-30 RX ADMIN — OXYCODONE HYDROCHLORIDE 10 MG: 5 TABLET ORAL at 20:43

## 2022-08-30 RX ADMIN — TAZOBACTAM SODIUM AND PIPERACILLIN SODIUM 3.38 G: 375; 3 INJECTION, SOLUTION INTRAVENOUS at 14:22

## 2022-08-30 RX ADMIN — MIRTAZAPINE 15 MG: 15 TABLET, FILM COATED ORAL at 22:34

## 2022-08-30 RX ADMIN — ASCORBIC ACID, VITAMIN A PALMITATE, CHOLECALCIFEROL, THIAMINE HYDROCHLORIDE, RIBOFLAVIN-5 PHOSPHATE SODIUM, PYRIDOXINE HYDROCHLORIDE, NIACINAMIDE, DEXPANTHENOL, ALPHA-TOCOPHEROL ACETATE, VITAMIN K1, FOLIC ACID, BIOTIN, CYANOCOBALAMIN: 200; 3300; 200; 6; 3.6; 6; 40; 15; 10; 150; 600; 60; 5 INJECTION, SOLUTION INTRAVENOUS at 20:53

## 2022-08-30 RX ADMIN — PANTOPRAZOLE SODIUM 40 MG: 40 TABLET, DELAYED RELEASE ORAL at 17:32

## 2022-08-30 RX ADMIN — I.V. FAT EMULSION 250 ML: 20 EMULSION INTRAVENOUS at 20:54

## 2022-08-30 RX ADMIN — TAZOBACTAM SODIUM AND PIPERACILLIN SODIUM 3.38 G: 375; 3 INJECTION, SOLUTION INTRAVENOUS at 08:39

## 2022-08-30 RX ADMIN — TAZOBACTAM SODIUM AND PIPERACILLIN SODIUM 3.38 G: 375; 3 INJECTION, SOLUTION INTRAVENOUS at 22:18

## 2022-08-30 ASSESSMENT — ACTIVITIES OF DAILY LIVING (ADL)
ADLS_ACUITY_SCORE: 25
ADLS_ACUITY_SCORE: 25
ADLS_ACUITY_SCORE: 28
ADLS_ACUITY_SCORE: 28
ADLS_ACUITY_SCORE: 25
ADLS_ACUITY_SCORE: 28
DEPENDENT_IADLS:: INDEPENDENT
ADLS_ACUITY_SCORE: 25
ADLS_ACUITY_SCORE: 28

## 2022-08-30 NOTE — PROGRESS NOTES
CLINICAL NUTRITION SERVICES - BRIEF NOTE     Nutrition Prescription    Recommendations already ordered by Registered Dietitian (RD):  Spoke to pharmacy to increase TPN @ rate of 75mL/hr x24 hours.  --Continue 20% Lipids @20.8 mL/hr x12 hours.       EVALUATION OF THE PROGRESS TOWARD GOALS   Diet: Full Liquid Diet  Nutrition Support: Recommend increasing TPN @ 75 mL/hr x 24 hrs. Run 20% lipids @ 20.8 mL/hr x 12 hrs . This provides 1800 mL, 1778 kcal (32 kcal/kg), 270 g dextrose, 90 g AA (1.6 g pro/kg), and 250 mL of 20% IV lipids. GIR = 3.45 mg/kg/min.    NEW FINDINGS   TPN currently running at 55 mL/hr. Patient tolerating. Per RN documentation patient tolerating small amount of liquids; intakes appear to be improving. Estimated to consume around 350 kcal orally on 8/29.   Per MD patient much better today; highly motivated to eat. Will continue TPN tonight but consider discontinuation tomorrow. Recommendation for patient to consume minimum of above 500 kcal orally before consideration of tapering or 2/3 of estimated energy needs for discontinuation of TPN per guidelines.     Labs 8/30:  Potassium 3.5-WNL  Mag 2.2-WNL  Phos 2.5-WNL  Sodium-136-WNL    INTERVENTIONS  Increase TPN @75mL/hr with 20.8 mL/hr     Monitoring/Evaluation  Progress toward goals will be monitored and evaluated per protocol.     Jaclyn Brownlee RDN, LD  Clinical Dietitian  Office: [phone]  Weekend pager: [phone]

## 2022-08-30 NOTE — PLAN OF CARE
"Goal Outcome Evaluation:    Pt A&O x4. Pt denies any pain. Refused AM Robaxin. Wound dressing on L shin clean, dry, and intact. Colostomy bag burped. Estimated discharge tomorrow.    /73 (BP Location: Left arm)   Pulse 61   Temp 97.3  F (36.3  C) (Oral)   Resp 18   Ht 1.778 m (5' 10\")   Wt 54.3 kg (119 lb 11.4 oz)   SpO2 95%   BMI 17.18 kg/m        "

## 2022-08-30 NOTE — PLAN OF CARE
Goal Outcome Evaluation:    Tolerating full liquids in small amounts.  IV fluids total rate of 100 ml with TPN/lipids.  Good urine output and he is using the bedside urinal.  Pain controlled with oxycodone and he usually declines the robaxin.  Alert and orientated and he can make his needs known.

## 2022-08-30 NOTE — CONSULTS
Care Management Initial Consult    General Information  Assessment completed with: Suresh Velasquez  Type of CM/SW Visit: Initial Assessment    Primary Care Provider verified and updated as needed: Yes   Readmission within the last 30 days:        Reason for Consult: discharge planning  Advance Care Planning:     None on chart     Communication Assessment  Patient's communication style: spoken language (English or Bilingual)    Hearing Difficulty or Deaf: no   Wear Glasses or Blind: yes    Cognitive  Cognitive/Neuro/Behavioral: WDL  Level of Consciousness: alert  Arousal Level: opens eyes spontaneously                Living Environment:   People in home: spouse  Irina  Current living Arrangements: house      Able to return to prior arrangements: yes     Family/Social Support:  Care provided by: self, spouse/significant other, homecare agency  Provides care for: no one  Marital Status:   Wife, Parent(s)  Irina       Description of Support System: Supportive, Involved    Support Assessment: Adequate family and caregiver support, Adequate social supports    Current Resources:   Patient receiving home care services: Yes  Skilled Home Care Services: Skilled Nursing  Community Resources: None  Equipment currently used at home: none  Supplies currently used at home: None    Employment/Financial:  Employment Status: employed full-time     Financial Concerns:   None at this time     Lifestyle & Psychosocial Needs:  Social Determinants of Health     Tobacco Use: High Risk     Smoking Tobacco Use: Current Every Day Smoker     Smokeless Tobacco Use: Never Used   Alcohol Use: Not on file   Financial Resource Strain: Not on file   Food Insecurity: Not on file   Transportation Needs: Not on file   Physical Activity: Not on file   Stress: Not on file   Social Connections: Not on file   Intimate Partner Violence: Not on file   Depression: Not at risk     PHQ-2 Score: 0   Housing Stability: Not on file     Functional  Status:  Prior to admission patient needed assistance:   Dependent ADLs:: Independent  Dependent IADLs:: Independent     Mental Health Status:  Mental Health Status: No Current Concerns       Chemical Dependency Status:  Chemical Dependency Status: No Current Concerns           Values/Beliefs:  Spiritual, Cultural Beliefs, Caodaism Practices, Values that affect care: no             Additional Information:  Care Management received referral to assist pt with discharge planning as pt open with [facility] (Phone: [phone]) for RN services prior to hospitalization.    AMELIA reviewed EMR and then met pt at bedside to introduce self/role, perform assessment, and discuss resources.    Per pt, prior to hospitalization, he was completely independent with all cares baseline, continuing to work, drive, and be active.  Pt anticipates that he will remain independent with his cares, but due to prolonged hospital stay, would like home care RN services to resume upon discharge.    Pt said his wife will transport home at time of discharge.    AMELIA updated [facility] (Phone: [phone]) with plan for pt to discharge tomorrow.     Care Management team to follow for discharge plans.    MARQUIS Carrillo

## 2022-08-30 NOTE — PROGRESS NOTES
[facility]    Medicine Progress Note - Hospitalist Service    Date of Admission:  8/21/2022    Assessment & Plan       Principal Problem:    SBO (small bowel obstruction) (H)    Assessment: Occurs in the setting of known colon cancer with current colostomy. No prior history of small bowel obstruction other than the obstruction associated with his colon mass.  NG tube was placed in the ER 8/21/22.  He initially had about 2 L of output.  A Gastrografin study was attempted 8/22/22 but only had minimal contrast passing into the colon by 16 hours on 8/23/22.  Patient did initially tolerate being off suction during the Gastrografin study, but morning of 8/23/22 he developed increasing pain prompting replacement of suction with about 500 cc out at that time. Output then dropped off. NGT removed accidentlaly. It was replaced with immediate 1.5L out. On 8/24/22 patient was minimally improved and x-ray showed continued distention. Open laparotomy with lysis of adhesions on 8/24/22. NG tube was discontinued by surgery yesterday. Continuous slow improvement.    Plan:  1. Continue TPN.   2. Encouraged increased PO intake of full liquids as tolerated.    Active Problems:    Peritonitis, acute generalized (H)    Assessment: Improving. Day #6 of piperacillin-tazobactam.    Plan:  1. Continue piperacillin-tazobactam.   2. Check CBC, BMP in am.      Colon adenocarcinoma (H)    Assessment: Follows with oncology, Dr. [place]. Diagnosed on CT 3/26/22, mass of sigmoid colon causing small bowel and colonic obstruction. Hospitalized at [facility], s/p colostomy. Currently on chemo with FOLFOX and bevacizumab, last infusion 8/11/22.     Plan: Colostomy cares.              Continue with outpatient oncology management.      Cancer associated pain    Assessment: Controlled.    Plan: Continue current analgesics.      Cancer cachexia (H)    Assessment: Secondary to cancer and  chemotherapy.    Plan:  1. TPN for now as above.   2. Encourage PO intake and supplements.      Esophageal reflux    Assessment: On PPI prior to admission.    Plan: Resume PPI.       Diet: Advance Diet as Tolerated: Full Liquid Diet  parenteral nutrition - Clinimix E    DVT Prophylaxis: Pneumatic Compression Devices  Castro Catheter: Not present  Central Lines: PRESENT     Cardiac Monitoring: None  Code Status: Full Code      Disposition Plan      Expected Discharge Date: 08/31/2022                The patient's care was discussed with the Bedside Nurse, Care Coordinator/ and Patient.    Truong Valentine MD  Hospitalist Service  [facility]  Securely message with the Vocera Web Console (learn more here)  Text page via [employer] Paging/Directory         Clinically Significant Risk Factors Present on Admission                      ______________________________________________________________________    Interval History   He reports improving abdominal pain and decreasing distention. No nausea or vomiting. He's tolerating full liquid diet but small amounts only. Denies fever, chills, shortness of breath, chest pain.    Data reviewed today: I reviewed all medications, new labs and imaging results over the last 24 hours. I personally reviewed no images or EKG's today.    Physical Exam   Vital Signs: Temp: 97.3  F (36.3  C) Temp src: Oral BP: 135/73 Pulse: 61   Resp: 18 SpO2: 95 % O2 Device: None (Room air)    Weight: 119 lbs 11.36 oz  Constitutional: awake, alert, cooperative, no apparent distress, appears stated age, cachectic  Eyes: Lids and lashes normal, pupils equal, round and reactive to light, extra ocular muscles intact, sclera clear, conjunctiva normal  ENT: Normocephalic, without obvious abnormality, atraumatic, external ears without lesions  Respiratory: No increased work of breathing, good air exchange, clear to auscultation bilaterally, no crackles or  wheezing  Cardiovascular: Normal apical impulse, regular rate and rhythm, normal S1 and S2, no S3 or S4, and no murmur noted  GI: Normal bowel sounds, soft, non-distended, mildly tender around colostomy, no guarding or rebound, no masses palpated, no hepatosplenomegally, colostomy bag with moderate amount of liquid stool  Skin: no bruising or bleeding and no rashes  Musculoskeletal: There is no redness, warmth, or swelling of the joints.  Motor strength is 5 out of 5 all extremities bilaterally.  Tone is normal.  Neurologic: Awake, alert, oriented to name, place and time.  Cranial nerves II-XII are grossly intact.  Motor is 5 out of 5 bilaterally.  Sensory is grossly intact.    Data   Recent Labs   Lab 08/30/22  0627 08/30/22  0140 08/29/22  0541 08/28/22  0606 08/28/22  0222 08/27/22 2001 08/27/22  0920 08/27/22  0630 08/26/22  1424 08/26/22  0700   WBC  --   --  25.2* 27.4*  --   --   --  21.2*  --  25.7*   HGB  --   --  11.7* 12.6*  --   --   --  12.7*  --  13.1*   MCV  --   --  93 95  --   --   --  95  --  94     --  314 318  --   --   --  300  --  291   INR  --   --  0.99  --   --   --   --   --   --  1.08   NA  --   --  136 135  --   --   --   --   --  138   POTASSIUM  --   --  3.5 3.6  3.6  --  3.2*   < >  --    < > 2.9*  2.9*   CHLORIDE  --   --  104 101  --   --   --   --   --  97   CO2  --   --  25 28  --   --   --   --   --  33*   BUN  --   --  20 30  --   --   --   --   --  39*   CR 0.52*  --  0.51* 0.44*  --   --   --  0.51*  --  0.56*   ANIONGAP  --   --  7 6  --   --   --   --   --  8   ROXIE  --   --  8.3* 8.6  --   --   --   --   --  8.9   GLC  --  112* 91  105* 115*   < >  --    < >  --    < > 149*   ALBUMIN  --   --  2.4*  --   --   --   --   --   --  3.0*   PROTTOTAL  --   --  6.6*  --   --   --   --   --   --  7.3   BILITOTAL  --   --  0.4  --   --   --   --   --   --  0.6   ALKPHOS  --   --  127  --   --   --   --   --   --  100   ALT  --   --  31  --   --   --   --   --   --  17   AST   --   --  39  --   --   --   --   --   --  21    < > = values in this interval not displayed.     No results found for this or any previous visit (from the past 24 hour(s)).  Medications     0.45% sodium chloride + KCl 20 mEq/L 45 mL/hr at 08/30/22 0835     dextrose       parenteral nutrition - Clinimix E       parenteral nutrition - Clinimix E 55 mL/hr at 08/30/22 0300       lipids  250 mL Intravenous Q24H    And     - MEDICATION INSTRUCTIONS -   Does not apply Q24H     Lidocaine  1 patch Transdermal Q24H     lidocaine   Transdermal Q8H DIMAS     methocarbamol  500 mg Oral 4x Daily     [Held by provider] mirtazapine  15 mg Oral At Bedtime     [Held by provider] omeprazole  20 mg Oral BID     piperacillin-tazobactam  3.375 g Intravenous Q6H     polyethylene glycol  17 g Oral Daily     senna-docusate  1 tablet Oral BID     sodium chloride (PF)  3 mL Intracatheter Q8H     sodium chloride (PF)  3 mL Intracatheter Q8H

## 2022-08-30 NOTE — PROGRESS NOTES
Patient complains of  No pain   passing flatus and stool in bag  Pain  under control   tolerating oral well  vitals  Patient Vitals for the past 24 hrs:   BP Temp Temp src Pulse Resp SpO2   08/30/22 0733 135/73 97.3  F (36.3  C) Oral 61 18 95 %   08/29/22 2252 123/89 97.9  F (36.6  C) Oral 85 16 98 %   08/29/22 1429 117/73 98.6  F (37  C) Oral 64 16 96 %     Hospital problem list  Patient Active Problem List   Diagnosis     Colon adenocarcinoma (H)     Colostomy present (H)     Cachexia (H)     Adenocarcinoma of sigmoid colon (H)     Cancer associated pain     Dehydration     Chemotherapy-induced neutropenia (H)     SBO (small bowel obstruction) (H)     Cancer cachexia (H)     Esophageal reflux     Peritonitis, acute generalized (H)     Physical exam:  Lungs clear to auscultation   Abdomen wound with out evidence of infection   No Lower extremity edema   Wounds look good  Assessment/ Plan:  If eating ok is ok to discharge home per surgery.   Follow up with me in 2 weeks or so to make sure he is doing well and if able to start chemo.           Segundo Hill MD

## 2022-08-31 VITALS
DIASTOLIC BLOOD PRESSURE: 72 MMHG | BODY MASS INDEX: 17.14 KG/M2 | TEMPERATURE: 97.5 F | RESPIRATION RATE: 16 BRPM | OXYGEN SATURATION: 98 % | HEIGHT: 70 IN | HEART RATE: 84 BPM | SYSTOLIC BLOOD PRESSURE: 122 MMHG | WEIGHT: 119.71 LBS

## 2022-08-31 LAB
ANION GAP SERPL CALCULATED.3IONS-SCNC: 8 MMOL/L (ref 7–15)
BASOPHILS # BLD MANUAL: 0.2 10E3/UL (ref 0–0.2)
BASOPHILS NFR BLD MANUAL: 1 %
BUN SERPL-MCNC: 17.6 MG/DL (ref 8–23)
CALCIUM SERPL-MCNC: 8.9 MG/DL (ref 8.8–10.2)
CHLORIDE SERPL-SCNC: 108 MMOL/L (ref 98–107)
CREAT SERPL-MCNC: 0.51 MG/DL (ref 0.67–1.17)
DEPRECATED HCO3 PLAS-SCNC: 22 MMOL/L (ref 22–29)
EOSINOPHIL # BLD MANUAL: 0.2 10E3/UL (ref 0–0.7)
EOSINOPHIL NFR BLD MANUAL: 1 %
ERYTHROCYTE [DISTWIDTH] IN BLOOD BY AUTOMATED COUNT: 18 % (ref 10–15)
ERYTHROCYTE [DISTWIDTH] IN BLOOD BY AUTOMATED COUNT: 18 % (ref 10–15)
GFR SERPL CREATININE-BSD FRML MDRD: >90 ML/MIN/1.73M2
GLUCOSE SERPL-MCNC: 115 MG/DL (ref 70–99)
HCT VFR BLD AUTO: 35.9 % (ref 40–53)
HCT VFR BLD AUTO: 36.9 % (ref 40–53)
HGB BLD-MCNC: 11.5 G/DL (ref 13.3–17.7)
HGB BLD-MCNC: 11.8 G/DL (ref 13.3–17.7)
LYMPHOCYTES # BLD MANUAL: 2.5 10E3/UL (ref 0.8–5.3)
LYMPHOCYTES NFR BLD MANUAL: 11 %
MAGNESIUM SERPL-MCNC: 2 MG/DL (ref 1.7–2.3)
MCH RBC QN AUTO: 30.6 PG (ref 26.5–33)
MCH RBC QN AUTO: 30.7 PG (ref 26.5–33)
MCHC RBC AUTO-ENTMCNC: 32 G/DL (ref 31.5–36.5)
MCHC RBC AUTO-ENTMCNC: 32 G/DL (ref 31.5–36.5)
MCV RBC AUTO: 96 FL (ref 78–100)
MCV RBC AUTO: 96 FL (ref 78–100)
MONOCYTES # BLD MANUAL: 1.4 10E3/UL (ref 0–1.3)
MONOCYTES NFR BLD MANUAL: 6 %
NEUTROPHILS # BLD MANUAL: 18.5 10E3/UL (ref 1.6–8.3)
NEUTROPHILS NFR BLD MANUAL: 81 %
PHOSPHATE SERPL-MCNC: 3 MG/DL (ref 2.5–4.5)
PLAT MORPH BLD: ABNORMAL
PLATELET # BLD AUTO: 350 10E3/UL (ref 150–450)
PLATELET # BLD AUTO: 368 10E3/UL (ref 150–450)
POTASSIUM SERPL-SCNC: 4.2 MMOL/L (ref 3.4–5.3)
RBC # BLD AUTO: 3.74 10E6/UL (ref 4.4–5.9)
RBC # BLD AUTO: 3.85 10E6/UL (ref 4.4–5.9)
RBC MORPH BLD: ABNORMAL
SODIUM SERPL-SCNC: 138 MMOL/L (ref 136–145)
WBC # BLD AUTO: 22.9 10E3/UL (ref 4–11)
WBC # BLD AUTO: 23.3 10E3/UL (ref 4–11)

## 2022-08-31 PROCEDURE — 85027 COMPLETE CBC AUTOMATED: CPT | Performed by: INTERNAL MEDICINE

## 2022-08-31 PROCEDURE — 250N000011 HC RX IP 250 OP 636: Performed by: INTERNAL MEDICINE

## 2022-08-31 PROCEDURE — 80048 BASIC METABOLIC PNL TOTAL CA: CPT | Performed by: INTERNAL MEDICINE

## 2022-08-31 PROCEDURE — 83735 ASSAY OF MAGNESIUM: CPT | Performed by: INTERNAL MEDICINE

## 2022-08-31 PROCEDURE — 85007 BL SMEAR W/DIFF WBC COUNT: CPT | Performed by: INTERNAL MEDICINE

## 2022-08-31 PROCEDURE — 84100 ASSAY OF PHOSPHORUS: CPT | Performed by: INTERNAL MEDICINE

## 2022-08-31 PROCEDURE — 250N000013 HC RX MED GY IP 250 OP 250 PS 637: Performed by: SURGERY

## 2022-08-31 PROCEDURE — 99238 HOSP IP/OBS DSCHRG MGMT 30/<: CPT | Performed by: INTERNAL MEDICINE

## 2022-08-31 PROCEDURE — 250N000013 HC RX MED GY IP 250 OP 250 PS 637: Performed by: INTERNAL MEDICINE

## 2022-08-31 RX ORDER — METRONIDAZOLE 500 MG/1
500 TABLET ORAL 3 TIMES DAILY
Status: DISCONTINUED | OUTPATIENT
Start: 2022-08-31 | End: 2022-08-31 | Stop reason: HOSPADM

## 2022-08-31 RX ORDER — METRONIDAZOLE 500 MG/1
500 TABLET ORAL 3 TIMES DAILY
Qty: 21 TABLET | Refills: 0 | Status: SHIPPED | OUTPATIENT
Start: 2022-08-31 | End: 2022-10-24

## 2022-08-31 RX ORDER — HEPARIN SODIUM (PORCINE) LOCK FLUSH IV SOLN 100 UNIT/ML 100 UNIT/ML
5-10 SOLUTION INTRAVENOUS
COMMUNITY
Start: 2022-08-31

## 2022-08-31 RX ORDER — CIPROFLOXACIN 500 MG/1
500 TABLET, FILM COATED ORAL EVERY 12 HOURS
Qty: 14 TABLET | Refills: 0 | Status: SHIPPED | OUTPATIENT
Start: 2022-08-31 | End: 2022-10-10

## 2022-08-31 RX ORDER — HEPARIN SODIUM (PORCINE) LOCK FLUSH IV SOLN 100 UNIT/ML 100 UNIT/ML
5-10 SOLUTION INTRAVENOUS
Status: DISCONTINUED | OUTPATIENT
Start: 2022-08-31 | End: 2022-08-31 | Stop reason: HOSPADM

## 2022-08-31 RX ORDER — CIPROFLOXACIN 500 MG/1
500 TABLET, FILM COATED ORAL EVERY 12 HOURS SCHEDULED
Status: DISCONTINUED | OUTPATIENT
Start: 2022-08-31 | End: 2022-08-31 | Stop reason: HOSPADM

## 2022-08-31 RX ORDER — AMOXICILLIN 250 MG
1 CAPSULE ORAL 2 TIMES DAILY
Qty: 60 TABLET | Refills: 0 | Status: SHIPPED | OUTPATIENT
Start: 2022-08-31 | End: 2022-09-27

## 2022-08-31 RX ORDER — OXYCODONE HYDROCHLORIDE 5 MG/1
5 TABLET ORAL EVERY 6 HOURS PRN
Qty: 6 TABLET | Refills: 0 | Status: SHIPPED | OUTPATIENT
Start: 2022-08-31 | End: 2022-09-02

## 2022-08-31 RX ADMIN — POTASSIUM CHLORIDE AND SODIUM CHLORIDE: 450; 150 INJECTION, SOLUTION INTRAVENOUS at 04:44

## 2022-08-31 RX ADMIN — SENNOSIDES AND DOCUSATE SODIUM 1 TABLET: 50; 8.6 TABLET ORAL at 07:42

## 2022-08-31 RX ADMIN — METRONIDAZOLE 500 MG: 500 TABLET ORAL at 13:42

## 2022-08-31 RX ADMIN — OXYCODONE HYDROCHLORIDE 5 MG: 5 TABLET ORAL at 07:57

## 2022-08-31 RX ADMIN — PANTOPRAZOLE SODIUM 40 MG: 40 TABLET, DELAYED RELEASE ORAL at 16:40

## 2022-08-31 RX ADMIN — CIPROFLOXACIN 500 MG: 500 TABLET, FILM COATED ORAL at 08:27

## 2022-08-31 RX ADMIN — TAZOBACTAM SODIUM AND PIPERACILLIN SODIUM 3.38 G: 375; 3 INJECTION, SOLUTION INTRAVENOUS at 04:36

## 2022-08-31 RX ADMIN — PANTOPRAZOLE SODIUM 40 MG: 40 TABLET, DELAYED RELEASE ORAL at 06:09

## 2022-08-31 RX ADMIN — POLYETHYLENE GLYCOL 3350 17 G: 17 POWDER, FOR SOLUTION ORAL at 07:42

## 2022-08-31 RX ADMIN — HEPARIN 5 ML: 100 SYRINGE at 15:46

## 2022-08-31 RX ADMIN — METRONIDAZOLE 500 MG: 500 TABLET ORAL at 08:27

## 2022-08-31 RX ADMIN — Medication: at 07:59

## 2022-08-31 RX ADMIN — METHOCARBAMOL 500 MG: 500 TABLET ORAL at 07:42

## 2022-08-31 ASSESSMENT — ACTIVITIES OF DAILY LIVING (ADL)
ADLS_ACUITY_SCORE: 27
ADLS_ACUITY_SCORE: 25
ADLS_ACUITY_SCORE: 27
ADLS_ACUITY_SCORE: 25

## 2022-08-31 NOTE — DISCHARGE SUMMARY
[facility]  Hospitalist Discharge Summary      Date of Admission:  8/21/2022  Date of Discharge:  8/31/2022  Discharging Provider: Truong Valentine MD  Discharge Service: Hospitalist Service    Discharge Diagnoses   SBO (small bowel obstruction)  Peritonitis, acute generalized   Colon adenocarcinoma   Cancer associated pain  Cancer cachexia   Esophageal reflux      Follow-ups Needed After Discharge   Follow-up Appointments     Follow-up and recommended labs and tests       Follow up with primary care provider, [facility], within 2-5 days for hospital follow- up.  The following labs/tests   are recommended: CBC, BMP.             Unresulted Labs Ordered in the Past 30 Days of this Admission     No orders found from 7/22/2022 to 8/22/2022.          Discharge Disposition   Discharged to home  Condition at discharge: Stable      Hospital Course   The patient was admitted with small bowel obstruction in the setting of known colon cancer with current colostomy.    He follows with oncology, Dr. Colorado. Diagnosed on CT 3/26/22, mass of sigmoid colon causing small bowel and colonic obstruction. Hospitalized at [facility], s/p colostomy. Currently on chemo with FOLFOX and bevacizumab, last infusion 8/11/22.    No prior history of small bowel obstruction other than the obstruction associated with his colon mass.  NG tube was placed in the ER 8/21/22.  He initially had about 2 L of output.  A Gastrografin study was attempted 8/22/22 but only had minimal contrast passing into the colon by 16 hours on 8/23/22.  Patient did initially tolerate being off suction during the Gastrografin study, but morning of 8/23/22 he developed increasing pain prompting replacement of suction with about 500 cc out at that time. Output then dropped off. NGT removed accidentlaly. It was replaced with immediate 1.5L out. On 8/24/22 patient was minimally improved and  x-ray showed continued distention. Open laparotomy with lysis of adhesions on 8/24/22.    He developed postoperative peritonitis that was treated with piperacillin-tazobactam IV, NPO, TPN. NG tube was discontinued on 8/29/22 and he continued improving. Diet was advanced from clear liquids to full liquids and he tolerated it well. He was on TPN postoperatively until the day of hospital discharge. He will have a follow up at surgery clinic in two weeks. He was switched to oral ciprofloxacin plus metronidazole for additional one week at the discharge. He still had significant leukocytosis when discharged but was clinically improving (minimal abdominal pain, resolved abdominal distension, afebrile, tolerating oral diet).         Consultations This Hospital Stay   CARE MANAGEMENT / SOCIAL WORK IP CONSULT  SURGERY GENERAL IP CONSULT  PHARMACY/NUTRITION TO START AND MANAGE TPN  CARE MANAGEMENT / SOCIAL WORK IP CONSULT    Code Status   Full Code    Time Spent on this Encounter   ITruong MD, personally saw the patient today and spent less than or equal to 30 minutes discharging this patient.       Truong Valentine MD  [facility] SURGICAL  [address] [address]  Phone: [phone]  Fax: [phone]  ______________________________________________________________________    Physical Exam   Vital Signs: Temp: 97.5  F (36.4  C) Temp src: Oral BP: 122/72 Pulse: 84   Resp: 16 SpO2: 98 % O2 Device: None (Room air)    Weight: 119 lbs 11.36 oz  Constitutional: awake, alert, cooperative, no apparent distress, appears stated age, cachectic  Eyes: Lids and lashes normal, pupils equal, round and reactive to light, extra ocular muscles intact, sclera clear, conjunctiva normal  ENT: Normocephalic, without obvious abnormality, atraumatic, external ears without lesions  Respiratory: No increased work of breathing, good air exchange, clear to auscultation bilaterally, no crackles or  wheezing  Cardiovascular: Normal apical impulse, regular rate and rhythm, normal S1 and S2, no S3 or S4, and no murmur noted  GI: Normal bowel sounds, soft, non-distended, mildly tender around colostomy, no guarding or rebound, no masses palpated, no hepatosplenomegally, colostomy bag with moderate amount of liquid stool  Skin: no bruising or bleeding and no rashes  Musculoskeletal: There is no redness, warmth, or swelling of the joints.  Motor strength is 5 out of 5 all extremities bilaterally.  Tone is normal.  Neurologic: Awake, alert, oriented to name, place and time.  Cranial nerves II-XII are grossly intact.  Motor is 5 out of 5 bilaterally.  Sensory is grossly intact.       Primary Care Physician   [facility]    Discharge Orders      Primary Care - Care Coordination Referral      Home Care Referral      Reason for your hospital stay    Small bowel obstruction     Follow-up and recommended labs and tests     Follow up with primary care provider, [facility], within 2-5 days for hospital follow- up.  The following labs/tests are recommended: CBC, BMP.     Activity    Your activity upon discharge: activity as tolerated     Diet    Follow this diet upon discharge: Orders Placed This Encounter      Snacks/Supplements Adult: Ensure Enlive; With Meals      Advance Diet as Tolerated: Full Liquid Diet       Significant Results and Procedures   Most Recent 3 CBC's:Recent Labs   Lab Test 08/31/22  1257 08/31/22  0608 08/30/22  0627 08/29/22  0541   WBC 23.3* 22.9*  --  25.2*   HGB 11.8* 11.5*  --  11.7*   MCV 96 96  --  93    350 331 314     Most Recent 3 BMP's:Recent Labs   Lab Test 08/31/22  0608 08/30/22  0627 08/30/22  0140 08/29/22  0541 08/28/22  0606     --   --  136 135   POTASSIUM 4.2  --   --  3.5 3.6  3.6   CHLORIDE 108*  --   --  104 101   CO2 22  --   --  25 28   BUN 17.6  --   --  20 30   CR 0.51* 0.52*  --  0.51* 0.44*   ANIONGAP 8  --    --  7 6   ROXIE 8.9  --   --  8.3* 8.6   *  --  112* 91  105* 115*     Most Recent 2 LFT's:Recent Labs   Lab Test 08/29/22  0541 08/26/22  0700   AST 39 21   ALT 31 17   ALKPHOS 127 100   BILITOTAL 0.4 0.6     Most Recent 3 INR's:Recent Labs   Lab Test 08/29/22  0541 08/26/22  0700 06/02/22  0838   INR 0.99 1.08 0.97     7-Day Micro Results     Collected Updated Procedure Result Status      08/28/2022 1612 08/28/2022 1646 Asymptomatic COVID-19 Virus (Coronavirus) by PCR Nasopharyngeal [[id number]]    Swab from Nasopharyngeal    Final result Component Value   SARS CoV2 PCR Negative   NEGATIVE: SARS-CoV-2 (COVID-19) RNA not detected, presumed negative.              ,   Results for orders placed or performed during the hospital encounter of 08/21/22   CT Abdomen Pelvis w Contrast    Narrative    EXAM: CT ABDOMEN PELVIS W CONTRAST  LOCATION: [facility]  DATE/TIME: 8/21/2022 3:30 PM    INDICATION: abdominal pain s p colectomy, suspect SBO  COMPARISON: MRI 06/13/2022, 05/04/2022  TECHNIQUE: CT scan of the abdomen and pelvis was performed following injection of IV contrast. Multiplanar reformats were obtained. Dose reduction techniques were used.  CONTRAST: 61 mL Isovue 370    FINDINGS:   LOWER CHEST: Unremarkable.    HEPATOBILIARY: Normal.    PANCREAS: Normal.    SPLEEN: Normal.    ADRENAL GLANDS: Normal.    KIDNEYS/BLADDER: No hydronephrosis. Left renal cyst, no specific follow-up recommended. Urinary bladder is unremarkable.    BOWEL: New dilated proximal small bowel with focal transition point in the central mesentery just to the right of the midline (series 2 image 105, series 3 image 26). Distal small bowel and colon are decompressed. No evidence of contreras bowel wall   hypoenhancement, pneumatosis or free intraperitoneal gas. Mild mesenteric edema and trace ascites. Postsurgical changes of right lower quadrant small bowel enterotomy as well as left lower quadrant loop colostomy.  Likely sigmoid colonic mass again noted,   better seen on prior MRI (series 2 image 167).    LYMPH NODES: No suspicious lymphadenopathy.    VASCULATURE: Severe calcified atherosclerosis with ectatic abdominal aorta measuring up to 2.9 cm (series 2 image 97).    PELVIC ORGANS: Normal.    MUSCULOSKELETAL: No acute bony abnormality.      Impression    IMPRESSION:   1.  Small bowel obstruction with transition point in the right midabdomen, presumably secondary to adhesion. No evidence of overt bowel ischemia or perforation.  2.  Small volume ascites.  3.  Known sigmoid colonic malignancy again noted.   XR Chest Port 1 View    Narrative    EXAM: XR CHEST PORT 1 VIEW  LOCATION: [facility]  DATE/TIME: 8/21/2022 7:03 PM    INDICATION: NG placement  COMPARISON: Chest radiograph 04/21/2022, same day abdominal CT      Impression    IMPRESSION: Nasogastric tube with tip and sidehole overlying the stomach. Normal cardiac silhouette with left chest port tip overlying the superior vena cava. No evidence of acute cardiopulmonary disease. Stable size of 1.3 cm nodule overlying the left   upper lung without definite correlate on prior PET, consider chest CT for further evaluation on a nonemergent basis. Persistent bowel obstruction, better seen on same day CT.   XR Gastrografin Challenge    Narrative    EXAM: XR GASTROGRAFIN CHALLENGE  LOCATION: [facility]  DATE/TIME: 8/23/2022 8:57 AM    INDICATION: Small Bowel Obstruction  COMPARISON: CT 08/21/2022  TECHNIQUE: Routine water soluble contrast follow-through challenge.    FINDINGS:  NUMBER OF FLUOROSCOPIC IMAGES: 2 images taken at 08/22/2022 at 2205 and at 06 10 this morning.    NG tube is in adequate position.     Gastrografin is seen predominantly in the stomach with a small amount having passed into the right colon on this morning's film.    Markedly dilated small bowel loops again noted indicating an incomplete, small bowel  obstruction.    Contrast seen in the bladder from recent CT.     Findings discussed with his nurse Zen at 09 26. NG tube is attached to suction.   XR Abdomen 1 View    Narrative    XR ABDOMEN 1 VIEW 8/23/2022 2:24 PM    HISTORY: NG placement    COMPARISON: 8/22/2022, 8/21/2022    FINDINGS/    Impression    IMPRESSION: An enteric catheter is seen terminating in the left upper  quadrant in the region of the stomach. There has been slight interval  retraction of the enteric catheter compared to the previous study with  the side port now seen at the level of the gastroesophageal junction,  previously just below the level of the gastroesophageal junction.  Hyperdense material is seen in the left upper quadrant in the region  of the stomach. A small amount of high dense material is also seen in  the right hemicolon. Multiple dilated air-filled loops of small bowel  are similar to comparison, consistent with small bowel obstruction.  Imaged lung bases are clear. No acute osseous abnormality.    YAN TURK MD         SYSTEM ID:  [id number]   XR Abdomen 2 Views    Narrative    EXAM: XR ABDOMEN 2 VIEWS  LOCATION: [facility]  DATE/TIME: 08/24/2022, 6:24 AM    INDICATION: SBO.  COMPARISON: 08/23/2022.      Impression    IMPRESSION: Enteric tube with tip in the stomach. This has been advanced compared with the prior study. Prominent air-fluid level in the stomach as well as some high density material in the stomach. Again seen are multiple dilated loops of small bowel   with multiple air-fluid levels present compatible with mechanical small bowel obstruction. The degree of bowel dilatation is similar to slightly increased compared to 08/23/2022. Again seen is some enteric contrast material in the right hemicolon. No   free air.      POC US Guidance Needle Placement    Impression    Normal ultrasound guided TAP block   Chest 2 Views*    Narrative    CHEST TWO VIEWS  8/25/2022 9:47 AM    HISTORY:  Elevated white blood cell count and rhonchi in the right  lung. History of small bowel obstruction, status post open laparotomy  with lysis of adhesions.    COMPARISON: 8/24/2022, 8/21/2022, 4/7/2022    FINDINGS/    Impression    IMPRESSION: An enteric catheter is seen with the tip in the region of  the gastroesophageal junction. The side port is seen at the level of  the distal esophagus. Recommend advancement and repeat imaging to  document optimal positioning. There is moderate to marked free air  beneath the diaphragm, consistent with pneumoperitoneum, which is  likely related to recent open laparotomy. A viscus perforation is,  however, not excluded. A small amount of high dense material/contrast  is seen in the left upper quadrant, presumably in the stomach.    The lungs are mildly hyperinflated with flattening of the diaphragms,  which can be seen with pulmonary emphysema. A round nodule in the left  upper lung projects over the posterior aspect of the left seventh rib  measuring 17 x 14 mm, which is similar to comparison chest x-ray on  8/21/2022, but new compared to the previous CT from 4/7/2022. Consider  further evaluation with chest CT. A few additional subcentimeter  bilateral round pulmonary nodules are also noted, which can be  assessed on follow-up CT as well. A wedge-shaped opacity is seen in  the retrocardiac region of the medial left lower lung, which is  suggestive of atelectasis. Pneumonia is, however, not entirely  excluded. No sign of pneumothorax or significant pleural effusion.    Heart size and pulmonary vasculature are normal appearing. A left  chest wall Port-A-Cath is in similar position with the tip in the  region of the SVC. No acute osseous abnormality.    YAN TURK MD         SYSTEM ID:  [id number]   CT Chest/Abdomen/Pelvis w Contrast    Narrative    CT CHEST/ABDOMEN/PELVIS WITH CONTRAST 8/26/2022 9:47 AM    CLINICAL HISTORY: Elevated white blood cell count.    TECHNIQUE: CT  scan of the chest, abdomen, and pelvis was performed  following injection of IV contrast. Multiplanar reformats were  obtained. Dose reduction techniques were used.   CONTRAST: 52 mL Isovue 370    COMPARISON: 8/25/2022, 8/21/2022    FINDINGS:   LUNGS AND PLEURA: Bibasilar airspace consolidations are present. Mild  mucous plugging is seen in the lower lobes. Large central airways are  patent. A 4 mm noncalcified pulmonary nodule is seen in the anterior  right upper lobe (3-100). The previously described nodular density in  the left upper lung on prior chest x-rays corresponds to a sclerotic  nonaggressive appearing lesion in the scapula measuring 15 x 7 mm  (2-70).    MEDIASTINUM/AXILLAE: A left chest wall Port-A-Cath is present with the  tip in SVC. The heart size is normal. Mild coronary artery  atherosclerosis. No pericardial effusion. There is mild fusiform  ectasia of the ascending thoracic aorta measuring 4.2 cm in AP  dimension. Moderate thoracic aortic atherosclerosis is present. There  are several prominent borderline enlarged mediastinal and hilar lymph  nodes present. Examples are as follows:  -AP window lymph node measuring 22 x 13 mm (2-54).  -Right hilar lymph node measuring 17 x 15 mm (2-82).    HEPATOBILIARY: Normal.    PANCREAS: Normal.    SPLEEN: Normal.    ADRENAL GLANDS: Normal.    KIDNEYS/BLADDER: A simple-appearing left renal cortical cyst measures  2.5 cm. No follow-up is necessary. No nephrolithiasis or  hydronephrosis.    BOWEL: Moderate pneumoperitoneum is seen with a majority of free air  seen in the anterior upper abdomen. High dense material is seen in the  gastric fundus. There are several prominent loops of large and small  bowel without a transition point, suggestive of a mild ileus. No wall  thickening or inflammatory changes of the bowel. Postsurgical changes  of small bowel are seen in the right lower quadrant. A left lower  quadrant colostomy is present. An enteric catheter  terminates in the  proximal stomach. High dense material is also seen in the colon. No  intra-abdominal or intrapelvic fluid collection or abscess.    PELVIC ORGANS: The prostate gland is enlarged.    ADDITIONAL FINDINGS: Mild ascites. Poststenotic dilation, which can be  seen with median arcuate ligament syndrome. There is ectasia of the  infrarenal abdominal aorta measuring up to 2.8 cm in AP dimension.    MUSCULOSKELETAL: As mentioned above, there is a nonaggressive  appearing sclerotic lesion in the scapula, which is favored to  represent a bone island.      Impression    IMPRESSION:  1.  Bibasilar opacities, which may represent atelectasis versus  pneumonia.  2.  Prominent and mildly enlarged mediastinal and hilar lymph nodes,  likely reactive in nature.  3.  Prominent, but not technically dilated, air-filled bowel loops  without a transition point, favoring a mild adynamic ileus.  4.  Left lower quadrant colostomy. No signs of bowel inflammation.  5.  Moderate pneumoperitoneum, most likely postsurgical in nature.  6.  Nonaggressive appearing sclerotic lesion in the left scapula  accounting for a previously described left upper lung pulmonary  nodule, favoring a bone island.    REFERENCE:  Guidelines for Management of Incidental Pulmonary Nodules Detected on  CT Images: From the Fleischner Society 2017.   Guidelines apply to incidental nodules in patients who are 35 years or  older.  Guidelines do not apply to lung cancer screening, patients with  immunosuppression, or patients with known primary cancer.    SINGLE NODULE  Nodule size <6 mm  Low-risk patients: No follow-up needed.  High-risk patients: Optional follow-up at 12 months.    Consider referral to lung nodule clinic.    YAN TURK MD         SYSTEM ID:  [id number]   XR Chest Port 1 View    Narrative    EXAM: XR CHEST PORT 1 VIEW  LOCATION: [facility]  DATE/TIME: 8/28/2022 11:00 AM    INDICATION: Leukocytosis.  Cough.  COMPARISON: 8/21/2022      Impression    IMPRESSION:   1. Left chest port with the tip in the lower SVC. Gastric drainage tube passing the diaphragm. The sidehole is just past the gastroesophageal junction.  2. Right lung base calcified granuloma. Unchanged 1.4 cm nodular opacity projecting over the left midlung. Basal interstitial opacities may reflect areas of chronic scarring or acute pneumonitis. No pleural effusion or pneumothorax.  3. Normal cardiomediastinal silhouette.  4. Gas below the diaphragm is better assessed on the CT.       Discharge Medications   Current Discharge Medication List      START taking these medications    Details   ciprofloxacin (CIPRO) 500 MG tablet Take 1 tablet (500 mg) by mouth every 12 hours  Qty: 14 tablet, Refills: 0    Associated Diagnoses: Peritonitis, acute generalized (H)      heparin 100 UNIT/ML SOLN injection 5-10 mLs by Intracatheter route every 28 days      metroNIDAZOLE (FLAGYL) 500 MG tablet Take 1 tablet (500 mg) by mouth 3 times daily  Qty: 21 tablet, Refills: 0    Associated Diagnoses: Peritonitis, acute generalized (H)      senna-docusate (SENOKOT-S/PERICOLACE) 8.6-50 MG tablet Take 1 tablet by mouth 2 times daily  Qty: 60 tablet, Refills: 0    Associated Diagnoses: SBO (small bowel obstruction) (H)         CONTINUE these medications which have CHANGED    Details   oxyCODONE (ROXICODONE) 5 MG tablet Take 1 tablet (5 mg) by mouth every 6 hours as needed for moderate to severe pain  Qty: 6 tablet, Refills: 0    Associated Diagnoses: Peritonitis, acute generalized (H)         CONTINUE these medications which have NOT CHANGED    Details   acetaminophen (TYLENOL) 500 MG tablet Take 2 tablets (1,000 mg) by mouth 4 times daily  Qty: 100 tablet, Refills: 0    Associated Diagnoses: S/P colostomy (H)      dexamethasone (DECADRON) 4 MG tablet Take 2 tablets (8 mg) by mouth daily Take for 2 days, starting the day after chemo. Take with food.  Qty: 4 tablet, Refills: 2  "   Associated Diagnoses: Adenocarcinoma of sigmoid colon (H)      fluorouracil (ADRUCIL) 2.5 GM/50ML SOLN injection       Lidocaine (LIDOCARE) 4 % Patch Place 1 patch onto the skin every 24 hours To prevent lidocaine toxicity, patient should be patch free for 12 hrs daily.  Qty: 5 patch, Refills: 0    Associated Diagnoses: Acute post-operative pain      mirtazapine (REMERON) 15 MG tablet Take 1 tablet (15 mg) by mouth At Bedtime  Qty: 30 tablet, Refills: 3    Associated Diagnoses: Adenocarcinoma of sigmoid colon (H); Poor appetite      multivitamin (CENTRUM SILVER) tablet Take 1 tablet by mouth daily  Qty: 30 tablet      omeprazole (PRILOSEC) 20 MG DR capsule Take 20 mg by mouth 2 times daily      ondansetron (ZOFRAN) 8 MG tablet Take 1 tablet (8 mg) by mouth every 8 hours as needed for nausea (vomiting)  Qty: 30 tablet, Refills: 2    Associated Diagnoses: Adenocarcinoma of sigmoid colon (H)      polyethylene glycol (MIRALAX) 17 GM/Dose powder Take 17 g by mouth daily  Qty: 510 g, Refills: 0    Associated Diagnoses: S/P colostomy (H)      prochlorperazine (COMPAZINE) 10 MG tablet Take 1 tablet (10 mg) by mouth every 6 hours as needed for nausea or vomiting  Qty: 30 tablet, Refills: 2    Associated Diagnoses: Adenocarcinoma of sigmoid colon (H)      Ostomy Supplies MISC 1 each Every Mon, Wed, Fri Morning Hollister1 piece flat fecal with filter #6345 20 pouches per month   2\" barrier ring #3468 (1 per pouch)   Adapt powder #7906   No sting film barrier # 0908   Adapt odor eliminator and lubricant 236ml bottle # 43656   M-9 Spray room deodorizer #1912      Qty: 1 each, Refills: 11    Associated Diagnoses: S/P colostomy (H)         STOP taking these medications       ibuprofen (ADVIL/MOTRIN) 400 MG tablet Comments:   Reason for Stopping:             Allergies   No Known Allergies  "

## 2022-08-31 NOTE — PLAN OF CARE
[facility] DISCHARGE NOTE    Patient discharged to home at 1715 PM via wheel chair. Accompanied by spouse and staff. Discharge instructions reviewed with patient and spouse, opportunity offered to ask questions. Prescriptions sent with patient to fill . All belongings sent with patient.    Noreen Polanco RN

## 2022-08-31 NOTE — PROGRESS NOTES
Care Management Discharge Note    Discharge Date: 08/31/2022     Discharge Disposition: Home Care; [facility] (Phone: [phone]) RN services    Discharge Services: None    Discharge DME: None    Discharge Transportation: family or friend will provide    Private pay costs discussed: Not applicable    PAS Confirmation Code:  NA  Patient/family educated on Medicare website which has current facility and service quality ratings: no    Education Provided on the Discharge Plan:  Yes  Persons Notified of Discharge Plans: Patient  Patient/Family in Agreement with the Plan: yes    Handoff Referral Completed: Yes    Additional Information:  Per IDT rounds, MD states that pt is medically stable for discharge today.    Pt was previously on service with [facility] (Phone: [phone]) for RN services & SW confirmed with pt & [facility] that cares will resume at discharge.  Pt is aware that [facility] has 48 hours post discharge to contact him for initial visit.    Transportation discussed and family to transport pt home.  Pt will call when ready.      MARQUIS Carrillo

## 2022-08-31 NOTE — PROGRESS NOTES
Nutrition Note Brief    Per MD rounding; patient extremely motivated to eat; RD place orders for supplements TID. Currently on full liquid diet. Tolerating small amounts. NG tube discontinued; improved abdominal pain and decreasing distension. Passing stool and flatus in colostomy bag. TPN to be discontiued today per Pharmacy protocol.     Jaclyn Brownlee RDN, LD  Clinical Dietitian  Office: [phone]  Weekend pager: [phone]

## 2022-08-31 NOTE — PROGRESS NOTES
Pt is preparing for discharge home today. TPN is being tapered off as ordered.PRN Oxy 5 mg was given earlier this morning with effective results. Pt is currently sleeping. Has no new concerns at this time.  TPN has been stopped

## 2022-08-31 NOTE — PROGRESS NOTES
Patient alert and oriented x 4. Vitals stable; on room air. Abdominal/back pain 8/10, well relieved with PRN oxycodone. Bandage on L leg clean, dry, and intact. Minimal appetite. TPN, lipids, and IV fluid infusing. Declined PM Robaxin.    Temp: 97.2  F (36.2  C) Temp src: Oral BP: 136/71 Pulse: 63   Resp: 18 SpO2: 97 % O2 Device: None (Room air)

## 2022-08-31 NOTE — PROGRESS NOTES
"Patient is calm and cooperative.  He is alert and oriented x 4.  Patient reports some abdominal pain but declined pain medication.  Patient appeared to be able to sleep in between cares.      Temp: 97.2  F (36.2  C) Temp src: Oral BP: 136/71 Pulse: 63   Resp: 18 SpO2: 97 % Height: 177.8 cm (5' 10\") Weight: 54.3 kg (119 lb 11.4 oz)  O2 Device: None (Room air) at    "

## 2022-09-01 ENCOUNTER — PATIENT OUTREACH (OUTPATIENT)
Dept: CARE COORDINATION | Facility: CLINIC | Age: 72
End: 2022-09-01

## 2022-09-01 ENCOUNTER — TELEPHONE (OUTPATIENT)
Dept: FAMILY MEDICINE | Facility: CLINIC | Age: 72
End: 2022-09-01

## 2022-09-01 NOTE — PROGRESS NOTES
Clinic Care Coordination Contact  [facility]: Post-Discharge Note  SITUATION                                                      Admission:    Admission Date: 08/21/22   Reason for Admission: vomiting  Discharge:   Discharge Date: 08/31/22  Discharge Diagnosis: SBO (small bowel obstruction    BACKGROUND                                                      Per hospital discharge summary and inpatient provider notes:The patient was admitted with small bowel obstruction in the setting of known colon cancer with current colostomy.     He follows with oncology, Dr. Colorado. Diagnosed on CT 3/26/22, mass of sigmoid colon causing small bowel and colonic obstruction. Hospitalized at [facility], s/p colostomy. Currently on chemo with FOLFOX and bevacizumab, last infusion 8/11/22.     No prior history of small bowel obstruction other than the obstruction associated with his colon mass.  NG tube was placed in the ER 8/21/22.  He initially had about 2 L of output.  A Gastrografin study was attempted 8/22/22 but only had minimal contrast passing into the colon by 16 hours on 8/23/22.  Patient did initially tolerate being off suction during the Gastrografin study, but morning of 8/23/22 he developed increasing pain prompting replacement of suction with about 500 cc out at that time. Output then dropped off. NGT removed accidentlaly. It was replaced with immediate 1.5L out. On 8/24/22 patient was minimally improved and x-ray showed continued distention. Open laparotomy with lysis of adhesions on 8/24/22.     He developed postoperative peritonitis that was treated with piperacillin-tazobactam IV, NPO, TPN. NG tube was discontinued on 8/29/22 and he continued improving. Diet was advanced from clear liquids to full liquids and he tolerated it well. He was on TPN postoperatively until the day of hospital discharge. He will have a follow up at surgery clinic in two weeks. He was switched to oral  "ciprofloxacin plus metronidazole for additional one week at the discharge. He still had significant leukocytosis when discharged but was clinically improving (minimal abdominal pain, resolved abdominal distension, afebrile, tolerating oral diet).      ASSESSMENT           Discharge Assessment  How are you doing now that you are home?: \" So far so good \"  How are your symptoms? (Red Flag symptoms escalate to triage hotline per guidelines): Improved  Do you feel your condition is stable enough to be safe at home until your provider visit?: Yes  Does the patient have their discharge instructions? : Yes  Does the patient have questions regarding their discharge instructions? : No  Were you started on any new medications or were there changes to any of your previous medications? : Yes  Does the patient have all of their medications?: Yes  Do you have questions regarding any of your medications? : No  Do you have all of your needed medical supplies or equipment (DME)?  (i.e. oxygen tank, CPAP, cane, etc.): Yes  Discharge follow-up appointment scheduled within 14 calendar days? : Yes  Discharge Follow Up Appointment Date: 09/02/22  Discharge Follow Up Appointment Scheduled with?: Primary Care Provider    Post-op ([facility] Only)  If the patient had a surgery or procedure, do they have any questions for a nurse?: No             PLAN                                                      Outpatient Plan: Follow up with primary care provider, [facility], within 2-5 days for hospital follow- up.  The following labs/tests are recommended: CBC, BMP.    Future Appointments   Date Time Provider Department Center   9/2/2022  3:30 PM Noreen Spear APRN CNP [facility]   9/7/2022  8:00 AM Nichole Barber RD [facility]         For any urgent concerns, please contact our 24 hour nurse triage line: [phone] ([phone])         Patricia Mccarthy                "

## 2022-09-01 NOTE — TELEPHONE ENCOUNTER
Reason for Call:  Home Care Verbal Order      Detailed comments:   1) Nursing 1 x for 4 weeks  2) Level 2 drug to drug interaction noted Between   cipro and zofran   3) Pain 8- out of 10 if above 7 Home care needs to notified Provider.  Pain located in post surgical Pain    4)Pt did not take any oxycodone for 11 hrs Prior to this.  Pt is out of Zofran & Compazine.    5) Pt does have appt 9/2/22 with ERIKA Spear NP      .[facility]/ [facility]  Nurse Clara Castillo # [phone]            Call taken on 9/1/2022 at 10:59 AM by Carol Galicia

## 2022-09-01 NOTE — TELEPHONE ENCOUNTER
Looks like you are not listed as PCP on this patient. Will you follow for home care? He seen you on 6/20/22, and 4/11/22. Looks like the Home care nurse has to notify you if pain is over 7/10, and Pain level is 8. Looks like surgery was on 8/24/22, also Pt did not take oxycodone for 11 hrs before apt with nurse. They are also noting he is out of Zofran and compazine, nurse did not say he is having nausea and vomiting.     Ana Harris, RN

## 2022-09-02 ENCOUNTER — PATIENT OUTREACH (OUTPATIENT)
Dept: ONCOLOGY | Facility: CLINIC | Age: 72
End: 2022-09-02

## 2022-09-02 ENCOUNTER — OFFICE VISIT (OUTPATIENT)
Dept: FAMILY MEDICINE | Facility: CLINIC | Age: 72
End: 2022-09-02
Payer: COMMERCIAL

## 2022-09-02 VITALS
HEART RATE: 114 BPM | WEIGHT: 121 LBS | TEMPERATURE: 98.6 F | SYSTOLIC BLOOD PRESSURE: 118 MMHG | HEIGHT: 70 IN | OXYGEN SATURATION: 100 % | RESPIRATION RATE: 20 BRPM | BODY MASS INDEX: 17.32 KG/M2 | DIASTOLIC BLOOD PRESSURE: 82 MMHG

## 2022-09-02 DIAGNOSIS — K65.0: ICD-10-CM

## 2022-09-02 DIAGNOSIS — R63.0 POOR APPETITE: ICD-10-CM

## 2022-09-02 DIAGNOSIS — C18.7 ADENOCARCINOMA OF SIGMOID COLON (H): ICD-10-CM

## 2022-09-02 LAB
ANION GAP SERPL CALCULATED.3IONS-SCNC: 9 MMOL/L (ref 7–15)
BUN SERPL-MCNC: 26.3 MG/DL (ref 8–23)
CALCIUM SERPL-MCNC: 9 MG/DL (ref 8.8–10.2)
CHLORIDE SERPL-SCNC: 107 MMOL/L (ref 98–107)
CREAT SERPL-MCNC: 0.77 MG/DL (ref 0.67–1.17)
DEPRECATED HCO3 PLAS-SCNC: 22 MMOL/L (ref 22–29)
ERYTHROCYTE [DISTWIDTH] IN BLOOD BY AUTOMATED COUNT: 18.4 % (ref 10–15)
GFR SERPL CREATININE-BSD FRML MDRD: >90 ML/MIN/1.73M2
GLUCOSE SERPL-MCNC: 99 MG/DL (ref 70–99)
HCT VFR BLD AUTO: 39.4 % (ref 40–53)
HGB BLD-MCNC: 12.6 G/DL (ref 13.3–17.7)
MCH RBC QN AUTO: 31.1 PG (ref 26.5–33)
MCHC RBC AUTO-ENTMCNC: 32 G/DL (ref 31.5–36.5)
MCV RBC AUTO: 97 FL (ref 78–100)
PLATELET # BLD AUTO: 412 10E3/UL (ref 150–450)
POTASSIUM SERPL-SCNC: 5.4 MMOL/L (ref 3.4–5.3)
RBC # BLD AUTO: 4.05 10E6/UL (ref 4.4–5.9)
SODIUM SERPL-SCNC: 138 MMOL/L (ref 136–145)
WBC # BLD AUTO: 16.4 10E3/UL (ref 4–11)

## 2022-09-02 PROCEDURE — 80048 BASIC METABOLIC PNL TOTAL CA: CPT | Performed by: NURSE PRACTITIONER

## 2022-09-02 PROCEDURE — 85027 COMPLETE CBC AUTOMATED: CPT | Performed by: NURSE PRACTITIONER

## 2022-09-02 PROCEDURE — 99495 TRANSJ CARE MGMT MOD F2F 14D: CPT | Performed by: NURSE PRACTITIONER

## 2022-09-02 PROCEDURE — 36415 COLL VENOUS BLD VENIPUNCTURE: CPT | Performed by: NURSE PRACTITIONER

## 2022-09-02 RX ORDER — PROCHLORPERAZINE MALEATE 10 MG
10 TABLET ORAL EVERY 8 HOURS PRN
Qty: 30 TABLET | Refills: 2 | Status: SHIPPED | OUTPATIENT
Start: 2022-09-02 | End: 2022-09-27

## 2022-09-02 RX ORDER — MIRTAZAPINE 15 MG/1
15 TABLET, FILM COATED ORAL AT BEDTIME
Qty: 90 TABLET | Refills: 3 | Status: SHIPPED | OUTPATIENT
Start: 2022-09-02 | End: 2023-03-13

## 2022-09-02 RX ORDER — ONDANSETRON 8 MG/1
8 TABLET, FILM COATED ORAL EVERY 8 HOURS PRN
Qty: 60 TABLET | Refills: 2 | Status: SHIPPED | OUTPATIENT
Start: 2022-09-02 | End: 2022-09-26

## 2022-09-02 RX ORDER — OXYCODONE HYDROCHLORIDE 5 MG/1
5 TABLET ORAL EVERY 6 HOURS PRN
Qty: 60 TABLET | Refills: 0 | Status: SHIPPED | OUTPATIENT
Start: 2022-09-02 | End: 2022-09-27

## 2022-09-02 NOTE — PROGRESS NOTES
"Called patient to follow up on hospital stay. Patient states he is slowly recovering. Still feeling \"Puny\" and wiped out and sore for surgery. Doing mainly a liquid diet still and drinking 3 protein shakes a day but is losing weight. Meeting with Nutritionist next week. Missed last chemo cycle 8 due to hospitalization. Sent message to Dr. Colorado as to when he wants to see him back and resume treatment. Abby Zavaleta RN on 9/2/2022 at 3:27 PM    "

## 2022-09-02 NOTE — TELEPHONE ENCOUNTER
Spoke with Noreen Spear, [facility].  Joan LICONA RN

## 2022-09-02 NOTE — TELEPHONE ENCOUNTER
Sujata, with [facility] calling to f/u on orders.  Pt has appt with ERIKA Spear today. Pt has no PCP listed.  Routing messages to ERIKA Spear for update before pt's appt today.    Mary Bang RN

## 2022-09-02 NOTE — TELEPHONE ENCOUNTER
Noreen,    Pt has appt with you at 315 today for hosp f/u.  Please see telephone notes. Are you willing to follow pt for homecare orders? No PCP listed.     Mary Bang RN

## 2022-09-02 NOTE — PROGRESS NOTES
Assessment & Plan     (C18.7) Adenocarcinoma of sigmoid colon (H)  Comment: Patient has had an actual bowel movement and also continues to have stool in the colostomy and it is improved since discharge.  Pain is still present from his cancer but follow-up with oncology renewed his medications today he is improved since discharge will obtain further labs  Plan: ondansetron (ZOFRAN) 8 MG tablet,         prochlorperazine (COMPAZINE) 10 MG tablet,         mirtazapine (REMERON) 15 MG tablet, Home Care         Referral      (K65.0) Peritonitis, acute generalized (H)  Comment: Patient has had an actual bowel movement and also continues to have stool in the colostomy and it is improved since discharge.  Pain is still present from his cancer but follow-up with oncology renewed his medications today he is improved since discharge will obtain further labs  Plan: oxyCODONE (ROXICODONE) 5 MG tablet, CBC with         platelets, Basic metabolic panel  (Ca, Cl, CO2,        Creat, Gluc, K, Na, BUN), Home Care Referral,         Basic metabolic panel  (Ca, Cl, CO2, Creat,         Gluc, K, Na, BUN)      (R63.0) Poor appetite  Comment: Patient has had an actual bowel movement and also continues to have stool in the colostomy and it is improved since discharge.  Pain is still present from his cancer but follow-up with oncology renewed his medications today he is improved since discharge will obtain further labs  Plan: mirtazapine (REMERON) 15 MG tablet       Nicotine/Tobacco Cessation:  He reports that he has been smoking. He has a 25.00 pack-year smoking history. He has never used smokeless tobacco.  Nicotine/Tobacco Cessation Plan:   Information offered: Patient not interested at this time      See Patient Instructions    No follow-ups on file.    JULIUS Hudson CNP  [facility]    Ambrose Traylor is a 72 year old accompanied by his spouse, presenting for the following health issues:  Hospital  "F/U      HPI     Post Discharge Outreach 9/1/2022   Admission Date 8/21/2022   Reason for Admission vomiting   Discharge Date 8/31/2022   Discharge Diagnosis SBO (small bowel obstruction   How are you doing now that you are home? \" So far so good \"   How are your symptoms? (Red Flag symptoms escalate to triage hotline per guidelines) Improved   Do you feel your condition is stable enough to be safe at home until your provider visit? Yes   Does the patient have their discharge instructions?  Yes   Does the patient have questions regarding their discharge instructions?  No   Were you started on any new medications or were there changes to any of your previous medications?  Yes   Does the patient have all of their medications? Yes   Do you have questions regarding any of your medications?  No   Do you have all of your needed medical supplies or equipment (DME)?  (i.e. oxygen tank, CPAP, cane, etc.) Yes   Discharge follow-up appointment scheduled within 14 calendar days?  Yes   Discharge Follow Up Appointment Date 9/2/2022   Discharge Follow Up Appointment Scheduled with? Primary Care Provider     Hospital Follow-up Visit:    Hospital/Nursing Home/IP Rehab Facility: [facility]  Date of Admission: 8/21/22  Date of Discharge: 8/31/22  Reason(s) for Admission: bowel obstruction    Was your hospitalization related to COVID-19? No   Problems taking medications regularly:  None  Medication changes since discharge: None  Problems adhering to non-medication therapy:  None    Summary of hospitalization:  [facility] discharge summary reviewed  Diagnostic Tests/Treatments reviewed.  Follow up needed: Oncology   Other Healthcare Providers Involved in Patient s Care:         None  Update since discharge: improved.   Post Medication Reconciliation Status:        Plan of care communicated with patient               Review of Systems   Constitutional, HEENT, cardiovascular, pulmonary, gi and " "gu systems are negative, except as otherwise noted.      Objective    /82 (BP Location: Right arm)   Pulse 114   Temp 98.6  F (37  C) (Tympanic)   Resp 20   Ht 1.778 m (5' 10\")   Wt 54.9 kg (121 lb)   SpO2 100%   BMI 17.36 kg/m    Body mass index is 17.36 kg/m .  Physical Exam   GENERAL: healthy, alert and no distress  EYES: Eyes grossly normal to inspection, PERRL and conjunctivae and sclerae normal  HENT: ear canals and TM's normal, nose and mouth without ulcers or lesions  NECK: no adenopathy, no asymmetry, masses, or scars and thyroid normal to palpation  RESP: lungs clear to auscultation - no rales, rhonchi or wheezes  CV: regular rate and rhythm, normal S1 S2, no S3 or S4, no murmur, click or rub, no peripheral edema and peripheral pulses strong  ABDOMEN: soft, nontender, no hepatosplenomegaly, no masses and bowel sounds normal  ABDOMEN: Colostomy site intact no signs of infection incision healing nicely no signs of infection  MS: no gross musculoskeletal defects noted, no edema  SKIN: no suspicious lesions or rashes  NEURO: Normal strength and tone, mentation intact and speech normal  PSYCH: mentation appears normal, affect normal/bright    Results for orders placed or performed in visit on 09/02/22   CBC with platelets     Status: Abnormal   Result Value Ref Range    WBC Count 16.4 (H) 4.0 - 11.0 10e3/uL    RBC Count 4.05 (L) 4.40 - 5.90 10e6/uL    Hemoglobin 12.6 (L) 13.3 - 17.7 g/dL    Hematocrit 39.4 (L) 40.0 - 53.0 %    MCV 97 78 - 100 fL    MCH 31.1 26.5 - 33.0 pg    MCHC 32.0 31.5 - 36.5 g/dL    RDW 18.4 (H) 10.0 - 15.0 %    Platelet Count 412 150 - 450 10e3/uL   Basic metabolic panel  (Ca, Cl, CO2, Creat, Gluc, K, Na, BUN)     Status: Abnormal   Result Value Ref Range    Creatinine 0.77 0.67 - 1.17 mg/dL    Sodium 138 136 - 145 mmol/L    Potassium 5.4 (H) 3.4 - 5.3 mmol/L    Urea Nitrogen 26.3 (H) 8.0 - 23.0 mg/dL    Chloride 107 98 - 107 mmol/L    Carbon Dioxide (CO2) 22 22 - 29 mmol/L "    Anion Gap 9 7 - 15 mmol/L    Glucose 99 70 - 99 mg/dL    GFR Estimate >90 >60 mL/min/1.73m2    Calcium 9.0 8.8 - 10.2 mg/dL

## 2022-09-07 ENCOUNTER — VIRTUAL VISIT (OUTPATIENT)
Dept: ONCOLOGY | Facility: CLINIC | Age: 72
End: 2022-09-07
Attending: INTERNAL MEDICINE
Payer: COMMERCIAL

## 2022-09-07 DIAGNOSIS — R63.0 POOR APPETITE: ICD-10-CM

## 2022-09-07 DIAGNOSIS — E43 SEVERE PROTEIN-CALORIE MALNUTRITION (H): ICD-10-CM

## 2022-09-07 DIAGNOSIS — C18.7 ADENOCARCINOMA OF SIGMOID COLON (H): ICD-10-CM

## 2022-09-07 PROCEDURE — 97802 MEDICAL NUTRITION INDIV IN: CPT | Mod: 95,GZ | Performed by: DIETITIAN, REGISTERED

## 2022-09-07 NOTE — LETTER
"    9/7/2022         RE: Kalin Shepard  [address] [place] [address]        Dear Colleague,    Thank you for referring your patient, Kalin Shepard, to the [facility]. Please see a copy of my visit note below.    The patient has been notified of the following:      \"We have found that certain health care needs can be provided without the need for a face to face visit.  This service lets us provide the care you need with a phone conversation.       I will have full access to your [facility] medical record during this entire phone call.   I will be taking notes for your medical record.      Since this is like an office visit, we will bill your insurance company for this service.       There are potential benefits and risks of telephone visits (e.g. limits to patient confidentiality) that differ from in-person visits.?  Confidentiality still applies for telephone services, and nobody will record the visit.  It is important to be in a quiet, private space that is free of distractions (including cell phone or other devices) during the visit.??      If during the course of the call I believe a telephone visit is not appropriate, you will not be charged for this service\"     Consent has been obtained for this service by care team member: Yes       CLINICAL NUTRITION SERVICES - ASSESSMENT NOTE    Kalin Shepard 72 year old referred for MNT related to colon cancer    Total Time Spent with patient: 30 min  Referred by: Stanislav 8/15/22  Reason for referral:   C18.7 (ICD-10-CM) - Adenocarcinoma of sigmoid colon (H)   R63.0 (ICD-10-CM) - Poor appetite   E43 (ICD-10-CM) - Severe protein-calorie malnutrition (H)     Patient accompanied by: self  Chemotherapy: Folfox  TPN/NG tube discontinued 8/29/22 8/22/22 admission for: SBO, leukocytosis, hypercalcemia, colon adenocarcinoma s/p colon ostomy, cancer related pain, esophageal reflux, cachexia and anorexia    Patient Medical " "History  Past Medical History:   Diagnosis Date     Cancer (H)        Current Medications    Current Outpatient Medications:      acetaminophen (TYLENOL) 500 MG tablet, Take 2 tablets (1,000 mg) by mouth 4 times daily (Patient taking differently: Take 1,000 mg by mouth every 6 hours as needed), Disp: 100 tablet, Rfl: 0     ciprofloxacin (CIPRO) 500 MG tablet, Take 1 tablet (500 mg) by mouth every 12 hours, Disp: 14 tablet, Rfl: 0     dexamethasone (DECADRON) 4 MG tablet, Take 2 tablets (8 mg) by mouth daily Take for 2 days, starting the day after chemo. Take with food., Disp: 4 tablet, Rfl: 2     fluorouracil (ADRUCIL) 2.5 GM/50ML SOLN injection, , Disp: , Rfl:      heparin 100 UNIT/ML SOLN injection, 5-10 mLs by Intracatheter route every 28 days, Disp: , Rfl:      Lidocaine (LIDOCARE) 4 % Patch, Place 1 patch onto the skin every 24 hours To prevent lidocaine toxicity, patient should be patch free for 12 hrs daily., Disp: 5 patch, Rfl: 0     metroNIDAZOLE (FLAGYL) 500 MG tablet, Take 1 tablet (500 mg) by mouth 3 times daily, Disp: 21 tablet, Rfl: 0     mirtazapine (REMERON) 15 MG tablet, Take 1 tablet (15 mg) by mouth At Bedtime, Disp: 90 tablet, Rfl: 3     multivitamin (CENTRUM SILVER) tablet, Take 1 tablet by mouth daily, Disp: 30 tablet, Rfl:      omeprazole (PRILOSEC) 20 MG DR capsule, Take 20 mg by mouth 2 times daily, Disp: , Rfl:      ondansetron (ZOFRAN) 8 MG tablet, Take 1 tablet (8 mg) by mouth every 8 hours as needed for nausea (vomiting), Disp: 60 tablet, Rfl: 2     Ostomy Supplies MISC, 1 each Every Mon, Wed, Fri Morning Hollister1 piece flat fecal with filter #7371 20 pouches per month  2\" barrier ring #3992 (1 per pouch)  Adapt powder #5094  No sting film barrier # 6700  Adapt odor eliminator and lubricant 236ml bottle # 07122  M-9 Spray room deodorizer #8199    , Disp: 1 each, Rfl: 11     oxyCODONE (ROXICODONE) 5 MG tablet, Take 1 tablet (5 mg) by mouth every 6 hours as needed for moderate to severe " pain, Disp: 60 tablet, Rfl: 0     polyethylene glycol (MIRALAX) 17 GM/Dose powder, Take 17 g by mouth daily, Disp: 510 g, Rfl: 0     prochlorperazine (COMPAZINE) 10 MG tablet, Take 1 tablet (10 mg) by mouth every 8 hours as needed for nausea or vomiting, Disp: 30 tablet, Rfl: 2     senna-docusate (SENOKOT-S/PERICOLACE) 8.6-50 MG tablet, Take 1 tablet by mouth 2 times daily, Disp: 60 tablet, Rfl: 0  Medications have been reviewed.    Pertinent lab results:  Prealbumin   Date Value Ref Range Status   08/26/2022 13 (L) 15 - 45 mg/dL Final     Urea Nitrogen   Date Value Ref Range Status   09/02/2022 26.3 (H) 8.0 - 23.0 mg/dL Final   08/29/2022 20 7 - 30 mg/dL Final     Potassium   Date Value Ref Range Status   09/02/2022 5.4 (H) 3.4 - 5.3 mmol/L Final   08/29/2022 3.5 3.4 - 5.3 mmol/L Final   Labs have been reviewed and noted.    Treatment Plan:  Oncology History   Adenocarcinoma of sigmoid colon (H)   4/13/2022 Initial Diagnosis    Adenocarcinoma of sigmoid colon (H)     4/26/2022 -  Chemotherapy    OP ONC Colorectal Cancer - FOLFOX-6 / Bevacizumab  Plan Provider: Pamela Colorado MD  Treatment goal: Palliative  Line of treatment: First Line     5/6/2022 -  Cancer Staged    Staging form: Colon and Rectum, AJCC 8th Edition  - Pathologic: Stage Unknown (pTX, pNX, pM1)     Treatment plan has been reviewed.    Food and Nutrition Related History  --Factors effecting nutritional intake: early satiety  --Currently on pureed, low fiber diet    Layton tells me that his intake and appetite have been improving, however, he still has early satiety.    He has been eating 3-4 meals/day and incorporating 2-3 Boost Plus shakes/day.    He has made smoothies in the past but has not done so recently  He expresses his interest in trying these again.  He has been hydrating with juice, milk, Boost and water, although, unsure of quantity.   He reports soft stool output, denies diarrhea or constipation.     Diet Recall  Breakfast Cream of  "wheat with butter, brown sugar   Lunch Variety of broth based soups, 1 Boost Plus   Dinner Soup, 1 Boost Plus   Snacks Applesauce, pudding, +/- 1 Boost Plus   Beverages Water, whole milk, juice     ANTHROPOMETRICS  Height: 70\"  Weight: 121 lbs/54kg  BMI: 17  Weight Status:  Underweight BMI <18.5  IBW: 166 lbs (72%)  Weight History: down 11 lb x past 3 months (9%)  Wt Readings from Last 8 Encounters:   09/02/22 54.9 kg (121 lb)   08/28/22 54.3 kg (119 lb 11.4 oz)   08/02/22 58.1 kg (128 lb)   07/19/22 58.1 kg (128 lb)   07/05/22 59.6 kg (131 lb 4.8 oz)   06/21/22 59.9 kg (132 lb)   06/20/22 59 kg (130 lb)   06/07/22 60 kg (132 lb 3.2 oz)     Dosing Weight: 54kg (current weight)    MALNUTRITION:  % Weight Loss:  > 7.5% in 3 months (severe malnutrition)  % Intake:  <75% for >/= 3 months (moderate malnutrition)  Subcutaneous Fat Loss:  Not observed  Muscle Loss:  Not observed  Fluid Retention:  None noted    Malnutrition Diagnosis: Moderate malnutrition  In Context of:  Acute illness or injury    ASSESSED NUTRITION NEEDS:  Estimated Energy Needs: 3764-0162 kcals (35-40 Kcal/Kg)  Justification: repletion/underweight  Estimated Protein Needs: 80 grams protein (1.5 g pro/Kg)  Justification: Repletion  Estimated Fluid Needs: 2000  mL   Justification: maintenance      Nutrition Diagnosis:  Inadequate oral intake related to decreased ability to consume sufficient energy due to side effects of cancer therapy and surgery as evidenced by early fullness 11 lb (9%) wt loss x past 3 months, dietary intake <75% estimated needs.      Intervention/Recommendations:  Provided written & verbal education:     - Reviewed nutrition and hydration needs.   Advised pt to aim for at least 2000kcal and 80g protein daily.   Advised pt to aim for 8 cups non-caffeine containing beverages (water/electrolytes) daily.    - Discussed strategies to help fortify meals and snacks with calories and protein.   - Encouraged to focus on 5-6 small, frequent " meals.   - Encouraged to have a protein source with each meal and snack.    - Reviewed common barriers to eating with cancer treatment/cancer surgery.  Discussed ways to cope with early satiety. Reviewed ways to help manage colostomy output.  Encouraged low fiber diet for 3-5 more weeks while healing. Reviewed post op diet transition to low fiber, regular diet.   - Reviewed oral nutrition supplement options (Ensure Complete, Boost Soothe, CIB with [employer] milk etc).   - Encouraged utilizing these ONS in home made shakes/smoothies to prevent flavor fatigue.      Provided pt with corresponding education materials/handouts on:  High Calorie/High Protein Recipe booklet, Tips to Increase the Calories in Your Diet and Sources of Protein    Goals:  1. Aim for 1533-4110 calories, 80g protein/day  2. Increase daily calorie intake by 400-500/day as able   3. Weight repletion towards 135 lbs     Monitor and Evaluation:  Patient has RD contact information for further nutrition questions/concerns.         Again, thank you for allowing me to participate in the care of your patient.      Sincerely,    Nichole Castellanos RD

## 2022-09-07 NOTE — PATIENT INSTRUCTIONS
Mynor Traylor,     Here are your nutrition goals for repletion:  --Calories: 200-2200/day  --Protein: 80 grams/day    I have attached resources that will help you increase your calorie/protein intake:  --High calorie/High protein recipes  --Tips to increase calories in your diet  --Tips to increase protein in your diet    I also added the web links for the Clear nutrition shakes:    Boost Soothe - formulated to help with taste aversions, mouth sores   https://www.[employer]/brands/boost-soothe  BOOST  SOOTHE -  [employer]  A clear nutritional drink with a refreshing, mouth cooling and soothing effect that is designed for cancer patients experiencing certain side effects  www.[employer]  ?  Boost Breeze:  https://wwwBadAbroad/products/boost-breeze  BOOST Breeze  BOOST Nutritional Drinks  Shop BOOST Breeze  Clear Nutritional Drink - a nutrient-rich clear liquid nutritional drink with essential vitamins and minerals to help fill nutritional gaps  www.boost.com    Ensure Clear:  https://www.[employer].com/our-products/ensure-clear-nutrition-drink    Ensure  Clear Nutrition Drink  ENSURE CLEAR is a fat-free, refreshing fruit-flavored nutrition drink that contains high-quality protein and essential nutrients. It is ideal for patients who prefer a fruit-flavored alternative to creamy shake-like supplements. Ensure Clear can benefit patients who have nutritional gaps they need to fill, have malnutrition, are at nutritional risk, or are experiencing involuntary weight loss.  www.[employer]  ?  Please reach out with further questions along the way!    Be well,

## 2022-09-07 NOTE — PROGRESS NOTES
"The patient has been notified of the following:      \"We have found that certain health care needs can be provided without the need for a face to face visit.  This service lets us provide the care you need with a phone conversation.       I will have full access to your [facility] medical record during this entire phone call.   I will be taking notes for your medical record.      Since this is like an office visit, we will bill your insurance company for this service.       There are potential benefits and risks of telephone visits (e.g. limits to patient confidentiality) that differ from in-person visits.?  Confidentiality still applies for telephone services, and nobody will record the visit.  It is important to be in a quiet, private space that is free of distractions (including cell phone or other devices) during the visit.??      If during the course of the call I believe a telephone visit is not appropriate, you will not be charged for this service\"     Consent has been obtained for this service by care team member: Yes       CLINICAL NUTRITION SERVICES - ASSESSMENT NOTE    Kalin Shepard 72 year old referred for MNT related to colon cancer    Total Time Spent with patient: 30 min  Referred by: Stanislav 8/15/22  Reason for referral:   C18.7 (ICD-10-CM) - Adenocarcinoma of sigmoid colon (H)   R63.0 (ICD-10-CM) - Poor appetite   E43 (ICD-10-CM) - Severe protein-calorie malnutrition (H)     Patient accompanied by: self  Chemotherapy: Folfox  TPN/NG tube discontinued 8/29/22 8/22/22 admission for: SBO, leukocytosis, hypercalcemia, colon adenocarcinoma s/p colon ostomy, cancer related pain, esophageal reflux, cachexia and anorexia    Patient Medical History  Past Medical History:   Diagnosis Date     Cancer (H)        Current Medications    Current Outpatient Medications:      acetaminophen (TYLENOL) 500 MG tablet, Take 2 tablets (1,000 mg) by mouth 4 times daily (Patient taking differently: Take 1,000 mg by " "mouth every 6 hours as needed), Disp: 100 tablet, Rfl: 0     ciprofloxacin (CIPRO) 500 MG tablet, Take 1 tablet (500 mg) by mouth every 12 hours, Disp: 14 tablet, Rfl: 0     dexamethasone (DECADRON) 4 MG tablet, Take 2 tablets (8 mg) by mouth daily Take for 2 days, starting the day after chemo. Take with food., Disp: 4 tablet, Rfl: 2     fluorouracil (ADRUCIL) 2.5 GM/50ML SOLN injection, , Disp: , Rfl:      heparin 100 UNIT/ML SOLN injection, 5-10 mLs by Intracatheter route every 28 days, Disp: , Rfl:      Lidocaine (LIDOCARE) 4 % Patch, Place 1 patch onto the skin every 24 hours To prevent lidocaine toxicity, patient should be patch free for 12 hrs daily., Disp: 5 patch, Rfl: 0     metroNIDAZOLE (FLAGYL) 500 MG tablet, Take 1 tablet (500 mg) by mouth 3 times daily, Disp: 21 tablet, Rfl: 0     mirtazapine (REMERON) 15 MG tablet, Take 1 tablet (15 mg) by mouth At Bedtime, Disp: 90 tablet, Rfl: 3     multivitamin (CENTRUM SILVER) tablet, Take 1 tablet by mouth daily, Disp: 30 tablet, Rfl:      omeprazole (PRILOSEC) 20 MG DR capsule, Take 20 mg by mouth 2 times daily, Disp: , Rfl:      ondansetron (ZOFRAN) 8 MG tablet, Take 1 tablet (8 mg) by mouth every 8 hours as needed for nausea (vomiting), Disp: 60 tablet, Rfl: 2     Ostomy Supplies MISC, 1 each Every Mon, Wed, Fri Morning Hollister1 piece flat fecal with filter #8211 20 pouches per month  2\" barrier ring #6800 (1 per pouch)  Adapt powder #7919  No sting film barrier # 1143  Adapt odor eliminator and lubricant 236ml bottle # 95971  M-9 Spray room deodorizer #9099    , Disp: 1 each, Rfl: 11     oxyCODONE (ROXICODONE) 5 MG tablet, Take 1 tablet (5 mg) by mouth every 6 hours as needed for moderate to severe pain, Disp: 60 tablet, Rfl: 0     polyethylene glycol (MIRALAX) 17 GM/Dose powder, Take 17 g by mouth daily, Disp: 510 g, Rfl: 0     prochlorperazine (COMPAZINE) 10 MG tablet, Take 1 tablet (10 mg) by mouth every 8 hours as needed for nausea or vomiting, Disp: 30 " "tablet, Rfl: 2     senna-docusate (SENOKOT-S/PERICOLACE) 8.6-50 MG tablet, Take 1 tablet by mouth 2 times daily, Disp: 60 tablet, Rfl: 0  Medications have been reviewed.    Pertinent lab results:  Prealbumin   Date Value Ref Range Status   08/26/2022 13 (L) 15 - 45 mg/dL Final     Urea Nitrogen   Date Value Ref Range Status   09/02/2022 26.3 (H) 8.0 - 23.0 mg/dL Final   08/29/2022 20 7 - 30 mg/dL Final     Potassium   Date Value Ref Range Status   09/02/2022 5.4 (H) 3.4 - 5.3 mmol/L Final   08/29/2022 3.5 3.4 - 5.3 mmol/L Final   Labs have been reviewed and noted.    Treatment Plan:  Oncology History   Adenocarcinoma of sigmoid colon (H)   4/13/2022 Initial Diagnosis    Adenocarcinoma of sigmoid colon (H)     4/26/2022 -  Chemotherapy    OP ONC Colorectal Cancer - FOLFOX-6 / Bevacizumab  Plan Provider: Pamela Colorado MD  Treatment goal: Palliative  Line of treatment: First Line     5/6/2022 -  Cancer Staged    Staging form: Colon and Rectum, AJCC 8th Edition  - Pathologic: Stage Unknown (pTX, pNX, pM1)     Treatment plan has been reviewed.    Food and Nutrition Related History  --Factors effecting nutritional intake: early satiety  --Currently on pureed, low fiber diet    Layton tells me that his intake and appetite have been improving, however, he still has early satiety.    He has been eating 3-4 meals/day and incorporating 2-3 Boost Plus shakes/day.    He has made smoothies in the past but has not done so recently  He expresses his interest in trying these again.  He has been hydrating with juice, milk, Boost and water, although, unsure of quantity.   He reports soft stool output, denies diarrhea or constipation.     Diet Recall  Breakfast Cream of wheat with butter, brown sugar   Lunch Variety of broth based soups, 1 Boost Plus   Dinner Soup, 1 Boost Plus   Snacks Applesauce, pudding, +/- 1 Boost Plus   Beverages Water, whole milk, juice     ANTHROPOMETRICS  Height: 70\"  Weight: 121 lbs/54kg  BMI: 17  Weight " Status:  Underweight BMI <18.5  IBW: 166 lbs (72%)  Weight History: down 11 lb x past 3 months (9%)  Wt Readings from Last 8 Encounters:   09/02/22 54.9 kg (121 lb)   08/28/22 54.3 kg (119 lb 11.4 oz)   08/02/22 58.1 kg (128 lb)   07/19/22 58.1 kg (128 lb)   07/05/22 59.6 kg (131 lb 4.8 oz)   06/21/22 59.9 kg (132 lb)   06/20/22 59 kg (130 lb)   06/07/22 60 kg (132 lb 3.2 oz)     Dosing Weight: 54kg (current weight)    MALNUTRITION:  % Weight Loss:  > 7.5% in 3 months (severe malnutrition)  % Intake:  <75% for >/= 3 months (moderate malnutrition)  Subcutaneous Fat Loss:  Not observed  Muscle Loss:  Not observed  Fluid Retention:  None noted    Malnutrition Diagnosis: Moderate malnutrition  In Context of:  Acute illness or injury    ASSESSED NUTRITION NEEDS:  Estimated Energy Needs: 2525-9375 kcals (35-40 Kcal/Kg)  Justification: repletion/underweight  Estimated Protein Needs: 80 grams protein (1.5 g pro/Kg)  Justification: Repletion  Estimated Fluid Needs: 2000  mL   Justification: maintenance      Nutrition Diagnosis:  Inadequate oral intake related to decreased ability to consume sufficient energy due to side effects of cancer therapy and surgery as evidenced by early fullness 11 lb (9%) wt loss x past 3 months, dietary intake <75% estimated needs.      Intervention/Recommendations:  Provided written & verbal education:     - Reviewed nutrition and hydration needs.   Advised pt to aim for at least 2000kcal and 80g protein daily.   Advised pt to aim for 8 cups non-caffeine containing beverages (water/electrolytes) daily.    - Discussed strategies to help fortify meals and snacks with calories and protein.   - Encouraged to focus on 5-6 small, frequent meals.   - Encouraged to have a protein source with each meal and snack.    - Reviewed common barriers to eating with cancer treatment/cancer surgery.  Discussed ways to cope with early satiety. Reviewed ways to help manage colostomy output.  Encouraged low fiber diet  for 3-5 more weeks while healing. Reviewed post op diet transition to low fiber, regular diet.   - Reviewed oral nutrition supplement options (Ensure Complete, Boost Soothe, CIB with [employer] milk etc).   - Encouraged utilizing these ONS in home made shakes/smoothies to prevent flavor fatigue.      Provided pt with corresponding education materials/handouts on:  High Calorie/High Protein Recipe booklet, Tips to Increase the Calories in Your Diet and Sources of Protein    Goals:  1. Aim for 4376-4719 calories, 80g protein/day  2. Increase daily calorie intake by 400-500/day as able   3. Weight repletion towards 135 lbs     Monitor and Evaluation:  Patient has RD contact information for further nutrition questions/concerns.     Nichole Castellanos RD, [facility]  [phone]

## 2022-09-12 ENCOUNTER — ONCOLOGY VISIT (OUTPATIENT)
Dept: ONCOLOGY | Facility: CLINIC | Age: 72
End: 2022-09-12
Attending: INTERNAL MEDICINE
Payer: COMMERCIAL

## 2022-09-12 VITALS
DIASTOLIC BLOOD PRESSURE: 78 MMHG | BODY MASS INDEX: 17.79 KG/M2 | WEIGHT: 124 LBS | HEART RATE: 63 BPM | RESPIRATION RATE: 12 BRPM | OXYGEN SATURATION: 99 % | TEMPERATURE: 98.1 F | SYSTOLIC BLOOD PRESSURE: 124 MMHG

## 2022-09-12 DIAGNOSIS — C18.7 ADENOCARCINOMA OF SIGMOID COLON (H): Primary | ICD-10-CM

## 2022-09-12 PROCEDURE — G0463 HOSPITAL OUTPT CLINIC VISIT: HCPCS

## 2022-09-12 PROCEDURE — 99214 OFFICE O/P EST MOD 30 MIN: CPT | Performed by: INTERNAL MEDICINE

## 2022-09-12 ASSESSMENT — PAIN SCALES - GENERAL: PAINLEVEL: MODERATE PAIN (5)

## 2022-09-12 NOTE — PROGRESS NOTES
[facility] Hematology and Oncology Outpatient Progress Note    Patient: Kalin Shepard  MRN: [id number]  Date of Service: Sep 12, 2022          Reason for Visit    1. Stage IV colon cancer (peritoneal mets)    Primary Hematologist/Oncologist: Dr. [place]      Assessment/Plan  1. Stage IV colon cancer (peritoneal mets)  Status post 7 cycles of FOLFOX with bevacizumab.  He was tolerating treatment pretty well except for some delays due to neutropenia.  However after cycle 7 he ended up in the hospital with abdominal pain and was diagnosed with small bowel obstruction.  Underwent emergent laparotomy with adhesions of lysis.  There was no evidence of any malignancy based on the operative note.  CT scan prior to surgery also showed no evidence of any metastatic disease.  He has some hilar lymphadenopathy which was thought to be reactive.  He is recovering well from surgery.  Denies any major issues today.  He is about 2-1/2 weeks out from surgery.  We discussed about restarting systemic therapy going forward.  It is reassuring that intraoperatively the did not see any evidence of malignancy.  So plan is still to proceed with systemic chemotherapy followed by some definitive surgery in the future.  However I will give him a couple more weeks of before restarting treatment.  I want him to completely heal.  I will also probably start with just FOLFOX and hold off on bevacizumab for a cycle or 2.  He is agreeable with the plan.    Continue aggressive oral protein calorie intake and hydration.  He is already gaining some weight.      Patient Instructions   RTC in 2 weeks with labs prior, cycle 8 FOLFOX to follow the same day.       ______________________________________________________________________________    History of Present Illness/ Interval History    Mr. Kalin Shepard  is a 71 year old status post 7 cycles of FOLFOX + bevacizumab.     Cycle 7 was delayed by a week due to neutropenia.  However after  that he developed abdominal pain and was hospitalized with small bowel obstruction on 8/21/2022.  CT scan at that time showed dilated air-filled bowel loops without a transition point favoring ileus.  He also had mildly enlarged mediastinal and hilar lymph nodes which were thought to be reactive in nature.  He was initially managed with NG suction.  His abdominal pain got worse and he was taken to the surgery immediately.  He underwent open laparotomy with lysis of adhesions on 8/24/2022.  Based on the operative report there was no evidence of any malignancy intra-abdominally during surgery.  Postop course was complicated by peritonitis for which she was started on antibiotics.  Recently finished ciprofloxacin.  Initially he was on TPN but has been cleared for oral intake.  Is been eating pretty well over the last week or so.  Has gained a few pounds.  Denies any abdominal pain today.  No fevers today.  Stools are slightly softer but no obvious diarrhea.  Surgical incision is healing well.    Is here to review further management plans for his colon cancer.      ECOG Performance    1      Oncology History/Treatment  Diagnosis/Stage:   3/26/2022:   -Presented with abdominal pain secondary to bowel obstruction.  CT scan showed mass in sigmoid colon with upstream small bowel and colonic obstruction.  Underwent ostomy placement.    -Peritoneal biopsy positive for adenocarcinoma.    -No evidence of other distant metastasis based on PET scan.  -BRAF V600E positive.  K-aldo and NRAS negative.  Proficient MMR.      Treatment:  Induction/neoadjuvant chemotherapy started for potentially resectable metachronous peritoneal metastasis    4/26/2022 - present: FOLFOX and bevacizumab       Physical Exam    GENERAL: Alert and oriented to time place and person. Seated comfortably. In no distress. Cachectic. Wife accompanies.  HEAD: Atraumatic and normocephalic. Mild alopecia (hair thinning per patient).  ABDOMEN: Soft. Not tender. Not  distended.  Laparotomy scar healing well.  Ostomy bag in place.  EXTREMITIES: Warm. No peripheral edema.  SKIN: no rash, or bruising or purpura.   NEURO: No gross deficit noted. Non-antalgic gait.      Lab Results    No results found for this or any previous visit (from the past 168 hour(s)).    Imaging    CT Chest/Abdomen/Pelvis w Contrast    Result Date: 8/26/2022  CT CHEST/ABDOMEN/PELVIS WITH CONTRAST 8/26/2022 9:47 AM CLINICAL HISTORY: Elevated white blood cell count. TECHNIQUE: CT scan of the chest, abdomen, and pelvis was performed following injection of IV contrast. Multiplanar reformats were obtained. Dose reduction techniques were used. CONTRAST: 52 mL Isovue 370 COMPARISON: 8/25/2022, 8/21/2022 FINDINGS: LUNGS AND PLEURA: Bibasilar airspace consolidations are present. Mild mucous plugging is seen in the lower lobes. Large central airways are patent. A 4 mm noncalcified pulmonary nodule is seen in the anterior right upper lobe (3-100). The previously described nodular density in the left upper lung on prior chest x-rays corresponds to a sclerotic nonaggressive appearing lesion in the scapula measuring 15 x 7 mm (2-70). MEDIASTINUM/AXILLAE: A left chest wall Port-A-Cath is present with the tip in SVC. The heart size is normal. Mild coronary artery atherosclerosis. No pericardial effusion. There is mild fusiform ectasia of the ascending thoracic aorta measuring 4.2 cm in AP dimension. Moderate thoracic aortic atherosclerosis is present. There are several prominent borderline enlarged mediastinal and hilar lymph nodes present. Examples are as follows: -AP window lymph node measuring 22 x 13 mm (2-54). -Right hilar lymph node measuring 17 x 15 mm (2-82). HEPATOBILIARY: Normal. PANCREAS: Normal. SPLEEN: Normal. ADRENAL GLANDS: Normal. KIDNEYS/BLADDER: A simple-appearing left renal cortical cyst measures 2.5 cm. No follow-up is necessary. No nephrolithiasis or hydronephrosis. BOWEL: Moderate pneumoperitoneum is  seen with a majority of free air seen in the anterior upper abdomen. High dense material is seen in the gastric fundus. There are several prominent loops of large and small bowel without a transition point, suggestive of a mild ileus. No wall thickening or inflammatory changes of the bowel. Postsurgical changes of small bowel are seen in the right lower quadrant. A left lower quadrant colostomy is present. An enteric catheter terminates in the proximal stomach. High dense material is also seen in the colon. No intra-abdominal or intrapelvic fluid collection or abscess. PELVIC ORGANS: The prostate gland is enlarged. ADDITIONAL FINDINGS: Mild ascites. Poststenotic dilation, which can be seen with median arcuate ligament syndrome. There is ectasia of the infrarenal abdominal aorta measuring up to 2.8 cm in AP dimension. MUSCULOSKELETAL: As mentioned above, there is a nonaggressive appearing sclerotic lesion in the scapula, which is favored to represent a bone island.     IMPRESSION: 1.  Bibasilar opacities, which may represent atelectasis versus pneumonia. 2.  Prominent and mildly enlarged mediastinal and hilar lymph nodes, likely reactive in nature. 3.  Prominent, but not technically dilated, air-filled bowel loops without a transition point, favoring a mild adynamic ileus. 4.  Left lower quadrant colostomy. No signs of bowel inflammation. 5.  Moderate pneumoperitoneum, most likely postsurgical in nature. 6.  Nonaggressive appearing sclerotic lesion in the left scapula accounting for a previously described left upper lung pulmonary nodule, favoring a bone island. REFERENCE: Guidelines for Management of Incidental Pulmonary Nodules Detected on CT Images: From the Fleischner Society 2017. Guidelines apply to incidental nodules in patients who are 35 years or older. Guidelines do not apply to lung cancer screening, patients with immunosuppression, or patients with known primary cancer. SINGLE NODULE Nodule size <6 mm  Low-risk patients: No follow-up needed. High-risk patients: Optional follow-up at 12 months. Consider referral to lung nodule clinic. YAN TURK MD   SYSTEM ID:  [id number]    Chest 2 Views*    Result Date: 8/25/2022  CHEST TWO VIEWS  8/25/2022 9:47 AM HISTORY: Elevated white blood cell count and rhonchi in the right lung. History of small bowel obstruction, status post open laparotomy with lysis of adhesions. COMPARISON: 8/24/2022, 8/21/2022, 4/7/2022 FINDINGS/    IMPRESSION: An enteric catheter is seen with the tip in the region of the gastroesophageal junction. The side port is seen at the level of the distal esophagus. Recommend advancement and repeat imaging to document optimal positioning. There is moderate to marked free air beneath the diaphragm, consistent with pneumoperitoneum, which is likely related to recent open laparotomy. A viscus perforation is, however, not excluded. A small amount of high dense material/contrast is seen in the left upper quadrant, presumably in the stomach. The lungs are mildly hyperinflated with flattening of the diaphragms, which can be seen with pulmonary emphysema. A round nodule in the left upper lung projects over the posterior aspect of the left seventh rib measuring 17 x 14 mm, which is similar to comparison chest x-ray on 8/21/2022, but new compared to the previous CT from 4/7/2022. Consider further evaluation with chest CT. A few additional subcentimeter bilateral round pulmonary nodules are also noted, which can be assessed on follow-up CT as well. A wedge-shaped opacity is seen in the retrocardiac region of the medial left lower lung, which is suggestive of atelectasis. Pneumonia is, however, not entirely excluded. No sign of pneumothorax or significant pleural effusion. Heart size and pulmonary vasculature are normal appearing. A left chest wall Port-A-Cath is in similar position with the tip in the region of the SVC. No acute osseous abnormality. YAN BENTLEY  MD ROSALEE   SYSTEM ID:  [id number]    XR Chest Port 1 View    Result Date: 8/28/2022  EXAM: XR CHEST PORT 1 VIEW LOCATION: [facility] DATE/TIME: 8/28/2022 11:00 AM INDICATION: Leukocytosis. Cough. COMPARISON: 8/21/2022     IMPRESSION: 1. Left chest port with the tip in the lower SVC. Gastric drainage tube passing the diaphragm. The sidehole is just past the gastroesophageal junction. 2. Right lung base calcified granuloma. Unchanged 1.4 cm nodular opacity projecting over the left midlung. Basal interstitial opacities may reflect areas of chronic scarring or acute pneumonitis. No pleural effusion or pneumothorax. 3. Normal cardiomediastinal silhouette. 4. Gas below the diaphragm is better assessed on the CT.    XR Chest Port 1 View    Result Date: 8/21/2022  EXAM: XR CHEST PORT 1 VIEW LOCATION: [facility] DATE/TIME: 8/21/2022 7:03 PM INDICATION: NG placement COMPARISON: Chest radiograph 04/21/2022, same day abdominal CT     IMPRESSION: Nasogastric tube with tip and sidehole overlying the stomach. Normal cardiac silhouette with left chest port tip overlying the superior vena cava. No evidence of acute cardiopulmonary disease. Stable size of 1.3 cm nodule overlying the left upper lung without definite correlate on prior PET, consider chest CT for further evaluation on a nonemergent basis. Persistent bowel obstruction, better seen on same day CT.    XR Abdomen 2 Views    Result Date: 8/24/2022  EXAM: XR ABDOMEN 2 VIEWS LOCATION: [facility] DATE/TIME: 08/24/2022, 6:24 AM INDICATION: SBO. COMPARISON: 08/23/2022.     IMPRESSION: Enteric tube with tip in the stomach. This has been advanced compared with the prior study. Prominent air-fluid level in the stomach as well as some high density material in the stomach. Again seen are multiple dilated loops of small bowel with multiple air-fluid levels present compatible with mechanical small bowel  obstruction. The degree of bowel dilatation is similar to slightly increased compared to 08/23/2022. Again seen is some enteric contrast material in the right hemicolon. No free air.     XR Abdomen 1 View    Result Date: 8/23/2022  XR ABDOMEN 1 VIEW 8/23/2022 2:24 PM HISTORY: NG placement COMPARISON: 8/22/2022, 8/21/2022 FINDINGS/    IMPRESSION: An enteric catheter is seen terminating in the left upper quadrant in the region of the stomach. There has been slight interval retraction of the enteric catheter compared to the previous study with the side port now seen at the level of the gastroesophageal junction, previously just below the level of the gastroesophageal junction. Hyperdense material is seen in the left upper quadrant in the region of the stomach. A small amount of high dense material is also seen in the right hemicolon. Multiple dilated air-filled loops of small bowel are similar to comparison, consistent with small bowel obstruction. Imaged lung bases are clear. No acute osseous abnormality. YAN TURK MD   SYSTEM ID:  [id number]    CT Abdomen Pelvis w Contrast    Result Date: 8/21/2022  EXAM: CT ABDOMEN PELVIS W CONTRAST LOCATION: [facility] DATE/TIME: 8/21/2022 3:30 PM INDICATION: abdominal pain s p colectomy, suspect SBO COMPARISON: MRI 06/13/2022, 05/04/2022 TECHNIQUE: CT scan of the abdomen and pelvis was performed following injection of IV contrast. Multiplanar reformats were obtained. Dose reduction techniques were used. CONTRAST: 61 mL Isovue 370 FINDINGS: LOWER CHEST: Unremarkable. HEPATOBILIARY: Normal. PANCREAS: Normal. SPLEEN: Normal. ADRENAL GLANDS: Normal. KIDNEYS/BLADDER: No hydronephrosis. Left renal cyst, no specific follow-up recommended. Urinary bladder is unremarkable. BOWEL: New dilated proximal small bowel with focal transition point in the central mesentery just to the right of the midline (series 2 image 105, series 3 image 26). Distal small bowel  and colon are decompressed. No evidence of contreras bowel wall hypoenhancement, pneumatosis or free intraperitoneal gas. Mild mesenteric edema and trace ascites. Postsurgical changes of right lower quadrant small bowel enterotomy as well as left lower quadrant loop colostomy. Likely sigmoid colonic mass again noted,  better seen on prior MRI (series 2 image 167). LYMPH NODES: No suspicious lymphadenopathy. VASCULATURE: Severe calcified atherosclerosis with ectatic abdominal aorta measuring up to 2.9 cm (series 2 image 97). PELVIC ORGANS: Normal. MUSCULOSKELETAL: No acute bony abnormality.     IMPRESSION: 1.  Small bowel obstruction with transition point in the right midabdomen, presumably secondary to adhesion. No evidence of overt bowel ischemia or perforation. 2.  Small volume ascites. 3.  Known sigmoid colonic malignancy again noted.    POC US Guidance Needle Placement    Normal ultrasound guided TAP block    XR Gastrografin Challenge    Result Date: 8/23/2022  EXAM: XR GASTROGRAFIN CHALLENGE LOCATION: [facility] DATE/TIME: 8/23/2022 8:57 AM INDICATION: Small Bowel Obstruction COMPARISON: CT 08/21/2022 TECHNIQUE: Routine water soluble contrast follow-through challenge. FINDINGS: NUMBER OF FLUOROSCOPIC IMAGES: 2 images taken at 08/22/2022 at 2205 and at 06 10 this morning. NG tube is in adequate position. Gastrografin is seen predominantly in the stomach with a small amount having passed into the right colon on this morning's film. Markedly dilated small bowel loops again noted indicating an incomplete, small bowel obstruction. Contrast seen in the bladder from recent CT. Findings discussed with his nurse Zen at 09 26. NG tube is attached to suction.      Billing  Total time 30 minutes, to include face to face visit, review of EMR, ordering, documentation and coordination of care on date of service    Signed by: Olga Lidia Ya NP

## 2022-09-12 NOTE — PROGRESS NOTES
"Oncology Rooming Note    September 12, 2022 10:41 AM   Kalin Shepard is a 72 year old male who presents for:    Chief Complaint   Patient presents with     Oncology Clinic Visit     Adenocarcinoma of sigmoid colon - Provider visit only     Initial Vitals: /78 (BP Location: Right arm, Patient Position: Sitting, Cuff Size: Adult Small)   Pulse 63   Temp 98.1  F (36.7  C) (Tympanic)   Resp 12   Wt 56.2 kg (124 lb)   SpO2 99%   BMI 17.79 kg/m   Estimated body mass index is 17.79 kg/m  as calculated from the following:    Height as of 9/2/22: 1.778 m (5' 10\").    Weight as of this encounter: 56.2 kg (124 lb). Body surface area is 1.67 meters squared.  Moderate Pain (5) Comment: Data Unavailable   No LMP for male patient.  Allergies reviewed: Yes  Medications reviewed: Yes    Medications: Medication refills not needed today.  Pharmacy name entered into [facility]:    [id number] - [place], [place] - [address]  [place], [place] - [address] [place] [address]    Clinical concerns:  None      Sara Almanzar CMA            "

## 2022-09-12 NOTE — LETTER
"    9/12/2022         RE: Kalin Shepard  [address] [facility] [address]        Dear Colleague,    Thank you for referring your patient, Kalin Shepard, to the [facility]. Please see a copy of my visit note below.    Oncology Rooming Note    September 12, 2022 10:41 AM   Kalin Shepard is a 72 year old male who presents for:    Chief Complaint   Patient presents with     Oncology Clinic Visit     Adenocarcinoma of sigmoid colon - Provider visit only     Initial Vitals: /78 (BP Location: Right arm, Patient Position: Sitting, Cuff Size: Adult Small)   Pulse 63   Temp 98.1  F (36.7  C) (Tympanic)   Resp 12   Wt 56.2 kg (124 lb)   SpO2 99%   BMI 17.79 kg/m   Estimated body mass index is 17.79 kg/m  as calculated from the following:    Height as of 9/2/22: 1.778 m (5' 10\").    Weight as of this encounter: 56.2 kg (124 lb). Body surface area is 1.67 meters squared.  Moderate Pain (5) Comment: Data Unavailable   No LMP for male patient.  Allergies reviewed: Yes  Medications reviewed: Yes    Medications: Medication refills not needed today.  Pharmacy name entered into [employer]:    [id number] - [place], [place] - [address]  [facility], [place] - [address] [place] [address]    Clinical concerns:  None      Sara Almanzar, [facility] Hematology and Oncology Outpatient Progress Note    Patient: Kalin Shepard  MRN: [id number]  Date of Service: Sep 12, 2022          Reason for Visit    1. Stage IV colon cancer (peritoneal mets)    Primary Hematologist/Oncologist: Dr. Colorado      Assessment/Plan  1. Stage IV colon cancer (peritoneal mets)  Status post 7 cycles of FOLFOX with bevacizumab.  He was tolerating treatment pretty well except for some delays due to neutropenia.  However after cycle 7 he ended up in the hospital with abdominal pain and was diagnosed with small bowel obstruction.  " Underwent emergent laparotomy with adhesions of lysis.  There was no evidence of any malignancy based on the operative note.  CT scan prior to surgery also showed no evidence of any metastatic disease.  He has some hilar lymphadenopathy which was thought to be reactive.  He is recovering well from surgery.  Denies any major issues today.  He is about 2-1/2 weeks out from surgery.  We discussed about restarting systemic therapy going forward.  It is reassuring that intraoperatively the did not see any evidence of malignancy.  So plan is still to proceed with systemic chemotherapy followed by some definitive surgery in the future.  However I will give him a couple more weeks of before restarting treatment.  I want him to completely heal.  I will also probably start with just FOLFOX and hold off on bevacizumab for a cycle or 2.  He is agreeable with the plan.    Continue aggressive oral protein calorie intake and hydration.  He is already gaining some weight.      Patient Instructions   RTC in 2 weeks with labs prior, cycle 8 FOLFOX to follow the same day.       ______________________________________________________________________________    History of Present Illness/ Interval History    Mr. Kalin Shepard  is a 71 year old status post 7 cycles of FOLFOX + bevacizumab.     Cycle 7 was delayed by a week due to neutropenia.  However after that he developed abdominal pain and was hospitalized with small bowel obstruction on 8/21/2022.  CT scan at that time showed dilated air-filled bowel loops without a transition point favoring ileus.  He also had mildly enlarged mediastinal and hilar lymph nodes which were thought to be reactive in nature.  He was initially managed with NG suction.  His abdominal pain got worse and he was taken to the surgery immediately.  He underwent open laparotomy with lysis of adhesions on 8/24/2022.  Based on the operative report there was no evidence of any malignancy intra-abdominally  during surgery.  Postop course was complicated by peritonitis for which she was started on antibiotics.  Recently finished ciprofloxacin.  Initially he was on TPN but has been cleared for oral intake.  Is been eating pretty well over the last week or so.  Has gained a few pounds.  Denies any abdominal pain today.  No fevers today.  Stools are slightly softer but no obvious diarrhea.  Surgical incision is healing well.    Is here to review further management plans for his colon cancer.      ECOG Performance    1      Oncology History/Treatment  Diagnosis/Stage:   3/26/2022:   -Presented with abdominal pain secondary to bowel obstruction.  CT scan showed mass in sigmoid colon with upstream small bowel and colonic obstruction.  Underwent ostomy placement.    -Peritoneal biopsy positive for adenocarcinoma.    -No evidence of other distant metastasis based on PET scan.  -BRAF V600E positive.  K-aldo and NRAS negative.  Proficient MMR.      Treatment:  Induction/neoadjuvant chemotherapy started for potentially resectable metachronous peritoneal metastasis    4/26/2022 - present: FOLFOX and bevacizumab       Physical Exam    GENERAL: Alert and oriented to time place and person. Seated comfortably. In no distress. Cachectic. Wife accompanies.  HEAD: Atraumatic and normocephalic. Mild alopecia (hair thinning per patient).  ABDOMEN: Soft. Not tender. Not distended.  Laparotomy scar healing well.  Ostomy bag in place.  EXTREMITIES: Warm. No peripheral edema.  SKIN: no rash, or bruising or purpura.   NEURO: No gross deficit noted. Non-antalgic gait.      Lab Results    No results found for this or any previous visit (from the past 168 hour(s)).    Imaging    CT Chest/Abdomen/Pelvis w Contrast    Result Date: 8/26/2022  CT CHEST/ABDOMEN/PELVIS WITH CONTRAST 8/26/2022 9:47 AM CLINICAL HISTORY: Elevated white blood cell count. TECHNIQUE: CT scan of the chest, abdomen, and pelvis was performed following injection of IV contrast.  Multiplanar reformats were obtained. Dose reduction techniques were used. CONTRAST: 52 mL Isovue 370 COMPARISON: 8/25/2022, 8/21/2022 FINDINGS: LUNGS AND PLEURA: Bibasilar airspace consolidations are present. Mild mucous plugging is seen in the lower lobes. Large central airways are patent. A 4 mm noncalcified pulmonary nodule is seen in the anterior right upper lobe (3-100). The previously described nodular density in the left upper lung on prior chest x-rays corresponds to a sclerotic nonaggressive appearing lesion in the scapula measuring 15 x 7 mm (2-70). MEDIASTINUM/AXILLAE: A left chest wall Port-A-Cath is present with the tip in SVC. The heart size is normal. Mild coronary artery atherosclerosis. No pericardial effusion. There is mild fusiform ectasia of the ascending thoracic aorta measuring 4.2 cm in AP dimension. Moderate thoracic aortic atherosclerosis is present. There are several prominent borderline enlarged mediastinal and hilar lymph nodes present. Examples are as follows: -AP window lymph node measuring 22 x 13 mm (2-54). -Right hilar lymph node measuring 17 x 15 mm (2-82). HEPATOBILIARY: Normal. PANCREAS: Normal. SPLEEN: Normal. ADRENAL GLANDS: Normal. KIDNEYS/BLADDER: A simple-appearing left renal cortical cyst measures 2.5 cm. No follow-up is necessary. No nephrolithiasis or hydronephrosis. BOWEL: Moderate pneumoperitoneum is seen with a majority of free air seen in the anterior upper abdomen. High dense material is seen in the gastric fundus. There are several prominent loops of large and small bowel without a transition point, suggestive of a mild ileus. No wall thickening or inflammatory changes of the bowel. Postsurgical changes of small bowel are seen in the right lower quadrant. A left lower quadrant colostomy is present. An enteric catheter terminates in the proximal stomach. High dense material is also seen in the colon. No intra-abdominal or intrapelvic fluid collection or abscess.  PELVIC ORGANS: The prostate gland is enlarged. ADDITIONAL FINDINGS: Mild ascites. Poststenotic dilation, which can be seen with median arcuate ligament syndrome. There is ectasia of the infrarenal abdominal aorta measuring up to 2.8 cm in AP dimension. MUSCULOSKELETAL: As mentioned above, there is a nonaggressive appearing sclerotic lesion in the scapula, which is favored to represent a bone island.     IMPRESSION: 1.  Bibasilar opacities, which may represent atelectasis versus pneumonia. 2.  Prominent and mildly enlarged mediastinal and hilar lymph nodes, likely reactive in nature. 3.  Prominent, but not technically dilated, air-filled bowel loops without a transition point, favoring a mild adynamic ileus. 4.  Left lower quadrant colostomy. No signs of bowel inflammation. 5.  Moderate pneumoperitoneum, most likely postsurgical in nature. 6.  Nonaggressive appearing sclerotic lesion in the left scapula accounting for a previously described left upper lung pulmonary nodule, favoring a bone island. REFERENCE: Guidelines for Management of Incidental Pulmonary Nodules Detected on CT Images: From the Fleischner Society 2017. Guidelines apply to incidental nodules in patients who are 35 years or older. Guidelines do not apply to lung cancer screening, patients with immunosuppression, or patients with known primary cancer. SINGLE NODULE Nodule size <6 mm Low-risk patients: No follow-up needed. High-risk patients: Optional follow-up at 12 months. Consider referral to lung nodule clinic. YAN TURK MD   SYSTEM ID:  [id number]    Chest 2 Views*    Result Date: 8/25/2022  CHEST TWO VIEWS  8/25/2022 9:47 AM HISTORY: Elevated white blood cell count and rhonchi in the right lung. History of small bowel obstruction, status post open laparotomy with lysis of adhesions. COMPARISON: 8/24/2022, 8/21/2022, 4/7/2022 FINDINGS/    IMPRESSION: An enteric catheter is seen with the tip in the region of the gastroesophageal junction. The  side port is seen at the level of the distal esophagus. Recommend advancement and repeat imaging to document optimal positioning. There is moderate to marked free air beneath the diaphragm, consistent with pneumoperitoneum, which is likely related to recent open laparotomy. A viscus perforation is, however, not excluded. A small amount of high dense material/contrast is seen in the left upper quadrant, presumably in the stomach. The lungs are mildly hyperinflated with flattening of the diaphragms, which can be seen with pulmonary emphysema. A round nodule in the left upper lung projects over the posterior aspect of the left seventh rib measuring 17 x 14 mm, which is similar to comparison chest x-ray on 8/21/2022, but new compared to the previous CT from 4/7/2022. Consider further evaluation with chest CT. A few additional subcentimeter bilateral round pulmonary nodules are also noted, which can be assessed on follow-up CT as well. A wedge-shaped opacity is seen in the retrocardiac region of the medial left lower lung, which is suggestive of atelectasis. Pneumonia is, however, not entirely excluded. No sign of pneumothorax or significant pleural effusion. Heart size and pulmonary vasculature are normal appearing. A left chest wall Port-A-Cath is in similar position with the tip in the region of the SVC. No acute osseous abnormality. YAN TURK MD   SYSTEM ID:  [id number]    XR Chest Port 1 View    Result Date: 8/28/2022  EXAM: XR CHEST PORT 1 VIEW LOCATION: [facility] DATE/TIME: 8/28/2022 11:00 AM INDICATION: Leukocytosis. Cough. COMPARISON: 8/21/2022     IMPRESSION: 1. Left chest port with the tip in the lower SVC. Gastric drainage tube passing the diaphragm. The sidehole is just past the gastroesophageal junction. 2. Right lung base calcified granuloma. Unchanged 1.4 cm nodular opacity projecting over the left midlung. Basal interstitial opacities may reflect areas of chronic  scarring or acute pneumonitis. No pleural effusion or pneumothorax. 3. Normal cardiomediastinal silhouette. 4. Gas below the diaphragm is better assessed on the CT.    XR Chest Port 1 View    Result Date: 8/21/2022  EXAM: XR CHEST PORT 1 VIEW LOCATION: [facility] DATE/TIME: 8/21/2022 7:03 PM INDICATION: NG placement COMPARISON: Chest radiograph 04/21/2022, same day abdominal CT     IMPRESSION: Nasogastric tube with tip and sidehole overlying the stomach. Normal cardiac silhouette with left chest port tip overlying the superior vena cava. No evidence of acute cardiopulmonary disease. Stable size of 1.3 cm nodule overlying the left upper lung without definite correlate on prior PET, consider chest CT for further evaluation on a nonemergent basis. Persistent bowel obstruction, better seen on same day CT.    XR Abdomen 2 Views    Result Date: 8/24/2022  EXAM: XR ABDOMEN 2 VIEWS LOCATION: [facility] DATE/TIME: 08/24/2022, 6:24 AM INDICATION: SBO. COMPARISON: 08/23/2022.     IMPRESSION: Enteric tube with tip in the stomach. This has been advanced compared with the prior study. Prominent air-fluid level in the stomach as well as some high density material in the stomach. Again seen are multiple dilated loops of small bowel with multiple air-fluid levels present compatible with mechanical small bowel obstruction. The degree of bowel dilatation is similar to slightly increased compared to 08/23/2022. Again seen is some enteric contrast material in the right hemicolon. No free air.     XR Abdomen 1 View    Result Date: 8/23/2022  XR ABDOMEN 1 VIEW 8/23/2022 2:24 PM HISTORY: NG placement COMPARISON: 8/22/2022, 8/21/2022 FINDINGS/    IMPRESSION: An enteric catheter is seen terminating in the left upper quadrant in the region of the stomach. There has been slight interval retraction of the enteric catheter compared to the previous study with the side port now seen at the  level of the gastroesophageal junction, previously just below the level of the gastroesophageal junction. Hyperdense material is seen in the left upper quadrant in the region of the stomach. A small amount of high dense material is also seen in the right hemicolon. Multiple dilated air-filled loops of small bowel are similar to comparison, consistent with small bowel obstruction. Imaged lung bases are clear. No acute osseous abnormality. YAN TURK MD   SYSTEM ID:  [id number]    CT Abdomen Pelvis w Contrast    Result Date: 8/21/2022  EXAM: CT ABDOMEN PELVIS W CONTRAST LOCATION: [facility] DATE/TIME: 8/21/2022 3:30 PM INDICATION: abdominal pain s p colectomy, suspect SBO COMPARISON: MRI 06/13/2022, 05/04/2022 TECHNIQUE: CT scan of the abdomen and pelvis was performed following injection of IV contrast. Multiplanar reformats were obtained. Dose reduction techniques were used. CONTRAST: 61 mL Isovue 370 FINDINGS: LOWER CHEST: Unremarkable. HEPATOBILIARY: Normal. PANCREAS: Normal. SPLEEN: Normal. ADRENAL GLANDS: Normal. KIDNEYS/BLADDER: No hydronephrosis. Left renal cyst, no specific follow-up recommended. Urinary bladder is unremarkable. BOWEL: New dilated proximal small bowel with focal transition point in the central mesentery just to the right of the midline (series 2 image 105, series 3 image 26). Distal small bowel and colon are decompressed. No evidence of contreras bowel wall hypoenhancement, pneumatosis or free intraperitoneal gas. Mild mesenteric edema and trace ascites. Postsurgical changes of right lower quadrant small bowel enterotomy as well as left lower quadrant loop colostomy. Likely sigmoid colonic mass again noted,  better seen on prior MRI (series 2 image 167). LYMPH NODES: No suspicious lymphadenopathy. VASCULATURE: Severe calcified atherosclerosis with ectatic abdominal aorta measuring up to 2.9 cm (series 2 image 97). PELVIC ORGANS: Normal. MUSCULOSKELETAL: No acute  bony abnormality.     IMPRESSION: 1.  Small bowel obstruction with transition point in the right midabdomen, presumably secondary to adhesion. No evidence of overt bowel ischemia or perforation. 2.  Small volume ascites. 3.  Known sigmoid colonic malignancy again noted.    POC US Guidance Needle Placement    Normal ultrasound guided TAP block    XR Gastrografin Challenge    Result Date: 8/23/2022  EXAM: XR GASTROGRAFIN CHALLENGE LOCATION: [facility] DATE/TIME: 8/23/2022 8:57 AM INDICATION: Small Bowel Obstruction COMPARISON: CT 08/21/2022 TECHNIQUE: Routine water soluble contrast follow-through challenge. FINDINGS: NUMBER OF FLUOROSCOPIC IMAGES: 2 images taken at 08/22/2022 at 2205 and at 06 10 this morning. NG tube is in adequate position. Gastrografin is seen predominantly in the stomach with a small amount having passed into the right colon on this morning's film. Markedly dilated small bowel loops again noted indicating an incomplete, small bowel obstruction. Contrast seen in the bladder from recent CT. Findings discussed with his nurse Zen at 09 26. NG tube is attached to suction.      Billing  Total time 30 minutes, to include face to face visit, review of EMR, ordering, documentation and coordination of care on date of service    Signed by: Olga Lidia Ya NP        Again, thank you for allowing me to participate in the care of your patient.        Sincerely,        Pamela Colorado MD

## 2022-09-26 ENCOUNTER — LAB (OUTPATIENT)
Dept: INFUSION THERAPY | Facility: CLINIC | Age: 72
End: 2022-09-26
Attending: INTERNAL MEDICINE
Payer: COMMERCIAL

## 2022-09-26 ENCOUNTER — ONCOLOGY VISIT (OUTPATIENT)
Dept: ONCOLOGY | Facility: CLINIC | Age: 72
End: 2022-09-26
Attending: INTERNAL MEDICINE
Payer: COMMERCIAL

## 2022-09-26 ENCOUNTER — HOME INFUSION (PRE-WILLOW HOME INFUSION) (OUTPATIENT)
Dept: PHARMACY | Facility: CLINIC | Age: 72
End: 2022-09-26

## 2022-09-26 VITALS
TEMPERATURE: 98.2 F | RESPIRATION RATE: 12 BRPM | BODY MASS INDEX: 18.8 KG/M2 | SYSTOLIC BLOOD PRESSURE: 137 MMHG | DIASTOLIC BLOOD PRESSURE: 77 MMHG | WEIGHT: 131 LBS | HEART RATE: 64 BPM | OXYGEN SATURATION: 100 %

## 2022-09-26 VITALS — SYSTOLIC BLOOD PRESSURE: 121 MMHG | HEART RATE: 61 BPM | DIASTOLIC BLOOD PRESSURE: 73 MMHG

## 2022-09-26 DIAGNOSIS — D70.1 CHEMOTHERAPY-INDUCED NEUTROPENIA (H): Primary | ICD-10-CM

## 2022-09-26 DIAGNOSIS — K56.609 SBO (SMALL BOWEL OBSTRUCTION) (H): ICD-10-CM

## 2022-09-26 DIAGNOSIS — D70.1 CHEMOTHERAPY-INDUCED NEUTROPENIA (H): ICD-10-CM

## 2022-09-26 DIAGNOSIS — C18.7 ADENOCARCINOMA OF SIGMOID COLON (H): ICD-10-CM

## 2022-09-26 DIAGNOSIS — T45.1X5A CHEMOTHERAPY-INDUCED NEUTROPENIA (H): Primary | ICD-10-CM

## 2022-09-26 DIAGNOSIS — G62.0 CHEMOTHERAPY-INDUCED PERIPHERAL NEUROPATHY (H): ICD-10-CM

## 2022-09-26 DIAGNOSIS — T45.1X5A CHEMOTHERAPY-INDUCED PERIPHERAL NEUROPATHY (H): ICD-10-CM

## 2022-09-26 DIAGNOSIS — C18.7 ADENOCARCINOMA OF SIGMOID COLON (H): Primary | ICD-10-CM

## 2022-09-26 DIAGNOSIS — K65.0: ICD-10-CM

## 2022-09-26 DIAGNOSIS — T45.1X5A CHEMOTHERAPY-INDUCED NEUTROPENIA (H): ICD-10-CM

## 2022-09-26 LAB
ALBUMIN UR-MCNC: NEGATIVE MG/DL
ALT SERPL W P-5'-P-CCNC: 12 U/L (ref 10–50)
ANION GAP SERPL CALCULATED.3IONS-SCNC: 7 MMOL/L (ref 7–15)
ANION GAP SERPL CALCULATED.3IONS-SCNC: 7 MMOL/L (ref 7–15)
AST SERPL W P-5'-P-CCNC: 19 U/L (ref 10–50)
BASOPHILS # BLD AUTO: 0.1 10E3/UL (ref 0–0.2)
BASOPHILS NFR BLD AUTO: 1 %
BILIRUB SERPL-MCNC: 0.3 MG/DL
BUN SERPL-MCNC: 14.2 MG/DL (ref 8–23)
CALCIUM SERPL-MCNC: 9.7 MG/DL (ref 8.8–10.2)
CHLORIDE SERPL-SCNC: 105 MMOL/L (ref 98–107)
CHLORIDE SERPL-SCNC: 105 MMOL/L (ref 98–107)
CREAT SERPL-MCNC: 0.55 MG/DL (ref 0.67–1.17)
DEPRECATED HCO3 PLAS-SCNC: 27 MMOL/L (ref 22–29)
DEPRECATED HCO3 PLAS-SCNC: 27 MMOL/L (ref 22–29)
EOSINOPHIL # BLD AUTO: 0.5 10E3/UL (ref 0–0.7)
EOSINOPHIL NFR BLD AUTO: 8 %
ERYTHROCYTE [DISTWIDTH] IN BLOOD BY AUTOMATED COUNT: 16.8 % (ref 10–15)
GFR SERPL CREATININE-BSD FRML MDRD: >90 ML/MIN/1.73M2
GLUCOSE SERPL-MCNC: 116 MG/DL (ref 70–99)
HCT VFR BLD AUTO: 39.4 % (ref 40–53)
HGB BLD-MCNC: 12.6 G/DL (ref 13.3–17.7)
IMM GRANULOCYTES # BLD: 0 10E3/UL
IMM GRANULOCYTES NFR BLD: 0 %
LYMPHOCYTES # BLD AUTO: 2 10E3/UL (ref 0.8–5.3)
LYMPHOCYTES NFR BLD AUTO: 34 %
MCH RBC QN AUTO: 32.1 PG (ref 26.5–33)
MCHC RBC AUTO-ENTMCNC: 32 G/DL (ref 31.5–36.5)
MCV RBC AUTO: 100 FL (ref 78–100)
MONOCYTES # BLD AUTO: 0.8 10E3/UL (ref 0–1.3)
MONOCYTES NFR BLD AUTO: 13 %
NEUTROPHILS # BLD AUTO: 2.6 10E3/UL (ref 1.6–8.3)
NEUTROPHILS NFR BLD AUTO: 44 %
NRBC # BLD AUTO: 0 10E3/UL
NRBC BLD AUTO-RTO: 0 /100
PLATELET # BLD AUTO: 173 10E3/UL (ref 150–450)
POTASSIUM SERPL-SCNC: 4.2 MMOL/L (ref 3.4–5.3)
POTASSIUM SERPL-SCNC: 4.2 MMOL/L (ref 3.4–5.3)
RBC # BLD AUTO: 3.93 10E6/UL (ref 4.4–5.9)
SODIUM SERPL-SCNC: 139 MMOL/L (ref 136–145)
SODIUM SERPL-SCNC: 139 MMOL/L (ref 136–145)
WBC # BLD AUTO: 5.9 10E3/UL (ref 4–11)

## 2022-09-26 PROCEDURE — 84295 ASSAY OF SERUM SODIUM: CPT | Performed by: NURSE PRACTITIONER

## 2022-09-26 PROCEDURE — 99214 OFFICE O/P EST MOD 30 MIN: CPT | Performed by: INTERNAL MEDICINE

## 2022-09-26 PROCEDURE — 96413 CHEMO IV INFUSION 1 HR: CPT

## 2022-09-26 PROCEDURE — 84460 ALANINE AMINO (ALT) (SGPT): CPT | Performed by: NURSE PRACTITIONER

## 2022-09-26 PROCEDURE — 36591 DRAW BLOOD OFF VENOUS DEVICE: CPT | Performed by: NURSE PRACTITIONER

## 2022-09-26 PROCEDURE — G0498 CHEMO EXTEND IV INFUS W/PUMP: HCPCS

## 2022-09-26 PROCEDURE — 96411 CHEMO IV PUSH ADDL DRUG: CPT

## 2022-09-26 PROCEDURE — G0463 HOSPITAL OUTPT CLINIC VISIT: HCPCS | Mod: 25

## 2022-09-26 PROCEDURE — 82247 BILIRUBIN TOTAL: CPT | Performed by: NURSE PRACTITIONER

## 2022-09-26 PROCEDURE — 96367 TX/PROPH/DG ADDL SEQ IV INF: CPT

## 2022-09-26 PROCEDURE — 96375 TX/PRO/DX INJ NEW DRUG ADDON: CPT

## 2022-09-26 PROCEDURE — 96415 CHEMO IV INFUSION ADDL HR: CPT

## 2022-09-26 PROCEDURE — 258N000003 HC RX IP 258 OP 636: Performed by: INTERNAL MEDICINE

## 2022-09-26 PROCEDURE — 84450 TRANSFERASE (AST) (SGOT): CPT | Performed by: NURSE PRACTITIONER

## 2022-09-26 PROCEDURE — 250N000011 HC RX IP 250 OP 636: Performed by: INTERNAL MEDICINE

## 2022-09-26 PROCEDURE — 96368 THER/DIAG CONCURRENT INF: CPT

## 2022-09-26 PROCEDURE — 85004 AUTOMATED DIFF WBC COUNT: CPT | Performed by: NURSE PRACTITIONER

## 2022-09-26 PROCEDURE — 81003 URINALYSIS AUTO W/O SCOPE: CPT | Performed by: NURSE PRACTITIONER

## 2022-09-26 RX ORDER — MEPERIDINE HYDROCHLORIDE 25 MG/ML
25 INJECTION INTRAMUSCULAR; INTRAVENOUS; SUBCUTANEOUS EVERY 30 MIN PRN
Status: CANCELLED | OUTPATIENT
Start: 2022-09-26

## 2022-09-26 RX ORDER — FLUOROURACIL 50 MG/ML
400 INJECTION, SOLUTION INTRAVENOUS ONCE
Status: COMPLETED | OUTPATIENT
Start: 2022-09-26 | End: 2022-09-26

## 2022-09-26 RX ORDER — ALBUTEROL SULFATE 90 UG/1
1-2 AEROSOL, METERED RESPIRATORY (INHALATION)
Status: CANCELLED
Start: 2022-09-26

## 2022-09-26 RX ORDER — ONDANSETRON 2 MG/ML
8 INJECTION INTRAMUSCULAR; INTRAVENOUS ONCE
Status: CANCELLED | OUTPATIENT
Start: 2022-09-26

## 2022-09-26 RX ORDER — DIPHENHYDRAMINE HYDROCHLORIDE 50 MG/ML
50 INJECTION INTRAMUSCULAR; INTRAVENOUS
Status: CANCELLED
Start: 2022-09-26

## 2022-09-26 RX ORDER — ALBUTEROL SULFATE 0.83 MG/ML
2.5 SOLUTION RESPIRATORY (INHALATION)
Status: CANCELLED | OUTPATIENT
Start: 2022-09-26

## 2022-09-26 RX ORDER — ONDANSETRON 2 MG/ML
8 INJECTION INTRAMUSCULAR; INTRAVENOUS ONCE
Status: COMPLETED | OUTPATIENT
Start: 2022-09-26 | End: 2022-09-26

## 2022-09-26 RX ORDER — HEPARIN SODIUM (PORCINE) LOCK FLUSH IV SOLN 100 UNIT/ML 100 UNIT/ML
5 SOLUTION INTRAVENOUS
Status: CANCELLED | OUTPATIENT
Start: 2022-09-28

## 2022-09-26 RX ORDER — EPINEPHRINE 1 MG/ML
0.3 INJECTION, SOLUTION, CONCENTRATE INTRAVENOUS EVERY 5 MIN PRN
Status: CANCELLED | OUTPATIENT
Start: 2022-09-26

## 2022-09-26 RX ORDER — LORAZEPAM 2 MG/ML
0.5 INJECTION INTRAMUSCULAR EVERY 4 HOURS PRN
Status: CANCELLED | OUTPATIENT
Start: 2022-09-26

## 2022-09-26 RX ORDER — FLUOROURACIL 50 MG/ML
400 INJECTION, SOLUTION INTRAVENOUS ONCE
Status: CANCELLED | OUTPATIENT
Start: 2022-09-26

## 2022-09-26 RX ORDER — HEPARIN SODIUM (PORCINE) LOCK FLUSH IV SOLN 100 UNIT/ML 100 UNIT/ML
5 SOLUTION INTRAVENOUS
Status: CANCELLED | OUTPATIENT
Start: 2022-09-26

## 2022-09-26 RX ORDER — NALOXONE HYDROCHLORIDE 0.4 MG/ML
0.2 INJECTION, SOLUTION INTRAMUSCULAR; INTRAVENOUS; SUBCUTANEOUS
Status: CANCELLED | OUTPATIENT
Start: 2022-09-26

## 2022-09-26 RX ORDER — METHYLPREDNISOLONE SODIUM SUCCINATE 125 MG/2ML
125 INJECTION, POWDER, LYOPHILIZED, FOR SOLUTION INTRAMUSCULAR; INTRAVENOUS
Status: CANCELLED
Start: 2022-09-26

## 2022-09-26 RX ADMIN — DEXAMETHASONE SODIUM PHOSPHATE: 10 INJECTION, SOLUTION INTRAMUSCULAR; INTRAVENOUS at 10:09

## 2022-09-26 RX ADMIN — SODIUM CHLORIDE 250 ML: 9 INJECTION, SOLUTION INTRAVENOUS at 10:00

## 2022-09-26 RX ADMIN — FLUOROURACIL 690 MG: 50 INJECTION, SOLUTION INTRAVENOUS at 12:45

## 2022-09-26 RX ADMIN — FAMOTIDINE 20 MG: 10 INJECTION INTRAVENOUS at 10:04

## 2022-09-26 RX ADMIN — ONDANSETRON 8 MG: 2 INJECTION INTRAMUSCULAR; INTRAVENOUS at 10:01

## 2022-09-26 RX ADMIN — OXALIPLATIN 150 MG: 100 INJECTION, SOLUTION, CONCENTRATE INTRAVENOUS at 10:40

## 2022-09-26 RX ADMIN — DEXTROSE MONOHYDRATE 692 MG: 50 INJECTION, SOLUTION INTRAVENOUS at 10:40

## 2022-09-26 RX ADMIN — DEXTROSE MONOHYDRATE 250 ML: 50 INJECTION, SOLUTION INTRAVENOUS at 10:40

## 2022-09-26 ASSESSMENT — PAIN SCALES - GENERAL: PAINLEVEL: MILD PAIN (3)

## 2022-09-26 NOTE — PROGRESS NOTES
[facility] Hematology and Oncology Outpatient Progress Note    Patient: Kalin Shepard  MRN: [id number]  Date of Service: Sep 26, 2022          Reason for Visit    1. Stage IV colon cancer (peritoneal mets)    Primary Hematologist/Oncologist: Dr. Colorado      Assessment/Plan  1. Stage IV colon cancer (peritoneal mets)  Status post 7 cycles of FOLFOX with bevacizumab.  He was tolerating treatment pretty well except for some delays due to neutropenia.  However after cycle 7 he ended up in the hospital with abdominal pain and was diagnosed with small bowel obstruction.  Underwent emergent laparotomy with adhesions of lysis.  There was no evidence of any malignancy based on the operative note.  CT scan prior to surgery also showed no evidence of any metastatic disease.  He has some hilar lymphadenopathy which was thought to be reactive.    He has fully recovered from his surgery.  He returns to restart chemotherapy today.  He will get cycle 8 of FOLFOX.  I have not made any dose changes. Neulasta at the time of pump discharge.   He is agreeable with the plan.  Hemoglobin is 12.6.  Platelet count is 173.  No contraindications to proceed with cycle 8 FOLFOX today.  I will hold off on restarting bevacizumab with cycle 8.  This is to allow it to heal.  But he can resume bevacizumab from cycle 9 onwards.    Continue aggressive oral protein calorie intake and hydration.  He is already gaining some weight.    2.  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  He has tingling all the time.  No balance issues.  Its not interfering with his activities of daily living.  I explained to him that I have a low threshold to either decrease the dose of oxaliplatin (probably be will do that first) or even completely stopping it if his neuropathy were to increase and interfere with his activities of daily living.      Patient Instructions   Cycle 8 FOLFOX today. No kranthi. Pump disconnection on Wednesday. RTC in 2 weeks with  labs prior for cycle 9.      ______________________________________________________________________________    History of Present Illness/ Interval History    Mr. Kalin Shepard  is a 71 year old status post 7 cycles of FOLFOX + bevacizumab.     Cycle 7 was delayed by a week due to neutropenia.  However after that he developed abdominal pain and was hospitalized with small bowel obstruction on 8/21/2022.  CT scan at that time showed dilated air-filled bowel loops without a transition point favoring ileus.  He also had mildly enlarged mediastinal and hilar lymph nodes which were thought to be reactive in nature.  He was initially managed with NG suction.  His abdominal pain got worse and he was taken to the surgery immediately.  He underwent open laparotomy with lysis of adhesions on 8/24/2022.  Based on the operative report there was no evidence of any malignancy intra-abdominally during surgery.  Postop course was complicated by peritonitis for which she was started on antibiotics.  Recently finished ciprofloxacin.  Initially he was on TPN but has been cleared for oral intake.     I gave him a couple weeks of to gain some weight and recuperate from the surgery.  He has done well.  He has put on about 10 pounds.  He is eating okay.  Drinking boost and Ensure.  Denies any nausea or vomiting.  Denies any abdominal pain.  His incision has completely healed.  No signs of infection.  He is here to restart chemotherapy.        ECOG Performance    1      Oncology History/Treatment  Diagnosis/Stage:   3/26/2022:   -Presented with abdominal pain secondary to bowel obstruction.  CT scan showed mass in sigmoid colon with upstream small bowel and colonic obstruction.  Underwent ostomy placement.    -Peritoneal biopsy positive for adenocarcinoma.    -No evidence of other distant metastasis based on PET scan.  -BRAF V600E positive.  K-aldo and NRAS negative.  Proficient MMR.      Treatment:  Induction/neoadjuvant chemotherapy  started for potentially resectable metachronous peritoneal metastasis    4/26/2022 - present: FOLFOX and bevacizumab       Physical Exam    GENERAL: Alert and oriented to time place and person. Seated comfortably. In no distress. Cachectic. Wife accompanies.  HEAD: Atraumatic and normocephalic. Mild alopecia (hair thinning per patient).  ABDOMEN: Soft. Not tender. Not distended.  Laparotomy scar healing well.  Ostomy bag in place.  EXTREMITIES: Warm. No peripheral edema.  SKIN: no rash, or bruising or purpura.   NEURO: No gross deficit noted. Non-antalgic gait.      Lab Results    Recent Results (from the past 168 hour(s))   Protein qualitative urine   Result Value Ref Range    Protein Albumin Urine Negative Negative mg/dL   CBC with platelets and differential   Result Value Ref Range    WBC Count 5.9 4.0 - 11.0 10e3/uL    RBC Count 3.93 (L) 4.40 - 5.90 10e6/uL    Hemoglobin 12.6 (L) 13.3 - 17.7 g/dL    Hematocrit 39.4 (L) 40.0 - 53.0 %     78 - 100 fL    MCH 32.1 26.5 - 33.0 pg    MCHC 32.0 31.5 - 36.5 g/dL    RDW 16.8 (H) 10.0 - 15.0 %    Platelet Count 173 150 - 450 10e3/uL    % Neutrophils 44 %    % Lymphocytes 34 %    % Monocytes 13 %    % Eosinophils 8 %    % Basophils 1 %    % Immature Granulocytes 0 %    NRBCs per 100 WBC 0 <1 /100    Absolute Neutrophils 2.6 1.6 - 8.3 10e3/uL    Absolute Lymphocytes 2.0 0.8 - 5.3 10e3/uL    Absolute Monocytes 0.8 0.0 - 1.3 10e3/uL    Absolute Eosinophils 0.5 0.0 - 0.7 10e3/uL    Absolute Basophils 0.1 0.0 - 0.2 10e3/uL    Absolute Immature Granulocytes 0.0 <=0.4 10e3/uL    Absolute NRBCs 0.0 10e3/uL       Imaging    XR Chest Port 1 View    Result Date: 8/28/2022  EXAM: XR CHEST PORT 1 VIEW LOCATION: [facility] DATE/TIME: 8/28/2022 11:00 AM INDICATION: Leukocytosis. Cough. COMPARISON: 8/21/2022     IMPRESSION: 1. Left chest port with the tip in the lower SVC. Gastric drainage tube passing the diaphragm. The sidehole is just past the  gastroesophageal junction. 2. Right lung base calcified granuloma. Unchanged 1.4 cm nodular opacity projecting over the left midlung. Basal interstitial opacities may reflect areas of chronic scarring or acute pneumonitis. No pleural effusion or pneumothorax. 3. Normal cardiomediastinal silhouette. 4. Gas below the diaphragm is better assessed on the CT.      Billing  Total time 30 minutes, to include face to face visit, review of EMR, ordering, documentation and coordination of care on date of service    Signed by: Pamela Colorado MD

## 2022-09-26 NOTE — LETTER
"    9/26/2022         RE: Kalin Shepard  [address] [facility] [address]        Dear Colleague,    Thank you for referring your patient, Kalin Shepard, to the [facility]. Please see a copy of my visit note below.    Oncology Rooming Note    September 26, 2022 9:04 AM   Kalin Shepard is a 72 year old male who presents for:    Chief Complaint   Patient presents with     Oncology Clinic Visit     Adenocarcinoma of sigmoid colon - Labs provider and infusion     Initial Vitals: /77 (BP Location: Right arm, Patient Position: Sitting, Cuff Size: Adult Small)   Pulse 64   Temp 98.2  F (36.8  C) (Tympanic)   Resp 12   Wt 59.4 kg (131 lb)   SpO2 100%   BMI 18.80 kg/m   Estimated body mass index is 18.8 kg/m  as calculated from the following:    Height as of 9/2/22: 1.778 m (5' 10\").    Weight as of this encounter: 59.4 kg (131 lb). Body surface area is 1.71 meters squared.  Mild Pain (3) Comment: Data Unavailable   No LMP for male patient.  Allergies reviewed: Yes  Medications reviewed: Yes    Medications: Medication refills not needed today.  Pharmacy name entered into [employer]:    [facility] - [place], [place] - [address]  [facility], [place] - [address] [place] [address]    Clinical concerns:  None      Sara Almanzar, [facility] Hematology and Oncology Outpatient Progress Note    Patient: Kalin Shepard  MRN: [id number]  Date of Service: Sep 26, 2022          Reason for Visit    1. Stage IV colon cancer (peritoneal mets)    Primary Hematologist/Oncologist: Dr. Colorado      Assessment/Plan  1. Stage IV colon cancer (peritoneal mets)  Status post 7 cycles of FOLFOX with bevacizumab.  He was tolerating treatment pretty well except for some delays due to neutropenia.  However after cycle 7 he ended up in the hospital with abdominal pain and was diagnosed with small bowel obstruction.  " Underwent emergent laparotomy with adhesions of lysis.  There was no evidence of any malignancy based on the operative note.  CT scan prior to surgery also showed no evidence of any metastatic disease.  He has some hilar lymphadenopathy which was thought to be reactive.    He has fully recovered from his surgery.  He returns to restart chemotherapy today.  He will get cycle 8 of FOLFOX.  I have not made any dose changes.  He still has 5-FU bolus and leucovorin bolus.  Previously he had some neutropenia which I thought was secondary to 5-FU bolus.  If neutropenia becomes an issue with the next cycle then I would omit 5-FU bolus before starting G-CSF.  He is agreeable with the plan.  Hemoglobin is 12.6.  Platelet count is 173.  No contraindications to proceed with cycle 8 FOLFOX today.  I will hold off on restarting bevacizumab with cycle 8.  This is to allow it to heal.  But he can resume bevacizumab from cycle 9 onwards.    Continue aggressive oral protein calorie intake and hydration.  He is already gaining some weight.    2.  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  He has tingling all the time.  No balance issues.  Its not interfering with his activities of daily living.  I explained to him that I have a low threshold to either decrease the dose of oxaliplatin (probably be will do that first) or even completely stopping it if his neuropathy were to increase and interfere with his activities of daily living.      Patient Instructions   Cycle 8 FOLFOX today. No kranthi. Pump disconnection on Wednesday. RTC in 2 weeks with labs prior for cycle 9.      ______________________________________________________________________________    History of Present Illness/ Interval History    Mr. Kalin Shepard  is a 71 year old status post 7 cycles of FOLFOX + bevacizumab.     Cycle 7 was delayed by a week due to neutropenia.  However after that he developed abdominal pain and was hospitalized with small bowel  obstruction on 8/21/2022.  CT scan at that time showed dilated air-filled bowel loops without a transition point favoring ileus.  He also had mildly enlarged mediastinal and hilar lymph nodes which were thought to be reactive in nature.  He was initially managed with NG suction.  His abdominal pain got worse and he was taken to the surgery immediately.  He underwent open laparotomy with lysis of adhesions on 8/24/2022.  Based on the operative report there was no evidence of any malignancy intra-abdominally during surgery.  Postop course was complicated by peritonitis for which she was started on antibiotics.  Recently finished ciprofloxacin.  Initially he was on TPN but has been cleared for oral intake.     I gave him a couple weeks of to gain some weight and recuperate from the surgery.  He has done well.  He has put on about 10 pounds.  He is eating okay.  Drinking boost and Ensure.  Denies any nausea or vomiting.  Denies any abdominal pain.  His incision has completely healed.  No signs of infection.  He is here to restart chemotherapy.        ECOG Performance    1      Oncology History/Treatment  Diagnosis/Stage:   3/26/2022:   -Presented with abdominal pain secondary to bowel obstruction.  CT scan showed mass in sigmoid colon with upstream small bowel and colonic obstruction.  Underwent ostomy placement.    -Peritoneal biopsy positive for adenocarcinoma.    -No evidence of other distant metastasis based on PET scan.  -BRAF V600E positive.  K-aldo and NRAS negative.  Proficient MMR.      Treatment:  Induction/neoadjuvant chemotherapy started for potentially resectable metachronous peritoneal metastasis    4/26/2022 - present: FOLFOX and bevacizumab       Physical Exam    GENERAL: Alert and oriented to time place and person. Seated comfortably. In no distress. Cachectic. Wife accompanies.  HEAD: Atraumatic and normocephalic. Mild alopecia (hair thinning per patient).  ABDOMEN: Soft. Not tender. Not distended.   Laparotomy scar healing well.  Ostomy bag in place.  EXTREMITIES: Warm. No peripheral edema.  SKIN: no rash, or bruising or purpura.   NEURO: No gross deficit noted. Non-antalgic gait.      Lab Results    Recent Results (from the past 168 hour(s))   Protein qualitative urine   Result Value Ref Range    Protein Albumin Urine Negative Negative mg/dL   CBC with platelets and differential   Result Value Ref Range    WBC Count 5.9 4.0 - 11.0 10e3/uL    RBC Count 3.93 (L) 4.40 - 5.90 10e6/uL    Hemoglobin 12.6 (L) 13.3 - 17.7 g/dL    Hematocrit 39.4 (L) 40.0 - 53.0 %     78 - 100 fL    MCH 32.1 26.5 - 33.0 pg    MCHC 32.0 31.5 - 36.5 g/dL    RDW 16.8 (H) 10.0 - 15.0 %    Platelet Count 173 150 - 450 10e3/uL    % Neutrophils 44 %    % Lymphocytes 34 %    % Monocytes 13 %    % Eosinophils 8 %    % Basophils 1 %    % Immature Granulocytes 0 %    NRBCs per 100 WBC 0 <1 /100    Absolute Neutrophils 2.6 1.6 - 8.3 10e3/uL    Absolute Lymphocytes 2.0 0.8 - 5.3 10e3/uL    Absolute Monocytes 0.8 0.0 - 1.3 10e3/uL    Absolute Eosinophils 0.5 0.0 - 0.7 10e3/uL    Absolute Basophils 0.1 0.0 - 0.2 10e3/uL    Absolute Immature Granulocytes 0.0 <=0.4 10e3/uL    Absolute NRBCs 0.0 10e3/uL       Imaging    XR Chest Port 1 View    Result Date: 8/28/2022  EXAM: XR CHEST PORT 1 VIEW LOCATION: [facility] DATE/TIME: 8/28/2022 11:00 AM INDICATION: Leukocytosis. Cough. COMPARISON: 8/21/2022     IMPRESSION: 1. Left chest port with the tip in the lower SVC. Gastric drainage tube passing the diaphragm. The sidehole is just past the gastroesophageal junction. 2. Right lung base calcified granuloma. Unchanged 1.4 cm nodular opacity projecting over the left midlung. Basal interstitial opacities may reflect areas of chronic scarring or acute pneumonitis. No pleural effusion or pneumothorax. 3. Normal cardiomediastinal silhouette. 4. Gas below the diaphragm is better assessed on the CT.      Billing  Total time 30  minutes, to include face to face visit, review of EMR, ordering, documentation and coordination of care on date of service    Signed by: Pamela Colorado MD           Again, thank you for allowing me to participate in the care of your patient.        Sincerely,        Pamela Colorado MD

## 2022-09-26 NOTE — PATIENT INSTRUCTIONS
Cycle 8 FOLFOX today. No kranthi. Pump disconnection on Wednesday. RTC in 2 weeks with labs prior for cycle 9.

## 2022-09-26 NOTE — PROGRESS NOTES
"Oncology Rooming Note    September 26, 2022 9:04 AM   Kalin Shepard is a 72 year old male who presents for:    Chief Complaint   Patient presents with     Oncology Clinic Visit     Adenocarcinoma of sigmoid colon - Labs provider and infusion     Initial Vitals: /77 (BP Location: Right arm, Patient Position: Sitting, Cuff Size: Adult Small)   Pulse 64   Temp 98.2  F (36.8  C) (Tympanic)   Resp 12   Wt 59.4 kg (131 lb)   SpO2 100%   BMI 18.80 kg/m   Estimated body mass index is 18.8 kg/m  as calculated from the following:    Height as of 9/2/22: 1.778 m (5' 10\").    Weight as of this encounter: 59.4 kg (131 lb). Body surface area is 1.71 meters squared.  Mild Pain (3) Comment: Data Unavailable   No LMP for male patient.  Allergies reviewed: Yes  Medications reviewed: Yes    Medications: Medication refills not needed today.  Pharmacy name entered into [facility]:    [facility] - [place], [place] - [address]  [place], [place] - [address] [place] [address]    Clinical concerns:  None      Sara Almanzar CMA            "

## 2022-09-26 NOTE — PROGRESS NOTES
Received phone call from [facility] that Neulasta is not covered in the home by insurance.  Pt will have to come here. Neulasta injection was just added today per Dr. Colorado.  Updated pt and he will come into infusion center for pump disconnect and Neulasta injection at 11:00 am on 9/28/22.  Discussed Claritin 10 mg the day before, day of and day after Neulasta with pt to help prevent any potential bone pain from Neulasta.  Pt also requested refill on oxycodone, colace and compazine.  Pt is no longer taking ondansetron due to funny feeling it gave pt. Let pt know I would let Dr. Colorado and his RN, Danna, about the refill request.  Pt verbalized understanding of information provided today.    Confirmed with [facility] about pt coming into infusion center for pump disconnect and Neulasta.  Pt can get future disconnects at home if Neulasta in not same day.      Message sent to Danna Collins RN and Dr. Colorado about refill requests.      Michelle Kirby RN

## 2022-09-26 NOTE — PROGRESS NOTES
"Infusion Nursing Note:  Kalin Shepard presents today for C8D1 FOLFOX/Zirabev.    Patient seen by provider today: Yes: Dr. Colorado   present during visit today: Not Applicable.    Note: Zirabev not given today per Dr. Colorado.     Intravenous Access:  Labs drawn without difficulty.  Implanted Port.    Treatment Conditions:  Lab Results   Component Value Date    HGB 12.6 (L) 09/26/2022    WBC 5.9 09/26/2022    ANEU 18.5 (H) 08/31/2022    ANEUTAUTO 2.6 09/26/2022     09/26/2022      Lab Results   Component Value Date     09/26/2022     09/26/2022    POTASSIUM 4.2 09/26/2022    POTASSIUM 4.2 09/26/2022    MAG 2.0 08/31/2022    CR 0.55 (L) 09/26/2022    ROXIE 9.7 09/26/2022    BILITOTAL 0.3 09/26/2022    ALBUMIN 2.4 (L) 08/29/2022    ALT 12 09/26/2022    AST 19 09/26/2022     Results reviewed, labs MET treatment parameters, ok to proceed with treatment.    Post Infusion Assessment:  Patient tolerated infusion without incident.  Blood return noted pre and post infusion.  Site patent and intact, free from redness, edema or discomfort.  No evidence of extravasations.     Prior to discharge: Port is secured in place with tegaderm and flushed with 10cc NS with positive blood return noted.  Continuous home infusion CADD pump connected.    All connectors secured in place and clamps taped open.    Pump started, \"running\" noted on display (CADD): YES.  Pump Connection double checked with Rafia Infante RN.  Patient instructed to call our clinic or [facility] with any questions or concerns at home.  Patient verbalized understanding.    Patient set up for pump disconnect at home with [facility] on 9/28/22 at 11:00 am.  Called [facility] and updated them on this information as well as pt receives Neulasta at home.        Discharge Plan:   Patient discharged in stable condition accompanied by: self.  Departure Mode: Ambulatory.  Pt to return on 10/10/22 at 9:45 am for PAC labs " followed by clinic visit with Olga Lidia Ya NP and then C9D1 FOLFOX/Zirabev.      Michelle Kirby RN

## 2022-09-27 DIAGNOSIS — K65.0: ICD-10-CM

## 2022-09-27 RX ORDER — DEXAMETHASONE 4 MG/1
8 TABLET ORAL DAILY
Qty: 4 TABLET | Refills: 2 | Status: SHIPPED | OUTPATIENT
Start: 2022-09-27 | End: 2022-10-10

## 2022-09-27 RX ORDER — PROCHLORPERAZINE MALEATE 10 MG
10 TABLET ORAL EVERY 8 HOURS PRN
Qty: 30 TABLET | Refills: 2 | Status: SHIPPED | OUTPATIENT
Start: 2022-09-27 | End: 2022-10-24

## 2022-09-27 RX ORDER — OXYCODONE HYDROCHLORIDE 5 MG/1
5 TABLET ORAL EVERY 6 HOURS PRN
Qty: 60 TABLET | Refills: 0 | Status: SHIPPED | OUTPATIENT
Start: 2022-09-27 | End: 2022-10-24

## 2022-09-27 RX ORDER — AMOXICILLIN 250 MG
1 CAPSULE ORAL 2 TIMES DAILY
Qty: 60 TABLET | Refills: 0 | Status: SHIPPED | OUTPATIENT
Start: 2022-09-27 | End: 2022-10-24

## 2022-09-28 ENCOUNTER — TELEPHONE (OUTPATIENT)
Dept: FAMILY MEDICINE | Facility: CLINIC | Age: 72
End: 2022-09-28

## 2022-09-28 ENCOUNTER — INFUSION THERAPY VISIT (OUTPATIENT)
Dept: INFUSION THERAPY | Facility: CLINIC | Age: 72
End: 2022-09-28
Attending: NURSE PRACTITIONER
Payer: COMMERCIAL

## 2022-09-28 VITALS
TEMPERATURE: 96.9 F | HEART RATE: 76 BPM | RESPIRATION RATE: 14 BRPM | SYSTOLIC BLOOD PRESSURE: 137 MMHG | DIASTOLIC BLOOD PRESSURE: 79 MMHG

## 2022-09-28 DIAGNOSIS — D70.1 CHEMOTHERAPY-INDUCED NEUTROPENIA (H): Primary | ICD-10-CM

## 2022-09-28 DIAGNOSIS — T45.1X5A CHEMOTHERAPY-INDUCED NEUTROPENIA (H): Primary | ICD-10-CM

## 2022-09-28 DIAGNOSIS — C18.7 ADENOCARCINOMA OF SIGMOID COLON (H): ICD-10-CM

## 2022-09-28 PROCEDURE — 96372 THER/PROPH/DIAG INJ SC/IM: CPT | Performed by: INTERNAL MEDICINE

## 2022-09-28 PROCEDURE — 250N000011 HC RX IP 250 OP 636: Performed by: INTERNAL MEDICINE

## 2022-09-28 PROCEDURE — 96377 APPLICATON ON-BODY INJECTOR: CPT | Performed by: INTERNAL MEDICINE

## 2022-09-28 RX ORDER — HEPARIN SODIUM (PORCINE) LOCK FLUSH IV SOLN 100 UNIT/ML 100 UNIT/ML
5 SOLUTION INTRAVENOUS
Status: DISCONTINUED | OUTPATIENT
Start: 2022-09-28 | End: 2022-09-28 | Stop reason: HOSPADM

## 2022-09-28 RX ADMIN — SODIUM CHLORIDE, PRESERVATIVE FREE 5 ML: 5 INJECTION INTRAVENOUS at 11:44

## 2022-09-28 RX ADMIN — PEGFILGRASTIM 6 MG: 6 INJECTION SUBCUTANEOUS at 11:41

## 2022-09-28 ASSESSMENT — PAIN SCALES - GENERAL: PAINLEVEL: NO PAIN (0)

## 2022-09-28 NOTE — TELEPHONE ENCOUNTER
Mynor Bentley, I see that this pt has seen a few providers at our clinic. Would you want to give these orders. It looks like he has seen you the most? Are you willing to follow him for home care?    Ana Harris RN

## 2022-09-28 NOTE — TELEPHONE ENCOUNTER
Called Clara and left a message on her private voicemail for Skilled nursing visit for chemo disconnect, 1 every 2 weeks for 6 weeks, and then 1 visit a week for 1 week.    Ana Harris RN

## 2022-09-28 NOTE — PROGRESS NOTES
Infusion Nursing Note:  Kalin Shepard presents today for pump discontinue & Neulasta injection.    Patient seen by provider today: No   present during visit today: Not Applicable.    Note: Chemo fully infused.    Intravenous Access:  Implanted Port.    Treatment Conditions:  Not Applicable.    Post Infusion Assessment:  Patient tolerated infusion without incident.  Blood return noted pre and post infusion.  Site patent and intact, free from redness, edema or discomfort.  No evidence of extravasations.  Access discontinued per protocol.     Discharge Plan:   Patient discharged in stable condition accompanied by: self.  Departure Mode: Ambulatory.      Mitchel Loera RN

## 2022-09-28 NOTE — TELEPHONE ENCOUNTER
Order/Referral Request    Who is requesting: [facility]    Orders being requested: Skilled nursing visit for chemo disconnect, 1 every 2 weeks for 6 weeks, and then 1 visit a week for 1 week.      Okay to leave a detailed message?: Yes at Other phone number:  [phone]  [facility]

## 2022-09-30 NOTE — PROGRESS NOTES
This is a recent snapshot of the patient's [facility] medical record.  For current drug dose and complete information and questions, call [phone] or In Basket pool, [facility]v home infusion ([id number])  CSN Number:  [id number]

## 2022-10-10 ENCOUNTER — ONCOLOGY VISIT (OUTPATIENT)
Dept: ONCOLOGY | Facility: CLINIC | Age: 72
End: 2022-10-10
Attending: NURSE PRACTITIONER
Payer: COMMERCIAL

## 2022-10-10 ENCOUNTER — LAB (OUTPATIENT)
Dept: INFUSION THERAPY | Facility: CLINIC | Age: 72
End: 2022-10-10
Attending: NURSE PRACTITIONER
Payer: COMMERCIAL

## 2022-10-10 ENCOUNTER — INFUSION THERAPY VISIT (OUTPATIENT)
Dept: INFUSION THERAPY | Facility: CLINIC | Age: 72
End: 2022-10-10
Attending: INTERNAL MEDICINE
Payer: COMMERCIAL

## 2022-10-10 VITALS
OXYGEN SATURATION: 98 % | BODY MASS INDEX: 18.8 KG/M2 | HEART RATE: 65 BPM | RESPIRATION RATE: 12 BRPM | WEIGHT: 131 LBS | TEMPERATURE: 98.2 F | DIASTOLIC BLOOD PRESSURE: 70 MMHG | SYSTOLIC BLOOD PRESSURE: 109 MMHG

## 2022-10-10 VITALS — HEART RATE: 54 BPM | DIASTOLIC BLOOD PRESSURE: 74 MMHG | SYSTOLIC BLOOD PRESSURE: 115 MMHG

## 2022-10-10 DIAGNOSIS — T45.1X5A CHEMOTHERAPY-INDUCED PERIPHERAL NEUROPATHY (H): ICD-10-CM

## 2022-10-10 DIAGNOSIS — D70.1 CHEMOTHERAPY-INDUCED NEUTROPENIA (H): ICD-10-CM

## 2022-10-10 DIAGNOSIS — C18.7 ADENOCARCINOMA OF SIGMOID COLON (H): ICD-10-CM

## 2022-10-10 DIAGNOSIS — T45.1X5A CHEMOTHERAPY-INDUCED NEUTROPENIA (H): Primary | ICD-10-CM

## 2022-10-10 DIAGNOSIS — I80.8 THROMBOPHLEBITIS ARM: ICD-10-CM

## 2022-10-10 DIAGNOSIS — G62.0 CHEMOTHERAPY-INDUCED PERIPHERAL NEUROPATHY (H): ICD-10-CM

## 2022-10-10 DIAGNOSIS — D70.1 CHEMOTHERAPY-INDUCED NEUTROPENIA (H): Primary | ICD-10-CM

## 2022-10-10 DIAGNOSIS — T45.1X5A CHEMOTHERAPY-INDUCED NEUTROPENIA (H): ICD-10-CM

## 2022-10-10 DIAGNOSIS — C18.7 ADENOCARCINOMA OF SIGMOID COLON (H): Primary | ICD-10-CM

## 2022-10-10 LAB
ALBUMIN UR-MCNC: NEGATIVE MG/DL
ALT SERPL W P-5'-P-CCNC: 14 U/L (ref 10–50)
ANION GAP SERPL CALCULATED.3IONS-SCNC: 9 MMOL/L (ref 7–15)
AST SERPL W P-5'-P-CCNC: 20 U/L (ref 10–50)
BASOPHILS # BLD AUTO: 0.1 10E3/UL (ref 0–0.2)
BASOPHILS NFR BLD AUTO: 1 %
BILIRUB SERPL-MCNC: <0.2 MG/DL
CHLORIDE SERPL-SCNC: 106 MMOL/L (ref 98–107)
CREAT SERPL-MCNC: 0.59 MG/DL (ref 0.67–1.17)
DEPRECATED HCO3 PLAS-SCNC: 25 MMOL/L (ref 22–29)
EOSINOPHIL # BLD AUTO: 0.2 10E3/UL (ref 0–0.7)
EOSINOPHIL NFR BLD AUTO: 2 %
ERYTHROCYTE [DISTWIDTH] IN BLOOD BY AUTOMATED COUNT: 16.3 % (ref 10–15)
GFR SERPL CREATININE-BSD FRML MDRD: >90 ML/MIN/1.73M2
HCT VFR BLD AUTO: 39.5 % (ref 40–53)
HGB BLD-MCNC: 12.6 G/DL (ref 13.3–17.7)
IMM GRANULOCYTES # BLD: 0.1 10E3/UL
IMM GRANULOCYTES NFR BLD: 1 %
LYMPHOCYTES # BLD AUTO: 2.3 10E3/UL (ref 0.8–5.3)
LYMPHOCYTES NFR BLD AUTO: 23 %
MCH RBC QN AUTO: 31.7 PG (ref 26.5–33)
MCHC RBC AUTO-ENTMCNC: 31.9 G/DL (ref 31.5–36.5)
MCV RBC AUTO: 100 FL (ref 78–100)
MONOCYTES # BLD AUTO: 0.9 10E3/UL (ref 0–1.3)
MONOCYTES NFR BLD AUTO: 9 %
NEUTROPHILS # BLD AUTO: 6.3 10E3/UL (ref 1.6–8.3)
NEUTROPHILS NFR BLD AUTO: 64 %
NRBC # BLD AUTO: 0 10E3/UL
NRBC BLD AUTO-RTO: 0 /100
PLATELET # BLD AUTO: 176 10E3/UL (ref 150–450)
POTASSIUM SERPL-SCNC: 4.2 MMOL/L (ref 3.4–5.3)
RBC # BLD AUTO: 3.97 10E6/UL (ref 4.4–5.9)
SODIUM SERPL-SCNC: 140 MMOL/L (ref 136–145)
WBC # BLD AUTO: 9.8 10E3/UL (ref 4–11)

## 2022-10-10 PROCEDURE — 82565 ASSAY OF CREATININE: CPT | Performed by: NURSE PRACTITIONER

## 2022-10-10 PROCEDURE — 85014 HEMATOCRIT: CPT | Performed by: NURSE PRACTITIONER

## 2022-10-10 PROCEDURE — 96375 TX/PRO/DX INJ NEW DRUG ADDON: CPT

## 2022-10-10 PROCEDURE — 99214 OFFICE O/P EST MOD 30 MIN: CPT | Performed by: NURSE PRACTITIONER

## 2022-10-10 PROCEDURE — 36591 DRAW BLOOD OFF VENOUS DEVICE: CPT | Performed by: NURSE PRACTITIONER

## 2022-10-10 PROCEDURE — 96415 CHEMO IV INFUSION ADDL HR: CPT

## 2022-10-10 PROCEDURE — 96368 THER/DIAG CONCURRENT INF: CPT

## 2022-10-10 PROCEDURE — 80051 ELECTROLYTE PANEL: CPT | Performed by: NURSE PRACTITIONER

## 2022-10-10 PROCEDURE — 96411 CHEMO IV PUSH ADDL DRUG: CPT

## 2022-10-10 PROCEDURE — 250N000011 HC RX IP 250 OP 636: Performed by: NURSE PRACTITIONER

## 2022-10-10 PROCEDURE — 96367 TX/PROPH/DG ADDL SEQ IV INF: CPT

## 2022-10-10 PROCEDURE — 84460 ALANINE AMINO (ALT) (SGPT): CPT | Performed by: NURSE PRACTITIONER

## 2022-10-10 PROCEDURE — 84450 TRANSFERASE (AST) (SGOT): CPT | Performed by: NURSE PRACTITIONER

## 2022-10-10 PROCEDURE — G0463 HOSPITAL OUTPT CLINIC VISIT: HCPCS | Mod: 25

## 2022-10-10 PROCEDURE — 96417 CHEMO IV INFUS EACH ADDL SEQ: CPT

## 2022-10-10 PROCEDURE — G0498 CHEMO EXTEND IV INFUS W/PUMP: HCPCS

## 2022-10-10 PROCEDURE — 81003 URINALYSIS AUTO W/O SCOPE: CPT | Performed by: NURSE PRACTITIONER

## 2022-10-10 PROCEDURE — 258N000003 HC RX IP 258 OP 636: Performed by: NURSE PRACTITIONER

## 2022-10-10 PROCEDURE — 82247 BILIRUBIN TOTAL: CPT | Performed by: NURSE PRACTITIONER

## 2022-10-10 PROCEDURE — 96413 CHEMO IV INFUSION 1 HR: CPT

## 2022-10-10 RX ORDER — DIPHENHYDRAMINE HYDROCHLORIDE 50 MG/ML
50 INJECTION INTRAMUSCULAR; INTRAVENOUS
Status: CANCELLED
Start: 2022-10-10

## 2022-10-10 RX ORDER — ALBUTEROL SULFATE 90 UG/1
1-2 AEROSOL, METERED RESPIRATORY (INHALATION)
Status: CANCELLED
Start: 2022-10-10

## 2022-10-10 RX ORDER — NALOXONE HYDROCHLORIDE 0.4 MG/ML
0.2 INJECTION, SOLUTION INTRAMUSCULAR; INTRAVENOUS; SUBCUTANEOUS
Status: CANCELLED | OUTPATIENT
Start: 2022-10-10

## 2022-10-10 RX ORDER — EPINEPHRINE 1 MG/ML
0.3 INJECTION, SOLUTION, CONCENTRATE INTRAVENOUS EVERY 5 MIN PRN
Status: CANCELLED | OUTPATIENT
Start: 2022-10-10

## 2022-10-10 RX ORDER — FLUOROURACIL 50 MG/ML
400 INJECTION, SOLUTION INTRAVENOUS ONCE
Status: COMPLETED | OUTPATIENT
Start: 2022-10-10 | End: 2022-10-10

## 2022-10-10 RX ORDER — FLUOROURACIL 50 MG/ML
400 INJECTION, SOLUTION INTRAVENOUS ONCE
Status: CANCELLED | OUTPATIENT
Start: 2022-10-10

## 2022-10-10 RX ORDER — LORAZEPAM 2 MG/ML
0.5 INJECTION INTRAMUSCULAR EVERY 4 HOURS PRN
Status: CANCELLED | OUTPATIENT
Start: 2022-10-10

## 2022-10-10 RX ORDER — ONDANSETRON 2 MG/ML
8 INJECTION INTRAMUSCULAR; INTRAVENOUS ONCE
Status: COMPLETED | OUTPATIENT
Start: 2022-10-10 | End: 2022-10-10

## 2022-10-10 RX ORDER — METHYLPREDNISOLONE SODIUM SUCCINATE 125 MG/2ML
125 INJECTION, POWDER, LYOPHILIZED, FOR SOLUTION INTRAMUSCULAR; INTRAVENOUS
Status: CANCELLED
Start: 2022-10-10

## 2022-10-10 RX ORDER — HEPARIN SODIUM (PORCINE) LOCK FLUSH IV SOLN 100 UNIT/ML 100 UNIT/ML
5 SOLUTION INTRAVENOUS
Status: CANCELLED | OUTPATIENT
Start: 2022-10-10

## 2022-10-10 RX ORDER — MEPERIDINE HYDROCHLORIDE 25 MG/ML
25 INJECTION INTRAMUSCULAR; INTRAVENOUS; SUBCUTANEOUS EVERY 30 MIN PRN
Status: CANCELLED | OUTPATIENT
Start: 2022-10-10

## 2022-10-10 RX ORDER — ALBUTEROL SULFATE 0.83 MG/ML
2.5 SOLUTION RESPIRATORY (INHALATION)
Status: CANCELLED | OUTPATIENT
Start: 2022-10-10

## 2022-10-10 RX ORDER — HEPARIN SODIUM (PORCINE) LOCK FLUSH IV SOLN 100 UNIT/ML 100 UNIT/ML
5 SOLUTION INTRAVENOUS
Status: CANCELLED | OUTPATIENT
Start: 2022-10-12

## 2022-10-10 RX ORDER — ONDANSETRON 2 MG/ML
8 INJECTION INTRAMUSCULAR; INTRAVENOUS ONCE
Status: CANCELLED | OUTPATIENT
Start: 2022-10-10

## 2022-10-10 RX ORDER — DEXAMETHASONE 4 MG/1
8 TABLET ORAL DAILY
Qty: 4 TABLET | Refills: 2 | Status: SHIPPED | OUTPATIENT
Start: 2022-10-10 | End: 2022-10-24

## 2022-10-10 RX ADMIN — DEXTROSE MONOHYDRATE 250 ML: 50 INJECTION, SOLUTION INTRAVENOUS at 12:56

## 2022-10-10 RX ADMIN — FAMOTIDINE 20 MG: 10 INJECTION INTRAVENOUS at 11:47

## 2022-10-10 RX ADMIN — SODIUM CHLORIDE 300 MG: 9 INJECTION, SOLUTION INTRAVENOUS at 12:24

## 2022-10-10 RX ADMIN — ONDANSETRON 8 MG: 2 INJECTION INTRAMUSCULAR; INTRAVENOUS at 11:52

## 2022-10-10 RX ADMIN — SODIUM CHLORIDE 250 ML: 9 INJECTION, SOLUTION INTRAVENOUS at 11:47

## 2022-10-10 RX ADMIN — FLUOROURACIL 690 MG: 50 INJECTION, SOLUTION INTRAVENOUS at 15:01

## 2022-10-10 RX ADMIN — FOSAPREPITANT: 150 INJECTION, POWDER, LYOPHILIZED, FOR SOLUTION INTRAVENOUS at 11:58

## 2022-10-10 RX ADMIN — OXALIPLATIN 120 MG: 100 INJECTION, SOLUTION, CONCENTRATE INTRAVENOUS at 12:56

## 2022-10-10 RX ADMIN — LEUCOVORIN CALCIUM 692 MG: 350 INJECTION, POWDER, LYOPHILIZED, FOR SUSPENSION INTRAMUSCULAR; INTRAVENOUS at 12:57

## 2022-10-10 ASSESSMENT — PAIN SCALES - GENERAL: PAINLEVEL: MODERATE PAIN (4)

## 2022-10-10 NOTE — LETTER
10/10/2022         RE: Kalin Shepard  [address]  [facility] [address]        Dear Colleague,    Thank you for referring your patient, Kalin Shepard, to the [facility]. Please see a copy of my visit note below.    [facility] Hematology and Oncology Outpatient Progress Note    Patient: Kalin Shepard  MRN: [id number]  Date of Service: Oct 10, 2022          Reason for Visit    1. Stage IV colon cancer (peritoneal mets)    Primary Hematologist/Oncologist: Dr. Colorado      Assessment/Plan  1. Stage IV colon cancer (peritoneal mets)  2. Chemo-induced pancytopenias  Tolerated cycle 8 well.  Labs: hgb 12.6, rest CBC WNL. Creatinine and LFTs WNL. Urine protein pending.    His abdominal incisions are completely healed.    Plan:  -Proceed with cycle 9 with Neulasta. Will dose-reduce oxaliplatin 20% for increased peripheral neuropathy. Will add bevacizumab back in now that he is 6 weeks post-laparotomy and healed without problem  -Return every 2 weeks with chemo through cycle 12  -Dr. Colorado would like to try to complete all chemo neoadjuvantly given advanced disease (up to 6 months, pending tolerance). Will repeat imaging after 12 cycles if tolerating and no signs of clinical progression sooner. At that time, if responding, would try to continue an additional 3 months with restaging.  -Restaging and consideration of resection of primary tumor and metachronous peritoneal mets pending response and PS.    3.   Chemo-induced nausea  4.   Weight loss secondary to anorexia/dysgeusia  Zyprexa was not helpful, so he's stopped taking this.   Continue with compazine and Zofran, alternating as needed.   Remeron 15 mg.   Weight is stable    Plan:  -Continue Remeron to 15 mg  -Continue Dex 8 mg daily x 2 days after day 1 chemo. We can consider elongating this for additional days as another intervention with future cycles; we opted to defer this for now.     5.   Chemo-induced  peripheral neuropathy (gr 1-2)  He has tingling all the time, mostly provoked by cold exposure. It was more notable with this past cycle.   No balance issues.  Its not interfering with his activities of daily living.      Plan:  -Dose-reduce oxaliplatin 20% this cycle.   -Will have a low threshold to either furtehr decrease the dose of oxaliplatin or even completely stopping it if his neuropathy were to increase and interfere with his activities of daily living.    6.   Cancer pain  Stable. Taking oxycodone 5 mg BID. He will notify us when refill needed.     7.   Superficial thrombophlebitis, right forearm  No signs of infection or concern for DVT on exam.     Plan:  -Warm pack, NSAIDs x 3 days.   -Doppler if worsening/new edema    ______________________________________________________________________________    History of Present Illness/ Interval History    Mr. Kalin Shepard  is a 71 year old returning for consideration of cycle 9 FOLFOX + bevacizumab. Bevacizumab was held last cycle given his recent laparotomy for SBO.     He tolerated last cycle well.  Weight has been stable. He is eating okay.  Drinking boost and Ensure.  Denies any nausea or vomiting.  Denies any abdominal pain.  His incision has completely healed.  No signs of infection.    Constant tingling in hands and feet is a bit more notable; while cold exposure worsens it, it is present fairly consistently between cycles. No pain. No interference in ADLs.    Bowels regular via ostomy. No blood in stools.   Stable abd discomfort, managed with Oxycodone 5 mg two times/day    Hardened/tender vein right foreaarm since hospitalization.       ECOG Performance    1      Oncology History/Treatment  Diagnosis/Stage:   3/26/2022:   -Presented with abdominal pain secondary to bowel obstruction.  CT scan showed mass in sigmoid colon with upstream small bowel and colonic obstruction.  Underwent ostomy placement.    -Peritoneal biopsy positive for  adenocarcinoma.    -No evidence of other distant metastasis based on PET scan.  -BRAF V600E positive.  K-aldo and NRAS negative.  Proficient MMR.    8/24/2022 open laparotomy for SBO, lysis of adhesions. No evidence of any malignancy intra-abdominally noted.       Treatment:  Induction/neoadjuvant chemotherapy started for potentially resectable metachronous peritoneal metastasis    4/26/2022 - present: FOLFOX and bevacizumab   -cycle 7 delayed for neutropenia. Neulasta added  -cycle 8 delayed for hospitalization with SBO (favoring ileus/adhesions, no evidence of malignancy). Post-op course complicated by peritonitis requiring antibiotics. Temp TPN, then cleared for oral intake.   -Bevacizumab omitted with cycle 8 to allow for healing postmalarial. Resumed with cycle 9.  -cycle 9 oxilaplatin dose-reduced 20% for gr 1-2 neuropathy      Physical Exam    GENERAL: Alert and oriented to time place and person. Seated comfortably. In no distress. Thin. Wife accompanies.  HEAD: Atraumatic and normocephalic. Mild alopecia (hair thinning per patient).  EYES: MARA, EOMI. No erythema. No icterus.  LYMPH NODES: No palpable supraclavicular, cervical lymphadenopathy.  CHEST: clear to auscultation bilaterally. Resonant to percussion throughout bilaterally. Symmetrical breath movements bilaterally.  CVS: RRR  ABDOMEN: Soft. Not tender. Not distended. No palpable hepatomegaly or splenomegaly. No other mass palpable. Bowel sounds present. Ostomy bag in place.  EXTREMITIES: Warm. No peripheral edema. Firm vein without erythema/edema right proximal forearm.   SKIN: no rash, or bruising or purpura.   NEURO: No gross deficit noted. Non-antalgic gait.      Lab Results    Recent Results (from the past 168 hour(s))   CBC with platelets and differential   Result Value Ref Range    WBC Count 9.8 4.0 - 11.0 10e3/uL    RBC Count 3.97 (L) 4.40 - 5.90 10e6/uL    Hemoglobin 12.6 (L) 13.3 - 17.7 g/dL    Hematocrit 39.5 (L) 40.0 - 53.0 %     78  "- 100 fL    MCH 31.7 26.5 - 33.0 pg    MCHC 31.9 31.5 - 36.5 g/dL    RDW 16.3 (H) 10.0 - 15.0 %    Platelet Count 176 150 - 450 10e3/uL    % Neutrophils 64 %    % Lymphocytes 23 %    % Monocytes 9 %    % Eosinophils 2 %    % Basophils 1 %    % Immature Granulocytes 1 %    NRBCs per 100 WBC 0 <1 /100    Absolute Neutrophils 6.3 1.6 - 8.3 10e3/uL    Absolute Lymphocytes 2.3 0.8 - 5.3 10e3/uL    Absolute Monocytes 0.9 0.0 - 1.3 10e3/uL    Absolute Eosinophils 0.2 0.0 - 0.7 10e3/uL    Absolute Basophils 0.1 0.0 - 0.2 10e3/uL    Absolute Immature Granulocytes 0.1 <=0.4 10e3/uL    Absolute NRBCs 0.0 10e3/uL       Imaging    No results found.    Billing  Total time 35 minutes, to include face to face visit, review of EMR, ordering, documentation and coordination of care on date of service    Signed by: Olga Lidia Ya NP      Oncology Rooming Note    October 10, 2022 10:21 AM   Kalin Shepard is a 72 year old male who presents for:    Chief Complaint   Patient presents with     Oncology Clinic Visit     Adenocarcinoma of sigmoid colon - Labs provider and infusion     Initial Vitals: /70 (BP Location: Left arm, Patient Position: Sitting, Cuff Size: Adult Small)   Pulse 65   Temp 98.2  F (36.8  C) (Tympanic)   Resp 12   Wt 59.4 kg (131 lb)   SpO2 98%   BMI 18.80 kg/m   Estimated body mass index is 18.8 kg/m  as calculated from the following:    Height as of 9/2/22: 1.778 m (5' 10\").    Weight as of this encounter: 59.4 kg (131 lb). Body surface area is 1.71 meters squared.  Moderate Pain (4) Comment: Data Unavailable   No LMP for male patient.  Allergies reviewed: Yes  Medications reviewed: Yes    Medications: MEDICATION REFILLS NEEDED TODAY. Provider was notified.  Pharmacy name entered into [employer]:    [facility] [place], [place] - [address]  [place], [place] - [address] [place] [address]    Clinical concerns:  None      Sara Almanzar, " CMA                Again, thank you for allowing me to participate in the care of your patient.        Sincerely,        Olga Lidia Ya, NP

## 2022-10-10 NOTE — PATIENT INSTRUCTIONS
If pending labs permit, proceed with cycle 9. Add kranthi back in with chemo. Dose reduce oxaliplatin 20% for neuropathy    Keep return with sonia in 2 weeks for cycle 10    Set up every 2 week cycles for cycle 11 & 12 - Stanislav for at least one of them

## 2022-10-10 NOTE — PROGRESS NOTES
"Infusion Nursing Note:  Kalin Shepard presents today for C9D1 Zirabev/FOLFOX.    Patient seen by provider today: Yes: Olga Lidia Ya NP   present during visit today: Not Applicable.    Note: N/A.    Intravenous Access:  Labs drawn without difficulty.  Implanted Port.    Treatment Conditions:  Lab Results   Component Value Date    HGB 12.6 (L) 10/10/2022    WBC 9.8 10/10/2022    ANEU 18.5 (H) 08/31/2022    ANEUTAUTO 6.3 10/10/2022     10/10/2022      Lab Results   Component Value Date     10/10/2022    POTASSIUM 4.2 10/10/2022    MAG 2.0 08/31/2022    CR 0.59 (L) 10/10/2022    ROXIE 9.7 09/26/2022    BILITOTAL <0.2 10/10/2022    ALBUMIN 2.4 (L) 08/29/2022    ALT 14 10/10/2022    AST 20 10/10/2022     Results reviewed, labs MET treatment parameters, ok to proceed with treatment.    Urine Negative.    Post Infusion Assessment:  Patient tolerated infusion without incident.  Blood return noted pre and post infusion.  Site patent and intact, free from redness, edema or discomfort.  No evidence of extravasations.     Prior to discharge: Port is secured in place with tegaderm and flushed with 10cc NS with positive blood return noted.  Continuous home infusion CADD pump connected.    All connectors secured in place and clamps taped open.    Pump started, \"running\" noted on display (CADD): YES.  Pump Connection double checked with RICHARD Murphy RN  Patient instructed to call our clinic or [facility] with any questions or concerns at home.  Patient verbalized understanding.    Patient set up for pump disconnect at our clinic on 10/12/22 at 1:00 pm for pump disconnect and Neulasta.      Did call and update [facility] that pt will be coming to infusion center to have pump disconnect as he now gets Neulasta and this injection is not covered in the home.  Pt aware to return for this on 10/12/22.    Discharge Plan:   Patient discharged in stable condition accompanied by: self.  Departure Mode: " Ambulatory.  Pt to return on 10/12/22 at 1:00 pm for pump disconnect and Neulasta.    Michelle Kirby RN

## 2022-10-10 NOTE — PROGRESS NOTES
[facility] Hematology and Oncology Outpatient Progress Note    Patient: Kalin Shepard  MRN: [id number]  Date of Service: Oct 10, 2022          Reason for Visit    1. Stage IV colon cancer (peritoneal mets)    Primary Hematologist/Oncologist: Dr. Colorado      Assessment/Plan  1. Stage IV colon cancer (peritoneal mets)  2. Chemo-induced pancytopenias  Tolerated cycle 8 well.  Labs: hgb 12.6, rest CBC WNL. Creatinine and LFTs WNL. Urine protein pending.    His abdominal incisions are completely healed.    Plan:  -Proceed with cycle 9 with Neulasta. Will dose-reduce oxaliplatin 20% for increased peripheral neuropathy. Will add bevacizumab back in now that he is 6 weeks post-laparotomy and healed without problem  -Return every 2 weeks with chemo through cycle 12  -Dr. Colorado would like to try to complete all chemo neoadjuvantly given advanced disease (up to 6 months, pending tolerance). Will repeat imaging after 12 cycles if tolerating and no signs of clinical progression sooner. At that time, if responding, would try to continue an additional 3 months with restaging.  -Restaging and consideration of resection of primary tumor and metachronous peritoneal mets pending response and PS.    3.   Chemo-induced nausea  4.   Weight loss secondary to anorexia/dysgeusia  Zyprexa was not helpful, so he's stopped taking this.   Continue with compazine and Zofran, alternating as needed.   Remeron 15 mg.   Weight is stable    Plan:  -Continue Remeron to 15 mg  -Continue Dex 8 mg daily x 2 days after day 1 chemo. We can consider elongating this for additional days as another intervention with future cycles; we opted to defer this for now.     5.   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 It was more notable with this past cycle.   No balance issues.  Its not interfering with his activities of daily living.      Plan:  -Dose-reduce oxaliplatin 20% this  cycle.   -Will have a low threshold to either furtehr decrease the dose of oxaliplatin or even completely stopping it if his neuropathy were to increase and interfere with his activities of daily living.    6.   Cancer pain  Stable. Taking oxycodone 5 mg BID. He will notify us when refill needed.     7.   Superficial thrombophlebitis, right forearm  No signs of infection or concern for DVT on exam.     Plan:  -Warm pack, NSAIDs x 3 days.   -Doppler if worsening/new edema    ______________________________________________________________________________    History of Present Illness/ Interval History    Mr. Kalin Shepard  is a 71 year old returning for consideration of cycle 9 FOLFOX + bevacizumab. Bevacizumab was held last cycle given his recent laparotomy for SBO.     He tolerated last cycle well.  Weight has been stable. He is eating okay.  Drinking boost and Ensure.  Denies any nausea or vomiting.  Denies any abdominal pain.  His incision has completely healed.  No signs of infection.    Constant tingling in hands and feet is a bit more notable; while cold exposure worsens it, it is present fairly consistently between cycles. No pain. No interference in ADLs.    Bowels regular via ostomy. No blood in stools.   Stable abd discomfort, managed with Oxycodone 5 mg two times/day    Hardened/tender vein right foreaarm since hospitalization.       ECOG Performance    1      Oncology History/Treatment  Diagnosis/Stage:   3/26/2022:   -Presented with abdominal pain secondary to bowel obstruction.  CT scan showed mass in sigmoid colon with upstream small bowel and colonic obstruction.  Underwent ostomy placement.    -Peritoneal biopsy positive for adenocarcinoma.    -No evidence of other distant metastasis based on PET scan.  -BRAF V600E positive.  K-aldo and NRAS negative.  Proficient MMR.    8/24/2022 open laparotomy for SBO, lysis of adhesions. No evidence of any malignancy intra-abdominally noted.        Treatment:  Induction/neoadjuvant chemotherapy started for potentially resectable metachronous peritoneal metastasis    4/26/2022 - present: FOLFOX and bevacizumab   -cycle 7 delayed for neutropenia. Neulasta added  -cycle 8 delayed for hospitalization with SBO (favoring ileus/adhesions, no evidence of malignancy). Post-op course complicated by peritonitis requiring antibiotics. Temp TPN, then cleared for oral intake.   -Bevacizumab omitted with cycle 8 to allow for healing postmalarial. Resumed with cycle 9.  -cycle 9 oxilaplatin dose-reduced 20% for gr 1-2 neuropathy      Physical Exam    GENERAL: Alert and oriented to time place and person. Seated comfortably. In no distress. Thin. Wife accompanies.  HEAD: Atraumatic and normocephalic. Mild alopecia (hair thinning per patient).  EYES: MARA, EOMI. No erythema. No icterus.  LYMPH NODES: No palpable supraclavicular, cervical lymphadenopathy.  CHEST: clear to auscultation bilaterally. Resonant to percussion throughout bilaterally. Symmetrical breath movements bilaterally.  CVS: RRR  ABDOMEN: Soft. Not tender. Not distended. No palpable hepatomegaly or splenomegaly. No other mass palpable. Bowel sounds present. Ostomy bag in place.  EXTREMITIES: Warm. No peripheral edema. Firm vein without erythema/edema right proximal forearm.   SKIN: no rash, or bruising or purpura.   NEURO: No gross deficit noted. Non-antalgic gait.      Lab Results    Recent Results (from the past 168 hour(s))   CBC with platelets and differential   Result Value Ref Range    WBC Count 9.8 4.0 - 11.0 10e3/uL    RBC Count 3.97 (L) 4.40 - 5.90 10e6/uL    Hemoglobin 12.6 (L) 13.3 - 17.7 g/dL    Hematocrit 39.5 (L) 40.0 - 53.0 %     78 - 100 fL    MCH 31.7 26.5 - 33.0 pg    MCHC 31.9 31.5 - 36.5 g/dL    RDW 16.3 (H) 10.0 - 15.0 %    Platelet Count 176 150 - 450 10e3/uL    % Neutrophils 64 %    % Lymphocytes 23 %    % Monocytes 9 %    % Eosinophils 2 %    % Basophils 1 %    % Immature  Granulocytes 1 %    NRBCs per 100 WBC 0 <1 /100    Absolute Neutrophils 6.3 1.6 - 8.3 10e3/uL    Absolute Lymphocytes 2.3 0.8 - 5.3 10e3/uL    Absolute Monocytes 0.9 0.0 - 1.3 10e3/uL    Absolute Eosinophils 0.2 0.0 - 0.7 10e3/uL    Absolute Basophils 0.1 0.0 - 0.2 10e3/uL    Absolute Immature Granulocytes 0.1 <=0.4 10e3/uL    Absolute NRBCs 0.0 10e3/uL       Imaging    No results found.    Billing  Total time 35 minutes, to include face to face visit, review of EMR, ordering, documentation and coordination of care on date of service    Signed by: Olga Lidia Ya NP

## 2022-10-10 NOTE — PROGRESS NOTES
"Oncology Rooming Note    October 10, 2022 10:21 AM   Kalin Shepard is a 72 year old male who presents for:    Chief Complaint   Patient presents with     Oncology Clinic Visit     Adenocarcinoma of sigmoid colon - Labs provider and infusion     Initial Vitals: /70 (BP Location: Left arm, Patient Position: Sitting, Cuff Size: Adult Small)   Pulse 65   Temp 98.2  F (36.8  C) (Tympanic)   Resp 12   Wt 59.4 kg (131 lb)   SpO2 98%   BMI 18.80 kg/m   Estimated body mass index is 18.8 kg/m  as calculated from the following:    Height as of 9/2/22: 1.778 m (5' 10\").    Weight as of this encounter: 59.4 kg (131 lb). Body surface area is 1.71 meters squared.  Moderate Pain (4) Comment: Data Unavailable   No LMP for male patient.  Allergies reviewed: Yes  Medications reviewed: Yes    Medications: MEDICATION REFILLS NEEDED TODAY. Provider was notified.  Pharmacy name entered into [facility]:    [id number] - [place], [place] - [address]  [place], [place] - [address] [place] [address]    Clinical concerns:  None      Sara Almanzar CMA            "

## 2022-10-12 ENCOUNTER — INFUSION THERAPY VISIT (OUTPATIENT)
Dept: INFUSION THERAPY | Facility: CLINIC | Age: 72
End: 2022-10-12
Attending: INTERNAL MEDICINE
Payer: COMMERCIAL

## 2022-10-12 DIAGNOSIS — C18.7 ADENOCARCINOMA OF SIGMOID COLON (H): Primary | ICD-10-CM

## 2022-10-12 DIAGNOSIS — D70.1 CHEMOTHERAPY-INDUCED NEUTROPENIA (H): ICD-10-CM

## 2022-10-12 DIAGNOSIS — T45.1X5A CHEMOTHERAPY-INDUCED NEUTROPENIA (H): ICD-10-CM

## 2022-10-12 PROCEDURE — 96377 APPLICATON ON-BODY INJECTOR: CPT

## 2022-10-12 PROCEDURE — 96372 THER/PROPH/DIAG INJ SC/IM: CPT | Performed by: NURSE PRACTITIONER

## 2022-10-12 PROCEDURE — 250N000011 HC RX IP 250 OP 636: Performed by: NURSE PRACTITIONER

## 2022-10-12 RX ORDER — HEPARIN SODIUM,PORCINE 10 UNIT/ML
5 VIAL (ML) INTRAVENOUS
Status: CANCELLED | OUTPATIENT
Start: 2022-10-12

## 2022-10-12 RX ORDER — HEPARIN SODIUM (PORCINE) LOCK FLUSH IV SOLN 100 UNIT/ML 100 UNIT/ML
5 SOLUTION INTRAVENOUS
Status: CANCELLED | OUTPATIENT
Start: 2022-10-12

## 2022-10-12 RX ORDER — HEPARIN SODIUM (PORCINE) LOCK FLUSH IV SOLN 100 UNIT/ML 100 UNIT/ML
5 SOLUTION INTRAVENOUS
Status: DISCONTINUED | OUTPATIENT
Start: 2022-10-12 | End: 2022-10-13 | Stop reason: HOSPADM

## 2022-10-12 RX ADMIN — Medication 5 ML: at 13:33

## 2022-10-12 RX ADMIN — PEGFILGRASTIM 6 MG: 6 INJECTION SUBCUTANEOUS at 13:48

## 2022-10-12 NOTE — PROGRESS NOTES
Infusion Nursing Note:  Kalin Shepard presents today for pump disconnect.    Patient seen by provider today: No   present during visit today: Not Applicable.    Note: N/A.  Intravenous Access:  Implanted Port.    Treatment Conditions:  Not Applicable.      Post Infusion Assessment:  Fluorouracil infusion completed.  Pt tolerated it without difficulty.  Pt tolerated injection of Neulasta without incident.       Discharge Plan:   AVS via MyChart  Patient discharged in stable condition accompanied by: self.  Departure Mode: Ambulatory.  Next cycle is scheduled for 10/24.    Kat Chaves RN

## 2022-10-13 VITALS — SYSTOLIC BLOOD PRESSURE: 143 MMHG | HEART RATE: 71 BPM | TEMPERATURE: 97.8 F | DIASTOLIC BLOOD PRESSURE: 78 MMHG

## 2022-10-24 ENCOUNTER — ONCOLOGY VISIT (OUTPATIENT)
Dept: ONCOLOGY | Facility: CLINIC | Age: 72
End: 2022-10-24
Attending: NURSE PRACTITIONER
Payer: COMMERCIAL

## 2022-10-24 ENCOUNTER — LAB (OUTPATIENT)
Dept: INFUSION THERAPY | Facility: CLINIC | Age: 72
End: 2022-10-24
Attending: NURSE PRACTITIONER
Payer: COMMERCIAL

## 2022-10-24 ENCOUNTER — INFUSION THERAPY VISIT (OUTPATIENT)
Dept: INFUSION THERAPY | Facility: CLINIC | Age: 72
End: 2022-10-24
Attending: INTERNAL MEDICINE
Payer: COMMERCIAL

## 2022-10-24 VITALS
WEIGHT: 134.4 LBS | DIASTOLIC BLOOD PRESSURE: 64 MMHG | SYSTOLIC BLOOD PRESSURE: 113 MMHG | HEART RATE: 83 BPM | TEMPERATURE: 97.9 F | RESPIRATION RATE: 12 BRPM | OXYGEN SATURATION: 97 % | BODY MASS INDEX: 19.28 KG/M2

## 2022-10-24 VITALS — HEART RATE: 88 BPM | SYSTOLIC BLOOD PRESSURE: 124 MMHG | DIASTOLIC BLOOD PRESSURE: 77 MMHG

## 2022-10-24 VITALS — WEIGHT: 134 LBS | BODY MASS INDEX: 19.23 KG/M2

## 2022-10-24 DIAGNOSIS — G62.0 CHEMOTHERAPY-INDUCED PERIPHERAL NEUROPATHY (H): ICD-10-CM

## 2022-10-24 DIAGNOSIS — T45.1X5A CHEMOTHERAPY-INDUCED PERIPHERAL NEUROPATHY (H): ICD-10-CM

## 2022-10-24 DIAGNOSIS — C18.7 ADENOCARCINOMA OF SIGMOID COLON (H): ICD-10-CM

## 2022-10-24 DIAGNOSIS — C78.6 METASTASIS TO PERITONEUM (H): ICD-10-CM

## 2022-10-24 DIAGNOSIS — D70.1 CHEMOTHERAPY-INDUCED NEUTROPENIA (H): Primary | ICD-10-CM

## 2022-10-24 DIAGNOSIS — T45.1X5A CHEMOTHERAPY-INDUCED NEUTROPENIA (H): ICD-10-CM

## 2022-10-24 DIAGNOSIS — K21.9 GASTROESOPHAGEAL REFLUX DISEASE WITHOUT ESOPHAGITIS: ICD-10-CM

## 2022-10-24 DIAGNOSIS — C18.7 ADENOCARCINOMA OF SIGMOID COLON (H): Primary | ICD-10-CM

## 2022-10-24 DIAGNOSIS — T45.1X5A CHEMOTHERAPY-INDUCED NEUTROPENIA (H): Primary | ICD-10-CM

## 2022-10-24 DIAGNOSIS — D70.1 CHEMOTHERAPY-INDUCED NEUTROPENIA (H): ICD-10-CM

## 2022-10-24 LAB
ALBUMIN UR-MCNC: NEGATIVE MG/DL
ALT SERPL W P-5'-P-CCNC: 16 U/L (ref 10–50)
ANION GAP SERPL CALCULATED.3IONS-SCNC: 12 MMOL/L (ref 7–15)
AST SERPL W P-5'-P-CCNC: 26 U/L (ref 10–50)
BASOPHILS # BLD AUTO: 0.1 10E3/UL (ref 0–0.2)
BASOPHILS NFR BLD AUTO: 1 %
BILIRUB SERPL-MCNC: 0.2 MG/DL
CHLORIDE SERPL-SCNC: 105 MMOL/L (ref 98–107)
CREAT SERPL-MCNC: 0.61 MG/DL (ref 0.67–1.17)
DEPRECATED HCO3 PLAS-SCNC: 23 MMOL/L (ref 22–29)
EOSINOPHIL # BLD AUTO: 0.1 10E3/UL (ref 0–0.7)
EOSINOPHIL NFR BLD AUTO: 1 %
ERYTHROCYTE [DISTWIDTH] IN BLOOD BY AUTOMATED COUNT: 16.1 % (ref 10–15)
GFR SERPL CREATININE-BSD FRML MDRD: >90 ML/MIN/1.73M2
HCT VFR BLD AUTO: 41.7 % (ref 40–53)
HGB BLD-MCNC: 13.4 G/DL (ref 13.3–17.7)
IMM GRANULOCYTES # BLD: 0.1 10E3/UL
IMM GRANULOCYTES NFR BLD: 1 %
LYMPHOCYTES # BLD AUTO: 2.4 10E3/UL (ref 0.8–5.3)
LYMPHOCYTES NFR BLD AUTO: 26 %
MCH RBC QN AUTO: 31.2 PG (ref 26.5–33)
MCHC RBC AUTO-ENTMCNC: 32.1 G/DL (ref 31.5–36.5)
MCV RBC AUTO: 97 FL (ref 78–100)
MONOCYTES # BLD AUTO: 1.2 10E3/UL (ref 0–1.3)
MONOCYTES NFR BLD AUTO: 12 %
NEUTROPHILS # BLD AUTO: 5.5 10E3/UL (ref 1.6–8.3)
NEUTROPHILS NFR BLD AUTO: 59 %
NRBC # BLD AUTO: 0 10E3/UL
NRBC BLD AUTO-RTO: 0 /100
PLATELET # BLD AUTO: 208 10E3/UL (ref 150–450)
POTASSIUM SERPL-SCNC: 4.1 MMOL/L (ref 3.4–5.3)
RBC # BLD AUTO: 4.29 10E6/UL (ref 4.4–5.9)
SODIUM SERPL-SCNC: 140 MMOL/L (ref 136–145)
WBC # BLD AUTO: 9.3 10E3/UL (ref 4–11)

## 2022-10-24 PROCEDURE — 96413 CHEMO IV INFUSION 1 HR: CPT

## 2022-10-24 PROCEDURE — 96411 CHEMO IV PUSH ADDL DRUG: CPT

## 2022-10-24 PROCEDURE — 250N000011 HC RX IP 250 OP 636: Performed by: NURSE PRACTITIONER

## 2022-10-24 PROCEDURE — 82247 BILIRUBIN TOTAL: CPT | Performed by: NURSE PRACTITIONER

## 2022-10-24 PROCEDURE — 96417 CHEMO IV INFUS EACH ADDL SEQ: CPT

## 2022-10-24 PROCEDURE — 96367 TX/PROPH/DG ADDL SEQ IV INF: CPT

## 2022-10-24 PROCEDURE — 96375 TX/PRO/DX INJ NEW DRUG ADDON: CPT

## 2022-10-24 PROCEDURE — 82565 ASSAY OF CREATININE: CPT | Performed by: NURSE PRACTITIONER

## 2022-10-24 PROCEDURE — G0498 CHEMO EXTEND IV INFUS W/PUMP: HCPCS

## 2022-10-24 PROCEDURE — 258N000003 HC RX IP 258 OP 636: Performed by: NURSE PRACTITIONER

## 2022-10-24 PROCEDURE — 96368 THER/DIAG CONCURRENT INF: CPT

## 2022-10-24 PROCEDURE — 96415 CHEMO IV INFUSION ADDL HR: CPT

## 2022-10-24 PROCEDURE — 84460 ALANINE AMINO (ALT) (SGPT): CPT | Performed by: NURSE PRACTITIONER

## 2022-10-24 PROCEDURE — 84450 TRANSFERASE (AST) (SGOT): CPT | Performed by: NURSE PRACTITIONER

## 2022-10-24 PROCEDURE — 99214 OFFICE O/P EST MOD 30 MIN: CPT | Performed by: NURSE PRACTITIONER

## 2022-10-24 PROCEDURE — G0463 HOSPITAL OUTPT CLINIC VISIT: HCPCS | Mod: 25

## 2022-10-24 PROCEDURE — 36591 DRAW BLOOD OFF VENOUS DEVICE: CPT | Performed by: NURSE PRACTITIONER

## 2022-10-24 PROCEDURE — 81003 URINALYSIS AUTO W/O SCOPE: CPT | Performed by: NURSE PRACTITIONER

## 2022-10-24 PROCEDURE — 80051 ELECTROLYTE PANEL: CPT | Performed by: NURSE PRACTITIONER

## 2022-10-24 PROCEDURE — 85025 COMPLETE CBC W/AUTO DIFF WBC: CPT | Performed by: NURSE PRACTITIONER

## 2022-10-24 RX ORDER — ALBUTEROL SULFATE 0.83 MG/ML
2.5 SOLUTION RESPIRATORY (INHALATION)
Status: CANCELLED | OUTPATIENT
Start: 2022-10-24

## 2022-10-24 RX ORDER — AMOXICILLIN 250 MG
1 CAPSULE ORAL 2 TIMES DAILY
Qty: 60 TABLET | Refills: 3 | Status: ON HOLD | OUTPATIENT
Start: 2022-10-24 | End: 2023-02-13

## 2022-10-24 RX ORDER — METHYLPREDNISOLONE SODIUM SUCCINATE 125 MG/2ML
125 INJECTION, POWDER, LYOPHILIZED, FOR SOLUTION INTRAMUSCULAR; INTRAVENOUS
Status: CANCELLED
Start: 2022-10-24

## 2022-10-24 RX ORDER — OXYCODONE HYDROCHLORIDE 5 MG/1
5 TABLET ORAL EVERY 6 HOURS PRN
Qty: 60 TABLET | Refills: 0 | Status: SHIPPED | OUTPATIENT
Start: 2022-10-24 | End: 2023-01-27

## 2022-10-24 RX ORDER — FLUOROURACIL 50 MG/ML
400 INJECTION, SOLUTION INTRAVENOUS ONCE
Status: CANCELLED | OUTPATIENT
Start: 2022-10-24

## 2022-10-24 RX ORDER — FLUOROURACIL 50 MG/ML
400 INJECTION, SOLUTION INTRAVENOUS ONCE
Status: COMPLETED | OUTPATIENT
Start: 2022-10-24 | End: 2022-10-24

## 2022-10-24 RX ORDER — HEPARIN SODIUM (PORCINE) LOCK FLUSH IV SOLN 100 UNIT/ML 100 UNIT/ML
5 SOLUTION INTRAVENOUS
Status: CANCELLED | OUTPATIENT
Start: 2022-10-26

## 2022-10-24 RX ORDER — PROCHLORPERAZINE MALEATE 10 MG
10 TABLET ORAL EVERY 8 HOURS PRN
Qty: 30 TABLET | Refills: 2 | Status: ON HOLD | OUTPATIENT
Start: 2022-10-24 | End: 2023-02-13

## 2022-10-24 RX ORDER — LORAZEPAM 2 MG/ML
0.5 INJECTION INTRAMUSCULAR EVERY 4 HOURS PRN
Status: CANCELLED | OUTPATIENT
Start: 2022-10-24

## 2022-10-24 RX ORDER — ALBUTEROL SULFATE 90 UG/1
1-2 AEROSOL, METERED RESPIRATORY (INHALATION)
Status: CANCELLED
Start: 2022-10-24

## 2022-10-24 RX ORDER — DIPHENHYDRAMINE HYDROCHLORIDE 50 MG/ML
50 INJECTION INTRAMUSCULAR; INTRAVENOUS
Status: CANCELLED
Start: 2022-10-24

## 2022-10-24 RX ORDER — MEPERIDINE HYDROCHLORIDE 25 MG/ML
25 INJECTION INTRAMUSCULAR; INTRAVENOUS; SUBCUTANEOUS EVERY 30 MIN PRN
Status: CANCELLED | OUTPATIENT
Start: 2022-10-24

## 2022-10-24 RX ORDER — HEPARIN SODIUM (PORCINE) LOCK FLUSH IV SOLN 100 UNIT/ML 100 UNIT/ML
5 SOLUTION INTRAVENOUS
Status: CANCELLED | OUTPATIENT
Start: 2022-10-24

## 2022-10-24 RX ORDER — ONDANSETRON 2 MG/ML
8 INJECTION INTRAMUSCULAR; INTRAVENOUS ONCE
Status: COMPLETED | OUTPATIENT
Start: 2022-10-24 | End: 2022-10-24

## 2022-10-24 RX ORDER — NALOXONE HYDROCHLORIDE 0.4 MG/ML
0.2 INJECTION, SOLUTION INTRAMUSCULAR; INTRAVENOUS; SUBCUTANEOUS
Status: CANCELLED | OUTPATIENT
Start: 2022-10-24

## 2022-10-24 RX ORDER — ONDANSETRON 2 MG/ML
8 INJECTION INTRAMUSCULAR; INTRAVENOUS ONCE
Status: CANCELLED | OUTPATIENT
Start: 2022-10-24

## 2022-10-24 RX ORDER — EPINEPHRINE 1 MG/ML
0.3 INJECTION, SOLUTION, CONCENTRATE INTRAVENOUS EVERY 5 MIN PRN
Status: CANCELLED | OUTPATIENT
Start: 2022-10-24

## 2022-10-24 RX ORDER — DEXAMETHASONE 4 MG/1
8 TABLET ORAL DAILY
Qty: 4 TABLET | Refills: 2 | Status: SHIPPED | OUTPATIENT
Start: 2022-10-24 | End: 2023-01-27

## 2022-10-24 RX ADMIN — FLUOROURACIL 690 MG: 50 INJECTION, SOLUTION INTRAVENOUS at 14:25

## 2022-10-24 RX ADMIN — SODIUM CHLORIDE 300 MG: 9 INJECTION, SOLUTION INTRAVENOUS at 11:34

## 2022-10-24 RX ADMIN — FOSAPREPITANT: 150 INJECTION, POWDER, LYOPHILIZED, FOR SOLUTION INTRAVENOUS at 10:41

## 2022-10-24 RX ADMIN — ONDANSETRON 8 MG: 2 INJECTION INTRAMUSCULAR; INTRAVENOUS at 10:35

## 2022-10-24 RX ADMIN — SODIUM CHLORIDE 250 ML: 9 INJECTION, SOLUTION INTRAVENOUS at 10:34

## 2022-10-24 RX ADMIN — FAMOTIDINE 20 MG: 10 INJECTION, SOLUTION INTRAVENOUS at 10:38

## 2022-10-24 RX ADMIN — OXALIPLATIN 120 MG: 100 INJECTION, SOLUTION, CONCENTRATE INTRAVENOUS at 12:16

## 2022-10-24 RX ADMIN — LEUCOVORIN CALCIUM 692 MG: 350 INJECTION, POWDER, LYOPHILIZED, FOR SUSPENSION INTRAMUSCULAR; INTRAVENOUS at 12:18

## 2022-10-24 RX ADMIN — DEXTROSE MONOHYDRATE 250 ML: 50 INJECTION, SOLUTION INTRAVENOUS at 12:20

## 2022-10-24 ASSESSMENT — PAIN SCALES - GENERAL: PAINLEVEL: MODERATE PAIN (5)

## 2022-10-24 NOTE — PROGRESS NOTES
"Infusion Nursing Note:  Kalin ANNE Shepard presents today for Zirabev and FOLFOX  Patient seen by provider today: Yes: Olga Lidia Ya NP   present during visit today: Not Applicable.      Intravenous Access:  Implanted Port.    Treatment Conditions:  Results reviewed, labs MET treatment parameters, ok to proceed with treatment.      Post Infusion Assessment:  Patient tolerated infusion without incident.  Blood return noted pre and post infusion of oxaliplatin and before a, during and after fluorouracil push.   Prior to discharge: Port is secured in place with tegaderm and flushed with 10cc NS with positive blood return noted.  Continuous home infusion CADD pump connected.    All connectors secured in place and clamps taped open.    Pump started, \"running\" noted on display (CADD): YES.  Patient instructed to call our clinic or [facility] with any questions or concerns at home.  Patient verbalized understanding.    Patient set up for pump disconnect and Neulasta injection at our clinic on Weds 10/26 at 1230.        Discharge Plan:   Patient discharged in stable condition accompanied by: self.  Departure Mode: Ambulatory.    Kat Chaves RN      "

## 2022-10-24 NOTE — PATIENT INSTRUCTIONS
Proceed with cycle 10 - same doses as last cycle    Keep appts as is in 2 weeks - Olga Lidia, cycle 11   and in 4 weeks - Stanislav, cycle 12

## 2022-10-24 NOTE — PROGRESS NOTES
"Oncology Rooming Note    October 24, 2022 9:58 AM   Kalin Shepard is a 72 year old male who presents for:    Chief Complaint   Patient presents with     Oncology Clinic Visit     Adenocarcinoma of sigmoid colon - Labs provider and infusion     Initial Vitals: There were no vitals taken for this visit. Estimated body mass index is 19.23 kg/m  as calculated from the following:    Height as of 9/2/22: 1.778 m (5' 10\").    Weight as of an earlier encounter on 10/24/22: 60.8 kg (134 lb). There is no height or weight on file to calculate BSA.  Data Unavailable Comment: Data Unavailable   No LMP for male patient.  Allergies reviewed: Yes  Medications reviewed: Yes    Medications: MEDICATION REFILLS NEEDED TODAY. Provider was notified.  Pharmacy name entered into [employer]:    [id number] - [place], [place] - [address]  [facility], [place] - [address] [place] [address]    Clinical concerns: None       Andria Truong CMA            "

## 2022-10-24 NOTE — LETTER
10/24/2022         RE: Kalin Shepard  [address]  [place] [address]        Dear Colleague,    Thank you for referring your patient, Kalin Shepard, to the [facility]. Please see a copy of my visit note below.    [facility] Hematology and Oncology Outpatient Progress Note    Patient: Kalin Shepard  MRN: [id number]  Date of Service: Oct 24, 2022          Reason for Visit    1. Stage IV colon cancer (peritoneal mets)    Primary Oncologist: Dr. Colorado      Assessment/Plan  1. Stage IV colon cancer (peritoneal mets)  2. Chemo-induced pancytopenias  Tolerated cycle 9 well.  Labs unremarkable.     His abdominal incisions are completely healed.  No symptoms of clinical progression.     Plan:  -Proceed with cycle 10 FOLFOX + bevacizumab, with Neulasta. Continue same oxaliplatin dose-reduction for peripheral neuropathy.   -Return every 2 weeks with chemo through cycle 12  -Dr. Colorado would like to try to complete all chemo neoadjuvantly given advanced disease (up to 6 months, pending tolerance). Will repeat imaging after 12 cycles if tolerating and no signs of clinical progression sooner. At that time, if responding, would try to continue an additional 3 months with restaging.  -Restaging and consideration of resection of primary tumor and metachronous peritoneal mets pending response and PS.    3.   Chemo-induced nausea  4.   Weight loss secondary to anorexia/dysgeusia  Zyprexa was not helpful, so he's stopped taking this.   Continue with compazine and Zofran, alternating as needed.   Remeron 15 mg.   Weight is up.    Plan:  -Continue Remeron to 15 mg  -Continue Dex 8 mg daily x 2 days after day 1 chemo. We can consider elongating this for additional days as another intervention with future cycles; we opted to defer this for now.   -Compazine refill sent    5.   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 No balance issues.  Its not interfering with his activities of daily living.  Oxaliplatin was dose-reduced 20% with the last cycle and this is stable.    Plan:  -Monitor on oxaliplatin   -Low threshold to either further decrease the dose of oxaliplatin or even completely stopping it if his neuropathy were to increase and interfere with his activities of daily living.    6.   Cancer pain  Stable. Taking oxycodone 5 mg BID. He will notify us when refill needed.     7.   Superficial thrombophlebitis, right forearm  No signs of infection or concern for DVT on exam. Improved with warm packing a few weeks ago.    Plan:  -Doppler if worsening/new edema    ______________________________________________________________________________    History of Present Illness/ Interval History    Mr. Kalin Shepard  is a 72 year old returning for consideration of cycle 10 FOLFOX + bevacizumab. Oxaliplatin was dose-reduced last cycle for peripheral neuropathy.    He tolerated last cycle well.  Fatigue the first week, then feels back to his baseline the 2nd week. Weight has been stable. He is eating okay.  Drinking boost and Ensure.  Denies any nausea or vomiting.  Denies any abdominal pain.  His incision has completely healed.  No signs of infection.    Intermittent tingling in fingertips is stable; while cold exposure worsens it. No pain. No interference in ADLs.    Bowels regular via ostomy. No blood in stools.   Stable abd discomfort, managed with Oxycodone 5 mg two times/day    Hardened vein right foreaarm since hospitalization is stable. No redness, swelling, pain.       ECOG Performance    1      Oncology History/Treatment  Diagnosis/Stage:   3/26/2022: IV sigmoid colon cancer to peritoneum  -Presented with abdominal pain secondary to bowel obstruction.  CT scan showed mass in sigmoid colon with upstream small bowel and colonic obstruction.  Underwent ostomy placement.    -Peritoneal biopsy  positive for adenocarcinoma.    -No evidence of other distant metastasis based on PET scan.  -BRAF V600E positive.  K-aldo and NRAS negative.  Proficient MMR.    8/24/2022 open laparotomy for SBO, lysis of adhesions. No evidence of any malignancy intra-abdominally noted.       Treatment:  Induction/neoadjuvant chemotherapy started for potentially resectable metachronous peritoneal metastasis    4/26/2022 - present: FOLFOX and bevacizumab   -cycle 7 delayed for neutropenia. Neulasta added  -cycle 8 delayed for hospitalization with SBO (favoring ileus/adhesions, no evidence of malignancy). Post-op course complicated by peritonitis requiring antibiotics. Temp TPN, then cleared for oral intake.   -Bevacizumab omitted with cycle 8 to allow for healing postmalarial. Resumed with cycle 9.  -cycle 9 oxilaplatin dose-reduced 20% for gr 1-2 neuropathy      Physical Exam    GENERAL: Alert and oriented to time place and person. Seated comfortably. In no distress. Thin. Alone  HEAD: Atraumatic and normocephalic.   EYES: MARA, EOMI. No erythema. No icterus.  LYMPH NODES: No palpable supraclavicular, cervical lymphadenopathy.  CHEST: clear to auscultation bilaterally. Resonant to percussion throughout bilaterally. Symmetrical breath movements bilaterally.  CVS: RRR  ABDOMEN: Soft. Not tender. Not distended. No palpable hepatomegaly or splenomegaly. No other mass palpable. Bowel sounds present. Ostomy bag in place.  EXTREMITIES: Warm. No peripheral edema. Firm vein without erythema/edema right proximal forearm.   SKIN: no rash, or bruising or purpura.   NEURO: No gross deficit noted. Non-antalgic gait.      Lab Results    Recent Results (from the past 168 hour(s))   AST   Result Value Ref Range    AST 26 10 - 50 U/L   ALT   Result Value Ref Range    ALT 16 10 - 50 U/L   Creatinine   Result Value Ref Range    Creatinine 0.61 (L) 0.67 - 1.17 mg/dL    GFR Estimate >90 >60 mL/min/1.73m2   Bilirubin  total   Result Value Ref Range     "Bilirubin Total 0.2 <=1.2 mg/dL   Protein qualitative urine   Result Value Ref Range    Protein Albumin Urine Negative Negative mg/dL   Electrolyte panel (Na, K, Cl, CO2, Anion gap)   Result Value Ref Range    Sodium 140 136 - 145 mmol/L    Potassium 4.1 3.4 - 5.3 mmol/L    Chloride 105 98 - 107 mmol/L    Carbon Dioxide (CO2) 23 22 - 29 mmol/L    Anion Gap 12 7 - 15 mmol/L   CBC with platelets and differential   Result Value Ref Range    WBC Count 9.3 4.0 - 11.0 10e3/uL    RBC Count 4.29 (L) 4.40 - 5.90 10e6/uL    Hemoglobin 13.4 13.3 - 17.7 g/dL    Hematocrit 41.7 40.0 - 53.0 %    MCV 97 78 - 100 fL    MCH 31.2 26.5 - 33.0 pg    MCHC 32.1 31.5 - 36.5 g/dL    RDW 16.1 (H) 10.0 - 15.0 %    Platelet Count 208 150 - 450 10e3/uL    % Neutrophils 59 %    % Lymphocytes 26 %    % Monocytes 12 %    % Eosinophils 1 %    % Basophils 1 %    % Immature Granulocytes 1 %    NRBCs per 100 WBC 0 <1 /100    Absolute Neutrophils 5.5 1.6 - 8.3 10e3/uL    Absolute Lymphocytes 2.4 0.8 - 5.3 10e3/uL    Absolute Monocytes 1.2 0.0 - 1.3 10e3/uL    Absolute Eosinophils 0.1 0.0 - 0.7 10e3/uL    Absolute Basophils 0.1 0.0 - 0.2 10e3/uL    Absolute Immature Granulocytes 0.1 <=0.4 10e3/uL    Absolute NRBCs 0.0 10e3/uL       Imaging    No results found.    Billing  Total time 30 minutes, to include face to face visit, review of EMR, ordering, documentation and coordination of care on date of service    Signed by: Olga Lidia Ya NP      Oncology Rooming Note    October 24, 2022 9:58 AM   Kalin Shepard is a 72 year old male who presents for:    Chief Complaint   Patient presents with     Oncology Clinic Visit     Adenocarcinoma of sigmoid colon - Labs provider and infusion     Initial Vitals: There were no vitals taken for this visit. Estimated body mass index is 19.23 kg/m  as calculated from the following:    Height as of 9/2/22: 1.778 m (5' 10\").    Weight as of an earlier encounter on 10/24/22: 60.8 kg (134 lb). There is no height or weight " on file to calculate BSA.  Data Unavailable Comment: Data Unavailable   No LMP for male patient.  Allergies reviewed: Yes  Medications reviewed: Yes    Medications: MEDICATION REFILLS NEEDED TODAY. Provider was notified.  Pharmacy name entered into [employer]:    [id number] - [place], [place] - [address]  [facility], [place] - [address] [place] [address]    Clinical concerns: None       Andria Truong [facility]                Again, thank you for allowing me to participate in the care of your patient.        Sincerely,        Olga Lidia Ya, NP

## 2022-10-24 NOTE — PROGRESS NOTES
[facility] Hematology and Oncology Outpatient Progress Note    Patient: Kalin Shepard  MRN: [id number]  Date of Service: Oct 24, 2022          Reason for Visit    1. Stage IV colon cancer (peritoneal mets)    Primary Oncologist: Dr. Colorado      Assessment/Plan  1. Stage IV colon cancer (peritoneal mets)  2. Chemo-induced pancytopenias  Tolerated cycle 9 well.  Labs unremarkable.     His abdominal incisions are completely healed.  No symptoms of clinical progression.     Plan:  -Proceed with cycle 10 FOLFOX + bevacizumab, with Neulasta. Continue same oxaliplatin dose-reduction for peripheral neuropathy.   -Return every 2 weeks with chemo through cycle 12  -Dr. Colorado would like to try to complete all chemo neoadjuvantly given advanced disease (up to 6 months, pending tolerance). Will repeat imaging after 12 cycles if tolerating and no signs of clinical progression sooner. At that time, if responding, would try to continue an additional 3 months with restaging.  -Restaging and consideration of resection of primary tumor and metachronous peritoneal mets pending response and PS.    3.   Chemo-induced nausea  4.   Weight loss secondary to anorexia/dysgeusia  Zyprexa was not helpful, so he's stopped taking this.   Continue with compazine and Zofran, alternating as needed.   Remeron 15 mg.   Weight is up.    Plan:  -Continue Remeron to 15 mg  -Continue Dex 8 mg daily x 2 days after day 1 chemo. We can consider elongating this for additional days as another intervention with future cycles; we opted to defer this for now.   -Compazine refill sent    5.   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 No balance issues.  Its not interfering with his activities of daily living.  Oxaliplatin was dose-reduced 20% with the last cycle and this is stable.    Plan:  -Monitor on oxaliplatin   -Low threshold to either further decrease the  dose of oxaliplatin or even completely stopping it if his neuropathy were to increase and interfere with his activities of daily living.    6.   Cancer pain  Stable. Taking oxycodone 5 mg BID. He will notify us when refill needed.     7.   Superficial thrombophlebitis, right forearm  No signs of infection or concern for DVT on exam. Improved with warm packing a few weeks ago.    Plan:  -Doppler if worsening/new edema    ______________________________________________________________________________    History of Present Illness/ Interval History    Mr. Kalin Shepard  is a 72 year old returning for consideration of cycle 10 FOLFOX + bevacizumab. Oxaliplatin was dose-reduced last cycle for peripheral neuropathy.    He tolerated last cycle well.  Fatigue the first week, then feels back to his baseline the 2nd week. Weight has been stable. He is eating okay.  Drinking boost and Ensure.  Denies any nausea or vomiting.  Denies any abdominal pain.  His incision has completely healed.  No signs of infection.    Intermittent tingling in fingertips is stable; while cold exposure worsens it. No pain. No interference in ADLs.    Bowels regular via ostomy. No blood in stools.   Stable abd discomfort, managed with Oxycodone 5 mg two times/day    Hardened vein right foreaarm since hospitalization is stable. No redness, swelling, pain.       ECOG Performance    1      Oncology History/Treatment  Diagnosis/Stage:   3/26/2022: IV sigmoid colon cancer to peritoneum  -Presented with abdominal pain secondary to bowel obstruction.  CT scan showed mass in sigmoid colon with upstream small bowel and colonic obstruction.  Underwent ostomy placement.    -Peritoneal biopsy positive for adenocarcinoma.    -No evidence of other distant metastasis based on PET scan.  -BRAF V600E positive.  K-aldo and NRAS negative.  Proficient MMR.    8/24/2022 open laparotomy for SBO, lysis of adhesions. No evidence of any malignancy intra-abdominally noted.        Treatment:  Induction/neoadjuvant chemotherapy started for potentially resectable metachronous peritoneal metastasis    4/26/2022 - present: FOLFOX and bevacizumab   -cycle 7 delayed for neutropenia. Neulasta added  -cycle 8 delayed for hospitalization with SBO (favoring ileus/adhesions, no evidence of malignancy). Post-op course complicated by peritonitis requiring antibiotics. Temp TPN, then cleared for oral intake.   -Bevacizumab omitted with cycle 8 to allow for healing postmalarial. Resumed with cycle 9.  -cycle 9 oxilaplatin dose-reduced 20% for gr 1-2 neuropathy      Physical Exam    GENERAL: Alert and oriented to time place and person. Seated comfortably. In no distress. Thin. Alone  HEAD: Atraumatic and normocephalic.   EYES: MARA, EOMI. No erythema. No icterus.  LYMPH NODES: No palpable supraclavicular, cervical lymphadenopathy.  CHEST: clear to auscultation bilaterally. Resonant to percussion throughout bilaterally. Symmetrical breath movements bilaterally.  CVS: RRR  ABDOMEN: Soft. Not tender. Not distended. No palpable hepatomegaly or splenomegaly. No other mass palpable. Bowel sounds present. Ostomy bag in place.  EXTREMITIES: Warm. No peripheral edema. Firm vein without erythema/edema right proximal forearm.   SKIN: no rash, or bruising or purpura.   NEURO: No gross deficit noted. Non-antalgic gait.      Lab Results    Recent Results (from the past 168 hour(s))   AST   Result Value Ref Range    AST 26 10 - 50 U/L   ALT   Result Value Ref Range    ALT 16 10 - 50 U/L   Creatinine   Result Value Ref Range    Creatinine 0.61 (L) 0.67 - 1.17 mg/dL    GFR Estimate >90 >60 mL/min/1.73m2   Bilirubin  total   Result Value Ref Range    Bilirubin Total 0.2 <=1.2 mg/dL   Protein qualitative urine   Result Value Ref Range    Protein Albumin Urine Negative Negative mg/dL   Electrolyte panel (Na, K, Cl, CO2, Anion gap)   Result Value Ref Range    Sodium 140 136 - 145 mmol/L    Potassium 4.1 3.4 - 5.3 mmol/L     Chloride 105 98 - 107 mmol/L    Carbon Dioxide (CO2) 23 22 - 29 mmol/L    Anion Gap 12 7 - 15 mmol/L   CBC with platelets and differential   Result Value Ref Range    WBC Count 9.3 4.0 - 11.0 10e3/uL    RBC Count 4.29 (L) 4.40 - 5.90 10e6/uL    Hemoglobin 13.4 13.3 - 17.7 g/dL    Hematocrit 41.7 40.0 - 53.0 %    MCV 97 78 - 100 fL    MCH 31.2 26.5 - 33.0 pg    MCHC 32.1 31.5 - 36.5 g/dL    RDW 16.1 (H) 10.0 - 15.0 %    Platelet Count 208 150 - 450 10e3/uL    % Neutrophils 59 %    % Lymphocytes 26 %    % Monocytes 12 %    % Eosinophils 1 %    % Basophils 1 %    % Immature Granulocytes 1 %    NRBCs per 100 WBC 0 <1 /100    Absolute Neutrophils 5.5 1.6 - 8.3 10e3/uL    Absolute Lymphocytes 2.4 0.8 - 5.3 10e3/uL    Absolute Monocytes 1.2 0.0 - 1.3 10e3/uL    Absolute Eosinophils 0.1 0.0 - 0.7 10e3/uL    Absolute Basophils 0.1 0.0 - 0.2 10e3/uL    Absolute Immature Granulocytes 0.1 <=0.4 10e3/uL    Absolute NRBCs 0.0 10e3/uL       Imaging    No results found.    Billing  Total time 30 minutes, to include face to face visit, review of EMR, ordering, documentation and coordination of care on date of service    Signed by: Olga Lidia Ya NP

## 2022-10-25 ENCOUNTER — TELEPHONE (OUTPATIENT)
Dept: FAMILY MEDICINE | Facility: CLINIC | Age: 72
End: 2022-10-25

## 2022-10-25 NOTE — TELEPHONE ENCOUNTER
FYI - Status Update    Who is Calling: nurseClara [facility]    Update: Clara calling to inform provider that nurse visit for 10/26/22 will be canceled as patient will be in clinic to have Chemo disconnected. Will send orders for signature, no call back needed.     Does caller want a call/response back: No

## 2022-10-26 ENCOUNTER — INFUSION THERAPY VISIT (OUTPATIENT)
Dept: INFUSION THERAPY | Facility: CLINIC | Age: 72
End: 2022-10-26
Attending: INTERNAL MEDICINE
Payer: COMMERCIAL

## 2022-10-26 VITALS — HEART RATE: 87 BPM | TEMPERATURE: 97.5 F | SYSTOLIC BLOOD PRESSURE: 126 MMHG | DIASTOLIC BLOOD PRESSURE: 76 MMHG

## 2022-10-26 DIAGNOSIS — C18.7 ADENOCARCINOMA OF SIGMOID COLON (H): ICD-10-CM

## 2022-10-26 DIAGNOSIS — T45.1X5A CHEMOTHERAPY-INDUCED NEUTROPENIA (H): Primary | ICD-10-CM

## 2022-10-26 DIAGNOSIS — D70.1 CHEMOTHERAPY-INDUCED NEUTROPENIA (H): Primary | ICD-10-CM

## 2022-10-26 PROCEDURE — 96372 THER/PROPH/DIAG INJ SC/IM: CPT | Performed by: NURSE PRACTITIONER

## 2022-10-26 PROCEDURE — 250N000011 HC RX IP 250 OP 636: Performed by: NURSE PRACTITIONER

## 2022-10-26 RX ORDER — HEPARIN SODIUM (PORCINE) LOCK FLUSH IV SOLN 100 UNIT/ML 100 UNIT/ML
5 SOLUTION INTRAVENOUS
Status: DISCONTINUED | OUTPATIENT
Start: 2022-10-26 | End: 2022-10-26 | Stop reason: HOSPADM

## 2022-10-26 RX ADMIN — PEGFILGRASTIM 6 MG: 6 INJECTION SUBCUTANEOUS at 12:52

## 2022-10-26 RX ADMIN — Medication 5 ML: at 12:50

## 2022-10-26 NOTE — PROGRESS NOTES
Infusion Nursing Note:  Kalin ANNE Shepard presents today for pump disconnect and Neulasta.    Patient seen by provider today: No   present during visit today: Not Applicable.    Note: N/A.    Intravenous Access:  Implanted Port.    Treatment Conditions:  Not Applicable.    Post Infusion Assessment:  Patient tolerated infusion without incident.  Blood return noted pre and post infusion.  Site patent and intact, free from redness, edema or discomfort.  No evidence of extravasations.  Access discontinued per protocol.     Discharge Plan:   Patient discharged in stable condition accompanied by: self.  Departure Mode: Ambulatory.      Zen Murphy RN

## 2022-10-30 ENCOUNTER — HEALTH MAINTENANCE LETTER (OUTPATIENT)
Age: 72
End: 2022-10-30

## 2022-11-03 NOTE — PROGRESS NOTES
This is a recent snapshot of the patient's [facility] medical record.  For current drug dose and complete information and questions, call [phone] or In Basket pool, fv home infusion ([id number])  CSN Number:  [id number]

## 2022-11-07 ENCOUNTER — ONCOLOGY VISIT (OUTPATIENT)
Dept: ONCOLOGY | Facility: CLINIC | Age: 72
End: 2022-11-07
Attending: INTERNAL MEDICINE
Payer: COMMERCIAL

## 2022-11-07 ENCOUNTER — INFUSION THERAPY VISIT (OUTPATIENT)
Dept: INFUSION THERAPY | Facility: CLINIC | Age: 72
End: 2022-11-07
Attending: INTERNAL MEDICINE
Payer: COMMERCIAL

## 2022-11-07 VITALS
WEIGHT: 134 LBS | HEART RATE: 72 BPM | DIASTOLIC BLOOD PRESSURE: 75 MMHG | TEMPERATURE: 97.4 F | BODY MASS INDEX: 19.23 KG/M2 | RESPIRATION RATE: 12 BRPM | SYSTOLIC BLOOD PRESSURE: 146 MMHG | OXYGEN SATURATION: 99 %

## 2022-11-07 VITALS — HEART RATE: 68 BPM | SYSTOLIC BLOOD PRESSURE: 128 MMHG | DIASTOLIC BLOOD PRESSURE: 76 MMHG

## 2022-11-07 DIAGNOSIS — C18.7 ADENOCARCINOMA OF SIGMOID COLON (H): ICD-10-CM

## 2022-11-07 DIAGNOSIS — D70.1 CHEMOTHERAPY-INDUCED NEUTROPENIA (H): Primary | ICD-10-CM

## 2022-11-07 DIAGNOSIS — T45.1X5A CHEMOTHERAPY-INDUCED NEUTROPENIA (H): Primary | ICD-10-CM

## 2022-11-07 DIAGNOSIS — T45.1X5A CHEMOTHERAPY-INDUCED PERIPHERAL NEUROPATHY (H): ICD-10-CM

## 2022-11-07 DIAGNOSIS — C18.7 ADENOCARCINOMA OF SIGMOID COLON (H): Primary | ICD-10-CM

## 2022-11-07 DIAGNOSIS — G62.0 CHEMOTHERAPY-INDUCED PERIPHERAL NEUROPATHY (H): ICD-10-CM

## 2022-11-07 DIAGNOSIS — D70.1 CHEMOTHERAPY-INDUCED NEUTROPENIA (H): ICD-10-CM

## 2022-11-07 DIAGNOSIS — T45.1X5A CHEMOTHERAPY-INDUCED NEUTROPENIA (H): ICD-10-CM

## 2022-11-07 LAB
ALBUMIN UR-MCNC: NEGATIVE MG/DL
ALT SERPL W P-5'-P-CCNC: 17 U/L (ref 10–50)
ANION GAP SERPL CALCULATED.3IONS-SCNC: 10 MMOL/L (ref 7–15)
AST SERPL W P-5'-P-CCNC: 25 U/L (ref 10–50)
BASOPHILS # BLD AUTO: 0.2 10E3/UL (ref 0–0.2)
BASOPHILS NFR BLD AUTO: 1 %
BILIRUB SERPL-MCNC: 0.2 MG/DL
CHLORIDE SERPL-SCNC: 106 MMOL/L (ref 98–107)
CREAT SERPL-MCNC: 0.63 MG/DL (ref 0.67–1.17)
DEPRECATED HCO3 PLAS-SCNC: 25 MMOL/L (ref 22–29)
EOSINOPHIL # BLD AUTO: 0.2 10E3/UL (ref 0–0.7)
EOSINOPHIL NFR BLD AUTO: 2 %
ERYTHROCYTE [DISTWIDTH] IN BLOOD BY AUTOMATED COUNT: 16.2 % (ref 10–15)
GFR SERPL CREATININE-BSD FRML MDRD: >90 ML/MIN/1.73M2
HCT VFR BLD AUTO: 43.9 % (ref 40–53)
HGB BLD-MCNC: 13.7 G/DL (ref 13.3–17.7)
IMM GRANULOCYTES # BLD: 0 10E3/UL
IMM GRANULOCYTES NFR BLD: 0 %
LYMPHOCYTES # BLD AUTO: 2.2 10E3/UL (ref 0.8–5.3)
LYMPHOCYTES NFR BLD AUTO: 19 %
MCH RBC QN AUTO: 30.6 PG (ref 26.5–33)
MCHC RBC AUTO-ENTMCNC: 31.2 G/DL (ref 31.5–36.5)
MCV RBC AUTO: 98 FL (ref 78–100)
MONOCYTES # BLD AUTO: 1.5 10E3/UL (ref 0–1.3)
MONOCYTES NFR BLD AUTO: 13 %
NEUTROPHILS # BLD AUTO: 7.7 10E3/UL (ref 1.6–8.3)
NEUTROPHILS NFR BLD AUTO: 65 %
NRBC # BLD AUTO: 0 10E3/UL
NRBC BLD AUTO-RTO: 0 /100
PLATELET # BLD AUTO: 166 10E3/UL (ref 150–450)
POTASSIUM SERPL-SCNC: 3.9 MMOL/L (ref 3.4–5.3)
RBC # BLD AUTO: 4.48 10E6/UL (ref 4.4–5.9)
SODIUM SERPL-SCNC: 141 MMOL/L (ref 136–145)
WBC # BLD AUTO: 11.9 10E3/UL (ref 4–11)

## 2022-11-07 PROCEDURE — 96367 TX/PROPH/DG ADDL SEQ IV INF: CPT

## 2022-11-07 PROCEDURE — G0463 HOSPITAL OUTPT CLINIC VISIT: HCPCS | Mod: 25

## 2022-11-07 PROCEDURE — 82247 BILIRUBIN TOTAL: CPT | Performed by: INTERNAL MEDICINE

## 2022-11-07 PROCEDURE — 250N000011 HC RX IP 250 OP 636: Performed by: NURSE PRACTITIONER

## 2022-11-07 PROCEDURE — 80051 ELECTROLYTE PANEL: CPT | Performed by: INTERNAL MEDICINE

## 2022-11-07 PROCEDURE — 99214 OFFICE O/P EST MOD 30 MIN: CPT | Performed by: NURSE PRACTITIONER

## 2022-11-07 PROCEDURE — 96417 CHEMO IV INFUS EACH ADDL SEQ: CPT

## 2022-11-07 PROCEDURE — 85014 HEMATOCRIT: CPT | Performed by: INTERNAL MEDICINE

## 2022-11-07 PROCEDURE — 96368 THER/DIAG CONCURRENT INF: CPT

## 2022-11-07 PROCEDURE — 81003 URINALYSIS AUTO W/O SCOPE: CPT | Performed by: INTERNAL MEDICINE

## 2022-11-07 PROCEDURE — 36591 DRAW BLOOD OFF VENOUS DEVICE: CPT | Performed by: INTERNAL MEDICINE

## 2022-11-07 PROCEDURE — 82565 ASSAY OF CREATININE: CPT | Performed by: INTERNAL MEDICINE

## 2022-11-07 PROCEDURE — 258N000003 HC RX IP 258 OP 636: Performed by: NURSE PRACTITIONER

## 2022-11-07 PROCEDURE — 84450 TRANSFERASE (AST) (SGOT): CPT | Performed by: INTERNAL MEDICINE

## 2022-11-07 PROCEDURE — 96413 CHEMO IV INFUSION 1 HR: CPT

## 2022-11-07 PROCEDURE — 96415 CHEMO IV INFUSION ADDL HR: CPT

## 2022-11-07 PROCEDURE — G0498 CHEMO EXTEND IV INFUS W/PUMP: HCPCS

## 2022-11-07 PROCEDURE — 96411 CHEMO IV PUSH ADDL DRUG: CPT

## 2022-11-07 PROCEDURE — 96375 TX/PRO/DX INJ NEW DRUG ADDON: CPT

## 2022-11-07 PROCEDURE — 84460 ALANINE AMINO (ALT) (SGPT): CPT | Performed by: INTERNAL MEDICINE

## 2022-11-07 RX ORDER — MEPERIDINE HYDROCHLORIDE 25 MG/ML
25 INJECTION INTRAMUSCULAR; INTRAVENOUS; SUBCUTANEOUS EVERY 30 MIN PRN
Status: CANCELLED | OUTPATIENT
Start: 2022-11-07

## 2022-11-07 RX ORDER — ONDANSETRON 2 MG/ML
8 INJECTION INTRAMUSCULAR; INTRAVENOUS ONCE
Status: COMPLETED | OUTPATIENT
Start: 2022-11-07 | End: 2022-11-07

## 2022-11-07 RX ORDER — ALBUTEROL SULFATE 90 UG/1
1-2 AEROSOL, METERED RESPIRATORY (INHALATION)
Status: CANCELLED
Start: 2022-11-07

## 2022-11-07 RX ORDER — FLUOROURACIL 50 MG/ML
400 INJECTION, SOLUTION INTRAVENOUS ONCE
Status: COMPLETED | OUTPATIENT
Start: 2022-11-07 | End: 2022-11-07

## 2022-11-07 RX ORDER — HEPARIN SODIUM (PORCINE) LOCK FLUSH IV SOLN 100 UNIT/ML 100 UNIT/ML
5 SOLUTION INTRAVENOUS
Status: CANCELLED | OUTPATIENT
Start: 2022-11-07

## 2022-11-07 RX ORDER — HEPARIN SODIUM (PORCINE) LOCK FLUSH IV SOLN 100 UNIT/ML 100 UNIT/ML
5 SOLUTION INTRAVENOUS
Status: CANCELLED | OUTPATIENT
Start: 2022-11-09

## 2022-11-07 RX ORDER — ONDANSETRON 2 MG/ML
8 INJECTION INTRAMUSCULAR; INTRAVENOUS ONCE
Status: CANCELLED | OUTPATIENT
Start: 2022-11-07

## 2022-11-07 RX ORDER — ALBUTEROL SULFATE 0.83 MG/ML
2.5 SOLUTION RESPIRATORY (INHALATION)
Status: CANCELLED | OUTPATIENT
Start: 2022-11-07

## 2022-11-07 RX ORDER — NALOXONE HYDROCHLORIDE 0.4 MG/ML
0.2 INJECTION, SOLUTION INTRAMUSCULAR; INTRAVENOUS; SUBCUTANEOUS
Status: CANCELLED | OUTPATIENT
Start: 2022-11-07

## 2022-11-07 RX ORDER — DIPHENHYDRAMINE HYDROCHLORIDE 50 MG/ML
50 INJECTION INTRAMUSCULAR; INTRAVENOUS
Status: CANCELLED
Start: 2022-11-07

## 2022-11-07 RX ORDER — LORAZEPAM 2 MG/ML
0.5 INJECTION INTRAMUSCULAR EVERY 4 HOURS PRN
Status: CANCELLED | OUTPATIENT
Start: 2022-11-07

## 2022-11-07 RX ORDER — METHYLPREDNISOLONE SODIUM SUCCINATE 125 MG/2ML
125 INJECTION, POWDER, LYOPHILIZED, FOR SOLUTION INTRAMUSCULAR; INTRAVENOUS
Status: CANCELLED
Start: 2022-11-07

## 2022-11-07 RX ORDER — FLUOROURACIL 50 MG/ML
400 INJECTION, SOLUTION INTRAVENOUS ONCE
Status: CANCELLED | OUTPATIENT
Start: 2022-11-07

## 2022-11-07 RX ORDER — EPINEPHRINE 1 MG/ML
0.3 INJECTION, SOLUTION, CONCENTRATE INTRAVENOUS EVERY 5 MIN PRN
Status: CANCELLED | OUTPATIENT
Start: 2022-11-07

## 2022-11-07 RX ADMIN — LEUCOVORIN CALCIUM 692 MG: 350 INJECTION, POWDER, LYOPHILIZED, FOR SUSPENSION INTRAMUSCULAR; INTRAVENOUS at 12:10

## 2022-11-07 RX ADMIN — FOSAPREPITANT: 150 INJECTION, POWDER, LYOPHILIZED, FOR SOLUTION INTRAVENOUS at 10:42

## 2022-11-07 RX ADMIN — SODIUM CHLORIDE 300 MG: 9 INJECTION, SOLUTION INTRAVENOUS at 11:28

## 2022-11-07 RX ADMIN — FLUOROURACIL 690 MG: 50 INJECTION, SOLUTION INTRAVENOUS at 14:13

## 2022-11-07 RX ADMIN — ONDANSETRON 8 MG: 2 INJECTION INTRAMUSCULAR; INTRAVENOUS at 11:06

## 2022-11-07 RX ADMIN — DEXTROSE MONOHYDRATE 250 ML: 50 INJECTION, SOLUTION INTRAVENOUS at 12:11

## 2022-11-07 RX ADMIN — SODIUM CHLORIDE 250 ML: 9 INJECTION, SOLUTION INTRAVENOUS at 10:24

## 2022-11-07 RX ADMIN — OXALIPLATIN 100 MG: 100 INJECTION, SOLUTION, CONCENTRATE INTRAVENOUS at 12:10

## 2022-11-07 RX ADMIN — FAMOTIDINE 20 MG: 10 INJECTION INTRAVENOUS at 11:09

## 2022-11-07 ASSESSMENT — PAIN SCALES - GENERAL: PAINLEVEL: MODERATE PAIN (4)

## 2022-11-07 NOTE — PROGRESS NOTES
[facility] Hematology and Oncology Outpatient Progress Note    Patient: Kalin Shepard  MRN: [id number]  Date of Service: Nov 7, 2022          Reason for Visit    1. Stage IV colon cancer (peritoneal mets)    Primary Oncologist: Dr. Colorado      Assessment/Plan  1. Stage IV colon cancer (peritoneal mets)  2.   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 Neuropathy in fingertips has been more persistent and starting to note with ADLs/hobbies; not painful.     Labs unremarkable.     No symptoms of clinical progression.     Plan:  -Proceed with cycle 11 FOLFOX + bevacizumab, with Neulasta. Further dose-reduction in oxaliplatin (66% original dose) for neuropathy. Follow and if not improving, will likely need to omit   -Return in 2 weeks with ahead of cycle 12  -Dr. Colorado would like to try to complete all chemo neoadjuvantly given advanced disease (up to 6 months, pending tolerance). Will repeat imaging after 12 cycles if tolerating and no signs of clinical progression sooner. At that time, if responding, would try to continue an additional 3 months with restaging.  -Restaging and consideration of resection of primary tumor and metachronous peritoneal mets pending response and PS.    3.   Chemo-induced nausea  4.   Weight loss secondary to anorexia/dysgeusia  Continue with compazine and Zofran, alternating as needed.   Remeron 15 mg.   Weight is up.    Plan:  -Continue Remeron to 15 mg  -Continue Dex 8 mg daily x 2 days after day 1 chemo.   -Compazine refill sent    5.   Cancer pain  Stable. Taking oxycodone 5 mg BID. He will notify us when refill needed.     ______________________________________________________________________________    History of Present Illness/ Interval History    Mr. Kalin Shepard  is a 72 year old returning for consideration of cycle 11 FOLFOX + bevacizumab. Oxaliplatin was dose-reduced cycle 9 for peripheral neuropathy.    He tolerated last cycle  well.  Fatigue the first week, then feels back to his baseline the 2nd week. Weight has been stable. He is eating okay.  Drinking boost and Ensure.  Denies any nausea or vomiting.  Denies any abdominal pain.  His incision has completely healed.  No signs of infection.    More persistent tingling in fingertips. No pain. Has noted a bit of interference buttoning and playing guitar.    Bowels regular via ostomy. No blood in stools.   Stable abd discomfort, managed with Oxycodone 5 mg two times/day    Hardened vein right foreaarm since hospitalization is stable. No redness, swelling, pain.       ECOG Performance    1      Oncology History/Treatment  Diagnosis/Stage:   3/26/2022: IV sigmoid colon cancer to peritoneum  -Presented with abdominal pain secondary to bowel obstruction.  CT scan showed mass in sigmoid colon with upstream small bowel and colonic obstruction.  Underwent ostomy placement.    -Peritoneal biopsy positive for adenocarcinoma.    -No evidence of other distant metastasis based on PET scan.  -BRAF V600E positive.  K-aldo and NRAS negative.  Proficient MMR.    8/24/2022 open laparotomy for SBO, lysis of adhesions. No evidence of any malignancy intra-abdominally noted.       Treatment:  Induction/neoadjuvant chemotherapy started for potentially resectable metachronous peritoneal metastasis    4/26/2022 - present: FOLFOX and bevacizumab   -cycle 7 delayed for neutropenia. Neulasta added  -cycle 8 delayed for hospitalization with SBO (favoring ileus/adhesions, no evidence of malignancy). Post-op course complicated by peritonitis requiring antibiotics. Temp TPN, then cleared for oral intake.   -Bevacizumab omitted with cycle 8 to allow for healing postmalarial. Resumed with cycle 9.  -cycle 9 oxilaplatin dose-reduced 20% for gr 1-2 neuropathy  -cycle 11 oxaliplatin dose-reduced another 10-15% (33% total from original) for gr 1-2 neuropathy      Physical Exam    GENERAL: Alert and oriented to time place and  person. Seated comfortably. In no distress. Thin. Wife accompanies.  HEAD: Atraumatic and normocephalic.   EYES: MARA, EOMI. No erythema. No icterus.  LYMPH NODES: No palpable supraclavicular, cervical lymphadenopathy.  CHEST: clear to auscultation bilaterally. Resonant to percussion throughout bilaterally. Symmetrical breath movements bilaterally.  CVS: RRR  ABDOMEN: Soft. Not tender. Not distended. No palpable hepatomegaly or splenomegaly. No other mass palpable. Bowel sounds present. Ostomy bag in place.  EXTREMITIES: Warm. No peripheral edema. Firm vein without erythema/edema right proximal forearm.   SKIN: no rash, or bruising or purpura.   NEURO: No gross deficit noted. Non-antalgic gait.      Lab Results    Recent Results (from the past 168 hour(s))   AST   Result Value Ref Range    AST 25 10 - 50 U/L   ALT   Result Value Ref Range    ALT 17 10 - 50 U/L   Creatinine   Result Value Ref Range    Creatinine 0.63 (L) 0.67 - 1.17 mg/dL    GFR Estimate >90 >60 mL/min/1.73m2   Bilirubin  total   Result Value Ref Range    Bilirubin Total 0.2 <=1.2 mg/dL   Protein qualitative urine   Result Value Ref Range    Protein Albumin Urine Negative Negative mg/dL   Electrolyte panel (Na, K, Cl, CO2, Anion gap)   Result Value Ref Range    Sodium 141 136 - 145 mmol/L    Potassium 3.9 3.4 - 5.3 mmol/L    Chloride 106 98 - 107 mmol/L    Carbon Dioxide (CO2) 25 22 - 29 mmol/L    Anion Gap 10 7 - 15 mmol/L   CBC with platelets and differential   Result Value Ref Range    WBC Count 11.9 (H) 4.0 - 11.0 10e3/uL    RBC Count 4.48 4.40 - 5.90 10e6/uL    Hemoglobin 13.7 13.3 - 17.7 g/dL    Hematocrit 43.9 40.0 - 53.0 %    MCV 98 78 - 100 fL    MCH 30.6 26.5 - 33.0 pg    MCHC 31.2 (L) 31.5 - 36.5 g/dL    RDW 16.2 (H) 10.0 - 15.0 %    Platelet Count 166 150 - 450 10e3/uL    % Neutrophils 65 %    % Lymphocytes 19 %    % Monocytes 13 %    % Eosinophils 2 %    % Basophils 1 %    % Immature Granulocytes 0 %    NRBCs per 100 WBC 0 <1 /100     Absolute Neutrophils 7.7 1.6 - 8.3 10e3/uL    Absolute Lymphocytes 2.2 0.8 - 5.3 10e3/uL    Absolute Monocytes 1.5 (H) 0.0 - 1.3 10e3/uL    Absolute Eosinophils 0.2 0.0 - 0.7 10e3/uL    Absolute Basophils 0.2 0.0 - 0.2 10e3/uL    Absolute Immature Granulocytes 0.0 <=0.4 10e3/uL    Absolute NRBCs 0.0 10e3/uL       Imaging    No results found.    Billing  Total time 30 minutes, to include face to face visit, review of EMR, ordering, documentation and coordination of care on date of service    Signed by: Olga Lidia Ya, NP

## 2022-11-07 NOTE — LETTER
11/7/2022         RE: Kalin Shepard  [address]  [facility] [address]        Dear Colleague,    Thank you for referring your patient, Kalin Shepard, to the [facility]. Please see a copy of my visit note below.    [facility] Hematology and Oncology Outpatient Progress Note    Patient: Kalin Shepard  MRN: [id number]  Date of Service: Nov 7, 2022          Reason for Visit    1. Stage IV colon cancer (peritoneal mets)    Primary Oncologist: Dr. Colorado      Assessment/Plan  1. Stage IV colon cancer (peritoneal mets)  2.   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 Neuropathy in fingertips has been more persistent and starting to note with ADLs/hobbies; not painful.     Labs unremarkable.     No symptoms of clinical progression.     Plan:  -Proceed with cycle 11 FOLFOX + bevacizumab, with Neulasta. Further dose-reduction in oxaliplatin (66% original dose) for neuropathy. Follow and if not improving, will likely need to omit   -Return in 2 weeks with ahead of cycle 12  -Dr. Colorado would like to try to complete all chemo neoadjuvantly given advanced disease (up to 6 months, pending tolerance). Will repeat imaging after 12 cycles if tolerating and no signs of clinical progression sooner. At that time, if responding, would try to continue an additional 3 months with restaging.  -Restaging and consideration of resection of primary tumor and metachronous peritoneal mets pending response and PS.    3.   Chemo-induced nausea  4.   Weight loss secondary to anorexia/dysgeusia  Continue with compazine and Zofran, alternating as needed.   Remeron 15 mg.   Weight is up.    Plan:  -Continue Remeron to 15 mg  -Continue Dex 8 mg daily x 2 days after day 1 chemo.   -Compazine refill sent    5.   Cancer pain  Stable. Taking oxycodone 5 mg BID. He will notify us when refill needed.      ______________________________________________________________________________    History of Present Illness/ Interval History    Mr. Kalin Shepard  is a 72 year old returning for consideration of cycle 11 FOLFOX + bevacizumab. Oxaliplatin was dose-reduced cycle 9 for peripheral neuropathy.    He tolerated last cycle well.  Fatigue the first week, then feels back to his baseline the 2nd week. Weight has been stable. He is eating okay.  Drinking boost and Ensure.  Denies any nausea or vomiting.  Denies any abdominal pain.  His incision has completely healed.  No signs of infection.    More persistent tingling in fingertips. No pain. Has noted a bit of interference buttoning and playing guitar.    Bowels regular via ostomy. No blood in stools.   Stable abd discomfort, managed with Oxycodone 5 mg two times/day    Hardened vein right foreaarm since hospitalization is stable. No redness, swelling, pain.       ECOG Performance    1      Oncology History/Treatment  Diagnosis/Stage:   3/26/2022: IV sigmoid colon cancer to peritoneum  -Presented with abdominal pain secondary to bowel obstruction.  CT scan showed mass in sigmoid colon with upstream small bowel and colonic obstruction.  Underwent ostomy placement.    -Peritoneal biopsy positive for adenocarcinoma.    -No evidence of other distant metastasis based on PET scan.  -BRAF V600E positive.  K-aldo and NRAS negative.  Proficient MMR.    8/24/2022 open laparotomy for SBO, lysis of adhesions. No evidence of any malignancy intra-abdominally noted.       Treatment:  Induction/neoadjuvant chemotherapy started for potentially resectable metachronous peritoneal metastasis    4/26/2022 - present: FOLFOX and bevacizumab   -cycle 7 delayed for neutropenia. Neulasta added  -cycle 8 delayed for hospitalization with SBO (favoring ileus/adhesions, no evidence of malignancy). Post-op course complicated by peritonitis requiring antibiotics. Temp TPN, then cleared for oral  intake.   -Bevacizumab omitted with cycle 8 to allow for healing postmalarial. Resumed with cycle 9.  -cycle 9 oxilaplatin dose-reduced 20% for gr 1-2 neuropathy  -cycle 11 oxaliplatin dose-reduced another 10-15% (33% total from original) for gr 1-2 neuropathy      Physical Exam    GENERAL: Alert and oriented to time place and person. Seated comfortably. In no distress. Thin. Wife accompanies.  HEAD: Atraumatic and normocephalic.   EYES: MARA, EOMI. No erythema. No icterus.  LYMPH NODES: No palpable supraclavicular, cervical lymphadenopathy.  CHEST: clear to auscultation bilaterally. Resonant to percussion throughout bilaterally. Symmetrical breath movements bilaterally.  CVS: RRR  ABDOMEN: Soft. Not tender. Not distended. No palpable hepatomegaly or splenomegaly. No other mass palpable. Bowel sounds present. Ostomy bag in place.  EXTREMITIES: Warm. No peripheral edema. Firm vein without erythema/edema right proximal forearm.   SKIN: no rash, or bruising or purpura.   NEURO: No gross deficit noted. Non-antalgic gait.      Lab Results    Recent Results (from the past 168 hour(s))   AST   Result Value Ref Range    AST 25 10 - 50 U/L   ALT   Result Value Ref Range    ALT 17 10 - 50 U/L   Creatinine   Result Value Ref Range    Creatinine 0.63 (L) 0.67 - 1.17 mg/dL    GFR Estimate >90 >60 mL/min/1.73m2   Bilirubin  total   Result Value Ref Range    Bilirubin Total 0.2 <=1.2 mg/dL   Protein qualitative urine   Result Value Ref Range    Protein Albumin Urine Negative Negative mg/dL   Electrolyte panel (Na, K, Cl, CO2, Anion gap)   Result Value Ref Range    Sodium 141 136 - 145 mmol/L    Potassium 3.9 3.4 - 5.3 mmol/L    Chloride 106 98 - 107 mmol/L    Carbon Dioxide (CO2) 25 22 - 29 mmol/L    Anion Gap 10 7 - 15 mmol/L   CBC with platelets and differential   Result Value Ref Range    WBC Count 11.9 (H) 4.0 - 11.0 10e3/uL    RBC Count 4.48 4.40 - 5.90 10e6/uL    Hemoglobin 13.7 13.3 - 17.7 g/dL    Hematocrit 43.9 40.0 -  "53.0 %    MCV 98 78 - 100 fL    MCH 30.6 26.5 - 33.0 pg    MCHC 31.2 (L) 31.5 - 36.5 g/dL    RDW 16.2 (H) 10.0 - 15.0 %    Platelet Count 166 150 - 450 10e3/uL    % Neutrophils 65 %    % Lymphocytes 19 %    % Monocytes 13 %    % Eosinophils 2 %    % Basophils 1 %    % Immature Granulocytes 0 %    NRBCs per 100 WBC 0 <1 /100    Absolute Neutrophils 7.7 1.6 - 8.3 10e3/uL    Absolute Lymphocytes 2.2 0.8 - 5.3 10e3/uL    Absolute Monocytes 1.5 (H) 0.0 - 1.3 10e3/uL    Absolute Eosinophils 0.2 0.0 - 0.7 10e3/uL    Absolute Basophils 0.2 0.0 - 0.2 10e3/uL    Absolute Immature Granulocytes 0.0 <=0.4 10e3/uL    Absolute NRBCs 0.0 10e3/uL       Imaging    No results found.    Billing  Total time 30 minutes, to include face to face visit, review of EMR, ordering, documentation and coordination of care on date of service    Signed by: Olga Lidia Ya NP      Oncology Rooming Note    November 7, 2022 9:55 AM   Kalin Shepard is a 72 year old male who presents for:    Chief Complaint   Patient presents with     Oncology Clinic Visit     Adenocarcinoma of sigmoid colon - Labs provider and infusion     Initial Vitals: BP (!) 146/75 (BP Location: Right arm, Patient Position: Sitting, Cuff Size: Adult Small)   Pulse 72   Temp 97.4  F (36.3  C) (Tympanic)   Resp 12   Wt 60.8 kg (134 lb)   SpO2 99%   BMI 19.23 kg/m   Estimated body mass index is 19.23 kg/m  as calculated from the following:    Height as of 9/2/22: 1.778 m (5' 10\").    Weight as of this encounter: 60.8 kg (134 lb). Body surface area is 1.73 meters squared.  Moderate Pain (4) Comment: Data Unavailable   No LMP for male patient.  Allergies reviewed: Yes  Medications reviewed: Yes    Medications: Medication refills not needed today.  Pharmacy name entered into [employer]:    [facility], [place] - [address]  [facility], [place] - [address] [place] [address]    Clinical concerns:  None      Sara Almanzar, " CMA                Again, thank you for allowing me to participate in the care of your patient.        Sincerely,        Olga Lidia Ya, NP

## 2022-11-07 NOTE — PATIENT INSTRUCTIONS
Proceed with cycle 11. Dose-reduce oxaliplatin for neuropathy (66% original dose)    Keep appts in 2 weeks with Dr. Colorado for cycle 12

## 2022-11-07 NOTE — PROGRESS NOTES
"Oncology Rooming Note    November 7, 2022 9:55 AM   Kalin Shepard is a 72 year old male who presents for:    Chief Complaint   Patient presents with     Oncology Clinic Visit     Adenocarcinoma of sigmoid colon - Labs provider and infusion     Initial Vitals: BP (!) 146/75 (BP Location: Right arm, Patient Position: Sitting, Cuff Size: Adult Small)   Pulse 72   Temp 97.4  F (36.3  C) (Tympanic)   Resp 12   Wt 60.8 kg (134 lb)   SpO2 99%   BMI 19.23 kg/m   Estimated body mass index is 19.23 kg/m  as calculated from the following:    Height as of 9/2/22: 1.778 m (5' 10\").    Weight as of this encounter: 60.8 kg (134 lb). Body surface area is 1.73 meters squared.  Moderate Pain (4) Comment: Data Unavailable   No LMP for male patient.  Allergies reviewed: Yes  Medications reviewed: Yes    Medications: Medication refills not needed today.  Pharmacy name entered into [facility]:    [id number] - [place], [place] - [address]  [place], [place] - [address] [place] [address]    Clinical concerns:  None      Sara Almanzar CMA            "

## 2022-11-07 NOTE — PROGRESS NOTES
"Infusion Nursing Note:  Kalin ANNE Shepard presents today for FOLFOX and Zirabev.    Patient seen by provider today: Yes, Olga Lidia Ya NP   present during visit today: Not Applicable.    Note: N/A.      Intravenous Access:  Implanted Port.    Treatment Conditions:  Results reviewed, labs MET treatment parameters, ok to proceed with treatment.      Post Infusion Assessment:  Patient tolerated infusion of chemo without incident. Good blood return noted before and after each chemo med administration.  Prior to discharge: Port is secured in place with tegaderm and flushed with 10cc NS with positive blood return noted.  Continuous home infusion CADD pump connected.    All connectors secured in place and clamps taped open.    Pump started, \"running\" noted on display (CADD): YES.  Patient instructed to call our clinic or [facility] with any questions or concerns at home.  Patient verbalized understanding.    Patient set up for pump disconnect at our clinic on Weds 11/9 at noon.          Discharge Plan:   AVS to patient via MYCHART.  Patient discharged in stable condition accompanied by: self.  Departure Mode: Ambulatory.    Kat Chaves RN      "

## 2022-11-09 ENCOUNTER — INFUSION THERAPY VISIT (OUTPATIENT)
Dept: INFUSION THERAPY | Facility: CLINIC | Age: 72
End: 2022-11-09
Attending: INTERNAL MEDICINE
Payer: COMMERCIAL

## 2022-11-09 VITALS — SYSTOLIC BLOOD PRESSURE: 118 MMHG | HEART RATE: 80 BPM | DIASTOLIC BLOOD PRESSURE: 66 MMHG | TEMPERATURE: 95.6 F

## 2022-11-09 DIAGNOSIS — C18.7 ADENOCARCINOMA OF SIGMOID COLON (H): Primary | ICD-10-CM

## 2022-11-09 DIAGNOSIS — T45.1X5A CHEMOTHERAPY-INDUCED NEUTROPENIA (H): ICD-10-CM

## 2022-11-09 DIAGNOSIS — D70.1 CHEMOTHERAPY-INDUCED NEUTROPENIA (H): ICD-10-CM

## 2022-11-09 PROCEDURE — 96372 THER/PROPH/DIAG INJ SC/IM: CPT | Performed by: NURSE PRACTITIONER

## 2022-11-09 PROCEDURE — 250N000011 HC RX IP 250 OP 636: Performed by: NURSE PRACTITIONER

## 2022-11-09 RX ORDER — HEPARIN SODIUM (PORCINE) LOCK FLUSH IV SOLN 100 UNIT/ML 100 UNIT/ML
5 SOLUTION INTRAVENOUS
Status: DISCONTINUED | OUTPATIENT
Start: 2022-11-09 | End: 2022-11-09 | Stop reason: HOSPADM

## 2022-11-09 RX ADMIN — PEGFILGRASTIM 6 MG: 6 INJECTION SUBCUTANEOUS at 12:37

## 2022-11-09 RX ADMIN — Medication 5 ML: at 12:37

## 2022-11-09 NOTE — PROGRESS NOTES
Infusion Nursing Note:  Kalin Shepard presents today for pump disconnect and Neulasta injection.    Patient seen by provider today: No   present during visit today: Not Applicable.    Intravenous Access:  Implanted Port.    Treatment Conditions:  Fluorouracil infusion completed  Post Infusion Assessment:  Patient tolerated infusion without incident.       Discharge Plan:   Patient discharged in stable condition accompanied by: self.  Departure Mode: Ambulatory.  Next appt is 11/21 for provider visit and chemo.  Kat Chaves RN

## 2022-11-21 ENCOUNTER — ONCOLOGY VISIT (OUTPATIENT)
Dept: ONCOLOGY | Facility: CLINIC | Age: 72
End: 2022-11-21
Attending: INTERNAL MEDICINE
Payer: COMMERCIAL

## 2022-11-21 ENCOUNTER — LAB (OUTPATIENT)
Dept: INFUSION THERAPY | Facility: CLINIC | Age: 72
End: 2022-11-21
Attending: INTERNAL MEDICINE
Payer: COMMERCIAL

## 2022-11-21 VITALS
TEMPERATURE: 97.8 F | DIASTOLIC BLOOD PRESSURE: 79 MMHG | SYSTOLIC BLOOD PRESSURE: 135 MMHG | BODY MASS INDEX: 19.38 KG/M2 | OXYGEN SATURATION: 97 % | WEIGHT: 135.1 LBS | RESPIRATION RATE: 12 BRPM | HEART RATE: 74 BPM

## 2022-11-21 VITALS — SYSTOLIC BLOOD PRESSURE: 128 MMHG | HEART RATE: 83 BPM | DIASTOLIC BLOOD PRESSURE: 77 MMHG

## 2022-11-21 DIAGNOSIS — T45.1X5A CHEMOTHERAPY-INDUCED NEUTROPENIA (H): ICD-10-CM

## 2022-11-21 DIAGNOSIS — D70.1 CHEMOTHERAPY-INDUCED NEUTROPENIA (H): ICD-10-CM

## 2022-11-21 DIAGNOSIS — C18.7 ADENOCARCINOMA OF SIGMOID COLON (H): Primary | ICD-10-CM

## 2022-11-21 DIAGNOSIS — G62.0 CHEMOTHERAPY-INDUCED PERIPHERAL NEUROPATHY (H): ICD-10-CM

## 2022-11-21 DIAGNOSIS — T45.1X5A CHEMOTHERAPY-INDUCED PERIPHERAL NEUROPATHY (H): ICD-10-CM

## 2022-11-21 LAB
ALBUMIN UR-MCNC: NEGATIVE MG/DL
ALT SERPL W P-5'-P-CCNC: 15 U/L (ref 10–50)
ANION GAP SERPL CALCULATED.3IONS-SCNC: 10 MMOL/L (ref 7–15)
AST SERPL W P-5'-P-CCNC: 20 U/L (ref 10–50)
BASOPHILS # BLD AUTO: 0.1 10E3/UL (ref 0–0.2)
BASOPHILS NFR BLD AUTO: 1 %
BILIRUB SERPL-MCNC: 0.2 MG/DL
CHLORIDE SERPL-SCNC: 105 MMOL/L (ref 98–107)
CREAT SERPL-MCNC: 0.6 MG/DL (ref 0.67–1.17)
DEPRECATED HCO3 PLAS-SCNC: 24 MMOL/L (ref 22–29)
EOSINOPHIL # BLD AUTO: 0.4 10E3/UL (ref 0–0.7)
EOSINOPHIL NFR BLD AUTO: 3 %
ERYTHROCYTE [DISTWIDTH] IN BLOOD BY AUTOMATED COUNT: 16.5 % (ref 10–15)
GFR SERPL CREATININE-BSD FRML MDRD: >90 ML/MIN/1.73M2
HCT VFR BLD AUTO: 42.3 % (ref 40–53)
HGB BLD-MCNC: 13.6 G/DL (ref 13.3–17.7)
IMM GRANULOCYTES # BLD: 0 10E3/UL
IMM GRANULOCYTES NFR BLD: 0 %
LYMPHOCYTES # BLD AUTO: 2.3 10E3/UL (ref 0.8–5.3)
LYMPHOCYTES NFR BLD AUTO: 20 %
MCH RBC QN AUTO: 30.9 PG (ref 26.5–33)
MCHC RBC AUTO-ENTMCNC: 32.2 G/DL (ref 31.5–36.5)
MCV RBC AUTO: 96 FL (ref 78–100)
MONOCYTES # BLD AUTO: 1.5 10E3/UL (ref 0–1.3)
MONOCYTES NFR BLD AUTO: 13 %
NEUTROPHILS # BLD AUTO: 7.1 10E3/UL (ref 1.6–8.3)
NEUTROPHILS NFR BLD AUTO: 63 %
NRBC # BLD AUTO: 0 10E3/UL
NRBC BLD AUTO-RTO: 0 /100
PLATELET # BLD AUTO: 167 10E3/UL (ref 150–450)
POTASSIUM SERPL-SCNC: 4.2 MMOL/L (ref 3.4–5.3)
RBC # BLD AUTO: 4.4 10E6/UL (ref 4.4–5.9)
SODIUM SERPL-SCNC: 139 MMOL/L (ref 136–145)
WBC # BLD AUTO: 11.4 10E3/UL (ref 4–11)

## 2022-11-21 PROCEDURE — G0463 HOSPITAL OUTPT CLINIC VISIT: HCPCS | Mod: 25

## 2022-11-21 PROCEDURE — 96375 TX/PRO/DX INJ NEW DRUG ADDON: CPT

## 2022-11-21 PROCEDURE — 82565 ASSAY OF CREATININE: CPT | Performed by: INTERNAL MEDICINE

## 2022-11-21 PROCEDURE — 258N000003 HC RX IP 258 OP 636: Performed by: INTERNAL MEDICINE

## 2022-11-21 PROCEDURE — 99214 OFFICE O/P EST MOD 30 MIN: CPT | Performed by: INTERNAL MEDICINE

## 2022-11-21 PROCEDURE — G0498 CHEMO EXTEND IV INFUS W/PUMP: HCPCS

## 2022-11-21 PROCEDURE — 96409 CHEMO IV PUSH SNGL DRUG: CPT

## 2022-11-21 PROCEDURE — 80051 ELECTROLYTE PANEL: CPT | Performed by: INTERNAL MEDICINE

## 2022-11-21 PROCEDURE — 81003 URINALYSIS AUTO W/O SCOPE: CPT | Performed by: INTERNAL MEDICINE

## 2022-11-21 PROCEDURE — 250N000011 HC RX IP 250 OP 636: Performed by: INTERNAL MEDICINE

## 2022-11-21 PROCEDURE — 84460 ALANINE AMINO (ALT) (SGPT): CPT | Performed by: INTERNAL MEDICINE

## 2022-11-21 PROCEDURE — 84450 TRANSFERASE (AST) (SGOT): CPT | Performed by: INTERNAL MEDICINE

## 2022-11-21 PROCEDURE — 82247 BILIRUBIN TOTAL: CPT | Performed by: INTERNAL MEDICINE

## 2022-11-21 PROCEDURE — 36591 DRAW BLOOD OFF VENOUS DEVICE: CPT | Performed by: INTERNAL MEDICINE

## 2022-11-21 PROCEDURE — 85014 HEMATOCRIT: CPT | Performed by: INTERNAL MEDICINE

## 2022-11-21 RX ORDER — HEPARIN SODIUM (PORCINE) LOCK FLUSH IV SOLN 100 UNIT/ML 100 UNIT/ML
5 SOLUTION INTRAVENOUS
Status: CANCELLED | OUTPATIENT
Start: 2022-11-21

## 2022-11-21 RX ORDER — FLUOROURACIL 50 MG/ML
400 INJECTION, SOLUTION INTRAVENOUS ONCE
Status: COMPLETED | OUTPATIENT
Start: 2022-11-21 | End: 2022-11-21

## 2022-11-21 RX ORDER — HEPARIN SODIUM (PORCINE) LOCK FLUSH IV SOLN 100 UNIT/ML 100 UNIT/ML
5 SOLUTION INTRAVENOUS
Status: CANCELLED | OUTPATIENT
Start: 2022-11-23

## 2022-11-21 RX ORDER — FLUOROURACIL 50 MG/ML
400 INJECTION, SOLUTION INTRAVENOUS ONCE
Status: CANCELLED | OUTPATIENT
Start: 2022-11-21

## 2022-11-21 RX ORDER — ALBUTEROL SULFATE 0.83 MG/ML
2.5 SOLUTION RESPIRATORY (INHALATION)
Status: CANCELLED | OUTPATIENT
Start: 2022-11-21

## 2022-11-21 RX ORDER — LORAZEPAM 2 MG/ML
0.5 INJECTION INTRAMUSCULAR EVERY 4 HOURS PRN
Status: CANCELLED | OUTPATIENT
Start: 2022-11-21

## 2022-11-21 RX ORDER — DIPHENHYDRAMINE HYDROCHLORIDE 50 MG/ML
50 INJECTION INTRAMUSCULAR; INTRAVENOUS
Status: CANCELLED
Start: 2022-11-21

## 2022-11-21 RX ORDER — MEPERIDINE HYDROCHLORIDE 25 MG/ML
25 INJECTION INTRAMUSCULAR; INTRAVENOUS; SUBCUTANEOUS EVERY 30 MIN PRN
Status: CANCELLED | OUTPATIENT
Start: 2022-11-21

## 2022-11-21 RX ORDER — ONDANSETRON 2 MG/ML
8 INJECTION INTRAMUSCULAR; INTRAVENOUS ONCE
Status: CANCELLED | OUTPATIENT
Start: 2022-11-21

## 2022-11-21 RX ORDER — NALOXONE HYDROCHLORIDE 0.4 MG/ML
0.2 INJECTION, SOLUTION INTRAMUSCULAR; INTRAVENOUS; SUBCUTANEOUS
Status: CANCELLED | OUTPATIENT
Start: 2022-11-21

## 2022-11-21 RX ORDER — ONDANSETRON 2 MG/ML
8 INJECTION INTRAMUSCULAR; INTRAVENOUS ONCE
Status: COMPLETED | OUTPATIENT
Start: 2022-11-21 | End: 2022-11-21

## 2022-11-21 RX ORDER — METHYLPREDNISOLONE SODIUM SUCCINATE 125 MG/2ML
125 INJECTION, POWDER, LYOPHILIZED, FOR SOLUTION INTRAMUSCULAR; INTRAVENOUS
Status: CANCELLED
Start: 2022-11-21

## 2022-11-21 RX ORDER — DEXAMETHASONE 4 MG/1
8 TABLET ORAL DAILY
Qty: 4 TABLET | Refills: 2 | Status: CANCELLED | OUTPATIENT
Start: 2022-11-21

## 2022-11-21 RX ORDER — EPINEPHRINE 1 MG/ML
0.3 INJECTION, SOLUTION, CONCENTRATE INTRAVENOUS EVERY 5 MIN PRN
Status: CANCELLED | OUTPATIENT
Start: 2022-11-21

## 2022-11-21 RX ORDER — ALBUTEROL SULFATE 90 UG/1
1-2 AEROSOL, METERED RESPIRATORY (INHALATION)
Status: CANCELLED
Start: 2022-11-21

## 2022-11-21 RX ADMIN — FLUOROURACIL 690 MG: 50 INJECTION, SOLUTION INTRAVENOUS at 11:39

## 2022-11-21 RX ADMIN — FAMOTIDINE 20 MG: 10 INJECTION INTRAVENOUS at 11:20

## 2022-11-21 RX ADMIN — ONDANSETRON 8 MG: 2 INJECTION INTRAMUSCULAR; INTRAVENOUS at 11:20

## 2022-11-21 RX ADMIN — SODIUM CHLORIDE 250 ML: 9 INJECTION, SOLUTION INTRAVENOUS at 11:20

## 2022-11-21 ASSESSMENT — PAIN SCALES - GENERAL: PAINLEVEL: MODERATE PAIN (4)

## 2022-11-21 NOTE — PROGRESS NOTES
Infusion Nursing Note:  Kalin ANNE Shepard presents today for 5FU push and pump hook up C12D1.    Patient seen by provider today: Yes: Dr. Colorado; therefore, assessment deferred   present during visit today: Not Applicable.    Note: N/A.    Intravenous Access:  Implanted Port.    Treatment Conditions:  Lab Results   Component Value Date    HGB 13.6 11/21/2022    WBC 11.4 (H) 11/21/2022    ANEU 18.5 (H) 08/31/2022    ANEUTAUTO 7.1 11/21/2022     11/21/2022      Results reviewed, labs MET treatment parameters, ok to proceed with treatment.  Urine Negative.    Post Infusion Assessment:  Patient tolerated infusion without incident.  Blood return noted pre and post infusion.  Blood return noted during administration every 2-3 cc.  Site patent and intact, free from redness, edema or discomfort.  No evidence of extravasations.  Access discontinued per protocol.     Discharge Plan:   Discharge instructions reviewed with: Patient.  Patient and/or family verbalized understanding of discharge instructions and all questions answered.  AVS to patient via 3LM.  Patient will return 11/23/2022 for next appointment.   Patient discharged in stable condition accompanied by: self.  Departure Mode: Ambulatory.    Rachel Akbar RN

## 2022-11-21 NOTE — LETTER
"    11/21/2022         RE: Kalin Shepard  [address] [facility] [address]        Dear Colleague,    Thank you for referring your patient, Kalin Shepard, to the [facility]. Please see a copy of my visit note below.    Oncology Rooming Note    November 21, 2022 10:32 AM   Kalin Shepard is a 72 year old male who presents for:    Chief Complaint   Patient presents with     Oncology Clinic Visit     Adenocarcinoma of sigmoid colon - Lab provider and infusion     Initial Vitals: /79 (BP Location: Right arm, Patient Position: Sitting, Cuff Size: Adult Regular)   Pulse 74   Temp 97.8  F (36.6  C) (Tympanic)   Resp 12   Wt 61.3 kg (135 lb 1.6 oz)   SpO2 97%   BMI 19.38 kg/m   Estimated body mass index is 19.38 kg/m  as calculated from the following:    Height as of 9/2/22: 1.778 m (5' 10\").    Weight as of this encounter: 61.3 kg (135 lb 1.6 oz). Body surface area is 1.74 meters squared.  Moderate Pain (4) Comment: Data Unavailable   No LMP for male patient.  Allergies reviewed: Yes  Medications reviewed: Yes    Medications: MEDICATION REFILLS NEEDED TODAY. Provider was notified.  Pharmacy name entered into [facility]:    [id number] - [place], [place] - [address]  [facility] [place], [place] - [address] [place] [address]    Clinical concerns:  None      Andria Truong, [facility] Hematology and Oncology Outpatient Progress Note    Patient: Kalin Shepard  MRN: [id number]  Date of Service: Nov 21, 2022          Reason for Visit    1. Stage IV colon cancer (peritoneal mets)    Primary Oncologist: Dr. Colorado      Assessment/Plan  1. Stage IV colon cancer (peritoneal mets), BRAF positive  2.   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  Last 3 doses of oxaliplatin has been dose reduced significantly due to peripheral neuropathy, grade 1-2.  To begin with he had " metastatic disease with peritoneal mets.  So plan was to do 6 months of chemotherapy as much as possible followed by repeat scans and possible surgery.  His scans in between the chemotherapy treatments have not shown any evidence of disease progression.  Most recently was hospitalized for SBO in August.  At the time he underwent laparotomy with adhesion of lysis.  There was no evidence of any malignancy intra-abdominally at that time.  CT scan also showed no evidence of disease.  Prior to starting systemic chemotherapy he had T4 lesion in the sigmoid colon with extension of malignancy to the adjacent segment of the sigmoid colon along with the peritoneal reflection and  abutment of the seminal vesicle.  He had indeterminate lesions in the liver.  However MRI of the liver was negative for any metastatic disease.     He has received good amount of chemotherapy so far.  Because of his peripheral neuropathy I will discontinue oxaliplatin.  We will also stop bevacizumab since there is no evidence of active disease right now.  Also in anticipation of a surgical procedure in the near future.  I am giving 5-FU infusion only today.  We will repeat a CT of the chest abdomen and pelvis and MRI of the pelvis.  If that shows no evidence of disease progression then we will refer him back to surgery.    3.   Chemo-induced nausea  4.   Weight loss secondary to anorexia/dysgeusia  Doing well with Compazine and Zofran.  I put him on Remeron for weight loss which seems to have helped.  He is maintaining his weight.  Oral intake has been adequate.    5.   Cancer pain  Stable. Taking oxycodone 5 mg BID. He will notify us when refill needed.     Patient Instructions   Cycle 12 5FU infusion only. Pump disconnection on Wednesday. Please schedule a CT CAP and MRI pelvis ASAP. Further follow ups will be scheduled soon.       ______________________________________________________________________________    History of Present Illness/ Interval  History    Mr. Kalin Shepard  is a 72 year old returning for consideration of cycle 11 FOLFOX + bevacizumab. Oxaliplatin was dose-reduced cycle 9 for peripheral neuropathy.    Doing well since last visit.  He has completed about 11 cycles of FOLFOX with significant dose reduction in the oxaliplatin due to peripheral neuropathy.  Also had adynamic bowel obstruction in August.  But since then he has done really well.  Regular bowel movements.  Somewhat loose.  No blood in stools.  No fevers or chills or abdominal pain.  Oral intake is okay.  He is maintaining his weight.  He has developed grade 1 through 2 peripheral neuropathy especially in his fingers.  He is still able to do all his activities of daily living without any major difficulties but he can feel that his fingers are numb.    Labs reviewed today.      ECOG Performance    1      Oncology History/Treatment  Diagnosis/Stage:   3/26/2022: IV sigmoid colon cancer to peritoneum  -Presented with abdominal pain secondary to bowel obstruction.  CT scan showed mass in sigmoid colon with upstream small bowel and colonic obstruction.  Underwent ostomy placement.    -Peritoneal biopsy positive for adenocarcinoma.    -No evidence of other distant metastasis based on PET scan.  -BRAF V600E positive.  K-aldo and NRAS negative.  Proficient MMR.    8/24/2022 open laparotomy for SBO, lysis of adhesions. No evidence of any malignancy intra-abdominally noted.       Treatment:  Induction/neoadjuvant chemotherapy started for potentially resectable metachronous peritoneal metastasis    4/26/2022 - present: FOLFOX and bevacizumab   -cycle 7 delayed for neutropenia. Neulasta added  -cycle 8 delayed for hospitalization with SBO (favoring ileus/adhesions, no evidence of malignancy). Post-op course complicated by peritonitis requiring antibiotics. Temp TPN, then cleared for oral intake.   -Bevacizumab omitted with cycle 8 to allow for healing postmalarial. Resumed with cycle  9.  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  Infusional 5-FU only.      Physical Exam    GENERAL: Alert and oriented to time place and person. Seated comfortably. In no distress. Thin. Wife accompanies.  HEAD: Atraumatic and normocephalic.   EYES: MARA, EOMI. No erythema. No icterus.  LYMPH NODES: No palpable supraclavicular, cervical lymphadenopathy.  CHEST: clear to auscultation bilaterally.   CVS: RRR  ABDOMEN: Soft. Not tender. Not distended. Ostomy bag in place.  EXTREMITIES: Warm. No peripheral edema. Firm vein without erythema/edema right proximal forearm.   SKIN: no rash, or bruising or purpura.   NEURO: No gross deficit noted. Non-antalgic gait.      Lab Results    Recent Results (from the past 168 hour(s))   Protein qualitative urine   Result Value Ref Range    Protein Albumin Urine Negative Negative mg/dL   CBC with platelets and differential   Result Value Ref Range    WBC Count 11.4 (H) 4.0 - 11.0 10e3/uL    RBC Count 4.40 4.40 - 5.90 10e6/uL    Hemoglobin 13.6 13.3 - 17.7 g/dL    Hematocrit 42.3 40.0 - 53.0 %    MCV 96 78 - 100 fL    MCH 30.9 26.5 - 33.0 pg    MCHC 32.2 31.5 - 36.5 g/dL    RDW 16.5 (H) 10.0 - 15.0 %    Platelet Count 167 150 - 450 10e3/uL    % Neutrophils 63 %    % Lymphocytes 20 %    % Monocytes 13 %    % Eosinophils 3 %    % Basophils 1 %    % Immature Granulocytes 0 %    NRBCs per 100 WBC 0 <1 /100    Absolute Neutrophils 7.1 1.6 - 8.3 10e3/uL    Absolute Lymphocytes 2.3 0.8 - 5.3 10e3/uL    Absolute Monocytes 1.5 (H) 0.0 - 1.3 10e3/uL    Absolute Eosinophils 0.4 0.0 - 0.7 10e3/uL    Absolute Basophils 0.1 0.0 - 0.2 10e3/uL    Absolute Immature Granulocytes 0.0 <=0.4 10e3/uL    Absolute NRBCs 0.0 10e3/uL       Imaging    No results found.    Billing  Total time 30 minutes, to include face to face visit, review  of EMR, ordering, documentation and coordination of care on date of service    Signed by: Pamela Colorado MD           Again, thank you for allowing me to participate in the care of your patient.        Sincerely,        Pamela Colorado MD

## 2022-11-21 NOTE — PROGRESS NOTES
[facility] Hematology and Oncology Outpatient Progress Note    Patient: Kalin Shepard  MRN: [id number]  Date of Service: Nov 21, 2022          Reason for Visit    1. Stage IV colon cancer (peritoneal mets)    Primary Oncologist: Dr. Colorado      Assessment/Plan  1. Stage IV colon cancer (peritoneal mets), BRAF positive  2.   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  Last 3 doses of oxaliplatin has been dose reduced significantly due to peripheral neuropathy, grade 1-2.  To begin with he had metastatic disease with peritoneal mets.  So plan was to do 6 months of chemotherapy as much as possible followed by repeat scans and possible surgery.  His scans in between the chemotherapy treatments have not shown any evidence of disease progression.  Most recently was hospitalized for SBO in August.  At the time he underwent laparotomy with adhesion of lysis.  There was no evidence of any malignancy intra-abdominally at that time.  CT scan also showed no evidence of disease.  Prior to starting systemic chemotherapy he had T4 lesion in the sigmoid colon with extension of malignancy to the adjacent segment of the sigmoid colon along with the peritoneal reflection and  abutment of the seminal vesicle.  He had indeterminate lesions in the liver.  However MRI of the liver was negative for any metastatic disease.     He has received good amount of chemotherapy so far.  Because of his peripheral neuropathy I will discontinue oxaliplatin.  We will also stop bevacizumab since there is no evidence of active disease right now.  Also in anticipation of a surgical procedure in the near future.  I am giving 5-FU infusion only today.  We will repeat a CT of the chest abdomen and pelvis and MRI of the pelvis.  If that shows no evidence of disease progression then we will refer him back to surgery.    3.   Chemo-induced nausea  4.   Weight loss secondary to anorexia/dysgeusia  Doing well  with Compazine and Zofran.  I put him on Remeron for weight loss which seems to have helped.  He is maintaining his weight.  Oral intake has been adequate.    5.   Cancer pain  Stable. Taking oxycodone 5 mg BID. He will notify us when refill needed.     Patient Instructions   Cycle 12 5FU infusion only. Pump disconnection on Wednesday. Please schedule a CT CAP and MRI pelvis ASAP. Further follow ups will be scheduled soon.       ______________________________________________________________________________    History of Present Illness/ Interval History    Mr. Kalin Shepard  is a 72 year old returning for consideration of cycle 11 FOLFOX + bevacizumab. Oxaliplatin was dose-reduced cycle 9 for peripheral neuropathy.    Doing well since last visit.  He has completed about 11 cycles of FOLFOX with significant dose reduction in the oxaliplatin due to peripheral neuropathy.  Also had adynamic bowel obstruction in August.  But since then he has done really well.  Regular bowel movements.  Somewhat loose.  No blood in stools.  No fevers or chills or abdominal pain.  Oral intake is okay.  He is maintaining his weight.  He has developed grade 1 through 2 peripheral neuropathy especially in his fingers.  He is still able to do all his activities of daily living without any major difficulties but he can feel that his fingers are numb.    Labs reviewed today.      ECOG Performance    1      Oncology History/Treatment  Diagnosis/Stage:   3/26/2022: IV sigmoid colon cancer to peritoneum  -Presented with abdominal pain secondary to bowel obstruction.  CT scan showed mass in sigmoid colon with upstream small bowel and colonic obstruction.  Underwent ostomy placement.    -Peritoneal biopsy positive for adenocarcinoma.    -No evidence of other distant metastasis based on PET scan.  -BRAF V600E positive.  K-aldo and NRAS negative.  Proficient MMR.    8/24/2022 open laparotomy for SBO, lysis of adhesions. No evidence of any  malignancy intra-abdominally noted.       Treatment:  Induction/neoadjuvant chemotherapy started for potentially resectable metachronous peritoneal metastasis    4/26/2022 - present: FOLFOX and bevacizumab   -cycle 7 delayed for neutropenia. Neulasta added  -cycle 8 delayed for hospitalization with SBO (favoring ileus/adhesions, no evidence of malignancy). Post-op course complicated by peritonitis requiring antibiotics. Temp TPN, then cleared for oral intake.   -Bevacizumab omitted with cycle 8 to allow for healing postmalarial. Resumed with cycle 9.  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  Infusional 5-FU only.      Physical Exam    GENERAL: Alert and oriented to time place and person. Seated comfortably. In no distress. Thin. Wife accompanies.  HEAD: Atraumatic and normocephalic.   EYES: MARA, EOMI. No erythema. No icterus.  LYMPH NODES: No palpable supraclavicular, cervical lymphadenopathy.  CHEST: clear to auscultation bilaterally.   CVS: RRR  ABDOMEN: Soft. Not tender. Not distended. Ostomy bag in place.  EXTREMITIES: Warm. No peripheral edema. Firm vein without erythema/edema right proximal forearm.   SKIN: no rash, or bruising or purpura.   NEURO: No gross deficit noted. Non-antalgic gait.      Lab Results    Recent Results (from the past 168 hour(s))   Protein qualitative urine   Result Value Ref Range    Protein Albumin Urine Negative Negative mg/dL   CBC with platelets and differential   Result Value Ref Range    WBC Count 11.4 (H) 4.0 - 11.0 10e3/uL    RBC Count 4.40 4.40 - 5.90 10e6/uL    Hemoglobin 13.6 13.3 - 17.7 g/dL    Hematocrit 42.3 40.0 - 53.0 %    MCV 96 78 - 100 fL    MCH 30.9 26.5 - 33.0 pg    MCHC 32.2 31.5 - 36.5 g/dL    RDW 16.5 (H) 10.0 - 15.0 %    Platelet Count 167 150 - 450 10e3/uL    % Neutrophils 63 %    %  Lymphocytes 20 %    % Monocytes 13 %    % Eosinophils 3 %    % Basophils 1 %    % Immature Granulocytes 0 %    NRBCs per 100 WBC 0 <1 /100    Absolute Neutrophils 7.1 1.6 - 8.3 10e3/uL    Absolute Lymphocytes 2.3 0.8 - 5.3 10e3/uL    Absolute Monocytes 1.5 (H) 0.0 - 1.3 10e3/uL    Absolute Eosinophils 0.4 0.0 - 0.7 10e3/uL    Absolute Basophils 0.1 0.0 - 0.2 10e3/uL    Absolute Immature Granulocytes 0.0 <=0.4 10e3/uL    Absolute NRBCs 0.0 10e3/uL       Imaging    No results found.    Billing  Total time 30 minutes, to include face to face visit, review of EMR, ordering, documentation and coordination of care on date of service    Signed by: Pamela Colorado MD

## 2022-11-21 NOTE — PROGRESS NOTES
"Oncology Rooming Note    November 21, 2022 10:32 AM   Kalin Shepard is a 72 year old male who presents for:    Chief Complaint   Patient presents with     Oncology Clinic Visit     Adenocarcinoma of sigmoid colon - Lab provider and infusion     Initial Vitals: /79 (BP Location: Right arm, Patient Position: Sitting, Cuff Size: Adult Regular)   Pulse 74   Temp 97.8  F (36.6  C) (Tympanic)   Resp 12   Wt 61.3 kg (135 lb 1.6 oz)   SpO2 97%   BMI 19.38 kg/m   Estimated body mass index is 19.38 kg/m  as calculated from the following:    Height as of 9/2/22: 1.778 m (5' 10\").    Weight as of this encounter: 61.3 kg (135 lb 1.6 oz). Body surface area is 1.74 meters squared.  Moderate Pain (4) Comment: Data Unavailable   No LMP for male patient.  Allergies reviewed: Yes  Medications reviewed: Yes    Medications: MEDICATION REFILLS NEEDED TODAY. Provider was notified.  Pharmacy name entered into [facility]:    [facility] - [place], [place] - [address]  [place], [place] - [address] [place] [address]    Clinical concerns:  None      Andria Truong CMA            "

## 2022-11-21 NOTE — PATIENT INSTRUCTIONS
Cycle 12 5FU infusion only. Pump disconnection on Wednesday. Please schedule a CT CAP and MRI pelvis ASAP. Further follow ups will be scheduled soon.

## 2022-11-22 ENCOUNTER — TELEPHONE (OUTPATIENT)
Dept: FAMILY MEDICINE | Facility: CLINIC | Age: 72
End: 2022-11-22

## 2022-11-22 NOTE — TELEPHONE ENCOUNTER
FYI - Status Update    Who is Calling: Clara urbina     Update: Cancelling nurse visit for the week of 11/21. Patient has his Chemo disconnect on 11/23. Will send paper orders for signature.     Does caller want a call/response back: No

## 2022-11-23 ENCOUNTER — MEDICAL CORRESPONDENCE (OUTPATIENT)
Dept: HEALTH INFORMATION MANAGEMENT | Facility: CLINIC | Age: 72
End: 2022-11-23

## 2022-11-23 ENCOUNTER — INFUSION THERAPY VISIT (OUTPATIENT)
Dept: INFUSION THERAPY | Facility: CLINIC | Age: 72
End: 2022-11-23
Attending: INTERNAL MEDICINE
Payer: COMMERCIAL

## 2022-11-23 VITALS — SYSTOLIC BLOOD PRESSURE: 124 MMHG | DIASTOLIC BLOOD PRESSURE: 76 MMHG | TEMPERATURE: 97.8 F | HEART RATE: 70 BPM

## 2022-11-23 DIAGNOSIS — C18.7 ADENOCARCINOMA OF SIGMOID COLON (H): Primary | ICD-10-CM

## 2022-11-23 DIAGNOSIS — D70.1 CHEMOTHERAPY-INDUCED NEUTROPENIA (H): ICD-10-CM

## 2022-11-23 DIAGNOSIS — T45.1X5A CHEMOTHERAPY-INDUCED NEUTROPENIA (H): ICD-10-CM

## 2022-11-23 RX ORDER — HEPARIN SODIUM (PORCINE) LOCK FLUSH IV SOLN 100 UNIT/ML 100 UNIT/ML
5 SOLUTION INTRAVENOUS
Status: DISCONTINUED | OUTPATIENT
Start: 2022-11-23 | End: 2022-11-23 | Stop reason: HOSPADM

## 2022-11-23 NOTE — PROGRESS NOTES
Pt arrives for pump disconnect  Continuous infusion fluorouracil infusion completed. Pt tolerated the infusion without incident.  Port flush done per protocol and needle removed.  Pt is scheduled to return on 12/5 for return visit and next chemo cycle.  Kat Chaves RN

## 2022-11-27 ENCOUNTER — HOSPITAL ENCOUNTER (OUTPATIENT)
Dept: CT IMAGING | Facility: CLINIC | Age: 72
Discharge: HOME | End: 2022-11-27
Attending: INTERNAL MEDICINE
Payer: COMMERCIAL

## 2022-11-27 ENCOUNTER — HOSPITAL ENCOUNTER (OUTPATIENT)
Dept: MRI IMAGING | Facility: CLINIC | Age: 72
Discharge: HOME | End: 2022-11-27
Attending: INTERNAL MEDICINE
Payer: COMMERCIAL

## 2022-11-27 DIAGNOSIS — C18.7 ADENOCARCINOMA OF SIGMOID COLON (H): ICD-10-CM

## 2022-11-27 PROCEDURE — A9585 GADOBUTROL INJECTION: HCPCS | Performed by: INTERNAL MEDICINE

## 2022-11-27 PROCEDURE — 258N000003 HC RX IP 258 OP 636: Performed by: INTERNAL MEDICINE

## 2022-11-27 PROCEDURE — 255N000002 HC RX 255 OP 636: Performed by: INTERNAL MEDICINE

## 2022-11-27 PROCEDURE — 250N000011 HC RX IP 250 OP 636: Performed by: INTERNAL MEDICINE

## 2022-11-27 PROCEDURE — 250N000009 HC RX 250: Performed by: INTERNAL MEDICINE

## 2022-11-27 PROCEDURE — 72197 MRI PELVIS W/O & W/DYE: CPT

## 2022-11-27 PROCEDURE — 74177 CT ABD & PELVIS W/CONTRAST: CPT

## 2022-11-27 RX ORDER — IOPAMIDOL 755 MG/ML
60 INJECTION, SOLUTION INTRAVASCULAR ONCE
Status: COMPLETED | OUTPATIENT
Start: 2022-11-27 | End: 2022-11-27

## 2022-11-27 RX ORDER — GADOBUTROL 604.72 MG/ML
6 INJECTION INTRAVENOUS ONCE
Status: COMPLETED | OUTPATIENT
Start: 2022-11-27 | End: 2022-11-27

## 2022-11-27 RX ADMIN — GADOBUTROL 6 ML: 604.72 INJECTION INTRAVENOUS at 08:55

## 2022-11-27 RX ADMIN — SODIUM CHLORIDE 50 ML: 9 INJECTION, SOLUTION INTRAVENOUS at 08:55

## 2022-11-27 RX ADMIN — SODIUM CHLORIDE 56 ML: 9 INJECTION, SOLUTION INTRAVENOUS at 09:32

## 2022-11-27 RX ADMIN — IOPAMIDOL 60 ML: 755 INJECTION, SOLUTION INTRAVENOUS at 09:31

## 2022-11-29 ENCOUNTER — VIRTUAL VISIT (OUTPATIENT)
Dept: ONCOLOGY | Facility: CLINIC | Age: 72
End: 2022-11-29
Attending: INTERNAL MEDICINE
Payer: COMMERCIAL

## 2022-11-29 DIAGNOSIS — C18.7 ADENOCARCINOMA OF SIGMOID COLON (H): Primary | ICD-10-CM

## 2022-11-29 PROCEDURE — 99212 OFFICE O/P EST SF 10 MIN: CPT | Mod: 95 | Performed by: INTERNAL MEDICINE

## 2022-11-29 NOTE — LETTER
"    11/29/2022         RE: Kalin Shepard  [address]  [facility] [address]        Dear Colleague,    Thank you for referring your patient, Kalin Shepard, to the [facility] [place]. Please see a copy of my visit note below.    Layton is a 72 year old who is being evaluated via a billable telephone visit.      What phone number would you like to be contacted at?[phone]   How would you like to obtain your AVS? Mars BOO    The patient has been notified of the following:      \"We have found that certain health care needs can be provided without the need for a face to face visit.  This service lets us provide the care you need with a phone conversation.       I will have full access to your [facility] medical record during this entire phone call.   I will be taking notes for your medical record.      Since this is like an office visit, we will bill your insurance company for this service.       There are potential benefits and risks of telephone visits (e.g. limits to patient confidentiality) that differ from in-person visits.?  Confidentiality still applies for telephone services, and nobody will record the visit.  It is important to be in a quiet, private space that is free of distractions (including cell phone or other devices) during the visit.??      If during the course of the call I believe a telephone visit is not appropriate, you will not be charged for this service\"     Consent has been obtained for this service by care team member: Yes     [facility] Hematology and Oncology Outpatient Progress Note    Patient: Kalin Shepard  MRN: [id number]  Date of Service: Nov 29, 2022          Reason for Visit    1. Stage IV colon cancer (peritoneal mets)    Primary Oncologist: Dr. Colorado      Assessment/Plan  1. Stage IV colon cancer (peritoneal mets), BRAF positive  2.   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  Last 3 doses of oxaliplatin has been dose reduced significantly due to peripheral neuropathy, grade 1-2.  To begin with he had metastatic disease with peritoneal mets.  So plan was to do 6 months of chemotherapy as much as possible followed by repeat scans and possible surgery.  His scans in between the chemotherapy treatments have not shown any evidence of disease progression.  Most recently was hospitalized for SBO in August.  At the time he underwent laparotomy with adhesion of lysis.  There was no evidence of any malignancy intra-abdominally at that time.  CT scan also showed no evidence of disease.  Prior to starting systemic chemotherapy he had T4 lesion in the sigmoid colon with extension of malignancy to the adjacent segment of the sigmoid colon along with the peritoneal reflection and  abutment of the left seminal vesicle.  He had indeterminate lesions in the liver.  However MRI of the liver was negative for any metastatic disease.     I repeated his CT scan and MRI of the pelvis last week.  CT scan showing no evidence of any metastatic disease.  MRI showing significant improvement in sigmoid colon mass which has shrunken from 5.7 cm to 1.7 cm.  There is still some tethering of the left seminal vesicle, peritoneal reflection and the adjacent loop of sigmoid colon by tumor and/or fibrosis.  I think overall this is an excellent response to treatment.  He feels a lot better after we discontinued oxaliplatin.  Neuropathy is pretty stable.  I will make arrangements for him to see colorectal surgery to discuss possible resection in the near future.  No plans for more chemotherapy for now.        There are no Patient Instructions on file for this visit.    ______________________________________________________________________________    History of Present Illness/ Interval History    Mr. Kalin Shepard  is a 72 year old returning for consideration of cycle 11 FOLFOX +  bevacizumab. Oxaliplatin was dose-reduced cycle 9 for peripheral neuropathy and discontinued after cycle 11.    I gave him a 5-FU infusion only couple weeks ago.  Did well with it.  No major side effects reported today.  He is feeling a lot better.  Neuropathy is stable.  He is seen as a telephone encounter to discuss the CT and MRI results.    Labs reviewed today.      ECOG Performance    1      Oncology History/Treatment  Diagnosis/Stage:   3/26/2022: IV sigmoid colon cancer to peritoneum  -Presented with abdominal pain secondary to bowel obstruction.  CT scan showed mass in sigmoid colon with upstream small bowel and colonic obstruction.  Underwent ostomy placement.    -Peritoneal biopsy positive for adenocarcinoma.    -No evidence of other distant metastasis based on PET scan.  -BRAF V600E positive.  K-aldo and NRAS negative.  Proficient MMR.    8/24/2022 open laparotomy for SBO, lysis of adhesions. No evidence of any malignancy intra-abdominally noted.       Treatment:  Induction/neoadjuvant chemotherapy started for potentially resectable metachronous peritoneal metastasis    4/26/2022 - present: FOLFOX and bevacizumab   -cycle 7 delayed for neutropenia. Neulasta added  -cycle 8 delayed for hospitalization with SBO (favoring ileus/adhesions, no evidence of malignancy). Post-op course complicated by peritonitis requiring antibiotics. Temp TPN, then cleared for oral intake.   -Bevacizumab omitted with cycle 8 to allow for healing postmalarial. Resumed with cycle 9.  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  Infusional 5-FU only.      Physical Exam    GENERAL: Alert and oriented to time place and person.       Lab Results    No results found for this or any previous visit (from the past 168 hour(s)).    Imaging    CT Chest/Abdomen/Pelvis w  Contrast    Result Date: 11/27/2022  EXAM: CT CHEST/ABDOMEN/PELVIS WITH CONTRAST LOCATION: [facility] DATE/TIME: 11/27/2022, 9:43 AM INDICATION: Adenocarcinoma of sigmoid colon (H). COMPARISON: 08/26/2022. TECHNIQUE: CT scan of the chest, abdomen, and pelvis was performed following injection of IV contrast. Multiplanar reformats were obtained. Dose reduction techniques were used. CONTRAST: 60 mL Isovue 370. FINDINGS: LUNGS AND PLEURA: Minimal scattered atelectasis and/or fibrosis. No infiltrates or effusions. Stable anterolateral right upper lobe nodule measuring 4 mm image 111 series 3. No pulmonary masses. MEDIASTINUM/AXILLAE: Right hilar lymph adenopathy measuring 12 mm in axial short axis. Mildly prominent AP window nodes, unchanged. This is not significantly changed since comparison study. No aneurysm. CORONARY ARTERY CALCIFICATION: Mild. HEPATOBILIARY: No significant mass or bile duct dilatation. No calcified gallstones. PANCREAS: No significant mass, duct dilatation, or inflammatory change. SPLEEN: Normal size. ADRENAL GLANDS: No significant nodules. KIDNEYS/BLADDER: No significant mass, stones, or hydronephrosis. There are simple or benign cysts. No follow up is needed. BOWEL: Bowel resection change and ileostomy. No obstruction. No recurrent or residual disease demonstrated. LYMPH NODES: No definite abdominopelvic adenopathy. VASCULATURE: 3 cm saccular infrarenal abdominal aortic aneurysm, unchanged from previous. PELVIC ORGANS: Similar prostatomegaly. MUSCULOSKELETAL: No frankly destructive bony lesions.     IMPRESSION: 1.  Similar minimally prominent adenopathy in the chest, I favor reactive adenopathy. 2.  No recurrent or residual malignancy demonstrated in the abdomen and pelvis.     MR Pelvis (Intrapelvic Organs) wo&w Contrast    Result Date: 11/27/2022  EXAM: MRI PELVIS WITHOUT AND WITH IV CONTRAST LOCATION: [facility] DATE/TIME: 11/27/2022 9:13  AM INDICATION:  Adenocarcinoma of sigmoid colon (H) COMPARISON: CT 08/26/2022, MRI 06/13/2022 TECHNIQUE: Routine MRI pelvis without and with IV contrast. Axial, sagittal, and coronal high-resolution T2 and post gadolinium T1 with fat saturation. CONTRAST: 6 mL Gadavist FINDINGS: Annular narrowing of the redundant sigmoid colon with associated spiculated soft tissue that tethers the left seminal vesicle, portions of the peritoneal reflection, and also an adjacent loop of sigmoid colon (e.g. 5/29, 6/16-18). Compared to 06/13/2022, the mass is significantly decreased in size and extent. Portions of tumor signal on the prior MRI are now low signal intensity scar. There is still residual viable tumor measuring approximately 1.7 cm in length (9.2/14), previously 5.7 Pelvic and inguinal lymph nodes are present, but none that are enlarged or with suspicious morphology. Normal bone marrow signal. Left lower quadrant colostomy partially imaged. Prostate is enlarged with heterogeneous transition zone and BPH nodules.     IMPRESSION: 1.  Spiculated mass in the sigmoid colon has significantly decreased in size and extent compared to 06/13/2022 with increased posttreatment scarring. Residual viable tumor is now approximately 1.7 cm in length, previously 5.7 cm. There remains tethering of the left seminal vesicle, peritoneal reflection, and adjacent loop of redundant sigmoid colon by tumor and fibrosis. 2.  No suspicious lymph nodes.      Billing  A total of 5 minutes was spent with the patient during this telephone encounter.    Signed by: Pamela Colorado MD           Again, thank you for allowing me to participate in the care of your patient.        Sincerely,        Pamela Colorado MD

## 2022-11-29 NOTE — NURSING NOTE
Patient declined individual allergy and medication review by support staff because pt states nothing has changed since last reviewed on November 21st 2022.    Jonathan BOO

## 2022-11-29 NOTE — LETTER
"    11/29/2022         RE: Kalin Shepard  [address]  [facility] [address]        Dear Colleague,    Thank you for referring your patient, Kalin Shepard, to the [facility]. Please see a copy of my visit note below.    Layton is a 72 year old who is being evaluated via a billable telephone visit.      What phone number would you like to be contacted at?[phone]   How would you like to obtain your AVS? Mars BOO    The patient has been notified of the following:      \"We have found that certain health care needs can be provided without the need for a face to face visit.  This service lets us provide the care you need with a phone conversation.       I will have full access to your [place] medical record during this entire phone call.   I will be taking notes for your medical record.      Since this is like an office visit, we will bill your insurance company for this service.       There are potential benefits and risks of telephone visits (e.g. limits to patient confidentiality) that differ from in-person visits.?  Confidentiality still applies for telephone services, and nobody will record the visit.  It is important to be in a quiet, private space that is free of distractions (including cell phone or other devices) during the visit.??      If during the course of the call I believe a telephone visit is not appropriate, you will not be charged for this service\"     Consent has been obtained for this service by care team member: Yes     [facility] Hematology and Oncology Outpatient Progress Note    Patient: Kalin Shepard  MRN: [id number]  Date of Service: Nov 29, 2022          Reason for Visit    1. Stage IV colon cancer (peritoneal mets)    Primary Oncologist: Dr. Colorado      Assessment/Plan  1. Stage IV colon cancer (peritoneal mets), BRAF positive  2.   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  Last 3 doses of oxaliplatin has been dose reduced significantly due to peripheral neuropathy, grade 1-2.  To begin with he had metastatic disease with peritoneal mets.  So plan was to do 6 months of chemotherapy as much as possible followed by repeat scans and possible surgery.  His scans in between the chemotherapy treatments have not shown any evidence of disease progression.  Most recently was hospitalized for SBO in August.  At the time he underwent laparotomy with adhesion of lysis.  There was no evidence of any malignancy intra-abdominally at that time.  CT scan also showed no evidence of disease.  Prior to starting systemic chemotherapy he had T4 lesion in the sigmoid colon with extension of malignancy to the adjacent segment of the sigmoid colon along with the peritoneal reflection and  abutment of the left seminal vesicle.  He had indeterminate lesions in the liver.  However MRI of the liver was negative for any metastatic disease.     I repeated his CT scan and MRI of the pelvis last week.  CT scan showing no evidence of any metastatic disease.  MRI showing significant improvement in sigmoid colon mass which has shrunken from 5.7 cm to 1.7 cm.  There is still some tethering of the left seminal vesicle, peritoneal reflection and the adjacent loop of sigmoid colon by tumor and/or fibrosis.  I think overall this is an excellent response to treatment.  He feels a lot better after we discontinued oxaliplatin.  Neuropathy is pretty stable.  I will make arrangements for him to see colorectal surgery to discuss possible resection in the near future.  No plans for more chemotherapy for now.        There are no Patient Instructions on file for this visit.    ______________________________________________________________________________    History of Present Illness/ Interval History    Mr. Kalin Shepard  is a 72 year old returning for consideration of cycle 11 FOLFOX +  bevacizumab. Oxaliplatin was dose-reduced cycle 9 for peripheral neuropathy and discontinued after cycle 11.    I gave him a 5-FU infusion only couple weeks ago.  Did well with it.  No major side effects reported today.  He is feeling a lot better.  Neuropathy is stable.  He is seen as a telephone encounter to discuss the CT and MRI results.    Labs reviewed today.      ECOG Performance    1      Oncology History/Treatment  Diagnosis/Stage:   3/26/2022: IV sigmoid colon cancer to peritoneum  -Presented with abdominal pain secondary to bowel obstruction.  CT scan showed mass in sigmoid colon with upstream small bowel and colonic obstruction.  Underwent ostomy placement.    -Peritoneal biopsy positive for adenocarcinoma.    -No evidence of other distant metastasis based on PET scan.  -BRAF V600E positive.  K-aldo and NRAS negative.  Proficient MMR.    8/24/2022 open laparotomy for SBO, lysis of adhesions. No evidence of any malignancy intra-abdominally noted.       Treatment:  Induction/neoadjuvant chemotherapy started for potentially resectable metachronous peritoneal metastasis    4/26/2022 - present: FOLFOX and bevacizumab   -cycle 7 delayed for neutropenia. Neulasta added  -cycle 8 delayed for hospitalization with SBO (favoring ileus/adhesions, no evidence of malignancy). Post-op course complicated by peritonitis requiring antibiotics. Temp TPN, then cleared for oral intake.   -Bevacizumab omitted with cycle 8 to allow for healing postmalarial. Resumed with cycle 9.  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  Infusional 5-FU only.      Physical Exam    GENERAL: Alert and oriented to time place and person.       Lab Results    No results found for this or any previous visit (from the past 168 hour(s)).    Imaging    CT Chest/Abdomen/Pelvis w  Contrast    Result Date: 11/27/2022  EXAM: CT CHEST/ABDOMEN/PELVIS WITH CONTRAST LOCATION: [facility] DATE/TIME: 11/27/2022, 9:43 AM INDICATION: Adenocarcinoma of sigmoid colon (H). COMPARISON: 08/26/2022. TECHNIQUE: CT scan of the chest, abdomen, and pelvis was performed following injection of IV contrast. Multiplanar reformats were obtained. Dose reduction techniques were used. CONTRAST: 60 mL Isovue 370. FINDINGS: LUNGS AND PLEURA: Minimal scattered atelectasis and/or fibrosis. No infiltrates or effusions. Stable anterolateral right upper lobe nodule measuring 4 mm image 111 series 3. No pulmonary masses. MEDIASTINUM/AXILLAE: Right hilar lymph adenopathy measuring 12 mm in axial short axis. Mildly prominent AP window nodes, unchanged. This is not significantly changed since comparison study. No aneurysm. CORONARY ARTERY CALCIFICATION: Mild. HEPATOBILIARY: No significant mass or bile duct dilatation. No calcified gallstones. PANCREAS: No significant mass, duct dilatation, or inflammatory change. SPLEEN: Normal size. ADRENAL GLANDS: No significant nodules. KIDNEYS/BLADDER: No significant mass, stones, or hydronephrosis. There are simple or benign cysts. No follow up is needed. BOWEL: Bowel resection change and ileostomy. No obstruction. No recurrent or residual disease demonstrated. LYMPH NODES: No definite abdominopelvic adenopathy. VASCULATURE: 3 cm saccular infrarenal abdominal aortic aneurysm, unchanged from previous. PELVIC ORGANS: Similar prostatomegaly. MUSCULOSKELETAL: No frankly destructive bony lesions.     IMPRESSION: 1.  Similar minimally prominent adenopathy in the chest, I favor reactive adenopathy. 2.  No recurrent or residual malignancy demonstrated in the abdomen and pelvis.     MR Pelvis (Intrapelvic Organs) wo&w Contrast    Result Date: 11/27/2022  EXAM: MRI PELVIS WITHOUT AND WITH IV CONTRAST LOCATION: [facility] DATE/TIME: 11/27/2022 9:13  AM INDICATION:  Adenocarcinoma of sigmoid colon (H) COMPARISON: CT 08/26/2022, MRI 06/13/2022 TECHNIQUE: Routine MRI pelvis without and with IV contrast. Axial, sagittal, and coronal high-resolution T2 and post gadolinium T1 with fat saturation. CONTRAST: 6 mL Gadavist FINDINGS: Annular narrowing of the redundant sigmoid colon with associated spiculated soft tissue that tethers the left seminal vesicle, portions of the peritoneal reflection, and also an adjacent loop of sigmoid colon (e.g. 5/29, 6/16-18). Compared to 06/13/2022, the mass is significantly decreased in size and extent. Portions of tumor signal on the prior MRI are now low signal intensity scar. There is still residual viable tumor measuring approximately 1.7 cm in length (9.2/14), previously 5.7 Pelvic and inguinal lymph nodes are present, but none that are enlarged or with suspicious morphology. Normal bone marrow signal. Left lower quadrant colostomy partially imaged. Prostate is enlarged with heterogeneous transition zone and BPH nodules.     IMPRESSION: 1.  Spiculated mass in the sigmoid colon has significantly decreased in size and extent compared to 06/13/2022 with increased posttreatment scarring. Residual viable tumor is now approximately 1.7 cm in length, previously 5.7 cm. There remains tethering of the left seminal vesicle, peritoneal reflection, and adjacent loop of redundant sigmoid colon by tumor and fibrosis. 2.  No suspicious lymph nodes.      Billing  A total of 5 minutes was spent with the patient during this telephone encounter.    Signed by: Pamela Colorado MD           Again, thank you for allowing me to participate in the care of your patient.        Sincerely,        Pamela Colorado MD

## 2022-11-29 NOTE — PROGRESS NOTES
"The patient has been notified of the following:      \"We have found that certain health care needs can be provided without the need for a face to face visit.  This service lets us provide the care you need with a phone conversation.       I will have full access to your [facility] medical record during this entire phone call.   I will be taking notes for your medical record.      Since this is like an office visit, we will bill your insurance company for this service.       There are potential benefits and risks of telephone visits (e.g. limits to patient confidentiality) that differ from in-person visits.?  Confidentiality still applies for telephone services, and nobody will record the visit.  It is important to be in a quiet, private space that is free of distractions (including cell phone or other devices) during the visit.??      If during the course of the call I believe a telephone visit is not appropriate, you will not be charged for this service\"     Consent has been obtained for this service by care team member: Fam     [facility] Hematology and Oncology Outpatient Progress Note    Patient: Kalin Shepard  MRN: [id number]  Date of Service: Nov 29, 2022          Reason for Visit    1. Stage IV colon cancer (peritoneal mets)    Primary Oncologist: Dr. Colorado      Assessment/Plan  1. Stage IV colon cancer (peritoneal mets), BRAF positive  2.   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  Last 3 doses of oxaliplatin has been dose reduced significantly due to peripheral neuropathy, grade 1-2.  To begin with he had metastatic disease with peritoneal mets.  So plan was to do 6 months of chemotherapy as much as possible followed by repeat scans and possible surgery.  His scans in between the chemotherapy treatments have not shown any evidence of disease progression.  Most recently was hospitalized for SBO in August.  At the time he " underwent laparotomy with adhesion of lysis.  There was no evidence of any malignancy intra-abdominally at that time.  CT scan also showed no evidence of disease.  Prior to starting systemic chemotherapy he had T4 lesion in the sigmoid colon with extension of malignancy to the adjacent segment of the sigmoid colon along with the peritoneal reflection and  abutment of the left seminal vesicle.  He had indeterminate lesions in the liver.  However MRI of the liver was negative for any metastatic disease.     I repeated his CT scan and MRI of the pelvis last week.  CT scan showing no evidence of any metastatic disease.  MRI showing significant improvement in sigmoid colon mass which has shrunken from 5.7 cm to 1.7 cm.  There is still some tethering of the left seminal vesicle, peritoneal reflection and the adjacent loop of sigmoid colon by tumor and/or fibrosis.  I think overall this is an excellent response to treatment.  He feels a lot better after we discontinued oxaliplatin.  Neuropathy is pretty stable.  I will make arrangements for him to see colorectal surgery to discuss possible resection in the near future.  No plans for more chemotherapy for now.        There are no Patient Instructions on file for this visit.    ______________________________________________________________________________    History of Present Illness/ Interval History    Mr. Kalin Shepard  is a 72 year old returning for consideration of cycle 11 FOLFOX + bevacizumab. Oxaliplatin was dose-reduced cycle 9 for peripheral neuropathy and discontinued after cycle 11.    I gave him a 5-FU infusion only couple weeks ago.  Did well with it.  No major side effects reported today.  He is feeling a lot better.  Neuropathy is stable.  He is seen as a telephone encounter to discuss the CT and MRI results.    Labs reviewed today.      ECOG Performance    1      Oncology History/Treatment  Diagnosis/Stage:   3/26/2022: IV sigmoid colon cancer to  peritoneum  -Presented with abdominal pain secondary to bowel obstruction.  CT scan showed mass in sigmoid colon with upstream small bowel and colonic obstruction.  Underwent ostomy placement.    -Peritoneal biopsy positive for adenocarcinoma.    -No evidence of other distant metastasis based on PET scan.  -BRAF V600E positive.  K-aldo and NRAS negative.  Proficient MMR.    8/24/2022 open laparotomy for SBO, lysis of adhesions. No evidence of any malignancy intra-abdominally noted.       Treatment:  Induction/neoadjuvant chemotherapy started for potentially resectable metachronous peritoneal metastasis    4/26/2022 - present: FOLFOX and bevacizumab   -cycle 7 delayed for neutropenia. Neulasta added  -cycle 8 delayed for hospitalization with SBO (favoring ileus/adhesions, no evidence of malignancy). Post-op course complicated by peritonitis requiring antibiotics. Temp TPN, then cleared for oral intake.   -Bevacizumab omitted with cycle 8 to allow for healing postmalarial. Resumed with cycle 9.  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  Infusional 5-FU only.      Physical Exam    GENERAL: Alert and oriented to time place and person.       Lab Results    No results found for this or any previous visit (from the past 168 hour(s)).    Imaging    CT Chest/Abdomen/Pelvis w Contrast    Result Date: 11/27/2022  EXAM: CT CHEST/ABDOMEN/PELVIS WITH CONTRAST LOCATION: [facility] DATE/TIME: 11/27/2022, 9:43 AM INDICATION: Adenocarcinoma of sigmoid colon (H). COMPARISON: 08/26/2022. TECHNIQUE: CT scan of the chest, abdomen, and pelvis was performed following injection of IV contrast. Multiplanar reformats were obtained. Dose reduction techniques were used. CONTRAST: 60 mL Isovue 370. FINDINGS: LUNGS AND PLEURA: Minimal scattered  atelectasis and/or fibrosis. No infiltrates or effusions. Stable anterolateral right upper lobe nodule measuring 4 mm image 111 series 3. No pulmonary masses. MEDIASTINUM/AXILLAE: Right hilar lymph adenopathy measuring 12 mm in axial short axis. Mildly prominent AP window nodes, unchanged. This is not significantly changed since comparison study. No aneurysm. CORONARY ARTERY CALCIFICATION: Mild. HEPATOBILIARY: No significant mass or bile duct dilatation. No calcified gallstones. PANCREAS: No significant mass, duct dilatation, or inflammatory change. SPLEEN: Normal size. ADRENAL GLANDS: No significant nodules. KIDNEYS/BLADDER: No significant mass, stones, or hydronephrosis. There are simple or benign cysts. No follow up is needed. BOWEL: Bowel resection change and ileostomy. No obstruction. No recurrent or residual disease demonstrated. LYMPH NODES: No definite abdominopelvic adenopathy. VASCULATURE: 3 cm saccular infrarenal abdominal aortic aneurysm, unchanged from previous. PELVIC ORGANS: Similar prostatomegaly. MUSCULOSKELETAL: No frankly destructive bony lesions.     IMPRESSION: 1.  Similar minimally prominent adenopathy in the chest, I favor reactive adenopathy. 2.  No recurrent or residual malignancy demonstrated in the abdomen and pelvis.     MR Pelvis (Intrapelvic Organs) wo&w Contrast    Result Date: 11/27/2022  EXAM: MRI PELVIS WITHOUT AND WITH IV CONTRAST LOCATION: [facility] DATE/TIME: 11/27/2022 9:13 AM INDICATION:  Adenocarcinoma of sigmoid colon (H) COMPARISON: CT 08/26/2022, MRI 06/13/2022 TECHNIQUE: Routine MRI pelvis without and with IV contrast. Axial, sagittal, and coronal high-resolution T2 and post gadolinium T1 with fat saturation. CONTRAST: 6 mL Gadavist FINDINGS: Annular narrowing of the redundant sigmoid colon with associated spiculated soft tissue that tethers the left seminal vesicle, portions of the peritoneal reflection, and also an adjacent loop of sigmoid  colon (e.g. 5/29, 6/16-18). Compared to 06/13/2022, the mass is significantly decreased in size and extent. Portions of tumor signal on the prior MRI are now low signal intensity scar. There is still residual viable tumor measuring approximately 1.7 cm in length (9.2/14), previously 5.7 Pelvic and inguinal lymph nodes are present, but none that are enlarged or with suspicious morphology. Normal bone marrow signal. Left lower quadrant colostomy partially imaged. Prostate is enlarged with heterogeneous transition zone and BPH nodules.     IMPRESSION: 1.  Spiculated mass in the sigmoid colon has significantly decreased in size and extent compared to 06/13/2022 with increased posttreatment scarring. Residual viable tumor is now approximately 1.7 cm in length, previously 5.7 cm. There remains tethering of the left seminal vesicle, peritoneal reflection, and adjacent loop of redundant sigmoid colon by tumor and fibrosis. 2.  No suspicious lymph nodes.      Billing  A total of 5 minutes was spent with the patient during this telephone encounter.    Signed by: Pamela Colorado MD

## 2022-11-29 NOTE — PROGRESS NOTES
Layton is a 72 year old who is being evaluated via a billable telephone visit.      What phone number would you like to be contacted at?[phone]   How would you like to obtain your AVS? Mars BOO

## 2022-12-01 ENCOUNTER — TELEPHONE (OUTPATIENT)
Dept: FAMILY MEDICINE | Facility: CLINIC | Age: 72
End: 2022-12-01

## 2022-12-01 NOTE — PROGRESS NOTES
This is a recent snapshot of the patient's [facility] medical record.  For current drug dose and complete information and questions, call [phone] or In Basket pool, [facility] ([id number])  CSN Number:  [id number]

## 2022-12-01 NOTE — TELEPHONE ENCOUNTER
Reason for Call:  Home Health Care    Clara with [facility] called regarding (reason for call): Please call with verbal orders - OK to leave detailed message    Orders are needed for this patient.     Skilled Nursing: Nurse visit 2 x in December    Pt Provider: Mc    Phone Number Homecare Nurse can be reached at: [phone]    Can we leave a detailed message on this number? YES    Phone number patient can be reached at: Home number on file [phone] (home)    Best Time: any    Call taken on 12/1/2022 at 8:09 AM by Karla Tate

## 2022-12-12 NOTE — PROGRESS NOTES
Colon and Rectal Surgery Clinic Note     RE:      Kalin Shepard.  :   1950.  REGGIE:   21     Reason for visit: Follow up after chemo, discuss surgery     HPI (2022): Kalin Shepard is a 71 year old male  who presented to [facility] ED on 3/26/22 with abdominal pain, he was found to have obstructing sigmoid colon mass and associated small bowel obstruction in the setting of unintentional weight loss over the last year, approximately 30-40 pounds. He then underwent emergent exploratory laparotomy, small bowel resection, loop descending colostomy creation, flexible sigmoidoscopy with me. Final pathology showed sigmoid mass that was negative for malignancy although likely sampling is non-representative and a peritoneal biopsy that is positive for malignancy - intestinal type adenocarcinoma. He followed up with Pamela Colorado MD with Med [facility] who recommended chemotherapy as anticipated. He has since started, and is doing well. No complaints. Healing well.        Surgical Pathology (3/26/22):        Interval history:     Admitted to [facility] from - for SBO, NG tube was placed. Gastrografin study was attempted 22 but only had minimal contrast passing into the colon by 16 hours on 22.  Patient did initially tolerate being off suction during the Gastrografin study, but morning of 22 he developed increasing pain prompting replacement of suction with about 500 cc out at that time. Output then dropped off. NGT removed accidentlaly. It was replaced with immediate 1.5L out. On 22 patient was minimally improved and x-ray showed continued distention. Open laparotomy with lysis of adhesions on 22. He developed postoperative peritonitis that was treated with piperacillin-tazobactam IV, NPO, TPN.   INDICATIONS:  The patient is a 72 year old male  With small bowel obstruction that did not resolve and although had some contrast in the right colon his xrays were  looking more distended and his pain was increasing.    We talked about with his stage IV colon cancer there may be cancer in the way of fixing the obstruction so we may try to to an ostomy above the obstruction if possible.        DESCRIPTION OF PROCEDURE:  The patient was brought to the OR, placed in the supine position.  After receiving general anesthesia, the abdomen was prepped and draped in a sterile manner.  Incision was made in the midline large enough for us to see the bowel and what we needed to see,  with a knife, on the skin and cautery to enter the abdomen carefully.  We then proceeded to free up the bowel form multiple adhesions in the right lower quadrant.       I was able to free these up with blunt, finger and sharp dissection and some cautery where safe to do so to get all the bowel out of the abdomen and then ran the bowel form the cecum to the ligament of treitz.    I then tried to push all the contents into the stomach to decompress the bowel to close the abdomen safely.     I irrigated the area multiple times, irrigated the rest of the abdomen as needed, and suctioned up as much of the fluid as possible. I  looked at the area multiple times and again, there was no evidence of any more bleeding.    The fascia was closed with 0 PDS looped and one started superior and the other inferior and tied in the midline.    The subcutaneous fat was brought together with a 3-0 vicryl and the skin was closed with a running 4-0 monocryl. covered with mastisol, Steri-Strips and Tegaderm, along with an abdominal binder.  The patient did receive antibiotics preoperatively.  Segundo Hill MD .       He follows with Dr Colorado with medical oncology, he completed 12 rounds of chemotherapy on 11/23/22 4/26/2022 - present: FOLFOX and bevacizumab   -cycle 7 delayed for neutropenia. Neulasta added  -cycle 8 delayed for hospitalization with SBO (favoring ileus/adhesions, no evidence of malignancy). Post-op course  complicated by peritonitis requiring antibiotics. Temp TPN, then cleared for oral intake.   -Bevacizumab omitted with cycle 8 to allow for healing postmalarial. Resumed with cycle 9.  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  Infusional 5-FU only.       MR Rectum 11/27/22  IMPRESSION:  1.  Spiculated mass in the sigmoid colon has significantly decreased in size and extent compared to 06/13/2022 with increased posttreatment scarring. Residual viable tumor is now approximately 1.7 cm in length, previously 5.7 cm. There remains tethering   of the left seminal vesicle, peritoneal reflection, and adjacent loop of redundant sigmoid colon by tumor and fibrosis.  2.  No suspicious lymph nodes.    CT CAP 11/27/22  IMPRESSION:  1.  Similar minimally prominent adenopathy in the chest, I favor reactive adenopathy.  2.  No recurrent or residual malignancy demonstrated in the abdomen and pelvis.        Medical history:  None     Surgical history:  Past Surgical History         Past Surgical History:   Procedure Laterality Date     INSERT PORT VASCULAR ACCESS Right 4/21/2022     Procedure: INSERTION, VASCULAR ACCESS PORT;  Surgeon: Marianne Schroeder MD;  Location: [facility] OR     INSERT PORT VASCULAR ACCESS Left 6/2/2022     Procedure: INSERTION, VASCULAR ACCESS PORT;  Surgeon: Phong Finch MD;  Location: [facility] OR     IR CHEST PORT PLACEMENT > 5 YRS OF AGE   6/2/2022     LAPAROTOMY EXPLORATORY N/A 3/26/2022     Procedure: exploratory laparotomy, small bowel resection, colostomy creation;  Surgeon: Riley Magdaleno MD;  Location: [facility] OR     LAPAROTOMY, LYSIS ADHESIONS, COMBINED N/A 8/24/2022     Procedure: LAPAROTOMY, EXPLORATORY, WITH LYSIS OF ADHESIONS;  Surgeon: Segundo Hill MD;  Location: [facility] OR     PICC INSERTION - DOUBLE LUMEN Left 03/28/2022      "left medial brachial 5 fr dl picc 49 cm     SIGMOIDOSCOPY FLEXIBLE N/A 3/26/2022     Procedure: Sigmoidoscopy flexible;  Surgeon: Riley Magdlaeno MD;  Location:  OR            Family history:  No Hx of IBD or GI malignancy     Medications:  Current Outpatient Prescriptions          Current Outpatient Medications   Medication Sig Dispense Refill     acetaminophen (TYLENOL) 500 MG tablet Take 2 tablets (1,000 mg) by mouth 4 times daily (Patient taking differently: Take 1,000 mg by mouth every 6 hours as needed) 100 tablet 0     dexamethasone (DECADRON) 4 MG tablet Take 2 tablets (8 mg) by mouth daily Take for 2 days, starting the day after chemo. Take with food. 4 tablet 2     fluorouracil (ADRUCIL) 2.5 GM/50ML SOLN injection           heparin 100 UNIT/ML SOLN injection 5-10 mLs by Intracatheter route every 28 days (Patient not taking: Reported on 10/24/2022)         mirtazapine (REMERON) 15 MG tablet Take 1 tablet (15 mg) by mouth At Bedtime 90 tablet 3     multivitamin (CENTRUM SILVER) tablet Take 1 tablet by mouth daily 30 tablet       omeprazole (PRILOSEC) 20 MG DR capsule Take 1 capsule (20 mg) by mouth 2 times daily 60 capsule 3     Ostomy Supplies MISC 1 each Every Mon, Wed, Fri Morning Hollister1 piece flat fecal with filter #8331 20 pouches per month   2\" barrier ring #5735 (1 per pouch)   Adapt powder #7906   No sting film barrier # 4061   Adapt odor eliminator and lubricant 236ml bottle # 49488   M-9 Spray room deodorizer #2913     1 each 11     oxyCODONE (ROXICODONE) 5 MG tablet Take 1 tablet (5 mg) by mouth every 6 hours as needed for moderate to severe pain 60 tablet 0     polyethylene glycol (MIRALAX) 17 GM/Dose powder Take 17 g by mouth daily 510 g 0     prochlorperazine (COMPAZINE) 10 MG tablet Take 1 tablet (10 mg) by mouth every 8 hours as needed for nausea or vomiting (Patient not taking: Reported on 11/7/2022) 30 tablet 2     senna-docusate (SENOKOT-S/PERICOLACE) 8.6-50 MG tablet Take 1 tablet " by mouth 2 times daily 60 tablet 3            Allergies:  No Known Allergies     Social history:   Social History            Tobacco Use     Smoking status: Every Day       Packs/day: 0.50       Years: 50.00       Pack years: 25.00       Types: Cigarettes     Smokeless tobacco: Never   Substance Use Topics     Alcohol use: Not Currently      Marital status: .     ROS:  A complete review of systems was performed with the patient and all systems negative except as per HPI.     Physical Examination:  There were no vitals taken for this visit.  General: Well hydrated. No acute distress.  Abdomen: Soft, NT, nondistended, ostomy pink and healthy, midline incision well healed. No inguinal adenopathy palpated.  Perianal external examination:  deferred     Laboratory values reviewed:          Recent Labs   Lab Test 04/04/22  0628 04/03/22  0916 04/02/22  0735 03/31/22  0656 03/30/22  0648   WBC  --   --   --   --  11.5*   HGB  --   --   --   --  11.6*   PLT  --   --  430  --  334   CR 0.49*   < >  --    < > 0.48*   ALBUMIN 2.3*  --   --   --  2.3*   BILITOTAL 0.2  --   --   --  0.4   ALKPHOS 76  --   --   --  47   ALT 58  --   --   --  17   AST 36  --   --   --  25   INR 1.01  --   --   --   --     < > = values in this interval not displayed.      Imaging personally reviewed by me     PET/ CT CAP (4/7/22):  IMPRESSION:   In this patient with recently diagnosed sigmoid colon adenocarcinoma:   1. Hypermetabolic sigmoid mass consistent with known primary malignancy. There is surrounding pericolonic stranding suggestive of locally invasive disease, with poor delineation of the mass from multiple other loops of bowel and the left posterolateral bladder wall.    2. Indeterminant small scattered pulmonary nodules and subcentimeter hepatic hypodensities, too small to fully characterize. Recommend comparison with older studies if available, otherwise attention on follow-up. Liver MRI can be considered to further characterize  the small hypodensities.   3. Postoperative changes of partial small bowel resection and descending loop colostomy. Multiple air-fluid levels throughout the small bowel without other evidence of obstruction, favor residual ongoing adynamic ileus or resolving patulous bowel from previous obstruction.   4. Air-fluid level in the left maxillary sinus, can be seen with current or recent acute sinusitis.   5. Subpleural groundglass and reticulonodular opacity in the right upper lobe with low levels of uptake, favor infectious/inflammatory etiology, possible scarring.  ASSESSMENT  1. Locally advanced sigmoid cancer, s/p small bowel resection (suspected tumor involvement) and loop sigmoid colostomy due to unresectable nature of tumor.  2. Former tobacco use     PLAN  1. Plan for diagnostic laparoscopy, sigmoidectomy (possible open), possible diverting loop ileostomy. This will be at least 6 weeks from his last dose of chemo, and does carry a high risk for open operation given his recent operations.    1. Preoperative labs: CBC, CMP, PTT/INR, Prealbumin.  2. Mechanical bowel prep with oral antibiotics.  3. Ostomy marking with WOCN for possible DLI  4. Hold these medications prior to surgery: none  5. Advised patient to begin protein shakes: Premier or Pure protein given high protein, low carb ratio for pre-operative rehab.  6. Prehabilitation referral to Dr. Jasso.  7. Smoking cessation: Quit Partner referral.       Risks, benefits, and alternatives of operative treatment were thoroughly discussed with the patient, he understands these well and agrees to proceed.     Time spent: 30 minutes,>50% spent in discussing, counseling and coordinating care.     Riley Magdaleno M.D    Division of Colon and Rectal Surgery  [facility]     Referring Provider:  No referring provider defined for this encounter.      Primary Care Provider:  [facility]

## 2022-12-15 ENCOUNTER — OFFICE VISIT (OUTPATIENT)
Dept: SURGERY | Facility: CLINIC | Age: 72
End: 2022-12-15
Payer: COMMERCIAL

## 2022-12-15 VITALS
HEIGHT: 70 IN | DIASTOLIC BLOOD PRESSURE: 83 MMHG | BODY MASS INDEX: 20.71 KG/M2 | HEART RATE: 71 BPM | WEIGHT: 144.7 LBS | OXYGEN SATURATION: 98 % | SYSTOLIC BLOOD PRESSURE: 131 MMHG

## 2022-12-15 DIAGNOSIS — Z93.3 S/P COLOSTOMY (H): Primary | ICD-10-CM

## 2022-12-15 DIAGNOSIS — C18.7 ADENOCARCINOMA OF SIGMOID COLON (H): ICD-10-CM

## 2022-12-15 DIAGNOSIS — R79.1 ABNORMAL COAGULATION PROFILE: ICD-10-CM

## 2022-12-15 PROCEDURE — 99214 OFFICE O/P EST MOD 30 MIN: CPT | Performed by: SURGERY

## 2022-12-15 ASSESSMENT — PAIN SCALES - GENERAL: PAINLEVEL: NO PAIN (0)

## 2022-12-15 NOTE — LETTER
12/15/2022       RE: Kalin Shepard  [address]  [facility] [address]     Dear Colleague,    Thank you for referring your patient, Kalin Shepard, to the [facility] COLON AND RECTAL SURGERY CLINIC [place] at [facility]. Please see a copy of my visit note below.    Colon and Rectal Surgery Clinic Note     RE:      Kalin Shepard.  :   1950.  REGGIE:   21     Reason for visit: Follow up after chemo, discuss surgery     HPI (2022): Kalin Shepard is a 71 year old male  who presented to [facility] ED on 3/26/22 with abdominal pain, he was found to have obstructing sigmoid colon mass and associated small bowel obstruction in the setting of unintentional weight loss over the last year, approximately 30-40 pounds. He then underwent emergent exploratory laparotomy, small bowel resection, loop descending colostomy creation, flexible sigmoidoscopy with me. Final pathology showed sigmoid mass that was negative for malignancy although likely sampling is non-representative and a peritoneal biopsy that is positive for malignancy - intestinal type adenocarcinoma. He followed up with Pamela Colorado MD with Med Oncology who recommended chemotherapy as anticipated. He has since started, and is doing well. No complaints. Healing well.        Surgical Pathology (3/26/22):        Interval history:     Admitted to [facility] from - for SBO, NG tube was placed. Gastrografin study was attempted 22 but only had minimal contrast passing into the colon by 16 hours on 22.  Patient did initially tolerate being off suction during the Gastrografin study, but morning of 22 he developed increasing pain prompting replacement of suction with about 500 cc out at that time. Output then dropped off. NGT removed accidentlaly. It was replaced with immediate 1.5L out. On 22 patient was minimally improved and x-ray showed  continued distention. Open laparotomy with lysis of adhesions on 8/24/22. He developed postoperative peritonitis that was treated with piperacillin-tazobactam IV, NPO, TPN.   INDICATIONS:  The patient is a 72 year old male  With small bowel obstruction that did not resolve and although had some contrast in the right colon his xrays were looking more distended and his pain was increasing.    We talked about with his stage IV colon cancer there may be cancer in the way of fixing the obstruction so we may try to to an ostomy above the obstruction if possible.        DESCRIPTION OF PROCEDURE:  The patient was brought to the OR, placed in the supine position.  After receiving general anesthesia, the abdomen was prepped and draped in a sterile manner.  Incision was made in the midline large enough for us to see the bowel and what we needed to see,  with a knife, on the skin and cautery to enter the abdomen carefully.  We then proceeded to free up the bowel form multiple adhesions in the right lower quadrant.       I was able to free these up with blunt, finger and sharp dissection and some cautery where safe to do so to get all the bowel out of the abdomen and then ran the bowel form the cecum to the ligament of treitz.    I then tried to push all the contents into the stomach to decompress the bowel to close the abdomen safely.     I irrigated the area multiple times, irrigated the rest of the abdomen as needed, and suctioned up as much of the fluid as possible. I  looked at the area multiple times and again, there was no evidence of any more bleeding.    The fascia was closed with 0 PDS looped and one started superior and the other inferior and tied in the midline.    The subcutaneous fat was brought together with a 3-0 vicryl and the skin was closed with a running 4-0 monocryl. covered with mastisol, Steri-Strips and Tegaderm, along with an abdominal binder.  The patient did receive antibiotics preoperatively.  Segundo  Sergio QUINN .       He follows with Dr Colorado with medical oncology, he completed 12 rounds of chemotherapy on 11/23/22 4/26/2022 - present: FOLFOX and bevacizumab   -cycle 7 delayed for neutropenia. Neulasta added  -cycle 8 delayed for hospitalization with SBO (favoring ileus/adhesions, no evidence of malignancy). Post-op course complicated by peritonitis requiring antibiotics. Temp TPN, then cleared for oral intake.   -Bevacizumab omitted with cycle 8 to allow for healing postmalarial. Resumed with cycle 9.  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  Infusional 5-FU only.       MR Rectum 11/27/22  IMPRESSION:  1.  Spiculated mass in the sigmoid colon has significantly decreased in size and extent compared to 06/13/2022 with increased posttreatment scarring. Residual viable tumor is now approximately 1.7 cm in length, previously 5.7 cm. There remains tethering   of the left seminal vesicle, peritoneal reflection, and adjacent loop of redundant sigmoid colon by tumor and fibrosis.  2.  No suspicious lymph nodes.    CT CAP 11/27/22  IMPRESSION:  1.  Similar minimally prominent adenopathy in the chest, I favor reactive adenopathy.  2.  No recurrent or residual malignancy demonstrated in the abdomen and pelvis.        Medical history:  None     Surgical history:  Past Surgical History         Past Surgical History:   Procedure Laterality Date     INSERT PORT VASCULAR ACCESS Right 4/21/2022     Procedure: INSERTION, VASCULAR ACCESS PORT;  Surgeon: Marianne Schroeder MD;  Location: [facility] OR     INSERT PORT VASCULAR ACCESS Left 6/2/2022     Procedure: INSERTION, VASCULAR ACCESS PORT;  Surgeon: Phong Finch MD;  [place] [facility] [place]     IR CHEST PORT PLACEMENT > 5 YRS OF AGE   6/2/2022     LAPAROTOMY EXPLORATORY N/A 3/26/2022     Procedure: exploratory  "laparotomy, small bowel resection, colostomy creation;  Surgeon: Riley Magdaleno MD;  Location: [facility] OR     LAPAROTOMY, LYSIS ADHESIONS, COMBINED N/A 8/24/2022     Procedure: LAPAROTOMY, EXPLORATORY, WITH LYSIS OF ADHESIONS;  Surgeon: Segundo Hill MD;  Location: [facility] OR     PICC INSERTION - DOUBLE LUMEN Left 03/28/2022     left medial brachial 5 fr dl picc 49 cm     SIGMOIDOSCOPY FLEXIBLE N/A 3/26/2022     Procedure: Sigmoidoscopy flexible;  Surgeon: Riley Magdaleno MD;  Location: [facility] OR            Family history:  No Hx of IBD or GI malignancy     Medications:  Current Outpatient Prescriptions          Current Outpatient Medications   Medication Sig Dispense Refill     acetaminophen (TYLENOL) 500 MG tablet Take 2 tablets (1,000 mg) by mouth 4 times daily (Patient taking differently: Take 1,000 mg by mouth every 6 hours as needed) 100 tablet 0     dexamethasone (DECADRON) 4 MG tablet Take 2 tablets (8 mg) by mouth daily Take for 2 days, starting the day after chemo. Take with food. 4 tablet 2     fluorouracil (ADRUCIL) 2.5 GM/50ML SOLN injection           heparin 100 UNIT/ML SOLN injection 5-10 mLs by Intracatheter route every 28 days (Patient not taking: Reported on 10/24/2022)         mirtazapine (REMERON) 15 MG tablet Take 1 tablet (15 mg) by mouth At Bedtime 90 tablet 3     multivitamin (CENTRUM SILVER) tablet Take 1 tablet by mouth daily 30 tablet       omeprazole (PRILOSEC) 20 MG DR capsule Take 1 capsule (20 mg) by mouth 2 times daily 60 capsule 3     Ostomy Supplies MISC 1 each Every Mon, Wed, Fri Morning Hollister1 piece flat fecal with filter #3510 20 pouches per month   2\" barrier ring #6349 (1 per pouch)   Adapt powder #8853   No sting film barrier # 6628   Adapt odor eliminator and lubricant 236ml bottle # 17488   M-9 Spray room deodorizer #2520     1 each 11     oxyCODONE (ROXICODONE) 5 MG tablet Take 1 tablet (5 mg) by mouth every 6 hours as needed for moderate to severe pain 60 tablet 0     " polyethylene glycol (MIRALAX) 17 GM/Dose powder Take 17 g by mouth daily 510 g 0     prochlorperazine (COMPAZINE) 10 MG tablet Take 1 tablet (10 mg) by mouth every 8 hours as needed for nausea or vomiting (Patient not taking: Reported on 11/7/2022) 30 tablet 2     senna-docusate (SENOKOT-S/PERICOLACE) 8.6-50 MG tablet Take 1 tablet by mouth 2 times daily 60 tablet 3            Allergies:  No Known Allergies     Social history:   Social History            Tobacco Use     Smoking status: Every Day       Packs/day: 0.50       Years: 50.00       Pack years: 25.00       Types: Cigarettes     Smokeless tobacco: Never   Substance Use Topics     Alcohol use: Not Currently      Marital status: .     ROS:  A complete review of systems was performed with the patient and all systems negative except as per HPI.     Physical Examination:  There were no vitals taken for this visit.  General: Well hydrated. No acute distress.  Abdomen: Soft, NT, nondistended, ostomy pink and healthy, midline incision well healed. No inguinal adenopathy palpated.  Perianal external examination:  deferred     Laboratory values reviewed:          Recent Labs   Lab Test 04/04/22  0628 04/03/22  0916 04/02/22  0735 03/31/22  0656 03/30/22  0648   WBC  --   --   --   --  11.5*   HGB  --   --   --   --  11.6*   PLT  --   --  430  --  334   CR 0.49*   < >  --    < > 0.48*   ALBUMIN 2.3*  --   --   --  2.3*   BILITOTAL 0.2  --   --   --  0.4   ALKPHOS 76  --   --   --  47   ALT 58  --   --   --  17   AST 36  --   --   --  25   INR 1.01  --   --   --   --     < > = values in this interval not displayed.      Imaging personally reviewed by me     PET/ CT CAP (4/7/22):  IMPRESSION:   In this patient with recently diagnosed sigmoid colon adenocarcinoma:   1. Hypermetabolic sigmoid mass consistent with known primary malignancy. There is surrounding pericolonic stranding suggestive of locally invasive disease, with poor delineation of the mass from  multiple other loops of bowel and the left posterolateral bladder wall.    2. Indeterminant small scattered pulmonary nodules and subcentimeter hepatic hypodensities, too small to fully characterize. Recommend comparison with older studies if available, otherwise attention on follow-up. Liver MRI can be considered to further characterize the small hypodensities.   3. Postoperative changes of partial small bowel resection and descending loop colostomy. Multiple air-fluid levels throughout the small bowel without other evidence of obstruction, favor residual ongoing adynamic ileus or resolving patulous bowel from previous obstruction.   4. Air-fluid level in the left maxillary sinus, can be seen with current or recent acute sinusitis.   5. Subpleural groundglass and reticulonodular opacity in the right upper lobe with low levels of uptake, favor infectious/inflammatory etiology, possible scarring.  ASSESSMENT  1. Locally advanced sigmoid cancer, s/p small bowel resection (suspected tumor involvement) and loop sigmoid colostomy due to unresectable nature of tumor.  2. Former tobacco use     PLAN  1. Plan for diagnostic laparoscopy, sigmoidectomy (possible open), possible diverting loop ileostomy. This will be at least 6 weeks from his last dose of chemo, and does carry a high risk for open operation given his recent operations.    1. Preoperative labs: CBC, CMP, PTT/INR, Prealbumin.  2. Mechanical bowel prep with oral antibiotics.  3. Ostomy marking with WOCN for possible DLI  4. Hold these medications prior to surgery: none  5. Advised patient to begin protein shakes: Premier or Pure protein given high protein, low carb ratio for pre-operative rehab.  6. Prehabilitation referral to Dr. Jasso.  7. Smoking cessation: Quit Partner referral.       Risks, benefits, and alternatives of operative treatment were thoroughly discussed with the patient, he understands these well and agrees to proceed.     Time spent: 30  minutes,>50% spent in discussing, counseling and coordinating care.          Referring Provider:  No referring provider defined for this encounter.      Primary Care Provider:  [facility], [facility]        Again, thank you for allowing me to participate in the care of your patient.      Sincerely,    Riley Magdaleno MD, MD

## 2022-12-15 NOTE — NURSING NOTE
"Chief Complaint   Patient presents with     Flexible Sigmoidoscopy       Vitals:    12/15/22 0913   BP: 131/83   BP Location: Left arm   Patient Position: Sitting   Cuff Size: Adult Regular   Pulse: 71   SpO2: 98%   Weight: 144 lb 11.2 oz   Height: 5' 10\"       Body mass index is 20.76 kg/m .    Kalpana England CMA    "

## 2022-12-15 NOTE — PATIENT INSTRUCTIONS
Follow up:  Schedule surgery with Karla at [phone]  Preop physical, labs, wound ostomy visit         Please call with any questions or concerns regarding your clinic visit today.    It is a pleasure being involved in your health care.    Contacts post-consultation depending on your need:    Radiology Appointments [phone]    Schedule Clinic Appointments [phone] # 1   M-F 7:30 - 5 pm    LILLIAN Connors [phone]    Clinic Fax Number [phone]    Surgery Scheduling [phone]    My Chart is available 24 hours a day and is a secure way to access your records and communicate with your care team.  I strongly recommend signing up if you haven't already done so, if you are comfortable with computers.  If you would like to inquire about this or are having problems with My Chart access, you may call [phone] or go online at [email or web address].  Please allow at least 24 hours for a response and extra time on weekends and Holidays.

## 2022-12-19 ENCOUNTER — TUMOR CONFERENCE (OUTPATIENT)
Dept: ONCOLOGY | Facility: CLINIC | Age: 72
End: 2022-12-19
Payer: COMMERCIAL

## 2022-12-19 NOTE — TUMOR CONFERENCE
Tumor Conference Information  Tumor Conference: Colorectal  Specialties Present: Medical oncology, Radiation oncology, Pathology, Radiology, Surgery  Patient Status: Retrospective  Stage: distal sigmoid cancer  Treatment to Date: Chemotherapy, Radiation, Surgery  Clinical Trials: Discussed (see comment)  Genetic Testing Discussed/Recommended?: No  Supportive Care Services Discussed/Recommended?: No  Recommended Plan: Follows evidence-based guidelines (Comment: APR - surgery.)  Did the review exceed 30 minutes?: did not           Documentation / Disclaimer Cancer Tumor Board Note  Cancer tumor board recommendations do not override what is determined to be reasonable care and treatment, which is dependent on the circumstances of a patient's case; the patient's medical, social, and personal concerns; and the clinical judgment of the oncologist [physician].

## 2022-12-23 ENCOUNTER — PREP FOR PROCEDURE (OUTPATIENT)
Dept: SURGERY | Facility: CLINIC | Age: 72
End: 2022-12-23

## 2022-12-23 NOTE — PROGRESS NOTES
COLECTOMY, LEFT, ROBOT-ASSISTED (SIGMOIDECTOMY), possible open, possible diverting loop ileostomy

## 2022-12-28 ENCOUNTER — PREP FOR PROCEDURE (OUTPATIENT)
Dept: SURGERY | Facility: CLINIC | Age: 72
End: 2022-12-28

## 2023-01-05 NOTE — PROGRESS NOTES
This is a recent snapshot of the patient's [facility] medical record.  For current drug dose and complete information and questions, call [phone] or In Basket pool, [facility] home infusion ([id number])  CSN Number:  [id number]

## 2023-01-23 ENCOUNTER — TELEPHONE (OUTPATIENT)
Dept: SURGERY | Facility: CLINIC | Age: 73
End: 2023-01-23
Payer: COMMERCIAL

## 2023-01-23 NOTE — TELEPHONE ENCOUNTER
Spoke with patient via phone, completed the following scheduling, then sent Surgery Packet to patient via [employer] and, entered note in PAC appt to please provide patient with a printed copy of his Surgery Packet, including related scheduling information and prep instructions:    Your surgery is scheduled:    Date: Wednesday February 8, 2023  ________________________________    Time: 7:30 AM*  ________________________________    Please arrive at:  5:30 AM*  ______________________    Surgeons' Names: Dr. Riley Magdaleno with Urologist Dr. Ulises Basilio for Cystoscopy, Ureteral Stent Placement  _______________________      Pre-Op Physical Fax Numbers:         [facility] Pre-Admissions  [facility] Fax:  [phone] / Phone:  [phone]        Your surgery is located at:      [place] [address]      [phone]      [email or web address]     *Times are tentative and may change. You can expect a call from the pre-admission nurses to confirm arrival and surgery start times if the times should change.     Required: Yes, you will need a  18 years or older to drive you home from your procedure as well as stay with you for 24 hours after your procedure, if you are discharged the same day as your procedure.    Surgery: Cystoscopy with Ureteral Stent Insertion; Open Sigmoidectomy, Possible Diverting Loop Ileostomy    Upcoming Related Appointments:     Jan 27, 2023 10:00 AM  (Arrive by 9:45 AM)  PAC EVALUATION w/ JULIUS Heart CNP  [facility] [place] ([facility] )   [phone]    [address] [place] [address]     Jan 27, 2023 11:30 AM  (Arrive by 11:15 AM)  NEW OSTOMY NURSE VISIT with Soledad Mclaughlin RN  [facility]   [phone]     [facility] [address] [facility] [address]     Jan 27, 2023 12:45 PM  Lab Central with  [facility]  [facility] ([facility] [place] ) [phone]    [address] [address]     Feb 21, 2023 10:30 AM  (Arrive by 10:15 AM)  Post Op with JULIUS Lr CNP  [facility]al [facility] [place] ([facility] ) [phone]    [address] [address]     Feb 23, 2023  4:00 PM  (Arrive by 3:45 PM)  Post Op with Riley Magdaleno MD  [facility]al [facility] [place] ([facility] ) [phone]    [address] [address]     Pre-surgical Preparation:    MiraLAX/Gatorade, Antibiotics, Zofran  - see \"Day Before Surgery\"   - obtain medications and ingredients in advance    Please go to your local pharmacy or store and purchase:   - TWO 8.3oz (238g) bottles of Miralax (Powder)   - 96 oz of Gatorade (no red or purple)     Karla Ferrera  Akiko-op Coordinator  [place]  Direct Phone: [phone]      "

## 2023-01-24 NOTE — TELEPHONE ENCOUNTER
FUTURE VISIT INFORMATION      SURGERY INFORMATION:    Date: 2/8/23    [facility]    Surgeon:  Ulises Basilio MD Jahansouz, Cyrus, MD    Anesthesia Type:  General    Procedure: CYSTOSCOPY, WITH URETERAL STENT INSERTION [[id number]] OPEN SIGMOIDECTOMY, POSSIBLE DIVERTING LOOP ILEOSTOMY    Consult: ov 12/15/22    RECORDS REQUESTED FROM:       Primary Care Provider: Segundo Bentley MD- [facility]     Most recent EKG+ Tracing: 3/26/22

## 2023-01-26 DIAGNOSIS — Z01.818 PREOPERATIVE EXAMINATION: Primary | ICD-10-CM

## 2023-01-26 DIAGNOSIS — R10.9 UNSPECIFIED ABDOMINAL PAIN: ICD-10-CM

## 2023-01-26 DIAGNOSIS — C18.9 COLON ADENOCARCINOMA (H): ICD-10-CM

## 2023-01-26 LAB
ABO/RH(D): NORMAL
ANTIBODY SCREEN: NEGATIVE
SPECIMEN EXPIRATION DATE: NORMAL

## 2023-01-27 ENCOUNTER — OFFICE VISIT (OUTPATIENT)
Dept: WOUND CARE | Facility: CLINIC | Age: 73
End: 2023-01-27
Payer: COMMERCIAL

## 2023-01-27 ENCOUNTER — TELEPHONE (OUTPATIENT)
Dept: UROLOGY | Facility: CLINIC | Age: 73
End: 2023-01-27

## 2023-01-27 ENCOUNTER — LAB (OUTPATIENT)
Dept: LAB | Facility: CLINIC | Age: 73
End: 2023-01-27
Attending: SURGERY
Payer: COMMERCIAL

## 2023-01-27 ENCOUNTER — PRE VISIT (OUTPATIENT)
Dept: SURGERY | Facility: CLINIC | Age: 73
End: 2023-01-27

## 2023-01-27 ENCOUNTER — ANESTHESIA EVENT (OUTPATIENT)
Dept: SURGERY | Facility: CLINIC | Age: 73
DRG: 330 | End: 2023-01-27
Payer: COMMERCIAL

## 2023-01-27 ENCOUNTER — TEAM CONFERENCE (OUTPATIENT)
Dept: SURGERY | Facility: CLINIC | Age: 73
End: 2023-01-27

## 2023-01-27 ENCOUNTER — OFFICE VISIT (OUTPATIENT)
Dept: SURGERY | Facility: CLINIC | Age: 73
End: 2023-01-27
Payer: COMMERCIAL

## 2023-01-27 VITALS
OXYGEN SATURATION: 99 % | WEIGHT: 145 LBS | DIASTOLIC BLOOD PRESSURE: 88 MMHG | HEART RATE: 78 BPM | HEIGHT: 71 IN | RESPIRATION RATE: 24 BRPM | BODY MASS INDEX: 20.3 KG/M2 | TEMPERATURE: 97.5 F | SYSTOLIC BLOOD PRESSURE: 131 MMHG

## 2023-01-27 DIAGNOSIS — Z01.818 PREOP EXAMINATION: Primary | ICD-10-CM

## 2023-01-27 DIAGNOSIS — Z71.89 OSTOMY NURSE CONSULTATION: ICD-10-CM

## 2023-01-27 DIAGNOSIS — C18.7 ADENOCARCINOMA OF SIGMOID COLON (H): ICD-10-CM

## 2023-01-27 DIAGNOSIS — C18.7 ADENOCARCINOMA OF SIGMOID COLON (H): Primary | ICD-10-CM

## 2023-01-27 DIAGNOSIS — F17.200 TOBACCO USE DISORDER: ICD-10-CM

## 2023-01-27 DIAGNOSIS — Z93.3 S/P COLOSTOMY (H): ICD-10-CM

## 2023-01-27 DIAGNOSIS — Z01.818 PREOP EXAMINATION: ICD-10-CM

## 2023-01-27 LAB
ALBUMIN SERPL BCG-MCNC: 4.1 G/DL (ref 3.5–5.2)
ALBUMIN UR-MCNC: NEGATIVE MG/DL
ALP SERPL-CCNC: 43 U/L (ref 40–129)
ALT SERPL W P-5'-P-CCNC: 13 U/L (ref 10–50)
ANION GAP SERPL CALCULATED.3IONS-SCNC: 9 MMOL/L (ref 7–15)
APPEARANCE UR: CLEAR
AST SERPL W P-5'-P-CCNC: 24 U/L (ref 10–50)
BILIRUB SERPL-MCNC: 0.3 MG/DL
BILIRUB UR QL STRIP: NEGATIVE
BUN SERPL-MCNC: 21.9 MG/DL (ref 8–23)
CALCIUM SERPL-MCNC: 9.5 MG/DL (ref 8.8–10.2)
CHLORIDE SERPL-SCNC: 107 MMOL/L (ref 98–107)
COLOR UR AUTO: YELLOW
CREAT SERPL-MCNC: 0.69 MG/DL (ref 0.67–1.17)
DEPRECATED HCO3 PLAS-SCNC: 25 MMOL/L (ref 22–29)
ERYTHROCYTE [DISTWIDTH] IN BLOOD BY AUTOMATED COUNT: 15.3 % (ref 10–15)
GFR SERPL CREATININE-BSD FRML MDRD: >90 ML/MIN/1.73M2
GLUCOSE SERPL-MCNC: 106 MG/DL (ref 70–99)
GLUCOSE UR STRIP-MCNC: NEGATIVE MG/DL
HCT VFR BLD AUTO: 44.5 % (ref 40–53)
HGB BLD-MCNC: 14.2 G/DL (ref 13.3–17.7)
HGB UR QL STRIP: NEGATIVE
KETONES UR STRIP-MCNC: NEGATIVE MG/DL
LEUKOCYTE ESTERASE UR QL STRIP: NEGATIVE
MCH RBC QN AUTO: 30.9 PG (ref 26.5–33)
MCHC RBC AUTO-ENTMCNC: 31.9 G/DL (ref 31.5–36.5)
MCV RBC AUTO: 97 FL (ref 78–100)
NITRATE UR QL: NEGATIVE
PH UR STRIP: 5.5 [PH] (ref 5–7)
PLATELET # BLD AUTO: 187 10E3/UL (ref 150–450)
POTASSIUM SERPL-SCNC: 4.4 MMOL/L (ref 3.4–5.3)
PROT SERPL-MCNC: 6.8 G/DL (ref 6.4–8.3)
RBC # BLD AUTO: 4.6 10E6/UL (ref 4.4–5.9)
SODIUM SERPL-SCNC: 141 MMOL/L (ref 136–145)
SP GR UR STRIP: 1.02 (ref 1–1.03)
UROBILINOGEN UR STRIP-MCNC: NORMAL MG/DL
WBC # BLD AUTO: 7.6 10E3/UL (ref 4–11)

## 2023-01-27 PROCEDURE — 80053 COMPREHEN METABOLIC PANEL: CPT

## 2023-01-27 PROCEDURE — 86901 BLOOD TYPING SEROLOGIC RH(D): CPT

## 2023-01-27 PROCEDURE — 36591 DRAW BLOOD OFF VENOUS DEVICE: CPT

## 2023-01-27 PROCEDURE — 99211 OFF/OP EST MAY X REQ PHY/QHP: CPT | Performed by: NURSE PRACTITIONER

## 2023-01-27 PROCEDURE — 99204 OFFICE O/P NEW MOD 45 MIN: CPT | Performed by: NURSE PRACTITIONER

## 2023-01-27 PROCEDURE — 81003 URINALYSIS AUTO W/O SCOPE: CPT

## 2023-01-27 PROCEDURE — 85027 COMPLETE CBC AUTOMATED: CPT

## 2023-01-27 PROCEDURE — 84134 ASSAY OF PREALBUMIN: CPT

## 2023-01-27 ASSESSMENT — ENCOUNTER SYMPTOMS: ORTHOPNEA: 0

## 2023-01-27 ASSESSMENT — LIFESTYLE VARIABLES: TOBACCO_USE: 1

## 2023-01-27 ASSESSMENT — COPD QUESTIONNAIRES
CAT_SEVERITY: MILD
COPD: 1

## 2023-01-27 ASSESSMENT — PAIN SCALES - GENERAL: PAINLEVEL: MODERATE PAIN (4)

## 2023-01-27 NOTE — H&P
Pre-Operative H & P     CC:  Preoperative exam to assess for increased cardiopulmonary risk while undergoing surgery and anesthesia.    Date of Encounter: 1/27/2023  Primary Care Physician:  [facility]     Reason for visit:   Encounter Diagnoses   Name Primary?     Preop examination Yes     Adenocarcinoma of sigmoid colon (H)        HPI  Kalin Shepard is a 72 year old male who presents for pre-operative H & P in preparation for  Procedure Information     Case: [id number] Date/Time: 02/08/23 0730    Procedures:       CYSTOSCOPY, WITH URETERAL STENT INSERTION [[id number]] (Bilateral: Urethra)      OPEN SIGMOIDECTOMY, POSSIBLE DIVERTING LOOP ILEOSTOMY (Abdomen)    Anesthesia type: General    Diagnosis:       S/P colostomy (H) [Z93.3]      Adenocarcinoma of sigmoid colon (H) [C18.7]    Pre-op diagnosis:       S/P colostomy (H) [Z93.3]      Adenocarcinoma of sigmoid colon (H) [C18.7]    Location:  OR  /  OR    Providers: Ulises Basilio MD; Riley Magdaleno MD          Kalin Shepard is a 72 year old male with tobacco use, post-herpetic neuralgia, colostomy status and alcohol dependence in remission that has adenocarcinoma of the sigmoid colon.  He is currently s/p prior small bowel resection and colostomy creation in March 2022 and chemotherapy which ended in November 2022.  The above listed surgery has now been recommended for further management.     History is obtained from the patient, his wife and chart review    Hx of abnormal bleeding or anti-platelet use: none      Past Medical History  Past Medical History:   Diagnosis Date     Cancer (H)        Past Surgical History  Past Surgical History:   Procedure Laterality Date     INSERT PORT VASCULAR ACCESS Right 4/21/2022    Procedure: INSERTION, VASCULAR ACCESS PORT;  Surgeon: Marianne Schroeder MD;  Location: [facility] OR     INSERT PORT VASCULAR ACCESS Left 6/2/2022    Procedure: INSERTION, VASCULAR ACCESS PORT;  Surgeon:  "Phong Finch MD;  Location: [facility] OR     IR CHEST PORT PLACEMENT > 5 YRS OF AGE  6/2/2022     LAPAROTOMY EXPLORATORY N/A 3/26/2022    Procedure: exploratory laparotomy, small bowel resection, colostomy creation;  Surgeon: Riley Magdaleno MD;  Location: [facility] OR     LAPAROTOMY, LYSIS ADHESIONS, COMBINED N/A 8/24/2022    Procedure: LAPAROTOMY, EXPLORATORY, WITH LYSIS OF ADHESIONS;  Surgeon: Segundo Hill MD;  Location: [facility] OR     PICC INSERTION - DOUBLE LUMEN Left 03/28/2022    left medial brachial 5 fr dl picc 49 cm     SIGMOIDOSCOPY FLEXIBLE N/A 3/26/2022    Procedure: Sigmoidoscopy flexible;  Surgeon: Riley Magdaleno MD;  Location: [facility] OR       Prior to Admission Medications  Current Outpatient Medications   Medication Sig Dispense Refill     mirtazapine (REMERON) 15 MG tablet Take 1 tablet (15 mg) by mouth At Bedtime 90 tablet 3     multivitamin (CENTRUM SILVER) tablet Take 1 tablet by mouth every morning 30 tablet      omeprazole (PRILOSEC) 20 MG DR capsule Take 1 capsule (20 mg) by mouth 2 times daily 60 capsule 3     polyethylene glycol (MIRALAX) 17 GM/Dose powder Take 17 g by mouth daily (Patient taking differently: Take 17 g by mouth every morning) 510 g 0     prochlorperazine (COMPAZINE) 10 MG tablet Take 1 tablet (10 mg) by mouth every 8 hours as needed for nausea or vomiting 30 tablet 2     senna-docusate (SENOKOT-S/PERICOLACE) 8.6-50 MG tablet Take 1 tablet by mouth 2 times daily 60 tablet 3     heparin 100 UNIT/ML SOLN injection 5-10 mLs by Intracatheter route every 28 days (Patient not taking: Reported on 10/24/2022)       Ostomy Supplies MISC 1 each Every Mon, Wed, Fri Morning Hollister1 piece flat fecal with filter #5660 20 pouches per month   2\" barrier ring #1432 (1 per pouch)   Adapt powder #[id number]   No sting film barrier # [id number]   Adapt odor eliminator and lubricant 236ml bottle # 40195   M-9 Spray room deodorizer #2153     1 each 11       Allergies  No Known Allergies    Social " History  Social History     Socioeconomic History     Marital status:      Spouse name: Not on file     Number of children: 3     Years of education: Not on file     Highest education level: Not on file   Occupational History     Occupation: retired   Tobacco Use     Smoking status: Every Day     Packs/day: 0.50     Years: 50.00     Pack years: 25.00     Types: Cigarettes     Smokeless tobacco: Never   Vaping Use     Vaping Use: Never used   Substance and Sexual Activity     Alcohol use: Not Currently     Drug use: Never     Sexual activity: Not on file   Other Topics Concern     Not on file   Social History Narrative     Not on file     Social Determinants of Health     Financial Resource Strain: Not on file   Food Insecurity: Not on file   Transportation Needs: Not on file   Physical Activity: Not on file   Stress: Not on file   Social Connections: Not on file   Intimate Partner Violence: Not on file   Housing Stability: Not on file       Family History  Family History   Problem Relation Age of Onset     No Known Problems Mother      Prostate Cancer Father      Colon Cancer Father      Lymphoma Father      Anesthesia Reaction No family hx of      Thrombosis No family hx of        Review of Systems  The complete review of systems is negative other than noted in the HPI or here.   Anesthesia Evaluation   Pt has had prior anesthetic.     No history of anesthetic complications       ROS/MED HX  ENT/Pulmonary:     (+) tobacco use, Current use, mild,  COPD,  (-) LAN risk factors and recent URI   Neurologic: Comment: Post-herpetic neuralgia T3 right side      Cardiovascular:     (+) -----Previous cardiac testing   Echo: Date: Results:    Stress Test: Date: Results:    ECG Reviewed: Date: 3/2022 Results:    Cath: Date: Results:   (-) JARAMILLO and orthopnea/PND   METS/Exercise Tolerance: >4 METS Comment: Walks regularly for exercise usually up to a 0.5 mile.  Also does yard work and strength training.     Hematologic:  -  "neg hematologic  ROS  (-) history of blood clots and history of blood transfusion   Musculoskeletal:  - neg musculoskeletal ROS     GI/Hepatic: Comment: Colostomy in place    (+) GERD, Asymptomatic on medication,     Renal/Genitourinary: Comment: Urinary frequency and urgency - never diagnosed with BPH.  Denies any retention symptoms.       Endo:  - neg endo ROS     Psychiatric/Substance Use:     (+) alcohol abuse     Infectious Disease:  - neg infectious disease ROS  (-) Recent Fever   Malignancy: Comment: Adenocarcinoma of sigmoid colon  (+) Malignancy, History of GI.GI CA  Active status post Surgery and Chemo.        Other:  - neg other ROS          /88 (BP Location: Right arm, Patient Position: Chair, Cuff Size: Adult Regular)   Pulse 78   Temp 97.5  F (36.4  C) (Oral)   Resp 24   Ht 1.803 m (5' 11\")   Wt 65.8 kg (145 lb)   SpO2 99%   BMI 20.22 kg/m      Physical Exam   Constitutional: Awake, alert, cooperative, no apparent distress, and appears stated age.  Eyes: Pupils equal, round and reactive to light, extra ocular muscles intact, sclera clear, conjunctiva normal.  HENT: Normocephalic, oral pharynx with moist mucus membranes. Poor dentition with multiple missing teeth. No goiter appreciated.   Respiratory: Clear to auscultation bilaterally, no crackles or wheezing.  Cardiovascular: Regular rate and rhythm, normal S1 and S2, and no murmur noted.  Carotids +2, no bruits. No edema. Palpable pulses to radial  DP and PT arteries.   GI: Normal bowel sounds, soft, non-distended, non-tender, no masses palpated, no hepatosplenomegaly.    Lymph/Hematologic: No cervical lymphadenopathy and no supraclavicular lymphadenopathy.  Genitourinary:  deferred  Skin: Warm and dry.  No rashes at anticipated surgical site.   Musculoskeletal: Full ROM of neck. There is no redness, warmth, or swelling of the exposed joints. Gross motor strength is normal.    Neurologic: Awake, alert, oriented to name, place and time. " Cranial nerves II-XII are grossly intact. Gait is normal.   Neuropsychiatric: Calm, cooperative. Normal affect.  Access: left chest portacath     Prior Labs/Diagnostic Studies   All labs and imaging personally reviewed     EKG  3/2022  Sinus rhythm with Premature atrial complexes   Rightward axis   Abnormal ECG       The patient's records and results personally reviewed by this provider.     Outside records reviewed from: Care Everywhere    LAB/DIAGNOSTIC STUDIES TODAY:    Component      Latest Ref Rng & Units 1/27/2023   Sodium      136 - 145 mmol/L 141   Potassium      3.4 - 5.3 mmol/L 4.4   Chloride      98 - 107 mmol/L 107   Carbon Dioxide (CO2)      22 - 29 mmol/L 25   Anion Gap      7 - 15 mmol/L 9   Urea Nitrogen      8.0 - 23.0 mg/dL 21.9   Creatinine      0.67 - 1.17 mg/dL 0.69   Calcium      8.8 - 10.2 mg/dL 9.5   Glucose      70 - 99 mg/dL 106 (H)   Alkaline Phosphatase      40 - 129 U/L 43   AST      10 - 50 U/L 24   ALT      10 - 50 U/L 13   Protein Total      6.4 - 8.3 g/dL 6.8   Albumin      3.5 - 5.2 g/dL 4.1   Bilirubin Total      <=1.2 mg/dL 0.3   GFR Estimate      >60 mL/min/1.73m2 >90   Color Urine      Colorless, Straw, Light Yellow, Yellow Yellow   Appearance Urine      Clear Clear   Glucose Urine      Negative mg/dL Negative   Bilirubin Urine      Negative Negative   Ketones Urine      Negative mg/dL Negative   Specific Gravity Urine      1.003 - 1.035 1.018   Blood Urine      Negative Negative   pH Urine      5.0 - 7.0 5.5   Protein Albumin Urine      Negative mg/dL Negative   Urobilinogen mg/dL      Normal, 2.0 mg/dL Normal   Nitrite Urine      Negative Negative   Leukocyte Esterase Urine      Negative Negative   WBC      4.0 - 11.0 10e3/uL 7.6   RBC Count      4.40 - 5.90 10e6/uL 4.60   Hemoglobin      13.3 - 17.7 g/dL 14.2   Hematocrit      40.0 - 53.0 % 44.5   MCV      78 - 100 fL 97   MCH      26.5 - 33.0 pg 30.9   MCHC      31.5 - 36.5 g/dL 31.9   RDW      10.0 - 15.0 % 15.3 (H)  "  Platelet Count      150 - 450 10e3/uL 187       Assessment      Kalin Shepard is a 72 year old male seen as a PAC referral for risk assessment and optimization for anesthesia.    Plan/Recommendations  Pt will be optimized for the proposed procedure.  See below for details on the assessment, risk, and preoperative recommendations    NEUROLOGY  - No history of TIA, CVA or seizure  - has T3 right-sided post-herpetic neuralgia.  No medications are used as he notes nothing has ever been helpful.    -Post Op delirium risk factors:  Age    ENT  - No current airway concerns.  Will need to be reassessed day of surgery.  Mallampati: II  TM: > 3    CARDIAC  - No history of CAD, Hypertension and Afib  - METS (Metabolic Equivalents) = >4  Patient performs 4 or more METS exercise without symptoms            Total Score: 0      RCRI-Low risk: Class 2 0.9% complication rate            Total Score: 1    RCRI: High Risk Surgery        PULMONARY    LAN Low Risk            Total Score: 2    LAN: Over 50 ys old    LAN: Male      - COPD  Well controlled   - reports that he has been working on cutting back on smoking in preparation for surgery.  Is hoping to quit prior to surgery and is interested in inpatient smoking cessation counseling.  Will place a referral.  - Tobacco History      History   Smoking Status     Every Day     Packs/day: 0.50     Years: 50.00     Types: Cigarettes   Smokeless Tobacco     Never       GI  - GERD is well controlled with omeprazole  - colostomy present  - adenocarcinoma of sigmoid colon - surgery planned as above  PONV Low Risk  Total Score: 1           1 AN PONV: Intended Post Op Opioids        /RENAL  - creatinine WNL  - no noted hydronephrosis on imaging    ENDOCRINE    - BMI: Estimated body mass index is 20.22 kg/m  as calculated from the following:    Height as of this encounter: 1.803 m (5' 11\").    Weight as of this encounter: 65.8 kg (145 lb).  Healthy Weight (BMI 18.5-24.9)  - No history " of Diabetes Mellitus    HEME  VTE High Risk 3%            Total Score: 8    VTE: Greater than 59 yrs old    VTE: Male    VTE: Current cancer      - No history of abnormal bleeding or antiplatelet use.      MSK  Patient is NOT Frail            Total Score: 1    Frailty: Weight loss 10 lbs or greater          PSYCH  - alcohol dependence in remission since treatment in 2009    Different anesthesia methods/types have been discussed with the patient, but they are aware that the final plan will be decided by the assigned anesthesia provider on the date of service.      The patient is optimized for their procedure. AVS with information on surgery time/arrival time, meds and NPO status given by nursing staff. No further diagnostic testing indicated.      On the day of service:     Prep time: 13 minutes  Visit time: 18 minutes  Documentation time: 19 minutes  ------------------------------------------  Total time: 50 minutes      JULIUS Heart CNP  Preoperative Assessment Center  [facility]  Clinic and Surgery Center  Phone: [phone]  Fax: [phone]

## 2023-01-27 NOTE — CONFIDENTIAL NOTE
COLON AND RECTAL SURGERY HUDDLE:    Patient was reviewed in preporation for their surgery the following was reviewed and has been completed:    Surgeon: Dr. Riley Magdaleno    Surgery & Date:  OPEN SIGMOIDECTOMY, POSSIBLE DIVERTING LOOP ILEOSTOMY 2/8     Last MD Note: reviewed    Anesthesia Type: General    Other Providers: Yes    PAC: Yes 1/27    WOC: Yes 1/27    Labs: Yes 1/27  Bowel Prep: Yes MiraLAX / Gatorade  and Antibiotic    Packet: Yes    Imaging: N/A    Post-Op Appointments: Yes    COVID: N/A    Pre op soap: Yes Questions about shower: no    Is patient on TPN?: No   If yes, I contacted the TPN pharmacist by paging the  pharmacy at [phone] or calling [phone]. I also contacted Kate SALAMANCA with inpatient colon and rectal team.     Pre op call complete: Yes

## 2023-01-27 NOTE — PATIENT INSTRUCTIONS
Preparing for Your Surgery      Name:  Kalin Shepard   MRN:  [id number]   :  1950   Today's Date:  2023       Arriving for surgery:  Surgery date:  23  Arrival time:  5:30 am     Surgeries and procedures: Adult patients can have 2 visitors all through the surgery process.     Visiting hours: 8 a.m. to 8:30 p.m.     Hospital: Adult patients and children under age 18 can have 4 visitor at a time     No visitors under the age of 5 are allowed for hospital patients.  Double occupancy rooms: Patients can have only two visitors at a time.     Patients with disabilities: Can have a support person with them (family member, service provider     Or someone well informed about their needs) plus the allowed number of visitors     Patients confirmed or suspected to have symptoms of COVID 19 or flu:     No visitors allowed for adult patients.   Children (under age 18) can have 1 named visitor.     People who are sick or showing symptoms of COVID 19 or flu:    Are not allowed to visit patients--we can only make exceptions in special situations.       Please follow these guidelines for your visit:   Arrive wearing a mask over your mouth and nose; we will give you a medical mask to wear    If you arrive wearing a cloth mask.   Keep it on during your entire visit, even when in patient's room.   If you don't wear a mask we'll ask you to leave.     Clean your hands with alcohol hand . Do this when you arrive at and leave the building and patient room,    And again after you touch your mask or anything in the room.     You can t visit if you have a fever, cough, shortness of breath, muscle aches, headaches, sore throat    Or diarrhea      Stay 6 feet away from others during your visit and between visits     Go directly to and from the room you are visiting.     Stay in the patient s room during your visit. Limit going to other places in the hospital as much as possible     Leave bags and jackets at home or  in the car.     For everyone s health, please don t come and go during your visit. That includes for smoking   during your visit.     Please come to:     [facility] [address]  [address] [place]  [address]    - ? parking is available in front of the hospital      -   Parking is available in the Patient Visitor Ramp on [address] [facility].     -   When entering the hospital you will be asked COVID screening questions, you will then be directed to Registration.  Registration will direct you to the 3rd floor Surgery waiting room.     -   Please ask if you need an escort or a wheelchair to the Surgery Waiting Room.  Preop number- [phone]      -    Please proceed to [address] on the 3rd floor. [phone]?     - ?If you are in need of directions, wheelchair or escort please stop at the Information Desk in the lobby.  Inform the information person that you are here for surgery; a wheelchair and escort to [address] will be provided.?     What can I eat or drink? - See attached surgeon's instructions titled Before Your Surgery  You may have clear liquids until 2 hours prior to arrival time for surgery (Until: 3:30 am)    Examples of clear liquids:  Water  Clear broth  Juices (apple, white grape, white cranberry  and cider) without pulp  Noncarbonated, powder based beverages  (lemonade and Avtar-Aid)  Sodas (Sprite, 7-Up, ginger ale and seltzer)  Coffee or tea (without milk or cream)  Gatorade    -  No Alcohol for at least 24 hours before surgery.     Which medicines can I take?    Hold Aspirin for 7 days before surgery.   Hold Multivitamins for 7 days before surgery.  Hold Supplements for 7 days before surgery.  Hold Ibuprofen (Advil, Motrin) for 1 day before surgery--unless otherwise directed by surgeon.  Hold Naproxen (Aleve) for 4 days before surgery.    -  DO NOT take these medications the day of surgery:  Multivitamin - stop 7 days before  surgery  Senokot    -  PLEASE TAKE these medications the day of surgery or the night before if that's your usual schedule:  Tylenol if needed  Dexamethasone (Decadron) if you are currently taking this  Mirtazapine (Remeron)  Omeprazole (Prilosec)    How do I prepare myself?  - Please take 2 showers before surgery using Scrubcare or Hibiclens soap.    Use this soap only from the neck to your toes.     Leave the soap on your skin for one minute--then rinse thoroughly.      You may use your own shampoo and conditioner. No other hair products.   - Please remove all jewelry and body piercings.  - No lotions, deodorants or fragrance.  - No makeup or fingernail polish.   - Bring your ID and insurance card.    -If you have a Deep Brain Stimulator, Spinal Cord Stimulator, or any Neuro Stimulator device---you must bring the remote control to the hospital.      Covid testing policy as of 12/06/2022  Your surgeon will notify and schedule you for a COVID test if one is needed before surgery--please direct any questions or COVID symptoms to your surgeon      Questions or Concerns:    - For any questions regarding the day of surgery or your hospital stay, please contact the Pre Admission Nursing Office at [phone].     - If you have health changes between today and your surgery, please call your surgeon.     - For questions after surgery, please call your surgeons office.

## 2023-01-27 NOTE — NURSING NOTE
"Chief Complaint   Patient presents with     Port Draw     Lab only appointment.  Port accessed and labs drawn by rn in lab.     Port accessed with 20g 3/4\" gripper needle, labs drawn by RN in lab.  Port flushed with saline and heparin.  Port de-accessed.    Chula Germain RN      "

## 2023-01-27 NOTE — PROGRESS NOTES
W[facility] Preoperative Ostomy Consult    Referral from Dr. Magdaleno  Consult attended by patient and spouse  Diagnosis:    Adenocarcinoma of sigmoid colon (H)  Ostomy nurse consultation Date of Surgery: 02/09/2023   Type of Surgery: OPEN SIGMOIDECTOMY, POSSIBLE DIVERTING LOOP ILEOSTOMY  Emotional readiness for surgery: no acute distress  Physical Limitations: Without limitations  Abdomen assessed for site by: Patient's ability to visiualize area, avoidance of skin creases and scars, palpating for rectus abdominus muscle and clothing fit  Teaching: Anatomy/picture of stoma, stoma/bowel function postop, intro to pouches, diet, returning to work/lifting and role of WOC/postop followup explained.  .    Stoma site marking:  Marking pen and tegaderm   Location chosen: RLQ  [employer]'s Ostomy packet given to patient  Karla Birmingham NP was available for supervision of care if needed or if questions should arise and regarding plan of care.  Soledad Mclaughlin RN RN CWON

## 2023-01-27 NOTE — TELEPHONE ENCOUNTER
----- Message from Malina Padilla CMA sent at 1/26/2023  4:37 PM CST -----  Regarding: RE: UC order  The order is in. Thank you!      ----- Message -----  From: Ileana Rizo  Sent: 1/26/2023   4:09 PM CST  To: Malina Padilla CMA  Subject: FW: UC order                                       ----- Message -----  From: Danna Berkowitz APRN CNP  Sent: 1/26/2023   2:55 PM CST  To: [facility]  Subject: UC order                                         Hello     This patient will be coming to PAC tomorrow.  Please place his UC order for Dr. Basilio.        Thank you  Danna Berkowitz DNP, RN, APRN

## 2023-01-29 NOTE — RESULT ENCOUNTER NOTE
Mynor Gagnon,    Your test results are attached. Your labs are okay for surgery.         Danna Berkowitz DNP, RN, ANP-C

## 2023-01-30 LAB — PREALB SERPL IA-MCNC: 24 MG/DL (ref 15–45)

## 2023-02-06 ENCOUNTER — TELEPHONE (OUTPATIENT)
Dept: SURGERY | Facility: CLINIC | Age: 73
End: 2023-02-06

## 2023-02-06 ENCOUNTER — TELEPHONE (OUTPATIENT)
Dept: SURGERY | Facility: CLINIC | Age: 73
End: 2023-02-06
Payer: COMMERCIAL

## 2023-02-06 DIAGNOSIS — Z93.3 S/P COLOSTOMY (H): Primary | ICD-10-CM

## 2023-02-06 DIAGNOSIS — C18.7 ADENOCARCINOMA OF SIGMOID COLON (H): ICD-10-CM

## 2023-02-06 RX ORDER — ONDANSETRON 4 MG/1
4 TABLET, FILM COATED ORAL EVERY 6 HOURS
Qty: 3 TABLET | Refills: 0 | Status: ON HOLD | OUTPATIENT
Start: 2023-02-06 | End: 2023-02-13

## 2023-02-06 RX ORDER — POLYETHYLENE GLYCOL 3350 17 G/17G
238 POWDER, FOR SOLUTION ORAL SEE ADMIN INSTRUCTIONS
Qty: 14 PACKET | Refills: 0 | Status: ON HOLD | OUTPATIENT
Start: 2023-02-06 | End: 2023-02-13

## 2023-02-06 RX ORDER — METRONIDAZOLE 500 MG/1
500 TABLET ORAL EVERY 6 HOURS
Qty: 3 TABLET | Refills: 0 | Status: ON HOLD | OUTPATIENT
Start: 2023-02-06 | End: 2023-02-12

## 2023-02-06 RX ORDER — NEOMYCIN SULFATE 500 MG/1
1000 TABLET ORAL EVERY 6 HOURS
Qty: 6 TABLET | Refills: 0 | Status: ON HOLD | OUTPATIENT
Start: 2023-02-06 | End: 2023-02-13

## 2023-02-06 RX ORDER — POLYETHYLENE GLYCOL 3350 17 G/17G
119 POWDER, FOR SOLUTION ORAL SEE ADMIN INSTRUCTIONS
Qty: 7 PACKET | Refills: 0 | Status: ON HOLD | OUTPATIENT
Start: 2023-02-06 | End: 2023-02-13

## 2023-02-06 NOTE — TELEPHONE ENCOUNTER
Patient's bowel prep was never ordered. Ordered to [facility].

## 2023-02-06 NOTE — TELEPHONE ENCOUNTER
Prior Authorization Retail Medication Request    Medication/Dose: Ondansetron 4mg  ICD code (if different than what is on RX):    Previously Tried and Failed:    Rationale:  Prior auth requried     Insurance Name:  [facility] part d  Insurance ID:  [id number]      Pharmacy Information (if different than what is on RX)    Thank you  Dixon JerryD  [facility]

## 2023-02-06 NOTE — TELEPHONE ENCOUNTER
[facility] Call Center    Phone Message    May a detailed message be left on voicemail: yes     Reason for Call: Other: Patient explained that he has an upcoming procedure and that  always prescribes him a couple of medications to help him prepare for the procedure. He is requesting a call back from the team to discuss and to see if he can have some medication requested. Please review and call patient back, thank you!     Action Taken: Message routed to:  [facility] ([facility]): Surgery Clinic CLR Nurses    Travel Screening: Not Applicable

## 2023-02-08 ENCOUNTER — HOSPITAL ENCOUNTER (INPATIENT)
Facility: CLINIC | Age: 73
LOS: 6 days | Discharge: HOME HEALTH | DRG: 330 | End: 2023-02-14
Attending: STUDENT IN AN ORGANIZED HEALTH CARE EDUCATION/TRAINING PROGRAM | Admitting: SURGERY
Payer: COMMERCIAL

## 2023-02-08 ENCOUNTER — ANESTHESIA (OUTPATIENT)
Dept: SURGERY | Facility: CLINIC | Age: 73
DRG: 330 | End: 2023-02-08
Payer: COMMERCIAL

## 2023-02-08 DIAGNOSIS — Z93.2 HIGH OUTPUT ILEOSTOMY (H): ICD-10-CM

## 2023-02-08 DIAGNOSIS — C19 CANCER OF RECTOSIGMOID (COLON) (H): ICD-10-CM

## 2023-02-08 DIAGNOSIS — R19.8 HIGH OUTPUT ILEOSTOMY (H): ICD-10-CM

## 2023-02-08 DIAGNOSIS — G89.18 ACUTE POST-OPERATIVE PAIN: ICD-10-CM

## 2023-02-08 DIAGNOSIS — Z93.3 COLOSTOMY PRESENT (H): Primary | ICD-10-CM

## 2023-02-08 DIAGNOSIS — Z93.2 S/P ILEOSTOMY (H): ICD-10-CM

## 2023-02-08 DIAGNOSIS — C18.9 COLON ADENOCARCINOMA (H): ICD-10-CM

## 2023-02-08 LAB
CREAT SERPL-MCNC: 0.88 MG/DL (ref 0.67–1.17)
GFR SERPL CREATININE-BSD FRML MDRD: >90 ML/MIN/1.73M2
GLUCOSE BLDC GLUCOMTR-MCNC: 84 MG/DL (ref 70–99)

## 2023-02-08 PROCEDURE — 44147 PARTIAL REMOVAL OF COLON: CPT | Mod: 80 | Performed by: SURGERY

## 2023-02-08 PROCEDURE — 250N000025 HC SEVOFLURANE, PER MIN: Performed by: STUDENT IN AN ORGANIZED HEALTH CARE EDUCATION/TRAINING PROGRAM

## 2023-02-08 PROCEDURE — 44310 ILEOSTOMY/JEJUNOSTOMY: CPT | Performed by: SURGERY

## 2023-02-08 PROCEDURE — 272N000001 HC OR GENERAL SUPPLY STERILE: Performed by: STUDENT IN AN ORGANIZED HEALTH CARE EDUCATION/TRAINING PROGRAM

## 2023-02-08 PROCEDURE — 250N000013 HC RX MED GY IP 250 OP 250 PS 637: Performed by: SURGERY

## 2023-02-08 PROCEDURE — 250N000011 HC RX IP 250 OP 636: Performed by: NURSE ANESTHETIST, CERTIFIED REGISTERED

## 2023-02-08 PROCEDURE — C1769 GUIDE WIRE: HCPCS | Performed by: STUDENT IN AN ORGANIZED HEALTH CARE EDUCATION/TRAINING PROGRAM

## 2023-02-08 PROCEDURE — 250N000011 HC RX IP 250 OP 636: Performed by: SURGERY

## 2023-02-08 PROCEDURE — 82565 ASSAY OF CREATININE: CPT | Performed by: SURGERY

## 2023-02-08 PROCEDURE — 999N000141 HC STATISTIC PRE-PROCEDURE NURSING ASSESSMENT: Performed by: STUDENT IN AN ORGANIZED HEALTH CARE EDUCATION/TRAINING PROGRAM

## 2023-02-08 PROCEDURE — 250N000009 HC RX 250: Performed by: NURSE ANESTHETIST, CERTIFIED REGISTERED

## 2023-02-08 PROCEDURE — 88307 TISSUE EXAM BY PATHOLOGIST: CPT | Mod: 26 | Performed by: PATHOLOGY

## 2023-02-08 PROCEDURE — 250N000011 HC RX IP 250 OP 636: Performed by: ANESTHESIOLOGY

## 2023-02-08 PROCEDURE — 52005 CYSTO W/URTRL CATHJ: CPT | Performed by: STUDENT IN AN ORGANIZED HEALTH CARE EDUCATION/TRAINING PROGRAM

## 2023-02-08 PROCEDURE — 0DTP0ZZ RESECTION OF RECTUM, OPEN APPROACH: ICD-10-PCS | Performed by: SURGERY

## 2023-02-08 PROCEDURE — 36415 COLL VENOUS BLD VENIPUNCTURE: CPT | Performed by: SURGERY

## 2023-02-08 PROCEDURE — 370N000017 HC ANESTHESIA TECHNICAL FEE, PER MIN: Performed by: STUDENT IN AN ORGANIZED HEALTH CARE EDUCATION/TRAINING PROGRAM

## 2023-02-08 PROCEDURE — 272N000002 HC OR SUPPLY OTHER OPNP: Performed by: STUDENT IN AN ORGANIZED HEALTH CARE EDUCATION/TRAINING PROGRAM

## 2023-02-08 PROCEDURE — 250N000011 HC RX IP 250 OP 636: Performed by: STUDENT IN AN ORGANIZED HEALTH CARE EDUCATION/TRAINING PROGRAM

## 2023-02-08 PROCEDURE — C9290 INJ, BUPIVACAINE LIPOSOME: HCPCS | Performed by: ANESTHESIOLOGY

## 2023-02-08 PROCEDURE — 44139 MOBILIZATION OF COLON: CPT | Performed by: SURGERY

## 2023-02-08 PROCEDURE — 120N000002 HC R&B MED SURG/OB UMMC

## 2023-02-08 PROCEDURE — 0D1N0Z4 BYPASS SIGMOID COLON TO CUTANEOUS, OPEN APPROACH: ICD-10-PCS | Performed by: SURGERY

## 2023-02-08 PROCEDURE — 88305 TISSUE EXAM BY PATHOLOGIST: CPT | Mod: 26 | Performed by: PATHOLOGY

## 2023-02-08 PROCEDURE — 88309 TISSUE EXAM BY PATHOLOGIST: CPT | Mod: 26 | Performed by: PATHOLOGY

## 2023-02-08 PROCEDURE — 88304 TISSUE EXAM BY PATHOLOGIST: CPT | Mod: 26 | Performed by: PATHOLOGY

## 2023-02-08 PROCEDURE — 360N000077 HC SURGERY LEVEL 4, PER MIN: Performed by: STUDENT IN AN ORGANIZED HEALTH CARE EDUCATION/TRAINING PROGRAM

## 2023-02-08 PROCEDURE — 88307 TISSUE EXAM BY PATHOLOGIST: CPT | Mod: TC | Performed by: STUDENT IN AN ORGANIZED HEALTH CARE EDUCATION/TRAINING PROGRAM

## 2023-02-08 PROCEDURE — 258N000003 HC RX IP 258 OP 636: Performed by: NURSE ANESTHETIST, CERTIFIED REGISTERED

## 2023-02-08 PROCEDURE — 44147 PARTIAL REMOVAL OF COLON: CPT | Mod: 22 | Performed by: SURGERY

## 2023-02-08 PROCEDURE — 0T788DZ DILATION OF BILATERAL URETERS WITH INTRALUMINAL DEVICE, VIA NATURAL OR ARTIFICIAL OPENING ENDOSCOPIC: ICD-10-PCS | Performed by: SURGERY

## 2023-02-08 PROCEDURE — 710N000010 HC RECOVERY PHASE 1, LEVEL 2, PER MIN: Performed by: STUDENT IN AN ORGANIZED HEALTH CARE EDUCATION/TRAINING PROGRAM

## 2023-02-08 PROCEDURE — C1758 CATHETER, URETERAL: HCPCS | Performed by: STUDENT IN AN ORGANIZED HEALTH CARE EDUCATION/TRAINING PROGRAM

## 2023-02-08 RX ORDER — ONDANSETRON 2 MG/ML
4 INJECTION INTRAMUSCULAR; INTRAVENOUS EVERY 30 MIN PRN
Status: DISCONTINUED | OUTPATIENT
Start: 2023-02-08 | End: 2023-02-08 | Stop reason: HOSPADM

## 2023-02-08 RX ORDER — BUPIVACAINE HYDROCHLORIDE 2.5 MG/ML
INJECTION, SOLUTION EPIDURAL; INFILTRATION; INTRACAUDAL
Status: COMPLETED | OUTPATIENT
Start: 2023-02-08 | End: 2023-02-08

## 2023-02-08 RX ORDER — NALOXONE HYDROCHLORIDE 0.4 MG/ML
0.2 INJECTION, SOLUTION INTRAMUSCULAR; INTRAVENOUS; SUBCUTANEOUS
Status: DISCONTINUED | OUTPATIENT
Start: 2023-02-08 | End: 2023-02-08 | Stop reason: HOSPADM

## 2023-02-08 RX ORDER — PROPOFOL 10 MG/ML
INJECTION, EMULSION INTRAVENOUS PRN
Status: DISCONTINUED | OUTPATIENT
Start: 2023-02-08 | End: 2023-02-08

## 2023-02-08 RX ORDER — NALOXONE HYDROCHLORIDE 0.4 MG/ML
0.4 INJECTION, SOLUTION INTRAMUSCULAR; INTRAVENOUS; SUBCUTANEOUS
Status: DISCONTINUED | OUTPATIENT
Start: 2023-02-08 | End: 2023-02-14 | Stop reason: HOSPADM

## 2023-02-08 RX ORDER — CEFAZOLIN SODIUM/WATER 2 G/20 ML
2 SYRINGE (ML) INTRAVENOUS SEE ADMIN INSTRUCTIONS
Status: DISCONTINUED | OUTPATIENT
Start: 2023-02-08 | End: 2023-02-08 | Stop reason: HOSPADM

## 2023-02-08 RX ORDER — GABAPENTIN 100 MG/1
100 CAPSULE ORAL AT BEDTIME
Status: DISCONTINUED | OUTPATIENT
Start: 2023-02-08 | End: 2023-02-14 | Stop reason: HOSPADM

## 2023-02-08 RX ORDER — METHOCARBAMOL 500 MG/1
500 TABLET, FILM COATED ORAL 4 TIMES DAILY
Status: DISCONTINUED | OUTPATIENT
Start: 2023-02-08 | End: 2023-02-11

## 2023-02-08 RX ORDER — FLUMAZENIL 0.1 MG/ML
0.2 INJECTION, SOLUTION INTRAVENOUS
Status: DISCONTINUED | OUTPATIENT
Start: 2023-02-08 | End: 2023-02-08 | Stop reason: HOSPADM

## 2023-02-08 RX ORDER — SODIUM CHLORIDE, SODIUM LACTATE, POTASSIUM CHLORIDE, CALCIUM CHLORIDE 600; 310; 30; 20 MG/100ML; MG/100ML; MG/100ML; MG/100ML
INJECTION, SOLUTION INTRAVENOUS CONTINUOUS
Status: DISCONTINUED | OUTPATIENT
Start: 2023-02-08 | End: 2023-02-08 | Stop reason: HOSPADM

## 2023-02-08 RX ORDER — ENOXAPARIN SODIUM 100 MG/ML
40 INJECTION SUBCUTANEOUS EVERY 24 HOURS
Status: DISCONTINUED | OUTPATIENT
Start: 2023-02-09 | End: 2023-02-14 | Stop reason: HOSPADM

## 2023-02-08 RX ORDER — LIDOCAINE 40 MG/G
CREAM TOPICAL
Status: DISCONTINUED | OUTPATIENT
Start: 2023-02-08 | End: 2023-02-14 | Stop reason: HOSPADM

## 2023-02-08 RX ORDER — HYDROMORPHONE HYDROCHLORIDE 1 MG/ML
0.4 INJECTION, SOLUTION INTRAMUSCULAR; INTRAVENOUS; SUBCUTANEOUS EVERY 5 MIN PRN
Status: DISCONTINUED | OUTPATIENT
Start: 2023-02-08 | End: 2023-02-08 | Stop reason: HOSPADM

## 2023-02-08 RX ORDER — FENTANYL CITRATE 50 UG/ML
50 INJECTION, SOLUTION INTRAMUSCULAR; INTRAVENOUS EVERY 5 MIN PRN
Status: DISCONTINUED | OUTPATIENT
Start: 2023-02-08 | End: 2023-02-08 | Stop reason: HOSPADM

## 2023-02-08 RX ORDER — NALOXONE HYDROCHLORIDE 0.4 MG/ML
0.4 INJECTION, SOLUTION INTRAMUSCULAR; INTRAVENOUS; SUBCUTANEOUS
Status: DISCONTINUED | OUTPATIENT
Start: 2023-02-08 | End: 2023-02-08 | Stop reason: HOSPADM

## 2023-02-08 RX ORDER — NALOXONE HYDROCHLORIDE 0.4 MG/ML
0.2 INJECTION, SOLUTION INTRAMUSCULAR; INTRAVENOUS; SUBCUTANEOUS
Status: DISCONTINUED | OUTPATIENT
Start: 2023-02-08 | End: 2023-02-14 | Stop reason: HOSPADM

## 2023-02-08 RX ORDER — ONDANSETRON 2 MG/ML
4 INJECTION INTRAMUSCULAR; INTRAVENOUS ONCE
Status: COMPLETED | OUTPATIENT
Start: 2023-02-08 | End: 2023-02-08

## 2023-02-08 RX ORDER — CEFAZOLIN SODIUM/WATER 2 G/20 ML
2 SYRINGE (ML) INTRAVENOUS
Status: COMPLETED | OUTPATIENT
Start: 2023-02-08 | End: 2023-02-08

## 2023-02-08 RX ORDER — SODIUM CHLORIDE, SODIUM LACTATE, POTASSIUM CHLORIDE, CALCIUM CHLORIDE 600; 310; 30; 20 MG/100ML; MG/100ML; MG/100ML; MG/100ML
INJECTION, SOLUTION INTRAVENOUS CONTINUOUS PRN
Status: DISCONTINUED | OUTPATIENT
Start: 2023-02-08 | End: 2023-02-08

## 2023-02-08 RX ORDER — ACETAMINOPHEN 325 MG/1
975 TABLET ORAL ONCE
Status: COMPLETED | OUTPATIENT
Start: 2023-02-08 | End: 2023-02-08

## 2023-02-08 RX ORDER — ONDANSETRON 4 MG/1
4 TABLET, ORALLY DISINTEGRATING ORAL EVERY 30 MIN PRN
Status: DISCONTINUED | OUTPATIENT
Start: 2023-02-08 | End: 2023-02-08 | Stop reason: HOSPADM

## 2023-02-08 RX ORDER — FENTANYL CITRATE 50 UG/ML
INJECTION, SOLUTION INTRAMUSCULAR; INTRAVENOUS PRN
Status: DISCONTINUED | OUTPATIENT
Start: 2023-02-08 | End: 2023-02-08

## 2023-02-08 RX ORDER — METRONIDAZOLE 500 MG/100ML
500 INJECTION, SOLUTION INTRAVENOUS
Status: COMPLETED | OUTPATIENT
Start: 2023-02-08 | End: 2023-02-08

## 2023-02-08 RX ORDER — GLYCOPYRROLATE 0.2 MG/ML
INJECTION, SOLUTION INTRAMUSCULAR; INTRAVENOUS PRN
Status: DISCONTINUED | OUTPATIENT
Start: 2023-02-08 | End: 2023-02-08

## 2023-02-08 RX ORDER — HYDROMORPHONE HYDROCHLORIDE 1 MG/ML
0.2 INJECTION, SOLUTION INTRAMUSCULAR; INTRAVENOUS; SUBCUTANEOUS EVERY 5 MIN PRN
Status: DISCONTINUED | OUTPATIENT
Start: 2023-02-08 | End: 2023-02-08 | Stop reason: HOSPADM

## 2023-02-08 RX ORDER — ONDANSETRON 4 MG/1
4 TABLET, ORALLY DISINTEGRATING ORAL EVERY 6 HOURS PRN
Status: DISCONTINUED | OUTPATIENT
Start: 2023-02-08 | End: 2023-02-14 | Stop reason: HOSPADM

## 2023-02-08 RX ORDER — FENTANYL CITRATE 50 UG/ML
25 INJECTION, SOLUTION INTRAMUSCULAR; INTRAVENOUS EVERY 5 MIN PRN
Status: DISCONTINUED | OUTPATIENT
Start: 2023-02-08 | End: 2023-02-08 | Stop reason: HOSPADM

## 2023-02-08 RX ORDER — FENTANYL CITRATE 50 UG/ML
25-50 INJECTION, SOLUTION INTRAMUSCULAR; INTRAVENOUS
Status: DISCONTINUED | OUTPATIENT
Start: 2023-02-08 | End: 2023-02-08 | Stop reason: HOSPADM

## 2023-02-08 RX ORDER — MEPERIDINE HYDROCHLORIDE 25 MG/ML
12.5 INJECTION INTRAMUSCULAR; INTRAVENOUS; SUBCUTANEOUS EVERY 5 MIN PRN
Status: DISCONTINUED | OUTPATIENT
Start: 2023-02-08 | End: 2023-02-08 | Stop reason: HOSPADM

## 2023-02-08 RX ORDER — OXYCODONE HYDROCHLORIDE 10 MG/1
10 TABLET ORAL EVERY 4 HOURS PRN
Status: DISCONTINUED | OUTPATIENT
Start: 2023-02-08 | End: 2023-02-08 | Stop reason: HOSPADM

## 2023-02-08 RX ORDER — DEXAMETHASONE SODIUM PHOSPHATE 4 MG/ML
INJECTION, SOLUTION INTRA-ARTICULAR; INTRALESIONAL; INTRAMUSCULAR; INTRAVENOUS; SOFT TISSUE PRN
Status: DISCONTINUED | OUTPATIENT
Start: 2023-02-08 | End: 2023-02-08

## 2023-02-08 RX ORDER — GABAPENTIN 300 MG/1
300 CAPSULE ORAL
Status: COMPLETED | OUTPATIENT
Start: 2023-02-08 | End: 2023-02-08

## 2023-02-08 RX ORDER — ACETAMINOPHEN 500 MG
1000 TABLET ORAL EVERY 6 HOURS
Status: DISCONTINUED | OUTPATIENT
Start: 2023-02-08 | End: 2023-02-12

## 2023-02-08 RX ORDER — ALBUTEROL SULFATE 0.83 MG/ML
2.5 SOLUTION RESPIRATORY (INHALATION) EVERY 4 HOURS PRN
Status: DISCONTINUED | OUTPATIENT
Start: 2023-02-08 | End: 2023-02-08 | Stop reason: HOSPADM

## 2023-02-08 RX ORDER — SODIUM CHLORIDE, SODIUM LACTATE, POTASSIUM CHLORIDE, CALCIUM CHLORIDE 600; 310; 30; 20 MG/100ML; MG/100ML; MG/100ML; MG/100ML
INJECTION, SOLUTION INTRAVENOUS CONTINUOUS
Status: DISCONTINUED | OUTPATIENT
Start: 2023-02-08 | End: 2023-02-09

## 2023-02-08 RX ORDER — OXYCODONE HYDROCHLORIDE 5 MG/1
5 TABLET ORAL EVERY 4 HOURS PRN
Status: DISCONTINUED | OUTPATIENT
Start: 2023-02-08 | End: 2023-02-08 | Stop reason: HOSPADM

## 2023-02-08 RX ORDER — ENOXAPARIN SODIUM 100 MG/ML
40 INJECTION SUBCUTANEOUS
Status: COMPLETED | OUTPATIENT
Start: 2023-02-08 | End: 2023-02-08

## 2023-02-08 RX ORDER — ONDANSETRON 2 MG/ML
4 INJECTION INTRAMUSCULAR; INTRAVENOUS EVERY 6 HOURS PRN
Status: DISCONTINUED | OUTPATIENT
Start: 2023-02-08 | End: 2023-02-14 | Stop reason: HOSPADM

## 2023-02-08 RX ADMIN — Medication 2 G: at 12:23

## 2023-02-08 RX ADMIN — Medication 65 MG: at 08:14

## 2023-02-08 RX ADMIN — SODIUM CHLORIDE, POTASSIUM CHLORIDE, SODIUM LACTATE AND CALCIUM CHLORIDE: 600; 310; 30; 20 INJECTION, SOLUTION INTRAVENOUS at 08:30

## 2023-02-08 RX ADMIN — DEXAMETHASONE SODIUM PHOSPHATE 4 MG: 4 INJECTION, SOLUTION INTRA-ARTICULAR; INTRALESIONAL; INTRAMUSCULAR; INTRAVENOUS; SOFT TISSUE at 08:30

## 2023-02-08 RX ADMIN — PHENYLEPHRINE HYDROCHLORIDE 100 MCG: 10 INJECTION INTRAVENOUS at 09:12

## 2023-02-08 RX ADMIN — PHENYLEPHRINE HYDROCHLORIDE 100 MCG: 10 INJECTION INTRAVENOUS at 11:41

## 2023-02-08 RX ADMIN — GLYCOPYRROLATE 0.2 MG: 0.2 INJECTION, SOLUTION INTRAMUSCULAR; INTRAVENOUS at 08:38

## 2023-02-08 RX ADMIN — SODIUM CHLORIDE, POTASSIUM CHLORIDE, SODIUM LACTATE AND CALCIUM CHLORIDE: 600; 310; 30; 20 INJECTION, SOLUTION INTRAVENOUS at 10:18

## 2023-02-08 RX ADMIN — BUPIVACAINE HYDROCHLORIDE 20 ML: 2.5 INJECTION, SOLUTION EPIDURAL; INFILTRATION; INTRACAUDAL; PERINEURAL at 06:50

## 2023-02-08 RX ADMIN — Medication 20 MG: at 09:13

## 2023-02-08 RX ADMIN — Medication 2 G: at 08:23

## 2023-02-08 RX ADMIN — Medication 20 MG: at 12:18

## 2023-02-08 RX ADMIN — BUPIVACAINE 20 ML: 13.3 INJECTION, SUSPENSION, LIPOSOMAL INFILTRATION at 06:50

## 2023-02-08 RX ADMIN — FENTANYL CITRATE 50 MCG: 50 INJECTION, SOLUTION INTRAMUSCULAR; INTRAVENOUS at 09:44

## 2023-02-08 RX ADMIN — PHENYLEPHRINE HYDROCHLORIDE 200 MCG: 10 INJECTION INTRAVENOUS at 08:22

## 2023-02-08 RX ADMIN — FENTANYL CITRATE 50 MCG: 50 INJECTION, SOLUTION INTRAMUSCULAR; INTRAVENOUS at 10:34

## 2023-02-08 RX ADMIN — HYDROMORPHONE HYDROCHLORIDE 0.2 MG: 1 INJECTION, SOLUTION INTRAMUSCULAR; INTRAVENOUS; SUBCUTANEOUS at 16:09

## 2023-02-08 RX ADMIN — HYDROMORPHONE HYDROCHLORIDE 0.2 MG: 1 INJECTION, SOLUTION INTRAMUSCULAR; INTRAVENOUS; SUBCUTANEOUS at 15:55

## 2023-02-08 RX ADMIN — HYDROMORPHONE HYDROCHLORIDE 0.5 MG: 1 INJECTION, SOLUTION INTRAMUSCULAR; INTRAVENOUS; SUBCUTANEOUS at 13:49

## 2023-02-08 RX ADMIN — METRONIDAZOLE 500 MG: 500 INJECTION, SOLUTION INTRAVENOUS at 06:52

## 2023-02-08 RX ADMIN — FENTANYL CITRATE 50 MCG: 50 INJECTION, SOLUTION INTRAMUSCULAR; INTRAVENOUS at 06:59

## 2023-02-08 RX ADMIN — ONDANSETRON 4 MG: 2 INJECTION INTRAMUSCULAR; INTRAVENOUS at 13:49

## 2023-02-08 RX ADMIN — PROPOFOL 110 MG: 10 INJECTION, EMULSION INTRAVENOUS at 08:13

## 2023-02-08 RX ADMIN — PHENYLEPHRINE HYDROCHLORIDE 0.5 MCG/KG/MIN: 10 INJECTION INTRAVENOUS at 08:49

## 2023-02-08 RX ADMIN — Medication: at 18:01

## 2023-02-08 RX ADMIN — PHENYLEPHRINE HYDROCHLORIDE 100 MCG: 10 INJECTION INTRAVENOUS at 08:38

## 2023-02-08 RX ADMIN — FENTANYL CITRATE 50 MCG: 50 INJECTION, SOLUTION INTRAMUSCULAR; INTRAVENOUS at 14:50

## 2023-02-08 RX ADMIN — PHENYLEPHRINE HYDROCHLORIDE 200 MCG: 10 INJECTION INTRAVENOUS at 08:49

## 2023-02-08 RX ADMIN — Medication 20 MG: at 10:51

## 2023-02-08 RX ADMIN — FENTANYL CITRATE 100 MCG: 50 INJECTION, SOLUTION INTRAMUSCULAR; INTRAVENOUS at 08:12

## 2023-02-08 RX ADMIN — FENTANYL CITRATE 50 MCG: 50 INJECTION, SOLUTION INTRAMUSCULAR; INTRAVENOUS at 15:25

## 2023-02-08 RX ADMIN — PHENYLEPHRINE HYDROCHLORIDE 200 MCG: 10 INJECTION INTRAVENOUS at 08:18

## 2023-02-08 RX ADMIN — Medication 20 MG: at 11:38

## 2023-02-08 RX ADMIN — ENOXAPARIN SODIUM 40 MG: 40 INJECTION SUBCUTANEOUS at 07:03

## 2023-02-08 RX ADMIN — FENTANYL CITRATE 50 MCG: 50 INJECTION, SOLUTION INTRAMUSCULAR; INTRAVENOUS at 15:14

## 2023-02-08 RX ADMIN — Medication 20 MG: at 13:05

## 2023-02-08 RX ADMIN — Medication 20 MG: at 10:00

## 2023-02-08 RX ADMIN — METHOCARBAMOL 500 MG: 500 TABLET ORAL at 20:33

## 2023-02-08 RX ADMIN — Medication 5 MG: at 08:11

## 2023-02-08 RX ADMIN — GABAPENTIN 300 MG: 300 CAPSULE ORAL at 06:49

## 2023-02-08 RX ADMIN — GABAPENTIN 100 MG: 100 CAPSULE ORAL at 22:26

## 2023-02-08 RX ADMIN — SODIUM CHLORIDE, POTASSIUM CHLORIDE, SODIUM LACTATE AND CALCIUM CHLORIDE: 600; 310; 30; 20 INJECTION, SOLUTION INTRAVENOUS at 08:10

## 2023-02-08 RX ADMIN — ACETAMINOPHEN 975 MG: 325 TABLET ORAL at 06:49

## 2023-02-08 RX ADMIN — PHENYLEPHRINE HYDROCHLORIDE 100 MCG: 10 INJECTION INTRAVENOUS at 08:33

## 2023-02-08 RX ADMIN — Medication 20 MG: at 13:45

## 2023-02-08 RX ADMIN — SUGAMMADEX 200 MG: 100 INJECTION, SOLUTION INTRAVENOUS at 14:38

## 2023-02-08 RX ADMIN — HYDROMORPHONE HYDROCHLORIDE 0.2 MG: 1 INJECTION, SOLUTION INTRAMUSCULAR; INTRAVENOUS; SUBCUTANEOUS at 16:28

## 2023-02-08 ASSESSMENT — ACTIVITIES OF DAILY LIVING (ADL)
ADLS_ACUITY_SCORE: 18
ADLS_ACUITY_SCORE: 18
ADLS_ACUITY_SCORE: 24
ADLS_ACUITY_SCORE: 18
ADLS_ACUITY_SCORE: 24

## 2023-02-08 ASSESSMENT — LIFESTYLE VARIABLES: TOBACCO_USE: 1

## 2023-02-08 ASSESSMENT — ENCOUNTER SYMPTOMS: ORTHOPNEA: 0

## 2023-02-08 ASSESSMENT — COPD QUESTIONNAIRES
COPD: 1
CAT_SEVERITY: MILD

## 2023-02-08 NOTE — ANESTHESIA PREPROCEDURE EVALUATION
Pre-Operative H & P     CC:  Preoperative exam to assess for increased cardiopulmonary risk while undergoing surgery and anesthesia.    Date of Encounter: 1/27/2023  Primary Care Physician:  [facility]     Reason for visit:   No diagnosis found.    HPI  Kalin Shepard is a 72 year old male who presents for pre-operative H & P in preparation for  Procedure Information     Case: [id number] Date/Time: 02/08/23 0730    Procedures:       CYSTOSCOPY, WITH URETERAL STENT INSERTION [[id number]] (Bilateral: Urethra)      OPEN SIGMOIDECTOMY, POSSIBLE DIVERTING LOOP ILEOSTOMY (Abdomen)    Anesthesia type: General with Block    Diagnosis:       S/P colostomy (H) [Z93.3]      Adenocarcinoma of sigmoid colon (H) [C18.7]    Pre-op diagnosis:       S/P colostomy (H) [Z93.3]      Adenocarcinoma of sigmoid colon (H) [C18.7]    Location:  OR  / [facility]    Providers: Ulises Basilio MD; Riley Magdaleno MD          Kalin Shepard is a 72 year old male with tobacco use, post-herpetic neuralgia, colostomy status and alcohol dependence in remission that has adenocarcinoma of the sigmoid colon.  He is currently s/p prior small bowel resection and colostomy creation in March 2022 and chemotherapy which ended in November 2022.  The above listed surgery has now been recommended for further management.     History is obtained from the patient, his wife and chart review    Hx of abnormal bleeding or anti-platelet use: none      Past Medical History  Past Medical History:   Diagnosis Date     Cancer (H)        Past Surgical History  Past Surgical History:   Procedure Laterality Date     INSERT PORT VASCULAR ACCESS Right 4/21/2022    Procedure: INSERTION, VASCULAR ACCESS PORT;  Surgeon: Marianne Schroeder MD;  Location: [facility] OR     INSERT PORT VASCULAR ACCESS Left 6/2/2022    Procedure: INSERTION, VASCULAR ACCESS PORT;  Surgeon: Phong Finch MD;  [place] [facility] [place]     IR CHEST PORT PLACEMENT > 5 YRS  OF AGE  6/2/2022     LAPAROTOMY EXPLORATORY N/A 3/26/2022    Procedure: exploratory laparotomy, small bowel resection, colostomy creation;  Surgeon: Riley Magdaleno MD;  Location: [facility]     LAPAROTOMY, LYSIS ADHESIONS, COMBINED N/A 8/24/2022    Procedure: LAPAROTOMY, EXPLORATORY, WITH LYSIS OF ADHESIONS;  Surgeon: Segundo Hill MD;  Location: [facility] OR     PICC INSERTION - DOUBLE LUMEN Left 03/28/2022    left medial brachial 5 fr dl picc 49 cm     SIGMOIDOSCOPY FLEXIBLE N/A 3/26/2022    Procedure: Sigmoidoscopy flexible;  Surgeon: Riley Magdaleno MD;  Location: [facility] OR       Prior to Admission Medications  No current outpatient medications on file.       Allergies  No Known Allergies    Social History  Social History     Socioeconomic History     Marital status:      Spouse name: Not on file     Number of children: 3     Years of education: Not on file     Highest education level: Not on file   Occupational History     Occupation: retired   Tobacco Use     Smoking status: Every Day     Packs/day: 0.50     Years: 50.00     Pack years: 25.00     Types: Cigarettes     Smokeless tobacco: Never   Vaping Use     Vaping Use: Never used   Substance and Sexual Activity     Alcohol use: Not Currently     Drug use: Never     Sexual activity: Not on file   Other Topics Concern     Not on file   Social History Narrative     Not on file     Social Determinants of Health     Financial Resource Strain: Not on file   Food Insecurity: Not on file   Transportation Needs: Not on file   Physical Activity: Not on file   Stress: Not on file   Social Connections: Not on file   Intimate Partner Violence: Not on file   Housing Stability: Not on file       Family History  Family History   Problem Relation Age of Onset     No Known Problems Mother      Prostate Cancer Father      Colon Cancer Father      Lymphoma Father      Anesthesia Reaction No family hx of      Thrombosis No family hx of        Review of Systems  The  complete review of systems is negative other than noted in the HPI or here.   Anesthesia Evaluation   Pt has had prior anesthetic. Type: General.    No history of anesthetic complications       ROS/MED HX  ENT/Pulmonary:     (+) tobacco use, Current use, mild,  COPD,  (-) LAN risk factors and recent URI   Neurologic: Comment: Post-herpetic neuralgia T3 right side      Cardiovascular:     (+) -----Previous cardiac testing   Echo: Date: Results:    Stress Test: Date: Results:    ECG Reviewed: Date: 3/2022 Results:    Cath: Date: Results:   (-) JARAMILLO, orthopnea/PND and murmur   METS/Exercise Tolerance: >4 METS Comment: Walks regularly for exercise usually up to a 0.5 mile.  Also does yard work and strength training.     Hematologic:  - neg hematologic  ROS  (-) history of blood clots and history of blood transfusion   Musculoskeletal:  - neg musculoskeletal ROS     GI/Hepatic: Comment: Colostomy in place    (+) GERD, Asymptomatic on medication,     Renal/Genitourinary: Comment: Urinary frequency and urgency - never diagnosed with BPH.  Denies any retention symptoms.       Endo:  - neg endo ROS     Psychiatric/Substance Use:     (+) alcohol abuse     Infectious Disease:  - neg infectious disease ROS  (-) Recent Fever   Malignancy: Comment: Adenocarcinoma of sigmoid colon  (+) Malignancy, History of GI.GI CA  Active status post Surgery and Chemo.        Other:  - neg other ROS          There were no vitals taken for this visit.    Physical Exam   Constitutional: Awake, alert, cooperative, no apparent distress, and appears stated age.  Eyes: Pupils equal, round and reactive to light, extra ocular muscles intact, sclera clear, conjunctiva normal.  HENT: Normocephalic, oral pharynx with moist mucus membranes. Poor dentition with multiple missing teeth. No goiter appreciated.   Respiratory: Clear to auscultation bilaterally, no crackles or wheezing.  Cardiovascular: Regular rate and rhythm, normal S1 and S2, and no murmur  noted.  Carotids +2, no bruits. No edema. Palpable pulses to radial  DP and PT arteries.   GI: Normal bowel sounds, soft, non-distended, non-tender, no masses palpated, no hepatosplenomegaly.    Lymph/Hematologic: No cervical lymphadenopathy and no supraclavicular lymphadenopathy.  Genitourinary:  deferred  Skin: Warm and dry.  No rashes at anticipated surgical site.   Musculoskeletal: Full ROM of neck. There is no redness, warmth, or swelling of the exposed joints. Gross motor strength is normal.    Neurologic: Awake, alert, oriented to name, place and time. Cranial nerves II-XII are grossly intact. Gait is normal.   Neuropsychiatric: Calm, cooperative. Normal affect.  Access: left chest portacath     Prior Labs/Diagnostic Studies   All labs and imaging personally reviewed     EKG  3/2022  Sinus rhythm with Premature atrial complexes   Rightward axis   Abnormal ECG       The patient's records and results personally reviewed by this provider.     Outside records reviewed from: Care Everywhere    LAB/DIAGNOSTIC STUDIES TODAY:    Component      Latest Ref Rng & Units 1/27/2023   Sodium      136 - 145 mmol/L 141   Potassium      3.4 - 5.3 mmol/L 4.4   Chloride      98 - 107 mmol/L 107   Carbon Dioxide (CO2)      22 - 29 mmol/L 25   Anion Gap      7 - 15 mmol/L 9   Urea Nitrogen      8.0 - 23.0 mg/dL 21.9   Creatinine      0.67 - 1.17 mg/dL 0.69   Calcium      8.8 - 10.2 mg/dL 9.5   Glucose      70 - 99 mg/dL 106 (H)   Alkaline Phosphatase      40 - 129 U/L 43   AST      10 - 50 U/L 24   ALT      10 - 50 U/L 13   Protein Total      6.4 - 8.3 g/dL 6.8   Albumin      3.5 - 5.2 g/dL 4.1   Bilirubin Total      <=1.2 mg/dL 0.3   GFR Estimate      >60 mL/min/1.73m2 >90   Color Urine      Colorless, Straw, Light Yellow, Yellow Yellow   Appearance Urine      Clear Clear   Glucose Urine      Negative mg/dL Negative   Bilirubin Urine      Negative Negative   Ketones Urine      Negative mg/dL Negative   Specific Gravity Urine       1.003 - 1.035 1.018   Blood Urine      Negative Negative   pH Urine      5.0 - 7.0 5.5   Protein Albumin Urine      Negative mg/dL Negative   Urobilinogen mg/dL      Normal, 2.0 mg/dL Normal   Nitrite Urine      Negative Negative   Leukocyte Esterase Urine      Negative Negative   WBC      4.0 - 11.0 10e3/uL 7.6   RBC Count      4.40 - 5.90 10e6/uL 4.60   Hemoglobin      13.3 - 17.7 g/dL 14.2   Hematocrit      40.0 - 53.0 % 44.5   MCV      78 - 100 fL 97   MCH      26.5 - 33.0 pg 30.9   MCHC      31.5 - 36.5 g/dL 31.9   RDW      10.0 - 15.0 % 15.3 (H)   Platelet Count      150 - 450 10e3/uL 187       Assessment      Kalin Shepard is a 72 year old male seen as a PAC referral for risk assessment and optimization for anesthesia.    Plan/Recommendations  Pt will be optimized for the proposed procedure.  See below for details on the assessment, risk, and preoperative recommendations    NEUROLOGY  - No history of TIA, CVA or seizure  - has T3 right-sided post-herpetic neuralgia.  No medications are used as he notes nothing has ever been helpful.    -Post Op delirium risk factors:  Age    ENT  - No current airway concerns.  Will need to be reassessed day of surgery.  Mallampati: II  TM: > 3    CARDIAC  - No history of CAD, Hypertension and Afib  - METS (Metabolic Equivalents) = >4  Patient performs 4 or more METS exercise without symptoms            Total Score: 0      RCRI-Low risk: Class 2 0.9% complication rate            Total Score: 1    RCRI: High Risk Surgery        PULMONARY    LAN Low Risk            Total Score: 2    LAN: Over 50 ys old    LAN: Male      - COPD  Well controlled   - reports that he has been working on cutting back on smoking in preparation for surgery.  Is hoping to quit prior to surgery and is interested in inpatient smoking cessation counseling.  Will place a referral.  - Tobacco History      History   Smoking Status     Every Day     Packs/day: 0.50     Years: 50.00     Types: Cigarettes  "  Smokeless Tobacco     Never       GI  - GERD is well controlled with omeprazole  - colostomy present  - adenocarcinoma of sigmoid colon - surgery planned as above  PONV Low Risk  Total Score: 1           1 AN PONV: Intended Post Op Opioids        /RENAL  - creatinine WNL  - no noted hydronephrosis on imaging    ENDOCRINE    - BMI: Estimated body mass index is 20.54 kg/m  as calculated from the following:    Height as of an earlier encounter on 2/8/23: 1.803 m (5' 11\").    Weight as of an earlier encounter on 2/8/23: 66.8 kg (147 lb 4.3 oz).  Healthy Weight (BMI 18.5-24.9)  - No history of Diabetes Mellitus    HEME  VTE High Risk 3%            Total Score: 8    VTE: Greater than 59 yrs old    VTE: Male    VTE: Current cancer      - No history of abnormal bleeding or antiplatelet use.      MSK  Patient is NOT Frail            Total Score: 1    Frailty: Weight loss 10 lbs or greater          PSYCH  - alcohol dependence in remission since treatment in 2009    Different anesthesia methods/types have been discussed with the patient, but they are aware that the final plan will be decided by the assigned anesthesia provider on the date of service.      The patient is optimized for their procedure. AVS with information on surgery time/arrival time, meds and NPO status given by nursing staff. No further diagnostic testing indicated.      On the day of service:     Prep time: 13 minutes  Visit time: 18 minutes  Documentation time: 19 minutes  ------------------------------------------  Total time: 50 minutes      JULIUS Heart CNP  Preoperative Assessment Center  [facility]  Phone: [phone]  Fax: [phone]    Physical Exam    Airway        Mallampati: II   TM distance: > 3 FB   Neck ROM: full   Mouth opening: > 3 cm    Respiratory Devices and Support         Dental       (+) Minor Abnormalities - some fillings, tiny chips      Cardiovascular          Rhythm " and rate: regular and normal (-) no murmur    Pulmonary           breath sounds clear to auscultation             Anesthesia Plan    ASA Status:  2   NPO Status:  NPO Appropriate    Anesthesia Type: General.     - Airway: ETT   Induction: Intravenous.   Maintenance: Inhalation.   Techniques and Equipment:     - Lines/Monitors: BIS     Consents    Anesthesia Plan(s) and associated risks, benefits, and realistic alternatives discussed. Questions answered and patient/representative(s) expressed understanding.    - Discussed:     - Discussed with:  Patient      - Extended Intubation/Ventilatory Support Discussed: No.      - Patient is DNR/DNI Status: No    Use of blood products discussed: No .     Postoperative Care    Pain management: IV analgesics, Oral pain medications, Peripheral nerve block (Single Shot).   PONV prophylaxis: Ondansetron (or other 5HT-3), Dexamethasone or Solumedrol     Comments:    Other Comments: Patient seen and examined.  Risks, benefits and alternatives to GETA discussed.  Questions answered and patient wishes to proceed

## 2023-02-08 NOTE — ANESTHESIA PROCEDURE NOTES
Airway       Patient location during procedure: OR       Procedure Start/Stop Times: 2/8/2023 8:16 AM  Staff -        CRNA: Ana Cristina Jarquin APRN CRNA       Performed By: CRNA  Consent for Airway        Urgency: elective  Indications and Patient Condition       Indications for airway management: april-procedural       Induction type:intravenous       Mask difficulty assessment: 1 - vent by mask    Final Airway Details       Final airway type: endotracheal airway       Successful airway: ETT - single  Endotracheal Airway Details        ETT size (mm): 8.0       Cuffed: yes       Successful intubation technique: direct laryngoscopy       DL Blade Type: March 2       Grade View of Cords: 1       Adjucts: stylet       Position: Right       Measured from: lips       Secured at (cm): 24       Bite block used: None    Post intubation assessment        Placement verified by: capnometry, equal breath sounds and chest rise        Number of attempts at approach: 1       Secured with: silk tape       Ease of procedure: easy       Dentition: Intact and Unchanged    Medication(s) Administered   Medication Administration Time: 2/8/2023 8:16 AM

## 2023-02-08 NOTE — OP NOTE
[facility] Colorectal Surgery Operative Report  February 8, 2023    PREOPERATIVE DIAGNOSIS:  1. Cancer involving the proximal rectum and distal sigmoid colon, s/p chemotherapy  2. Loop colostomy status    POSTOPERATIVE DIAGNOSIS:   1. Cancer involving the proximal rectum and distal sigmoid colon, s/p chemotherapy  2. Loop colostomy status    PROCEDURE:  1. Exploratory laparotomy  2. Lysis of adhesions, 2 hours  3. Low anterior resection  4. Takedown of loop colostomy  5. Diverting loop ileostomy  6. Flexible sigmoidoscopy  7. Modifier 22; this case was significantly more challenging than usual due to the tumor size and surrounding desmoplastic reaction which required >3 hours total of additional dissection to resect and take down scar tissue.    ANESTHESIA: General endotracheal anesthesia plus local anesthesia.    SURGEON:  Riley Magdaleno M.D.    ASSISTANT(S): Shannon Jimenez, [facility] Fellow; Star Smart M.D.; his presence as an assitant surgeon was required due to the complexity of the operation. He assisted with opening, exposure, retraction, takedown of loop colostomy, and identification of JM. His assistance increased the speed of the operation, reducing patient exposure to anesthesia thus improving the safety of the operation.     INDICATIONS FOR PROCEDURE  Kalin Shepard is a 72 year old male who previously presented with large bowel obstruction secondary to a cancer involving the proximal rectum and distal sigmoid colon. I performed an emergent sigmoid loop colostomy give my concern for it invading critical structures in the left lower quadrant beyond the typical area of dissection. He received neoadjuvant chemotherapy with an excellent response and regression of the tumor. Upon completion and restaging, I recommended surgical resection. I thoroughly discussed the risks, benefits, and alternatives of operative treatment with the patient and he agreed to proceed.    General risks related to abdominal surgery were  "reviewed with the patient. These include, but are not limited to, death, myocardial infarction, pneumonia, urinary tract infection, deep venous thrombosis with or without pulmonary embolus, abdominal infection from bowel injury or abscess, fistula, anastomotic leak that may require reoperation and a stoma, ureteral injury, bowel obstruction, wound infection, and bleeding.    OPERATIVE PROCEDURE:  After obtaining informed consent, the patient was brought to the operating room and placed in the supine position. Appropriate preoperative mechanical and chemical deep venous thrombosis prophylaxis, as well as preoperative prophylactic parenteral antibiotics were given. General endotracheal anesthesia was gently induced. Bilateral lower extremity pneumatic compression devices were applied and all pressure points were cushioned. The perineum was prepped and draped in the usual sterile fashion. Dr. Basilio performed cystoscopy with bilateral ureteral stent placement, please see his operative report. A Castro catheter was inserted without difficulty.     The abdomen was then preped and draped in the standard sterile fashion. After a \"time-out\" was performed, the peritoneal cavity was entered through a vertical midline incision at the site of an old laparotomy scar. Multiple adhesions were encountered.      Adhesions between the abdominal wall and the underlying small bowel and omentum were completely dissected using a combination of blunt and electrocautery dissection. This portion of the procedure required 2 additional hours of operating room time. The small bowel was completely mobilized from ligament of Treitz to the ileocecal valve.  Upon freeing the adhesions, the XL Uziel and Speedtrack were then placed. We identified the loop colostomy in the left lower quadrant. The two loops of bowel were then transected flush with the fascia, with two loads of the Green contour stapler, freeing the bowel to enable mobilization. The " dissection began in a medial to lateral dissection given the dense adhesions to the left lower quadrant abdominal wall and retroperitoneal structures. We identified and preserved the ureters bilaterally with the stents in place. The dissection began at the sacral promontory and the TME plane was entered. This was carried superiorly to the JM pedical, along with the IMV. Both structures were isolated and dissected, and transected with a single fire of the Endo ALYX stapler with white load. Hemostasis was confirmed. At this time, the corresponding mesentery was then ligated up to the divide loop colostomy. The splenic flexure was then mobilized in its entirety using a combination of lateral to medial and medial to lateral dissection easily allowing for the proximal conduit of the proposed anastomosis to reach the pelvis without tension.    Next, we turned our attention to the pelvis and left lower quadrant where the tumow was invading the retroperitoneum. The rectum was mobilized along the TME plane posteriorly down to the coccyx, and then brought about along the right side anteriorly where the peritoneum was incised higher than usual to ensure that we dissected completely around the tumor. The anterior reflection was completely dissected, and we carefully continued to dissect anterior and lateral to the sigmoid colon palpating the edges of the tumor to stay way and ensure an en bloc resection. The attached peritoneum, as well as a rim of retroperitoneal fat was dissected using a combination of blunt dissection and electrocautery. The left ureter was being pulled in medially, but this was carefully dissected free and preserved the entire time. We continued our careful dissection around the tumor, at which was clearing involving the vas deferens, which was ligated with the Ligasure. Mobilization continued deep into the retroperitoneum into the lateral pelvic compartment. The bladder was mobilized anteriorly, and  prostate and seminal vesicles were protected for the duration of the case. Once the sigmoid colon was freed en bloc from the retroperitoneal structures, we identified the upper rectum which was far from tumor amenable for distal transection. The corresponding mesentery was ligated with the Ligasure, and the rectum was transected with the Contour stapler green load. The assistant then went below and performed a flexible sigmoidoscopy, which revealed a 1 cm polyp 1 cm distal to the rectal transection site. Given this finding, we reresected the rectum two cm distally to include this polyp. A flexible sigmoidoscopy was performed which was negative for a stump leak test.    Our proximal colon was then prepared for the colorectal anastomosis. The staple line was transected to visualize the colonic mucosa. A 2-0 prolene was then sutured in pursestring fashion, and the head of the 29 EEA stapler was placed in the end of the colon and carefully tied down. A 29 mm EEA stapler was then gently passed through the anus up the rectum, and positioned at the left corner staple line of the rectum. The stapler post was then brought through the corner with the careful assistance of the abdominal surgeon to not incorporate the linear staple-line. The anvil was then connected to the post of the surgical stapler and both ends of bowel were brought together. The bowel ends appeared to be well vascularized, with no evidence of tension or torsion. After a short pause, the stapler was fired creating an end-to-side colorectal anastomosis. The stapler was then carefully removed without difficulty. On the back-table, the assistant surgeon assured adequate integrity of the stapler tissue-rings. A repeat flexible sigmoidoscopy was performed with no evidence of ongoing bleeding or air leak.     Peritoneal lavage was performed using 3 L of water. The abdomen was inspected for hemostasis.  A site for the loop ileostomy was identified 20 cm from his  prior small bowel anastomosis, approximately 60 cm from the ileocecal valve. The loop colostomy was then taken down carefully. An incision was made at the mucocutaneous junction, dissected down through the soft tissue to free the loop colostomy. This was handed off for pathology. The small intestine was then brought out straight without any twisting of the mesentery, afferent superior efferent limb inferior. The fascia was closed using 0 PDS and the subcutaneous tissue was irrigated with water.  The skin was closed with skin staples, and dressed with an island dressing. The ileostomy was then matured in standard Nelia fashion with 3-0 vicryl sutures. An ostomy appliance was placed.     Sterile dressings were applied.  The patient tolerated the procedure well. I was scrubbed for all components of the operation. All sponge and needle counts were correct x 2 at the end of the procedure.      COMPLICATIONS: none.    ESTIMATED BLOOD LOS: 200 mL.    REPLACEMENT:     - Crystalloid: 3000 mL.     - Colloid: 0 mL.     - Blood products: 0.    URINE OUTPUT: 165 mL    SPECIMEN(S): sigmoid colon and rectum, loop colostomy, omentum, proximal and distal anastomotic rings.    OPERATIVE COUNT: Complete.x    OPERATIVE FINDINGS:   1. No evidence of metastatic disease.   2. Tumor with significant desmoplastic reaction invading into peritoneum of left lower quadrant into retroperitoneum requiring dissection overlying left ureter, and transection of left vas deferens.   3. Bilateral ureters identified and preserved for entire duration of case.   4. Extensive adhesions throughout abdomen requiring >2 hours of dissection.   5. Tension-free end to end colorectal anastomosis with high ligation of JM, negative rectal stump leak test and anastomotic leak test.   6. Diverting loop ileostomy approximately 60 cm from ileocecal valve (20 cm proximal to previous small bowel anastomosis), afferent superior efferent inferior.    Riley Magdaleno,  MD  Division of Colon and Rectal Surgery  [facility]  [phone]

## 2023-02-08 NOTE — ANESTHESIA POSTPROCEDURE EVALUATION
Patient: Kalin Shepard    Procedure: Procedure(s):  CYSTOSCOPY, WITH URETERAL STENT INSERTION [[id number]]  Exploratory Laparotomy, lysis of adhesions, Low Anterior Resection, take down of loop colostomy,  DIVERTING LOOP ILEOSTOMY  Sigmoidoscopy flexible       Anesthesia Type:  General    Note:  Disposition: Inpatient   Postop Pain Control: Uneventful            Sign Out: Well controlled pain   PONV: No   Neuro/Psych: Uneventful            Sign Out: Acceptable/Baseline neuro status   Airway/Respiratory: Uneventful            Sign Out: Acceptable/Baseline resp. status   CV/Hemodynamics: Uneventful            Sign Out: Acceptable CV status; No obvious hypovolemia; No obvious fluid overload   Other NRE: NONE   DID A NON-ROUTINE EVENT OCCUR? No           Last vitals:  Vitals Value Taken Time   /76 02/08/23 1630   Temp 36.5  C (97.7  F) 02/08/23 1448   Pulse 89 02/08/23 1642   Resp 17 02/08/23 1642   SpO2 95 % 02/08/23 1642   Vitals shown include unvalidated device data.    Electronically Signed By: Jordan Veloz MD  February 8, 2023  4:44 PM

## 2023-02-08 NOTE — ANESTHESIA PROCEDURE NOTES
TAP Procedure Note    Pre-Procedure   Staff -        Anesthesiologist:  Karl Vigil MD       Resident/Fellow: Jordan Veloz MD       Performed By: resident       Location: pre-op       Procedure Start/Stop Times: 2/8/2023 6:50 AM and 2/8/2023 6:56 AM       Pre-Anesthestic Checklist: patient identified, IV checked, site marked, risks and benefits discussed, informed consent, monitors and equipment checked, pre-op evaluation, at physician/surgeon's request and post-op pain management  Timeout:       Correct Patient: Yes        Correct Procedure: Yes        Correct Site: Yes        Correct Position: Yes        Correct Laterality: N/A        Site Marked: N/A  Procedure Documentation  Procedure: TAP       Diagnosis: POST OP ANALGESIA       Laterality: bilateral       Patient Position: sitting       Skin prep: Chloraprep       Needle Type: short bevel       Needle Gauge: 21.        Needle Length (millimeters): 110        Ultrasound guided       1. Ultrasound was used to identify targeted nerve, plexus, vascular marker, or fascial plane and place a needle adjacent to it in real-time.       2. Ultrasound was used to visualize the spread of anesthetic in close proximity to the above referenced structure.       3. A permanent image is entered into the patient's record.       4. The visualized anatomic structures appeared normal.       5. There were no apparent abnormal pathologic findings.    Assessment/Narrative         The placement was negative for: blood aspirated, painful injection and site bleeding       Paresthesias: No.       Bolus given via needle. no blood aspirated via catheter.        Secured via.        Insertion/Infusion Method: Single Shot       Complications: none       Injection made incrementally with aspirations every 3 mL.    Medication(s) Administered   Bupivacaine 0.25% PF (Infiltration) - Infiltration   20 mL - 2/8/2023 6:50:00 AM  Bupivacaine liposome (Exparel) 1.3% LA inj susp  "(Infiltration) - Infiltration   20 mL - 2/8/2023 6:50:00 AM  Medication Administration Time: 2/8/2023 6:50 AM      FOR [facility] ([facility]) ONLY:   Pain Team Contact information: please page the Pain Team Via [employer]. Search \"Pain\". During daytime hours, please page the attending first. At night please page the resident first.    "

## 2023-02-08 NOTE — OP NOTE
Assistant Surgeon Operative Report     Please see Dr Juarez's full operative report. I was asked to first assist him in this operation given the lack of any qualified assistance. All colorectal fellows were out for conference and the general surgery  Resident was assisting our partner in another room.     In brief, this is a 71 yo M with a hx of sigmoid cancer s/p diverting loop colostomy.     I assisted with opening, exposure, retraction, takedown of loop colostomy, and identification of JM. My assistance increased the speed of the operation, reducing patient exposure to anesthesia thus improving the safety of the operation.     I was present for the above portion of the procedure, which the patient tolerated well.     Star Smart MD  Division of Colon & Rectal Surgery  Department of Surgery  [facility]  [phone]

## 2023-02-08 NOTE — BRIEF OP NOTE
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]  Post-operative diagnosis Same as pre-operative diagnosis    Procedure: Procedure(s):  CYSTOSCOPY, WITH URETERAL STENT INSERTION [[id number]]  Exploratory Laparotomy, lysis of adhesions, Low Anterior Resection, take down of loop colostomy,  DIVERTING LOOP ILEOSTOMY  Sigmoidoscopy flexible  Surgeon: Surgeon(s) and Role:  Panel 1:     * Ulises Basilio MD - Primary  Panel 2:     * Riley Magdaleno MD - Primary     * Star Smart MD - Assisting     * Maryam Jimenez MD - Fellow - Assisting  Anesthesia: General with Block   Estimated Blood Loss: 200 mL from 2/8/2023  8:05 AM to 2/8/2023  2:46 PM  IVF: 3L  UO: 165 mL  Drains: None    Specimens:   ID Type Source Tests Collected by Time Destination   1 : Sigmoid and rectum Tissue Large Intestine, Colon, Sigmoid/Rectal SURGICAL PATHOLOGY EXAM Ulises Basilio MD 2/8/2023 12:29 PM    2 : Rectal Stump Tissue Other SURGICAL PATHOLOGY EXAM Ulises Basilio MD 2/8/2023 12:49 PM    3 : Proximal anastamotic ring Tissue Other SURGICAL PATHOLOGY EXAM Ulises Basilio MD 2/8/2023  1:17 PM    4 : Distal Anastamotic ring  Tissue Other SURGICAL PATHOLOGY EXAM Ulises Basilio MD 2/8/2023  1:17 PM    5 : loop colostomy Tissue Other SURGICAL PATHOLOGY EXAM (Canceled) Riley Magdaleno MD 2/8/2023  1:26 PM    6 : Omentum Tissue Omentum SURGICAL PATHOLOGY EXAM (Canceled) Riley Magdaleno MD 2/8/2023  1:50 PM      Findings:   1. No evidence of metastatic disease. Tumor with significant desmoplastic reaction invading into peritoneum of left lower quadrant into retroperitoneum requiring dissection overlying left ureter, and transection of left vas deferens. 3. Bilateral ureters identified and preserved for entire duration of case. 4. Extensive adhesions throughout abdomen  requiring >2 hours of dissection. 5. Tension-free end to end colorectal anastomosis with high ligation of JM, negative rectal stump leak test and anastomotic leak test. 6. Diverting loop ileostomy approximately 60 cm from ileocecal valve (20 cm proximal to previous small bowel anastomosis), afferent superior efferent inferior.    Complications: None.    Implants:   Implant Name Type Inv. Item Serial No.  Lot No. LRB No. Used Action   Open End Flexi Tip Ureteral Catheter     [id number] Left 1 Implanted and Explanted   Open End Flexi Tip Ureteral Catheter     [id number] Right 1 Implanted and Explanted       Riley Magdaleno MD  Division of Colon and Rectal Surgery  [facility]  [phone]

## 2023-02-08 NOTE — PHARMACY-ADMISSION MEDICATION HISTORY
"  Admission Medication History Completed by Pharmacy    See [facility] Admission Navigator for allergy information, preferred outpatient pharmacy, prior to admission medications and immunization status.     Medication History Sources:     Pre-op RN assessment, Dispense history    Prior to Admission medications    Medication Sig Last Dose Taking? Auth Provider Long Term End Date   metroNIDAZOLE (FLAGYL) 500 MG tablet Take 1 tablet (500 mg) by mouth every 6 hours At 8:00 am, 2:00 pm, 8:00 pm the day prior to your surgery with neomycin and zofran. 2/7/2023 Yes Riley Magdaleno MD     mirtazapine (REMERON) 15 MG tablet Take 1 tablet (15 mg) by mouth At Bedtime More than a month Yes Noreen Spear, APRN CNP Yes    neomycin (MYCIFRADIN) 500 MG tablet Take 2 tablets (1,000 mg) by mouth every 6 hours At 8:00 am, 2:00 pm, 8:00 pm the day prior to your surgery with flagyl and zofran. 2/7/2023 Yes Riley Magdaleno MD     omeprazole (PRILOSEC) 20 MG DR capsule Take 1 capsule (20 mg) by mouth 2 times daily 2/7/2023 Yes Olga Lidia Ya NP     ondansetron (ZOFRAN) 4 MG tablet Take 1 tablet (4 mg) by mouth every 6 hours At 8:00 am, 2:00 pm, 8:00 pm the day prior to your surgery with neomycin and flagyl. More than a month Yes Riley Magdaleno MD     polyethylene glycol (MIRALAX) 17 g packet Take 119 g by mouth See Admin Instructions Starting at 8pm night prior to surgery. Refer to \"Getting Ready for Surgery\" instructions. 2/7/2023 Yes Riley Magdaleno MD     polyethylene glycol (MIRALAX) 17 g packet Take 238 g by mouth See Admin Instructions Starting at 4 pm night prior to surgery. Refer to \"Getting Ready for Surgery\" instructions. 2/7/2023 Yes Riley Magdaleno MD     polyethylene glycol (MIRALAX) 17 GM/Dose powder Take 17 g by mouth daily  Patient taking differently: Take 17 g by mouth every morning 2/7/2023 Yes Doris Mccloud PA-C     prochlorperazine (COMPAZINE) 10 MG tablet Take 1 tablet (10 mg) by mouth every 8 hours " "as needed for nausea or vomiting More than a month Yes Olga Lidia Ya, NP     senna-docusate (SENOKOT-S/PERICOLACE) 8.6-50 MG tablet Take 1 tablet by mouth 2 times daily 2/7/2023 Yes Olga Lidia Ya NP     heparin 100 UNIT/ML SOLN injection 5-10 mLs by Intracatheter route every 28 days   Truong Valentine MD     multivitamin (CENTRUM SILVER) tablet Take 1 tablet by mouth every morning 2/5/2023  Segundo Bentley MD     Ostomy Supplies MISC 1 each Every Mon, Wed, Fri Morning Hollister1 piece flat fecal with filter #9178 20 pouches per month   2\" barrier ring #1665 (1 per pouch)   Adapt powder #7906   No sting film barrier # 2685   Adapt odor eliminator and lubricant 236ml bottle # 82448   M-9 Spray room deodorizer #4079       Riley Magdaleno MD         Date completed: 02/08/23    Medication history completed by: Asuncion Chan [facility]    "

## 2023-02-08 NOTE — ANESTHESIA CARE TRANSFER NOTE
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    Anesthesia Type:   General     Note:    Oropharynx: oropharynx clear of all foreign objects and spontaneously breathing  Level of Consciousness: awake  Oxygen Supplementation: face mask  Level of Supplemental Oxygen (L/min / FiO2): 6  Independent Airway: airway patency satisfactory and stable  Dentition: dentition unchanged  Vital Signs Stable: post-procedure vital signs reviewed and stable  Report to RN Given: handoff report given  Patient transferred to: PACU    Handoff Report: Identifed the Patient, Identified the Reponsible Provider, Reviewed the pertinent medical history, Discussed the surgical course, Reviewed Intra-OP anesthesia mangement and issues during anesthesia, Set expectations for post-procedure period and Allowed opportunity for questions and acknowledgement of understanding      Vitals:  Vitals Value Taken Time   /86    Temp     Pulse 86 02/08/23 1449   Resp 16 02/08/23 1449   SpO2 100 % 02/08/23 1449   Vitals shown include unvalidated device data.    Electronically Signed By: JULIUS Cortes CRNA  February 8, 2023  2:51 PM

## 2023-02-08 NOTE — OP NOTE
PREOPERATIVE DIAGNOSIS: sigmoid mass    POSTOPERATIVE DIAGNOSIS: same    PROCEDURES PERFORMED:   1. Cystourethroscopy.   2. Placement of bilateralureteral stents    STAFF SURGEON:  Ulises Basilio MD      ANESTHESIA: General    ESTIMATED BLOOD LOSS: none    COMPLICATIONS: None.     BRIEF OPERATIVE INDICATIONS: Kalin Shepard is a 72 year old male undergoing surgery by the  colorectal service for a sigmoid mass resection. They have asked that we place bilateralureteral stents to aid in ureteral identification.    DESCRIPTION OF PROCEDURE: After full informed voluntary consent was obtained, the patient was transported to the operating room, placed supine on the table. After adequate anesthesia was induced, he was placed in the lithotomy position and prepped and draped in the usual sterile fashion. A timeout was taken to confirm correct patient, procedure and laterality.     The case was started by inserting a 22-Maori rigid cystoscope sheath and 30-degree angle lens. The urethra was unremarkable. Once in the urinary bladder, media was clear. Pathologic bladder findings included: None.    The right and left ureteral orifices were cannulated with open-ended 5F ureteral catheters withoutthe aid of a guidewire. The catheters were passed up the ureters 25cm. The scope was removed. A Urethral catheter was placed and the stents were tied to the catheter.  The case was turned over to the primary surgical service. The will notify us if there is any concern about the ureters; otherwise they will remove the stents at the conclusion of the case.     Ulises Basilio MD

## 2023-02-08 NOTE — PLAN OF CARE
"/76 (BP Location: Right arm)   Pulse 89   Temp 97.2  F (36.2  C) (Temporal)   Resp 21   Ht 1.803 m (5' 11\")   Wt 66.8 kg (147 lb 4.3 oz)   SpO2 96%   BMI 20.54 kg/m      Status: Patient arrived from PACU around 1715. Had cystoscopy with ureteral stent insertion/removal, ex lap, lysis of adhesions, low anterior resection, take down of loop colostomy, diverting loop ileostomy  Pain/Nausea: patient started on PCA Dilaudid pump. Denies nausea  Mobility: not OOB yet this evening  Diet: clear liquid diet  Labs: reviewed  LDAs: PIV, Lyubov cath, jo, ostomy  Skin/incisions: skin intact, some dryness and bruising noted. Mepilex to sacrum  Neuro: AOX4  Respiratory: On 2 L O2, sating adequately. Denies SOB  Cardiac: WNL  GI/: jo has bloody output. No output from ileostomy yet  Plan: continue with plan of care  "

## 2023-02-09 ENCOUNTER — APPOINTMENT (OUTPATIENT)
Dept: PHYSICAL THERAPY | Facility: CLINIC | Age: 73
DRG: 330 | End: 2023-02-09
Attending: SURGERY
Payer: COMMERCIAL

## 2023-02-09 LAB
ANION GAP SERPL CALCULATED.3IONS-SCNC: 8 MMOL/L (ref 7–15)
BUN SERPL-MCNC: 19.7 MG/DL (ref 8–23)
CALCIUM SERPL-MCNC: 8.1 MG/DL (ref 8.8–10.2)
CHLORIDE SERPL-SCNC: 106 MMOL/L (ref 98–107)
CREAT SERPL-MCNC: 0.93 MG/DL (ref 0.67–1.17)
DEPRECATED HCO3 PLAS-SCNC: 24 MMOL/L (ref 22–29)
ERYTHROCYTE [DISTWIDTH] IN BLOOD BY AUTOMATED COUNT: 14.4 % (ref 10–15)
GFR SERPL CREATININE-BSD FRML MDRD: 87 ML/MIN/1.73M2
GLUCOSE SERPL-MCNC: 89 MG/DL (ref 70–99)
HCT VFR BLD AUTO: 39.7 % (ref 40–53)
HGB BLD-MCNC: 12.4 G/DL (ref 13.3–17.7)
MAGNESIUM SERPL-MCNC: 1.7 MG/DL (ref 1.7–2.3)
MCH RBC QN AUTO: 31 PG (ref 26.5–33)
MCHC RBC AUTO-ENTMCNC: 31.2 G/DL (ref 31.5–36.5)
MCV RBC AUTO: 99 FL (ref 78–100)
PLATELET # BLD AUTO: 171 10E3/UL (ref 150–450)
POTASSIUM SERPL-SCNC: 4.9 MMOL/L (ref 3.4–5.3)
RBC # BLD AUTO: 4 10E6/UL (ref 4.4–5.9)
SODIUM SERPL-SCNC: 138 MMOL/L (ref 136–145)
WBC # BLD AUTO: 10.4 10E3/UL (ref 4–11)

## 2023-02-09 PROCEDURE — 250N000013 HC RX MED GY IP 250 OP 250 PS 637: Performed by: SURGERY

## 2023-02-09 PROCEDURE — 97161 PT EVAL LOW COMPLEX 20 MIN: CPT | Mod: GP

## 2023-02-09 PROCEDURE — 80048 BASIC METABOLIC PNL TOTAL CA: CPT | Performed by: SURGERY

## 2023-02-09 PROCEDURE — 250N000011 HC RX IP 250 OP 636: Performed by: PHYSICIAN ASSISTANT

## 2023-02-09 PROCEDURE — 85027 COMPLETE CBC AUTOMATED: CPT | Performed by: SURGERY

## 2023-02-09 PROCEDURE — 250N000011 HC RX IP 250 OP 636: Performed by: SURGERY

## 2023-02-09 PROCEDURE — 83735 ASSAY OF MAGNESIUM: CPT | Performed by: SURGERY

## 2023-02-09 PROCEDURE — 36415 COLL VENOUS BLD VENIPUNCTURE: CPT | Performed by: SURGERY

## 2023-02-09 PROCEDURE — 97116 GAIT TRAINING THERAPY: CPT | Mod: GP

## 2023-02-09 PROCEDURE — G0463 HOSPITAL OUTPT CLINIC VISIT: HCPCS

## 2023-02-09 PROCEDURE — 250N000013 HC RX MED GY IP 250 OP 250 PS 637: Performed by: PHYSICIAN ASSISTANT

## 2023-02-09 PROCEDURE — 999N000111 HC STATISTIC OT IP EVAL DEFER

## 2023-02-09 PROCEDURE — 258N000003 HC RX IP 258 OP 636: Performed by: SURGERY

## 2023-02-09 PROCEDURE — 999N000128 HC STATISTIC PERIPHERAL IV START W/O US GUIDANCE

## 2023-02-09 PROCEDURE — 120N000002 HC R&B MED SURG/OB UMMC

## 2023-02-09 PROCEDURE — 97530 THERAPEUTIC ACTIVITIES: CPT | Mod: GP

## 2023-02-09 RX ORDER — TAMSULOSIN HYDROCHLORIDE 0.4 MG/1
0.4 CAPSULE ORAL DAILY
Status: DISCONTINUED | OUTPATIENT
Start: 2023-02-09 | End: 2023-02-14 | Stop reason: HOSPADM

## 2023-02-09 RX ORDER — DEXTROSE MONOHYDRATE, SODIUM CHLORIDE, AND POTASSIUM CHLORIDE 50; 1.49; 4.5 G/1000ML; G/1000ML; G/1000ML
INJECTION, SOLUTION INTRAVENOUS CONTINUOUS
Status: DISCONTINUED | OUTPATIENT
Start: 2023-02-09 | End: 2023-02-11

## 2023-02-09 RX ORDER — MAGNESIUM SULFATE HEPTAHYDRATE 40 MG/ML
2 INJECTION, SOLUTION INTRAVENOUS ONCE
Status: COMPLETED | OUTPATIENT
Start: 2023-02-09 | End: 2023-02-09

## 2023-02-09 RX ADMIN — METHOCARBAMOL 500 MG: 500 TABLET ORAL at 07:57

## 2023-02-09 RX ADMIN — ACETAMINOPHEN 1000 MG: 500 TABLET ORAL at 21:21

## 2023-02-09 RX ADMIN — POTASSIUM CHLORIDE, DEXTROSE MONOHYDRATE AND SODIUM CHLORIDE: 150; 5; 450 INJECTION, SOLUTION INTRAVENOUS at 08:08

## 2023-02-09 RX ADMIN — MAGNESIUM SULFATE IN WATER 2 G: 40 INJECTION, SOLUTION INTRAVENOUS at 08:48

## 2023-02-09 RX ADMIN — ACETAMINOPHEN 1000 MG: 500 TABLET ORAL at 02:00

## 2023-02-09 RX ADMIN — ENOXAPARIN SODIUM 40 MG: 40 INJECTION SUBCUTANEOUS at 08:05

## 2023-02-09 RX ADMIN — METHOCARBAMOL 500 MG: 500 TABLET ORAL at 21:21

## 2023-02-09 RX ADMIN — ACETAMINOPHEN 1000 MG: 500 TABLET ORAL at 13:40

## 2023-02-09 RX ADMIN — SODIUM CHLORIDE, POTASSIUM CHLORIDE, SODIUM LACTATE AND CALCIUM CHLORIDE: 600; 310; 30; 20 INJECTION, SOLUTION INTRAVENOUS at 06:59

## 2023-02-09 RX ADMIN — ACETAMINOPHEN 1000 MG: 500 TABLET ORAL at 07:56

## 2023-02-09 RX ADMIN — ONDANSETRON 4 MG: 4 TABLET, ORALLY DISINTEGRATING ORAL at 08:02

## 2023-02-09 RX ADMIN — POTASSIUM CHLORIDE, DEXTROSE MONOHYDRATE AND SODIUM CHLORIDE: 150; 5; 450 INJECTION, SOLUTION INTRAVENOUS at 22:21

## 2023-02-09 RX ADMIN — TAMSULOSIN HYDROCHLORIDE 0.4 MG: 0.4 CAPSULE ORAL at 08:48

## 2023-02-09 RX ADMIN — ONDANSETRON 4 MG: 2 INJECTION INTRAMUSCULAR; INTRAVENOUS at 13:40

## 2023-02-09 RX ADMIN — METHOCARBAMOL 500 MG: 500 TABLET ORAL at 16:56

## 2023-02-09 RX ADMIN — GABAPENTIN 100 MG: 100 CAPSULE ORAL at 21:21

## 2023-02-09 ASSESSMENT — ACTIVITIES OF DAILY LIVING (ADL)
ADLS_ACUITY_SCORE: 24
DEPENDENT_IADLS:: INDEPENDENT
ADLS_ACUITY_SCORE: 24

## 2023-02-09 NOTE — PLAN OF CARE
OT: 7C: DEFERE    Occupational Therapy: Orders received. Chart reviewed and discussed with care team.? Occupational Therapy not indicated due to no acute IP OT needs at this time. Per discussion with PT, the pt is up SBA ambulating with near baseline ADL IND performance. Has near 24/7 support to assist if needed.? Defer discharge recommendations to physical therapy, medical team, and SW.? Will complete orders.

## 2023-02-09 NOTE — PLAN OF CARE
Admitted/transferred from: PACU  2 RN full   skin assessment completed by Edinson Zavala, LILLIAN and Cheri YANEZ RN.  Skin assessment finding: issues found dryness on both BLE, PIV, Lyubov cath on L chest. some scattered bruising, skin intact otherwise- midline incision is covered and dressing is marked  Interventions/actions: skin interventions mepilex applied to sacrum

## 2023-02-09 NOTE — PLAN OF CARE
"Goal Outcome Evaluation:   72 year old male with h/o sigmoid colon adenocarcinoma. now POD#0 s/p Exploratory Laparotomy, lysis of adhesions, Low Anterior Resection, take down of loop colostomy, DIVERTING LOOP ILEOSTOMY./65 (BP Location: Right arm, Cuff Size: Adult Regular)   Pulse 78   Temp 99.2  F (37.3  C) (Oral)   Resp 15   Ht 1.803 m (5' 11\")   Wt 66.8 kg (147 lb 4.3 oz)   SpO2 95%   BMI 20.54 kg/m   on 2L n/c. Lungs clear.Pt using dilaudid PCA at 0.2mg every 10 min.IV LR at 100 hr  and pain under control 5/10.Pt did dangle and stand with assist of 2 this evening.He felt some dizziness but it passed.Castro has good urine output and continues to be bloody.MD here and aware and assessed patient.Denies any nausea and drinking some.Will continue to monitor.                         "

## 2023-02-09 NOTE — TELEPHONE ENCOUNTER
Central Prior Authorization Team   Phone: [phone]      PA Initiation    Medication: ondansetron (ZOFRAN) 4 MG tablet-PA initiated  Insurance Company: [employer] - Phone [phone] Fax [phone]  Pharmacy Filling the Rx: [facility] [place], [place] - [address] [place] [address]  Filling Pharmacy Phone: [phone]  Filling Pharmacy Fax:    Start Date: 2/9/2023

## 2023-02-09 NOTE — TELEPHONE ENCOUNTER
Prior Authorization Approval-We have no valid pool to route this back. I will close the encounter.     Authorization Effective Date: 1/10/2023  Authorization Expiration Date: 2/9/2024  Medication: ondansetron (ZOFRAN) 4 MG tablet-PA approved  Approved Dose/Quantity:   Reference #: [id number]   Insurance Company: [employer] - Phone [phone] Fax [phone]  Expected CoPay:       CoPay Card Available:      Foundation Assistance Needed:    Which Pharmacy is filling the prescription (Not needed for infusion/clinic administered): [facility] [place], [place] - [address] [place] [address]  Pharmacy Notified: Yes  Patient Notified: No

## 2023-02-09 NOTE — PLAN OF CARE
"Goal Outcome Evaluation:           A&O x3,/69 (BP Location: Right arm, Cuff Size: Adult Regular)   Pulse 87   Temp 98.5  F (36.9  C) (Oral)   Resp 16   Ht 1.803 m (5' 11\")   Wt 66.8 kg (147 lb 4.3 oz)   SpO2 93%   BMI 20.54 kg/m   on 2L n/c  Chest: Lungs clear occ  crackles and productive cough 93%  SpO2 sat @2L NC encourage IS and cough.  Abdomen: soft,  non-distended Drsg has drainage circled and no change.Denies nausea   Incision: Covered with dressing,   Ostomy: In the LLQ, viable, no output.  : Castro in place with red urine and MD aware.  Port A cath: in place, covered, clean. PIV LR at 90 hr.Pain reliief from dilaudid IV PCA.  Extremities: warm  and dangled and stood up at bedside but does feel some dizziness but better.Up with 2 SBA.Will continue to monitor.                    "

## 2023-02-09 NOTE — PROGRESS NOTES
"Post Op Check    02/08/2023    Kalin Shepard is a 72 year old male with h/o sigmoid colon adenocarcinoma. now POD#0 s/p Exploratory Laparotomy, lysis of adhesions, Low Anterior Resection, take down of loop colostomy, DIVERTING LOOP ILEOSTOMY.    Pt reports good pain control. Denies SOB, chest pain, or dizziness. Denies nausea/vomiting. Adequate UOP.    /68 (BP Location: Right arm)   Pulse 77   Temp 99.1  F (37.3  C) (Temporal)   Resp 10   Ht 1.803 m (5' 11\")   Wt 66.8 kg (147 lb 4.3 oz)   SpO2 94%   BMI 20.54 kg/m      Gen: A&O x3, NAD  Chest: breathing non-labored, 93%  SpO2 sat @2L NC   Abdomen: soft, non-tender, non-distended  Incision: Covered with dressing, with no fluid seeping through   Ostomy: In the LLQ, viable, non-functioning   : Castro in place with red stained urine   Port A cath: in place, covered, clean.   Extremities: warm and well perfused    A/P:No acute post-op issues. Continue plan of care per primary team. Please call with any questions.    Marielena Pollock MD  Surgery resident, PGY-1  [facility]         "

## 2023-02-09 NOTE — PROGRESS NOTES
"CLINICAL NUTRITION SERVICES - ASSESSMENT NOTE     Nutrition Prescription    RECOMMENDATIONS FOR MDs/PROVIDERS TO ORDER:  None at this time.    Malnutrition Status:    Moderate malnutrition in the context of chronic illness    Recommendations already ordered by Registered Dietitian (RD):  None    Future/Additional Recommendations:  - Supplements as needed     REASON FOR ASSESSMENT  Kalin Shepard is a/an 72 year old male assessed by the dietitian for positive Admission Nutrition Risk Screen:  Have you recently lost weight without trying? Unsure  Have you been eating poorly because of a decreased appetite? No    PMH: Patient has history of sigmoid colon adenocarcinoma    S/p cystourethroscopy, placement of bilateralureteral stents, ex Lap, JURGEN, LAR, loop colostomy take down, new diverting loop ileostomy, flex sig on 2/8    NUTRITION HISTORY  Usual Intake: Layton reported that his appetite has been fairly good recently, and he typically has 2 meals/day. His breakfast is a big bowl of cream of wheat with brown sugar, cream, butter, and peanut butter. For dinner he often eats homemade split pea soup with other veggies added, and sometimes mashed potatoes with gravy. He occasionally eats some meat.    Supplements: Boost Plus 3x/day    Allergies: NKFA    CURRENT NUTRITION ORDERS  Diet: Low Fiber    Intake/Tolerance:  - Has not yet ordered any meals per [employer]. Patient did not know that he was advanced to a low-fiber diet. I shared the low-fiber diet menu with him, and let him know he can order.  - Layton mentioned that he has had some nausea. Had emesis bag in his hands and said he thought he may vomit during visit. He did, however, seem optimistic about trying solid foods.    LABS  Labs reviewed    MEDICATIONS  Medications reviewed  - D5 and 0.45% NaCl + KCl 20 mEq/L infusion (60 mL/hr)  - Zofran PRN    ANTHROPOMETRICS  Height: 180.3 cm (5' 11\")  Most Recent Weight: 66.8 kg (147 lb 4.3 oz)    IBW: 78.2 kg  BMI: " Normal BMI   Weight History:   Wt Readings from Last 10 Encounters:   02/08/23 66.8 kg (147 lb 4.3 oz)   01/27/23 65.8 kg (145 lb)   12/15/22 65.6 kg (144 lb 11.2 oz)   11/21/22 61.3 kg (135 lb 1.6 oz)   11/07/22 60.8 kg (134 lb)   10/24/22 61 kg (134 lb 6.4 oz)   10/24/22 60.8 kg (134 lb)   10/10/22 59.4 kg (131 lb)   09/26/22 59.4 kg (131 lb)   09/12/22 56.2 kg (124 lb)     RD note from 9/22 lists a weight of 121 lbs. Patient shared that his lowest weight was around 120 lbs, and he has gradually been gaining weight. His personal goal is 160 lbs, as this was once his UBW. Patient has had about a 19% intentional weight gain in 5 months.    Dosing Weight: 66.8 kg (actual)    ASSESSED NUTRITION NEEDS  Estimated Energy Needs: 7954-4333+ kcals/day (25-30+ kcals/kg ) Aim higher end, given patient trying to gain weight  Justification: Maintenance  Estimated Protein Needs:  grams protein/day (1.2 - 1.5 grams of pro/kg)  Justification: Post-op and Repletion  Estimated Fluid Needs: (1 mL/kcal)   Justification: Maintenance or Per provider pending fluid status     PHYSICAL FINDINGS  See malnutrition section below.      MALNUTRITION  % Intake: Decreased intake does not meet criteria  % Weight Loss: None noted  Subcutaneous Fat Loss: Facial region: moderate-severe, Upper arm: moderate   Muscle Loss: Temporal: moderate, Facial & jaw region: moderate-severe, Thoracic region (clavicle, acromium bone, deltoid, trapezius, pectoral): mild-moderate, Upper arm (bicep, tricep): mild-moderate, and Patellar region: moderate  Fluid Accumulation/Edema: None noted  Malnutrition Diagnosis: Moderate malnutrition in the context of chronic illness    NUTRITION DIAGNOSIS  Predicted inadequate nutrient intake (protein-energy) related to potential for decreased appetite and nausea with post-op recovery.    INTERVENTIONS  Implementation  Nutrition Education: Provided education on ability to start low-fiber diet. Discussed importance of meals  adequate in energy and protein, and emphasized prioritizing protein. Shared suggestions for ways to increase protein in the diet and encouraged oral intake.    Collaboration with other providers- spoke with RN about ability to start low-fiber diet.    Goals  Patient to consume % of nutritionally adequate meal trays TID, or the equivalent with supplements/snacks.     Monitoring/Evaluation  Progress toward goals will be monitored and evaluated per protocol.      Rachel Plata  Dietetic Intern      I have read and agree with the above nutrition note, recs, and interventions.  I did not personally see this patient today, chart review only.    Shoshana Marie, RD, , LD  Weekday Pager: [phone]  Weekday Units covered: 7C (all beds) and 5A (beds 5201 through 5209)  Weekend/Holiday RD Pager: [phone]

## 2023-02-09 NOTE — PLAN OF CARE
"/58 (BP Location: Right arm)   Pulse 75   Temp 97.2  F (36.2  C) (Oral)   Resp 16   Ht 1.803 m (5' 11\")   Wt 66.8 kg (147 lb 4.3 oz)   SpO2 94%   BMI 20.54 kg/m       Neuro: A&O x4, calls appropriately  Pain/ Nausea: Managed with  PCA dialudid, has scheduled Tylenol and robaxin. C/o of Nausea IV Zofran given x1  Mobility: Assist x1  Diet: Low fiber diet with poor appetite  LDAs: LPIV SL. l Port infusing Kcl @ 60 mL/hr  Skin/incisions: Abd incision, LLQ ostomy  Respiratory: On 2L NC  Cardiac: WDL, denies chest pain  GI/: Castro in place with AUOP  Plan:  Monitor and continue with POC       "

## 2023-02-09 NOTE — PLAN OF CARE
Goal Outcome Evaluation:       Pt A/Ox4, VSS on new 2L NC. Weaning O2 at bedside unsuccessful. C/O pain in abd with movement. PCA in use via L C port along with D5 1/2NS +KCL 20mEq. Jo with bloody output, team is aware. Most likely do jo removal tomorrow per Surgery. Clear liquid diet. WOC changed pts ostomy today. Abd incision with some bloody drainage. Sacral mepilex in place for preventative reasons. PT assessed pt and writer took pt on x1 walk in the room with pt. Pt felt the walk went well but did not feel comfortable enough to go out in the halls yet. Goal of x4 walks today. Discharge dependent on PO intake, pain management and jo removal

## 2023-02-09 NOTE — CONSULTS
[facility] Nurse Inpatient Assessment     Consulted for: loop ileostomy    Patient History (according to provider note(s):      This is a 72 year old male PMH 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  Now s/p Ex Lap,  JURGEN (2 hrs), LAR, loop colostomy take down, new diverting loop ileostomy, flex sig on 2/8/2023.    Areas Assessed:      Areas visualized during today's visit: Abdomen    Assessment of new loop Ileostomy:  Diagnosis Pertinent to Stoma: Cancer - Colon or Rectum      Surgery Date: 2/8  Surgeon:Jaisiva       [facility]: [facility]  Pouching system in place on assessment today: [place] two piece and cut to fit   Pouch barrier status: Minimal melting and Edges lifting   Pouch last changed/wear time: <24 hours  Reason for pouch change today: ostomy education, leakage, routine schedule and initial post-op assessment  Effectiveness of current pouching/ supply plan: Attempting new system, will re-evaluate next assessment  Change made with ostomy management today: Yes  Pouching system placed today: [place] two piece, cut to fit, flat, barrier ring and HOP pouch    Supplies: at bedside     Last photo: 2/9  Stoma location: [facility]  Stoma size: 1 3/4 x 1 5/8 inches  Stoma appearance: viable, healthy, normal-appearing, oval, moist, good turgor, edematous and protruberant  Mucocutaneous junction:  intact  Peristomal complication(s): none   Output: bowel sweat   Output volume emptied during visit: 0 ml  Abdominal assessment: Distended  Surgical site(s): dressing intact  NG still in place? No  Pain: Cramping and Fullness  Is patient still on a PCA? Yes    Ostomy education assessment:  Participant of teaching session today: patient   Education completed today: Initial fitting, Stoma assessment, Pouching system assessment , Evaluate leakage issue, Refitting of appliance , Adjustment of pouching plan, Pouch change demonstration, Pouch  "change return demonstration, Peristomal skin care and Fluid and electrolyte balance   Educational materials/methods: Verbal and Left packet of information at bedside   Education still needed: Pouch change return demonstration, Ostomy accessory product use , Introduction to pouches, Peristomal skin care, Intake and output recording, Fluid and electrolyte balance , Importance of hydration, When to seek medical attention, Low fiber diet , Odor/flatus management , Infection prevention/hygiene , Hernia prevention, Lifestyle adjustments  and Discharge instructions  Learning Comprehension:   Psychosocial assessment: had previous loop colostomy   Patient readiness for education today: attentive  Following today's visit: patient  is able to demonstrate;         1. How to empty their pouch? Yes and Demo provided         2. How to change their pouch? Yes, PTA on previous pouch         3. How to read and record intake and output correctly? Needs additional practice   Preparation for discharge completed: No discharge preparation started yet  Preparation for discharge still needed: Placed prescription recommendations in discharge navigator for MD to sign, Ensured patient has extra supplies for discharge, Discussed how to order supplies after discharge , Ordered samples from  after gaining consent from patient/caregiver, Discussed how and when to make an outpatient WOC nurse appointment after discharge, Prepared for discharge home with home care and Discuss signs/symptoms of when to seek medical attention  Pt support system on discharge: family   WO recommend home care? Yes and By WOC RN if possible  Face to face time: 30 minutes     Treatment Plan:     LLQ Ileostomy pouching plan:   Pouching system: ostomy supplies pouches: [place] 70 FECAL ([id number]) ostomy supplies barrier: [place] 70mm FLAT ([id number])  Accessories used: [facility] ostomy accessories: 2\" Adapt Barrier Ring ([id number])   Frequency of pouch changes: Three " times a week  WOC follow up plan: Daily Monday-Friday  Bedside RN interventions: Change pouch PRN if leaking using the supplies above, Empty pouch when 1/3 to 1/2 full, ensure to clean pouch outlet after emptying to prevent odor, Notify WOC for ongoing pouch leakage, Document stoma appearance and output volume, color, and consistency every shift, Encourage patient to empty pouch independently and Assist patient to measure and record output    Orders: Written    RECOMMEND PRIMARY TEAM ORDER: None, at this time  Education provided: plan of care  Discussed plan of care with: Patient and Nurse  WO nurse follow-up plan: daily  Notify WOC if wound(s) deteriorate.  Nursing to notify the Provider(s) and re-consult the WOC Nurse if new skin concern.    DATA:     Current support surface: Standard  Standard gel/foam mattress (IsoFlex, Atmos air, etc)  Containment of urine/stool: Incontinent pad in bed, Indwelling catheter and Ileostomy pouch  BMI: Body mass index is 20.54 kg/m .   Active diet order: Orders Placed This Encounter      Low Fiber Diet     Output: I/O last 3 completed shifts:  In: 4465 [P.O.:180; I.V.:4285]  Out: 1615 [Urine:1415; Blood:200]     Labs: Recent Labs   Lab 02/09/23  0723   HGB 12.4*   WBC 10.4     Pressure injury risk assessment:   Sensory Perception: 3-->slightly limited  Moisture: 4-->rarely moist  Activity: 3-->walks occasionally  Mobility: 3-->slightly limited  Nutrition: 2-->probably inadequate  Friction and Shear: 2-->potential problem  Wyatt Score: 17    Rachel Eagle RN CWOCN   Pager no longer is use, please contact through mPortal group: [facility] Nurse  Dept. Office Number: [phone]

## 2023-02-09 NOTE — PROGRESS NOTES
"   02/09/23 1034   Appointment Info   Signing Clinician's Name / Credentials (PT) Aranza Maddox DPT   Rehab Comments (PT) abdominal precautions   Living Environment   People in Home child(luna), dependent;spouse   Current Living Arrangements house   Home Accessibility no concerns   Transportation Anticipated family or friend will provide   Living Environment Comments Pt no stairs to enter, has flight upstairs but does not need to go up. Pt has tub shower combo. Pt lives with spouse and teenage children, someone is home near all the time to assist if needed. Pt has tub/shower combo w/o grab bars   Self-Care   Usual Activity Tolerance good   Current Activity Tolerance good   Regular Exercise No   Equipment Currently Used at Home none   Fall history within last six months no   Activity/Exercise/Self-Care Comment Pt denies AD use, denies falls. Pt able to amb community distances without difficulty, IND in ADLs. Pt has shower chair if needed but does not use it.   General Information   Onset of Illness/Injury or Date of Surgery 02/08/23   Referring Physician Riley Magdaleno MD   Patient/Family Therapy Goals Statement (PT) To return home   Pertinent History of Current Problem (include personal factors and/or comorbidities that impact the POC) Per EMR: \"72 year old male PMH 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  Now s/p Ex Lap,  JURGEN (2 hrs), LAR, loop colostomy take down, new diverting loop ileostomy, flex sig on 2/8/2023.   Existing Precautions/Restrictions abdominal   Weight-Bearing Status - LUE partial weight-bearing (% in comments)  (10# limit 2/2 abdominal precautions)   Weight-Bearing Status - RUE partial weight-bearing (% in comments)  (10# d/t abdominal precautions)   Cognition   Affect/Mental Status (Cognition) WFL   Pain Assessment   Patient Currently in Pain Yes, see Vital Sign flowsheet   Posture    Posture Forward head position   Range of Motion (ROM)   Range " "of Motion ROM is WFL   Strength (Manual Muscle Testing)   Strength (Manual Muscle Testing) strength is WFL   Bed Mobility   Comment, (Bed Mobility) CGA supine <>sit   Transfers   Comment, (Transfers) sit<>stand SBA w/ 1 UE support on IV pole   Gait/Stairs (Locomotion)   Comment, (Gait/Stairs) amb 20' within room w/ B UE support on IV pole, SBA   Balance   Balance Comments demonstrates standing unsuported, dynamic standing balance able to take steps w/o UE support   Sensory Examination   Sensory Perception patient reports no sensory changes   Coordination   Coordination no deficits were identified   Muscle Tone   Muscle Tone no deficits were identified   Clinical Impression   Criteria for Skilled Therapeutic Intervention Yes, treatment indicated   PT Diagnosis (PT) Impaired functional mobility in presence of new abdominal precautions   Influenced by the following impairments pain, abdominal precautions, impaired activity tolerance, \"dizziness\"   Functional limitations due to impairments bed mobility, community distance amb   Clinical Presentation (PT Evaluation Complexity) Stable/Uncomplicated   Clinical Presentation Rationale Pt with few functional impairments, non-evolving in status as he is following predicted course of rehabilitation in terms of mobility   Clinical Decision Making (Complexity) low complexity   Planned Therapy Interventions (PT) gait training;bed mobility training;home exercise program;patient/family education;progressive activity/exercise;risk factor education;home program guidelines   Risk & Benefits of therapy have been explained evaluation/treatment results reviewed;care plan/treatment goals reviewed;risks/benefits reviewed;current/potential barriers reviewed;participants voiced agreement with care plan;participants included;patient   Clinical Impression Comments Pt will benefit from IP PT to address functional mobility impairments including functional endurance, and education/training in " "abdominal precautions   PT Total Evaluation Time   PT Eval, Low Complexity Minutes (89558) 8   Plan of Care Review   Plan of Care Reviewed With patient   Physical Therapy Goals   PT Frequency 6x/week   PT Predicted Duration/Target Date for Goal Attainment 02/14/23   PT Goals Bed Mobility;Gait;PT Goal 1   PT: Bed Mobility Independent;Within precautions;Supine to/from sit  (flat bed)   PT: Gait Independent;Greater than 200 feet   PT: Goal 1 Pt will correctly verbalize abdominal precautions   Interventions   Interventions Quick Adds Therapeutic Activity;Gait Training   Therapeutic Activity   Therapeutic Activities: dynamic activities to improve functional performance Minutes (99734) 15   Treatment Detail/Skilled Intervention Educated pt on abdominal precautions and provided modifications to functional activities in order to adhere to these. Bed mobility training cuing for sequencing log roll (see eval). Pt reports he is fearful of feeling sx of his \"vertigo\", which he describes as room spinning, lasting <5 min, occuring with getting in and out of bed. Pt politley declines formal vestibular evaluation as it is not a big concern of his, but notes when returning to supine at end of session (sit>R sidelying) he notes mild sx, no noted nystagmus. No noted instability/safety concerns when pt navigating bed mobility dispite these mild sx. BP WFL sitting EOB   Gait Training   Gait Training Minutes (20302) 12   Treatment Detail/Skilled Intervention Following evaluation progressed to OOR amb, pt initially w/ B UE support on IV pole SBA for 150', progressed to no UE support/AD pt SBA w/o IV pole for 200' forward flexed at hips, good victorina, widened EDIN but no evidence of imbalance, more effortful for pt than baseline per his subjective report. Pt requiring 2L O2 throughout amb.   PT Discharge Planning   PT Plan Progress amb w/o IV pole support, wean O2 as able. Reinforce abdominal precautions, bed mobility from flat bed   PT " Discharge Recommendation (DC Rec) home with assist   PT Rationale for DC Rec Pt demonstrates most household mobility with SBA which he will have at home- pt will benefit from IP PT to progress back from SBA to his IND baseline, and to reinforce abdominal precautions. Anticipate pt will progress to IND throughout LOS, without requiring any further OP PT intervention   PT Brief overview of current status SBA w/ monitoring O2, enc[place] hallway amb with nursing staff   Total Session Time   Timed Code Treatment Minutes 27   Total Session Time (sum of timed and untimed services) 35

## 2023-02-10 LAB
ANION GAP SERPL CALCULATED.3IONS-SCNC: 9 MMOL/L (ref 7–15)
BUN SERPL-MCNC: 19.4 MG/DL (ref 8–23)
CALCIUM SERPL-MCNC: 8.2 MG/DL (ref 8.8–10.2)
CHLORIDE SERPL-SCNC: 102 MMOL/L (ref 98–107)
CREAT SERPL-MCNC: 0.99 MG/DL (ref 0.67–1.17)
DEPRECATED HCO3 PLAS-SCNC: 26 MMOL/L (ref 22–29)
GFR SERPL CREATININE-BSD FRML MDRD: 80 ML/MIN/1.73M2
GLUCOSE SERPL-MCNC: 109 MG/DL (ref 70–99)
HOLD SPECIMEN: NORMAL
MAGNESIUM SERPL-MCNC: 2 MG/DL (ref 1.7–2.3)
POTASSIUM SERPL-SCNC: 4.4 MMOL/L (ref 3.4–5.3)
SODIUM SERPL-SCNC: 137 MMOL/L (ref 136–145)

## 2023-02-10 PROCEDURE — 250N000013 HC RX MED GY IP 250 OP 250 PS 637: Performed by: SURGERY

## 2023-02-10 PROCEDURE — 250N000011 HC RX IP 250 OP 636: Performed by: SURGERY

## 2023-02-10 PROCEDURE — 999N000199 HC STATISTIC WOC PT EDUCATION, 31-45 MIN

## 2023-02-10 PROCEDURE — 83735 ASSAY OF MAGNESIUM: CPT | Performed by: SURGERY

## 2023-02-10 PROCEDURE — 250N000013 HC RX MED GY IP 250 OP 250 PS 637: Performed by: PHYSICIAN ASSISTANT

## 2023-02-10 PROCEDURE — 82374 ASSAY BLOOD CARBON DIOXIDE: CPT | Performed by: SURGERY

## 2023-02-10 PROCEDURE — 82310 ASSAY OF CALCIUM: CPT | Performed by: SURGERY

## 2023-02-10 PROCEDURE — 36415 COLL VENOUS BLD VENIPUNCTURE: CPT | Performed by: SURGERY

## 2023-02-10 PROCEDURE — 120N000002 HC R&B MED SURG/OB UMMC

## 2023-02-10 PROCEDURE — 258N000003 HC RX IP 258 OP 636: Performed by: SURGERY

## 2023-02-10 RX ORDER — OXYCODONE HYDROCHLORIDE 5 MG/1
5-10 TABLET ORAL EVERY 4 HOURS PRN
Status: DISCONTINUED | OUTPATIENT
Start: 2023-02-10 | End: 2023-02-14 | Stop reason: HOSPADM

## 2023-02-10 RX ORDER — ALBUTEROL SULFATE 90 UG/1
2 AEROSOL, METERED RESPIRATORY (INHALATION) 4 TIMES DAILY PRN
Status: DISCONTINUED | OUTPATIENT
Start: 2023-02-10 | End: 2023-02-10

## 2023-02-10 RX ORDER — HYDROMORPHONE HCL IN WATER/PF 6 MG/30 ML
.2-.3 PATIENT CONTROLLED ANALGESIA SYRINGE INTRAVENOUS
Status: DISCONTINUED | OUTPATIENT
Start: 2023-02-10 | End: 2023-02-12

## 2023-02-10 RX ORDER — ALBUTEROL SULFATE 90 UG/1
1-2 AEROSOL, METERED RESPIRATORY (INHALATION) 4 TIMES DAILY PRN
Status: DISCONTINUED | OUTPATIENT
Start: 2023-02-10 | End: 2023-02-14 | Stop reason: HOSPADM

## 2023-02-10 RX ORDER — PSYLLIUM SEED (WITH DEXTROSE)
2 POWDER (GRAM) ORAL 2 TIMES DAILY
Status: DISCONTINUED | OUTPATIENT
Start: 2023-02-10 | End: 2023-02-12

## 2023-02-10 RX ORDER — OMEPRAZOLE
20 KIT
Status: DISCONTINUED | OUTPATIENT
Start: 2023-02-10 | End: 2023-02-10

## 2023-02-10 RX ADMIN — ACETAMINOPHEN 1000 MG: 500 TABLET ORAL at 08:12

## 2023-02-10 RX ADMIN — METHOCARBAMOL 500 MG: 500 TABLET ORAL at 16:50

## 2023-02-10 RX ADMIN — Medication 40 MG: at 16:50

## 2023-02-10 RX ADMIN — ACETAMINOPHEN 1000 MG: 500 TABLET ORAL at 13:37

## 2023-02-10 RX ADMIN — ACETAMINOPHEN 1000 MG: 500 TABLET ORAL at 19:23

## 2023-02-10 RX ADMIN — POTASSIUM CHLORIDE, DEXTROSE MONOHYDRATE AND SODIUM CHLORIDE: 150; 5; 450 INJECTION, SOLUTION INTRAVENOUS at 13:47

## 2023-02-10 RX ADMIN — ENOXAPARIN SODIUM 40 MG: 40 INJECTION SUBCUTANEOUS at 11:17

## 2023-02-10 RX ADMIN — GABAPENTIN 100 MG: 100 CAPSULE ORAL at 21:23

## 2023-02-10 RX ADMIN — METHOCARBAMOL 500 MG: 500 TABLET ORAL at 12:31

## 2023-02-10 RX ADMIN — Medication 40 MG: at 09:10

## 2023-02-10 RX ADMIN — ONDANSETRON 4 MG: 4 TABLET, ORALLY DISINTEGRATING ORAL at 08:51

## 2023-02-10 RX ADMIN — TAMSULOSIN HYDROCHLORIDE 0.4 MG: 0.4 CAPSULE ORAL at 08:12

## 2023-02-10 RX ADMIN — METHOCARBAMOL 500 MG: 500 TABLET ORAL at 19:23

## 2023-02-10 RX ADMIN — OXYCODONE HYDROCHLORIDE 5 MG: 5 TABLET ORAL at 20:19

## 2023-02-10 RX ADMIN — METHOCARBAMOL 500 MG: 500 TABLET ORAL at 08:12

## 2023-02-10 RX ADMIN — Medication 2 WAFER: at 19:23

## 2023-02-10 ASSESSMENT — ACTIVITIES OF DAILY LIVING (ADL)
ADLS_ACUITY_SCORE: 24

## 2023-02-10 NOTE — PROGRESS NOTES
CLINICAL NUTRITION SERVICES - BRIEF NOTE    Visited patient to check in on tolerance of low-fiber diet. Layton said he tried ice cream and pudding yesterday, which did not sit very well in his stomach. He also had some milk, which he said went well.     When I went into his room, he was just about to eat breakfast- scrambled eggs and cottage cheese.     Layton drinks Boost Plus 3x/day at home, as he has been trying to gain weight, and he was interested in getting a supplement while he is here in the hospital.      INTERVENTIONS  Recommendations / Nutrition Prescription  - Vanilla Ensure Enlive between meals BID  - Continue to encourage intake of solid foods as tolerated    Implementation  Medical food supplement therapy- Ordered as above.        Rachel Plata  Dietetic Intern    I have read and agree with the above nutrition note, recs, and interventions.  I did not personally see this patient today, chart review only.  Shoshana Marie RD, , LD  Weekday Pager: [phone]  Weekday Units covered: 7C (all beds) and 5A (beds 9215 through 1908)  Weekend/Holiday RD Pager: [phone]

## 2023-02-10 NOTE — PROGRESS NOTES
Care Management Follow Up    Length of Stay (days): 2  Expected Discharge Date: 02/13/2023  Concerns to be Addressed: discharge planning     Patient plan of care discussed at interdisciplinary rounds: Yes  Anticipated Discharge Disposition: Home  Anticipated Discharge Services:  Home care  Anticipated Discharge DME:  New ostomy supplies  Patient/family educated on Medicare website which has current facility and service quality ratings:  yes  Education Provided on the Discharge Plan:  yes  Patient/Family in Agreement with the Plan: yes      Additional Information:  RNCC spoke with pt over the phone regarding new ileostomy. Pt states he would like home care to assist him with the new ileostomy for a short period of time. Pt confirmed he was previously open to [facility] and would like referrals sent there first. RNCC sent referral to [facility] and they accepted. RNCC added home care orders.    [facility]  Ph: [phone]  Fax: [phone]    Jj Timmons RN BSN  7C RN Care Coordinator   Ph: [phone]  Pager: [phone]

## 2023-02-10 NOTE — PLAN OF CARE
Goal Outcome Evaluation: POD2 Ex Lap,  JURGEN (2 hrs), LAR, loop colostomy take down, new diverting loop ileostomy, flex sig.     A&Ox4, VSS on 2 LPM NC. O2 sats drop to 88% on room air. Pain is rated a 4-5 on a PCA of dilaudid. LS have insp/exp. Wheezes. Uses albuterol. Ileostomy has green liquid and gas out. IVM running in port. On a LFD. Up independently, ambulating the halls. Castro removed at 1000 DTV at 1600. Midline incision IRENE.     Plan: Wean O2, Follow LFD, control pain.

## 2023-02-10 NOTE — PLAN OF CARE
Goal Outcome Evaluation: Ongoing   Alert and oriented x4, on room air. Able to make needs known. Pain 3-4/10, PCA dilaudid. Walked in [place] x1 this shift. Bloody urine, from jo, 125 mL. Not passing gas. No stool in colostomy bag. Continue to monitor.

## 2023-02-10 NOTE — PROGRESS NOTES
[facility] Nurse Inpatient Assessment     Consulted for: loop ileostomy    Patient History (according to provider note(s):      This is a 72 year old male PMH 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  Now s/p Ex Lap,  JURGEN (2 hrs), LAR, loop colostomy take down, new diverting loop ileostomy, flex sig on 2/8/2023.    Areas Assessed:      Areas visualized during today's visit: Abdomen    Assessment of new loop Ileostomy:  Diagnosis Pertinent to Stoma: Cancer - Colon or Rectum      Surgery Date: 2/8  Surgeon:Jaisiva       [facility]: [facility]  Pouching system in place on assessment today: [place] two piece, cut to fit, flat, barrier ring and HOP pouch    Pouch barrier status: intact and Edges lifting tagederm to outer edges   Pouch last changed/wear time: +24 hours  Reason for pouch change today: ostomy education, leakage, routine schedule and initial post-op assessment  Effectiveness of current pouching/ supply plan: Attempting new system, will re-evaluate next assessment  Change made with ostomy management today: No  Pouching system placed today: [place] two piece, cut to fit, flat, barrier ring and HOP pouch    Supplies: at bedside     Last photo: 2/9  Stoma location: Q  Stoma size: 1 3/4 x 1 5/8 inches  Stoma appearance: viable, healthy, normal-appearing, oval, moist, good turgor, edematous and protruberant  Mucocutaneous junction:  intact  Peristomal complication(s): none   Output: bowel sweat   Output volume emptied during visit: 0 ml  Abdominal assessment: Distended  Surgical site(s): open to air  NG still in place? No  Pain: Cramping and Fullness  Is patient still on a PCA? Yes    Ostomy education assessment:  Participant of teaching session today: patient   Education completed today: Intake and output recording, Fluid and electrolyte balance , Importance of hydration, When to seek medical attention, Low fiber diet ,  "Odor/flatus management , Infection prevention/hygiene , Hernia prevention, Lifestyle adjustments  and Discharge instructions  Educational materials/methods: Verbal and Left packet of information at bedside   Education still needed: Pouch change return demonstration and Pouch emptying return demonstration  Learning Comprehension:   Psychosocial assessment: had previous loop colostomy   Patient readiness for education today: attentive  Following today's visit: patient  is able to demonstrate;         1. How to empty their pouch? Yes and Demo provided         2. How to change their pouch? Yes, PTA on previous pouch         3. How to read and record intake and output correctly? Needs additional practice   Preparation for discharge completed: Placed prescription recommendations in discharge navigator for MD to sign, Ensured patient has extra supplies for discharge, Discussed how to order supplies after discharge , Ordered samples from  after gaining consent from patient/caregiver, Discussed how and when to make an outpatient WOC nurse appointment after discharge, Prepared for discharge home with home care and Discuss signs/symptoms of when to seek medical attention  Preparation for discharge still needed: complete   Pt support system on discharge: family   WOC recommend home care? Yes and By WOC RN if possible  Face to face time: 35 minutes     Treatment Plan:     LLQ Ileostomy pouching plan:   Pouching system: ostomy supplies pouches: [place]inette 70 FECAL ([id number]) ostomy supplies barrier: [place] 70mm FLAT ([id number])  Accessories used: [facility] ostomy accessories: 2\" Adapt Barrier Ring ([id number])   Frequency of pouch changes: Three times a week  WOC follow up plan: Daily Monday-Friday  Bedside RN interventions: Change pouch PRN if leaking using the supplies above, Empty pouch when 1/3 to 1/2 full, ensure to clean pouch outlet after emptying to prevent odor, Notify WOC for ongoing pouch leakage, Document stoma " appearance and output volume, color, and consistency every shift, Encourage patient to empty pouch independently and Assist patient to measure and record output    Orders: Reviewed    RECOMMEND PRIMARY TEAM ORDER: None, at this time  Education provided: plan of care  Discussed plan of care with: Patient and Nurse  WO nurse follow-up plan: daily  Notify WOC if wound(s) deteriorate.  Nursing to notify the Provider(s) and re-consult the WOC Nurse if new skin concern.    DATA:     Current support surface: Standard  Standard gel/foam mattress (IsoFlex, Atmos air, etc)  Containment of urine/stool: Incontinent pad in bed, Indwelling catheter and Ileostomy pouch  BMI: Body mass index is 20.54 kg/m .   Active diet order: Orders Placed This Encounter      Low Fiber Diet     Output: I/O last 3 completed shifts:  In: 30 [I.V.:30]  Out: 1175 [Urine:1175]     Labs:   Recent Labs   Lab 02/09/23  0723   HGB 12.4*   WBC 10.4     Pressure injury risk assessment:   Sensory Perception: 3-->slightly limited  Moisture: 4-->rarely moist  Activity: 3-->walks occasionally  Mobility: 3-->slightly limited  Nutrition: 2-->probably inadequate  Friction and Shear: 2-->potential problem  Wyatt Score: 17    Rachel Eagle RN CWOCN   Pager no longer is use, please contact through [facility] group: [facility] Nurse  Dept. Office Number: [phone]      [facility]     Name: Kalin Shepard   Date: 2/10/2023    To order your ostomy supplies    The ostomy Supplier needs this supply list  to process your order. You will need to fax/deliver this list, along with your Insurance information. Your home care nurse can assist with this process.    List of Ostomy Distributors      [facility]  Ph. [phone] [phone] Fax # [phone]  [facility]   Ph. [phone] [phone]  [employer] [facility]   Ph. [phone]  [facility]   Ph. [phone] [phone]  Or Call your insurance  provider for their preferred supplier    Your Medical Supplier will need your surgeon's name, phone and fax number    Clinic:                     Phone                            Fax  Colorectal Surgery:    [phone]    Verbal Order for ostomy supplies for 1 Month per:                                          Rachel May, RN, CWOCN                                                          Authorizing MD: Dr. Magdaleno    Quantity of pouches:  20   /mo.    Request the following supplies:      [place]    1 piece flat fecal # 8931,    or    2 piece flat wafer 70 mm # 62157    &    Fecal pouch 70mm- #44873   or    High output pouch 70mm- #  50332             Accessories  2  barrier ring #[id number]     Adapt powder #7906    No sting film barrier # 3345                          [place] barrier extenders #[id number]                                   Change your pouch three times a week, more often if leaking.    If you are cutting a hole in the wafer of your pouch, recheck stoma size and adjust pouch opening as needed every week    . Call the Ostomy Nurse at [facility]       [address] [place] [address] JOSE ALFREDO KENNEDY : [phone]   Schedule a follow-up visit in 2 to 4 weeks after your surgery, sooner if having problems Bring a complete set of pouch-changing supplies to this visit    [facility] outpatient [facility] department  [address] [place] [address]   number- [phone]      Problems you should Report  - The stoma turns blue or darker in color.  - Cuts or sores around the stoma.  - Red, raw or painful skin around the stoma.  - Any bulging of the skin around the stoma.  - A pouch that leaks every day.  - Problems making the right size hole in the pouch wafer.    Please call with any questions or concerns.

## 2023-02-10 NOTE — PROGRESS NOTES
Colorectal Surgery Progress Note  [facility]  POD#2      Subjective:  Continues with abdominal pain. No stoma output.  Little nauseated at times.  Not hungry.     Vitals:  Vitals:    02/09/23 2249 02/10/23 0750 02/10/23 0757 02/10/23 0758   BP: 117/62 111/59     BP Location: Right arm Right arm     Cuff Size:       Pulse: 97 71     Resp: 16 16     Temp: 99.2  F (37.3  C) 98.1  F (36.7  C)     TempSrc: Temporal Temporal     SpO2: (!) 88% 95% 90% 96%   Weight:       Height:         I/O:  I/O last 3 completed shifts:  In: 30 [I.V.:30]  Out: 1175 [Urine:1175]    Physical Exam:  Gen: AAOx3, NAD  Pulm: Non-labored breathing  Abd: Soft, mildly distended, appropriately tender, no guarding/rebound   Incision C/D/I    Stoma pink and viable - no stool/gas per ostomy   Jo - darker hematuria but thin  Ext:  Warm and well-perfused    BMP  Recent Labs   Lab 02/10/23  0723 02/09/23  0723 02/08/23  1721 02/08/23  0637    138  --   --    POTASSIUM 4.4 4.9  --   --    CHLORIDE 102 106  --   --    CO2 26 24  --   --    BUN 19.4 19.7  --   --    CR 0.99 0.93 0.88  --    * 89  --  84   MAG 2.0 1.7  --   --      CBC  Recent Labs   Lab 02/09/23 0723   WBC 10.4   HGB 12.4*   HCT 39.7*            ASSESSMENT: This is a 72 year old male PMH 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  Now s/p Ex Lap,  JURGEN (2 hrs), LAR, loop colostomy take down, new diverting loop ileostomy, flex sig on 2/8/2023.    Neuro/Pain: tylenol,  Robaxin, neurontin, PCA  CV: no acute concerns at this time  PULM: encourage IS.  GI/FEN:   - LRD as tolerated  - continue IVF @ 60  - WOCN   - ambulate  : dc jo today.   Continue flomax.    Heme: Hgb 12.4 from baseline ~13   ID: no acute concerns   Endocrine: no acute concerns at this time    Activity: as tolerated.  Ppx: lovenox ppx  Dispo: pending ROBF, tolerate orals, will need lovenox ppx x30 days      Doris Mccloud PA-C  ..................2/10/2023   7:59 AM  Colon and Rectal Surgery    Patient was seen and discussed with Dr. Jimenez    The above plan of care was performed and communicated to me by Dr. Magdaleno    I spent 25 minute face-to-face or coordinating care of Kalin Shepard. Over 50% of our time on the unit was spent counseling the patient and/or coordinating care as documented in the assessment and plan.     01899 post op hospital visit

## 2023-02-10 NOTE — CONSULTS
Care Management Initial Consult    General Information  Assessment completed with: Layton Velasquez  Type of CM/SW Visit: Initial Assessment    Primary Care Provider verified and updated as needed: Yes   Readmission within the last 30 days: no previous admission in last 30 days   Reason for Consult: discharge planning (elevated risk score)  Advance Care Planning:        Communication Assessment  Patient's communication style: spoken language (English or Bilingual)    Hearing Difficulty or Deaf: no   Wear Glasses or Blind: no    Cognitive  Cognitive/Neuro/Behavioral: WDL                  Living Environment:   People in home: child(luna), dependent, spouse  Irina  Current living Arrangements: house      Able to return to prior arrangements: yes     Family/Social Support:  Care provided by: spouse/significant other  Provides care for: no one (teenage children)  Marital Status:   Wife, Children          Description of Support System: Supportive, Involved       Current Resources:   Patient receiving home care services: No  Community Resources: None  Equipment currently used at home: none  Supplies currently used at home: None    Employment/Financial:  Employment Status: employed full-time     Financial Concerns: no     Functional Status:  Prior to admission patient needed assistance:   Dependent ADLs:: Independent  Dependent IADLs:: Independent     Mental Health Status:  Mental Health Status: No Current Concerns       Chemical Dependency Status:  Chemical Dependency Status: No Current Concerns           Values/Beliefs:  Spiritual, Cultural Beliefs, Anabaptist Practices, Values that affect care: no             Additional Information:  Kalin Shepard is a 72 year old male with h/o sigmoid colon adenocarcinoma. Kalin is now  s/p Exploratory Laparotomy, lysis of adhesions, Low Anterior Resection, take down of loop colostomy, divering loop ileostomy.     Care management consult placed for elevated risk score and  discharge planning.     RNCC met with patient at bedside, introduced self and role.     Patient stated he lives with his wife and two children, ages 15 and 16. Patient said he is well supported by them.      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 Patient stated he is independent with ambulation and ADL's.      Patient has previously had home nursing services from [facility], but currently does not.    Patient stated he does not have anymore ostomy bags left and will need a new order for them prior to discharge. Patient stated he currently gets his bags from [employer], and wanted it noted that he will now need a different size than previously.     Patient states he does not drive but his wife helps him with transportation. Patient states he will get a ride home from family at discharge.      RNCC team will continue to follow patient to assist with safe discharge planning.    Annie Wallis RN, BSN  Care Coordinator 7D  Office: [phone]  Pager: [phone]    To contact the weekend RNCC  [place] (0800 - 1630) Saturday and Sunday    Units: 4A, 4C, 4E, 5A and 5B- Pager 1: [phone]    Units: 6A, 6B, 6C, 6D- Pager 2: [phone]    Units: 7A, 7B, 7C, 7D, and 5C-Pager 3: [phone]

## 2023-02-10 NOTE — PROGRESS NOTES
"Goal outcome evaluation:  Time: 2300 - 0700  Plan of care reviewed with: Patient  Overall patient progress: No change  VS /62 (BP Location: Right arm)   Pulse 97   Temp 99.2  F (37.3  C) (Temporal)   Resp 16   Ht 1.803 m (5' 11\")   Wt 66.8 kg (147 lb 4.3 oz)   SpO2 (!) 88%   BMI 20.54 kg/m      Activity: SBA.  Neuros: A&O x4. Calls appropriately. Able to make needs known. Clear and appropriate speech. Follows instructions.   Cardiac: WDL. Denies chest pain  Respiratory: WDL on RA. Denies SOB  GI/: Jo on place, urine is red. No BM this shift  Diet: Low fiber diet  Skin/Incisions: Abdominal incision CDI  Lines/Drains: ileostomy, jo, PIV SL. Port infusing D5 1/2 NS + KCL and PCA Dilaudid  Labs: Reviewed  Pain/Nausea: Managed with PCA Dilaudid, refused scheduled tylenol, stated he was not in pain.  Plan: Continue POC      "

## 2023-02-11 LAB
ANION GAP SERPL CALCULATED.3IONS-SCNC: 7 MMOL/L (ref 7–15)
BUN SERPL-MCNC: 8.6 MG/DL (ref 8–23)
CALCIUM SERPL-MCNC: 8.3 MG/DL (ref 8.8–10.2)
CHLORIDE SERPL-SCNC: 105 MMOL/L (ref 98–107)
CREAT SERPL-MCNC: 0.75 MG/DL (ref 0.67–1.17)
DEPRECATED HCO3 PLAS-SCNC: 27 MMOL/L (ref 22–29)
GFR SERPL CREATININE-BSD FRML MDRD: >90 ML/MIN/1.73M2
GLUCOSE SERPL-MCNC: 125 MG/DL (ref 70–99)
MAGNESIUM SERPL-MCNC: 1.8 MG/DL (ref 1.7–2.3)
PHOSPHATE SERPL-MCNC: 2.3 MG/DL (ref 2.5–4.5)
PLATELET # BLD AUTO: 145 10E3/UL (ref 150–450)
POTASSIUM SERPL-SCNC: 4.1 MMOL/L (ref 3.4–5.3)
SODIUM SERPL-SCNC: 139 MMOL/L (ref 136–145)

## 2023-02-11 PROCEDURE — 85049 AUTOMATED PLATELET COUNT: CPT | Performed by: SURGERY

## 2023-02-11 PROCEDURE — 36591 DRAW BLOOD OFF VENOUS DEVICE: CPT | Performed by: STUDENT IN AN ORGANIZED HEALTH CARE EDUCATION/TRAINING PROGRAM

## 2023-02-11 PROCEDURE — 258N000003 HC RX IP 258 OP 636: Performed by: SURGERY

## 2023-02-11 PROCEDURE — 250N000009 HC RX 250: Performed by: SURGERY

## 2023-02-11 PROCEDURE — 80048 BASIC METABOLIC PNL TOTAL CA: CPT | Performed by: STUDENT IN AN ORGANIZED HEALTH CARE EDUCATION/TRAINING PROGRAM

## 2023-02-11 PROCEDURE — 120N000002 HC R&B MED SURG/OB UMMC

## 2023-02-11 PROCEDURE — 999N000128 HC STATISTIC PERIPHERAL IV START W/O US GUIDANCE

## 2023-02-11 PROCEDURE — 250N000011 HC RX IP 250 OP 636: Performed by: COLON & RECTAL SURGERY

## 2023-02-11 PROCEDURE — 83735 ASSAY OF MAGNESIUM: CPT | Performed by: STUDENT IN AN ORGANIZED HEALTH CARE EDUCATION/TRAINING PROGRAM

## 2023-02-11 PROCEDURE — 258N000003 HC RX IP 258 OP 636: Performed by: COLON & RECTAL SURGERY

## 2023-02-11 PROCEDURE — 84100 ASSAY OF PHOSPHORUS: CPT | Performed by: STUDENT IN AN ORGANIZED HEALTH CARE EDUCATION/TRAINING PROGRAM

## 2023-02-11 PROCEDURE — 250N000013 HC RX MED GY IP 250 OP 250 PS 637: Performed by: PHYSICIAN ASSISTANT

## 2023-02-11 PROCEDURE — 250N000013 HC RX MED GY IP 250 OP 250 PS 637: Performed by: SURGERY

## 2023-02-11 PROCEDURE — 250N000011 HC RX IP 250 OP 636: Performed by: SURGERY

## 2023-02-11 RX ORDER — CALCIUM GLUCONATE 20 MG/ML
2 INJECTION, SOLUTION INTRAVENOUS ONCE
Status: COMPLETED | OUTPATIENT
Start: 2023-02-11 | End: 2023-02-11

## 2023-02-11 RX ORDER — HEPARIN SODIUM,PORCINE 10 UNIT/ML
5-10 VIAL (ML) INTRAVENOUS
Status: DISCONTINUED | OUTPATIENT
Start: 2023-02-11 | End: 2023-02-14 | Stop reason: HOSPADM

## 2023-02-11 RX ORDER — HEPARIN SODIUM (PORCINE) LOCK FLUSH IV SOLN 100 UNIT/ML 100 UNIT/ML
5-10 SOLUTION INTRAVENOUS
Status: DISCONTINUED | OUTPATIENT
Start: 2023-02-11 | End: 2023-02-14 | Stop reason: HOSPADM

## 2023-02-11 RX ORDER — METHOCARBAMOL 500 MG/1
500 TABLET, FILM COATED ORAL 4 TIMES DAILY PRN
Status: DISCONTINUED | OUTPATIENT
Start: 2023-02-11 | End: 2023-02-14 | Stop reason: HOSPADM

## 2023-02-11 RX ORDER — HEPARIN SODIUM,PORCINE 10 UNIT/ML
5-10 VIAL (ML) INTRAVENOUS EVERY 24 HOURS
Status: DISCONTINUED | OUTPATIENT
Start: 2023-02-11 | End: 2023-02-14 | Stop reason: HOSPADM

## 2023-02-11 RX ORDER — SODIUM CHLORIDE, SODIUM LACTATE, POTASSIUM CHLORIDE, CALCIUM CHLORIDE 600; 310; 30; 20 MG/100ML; MG/100ML; MG/100ML; MG/100ML
INJECTION, SOLUTION INTRAVENOUS CONTINUOUS
Status: DISCONTINUED | OUTPATIENT
Start: 2023-02-11 | End: 2023-02-14 | Stop reason: HOSPADM

## 2023-02-11 RX ADMIN — ACETAMINOPHEN 1000 MG: 500 TABLET ORAL at 19:39

## 2023-02-11 RX ADMIN — ACETAMINOPHEN 1000 MG: 500 TABLET ORAL at 08:13

## 2023-02-11 RX ADMIN — OXYCODONE HYDROCHLORIDE 10 MG: 5 TABLET ORAL at 08:16

## 2023-02-11 RX ADMIN — OXYCODONE HYDROCHLORIDE 5 MG: 5 TABLET ORAL at 23:33

## 2023-02-11 RX ADMIN — OXYCODONE HYDROCHLORIDE 10 MG: 5 TABLET ORAL at 13:44

## 2023-02-11 RX ADMIN — SODIUM CHLORIDE, POTASSIUM CHLORIDE, SODIUM LACTATE AND CALCIUM CHLORIDE 25 ML: 600; 310; 30; 20 INJECTION, SOLUTION INTRAVENOUS at 13:34

## 2023-02-11 RX ADMIN — SODIUM CHLORIDE, POTASSIUM CHLORIDE, SODIUM LACTATE AND CALCIUM CHLORIDE: 600; 310; 30; 20 INJECTION, SOLUTION INTRAVENOUS at 12:20

## 2023-02-11 RX ADMIN — ALBUTEROL SULFATE 2 PUFF: 90 AEROSOL, METERED RESPIRATORY (INHALATION) at 08:12

## 2023-02-11 RX ADMIN — Medication 40 MG: at 16:06

## 2023-02-11 RX ADMIN — POTASSIUM PHOSPHATE, MONOBASIC POTASSIUM PHOSPHATE, DIBASIC 9 MMOL: 224; 236 INJECTION, SOLUTION, CONCENTRATE INTRAVENOUS at 11:38

## 2023-02-11 RX ADMIN — TAMSULOSIN HYDROCHLORIDE 0.4 MG: 0.4 CAPSULE ORAL at 08:12

## 2023-02-11 RX ADMIN — POTASSIUM CHLORIDE, DEXTROSE MONOHYDRATE AND SODIUM CHLORIDE: 150; 5; 450 INJECTION, SOLUTION INTRAVENOUS at 01:19

## 2023-02-11 RX ADMIN — OXYCODONE HYDROCHLORIDE 5 MG: 5 TABLET ORAL at 04:00

## 2023-02-11 RX ADMIN — METHOCARBAMOL 500 MG: 500 TABLET ORAL at 10:08

## 2023-02-11 RX ADMIN — GABAPENTIN 100 MG: 100 CAPSULE ORAL at 21:51

## 2023-02-11 RX ADMIN — CALCIUM GLUCONATE 2 G: 20 INJECTION, SOLUTION INTRAVENOUS at 13:39

## 2023-02-11 RX ADMIN — SODIUM CHLORIDE, POTASSIUM CHLORIDE, SODIUM LACTATE AND CALCIUM CHLORIDE: 600; 310; 30; 20 INJECTION, SOLUTION INTRAVENOUS at 21:53

## 2023-02-11 RX ADMIN — Medication 40 MG: at 07:38

## 2023-02-11 RX ADMIN — OXYCODONE HYDROCHLORIDE 5 MG: 5 TABLET ORAL at 19:39

## 2023-02-11 RX ADMIN — SODIUM CHLORIDE, POTASSIUM CHLORIDE, SODIUM LACTATE AND CALCIUM CHLORIDE 500 ML: 600; 310; 30; 20 INJECTION, SOLUTION INTRAVENOUS at 10:13

## 2023-02-11 RX ADMIN — ALBUTEROL SULFATE 2 PUFF: 90 AEROSOL, METERED RESPIRATORY (INHALATION) at 08:10

## 2023-02-11 RX ADMIN — SODIUM CHLORIDE, POTASSIUM CHLORIDE, SODIUM LACTATE AND CALCIUM CHLORIDE 1000 ML: 600; 310; 30; 20 INJECTION, SOLUTION INTRAVENOUS at 19:32

## 2023-02-11 RX ADMIN — ENOXAPARIN SODIUM 40 MG: 40 INJECTION SUBCUTANEOUS at 10:08

## 2023-02-11 RX ADMIN — ACETAMINOPHEN 1000 MG: 500 TABLET ORAL at 13:44

## 2023-02-11 RX ADMIN — ACETAMINOPHEN 1000 MG: 500 TABLET ORAL at 01:20

## 2023-02-11 RX ADMIN — SODIUM CHLORIDE, POTASSIUM CHLORIDE, SODIUM LACTATE AND CALCIUM CHLORIDE: 600; 310; 30; 20 INJECTION, SOLUTION INTRAVENOUS at 22:52

## 2023-02-11 ASSESSMENT — ACTIVITIES OF DAILY LIVING (ADL)
ADLS_ACUITY_SCORE: 24
ADLS_ACUITY_SCORE: 20
ADLS_ACUITY_SCORE: 24
ADLS_ACUITY_SCORE: 24
ADLS_ACUITY_SCORE: 20
ADLS_ACUITY_SCORE: 20
ADLS_ACUITY_SCORE: 24
ADLS_ACUITY_SCORE: 20
ADLS_ACUITY_SCORE: 24
ADLS_ACUITY_SCORE: 20

## 2023-02-11 NOTE — PLAN OF CARE
Oriented x4. VSS on 1.5 LPM via NC. Attempted to wean, desats to 88-89% on RA. Using IS. Up SBA, walked in halls once. Incisional pain 3/10 receiving scheduled acetaminophen, robaxin. Midline incision stapled, IRENE. Ileostomy o/p, total 1500mL. mIVF infusing thru port increased to 100mL/hr. PIV SL. Voids adequately. Watching TV and reading between cares.     Marielena Santoyo MD paged for ileostomy o/p of 1250 between [id number].

## 2023-02-11 NOTE — PLAN OF CARE
Goal Outcome Evaluation: POD3 Ex Lap,  JURGEN (2 hrs), LAR, loop colostomy take down, new diverting loop ileostomy, flex sig.    A&Ox4. VSS on room air. Pain is controlled with PO oxycodone. Gas and stool in his ileostomy. Minimal stool output that is thickening without metamucil. Up independently, Midline incision is IRENE. LR replacement ordered for previous high output. 50ml out this shift, replaced 25ml. Tolerating a LFD. Ambulated the halls x2. Phos is replacing. IVM running at 30/hr. Voiding in the urinal. LS show insp/exp wheezes. Albuterol given.     Plan: pending bowel function. Will discharge with lovenox.

## 2023-02-11 NOTE — PROGRESS NOTES
"Goal outcome evaluation:  Time: 2300 - 0700  Plan of care reviewed with: Patient  Overall patient progress: No change  VS /66 (BP Location: Right arm)   Pulse 70   Temp 99.8  F (37.7  C) (Temporal)   Resp 14   Ht 1.803 m (5' 11\")   Wt 66.8 kg (147 lb 4.3 oz)   SpO2 96%   BMI 20.54 kg/m        Activity: SBA.  Neuros: A&O x4. Calls appropriately. Able to make needs known. Clear and appropriate speech. Follows instructions.   Cardiac: WDL. Denies chest pain  Respiratory: WDL on RA. Denies SOB  GI/: Voiding spontaneously. Decreased ileostomy compared to previous shift  Diet: Low fiber diet  Skin/Incisions: Abdominal incision CDI  Lines/Drains: ileostomy, jo, PIV SL. Port infusing D5 1/2 NS + KCL  Labs: Reviewed  Pain/Nausea: Managed with Oxy x 1  Plan: Continue POC  "

## 2023-02-11 NOTE — PROGRESS NOTES
Colorectal Surgery Progress Note  [facility]  POD#3      Subjective:  Continues with abdominal pain. Now having stoma output, but very high 2500cc output.  Feels well otherwise.    Vitals:  Vitals:    02/10/23 1231 02/10/23 1511 02/10/23 2156 02/11/23 0625   BP:  121/62 117/66 112/66   BP Location:  Right arm Right arm Right arm   Cuff Size:    Adult Regular   Pulse:  76 70 60   Resp:  16 14 22   Temp:  99  F (37.2  C) 99.8  F (37.7  C) 97.4  F (36.3  C)   TempSrc:  Temporal Temporal Temporal   SpO2: 95% 95% 96% 95%   Weight:       Height:         I/O:  I/O last 3 completed shifts:  In: 2895.33 [P.O.:1920; I.V.:975.33]  Out: 4885 [Urine:2385; Stool:2500]    Physical Exam:  Gen: AAOx3, NAD  Pulm: Non-labored breathing  Abd: Soft, mildly distended, appropriately tender, no guarding/rebound   Incision C/D/I    Stoma pink and viable - having semi-thick ostomy output    Castro - darker hematuria but thin  Ext:  Warm and well-perfused    BMP  Recent Labs   Lab 02/11/23  0658 02/10/23  0723 02/09/23  0723 02/08/23  1721 02/08/23  0637    137 138  --   --    POTASSIUM 4.1 4.4 4.9  --   --    CHLORIDE 105 102 106  --   --    CO2 27 26 24  --   --    BUN 8.6 19.4 19.7  --   --    CR 0.75 0.99 0.93 0.88  --    * 109* 89  --  84   MAG 1.8 2.0 1.7  --   --    PHOS 2.3*  --   --   --   --      CBC  Recent Labs   Lab 02/11/23 0658 02/09/23 0723   WBC  --  10.4   HGB  --  12.4*   HCT  --  39.7*   * 171         ASSESSMENT: This is a 72 year old male PMH 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  Now s/p Ex Lap,  JURGEN (2 hrs), LAR, loop colostomy take down, new diverting loop ileostomy, flex sig on 2/8/2023.    Neuro/Pain: tylenol,  Robaxin, neurontin, PCA    GI/FEN:   - LRD as tolerated, expect ostomy output to come down with PO intake.   - Replacement fluids- 0.5ccLR  for every 1cc over 1500cc  - WOCN   - ambulate        Activity: as  tolerated.  Ppx: lovenox ppx  Dispo: pending ROBF, tolerate orals, will need lovenox ppx x30 days    Oscar Rosales MD, MPH  Fellow in Colon and Rectal Surgery  [facility]

## 2023-02-12 LAB
ANION GAP SERPL CALCULATED.3IONS-SCNC: 7 MMOL/L (ref 7–15)
BUN SERPL-MCNC: 7.7 MG/DL (ref 8–23)
CALCIUM SERPL-MCNC: 8.8 MG/DL (ref 8.8–10.2)
CHLORIDE SERPL-SCNC: 102 MMOL/L (ref 98–107)
CREAT SERPL-MCNC: 0.67 MG/DL (ref 0.67–1.17)
DEPRECATED HCO3 PLAS-SCNC: 28 MMOL/L (ref 22–29)
GFR SERPL CREATININE-BSD FRML MDRD: >90 ML/MIN/1.73M2
GLUCOSE SERPL-MCNC: 96 MG/DL (ref 70–99)
HOLD SPECIMEN: NORMAL
PHOSPHATE SERPL-MCNC: 3.5 MG/DL (ref 2.5–4.5)
POTASSIUM SERPL-SCNC: 4.1 MMOL/L (ref 3.4–5.3)
SODIUM SERPL-SCNC: 137 MMOL/L (ref 136–145)

## 2023-02-12 PROCEDURE — 250N000013 HC RX MED GY IP 250 OP 250 PS 637: Performed by: PHYSICIAN ASSISTANT

## 2023-02-12 PROCEDURE — 36591 DRAW BLOOD OFF VENOUS DEVICE: CPT | Performed by: COLON & RECTAL SURGERY

## 2023-02-12 PROCEDURE — 250N000013 HC RX MED GY IP 250 OP 250 PS 637: Performed by: STUDENT IN AN ORGANIZED HEALTH CARE EDUCATION/TRAINING PROGRAM

## 2023-02-12 PROCEDURE — 120N000002 HC R&B MED SURG/OB UMMC

## 2023-02-12 PROCEDURE — 80048 BASIC METABOLIC PNL TOTAL CA: CPT | Performed by: COLON & RECTAL SURGERY

## 2023-02-12 PROCEDURE — 250N000013 HC RX MED GY IP 250 OP 250 PS 637: Performed by: COLON & RECTAL SURGERY

## 2023-02-12 PROCEDURE — 250N000011 HC RX IP 250 OP 636: Performed by: STUDENT IN AN ORGANIZED HEALTH CARE EDUCATION/TRAINING PROGRAM

## 2023-02-12 PROCEDURE — 250N000013 HC RX MED GY IP 250 OP 250 PS 637: Performed by: SURGERY

## 2023-02-12 PROCEDURE — 250N000011 HC RX IP 250 OP 636: Performed by: SURGERY

## 2023-02-12 PROCEDURE — 258N000003 HC RX IP 258 OP 636: Performed by: COLON & RECTAL SURGERY

## 2023-02-12 PROCEDURE — 84100 ASSAY OF PHOSPHORUS: CPT | Performed by: SURGERY

## 2023-02-12 RX ORDER — ACETAMINOPHEN 500 MG
1000 TABLET ORAL 4 TIMES DAILY
Status: DISCONTINUED | OUTPATIENT
Start: 2023-02-12 | End: 2023-02-14 | Stop reason: HOSPADM

## 2023-02-12 RX ORDER — KETOROLAC TROMETHAMINE 15 MG/ML
15 INJECTION, SOLUTION INTRAMUSCULAR; INTRAVENOUS EVERY 6 HOURS PRN
Status: DISCONTINUED | OUTPATIENT
Start: 2023-02-12 | End: 2023-02-12

## 2023-02-12 RX ADMIN — OXYCODONE HYDROCHLORIDE 10 MG: 5 TABLET ORAL at 04:03

## 2023-02-12 RX ADMIN — TAMSULOSIN HYDROCHLORIDE 0.4 MG: 0.4 CAPSULE ORAL at 08:22

## 2023-02-12 RX ADMIN — Medication 40 MG: at 08:21

## 2023-02-12 RX ADMIN — PSYLLIUM HUSK 1 PACKET: 3.4 POWDER ORAL at 09:09

## 2023-02-12 RX ADMIN — ACETAMINOPHEN 1000 MG: 500 TABLET ORAL at 20:41

## 2023-02-12 RX ADMIN — ACETAMINOPHEN 1000 MG: 500 TABLET ORAL at 08:21

## 2023-02-12 RX ADMIN — ACETAMINOPHEN 1000 MG: 500 TABLET ORAL at 13:33

## 2023-02-12 RX ADMIN — SODIUM CHLORIDE, POTASSIUM CHLORIDE, SODIUM LACTATE AND CALCIUM CHLORIDE 50 ML: 600; 310; 30; 20 INJECTION, SOLUTION INTRAVENOUS at 11:27

## 2023-02-12 RX ADMIN — Medication 40 MG: at 16:06

## 2023-02-12 RX ADMIN — OXYCODONE HYDROCHLORIDE 10 MG: 5 TABLET ORAL at 20:41

## 2023-02-12 RX ADMIN — ENOXAPARIN SODIUM 40 MG: 40 INJECTION SUBCUTANEOUS at 09:10

## 2023-02-12 RX ADMIN — Medication 5 ML: at 13:26

## 2023-02-12 RX ADMIN — KETOROLAC TROMETHAMINE 15 MG: 15 INJECTION, SOLUTION INTRAMUSCULAR; INTRAVENOUS at 13:26

## 2023-02-12 RX ADMIN — ALBUTEROL SULFATE 2 PUFF: 90 AEROSOL, METERED RESPIRATORY (INHALATION) at 08:21

## 2023-02-12 RX ADMIN — PSYLLIUM HUSK 1 PACKET: 3.4 POWDER ORAL at 20:49

## 2023-02-12 RX ADMIN — SODIUM CHLORIDE, POTASSIUM CHLORIDE, SODIUM LACTATE AND CALCIUM CHLORIDE 350 ML: 600; 310; 30; 20 INJECTION, SOLUTION INTRAVENOUS at 18:44

## 2023-02-12 RX ADMIN — GABAPENTIN 100 MG: 100 CAPSULE ORAL at 22:10

## 2023-02-12 RX ADMIN — SODIUM CHLORIDE, POTASSIUM CHLORIDE, SODIUM LACTATE AND CALCIUM CHLORIDE 25 ML: 600; 310; 30; 20 INJECTION, SOLUTION INTRAVENOUS at 03:08

## 2023-02-12 RX ADMIN — OXYCODONE HYDROCHLORIDE 10 MG: 5 TABLET ORAL at 08:22

## 2023-02-12 ASSESSMENT — ACTIVITIES OF DAILY LIVING (ADL)
ADLS_ACUITY_SCORE: 20

## 2023-02-12 NOTE — PLAN OF CARE
"Goal Outcome Evaluation:    Time: 4975-4101  VS:/74 (BP Location: Right arm)   Pulse 69   Temp 98.6  F (37  C) (Temporal)   Resp 18   Ht 1.803 m (5' 11\")   Wt 66.8 kg (147 lb 4.3 oz)   SpO2 96%   BMI 20.54 kg/m    Activity:  Up independently. Walk x1 in the unit  Pain: c/o abd pain managed with PRN Oxy for relieved  Neuro: A&O x 4  Cardiac: WDL. Denies SOB, chest pain  Respiratory: RA @ 96% stat  GI/: ileostomy in place with adequate output. 800 ml this shift, replace with  400 ml of LR. Voiding via urinal with AUOP    Diet/Nausea: Tolerating LFD. Denies nausea   Lines: Chest port, SL. (R) PIV infusing LR @ 30 ml/hr   Incisions/Drains/Skin: Midline incision IRENE, ileostomy   Lab: Reviewed   Electrolyte Replacements: None   Plan: Continue w/poc  New changes this shift: No acute change     "

## 2023-02-12 NOTE — PLAN OF CARE
Goal Outcome Evaluation:       A&O VSS on RA. Port a cath heparin locked. PIV infusing LR. Denies pain. Using BSU, adequate UOP. Stoma 700 mL output. Midline incision CDI.

## 2023-02-12 NOTE — PROGRESS NOTES
"Goal outcome evaluation:  Time: 2300 - 0700  Plan of care reviewed with: Patient  Overall patient progress: No change  VS /74 (BP Location: Right arm)   Pulse 69   Temp 98.6  F (37  C) (Temporal)   Resp 18   Ht 1.803 m (5' 11\")   Wt 66.8 kg (147 lb 4.3 oz)   SpO2 96%   BMI 20.54 kg/m      Activity: SBA.  Neuros: A&O x4. Calls appropriately. Able to make needs known. Clear and appropriate speech. Follows instructions.   Cardiac: WDL. Denies chest pain  Respiratory: WDL on RA. Denies SOB  GI/: Voiding spontaneously. Ileostomy with adequate output that is replaced 1 mL: 0.5 mL of LR bolus  Diet: Low fiber diet  Skin/Incisions: Abdominal incision staples, IRENE  Lines/Drains: ileostomy, jo, PIV infusing LR @ 30 mL/hr. Port SL  Labs: Reviewed  Pain/Nausea: Managed with Oxy and Tylenol  Plan: Continue POC      "

## 2023-02-12 NOTE — PROVIDER NOTIFICATION
Notified Resident at 11:35 PM regarding order clarification. Patient has two LR bolus orders for ostomy replacement, in addition to maintenance IV fluid orders.    Spoke with: Kelle Sams MD    Orders were obtained.    Comments: one of the orders was discontinued

## 2023-02-12 NOTE — PLAN OF CARE
Goal Outcome Evaluation: POD4 JURGEN, LAR, loop colostomy takedown, new DLI, flex sig.     A&Ox4, VSS on room air. O2 sats 92-93%. Pain is controlled with toradol, tylenol, oxycodone. UP independently, voiding in the urinal, Ilestomy has decreasing output. LR running at 30/hr plus 0.5/1ml replacement. Midline is IRENE. Port is hep locked.    Plan: pending.

## 2023-02-12 NOTE — PROGRESS NOTES
"Colon & Rectal Surgery Progress Note    Subjective:   Feels full today. No n/v. Pain tolerable. Good UOP. DLI output down trending.    Objective: /72 (BP Location: Left arm)   Pulse 78   Temp 99  F (37.2  C) (Temporal)   Resp 18   Ht 5' 11\"   Wt 147 lb 4.3 oz   SpO2 92%   BMI 20.54 kg/m     Recent Labs   Lab Test 02/11/23  0658 02/09/23  0723   WBC  --  10.4   RBC  --  4.00*   HGB  --  12.4*   HCT  --  39.7*   MCV  --  99   MCH  --  31.0   MCHC  --  31.2*   RDW  --  14.4   * 171       Intake/Output Summary (Last 24 hours) at 2/12/2023 1011  Last data filed at 2/12/2023 0800  Gross per 24 hour   Intake 999 ml   Output 2945 ml   Net -1946 ml       Abdomen: distended but soft. Incision clean. Left-sided DLI viable and functioning..    Assessment:  POD#4. Cr NL.    Plan:  - LR diet.  - LR@30 + replacement protocol.  - GI/DVT proph.  - Metamucil BID. Hold off on Imodium today given decrease in stoma output and abd distension/fullness.  - Ambulate QID.  - Routine stoma cares.    Reji Elizabeth M.D., M.Sc.    Colon and Rectal Surgery  Pager: [phone].    "

## 2023-02-13 LAB
ANION GAP SERPL CALCULATED.3IONS-SCNC: 10 MMOL/L (ref 7–15)
BUN SERPL-MCNC: 9.4 MG/DL (ref 8–23)
CALCIUM SERPL-MCNC: 9.5 MG/DL (ref 8.8–10.2)
CHLORIDE SERPL-SCNC: 106 MMOL/L (ref 98–107)
CREAT SERPL-MCNC: 0.7 MG/DL (ref 0.67–1.17)
DEPRECATED HCO3 PLAS-SCNC: 26 MMOL/L (ref 22–29)
GFR SERPL CREATININE-BSD FRML MDRD: >90 ML/MIN/1.73M2
GLUCOSE SERPL-MCNC: 115 MG/DL (ref 70–99)
PHOSPHATE SERPL-MCNC: 3.4 MG/DL (ref 2.5–4.5)
POTASSIUM SERPL-SCNC: 4 MMOL/L (ref 3.4–5.3)
SODIUM SERPL-SCNC: 142 MMOL/L (ref 136–145)

## 2023-02-13 PROCEDURE — 258N000003 HC RX IP 258 OP 636: Performed by: COLON & RECTAL SURGERY

## 2023-02-13 PROCEDURE — 36591 DRAW BLOOD OFF VENOUS DEVICE: CPT | Performed by: SURGERY

## 2023-02-13 PROCEDURE — 250N000013 HC RX MED GY IP 250 OP 250 PS 637

## 2023-02-13 PROCEDURE — 82310 ASSAY OF CALCIUM: CPT

## 2023-02-13 PROCEDURE — 250N000013 HC RX MED GY IP 250 OP 250 PS 637: Performed by: PHYSICIAN ASSISTANT

## 2023-02-13 PROCEDURE — 250N000011 HC RX IP 250 OP 636: Performed by: SURGERY

## 2023-02-13 PROCEDURE — G0463 HOSPITAL OUTPT CLINIC VISIT: HCPCS

## 2023-02-13 PROCEDURE — 84100 ASSAY OF PHOSPHORUS: CPT | Performed by: SURGERY

## 2023-02-13 PROCEDURE — 120N000002 HC R&B MED SURG/OB UMMC

## 2023-02-13 PROCEDURE — 250N000013 HC RX MED GY IP 250 OP 250 PS 637: Performed by: COLON & RECTAL SURGERY

## 2023-02-13 PROCEDURE — 250N000013 HC RX MED GY IP 250 OP 250 PS 637: Performed by: STUDENT IN AN ORGANIZED HEALTH CARE EDUCATION/TRAINING PROGRAM

## 2023-02-13 PROCEDURE — 250N000013 HC RX MED GY IP 250 OP 250 PS 637: Performed by: SURGERY

## 2023-02-13 RX ORDER — ACETAMINOPHEN 500 MG
1000 TABLET ORAL 4 TIMES DAILY
Qty: 56 TABLET | Refills: 0 | Status: ON HOLD | OUTPATIENT
Start: 2023-02-13 | End: 2023-05-05

## 2023-02-13 RX ORDER — GABAPENTIN 100 MG/1
100 CAPSULE ORAL AT BEDTIME
Qty: 14 CAPSULE | Refills: 0 | Status: SHIPPED | OUTPATIENT
Start: 2023-02-13 | End: 2023-04-13

## 2023-02-13 RX ORDER — ENOXAPARIN SODIUM 100 MG/ML
40 INJECTION SUBCUTANEOUS EVERY 24 HOURS
Qty: 9.6 ML | Refills: 0 | Status: SHIPPED | OUTPATIENT
Start: 2023-02-14 | End: 2023-03-10

## 2023-02-13 RX ORDER — LOPERAMIDE HCL 2 MG
2 CAPSULE ORAL 3 TIMES DAILY
Status: DISCONTINUED | OUTPATIENT
Start: 2023-02-13 | End: 2023-02-14 | Stop reason: HOSPADM

## 2023-02-13 RX ADMIN — SODIUM CHLORIDE, POTASSIUM CHLORIDE, SODIUM LACTATE AND CALCIUM CHLORIDE: 600; 310; 30; 20 INJECTION, SOLUTION INTRAVENOUS at 19:17

## 2023-02-13 RX ADMIN — Medication 40 MG: at 08:43

## 2023-02-13 RX ADMIN — PSYLLIUM HUSK 1 PACKET: 3.4 POWDER ORAL at 20:11

## 2023-02-13 RX ADMIN — ENOXAPARIN SODIUM 40 MG: 40 INJECTION SUBCUTANEOUS at 08:46

## 2023-02-13 RX ADMIN — LOPERAMIDE HYDROCHLORIDE 2 MG: 2 CAPSULE ORAL at 14:17

## 2023-02-13 RX ADMIN — LOPERAMIDE HYDROCHLORIDE 2 MG: 2 CAPSULE ORAL at 20:11

## 2023-02-13 RX ADMIN — ALBUTEROL SULFATE 2 PUFF: 90 AEROSOL, METERED RESPIRATORY (INHALATION) at 10:28

## 2023-02-13 RX ADMIN — GABAPENTIN 100 MG: 100 CAPSULE ORAL at 22:14

## 2023-02-13 RX ADMIN — OXYCODONE HYDROCHLORIDE 10 MG: 5 TABLET ORAL at 14:17

## 2023-02-13 RX ADMIN — LOPERAMIDE HYDROCHLORIDE 2 MG: 2 CAPSULE ORAL at 08:44

## 2023-02-13 RX ADMIN — TAMSULOSIN HYDROCHLORIDE 0.4 MG: 0.4 CAPSULE ORAL at 08:44

## 2023-02-13 RX ADMIN — ACETAMINOPHEN 1000 MG: 500 TABLET ORAL at 14:17

## 2023-02-13 RX ADMIN — ACETAMINOPHEN 1000 MG: 500 TABLET ORAL at 22:13

## 2023-02-13 RX ADMIN — Medication 5 ML: at 07:12

## 2023-02-13 RX ADMIN — OXYCODONE HYDROCHLORIDE 10 MG: 5 TABLET ORAL at 05:05

## 2023-02-13 RX ADMIN — ACETAMINOPHEN 1000 MG: 500 TABLET ORAL at 08:44

## 2023-02-13 RX ADMIN — OXYCODONE HYDROCHLORIDE 10 MG: 5 TABLET ORAL at 22:13

## 2023-02-13 RX ADMIN — Medication 40 MG: at 16:55

## 2023-02-13 RX ADMIN — PSYLLIUM HUSK 1 PACKET: 3.4 POWDER ORAL at 08:44

## 2023-02-13 RX ADMIN — Medication 5 ML: at 14:18

## 2023-02-13 RX ADMIN — ALBUTEROL SULFATE 2 PUFF: 90 AEROSOL, METERED RESPIRATORY (INHALATION) at 00:13

## 2023-02-13 RX ADMIN — ACETAMINOPHEN 1000 MG: 500 TABLET ORAL at 16:55

## 2023-02-13 ASSESSMENT — ACTIVITIES OF DAILY LIVING (ADL)
ADLS_ACUITY_SCORE: 20

## 2023-02-13 NOTE — PLAN OF CARE
Goal Outcome Evaluation: POD5 XL, JURGEN, LAR, loop colostomy takedown, New DLI, flex sig. A&Ox4, VSS on room air. Pain is well controlled with Oxycodone and tylenol. Up independently, voiding adequately, Ileostomy output orders changed, see order. IVM running at 30/hr. Ambulated the halls, appetite is poor, belly less distended. Midline incision is IRENE.    Plan is to discharge tomorrow, Pt has given self lovenox previously.

## 2023-02-13 NOTE — PROVIDER NOTIFICATION
Paged Gyn/onc regarding A-fibb. Pt was in a-fibb at times in the OR and PACU. Please advise.     Nydia REHANA called back. Pt is on metoprolol and is rate controlled. OK to send to unit.

## 2023-02-13 NOTE — PROGRESS NOTES
Colorectal Surgery Progress Note  [facility]  POD#5        Subjective: Doing well today. No bloating, nausea, or vomiting. Good pain control and tolerating low residue diet. Voiding without issue. No  concerns at this time.       Vitals:  Temp: 97.5  F (36.4  C) Temp src: Oral BP: 108/75 Pulse: 69   Resp: 16 SpO2: 93 % O2 Device: None (Room air)        I/O:  I/O last 3 completed shifts:  In: 1799 [P.O.:1170; I.V.:329; IV Piggyback:300]  Out: 3425 [Urine:1975; Stool:1450]     Physical Exam:  Gen:      AAOx3, NAD  Pulm:    Non-labored breathing on room air  Abd:      Soft, mild distension, no tenderness, no guarding/rebound               Incision C/D/I, staples open to air               Stoma pink and viable with large amount of oatmeal consistency stool  Ext:       Warm and well-perfused     BMP   2/13  Na - 142  K - 4.0  BUN - 9.4  Cr - 0.7  Phos - 3.5  Glu - 115      CBC  2/12  plt - 145    Ileostomy output:   1450/24h (up from 850cc the previous 24h)      ASSESSMENT: This is a 72 year old year old male with a PMH of proximal and distal sigmoid colon cancer s/p prior loop colostomy and chemotherapy who is now post Ex Lap, JURGEN, LAR, loop colostomy takedown, new diverting loop ileostomy, and flex sigmoidoscopy on 2/8/23.   Postop course with continued by high ileoostomy output but otherwise doing well.     Neuro/Pain:   Tylenol, gabapentin, Robaxin, oxycodone    CV & Heme:   Lovenox  Hgb stable postop. No need for further rechecks.    PULM:    encourage out of bed, deep breathing and IS     GI/FEN:   Low fiber diet  LR @ 30 mL/hr with 0.5:1 repletion of ileostomy output >1500  Metamucil BID  Start Imodium 2 mg PO TID  Monitor stool output    :   Good urine output; no concerns    ID:   no acute concerns at this time    Endocrine:    No concerns at this time     Activity: as tolerated.  Ppx: Lovenox  Dispo:  Likely discharge tomorrow if ostomy output<1L; Lovenox x30 days on d/c          Avery Mcpherson, MS3 ................... 02/13/23   Colon and Rectal Surgery     Patient was seen and discussed with CRS team.     The above plan of care was performed and communicated with Dr. Gudino and Dr. Magdaleno    Attestation:  I personally saw and physically examined the patient with the medical student for the service. I have edited as appropriate and verified the medical student's documentation for this encounter.     Plan was discussed with Dr. Rosales and Dr. Magdaleno.

## 2023-02-13 NOTE — DISCHARGE SUMMARY
[facility]  Discharge Summary  Colon and Rectal Surgery     Kalin Shepard MRN# [id number]   YOB: 1950 Age: 72 year old     Date of Admission:  2/8/2023  Date of Discharge::  02/14/2023  Admitting Physician:  Riley Magdaleno MD  Discharge Physician:  Riley Magdaleno MD  Primary Care Physician:        [facility]          Admission Diagnoses:   S/P colostomy (H) [Z93.3]  Adenocarcinoma of sigmoid colon (H) [C18.7]  Cancer of rectosigmoid (colon) (H) [C19]    Tobacco use  Post-herpetic neuralgia  Prior alcohol dependence in remission   Prior malignant large bowel obstruction s/p emergent exploratory laparotomy, small bowel resection, loop descending colostomy in March 2022  Unintentional weight loss         Discharge Diagnosis:   S/P colostomy (H) [Z93.3]  Adenocarcinoma of sigmoid colon (H) [C18.7]  Cancer of rectosigmoid (colon) (H) [C19]  High ileostomy outputs  Hypophosphatemia  Moderate malnutrition in the context of chronic illness    Tobacco use  Post-herpetic neuralgia  Prior alcohol dependence in remission   Prior malignant large bowel obstruction s/p emergent exploratory laparotomy, small bowel resection, loop descending colostomy in March 2022  Unintentional weight loss         Procedures:   2/8/2023  PROCEDURE:  1. Exploratory laparotomy  2. Lysis of adhesions, 2 hours  3. Low anterior resection  4. Takedown of loop colostomy  5. Diverting loop ileostomy  6. Flexible sigmoidoscopy  7. Modifier 22; this case was significantly more challenging than usual due to the tumor size and surrounding desmoplastic reaction which required >3 hours total of additional dissection to resect and take down scar tissue.          Consultations:   OCCUPATIONAL THERAPY ADULT IP CONSULT  PHYSICAL THERAPY ADULT IP CONSULT  WOUND OSTOMY CONTINENCE NURSE  IP CONSULT  CARE MANAGEMENT / SOCIAL WORK IP CONSULT  NURSING TO CONSULT FOR  VASCULAR ACCESS CARE IP CONSULT  NURSING TO CONSULT FOR VASCULAR ACCESS CARE IP CONSULT         Imaging Studies:     Results for orders placed or performed during the hospital encounter of 11/27/22   CT Chest/Abdomen/Pelvis w Contrast    Narrative    EXAM: CT CHEST/ABDOMEN/PELVIS WITH CONTRAST  LOCATION: [facility]  DATE/TIME: 11/27/2022, 9:43 AM    INDICATION: Adenocarcinoma of sigmoid colon (H).  COMPARISON: 08/26/2022.  TECHNIQUE: CT scan of the chest, abdomen, and pelvis was performed following injection of IV contrast. Multiplanar reformats were obtained. Dose reduction techniques were used.   CONTRAST: 60 mL Isovue 370.    FINDINGS:   LUNGS AND PLEURA: Minimal scattered atelectasis and/or fibrosis. No infiltrates or effusions. Stable anterolateral right upper lobe nodule measuring 4 mm image 111 series 3. No pulmonary masses.    MEDIASTINUM/AXILLAE: Right hilar lymph adenopathy measuring 12 mm in axial short axis. Mildly prominent AP window nodes, unchanged. This is not significantly changed since comparison study. No aneurysm.    CORONARY ARTERY CALCIFICATION: Mild.    HEPATOBILIARY: No significant mass or bile duct dilatation. No calcified gallstones.     PANCREAS: No significant mass, duct dilatation, or inflammatory change.    SPLEEN: Normal size.    ADRENAL GLANDS: No significant nodules.    KIDNEYS/BLADDER: No significant mass, stones, or hydronephrosis. There are simple or benign cysts. No follow up is needed.    BOWEL: Bowel resection change and ileostomy. No obstruction. No recurrent or residual disease demonstrated.    LYMPH NODES: No definite abdominopelvic adenopathy.    VASCULATURE: 3 cm saccular infrarenal abdominal aortic aneurysm, unchanged from previous.    PELVIC ORGANS: Similar prostatomegaly.    MUSCULOSKELETAL: No frankly destructive bony lesions.      Impression    IMPRESSION:  1.  Similar minimally prominent adenopathy in the chest, I favor reactive  "adenopathy.  2.  No recurrent or residual malignancy demonstrated in the abdomen and pelvis.              Medications Prior to Admission:     Medications Prior to Admission   Medication Sig Dispense Refill Last Dose     mirtazapine (REMERON) 15 MG tablet Take 1 tablet (15 mg) by mouth At Bedtime 90 tablet 3 More than a month     omeprazole (PRILOSEC) 20 MG DR capsule Take 1 capsule (20 mg) by mouth 2 times daily 60 capsule 3 2/7/2023     heparin 100 UNIT/ML SOLN injection 5-10 mLs by Intracatheter route every 28 days        multivitamin (CENTRUM SILVER) tablet Take 1 tablet by mouth every morning 30 tablet  2/5/2023     Ostomy Supplies MISC 1 each Every Mon, Wed, Fri Morning Hollister1 piece flat fecal with filter #4395 20 pouches per month   2\" barrier ring #9315 (1 per pouch)   Adapt powder #7906   No sting film barrier # 3205   Adapt odor eliminator and lubricant 236ml bottle # 03300   M-9 Spray room deodorizer #5172     1 each 11      [DISCONTINUED] neomycin (MYCIFRADIN) 500 MG tablet Take 2 tablets (1,000 mg) by mouth every 6 hours At 8:00 am, 2:00 pm, 8:00 pm the day prior to your surgery with flagyl and zofran. 6 tablet 0 2/7/2023     [DISCONTINUED] ondansetron (ZOFRAN) 4 MG tablet Take 1 tablet (4 mg) by mouth every 6 hours At 8:00 am, 2:00 pm, 8:00 pm the day prior to your surgery with neomycin and flagyl. 3 tablet 0 More than a month     [DISCONTINUED] polyethylene glycol (MIRALAX) 17 g packet Take 119 g by mouth See Admin Instructions Starting at 8pm night prior to surgery. Refer to \"Getting Ready for Surgery\" instructions. 7 packet 0 2/7/2023     [DISCONTINUED] polyethylene glycol (MIRALAX) 17 g packet Take 238 g by mouth See Admin Instructions Starting at 4 pm night prior to surgery. Refer to \"Getting Ready for Surgery\" instructions. 14 packet 0 2/7/2023     [DISCONTINUED] polyethylene glycol (MIRALAX) 17 GM/Dose powder Take 17 g by mouth daily (Patient taking differently: Take 17 g by mouth every " morning) 510 g 0 2/7/2023     [DISCONTINUED] prochlorperazine (COMPAZINE) 10 MG tablet Take 1 tablet (10 mg) by mouth every 8 hours as needed for nausea or vomiting 30 tablet 2 More than a month     [DISCONTINUED] senna-docusate (SENOKOT-S/PERICOLACE) 8.6-50 MG tablet Take 1 tablet by mouth 2 times daily 60 tablet 3 2/7/2023            Discharge Medications:     Current Discharge Medication List      START taking these medications    Details   acetaminophen (TYLENOL) 500 MG tablet Take 2 tablets (1,000 mg) by mouth 4 times daily  Qty: 56 tablet, Refills: 0    Associated Diagnoses: Colostomy present (H)      enoxaparin ANTICOAGULANT (LOVENOX) 40 MG/0.4ML syringe Inject 0.4 mLs (40 mg) Subcutaneous every 24 hours for 24 days  Qty: 9.6 mL, Refills: 0    Comments: Patient likely to discharge after lovenox dose 2/14 in hospital and will need 24 more doses.  Associated Diagnoses: Cancer of rectosigmoid (colon) (H)      gabapentin (NEURONTIN) 100 MG capsule Take 1 capsule (100 mg) by mouth At Bedtime  Qty: 14 capsule, Refills: 0    Associated Diagnoses: Cancer of rectosigmoid (colon) (H)      lactated ringers infusion Inject 1,000 mLs into the vein in the morning, Mon and Thur for 6 doses  Qty: 6000 mL, Refills: 0    Comments: Lactated Ringer 1L bolus every Monday and Thursday to start on Thurs 2/16.  Associated Diagnoses: S/P ileostomy (H); High output ileostomy (H)      loperamide (IMODIUM) 2 MG capsule Take 1 capsule (2 mg) by mouth 3 times daily  Qty: 120 capsule, Refills: 0    Associated Diagnoses: S/P ileostomy (H); High output ileostomy (H)      oxyCODONE (ROXICODONE) 5 MG tablet Take 1-2 tablets (5-10 mg) by mouth every 6 hours as needed for moderate to severe pain  Qty: 20 tablet, Refills: 0    Associated Diagnoses: Acute post-operative pain; S/P ileostomy (H)      psyllium (METAMUCIL/KONSYL) Packet Take 1 packet by mouth 2 times daily  Qty: 60 packet, Refills: 0    Associated Diagnoses: S/P ileostomy (H); High  "output ileostomy (H)         CONTINUE these medications which have NOT CHANGED    Details   mirtazapine (REMERON) 15 MG tablet Take 1 tablet (15 mg) by mouth At Bedtime  Qty: 90 tablet, Refills: 3    Associated Diagnoses: Adenocarcinoma of sigmoid colon (H); Poor appetite      omeprazole (PRILOSEC) 20 MG DR capsule Take 1 capsule (20 mg) by mouth 2 times daily  Qty: 60 capsule, Refills: 3    Associated Diagnoses: Gastroesophageal reflux disease without esophagitis      heparin 100 UNIT/ML SOLN injection 5-10 mLs by Intracatheter route every 28 days      multivitamin (CENTRUM SILVER) tablet Take 1 tablet by mouth every morning  Qty: 30 tablet      Ostomy Supplies MISC 1 each Every Mon, Wed, Fri Morning Hollister1 piece flat fecal with filter #7243 20 pouches per month   2\" barrier ring #1950 (1 per pouch)   Adapt powder #7906   No sting film barrier # 9925   Adapt odor eliminator and lubricant 236ml bottle # 68673   M-9 Spray room deodorizer #3058      Qty: 1 each, Refills: 11    Associated Diagnoses: S/P colostomy (H)         STOP taking these medications       neomycin (MYCIFRADIN) 500 MG tablet Comments:   Reason for Stopping:         ondansetron (ZOFRAN) 4 MG tablet Comments:   Reason for Stopping:         polyethylene glycol (MIRALAX) 17 g packet Comments:   Reason for Stopping:         polyethylene glycol (MIRALAX) 17 g packet Comments:   Reason for Stopping:         polyethylene glycol (MIRALAX) 17 GM/Dose powder Comments:   Reason for Stopping:         prochlorperazine (COMPAZINE) 10 MG tablet Comments:   Reason for Stopping:         senna-docusate (SENOKOT-S/PERICOLACE) 8.6-50 MG tablet Comments:   Reason for Stopping:                     Brief History of Illness:   72 year old male, PMH former tobacco use, prior alcohol dependence in remission, found to have malignant large bowel obstruction in setting of unintentional weight loss.  Pt underwent emergent exploratory laparotomy,  Small bowel resection and " loop descending colostomy in March 2022 followed by 12 rounds of chemotherapy, MRI showed evidence of residual tumor.  Now s/p exploratory laparotomy, lysis of adhesions, 2 hours, low anterior resection, take down of loop colostomy, diverting loop ileostomy, flexible sigmoidoscopy on 2/8/2023.           Hospital Course:   Post-operatively pt was gently fluid resuscitated.  Castro was removed on POD# 2 due to low pelvic dissection. Pt had eventual return of bowel function and was able to tolerate small amounts of a low fiber diet. He had high ileostomy output and was started on twice daily metamucil along with imodium 2mg TID.  Due to borderline high ostomy outputs despite starting a bowel slowing regimen, pt should obtain twice weekly labs of BMP and Mg on Mon & Thurs, first draw on Thursday 2/16 and should continue for the first 2-3 weeks after discharge.  Pt should also receive Lactated Ringers 1L bolus every monday and thursday also for the first 2-3 weeks after discharge until re-evaluated in clinic to assess recovery and ileostomy outputs.  Goal is to have less than 1000mL of ileostomy output in a 24 hour period.  Pt may require up-titration of imodium, but will need more time for recovery and decreased post-operative bloating.  Therefore will support with labs and home IV fluids.      Pt was seen by [facility] and taught how to manage his new ostomy. Pt was seen by PT/OT and deemed appropriate for discharge home.  Pt was taught how to self-inject post-operative prophylactic lovenox for which he is to do for a total of 30 days. Post-operative pain was controlled with scheduled tylenol, gabapentin, robaxin and prn oxycodone.    Patient is to follow up in the Colon and Rectal Surgery Clinic in 2-3 week with Karla Birmingham NP or Shea Sanchez PA-C and then with Dr. Magdaleno in 2-3 weeks after.          Day of Discharge Physical Exam:   Blood pressure 130/77, pulse 91, temperature 98.3  F (36.8  C), temperature  "source Temporal, resp. rate 16, height 1.803 m (5' 11\"), weight 66.4 kg (146 lb 4.8 oz), SpO2 96 %.    Gen: AAOx3, NAD  Pulm: Non-labored breathing  Abd: Soft, mildly distended, appropriately tender, no guarding/rebound   Incision C/D/I with staples   Stoma pink and viable with thick applesauce like stool in bag  Ext:  Warm and well-perfused         Final Pathology Result:   Pending at time of discharge           Discharge Instructions and Follow-Up:     Discharge Procedure Orders   Basic metabolic panel   Standing Status: Future Standing Exp. Date: 02/14/24   Order Comments: Lab work:  BMP and Magnesium levels checked twice weekly for high ileostomy outputs.  Monday and Thursdays for the next 2-3 weeks.    First draw should be on Thurs 2/16.    Results should be sent to Colon and Rectal Surgery clinic, Attn. Dr. Magdaleno/Droy JAMES RN/Rachel MINER RN at Fax: [phone].     Magnesium   Standing Status: Future Standing Exp. Date: 02/14/24   Order Comments: Lab work:  BMP and Magnesium levels checked twice weekly for high ileostomy outputs.  Monday and Thursdays for the next 2-3 weeks.    First draw should be on Thurs 2/16.    Results should be sent to Colon and Rectal Surgery clinic, Attn. Dr. Magdaleno/Dory JAMES RN/Rachel MINER RN at Fax: [phone].     Home Infusion Referral   Referral Priority: Routine: Next available opening Referral Type: Consultation   Number of Visits Requested: 1     Home Care Referral   Referral Priority: Routine: Next available opening Referral Type: Home Health Therapies & Aides   Number of Visits Requested: 1     Primary Care - Care Coordination Referral   Standing Status: Future   Referral Priority: Routine: Next available opening Referral Type: Care Coordination   Number of Visits Requested: 1     Reason for your hospital stay   Order Comments: 2/8 Exploratory laparotomy, Lysis of adhesions, 2 hours, Low anterior resection, Takedown of loop colostomy, Diverting loop ileostomy, Flexible " sigmoidoscopy, Modifier 22; this case was significantly more challenging than usual due to the tumor size and surrounding desmoplastic reaction which required >3 hours total of additional dissection to resect and take down scar tissue.     Activity   Order Comments: ACTIVITY  -No lifting, pushing, pulling greater than 10 lbs and no strenuous exercise for 6 weeks   -No driving while on narcotic analgesics (i.e. Percocet, oxycodone, Vicodin)  -No driving until you are able to fully twist to both sides or slam on brakes quickly and without any pain  -We encourage walking at least 4-5 times per day     Order Specific Question Answer Comments   Is discharge order? Yes      Adult [facility] Follow-up and recommended labs and tests   Order Comments: WOUND CARE  -Inspect your wounds daily for signs of infection (increased redness, drainage, pain)  -Keep your wound clean and dry  -You may shower, but do not soak in tub or pool  -If you have staples, they can be removed in 7-10 days by your PCP or in Colorectal Clinic    NOTIFY  Please contact Rachel Hunt RN or Dory Bynum RN at [phone] for problems after discharge such as:  -Temperature > 101F, chills, rigors, dizziness  -Redness around or purulent drainage from wound  -Inability to tolerate diet, nausea or vomiting  -You stop passing gas (or your stoma stops functioning), develop significant bloating, abdominal pain  -You have dark and low volume urine  -Have blood in stools/vomit  -Have severe diarrhea/constipation  -Any other questions or concerns.  - At nights (after 4:30pm), on weekends, or if urgent, call [phone] and ask the  to speak with the on-call Colorectal Surgery resident or fellow    -Measure your total daily ileostomy output and record.  If you have LESS than 500mL or GREATER than 1000mL of ileostomy output within a 24 hour period, please notify the Colorectal Nurse.  If you have greater than 1000-1200mL in a 24 hour period or greater than  500cc for three consecutive 8 hour shifts, take Imodium 2mg once, stay hydrated especially with Pedialyte and call the Colorectal Clinic to further discuss.      Medication Instructions  Some of your medications may have changed. Please take only prescribed and resumed medications     FOLLOW-UP  1.  You will need to follow-up with Karla Birmingham NP or Shea Sanchez PA-C in the Colon and Rectal Surgery clinic in 2-3 week(s) and then with CRS Staff: Dr. Riley Magdaleno in 4-5 weeks after.  Please contact our Nurses (phone # [phone]) if you have not heard from our clinic in 3 business days afer discharge to schedule a follow-up appointment.  Please bring your log of daily ileostomy outputs to your follow up clinic appointment.   2.  For high ileostomy outputs, recommend twice weekly labs of BMP and Mg every Monday and Thursday, starting Thurs 2/16 for 2-3 weeks after discharge.  Then recommend Lactated Ringer 1L bolus following blood work on Monday and Thursday, starting Thurs 2/16 for a duration of 2-3 weeks after discharge pending recovery, ostomy outputs, ostomy consistency, lab trends, appetite.    3.  OK to remove staples in one week after discharge.  Can do this closer to home or call the Colon and Rectal Surgery Clinic and we can help coordinate staple removal.       ORAL REHYDRATION RECIPE for Home Use  With your new ileostomy, you are at increased risk for dehydration,  Especially if you have high ileostomy outputs, or low appetites, you can help prevent it by drinking this mixture as directed.  Some examples of commercially prepared Oral Rehydration Solutions are: Pedialyte (solution, powder packet, Freeze Pops), or Drip Drop.     Electrolyte Water   -4 cups of water (equal to 1 quart or 32 ounces)   -  teaspoon salt   -6 teaspoons sugar   -Crystal Light if desired (or other choice Nutrasweet or Splenda flavoring - SUGAR-FREE) to taste (recommend lemonade or orange-pineapple flavors, but any  "flavor will work)    Add two cups of tap water to a large container. With measuring spoons (not table silverware), add the dry ingredients and stir or shake well. Add two additional cups of water. This will equal one quart of Electrolyte Water. Add sugar-free flavoring if desired. Best if chilled. Sip solution throughout the day. Discard after 24 hours.     Appointments on [place] and/or [facility] (with [facility] or [facility] provider or service). Call [phone] if you haven't heard regarding these appointments within 7 days of discharge.     Miscellaneous DME Order   Order Comments: Your Medical Supplier will need your surgeon's name, phone and fax number     Clinic:                                                         Phone                                    Fax  Colorectal Surgery:                               [phone]                                                                                Authorizing MD: Dr. Magdaleno     Quantity of pouches:  20 /mo.     Request the following supplies:      Antoinette          1 piece flat fecal # 8931,    or                            2 piece flat wafer 70 mm # 17229                &                            Fecal pouch 70mm- #35047               or                            High output pouch 70mm- #  10508                         Accessories     2\" barrier ring #7015                             Adapt powder #7906                            No sting film barrier # 3345                          Antoinette barrier extenders #95155                                                           Change your pouch three times a week, more often if leaking.    DME Documentation:   Describe the reason for need to support medical necessity: s/p diverting loop ileostomy.     I, the undersigned, certify that the above prescribed supplies are medically necessary for this patient and is both reasonable and necessary in reference to " accepted standards of medical and necessary in reference to accepted standards of medical practice in the treatment of this patient's condition and is not prescribed as a convenience.     Order Specific Question Answer Comments   DME Provider: [facility]    Start Date: 2/14/2023    DME Item Needed: ileostomy supplies    Length of Need: 12 months      Diet   Order Comments: DIET  -Low Residue Diet for at least 4-6 weeks unless cleared by Colorectal surgery.  No raw vegetables, fruit skins, fibrous foods that require a lot of chewing, nuts, seeds, corn, popcorn.   -We recommend eating slowly, chewing thoroughly, eating small frequent meals throughout the day  -Stay well hydrated.    -Follow the food recommendations given by the Nutritionist and the Wound Ostomy Nurse.     Order Specific Question Answer Comments   Is discharge order? Yes             Home Health Care:     Arranged           Discharge Disposition:     Discharged to home      Condition at discharge: Stable      Doris Mccloud PA-C ..................2/14/2023   9:20 AM  Colon and Rectal Surgery    Patient was seen and discussed with Dr. Rosales    The above plan of care was performed and communicated to me by Dr. Magdaleno    I spent 45 minute face-to-face or coordinating care of Kalin Shepard. Over 50% of our time on the unit was spent counseling the patient and/or coordinating care as documented in the assessment and plan.     [id number] post op hospital visit

## 2023-02-13 NOTE — PLAN OF CARE
Goal Outcome Evaluation:    VS: VSS    Neuro: A&x4 and can make his needs nkown    Cardiac: WNL                 Respiratory: Lungs sounds clear and breathing without any complications    GI/: voiding adequately through the urinal without any problem. Ileostomy is putting out enough liquid stool/     Diet/appetite: Low fiber diet    Activity: Independent in room    Pain: .is manage with Oxy.    Skin/drains: site of the ileostomy is in tight.    Lines:none

## 2023-02-13 NOTE — PROGRESS NOTES
[facility] Nurse Inpatient Assessment     Consulted for: loop ileostomy    Patient History (according to provider note(s):      This is a 72 year old male PMH 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  Now s/p Ex Lap,  JURGEN (2 hrs), LAR, loop colostomy take down, new diverting loop ileostomy, flex sig on 2/8/2023.    Areas Assessed:      Areas visualized during today's visit: Abdomen    Assessment of new loop Ileostomy:  Diagnosis Pertinent to Stoma: Cancer - Colon or Rectum      Surgery Date: 2/8  Surgeon:Jaisiva       [facility]: [facility]  Pouching system in place on assessment today: [place] two piece, cut to fit, flat, barrier ring and HOP pouch    Pouch barrier status: Minimal melting  Pouch last changed/wear time: 4 days   Reason for pouch change today: ostomy education and routine schedule  Effectiveness of current pouching/ supply plan: Attempting new system, will re-evaluate next assessment  Change made with ostomy management today: No  Pouching system placed today: [place] two piece, cut to fit, flat, barrier ring and HOP pouch    Supplies: at bedside     Last photo: 2/13  Stoma location: [facility]  Stoma size: 1 3/4 x 1 1/2 inches  Stoma appearance: viable, healthy, normal-appearing, oval, moist, good turgor, edematous and protruberant  Mucocutaneous junction:  intact  Peristomal complication(s): none   Output: bowel sweat   Output volume emptied during visit: 150 ml  Abdominal assessment: Distended  Surgical site(s): open to air  NG still in place? No  Pain: Cramping and Fullness  Is patient still on a PCA? Yes    Ostomy education assessment:  Participant of teaching session today: patient   Education completed today: Refitting of appliance , Pouch change return demonstration, Ostomy accessory product use , Peristomal skin care and Pouch emptying return demonstration  Educational materials/methods: Verbal and Left  "packet of information at bedside   Education still needed: Pouch change return demonstration  Learning Comprehension:   Psychosocial assessment: had previous loop colostomy   Patient readiness for education today: attentive  Following today's visit: patient  is able to demonstrate;         1. How to empty their pouch? Yes        2. How to change their pouch? Yes, PTA on previous pouch         3. How to read and record intake and output correctly? Yes  Preparation for discharge completed: Placed prescription recommendations in discharge navigator for MD to sign, Ensured patient has extra supplies for discharge, Discussed how to order supplies after discharge , Ordered samples from  after gaining consent from patient/caregiver, Discussed how and when to make an outpatient WO nurse appointment after discharge, Prepared for discharge home with home care and Discuss signs/symptoms of when to seek medical attention  Preparation for discharge still needed: complete   Pt support system on discharge: family   WO recommend home care? Yes and By WOC RN if possible  Face to face time: 35 minutes     Treatment Plan:     LLQ Ileostomy pouching plan:   Pouching system: ostomy supplies pouches: Antoinette 70 FECAL ([id number]) ostomy supplies barrier: Antoinette 70mm FLAT (314338)  Accessories used: [facility] ostomy accessories: 2\" Adapt Barrier Ring ([id number])   Frequency of pouch changes: Three times a week  WO follow up plan: Three times a week  Bedside RN interventions: Change pouch PRN if leaking using the supplies above, Empty pouch when 1/3 to 1/2 full, ensure to clean pouch outlet after emptying to prevent odor, Notify WOC for ongoing pouch leakage, Document stoma appearance and output volume, color, and consistency every shift, Encourage patient to empty pouch independently and Assist patient to measure and record output    Orders: Reviewed    RECOMMEND PRIMARY TEAM ORDER: None, at this time  Education provided: plan of " care  Discussed plan of care with: Patient and Nurse  [facility] nurse follow-up plan: Wednesday  Notify WO if wound(s) deteriorate.  Nursing to notify the Provider(s) and re-consult the [facility] Nurse if new skin concern.    DATA:     Current support surface: Standard  Standard gel/foam mattress (IsoFlex, Atmos air, etc)  Containment of urine/stool: Incontinent pad in bed, Indwelling catheter and Ileostomy pouch  BMI: Body mass index is 20.4 kg/m .   Active diet order: Orders Placed This Encounter      Low Fiber Diet     Output: I/O last 3 completed shifts:  In: 1799 [P.O.:1170; I.V.:329; IV Piggyback:300]  Out: 3425 [Urine:1975; Stool:1450]     Labs:   Recent Labs   Lab 02/09/23  0723   HGB 12.4*   WBC 10.4     Pressure injury risk assessment:   Sensory Perception: 3-->slightly limited  Moisture: 4-->rarely moist  Activity: 3-->walks occasionally  Mobility: 3-->slightly limited  Nutrition: 3-->adequate  Friction and Shear: 3-->no apparent problem  Wyatt Score: 19    Rachel Eagle RN CWOCN   Pager no longer is use, please contact through [employer] group: [facility] Nurse  Dept. Office Number: [phone]      [facility]     Name: Kalin Shepadr   Date: 2/10/2023    To order your ostomy supplies    The ostomy Supplier needs this supply list  to process your order. You will need to fax/deliver this list, along with your Insurance information. Your home care nurse can assist with this process.    List of Ostomy Distributors      [facility]  Ph. [phone] Fax # [phone]  [facility]   Ph. [phone]  [employer] [facility]   Ph. [phone]  [facility]   Ph. [phone]  Or Call your insurance provider for their preferred supplier    Your Medical Supplier will need your surgeon's name, phone and fax number    Clinic:                     Phone                            Fax  Colorectal Surgery:     [phone]   [phone]    Verbal Order for ostomy supplies for 1 Month per:                                          Rachel May, RN, CWOCN                                                          Authorizing MD: Dr. Magdaleno    Quantity of pouches:  20   /mo.    Request the following supplies:      [place]    1 piece flat fecal # 8931,    or    2 piece flat wafer 70 mm # 99774    &    Fecal pouch 70mm- #78265   or    High output pouch 70mm- #  33084             Accessories  2  barrier ring #7805     Adapt powder #7906    No sting film barrier # 3345                          Antoinette barrier extenders #73494                                   Change your pouch three times a week, more often if leaking.    If you are cutting a hole in the wafer of your pouch, recheck stoma size and adjust pouch opening as needed every week    . Call the Ostomy Nurse at [facility]       [address] [place] [address] [place] : [phone]   Schedule a follow-up visit in 2 to 4 weeks after your surgery, sooner if having problems Bring a complete set of pouch-changing supplies to this visit    [facility] outpatient [facility] department  [facility] [place] [address]   number- [phone]      Problems you should Report  - The stoma turns blue or darker in color.  - Cuts or sores around the stoma.  - Red, raw or painful skin around the stoma.  - Any bulging of the skin around the stoma.  - A pouch that leaks every day.  - Problems making the right size hole in the pouch wafer.    Please call with any questions or concerns.

## 2023-02-13 NOTE — PROGRESS NOTES
Goal outcome evaluation:    Time: 6196-3251  Plan of care reviewed with: Patient  Overall patient progress: No change   Activity: SBA.  Neuros: A&O x4. Calls appropriately. Able to make needs known. Clear and appropriate speech. Follows instructions.   Cardiac: WDL. Denies chest pain  Respiratory: WDL on RA. Denies SOB  GI/: Voiding spontaneously. Ileostomy with adequate output that is replaced 1 mL: 0.5 mL of LR bolus   Diet: Low fiber diet   Skin/Incisions: Abdominal incision staples, IRENE  Lines/Drains: ileostomy, jo, PIV infusing LR @ 30 mL/hr. Port SL  Labs: Reviewed  Pain/Nausea: incisional pain-rating 6-8/10, given PRN  Oxycodone 10mg and Tylenol--effective per pt.  Plan: Continue POC

## 2023-02-14 VITALS
TEMPERATURE: 98.5 F | OXYGEN SATURATION: 94 % | DIASTOLIC BLOOD PRESSURE: 70 MMHG | HEART RATE: 74 BPM | WEIGHT: 146.3 LBS | BODY MASS INDEX: 20.48 KG/M2 | SYSTOLIC BLOOD PRESSURE: 131 MMHG | RESPIRATION RATE: 18 BRPM | HEIGHT: 71 IN

## 2023-02-14 PROBLEM — R19.8 HIGH OUTPUT ILEOSTOMY (H): Status: ACTIVE | Noted: 2023-02-14

## 2023-02-14 PROBLEM — Z93.2 HIGH OUTPUT ILEOSTOMY (H): Status: ACTIVE | Noted: 2023-02-14

## 2023-02-14 LAB
HOLD SPECIMEN: NORMAL
PHOSPHATE SERPL-MCNC: 3.6 MG/DL (ref 2.5–4.5)

## 2023-02-14 PROCEDURE — 250N000013 HC RX MED GY IP 250 OP 250 PS 637: Performed by: SURGERY

## 2023-02-14 PROCEDURE — 250N000013 HC RX MED GY IP 250 OP 250 PS 637: Performed by: COLON & RECTAL SURGERY

## 2023-02-14 PROCEDURE — 250N000011 HC RX IP 250 OP 636: Performed by: SURGERY

## 2023-02-14 PROCEDURE — 36591 DRAW BLOOD OFF VENOUS DEVICE: CPT | Performed by: SURGERY

## 2023-02-14 PROCEDURE — 84100 ASSAY OF PHOSPHORUS: CPT | Performed by: SURGERY

## 2023-02-14 PROCEDURE — 250N000013 HC RX MED GY IP 250 OP 250 PS 637: Performed by: PHYSICIAN ASSISTANT

## 2023-02-14 PROCEDURE — 250N000013 HC RX MED GY IP 250 OP 250 PS 637: Performed by: STUDENT IN AN ORGANIZED HEALTH CARE EDUCATION/TRAINING PROGRAM

## 2023-02-14 PROCEDURE — 250N000013 HC RX MED GY IP 250 OP 250 PS 637

## 2023-02-14 RX ORDER — SODIUM CHLORIDE, SODIUM LACTATE, POTASSIUM CHLORIDE, CALCIUM CHLORIDE 600; 310; 30; 20 MG/100ML; MG/100ML; MG/100ML; MG/100ML
1000 INJECTION, SOLUTION INTRAVENOUS
Qty: 6000 ML | Refills: 0 | Status: SHIPPED | OUTPATIENT
Start: 2023-02-16 | End: 2023-03-07

## 2023-02-14 RX ORDER — OXYCODONE HYDROCHLORIDE 5 MG/1
5-10 TABLET ORAL EVERY 6 HOURS PRN
Qty: 20 TABLET | Refills: 0 | Status: SHIPPED | OUTPATIENT
Start: 2023-02-14 | End: 2023-02-21

## 2023-02-14 RX ORDER — HEPARIN SODIUM (PORCINE) LOCK FLUSH IV SOLN 100 UNIT/ML 100 UNIT/ML
5 SOLUTION INTRAVENOUS
Status: CANCELLED | OUTPATIENT
Start: 2023-02-16

## 2023-02-14 RX ORDER — LOPERAMIDE HCL 2 MG
2 CAPSULE ORAL 3 TIMES DAILY
Qty: 120 CAPSULE | Refills: 0 | Status: SHIPPED | OUTPATIENT
Start: 2023-02-14 | End: 2023-04-05

## 2023-02-14 RX ORDER — HEPARIN SODIUM,PORCINE 10 UNIT/ML
5 VIAL (ML) INTRAVENOUS
Status: CANCELLED | OUTPATIENT
Start: 2023-02-16

## 2023-02-14 RX ADMIN — TAMSULOSIN HYDROCHLORIDE 0.4 MG: 0.4 CAPSULE ORAL at 09:08

## 2023-02-14 RX ADMIN — Medication 40 MG: at 08:25

## 2023-02-14 RX ADMIN — PSYLLIUM HUSK 1 PACKET: 3.4 POWDER ORAL at 09:15

## 2023-02-14 RX ADMIN — Medication 5 ML: at 07:15

## 2023-02-14 RX ADMIN — ACETAMINOPHEN 1000 MG: 500 TABLET ORAL at 09:08

## 2023-02-14 RX ADMIN — ENOXAPARIN SODIUM 40 MG: 40 INJECTION SUBCUTANEOUS at 09:11

## 2023-02-14 RX ADMIN — OXYCODONE HYDROCHLORIDE 10 MG: 5 TABLET ORAL at 14:14

## 2023-02-14 RX ADMIN — Medication 5 ML: at 13:04

## 2023-02-14 RX ADMIN — LOPERAMIDE HYDROCHLORIDE 2 MG: 2 CAPSULE ORAL at 14:15

## 2023-02-14 RX ADMIN — OXYCODONE HYDROCHLORIDE 10 MG: 5 TABLET ORAL at 06:22

## 2023-02-14 RX ADMIN — LOPERAMIDE HYDROCHLORIDE 2 MG: 2 CAPSULE ORAL at 09:09

## 2023-02-14 RX ADMIN — ACETAMINOPHEN 1000 MG: 500 TABLET ORAL at 14:14

## 2023-02-14 RX ADMIN — ALBUTEROL SULFATE 2 PUFF: 90 AEROSOL, METERED RESPIRATORY (INHALATION) at 08:28

## 2023-02-14 ASSESSMENT — ACTIVITIES OF DAILY LIVING (ADL)
ADLS_ACUITY_SCORE: 20

## 2023-02-14 NOTE — PROGRESS NOTES
"Blood pressure 126/72, pulse 66, temperature 98.8  F (37.1  C), temperature source Temporal, resp. rate 16, height 1.803 m (5' 11\"), weight 66.4 kg (146 lb 4.8 oz), SpO2 97 %.    Goal Outcome Evaluation:     Plan of Care Reviewed With: patient   Overall Patient Progress: No change     [id number]   Status: S/p Ex Lap, JURGEN, LAR, loop colostomy takedown, new diverting loop ileostomy, and flex sigmoidoscopy on 2/8/23  Neuros: A&O x4, CMS intact  Cardiac: WNL   Respiratory: WNL on RA   GI/: Voiding spontaneously in urinal. Ostomy w/ 500 mL brown output during shift. No replacement given   Diet/Nausea: Low Fiber diet; no nausea reported   Skin: No new skin deficits noted   LDA: L chest port infusing LR @ 30 mL/hr   Labs: Reviewed   Pain: Denies   Activity: Up in room SBA   Plan: Possible discharge today pending ostomy output       "

## 2023-02-14 NOTE — PROGRESS NOTES
Care Management Discharge Note    Discharge Date: 02/14/2023  Discharge Disposition: Home  Discharge Services:  Home care, Home Infusion  Discharge DME:  IV fluids  Discharge Transportation: family or friend will provide  Patient/family educated on Medicare website which has current facility and service quality ratings:  yes  Education Provided on the Discharge Plan:  yes  Persons Notified of Discharge Plans: yes  Patient/Family in Agreement with the Plan: yes    Handoff Referral Completed: Yes    Additional Information:  RNCC was notified by primary team that they are hoping for pt to have IV fluids and blood draws twice weekly for 3 weeks on Mondays and Thursdays starting this Thursday 2/16/23. RNCC reached out to [place], [facility], to confirm if they can accommodate that. Cuhla reached out to her branch and confirmed that they can assist with those needs.     RNCC sent referral to [facility] to determine eligibility for home infusions. [facility] stated that pt does not have coverage for IV fluids in the home but would have coverage in an infusion center. If pt would like home infusions, then it would roughly cost $35-$40/infusion. RNCC reached out to [facility] and [facility] and was notified they have very limited availability for appointments. Pt would have to travel to at least 3 different clinics if he wished to get infusions in clinic.     RNCC met with pt at bedside to discuss his different options. Pt stated he would rather have home infusions and is willing to self pay. RNCC passed along decision to [facility] and [facility]. RNCC added home care and home infusion orders. Primary team working on discharge orders.    Jj Timmons RN BSN  7C RN Care Coordinator   Ph: [phone]  Pager: [phone]

## 2023-02-14 NOTE — PLAN OF CARE
[id number]:    Neuro: A/Ox4.  Cardiac: VSS.   Respiratory: O2 sats stable on RA  GI/: Voiding in urinal, self empties ostomy bag  Diet/appetite: Low fiber diet  Activity:  UAL  Pain: At acceptable level on current regimen.   Skin: Midline abdomen inc with staples  LDA's: Port HL'd, PIV @30cc/hr    Plan: Possible discharge. Continue with POC. Notify primary team with changes.

## 2023-02-14 NOTE — PLAN OF CARE
Goal Outcome Evaluation: POD6 of an JURGEN, LAR, Loop colostomy takedown, New DLI, flex sig.     A&Ox4, VSS on room air. Pain is well controlled with PO tylenol and oxycodone. Up independent, voiding adequately, Ileostomy output is not replaceable in volume, output is applesauce consistency. Port de-accessed. Discharging to home with home care.

## 2023-02-15 ENCOUNTER — MEDICAL CORRESPONDENCE (OUTPATIENT)
Dept: HEALTH INFORMATION MANAGEMENT | Facility: CLINIC | Age: 73
End: 2023-02-15

## 2023-02-16 ENCOUNTER — PATIENT OUTREACH (OUTPATIENT)
Dept: SURGERY | Facility: CLINIC | Age: 73
End: 2023-02-16
Payer: COMMERCIAL

## 2023-02-16 ENCOUNTER — MYC MEDICAL ADVICE (OUTPATIENT)
Dept: SURGERY | Facility: CLINIC | Age: 73
End: 2023-02-16
Payer: COMMERCIAL

## 2023-02-16 DIAGNOSIS — Z93.3 S/P COLOSTOMY (H): Primary | ICD-10-CM

## 2023-02-16 LAB
PATH REPORT.COMMENTS IMP SPEC: NORMAL
PATH REPORT.COMMENTS IMP SPEC: NORMAL
PATH REPORT.FINAL DX SPEC: NORMAL
PATH REPORT.GROSS SPEC: NORMAL
PATH REPORT.MICROSCOPIC SPEC OTHER STN: NORMAL
PATH REPORT.RELEVANT HX SPEC: NORMAL
PATHOLOGY SYNOPTIC REPORT: NORMAL
PHOTO IMAGE: NORMAL

## 2023-02-16 PROCEDURE — 83735 ASSAY OF MAGNESIUM: CPT | Performed by: SURGERY

## 2023-02-16 PROCEDURE — 80048 BASIC METABOLIC PNL TOTAL CA: CPT | Performed by: SURGERY

## 2023-02-16 RX ORDER — OXYCODONE HYDROCHLORIDE 5 MG/1
5 TABLET ORAL EVERY 6 HOURS PRN
Qty: 20 TABLET | Refills: 0 | Status: SHIPPED | OUTPATIENT
Start: 2023-02-16 | End: 2023-02-19

## 2023-02-16 NOTE — CONFIDENTIAL NOTE
Post Op Note     Called to check on patient postoperatively after hospital discharge.       Patient is s/p ex Lap, JURGEN, LAR, loop colostomy takedown, new diverting loop ileostomy, and flex sigmoidoscopy with Dr. Riley Magdaleno.   Admitted 2/8 and discharged on 2/14.      Pain is well controlled with oxy, gabapentin, and tylenol. Pain has been 4/10 after the oxycodone. He IS taking Tylenol 1000mg three times daily. Oxycodone 10mg three times daily.     Patient is eating and drinking normally. Patient is on a low fiber diet.  Encouraged patient to drink 8-10 glasses of water a day.     Patient has been recording daily ileostomy outputs. Patient has recorded outputs these were; 1L 2/15, 800 on 2/14 Advised patient to contact the clinic if outputs are more than 1000 mL daily for two consecutive days or more than 2000 mL in one day or less than 500 mL for two consecutive days.    Patient reports taking medication for bowel function. Fiber twice daily, thicker output. Imodium twice daily - Recommended Imodium 3 times a day    Patient denies pouching difficulties    If any pouching difficulties reported, please contact [facility] nurse  to schedule appointment.     Patient does not require supplies.    Patient is voiding normally and urine is light in color.    Patient is set up with home care. Patient reports being contacted by the home care service. Patient has been seen by someone from home care.     Patient's pathology was not reviewed with them.     Patient Denies nausea and vomiting.    Patient Denies any fevers or chills.    Patient's incision is CDI. Patient reminded NOT to remove any dressings over their incisions.     Patient is on a activity restriction. Lifting 10 pounds for 6 weeks.     Patient Denies needing any forms completed.     Follow up is set up with Karla Pompa NP on 2/21 and with Dr. Riley Magdaleno on 3/16.  Encouraged the patient to contact the clinic in the meantime with any fevers,  chills, nausea, vomiting, increased colostomy output, no colostomy output, dizziness, lightheadedness, uncontrolled pain, changes to the incisions, or with any questions or concerns.    Patient's questions were answered to their stated satisfaction and they are in agreement with this plan.    LILLIAN Connors [phone]  [facility]  [facility]          Date of discharge:  2/14/23.  S/p  on 2/8/23.     Discharged to:  Home   Home Cares:  Arranged     He has high ileostomy output and is being discharged on imodium 2mg TID and metamucil BID. He will get BMP/mag drawn every Monday and Thursday for 2-3 weeks along with 1L LR boluses every Monday and Thursday (start 2/16). Has staples in place that can be removed in 1 week either locally or with RN in our clinic.

## 2023-02-16 NOTE — CONFIDENTIAL NOTE
Sent upcoming appointment reminder via [employer].     Appt date/time: 2/21/2023 at 10:30 am  Provider: DAVID Arthur  Appt type: Post-op    Kalpana England CMA

## 2023-02-20 PROCEDURE — 83735 ASSAY OF MAGNESIUM: CPT | Performed by: SURGERY

## 2023-02-20 PROCEDURE — 80048 BASIC METABOLIC PNL TOTAL CA: CPT | Performed by: SURGERY

## 2023-02-21 ENCOUNTER — OFFICE VISIT (OUTPATIENT)
Dept: SURGERY | Facility: CLINIC | Age: 73
End: 2023-02-21
Payer: COMMERCIAL

## 2023-02-21 VITALS
BODY MASS INDEX: 19.95 KG/M2 | WEIGHT: 142.5 LBS | DIASTOLIC BLOOD PRESSURE: 80 MMHG | SYSTOLIC BLOOD PRESSURE: 130 MMHG | OXYGEN SATURATION: 99 % | HEART RATE: 74 BPM | HEIGHT: 71 IN

## 2023-02-21 DIAGNOSIS — G89.18 ACUTE POST-OPERATIVE PAIN: ICD-10-CM

## 2023-02-21 DIAGNOSIS — Z93.2 S/P ILEOSTOMY (H): ICD-10-CM

## 2023-02-21 DIAGNOSIS — Z09 FOLLOW-UP EXAMINATION AFTER COLORECTAL SURGERY: Primary | ICD-10-CM

## 2023-02-21 DIAGNOSIS — C18.7 ADENOCARCINOMA OF SIGMOID COLON (H): ICD-10-CM

## 2023-02-21 PROCEDURE — 99024 POSTOP FOLLOW-UP VISIT: CPT | Performed by: NURSE PRACTITIONER

## 2023-02-21 RX ORDER — OXYCODONE HYDROCHLORIDE 5 MG/1
5-10 TABLET ORAL EVERY 6 HOURS PRN
Qty: 20 TABLET | Refills: 0 | Status: SHIPPED | OUTPATIENT
Start: 2023-02-21 | End: 2023-03-13

## 2023-02-21 ASSESSMENT — PAIN SCALES - GENERAL: PAINLEVEL: SEVERE PAIN (6)

## 2023-02-21 NOTE — LETTER
2023       RE: Kalin Shepard  [address]  [facility] [address]     Dear Colleague,    Thank you for referring your patient, Kalin Shepard, to the [facility] COLON AND RECTAL SURGERY CLINIC [place] [facility]. Please see a copy of my visit note below.    Colon and Rectal Surgery Postoperative Clinic Note    RE: Kalin Shepard  : 1950  REGGIE: 2023    Kalin Shepard is a very pleasant 72 year old male with rectosigmoid cancer who underwent emergent exploratory laparotomy, small bowel resection and loop descending colostomy in 2022 followed by 12 rounds of chemotherapy, MRI showed evidence of residual tumor.  Now s/p exploratory laparotomy, lysis of adhesions, 2 hours, low anterior resection, take down of loop colostomy, diverting loop ileostomy, flexible sigmoidoscopy on 2023 with Dr. Magdaleno on 23.    Final Diagnosis   A. SIGMOID AND RECTUM, RESECTION:  -Adenocarcinoma, moderate to poorly differentiated, invading through the wall to involve the adjacent adherent perirectal fibroadipose tissue  -Tumor size: 3 cm in greatest dimension  -Resection margins free of involvement  -No evidence of lymphovascular or perineural invasion  -Treatment effect: Poor response (score 3)  -No tumor deposits identified  -Thirty-three benign lymph nodes (0/33)  -Sections of vas deferens with no evidence of involvement by adenocarcinoma.  -Please see tumor synoptic for details     B. RECTAL STUMP:  -Segment of rectal wall with no evidence of malignancy     C.  COLON, PROXIMAL ANASTAMOTIC RING:  -Segment of colonic wall with no evidence of malignancy     D.  COLON, DISTAL ANASTAMOTIC RING :  -Segment of colonic wall with no evidence of malignancy     E. LOOP COLOSTOMY:  -Segment of colonic wall with no evidence of malignancy     F. OMENTUM, OMENTECTOMY:  -Fibroadipose tissue with no evidence of malignancy     Interval  "history: Having 5-6/10 pain. Is taking oxycodone three times a day. Not taking tylenol. Taking Neurontin at night.   Taking one imodium three times a day and Metamucil twice a day. Ostomy outputs are under 500. Is still getting IVF twice a week and feels like he gets dehydrated between. No pouching issues. Eating and drinking without nausea. Having some drainage from his rectum.    Physical Examination: Exam was chaperoned by Almas Stanley, EMT-P   /80 (BP Location: Left arm, Patient Position: Sitting, Cuff Size: Adult Regular)   Pulse 74   Ht 5' 11\"   Wt 142 lb 8 oz   SpO2 99%   BMI 19.87 kg/m    General: alert, oriented, in no acute distress, sitting comfortably  HEENT: mucous membranes moist  Abdomen: soft, non distended, non tender on palpation; midline incision well approximated with staples in place and no erythema or drainage. Staples removed and steri strips placed. Stoma well everted with tthick stool in bag and pouch with good seal.    Assessment/Plan:  72 year old male s/p exploratory laparotomy, lysis of adhesions, 2 hours, low anterior resection, take down of loop colostomy, diverting loop ileostomy, flexible sigmoidoscopy on 2/8/2023 with Dr. Magdaleno on 2/8/23. He is recovering well. Stoma functioning well and stools have gotten much thicker. He feels he still needs a few more IVF infusions but could try to get off these soon since he is eating and drinking well. Continue with low residue diet for another 3-4 weeks. No lifting more than 10 pounds for a full 6 weeks from surgery. Recommended taking scheduled tylenol and can use oxycodone as needed but continue to try to wean down on this. Refill provided. He is scheduled on 3/16 with Dr. Magdaleno. Encouraged him to contact the clinic in the meantime with any questions or concerns. Patient's questions were answered to his stated satisfaction and he is in agreement with this plan.     Medical history:  Past Medical History:   Diagnosis Date "     Cancer (H)        Surgical history:  Past Surgical History:   Procedure Laterality Date     COLECTOMY LEFT N/A 2/8/2023    Procedure: Exploratory Laparotomy, lysis of adhesions, Low Anterior Resection, take down of loop colostomy,  DIVERTING LOOP ILEOSTOMY;  Surgeon: Riley Magdaleno MD;  [facility]     COMBINED CYSTOSCOPY, INSERT STENT URETER(S) Bilateral 2/8/2023    Procedure: CYSTOSCOPY, WITH URETERAL STENT INSERTION [[id number]];  Surgeon: Ulises Basilio MD;  Location: [facility]     INSERT PORT VASCULAR ACCESS Right 4/21/2022    Procedure: INSERTION, VASCULAR ACCESS PORT;  Surgeon: Marianne Schroeder MD;  Location: [facility]     INSERT PORT VASCULAR ACCESS Left 6/2/2022    Procedure: INSERTION, VASCULAR ACCESS PORT;  Surgeon: Phong Finch MD;  Location: [facility] OR     IR CHEST PORT PLACEMENT > 5 YRS OF AGE  6/2/2022     LAPAROTOMY EXPLORATORY N/A 3/26/2022    Procedure: exploratory laparotomy, small bowel resection, colostomy creation;  Surgeon: iRley Magdaleno MD;  [facility]     LAPAROTOMY, LYSIS ADHESIONS, COMBINED N/A 8/24/2022    Procedure: LAPAROTOMY, EXPLORATORY, WITH LYSIS OF ADHESIONS;  Surgeon: Segundo Hill MD;  Location: [facility]     PICC INSERTION - DOUBLE LUMEN Left 03/28/2022    left medial brachial 5 fr dl picc 49 cm     SIGMOIDOSCOPY FLEXIBLE N/A 3/26/2022    Procedure: Sigmoidoscopy flexible;  Surgeon: Riley Magdaleno MD;  [facility]     SIGMOIDOSCOPY FLEXIBLE N/A 2/8/2023    Procedure: Sigmoidoscopy flexible;  Surgeon: Riley Magdaleno MD;  Location: [facility]       Problem list:    Patient Active Problem List    Diagnosis Date Noted     High output ileostomy (H) 02/14/2023     Priority: Medium     Cancer of rectosigmoid (colon) (H) 02/08/2023     Priority: Medium     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  2 month supply given 6/20/22       Adenocarcinoma of sigmoid colon (H) 04/13/2022     Priority: Medium     Colon adenocarcinoma (H) 04/11/2022     Priority: Medium     Peritoneal involvement.  Resection of obstruction, small intestine adherent to mass.  Unable to resect sigmoid mass, extensive involvement.         Colostomy present (H) 04/11/2022     Priority: Medium     Cachexia (H) 04/11/2022     Priority: Medium       Medications:  Current Outpatient Medications   Medication Sig Dispense Refill     oxyCODONE (ROXICODONE) 5 MG tablet Take 1-2 tablets (5-10 mg) by mouth every 6 hours as needed for moderate to severe pain 20 tablet 0     acetaminophen (TYLENOL) 500 MG tablet Take 2 tablets (1,000 mg) by mouth 4 times daily 56 tablet 0     enoxaparin ANTICOAGULANT (LOVENOX) 40 MG/0.4ML syringe Inject 0.4 mLs (40 mg) Subcutaneous every 24 hours for 24 days 9.6 mL 0     gabapentin (NEURONTIN) 100 MG capsule Take 1 capsule (100 mg) by mouth At Bedtime 14 capsule 0     heparin 100 UNIT/ML SOLN injection 5-10 mLs by Intracatheter route every 28 days       lactated ringers infusion Inject 1,000 mLs into the vein in the morning, Mon and Thur for 6 doses 6000 mL 0     loperamide (IMODIUM) 2 MG capsule Take 1 capsule (2 mg) by mouth 3 times daily 120 capsule 0     mirtazapine (REMERON) 15 MG tablet Take 1 tablet (15 mg) by mouth At Bedtime 90 tablet 3     multivitamin (CENTRUM SILVER) tablet Take 1 tablet by mouth every morning 30 tablet      omeprazole (PRILOSEC) 20 MG DR capsule Take 1 capsule (20 mg) by mouth 2 times daily 60 capsule 3     Ostomy Supplies MISC 1 each Every Mon, Wed, Fri Morning  "Hollister1 piece flat fecal with filter #8331 20 pouches per month   2\" barrier ring #4720 (1 per pouch)   Adapt powder #7943   No sting film barrier # 4945   Adapt odor eliminator and lubricant 236ml bottle # 93782   M-9 Spray room deodorizer #8834     1 each 11     psyllium (METAMUCIL/KONSYL) Packet Take 1 packet by mouth 2 times daily 60 packet 0       Allergies:  No Known Allergies    Family history:  Family History   Problem Relation Age of Onset     No Known Problems Mother      Prostate Cancer Father      Colon Cancer Father      Lymphoma Father      Anesthesia Reaction No family hx of      Thrombosis No family hx of        Social history:  Social History     Tobacco Use     Smoking status: Every Day     Packs/day: 0.50     Years: 50.00     Pack years: 25.00     Types: Cigarettes     Smokeless tobacco: Never   Substance Use Topics     Alcohol use: Not Currently     Marital status: .    Nursing Notes:   Almas Stanley, EMT  2/21/2023 10:32 AM  Signed  Chief Complaint   Patient presents with     Surgical Followup       Vitals:    02/21/23 1029   BP: 130/80   BP Location: Left arm   Patient Position: Sitting   Cuff Size: Adult Regular   Pulse: 74   SpO2: 99%   Weight: 142 lb 8 oz   Height: 5' 11\"       Body mass index is 19.87 kg/m .     Almas Stanley, EMT- P         20 minutes spent on the date of the encounter doing chart review, history and exam, documentation and further activities as noted above.   This is a postop visit.    JULIUS Arthur, NP-C  Colon and Rectal Surgery  [facility]    This note was created using speech recognition software and may contain unintended word substitutions.  "

## 2023-02-21 NOTE — PROGRESS NOTES
Colon and Rectal Surgery Postoperative Clinic Note    RE: Kalin Shepard  : 1950  REGGIE: 2023    Kalin Shepard is a very pleasant 72 year old male with rectosigmoid cancer who underwent emergent exploratory laparotomy, small bowel resection and loop descending colostomy in 2022 followed by 12 rounds of chemotherapy, MRI showed evidence of residual tumor.  Now s/p exploratory laparotomy, lysis of adhesions, 2 hours, low anterior resection, take down of loop colostomy, diverting loop ileostomy, flexible sigmoidoscopy on 2023 with Dr. Magdaleno on 23.    Final Diagnosis   A. SIGMOID AND RECTUM, RESECTION:  -Adenocarcinoma, moderate to poorly differentiated, invading through the wall to involve the adjacent adherent perirectal fibroadipose tissue  -Tumor size: 3 cm in greatest dimension  -Resection margins free of involvement  -No evidence of lymphovascular or perineural invasion  -Treatment effect: Poor response (score 3)  -No tumor deposits identified  -Thirty-three benign lymph nodes (0/33)  -Sections of vas deferens with no evidence of involvement by adenocarcinoma.  -Please see tumor synoptic for details     B. RECTAL STUMP:  -Segment of rectal wall with no evidence of malignancy     C.  COLON, PROXIMAL ANASTAMOTIC RING:  -Segment of colonic wall with no evidence of malignancy     D.  COLON, DISTAL ANASTAMOTIC RING :  -Segment of colonic wall with no evidence of malignancy     E. LOOP COLOSTOMY:  -Segment of colonic wall with no evidence of malignancy     F. OMENTUM, OMENTECTOMY:  -Fibroadipose tissue with no evidence of malignancy     Interval history: Having 5-6/10 pain. Is taking oxycodone three times a day. Not taking tylenol. Taking Neurontin at night.   Taking one imodium three times a day and Metamucil twice a day. Ostomy outputs are under 500. Is still getting IVF twice a week and feels like he gets dehydrated between. No pouching issues. Eating and drinking without nausea.  "Having some drainage from his rectum.    Physical Examination: Exam was chaperoned by Almas Stanley, EMT-P   /80 (BP Location: Left arm, Patient Position: Sitting, Cuff Size: Adult Regular)   Pulse 74   Ht 5' 11\"   Wt 142 lb 8 oz   SpO2 99%   BMI 19.87 kg/m    General: alert, oriented, in no acute distress, sitting comfortably  HEENT: mucous membranes moist  Abdomen: soft, non distended, non tender on palpation; midline incision well approximated with staples in place and no erythema or drainage. Staples removed and steri strips placed. Stoma well everted with tthick stool in bag and pouch with good seal.    Assessment/Plan:  72 year old male s/p exploratory laparotomy, lysis of adhesions, 2 hours, low anterior resection, take down of loop colostomy, diverting loop ileostomy, flexible sigmoidoscopy on 2/8/2023 with Dr. Magdaleno on 2/8/23. He is recovering well. Stoma functioning well and stools have gotten much thicker. He feels he still needs a few more IVF infusions but could try to get off these soon since he is eating and drinking well. Continue with low residue diet for another 3-4 weeks. No lifting more than 10 pounds for a full 6 weeks from surgery. Recommended taking scheduled tylenol and can use oxycodone as needed but continue to try to wean down on this. Refill provided. He is scheduled on 3/16 with Dr. Magdaleno. Encouraged him to contact the clinic in the meantime with any questions or concerns. Patient's questions were answered to his stated satisfaction and he is in agreement with this plan.     Medical history:  Past Medical History:   Diagnosis Date     Cancer (H)        Surgical history:  Past Surgical History:   Procedure Laterality Date     COLECTOMY LEFT N/A 2/8/2023    Procedure: Exploratory Laparotomy, lysis of adhesions, Low Anterior Resection, take down of loop colostomy,  DIVERTING LOOP ILEOSTOMY;  Surgeon: Riley Magdaleno MD;  Location: [facility]     COMBINED CYSTOSCOPY, INSERT " STENT URETER(S) Bilateral 2/8/2023    Procedure: CYSTOSCOPY, WITH URETERAL STENT INSERTION [[id number]];  Surgeon: Ulises Basilio MD;  [facility]     INSERT PORT VASCULAR ACCESS Right 4/21/2022    Procedure: INSERTION, VASCULAR ACCESS PORT;  Surgeon: Marianne Schroeder MD;  Location: [facility]     INSERT PORT VASCULAR ACCESS Left 6/2/2022    Procedure: INSERTION, VASCULAR ACCESS PORT;  Surgeon: Phong Finch MD;  Location: [facility] OR     IR CHEST PORT PLACEMENT > 5 YRS OF AGE  6/2/2022     LAPAROTOMY EXPLORATORY N/A 3/26/2022    Procedure: exploratory laparotomy, small bowel resection, colostomy creation;  Surgeon: Riley Magdaleno MD;  [facility]     LAPAROTOMY, LYSIS ADHESIONS, COMBINED N/A 8/24/2022    Procedure: LAPAROTOMY, EXPLORATORY, WITH LYSIS OF ADHESIONS;  Surgeon: Segundo Hill MD;  Location[facility]     PICC INSERTION - DOUBLE LUMEN Left 03/28/2022    left medial brachial 5 fr dl picc 49 cm     SIGMOIDOSCOPY FLEXIBLE N/A 3/26/2022    Procedure: Sigmoidoscopy flexible;  Surgeon: Riley Magdaleno MD;  [facility]     SIGMOIDOSCOPY FLEXIBLE N/A 2/8/2023    Procedure: Sigmoidoscopy flexible;  Surgeon: Riley Magdaleno MD;  Location[facility]       Problem list:    Patient Active Problem List    Diagnosis Date Noted     High output ileostomy (H) 02/14/2023     Priority: Medium     Cancer of rectosigmoid (colon) (H) 02/08/2023     Priority: Medium     Chemotherapy-induced peripheral neuropathy (H) 09/26/2022     Priority: Medium     Peritonitis, acute generalized (H) 08/29/2022     Priority: Medium     SBO (small bowel obstruction) (H) 08/21/2022     Priority: Medium     Cancer cachexia (H) 08/21/2022     Priority: Medium     Esophageal reflux 08/21/2022     Priority: Medium     Chemotherapy-induced neutropenia (H) 08/02/2022     Priority: Medium     Dehydration 07/19/2022     Priority: Medium     Cancer associated pain 06/20/2022     Priority: Medium     Oxycodone 5mg bid.  2 month  "supply given 6/20/22       Adenocarcinoma of sigmoid colon (H) 04/13/2022     Priority: Medium     Colon adenocarcinoma (H) 04/11/2022     Priority: Medium     Peritoneal involvement.  Resection of obstruction, small intestine adherent to mass.  Unable to resect sigmoid mass, extensive involvement.         Colostomy present (H) 04/11/2022     Priority: Medium     Cachexia (H) 04/11/2022     Priority: Medium       Medications:  Current Outpatient Medications   Medication Sig Dispense Refill     oxyCODONE (ROXICODONE) 5 MG tablet Take 1-2 tablets (5-10 mg) by mouth every 6 hours as needed for moderate to severe pain 20 tablet 0     acetaminophen (TYLENOL) 500 MG tablet Take 2 tablets (1,000 mg) by mouth 4 times daily 56 tablet 0     enoxaparin ANTICOAGULANT (LOVENOX) 40 MG/0.4ML syringe Inject 0.4 mLs (40 mg) Subcutaneous every 24 hours for 24 days 9.6 mL 0     gabapentin (NEURONTIN) 100 MG capsule Take 1 capsule (100 mg) by mouth At Bedtime 14 capsule 0     heparin 100 UNIT/ML SOLN injection 5-10 mLs by Intracatheter route every 28 days       lactated ringers infusion Inject 1,000 mLs into the vein in the morning, Mon and Thur for 6 doses 6000 mL 0     loperamide (IMODIUM) 2 MG capsule Take 1 capsule (2 mg) by mouth 3 times daily 120 capsule 0     mirtazapine (REMERON) 15 MG tablet Take 1 tablet (15 mg) by mouth At Bedtime 90 tablet 3     multivitamin (CENTRUM SILVER) tablet Take 1 tablet by mouth every morning 30 tablet      omeprazole (PRILOSEC) 20 MG DR capsule Take 1 capsule (20 mg) by mouth 2 times daily 60 capsule 3     Ostomy Supplies MISC 1 each Every Mon, Wed, Fri Morning Hollister1 piece flat fecal with filter #7784 20 pouches per month   2\" barrier ring #1600 (1 per pouch)   Adapt powder #3732   No sting film barrier # 2561   Adapt odor eliminator and lubricant 236ml bottle # 94232   M-9 Spray room deodorizer #8386     1 each 11     psyllium (METAMUCIL/KONSYL) Packet Take 1 packet by mouth 2 times daily 60 " "packet 0       Allergies:  No Known Allergies    Family history:  Family History   Problem Relation Age of Onset     No Known Problems Mother      Prostate Cancer Father      Colon Cancer Father      Lymphoma Father      Anesthesia Reaction No family hx of      Thrombosis No family hx of        Social history:  Social History     Tobacco Use     Smoking status: Every Day     Packs/day: 0.50     Years: 50.00     Pack years: 25.00     Types: Cigarettes     Smokeless tobacco: Never   Substance Use Topics     Alcohol use: Not Currently     Marital status: .    Nursing Notes:   Almas Stanley, EMT  2/21/2023 10:32 AM  Signed  Chief Complaint   Patient presents with     Surgical Followup       Vitals:    02/21/23 1029   BP: 130/80   BP Location: Left arm   Patient Position: Sitting   Cuff Size: Adult Regular   Pulse: 74   SpO2: 99%   Weight: 142 lb 8 oz   Height: 5' 11\"       Body mass index is 19.87 kg/m .     Almas Stanley, EMT- P         20 minutes spent on the date of the encounter doing chart review, history and exam, documentation and further activities as noted above.   This is a postop visit.    JULIUS Arthur, NP-C  Colon and Rectal Surgery  [facility]    This note was created using speech recognition software and may contain unintended word substitutions.      "

## 2023-02-21 NOTE — NURSING NOTE
"Chief Complaint   Patient presents with     Surgical Followup       Vitals:    02/21/23 1029   BP: 130/80   BP Location: Left arm   Patient Position: Sitting   Cuff Size: Adult Regular   Pulse: 74   SpO2: 99%   Weight: 142 lb 8 oz   Height: 5' 11\"       Body mass index is 19.87 kg/m .     Almas Stanley, EMT- P    "

## 2023-02-22 ENCOUNTER — TELEPHONE (OUTPATIENT)
Dept: SURGERY | Facility: CLINIC | Age: 73
End: 2023-02-22
Payer: COMMERCIAL

## 2023-02-22 NOTE — TELEPHONE ENCOUNTER
[facility] Call Center    Phone Message    May a detailed message be left on voicemail: yes     Reason for Call: Other: Clara explained that the patient is currently scheduled to have his needle and dressings changed tomorrow but due to the weather, they are wondering if the provider is okay with them rescheduling it to Friday instead. They stated they would ljust need a verbal order from the provider and they would be able to reschedule to Friday. Please call them back at [phone] to discuss, thank you!     Action Taken: Message routed to:  [facility] Colon and Rectal Clinic    Travel Screening: Not Applicable

## 2023-02-23 DIAGNOSIS — C18.9 COLON ADENOCARCINOMA (H): Primary | ICD-10-CM

## 2023-02-24 PROCEDURE — 80048 BASIC METABOLIC PNL TOTAL CA: CPT | Performed by: SURGERY

## 2023-02-24 PROCEDURE — 83735 ASSAY OF MAGNESIUM: CPT | Performed by: SURGERY

## 2023-02-27 ENCOUNTER — TELEPHONE (OUTPATIENT)
Dept: SURGERY | Facility: CLINIC | Age: 73
End: 2023-02-27
Payer: COMMERCIAL

## 2023-02-27 PROCEDURE — 80048 BASIC METABOLIC PNL TOTAL CA: CPT | Performed by: SURGERY

## 2023-02-27 PROCEDURE — 83735 ASSAY OF MAGNESIUM: CPT | Performed by: SURGERY

## 2023-02-27 NOTE — TELEPHONE ENCOUNTER
Kalin Shepard is a very pleasant 72 year old male with rectosigmoid cancer who underwent emergent exploratory laparotomy, small bowel resection and loop descending colostomy in March 2022 followed by 12 rounds of chemotherapy, MRI showed evidence of residual tumor.  Now s/p exploratory laparotomy, lysis of adhesions, 2 hours, low anterior resection, take down of loop colostomy, diverting loop ileostomy, flexible sigmoidoscopy on 2/8/2023 with Dr. Magdaleno on 2/8/23.    Home care nurse called to inform us about recent weight loss. Pt is drinking two protein shakes a day. Left a VM with Clara to see if she had questions or if she was just informing us. Updated Dr. Magdaleno about weight loss.

## 2023-02-27 NOTE — TELEPHONE ENCOUNTER
[facility]    Phone Message    May a detailed message be left on voicemail: yes     Reason for Call: Other: Clara is requesting a call back please to report Pt weight loss. Pt was 140.6 lbs when Home Care started, 138 lbs on 02/24, and is 136 lba today. Pt is drinking 2 Protein Shakes a day. Please reach out to Clara to discuss. Thank you!     Action Taken: Message routed to:  [facility] Colon and Rectal     Travel Screening: Not Applicable

## 2023-03-01 DIAGNOSIS — Z53.9 DIAGNOSIS NOT YET DEFINED: Primary | ICD-10-CM

## 2023-03-01 PROCEDURE — 99207 PR MD CERTIFICATION HHA PATIENT: CPT | Performed by: FAMILY MEDICINE

## 2023-03-02 ENCOUNTER — TRANSFERRED RECORDS (OUTPATIENT)
Dept: HEALTH INFORMATION MANAGEMENT | Facility: CLINIC | Age: 73
End: 2023-03-02

## 2023-03-02 PROCEDURE — 82310 ASSAY OF CALCIUM: CPT | Performed by: INTERNAL MEDICINE

## 2023-03-02 PROCEDURE — 83735 ASSAY OF MAGNESIUM: CPT | Performed by: INTERNAL MEDICINE

## 2023-03-06 PROCEDURE — 83735 ASSAY OF MAGNESIUM: CPT | Performed by: INTERNAL MEDICINE

## 2023-03-06 PROCEDURE — 80048 BASIC METABOLIC PNL TOTAL CA: CPT | Performed by: INTERNAL MEDICINE

## 2023-03-09 ENCOUNTER — TELEPHONE (OUTPATIENT)
Dept: FAMILY MEDICINE | Facility: CLINIC | Age: 73
End: 2023-03-09
Payer: COMMERCIAL

## 2023-03-09 NOTE — TELEPHONE ENCOUNTER
Verbal order given for SN 1x next wk (instead of 2x) due to F/U surg appt 3/16.  CECILIA White RN

## 2023-03-09 NOTE — TELEPHONE ENCOUNTER
Home Care Verbal Order    Contacts       Type Contact Phone/Fax    03/09/2023 11:00 AM CST Phone (Incoming) Clara Gamboa  (Home Care) [phone]        Reason for Call:  Dr. Bentley Patient per Clara Davis Nurse visit 3/16 pt will be seeing by his Surgeon this day.    Could we send this information to you in [employer] or would you prefer to receive a phone call?:   Patient would prefer a phone call   Okay to leave a detailed message?: Yes at Other phone number:  [facility] *

## 2023-03-13 ENCOUNTER — TELEPHONE (OUTPATIENT)
Dept: SURGERY | Facility: CLINIC | Age: 73
End: 2023-03-13
Payer: COMMERCIAL

## 2023-03-13 DIAGNOSIS — R63.0 POOR APPETITE: ICD-10-CM

## 2023-03-13 DIAGNOSIS — G89.18 ACUTE POST-OPERATIVE PAIN: ICD-10-CM

## 2023-03-13 DIAGNOSIS — C18.7 ADENOCARCINOMA OF SIGMOID COLON (H): ICD-10-CM

## 2023-03-13 DIAGNOSIS — Z93.2 S/P ILEOSTOMY (H): Primary | ICD-10-CM

## 2023-03-13 DIAGNOSIS — Z93.2 S/P ILEOSTOMY (H): ICD-10-CM

## 2023-03-13 RX ORDER — MIRTAZAPINE 15 MG/1
15 TABLET, FILM COATED ORAL AT BEDTIME
Qty: 90 TABLET | Refills: 3 | Status: SHIPPED | OUTPATIENT
Start: 2023-03-13

## 2023-03-13 RX ORDER — OXYCODONE HYDROCHLORIDE 5 MG/1
5 TABLET ORAL EVERY 8 HOURS PRN
Qty: 12 TABLET | Refills: 0 | Status: SHIPPED | OUTPATIENT
Start: 2023-03-13 | End: 2023-04-13

## 2023-03-13 NOTE — TELEPHONE ENCOUNTER
[facility]    Phone Message    May a detailed message be left on voicemail: yes     Reason for Call: Other:     Clara, [facility], is requesting a call back to discuss the pt's weight loss, appetite, and pain. Caller stated the pt currently weighs 134, has a lack of appetite, and has pain (8/10) related to the pt' stoma.    [phone]     Action Taken: Message routed to:  [facility]     Travel Screening: Not Applicable

## 2023-03-13 NOTE — TELEPHONE ENCOUNTER
Layton has lost 10 lbs and does not have an appetite. He is having pain on his stoma and around it. He says there may be sutures there causing some irritation. I asked if he wanted to see Soledad on Thursday when he sees Dr. Magdaleno but he does not. He is looking for a refill on remeron and oxycodone. Discussed with Dr. Magdaleno. Plan for 10 more oxycodone and GGE to check anastomosis.

## 2023-03-16 ENCOUNTER — OFFICE VISIT (OUTPATIENT)
Dept: SURGERY | Facility: CLINIC | Age: 73
End: 2023-03-16
Payer: COMMERCIAL

## 2023-03-16 VITALS
HEIGHT: 71 IN | HEART RATE: 104 BPM | DIASTOLIC BLOOD PRESSURE: 82 MMHG | WEIGHT: 135 LBS | SYSTOLIC BLOOD PRESSURE: 115 MMHG | OXYGEN SATURATION: 99 % | BODY MASS INDEX: 18.9 KG/M2

## 2023-03-16 DIAGNOSIS — Z09 FOLLOW-UP EXAMINATION AFTER COLORECTAL SURGERY: ICD-10-CM

## 2023-03-16 DIAGNOSIS — R79.1 ABNORMAL COAGULATION PROFILE: ICD-10-CM

## 2023-03-16 DIAGNOSIS — Z93.2 S/P ILEOSTOMY (H): Primary | ICD-10-CM

## 2023-03-16 DIAGNOSIS — C18.7 ADENOCARCINOMA OF SIGMOID COLON (H): ICD-10-CM

## 2023-03-16 PROCEDURE — 99024 POSTOP FOLLOW-UP VISIT: CPT | Performed by: SURGERY

## 2023-03-16 ASSESSMENT — PAIN SCALES - GENERAL: PAINLEVEL: MODERATE PAIN (5)

## 2023-03-16 NOTE — NURSING NOTE
"Chief Complaint   Patient presents with     Post-op Visit       Vitals:    03/16/23 0919   BP: 115/82   BP Location: Left arm   Patient Position: Sitting   Cuff Size: Adult Regular   Pulse: 104   SpO2: 99%   Weight: 135 lb   Height: 5' 11\"       Body mass index is 18.83 kg/m .    Kalpana England CMA    "

## 2023-03-16 NOTE — PATIENT INSTRUCTIONS
Follow up:  Schedule ostomy takedown - [phone]      Please call with any questions or concerns regarding your clinic visit today.    It is a pleasure being involved in your health care.    Contacts post-consultation depending on your need:    Radiology Appointments [phone]    Schedule Clinic Appointments [phone] # 1   M-F 7:30 - 5 pm    [facility] Connors [phone]    Clinic Fax Number [phone]    Surgery Scheduling [phone]    My Chart is available 24 hours a day and is a secure way to access your records and communicate with your care team.  I strongly recommend signing up if you haven't already done so, if you are comfortable with computers.  If you would like to inquire about this or are having problems with My Chart access, you may call [phone] or go online at [email or web address].  Please allow at least 24 hours for a response and extra time on weekends and Holidays.

## 2023-03-16 NOTE — LETTER
3/16/2023       RE: Kalin Shepard  [address] [facility] [address]     Dear Colleague,    Thank you for referring your patient, Kalin Shepard, to the [facility] [place] at [facility]. Please see a copy of my visit note below.    Colon and Rectal Surgery Postoperative Clinic Note     Referring provider:  Riley Magdaleno MD  [address] [place] [id number]       RE: Kalin Shepard  : 1950  REGGIE: 3/16/2023      Kalin Shepard is a very pleasant 72 year old male with rectosigmoid cancer who underwent emergent exploratory laparotomy, small bowel resection and loop descending colostomy in 2022 followed by 12 rounds of chemotherapy, MRI showed evidence of residual tumor.  Now s/p exploratory laparotomy, lysis of adhesions, 2 hours, low anterior resection, take down of loop colostomy, diverting loop ileostomy, flexible sigmoidoscopy on 23.     Final Diagnosis   A. SIGMOID AND RECTUM, RESECTION:  -Adenocarcinoma, moderate to poorly differentiated, invading through the wall to involve the adjacent adherent perirectal fibroadipose tissue  -Tumor size: 3 cm in greatest dimension  -Resection margins free of involvement  -No evidence of lymphovascular or perineural invasion  -Treatment effect: Poor response (score 3)  -No tumor deposits identified  -Thirty-three benign lymph nodes (0/33)  -Sections of vas deferens with no evidence of involvement by adenocarcinoma.  -Please see tumor synoptic for details     B. RECTAL STUMP:  -Segment of rectal wall with no evidence of malignancy     C.  COLON, PROXIMAL ANASTAMOTIC RING:  -Segment of colonic wall with no evidence of malignancy     D.  COLON, DISTAL ANASTAMOTIC RING :  -Segment of colonic wall with no evidence of malignancy     E. LOOP COLOSTOMY:  -Segment of colonic wall with no evidence of malignancy     F. OMENTUM,  OMENTECTOMY:  -Fibroadipose tissue with no evidence of malignancy      Interval history: Having 5-6/10 pain. Is taking oxycodone three times a day. Not taking tylenol. Taking Neurontin at night.   Taking one imodium three times a day and Metamucil twice a day. Ostomy outputs are under 500. Is still getting IVF twice a week and feels like he gets dehydrated between. No pouching issues. Eating and drinking without nausea.       Assessment/Plan:  72 year old male with recurrent rectosigmoid cancer now 5 week(s) status post  exploratory laparotomy, lysis of adhesions, 2 hours, low anterior resection, take down of loop colostomy, diverting loop ileostomy, flexible sigmoidoscopy .    -GGE scheduled.  -Plan for loop ileostomy reversal.    1. Preoperative labs: CBC, CMP, PTT/INR, Prealbumin.  2. No prep needed.  3. Hold these medications prior to surgery: none  4. Advised patient to continue protein shakes: Premier or Pure protein given high protein, low carb ratio for pre-operative rehab.      PLEASE SEE NOTE BELOW FOR PHYSICAL EXAMINATION, REVIEW OF SYSTEMS, AND OTHER HISTORY.    Riley Magdaleno MD  Division of Colon and Rectal Surgery   [facility]  [phone]    -------------------------------------------------------------------------------------------------------------------    Medical history:  Past Medical History:   Diagnosis Date     Cancer (H)        Surgical history:  Past Surgical History:   Procedure Laterality Date     COLECTOMY LEFT N/A 2/8/2023    Procedure: Exploratory Laparotomy, lysis of adhesions, Low Anterior Resection, take down of loop colostomy,  DIVERTING LOOP ILEOSTOMY;  Surgeon: Riley Magdaleno MD;  Location: [facility] OR     COMBINED CYSTOSCOPY, INSERT STENT URETER(S) Bilateral 2/8/2023    Procedure: CYSTOSCOPY, WITH URETERAL STENT INSERTION [[id number]];  Surgeon: Ulises Basilio MD;  Location: [facility] OR     INSERT PORT VASCULAR ACCESS Right 4/21/2022    Procedure:  INSERTION, VASCULAR ACCESS PORT;  Surgeon: Marianne Schroeder MD;  Location: [facility]     INSERT PORT VASCULAR ACCESS Left 6/2/2022    Procedure: INSERTION, VASCULAR ACCESS PORT;  Surgeon: Phong Finch MD;  Location: [facility] OR     IR CHEST PORT PLACEMENT > 5 YRS OF AGE  6/2/2022     LAPAROTOMY EXPLORATORY N/A 3/26/2022    Procedure: exploratory laparotomy, small bowel resection, colostomy creation;  Surgeon: Riley Magdaleno MD;  Location: [facility] OR     LAPAROTOMY, LYSIS ADHESIONS, COMBINED N/A 8/24/2022    Procedure: LAPAROTOMY, EXPLORATORY, WITH LYSIS OF ADHESIONS;  Surgeon: Segundo Hill MD;  Location: [facility]     PICC INSERTION - DOUBLE LUMEN Left 03/28/2022    left medial brachial 5 fr dl picc 49 cm     SIGMOIDOSCOPY FLEXIBLE N/A 3/26/2022    Procedure: Sigmoidoscopy flexible;  Surgeon: Riley Magdaleno MD;  Location: [facility]     SIGMOIDOSCOPY FLEXIBLE N/A 2/8/2023    Procedure: Sigmoidoscopy flexible;  Surgeon: Riley Magdaleno MD;  Location: [facility] OR       Problem list:  Patient Active Problem List    Diagnosis Date Noted     High output ileostomy (H) 02/14/2023     Priority: Medium     Cancer of rectosigmoid (colon) (H) 02/08/2023     Priority: Medium     Chemotherapy-induced peripheral neuropathy (H) 09/26/2022     Priority: Medium     Peritonitis, acute generalized (H) 08/29/2022     Priority: Medium     SBO (small bowel obstruction) (H) 08/21/2022     Priority: Medium     Cancer cachexia (H) 08/21/2022     Priority: Medium     Esophageal reflux 08/21/2022     Priority: Medium     Chemotherapy-induced neutropenia (H) 08/02/2022     Priority: Medium     Dehydration 07/19/2022     Priority: Medium     Cancer associated pain 06/20/2022     Priority: Medium     Oxycodone 5mg bid.  2 month supply given 6/20/22       Adenocarcinoma of sigmoid colon (H) 04/13/2022     Priority: Medium     Colon adenocarcinoma (H) 04/11/2022     Priority: Medium     Peritoneal involvement.  Resection of obstruction, small  "intestine adherent to mass.  Unable to resect sigmoid mass, extensive involvement.         Colostomy present (H) 04/11/2022     Priority: Medium     Cachexia (H) 04/11/2022     Priority: Medium       Medications:  Current Outpatient Medications   Medication Sig Dispense Refill     acetaminophen (TYLENOL) 500 MG tablet Take 2 tablets (1,000 mg) by mouth 4 times daily 56 tablet 0     enoxaparin ANTICOAGULANT (LOVENOX) 40 MG/0.4ML syringe Inject 0.4 mLs (40 mg) Subcutaneous every 24 hours for 24 days 9.6 mL 0     gabapentin (NEURONTIN) 100 MG capsule Take 1 capsule (100 mg) by mouth At Bedtime 14 capsule 0     heparin 100 UNIT/ML SOLN injection 5-10 mLs by Intracatheter route every 28 days       lactated ringers infusion Inject 1,000 mLs into the vein in the morning, Mon and Thur for 6 doses 6000 mL 0     loperamide (IMODIUM) 2 MG capsule Take 1 capsule (2 mg) by mouth 3 times daily 120 capsule 0     mirtazapine (REMERON) 15 MG tablet Take 1 tablet (15 mg) by mouth At Bedtime 90 tablet 3     multivitamin (CENTRUM SILVER) tablet Take 1 tablet by mouth every morning 30 tablet      omeprazole (PRILOSEC) 20 MG DR capsule Take 1 capsule (20 mg) by mouth 2 times daily 60 capsule 3     Ostomy Supplies MISC 1 each Every Mon, Wed, Fri Morning Hollister1 piece flat fecal with filter #6693 20 pouches per month   2\" barrier ring #3272 (1 per pouch)   Adapt powder #7906   No sting film barrier # 0346   Adapt odor eliminator and lubricant 236ml bottle # 37840   M-9 Spray room deodorizer #6162     1 each 11     oxyCODONE (ROXICODONE) 5 MG tablet Take 1-2 tablets (5-10 mg) by mouth every 6 hours as needed for moderate to severe pain 20 tablet 0     psyllium (METAMUCIL/KONSYL) Packet Take 1 packet by mouth 2 times daily 60 packet 0       Allergies:  No Known Allergies    Family history:  Family History   Problem Relation Age of Onset     No Known Problems Mother      Prostate Cancer Father      Colon Cancer Father      Lymphoma Father  " "    Anesthesia Reaction No family hx of      Thrombosis No family hx of        Social history:  Social History     Socioeconomic History     Marital status:      Spouse name: Not on file     Number of children: 3     Years of education: Not on file     Highest education level: Not on file   Occupational History     Occupation: retired   Tobacco Use     Smoking status: Every Day     Packs/day: 0.50     Years: 50.00     Pack years: 25.00     Types: Cigarettes     Smokeless tobacco: Never   Vaping Use     Vaping Use: Never used   Substance and Sexual Activity     Alcohol use: Not Currently     Drug use: Never     Sexual activity: Not on file   Other Topics Concern     Not on file   Social History Narrative     Not on file     Social Determinants of Health     Financial Resource Strain: Not on file   Food Insecurity: Not on file   Transportation Needs: Not on file   Physical Activity: Not on file   Stress: Not on file   Social Connections: Not on file   Intimate Partner Violence: Not on file   Housing Stability: Not on file         Physical Examination:  /82 (BP Location: Left arm, Patient Position: Sitting, Cuff Size: Adult Regular)   Pulse 104   Ht 1.803 m (5' 11\")   Wt 61.2 kg (135 lb)   SpO2 99%   BMI 18.83 kg/m    General: NAD  Abdomen: soft, NTND incision well healed, ostomy pink and healthy  Extremities: wwp    Digital rectal examination: Was performed.   Sphincter tone: Good.  Palpable lesions: No.  Prostate: Not assessed.  Other: None..    Procedures:  Prior to the start of the procedure and with procedural staff participation, I verbally confirmed the patient s identity using two indicators, relevant allergies, that the procedure was appropriate and matched the consent or emergent situation, and that the correct equipment/implants were available. Immediately prior to starting the procedure I conducted the Time Out with the procedural staff and re-confirmed the patient s name, procedure, and " site/side. (The Joint Commission universal protocol was followed.)  Yes    Sedation (Moderate or Deep): None    Flexible sigmoidoscopy was performed to 25cm. Anastomosis was identified without obvious abnormality, widely healthy and patent. Pictures taken, uploaded into epic.          Again, thank you for allowing me to participate in the care of your patient.      Sincerely,    Riley Magdaleno MD, MD

## 2023-03-23 ENCOUNTER — TELEPHONE (OUTPATIENT)
Dept: SURGERY | Facility: CLINIC | Age: 73
End: 2023-03-23
Payer: COMMERCIAL

## 2023-03-23 NOTE — TELEPHONE ENCOUNTER
"Per Case Request notes, schedule patient's Ileostomy Closure next available after 3/27/2023 (imaging is scheduled that date).    At this time, it appears that next available is Wednesday May 3, 2023. If     Called patient, [facility] for return call regarding scheduling. Vm msg says it is \"Dr. Shepard.\"    Placed tentative [place] Hold on Weds May 3, 2023.  ----------------  Sent the following message to Dory Rivas, RN, and Sunshine ADAM, who will be covering for me while I am out.    \"Hi Dr. Riley Magdaleno, Syed, RN, Sunshine,    I left a [facility] for patient \"Dr. Shepard\" to call back regarding scheduling his Ileostomy Closure with you on a date after 3/27 when he is having his GGE. Is your next available surgery block day Weds May 3, 2023 soon enough for this patient's surgery?    If not, is there a date that you would suggest where this could be done as wait list or in open block?    Please let us know.     Thank-you,  Karla\"    Karla Ferrera  Akiko-op Coordinator  [facility]  Direct Phone: [phone]    "

## 2023-03-27 ENCOUNTER — INFUSION THERAPY VISIT (OUTPATIENT)
Dept: INFUSION THERAPY | Facility: CLINIC | Age: 73
End: 2023-03-27
Attending: NURSE PRACTITIONER
Payer: COMMERCIAL

## 2023-03-27 ENCOUNTER — ONCOLOGY VISIT (OUTPATIENT)
Dept: ONCOLOGY | Facility: CLINIC | Age: 73
End: 2023-03-27
Attending: NURSE PRACTITIONER
Payer: COMMERCIAL

## 2023-03-27 ENCOUNTER — HOSPITAL ENCOUNTER (OUTPATIENT)
Dept: GENERAL RADIOLOGY | Facility: CLINIC | Age: 73
Discharge: HOME | End: 2023-03-27
Attending: SURGERY
Payer: COMMERCIAL

## 2023-03-27 ENCOUNTER — TELEPHONE (OUTPATIENT)
Dept: SURGERY | Facility: CLINIC | Age: 73
End: 2023-03-27

## 2023-03-27 ENCOUNTER — LAB (OUTPATIENT)
Dept: LAB | Facility: CLINIC | Age: 73
End: 2023-03-27
Attending: SURGERY
Payer: COMMERCIAL

## 2023-03-27 ENCOUNTER — TELEPHONE (OUTPATIENT)
Dept: SURGERY | Facility: CLINIC | Age: 73
End: 2023-03-27
Payer: COMMERCIAL

## 2023-03-27 VITALS
RESPIRATION RATE: 12 BRPM | SYSTOLIC BLOOD PRESSURE: 112 MMHG | BODY MASS INDEX: 18.76 KG/M2 | HEART RATE: 83 BPM | TEMPERATURE: 98.1 F | HEIGHT: 71 IN | OXYGEN SATURATION: 98 % | WEIGHT: 134 LBS | DIASTOLIC BLOOD PRESSURE: 73 MMHG

## 2023-03-27 DIAGNOSIS — Z93.2 S/P ILEOSTOMY (H): ICD-10-CM

## 2023-03-27 DIAGNOSIS — E83.52 HYPERCALCEMIA: ICD-10-CM

## 2023-03-27 DIAGNOSIS — C18.9 COLON ADENOCARCINOMA (H): ICD-10-CM

## 2023-03-27 DIAGNOSIS — E86.0 DEHYDRATION: Primary | ICD-10-CM

## 2023-03-27 DIAGNOSIS — C18.7 ADENOCARCINOMA OF SIGMOID COLON (H): ICD-10-CM

## 2023-03-27 DIAGNOSIS — C78.6 METASTASIS TO PERITONEUM (H): ICD-10-CM

## 2023-03-27 DIAGNOSIS — C19 CANCER OF RECTOSIGMOID (COLON) (H): Primary | ICD-10-CM

## 2023-03-27 LAB
ALBUMIN SERPL BCG-MCNC: 4.8 G/DL (ref 3.5–5.2)
ALP SERPL-CCNC: 55 U/L (ref 40–129)
ALT SERPL W P-5'-P-CCNC: 20 U/L (ref 10–50)
ANION GAP SERPL CALCULATED.3IONS-SCNC: 10 MMOL/L (ref 7–15)
AST SERPL W P-5'-P-CCNC: 19 U/L (ref 10–50)
BASOPHILS # BLD AUTO: 0.1 10E3/UL (ref 0–0.2)
BASOPHILS NFR BLD AUTO: 1 %
BILIRUB SERPL-MCNC: 0.4 MG/DL
BUN SERPL-MCNC: 42.2 MG/DL (ref 8–23)
CALCIUM SERPL-MCNC: 10.6 MG/DL (ref 8.8–10.2)
CHLORIDE SERPL-SCNC: 103 MMOL/L (ref 98–107)
CREAT SERPL-MCNC: 0.84 MG/DL (ref 0.67–1.17)
DEPRECATED HCO3 PLAS-SCNC: 27 MMOL/L (ref 22–29)
EOSINOPHIL # BLD AUTO: 0.1 10E3/UL (ref 0–0.7)
EOSINOPHIL NFR BLD AUTO: 2 %
ERYTHROCYTE [DISTWIDTH] IN BLOOD BY AUTOMATED COUNT: 13.9 % (ref 10–15)
GFR SERPL CREATININE-BSD FRML MDRD: >90 ML/MIN/1.73M2
GLUCOSE SERPL-MCNC: 91 MG/DL (ref 70–99)
HCT VFR BLD AUTO: 48.4 % (ref 40–53)
HGB BLD-MCNC: 15.6 G/DL (ref 13.3–17.7)
IMM GRANULOCYTES # BLD: 0 10E3/UL
IMM GRANULOCYTES NFR BLD: 0 %
LYMPHOCYTES # BLD AUTO: 2.7 10E3/UL (ref 0.8–5.3)
LYMPHOCYTES NFR BLD AUTO: 34 %
MCH RBC QN AUTO: 30.5 PG (ref 26.5–33)
MCHC RBC AUTO-ENTMCNC: 32.2 G/DL (ref 31.5–36.5)
MCV RBC AUTO: 95 FL (ref 78–100)
MONOCYTES # BLD AUTO: 0.6 10E3/UL (ref 0–1.3)
MONOCYTES NFR BLD AUTO: 8 %
NEUTROPHILS # BLD AUTO: 4.3 10E3/UL (ref 1.6–8.3)
NEUTROPHILS NFR BLD AUTO: 55 %
NRBC # BLD AUTO: 0 10E3/UL
NRBC BLD AUTO-RTO: 0 /100
PLATELET # BLD AUTO: 223 10E3/UL (ref 150–450)
POTASSIUM SERPL-SCNC: 5.1 MMOL/L (ref 3.4–5.3)
PROT SERPL-MCNC: 8.1 G/DL (ref 6.4–8.3)
RBC # BLD AUTO: 5.12 10E6/UL (ref 4.4–5.9)
SODIUM SERPL-SCNC: 140 MMOL/L (ref 136–145)
WBC # BLD AUTO: 7.9 10E3/UL (ref 4–11)

## 2023-03-27 PROCEDURE — G0463 HOSPITAL OUTPT CLINIC VISIT: HCPCS | Mod: 25 | Performed by: NURSE PRACTITIONER

## 2023-03-27 PROCEDURE — 96523 IRRIG DRUG DELIVERY DEVICE: CPT

## 2023-03-27 PROCEDURE — 99207 PR NON-BILLABLE SERV PER CHARTING: CPT

## 2023-03-27 PROCEDURE — 74270 X-RAY XM COLON 1CNTRST STD: CPT

## 2023-03-27 PROCEDURE — 80053 COMPREHEN METABOLIC PANEL: CPT

## 2023-03-27 PROCEDURE — 36415 COLL VENOUS BLD VENIPUNCTURE: CPT

## 2023-03-27 PROCEDURE — 85025 COMPLETE CBC W/AUTO DIFF WBC: CPT

## 2023-03-27 PROCEDURE — 99215 OFFICE O/P EST HI 40 MIN: CPT | Performed by: NURSE PRACTITIONER

## 2023-03-27 PROCEDURE — 250N000011 HC RX IP 250 OP 636: Performed by: NURSE PRACTITIONER

## 2023-03-27 PROCEDURE — G0463 HOSPITAL OUTPT CLINIC VISIT: HCPCS | Performed by: NURSE PRACTITIONER

## 2023-03-27 RX ORDER — HEPARIN SODIUM (PORCINE) LOCK FLUSH IV SOLN 100 UNIT/ML 100 UNIT/ML
5 SOLUTION INTRAVENOUS
Status: DISCONTINUED | OUTPATIENT
Start: 2023-03-27 | End: 2023-03-27 | Stop reason: HOSPADM

## 2023-03-27 RX ORDER — HEPARIN SODIUM (PORCINE) LOCK FLUSH IV SOLN 100 UNIT/ML 100 UNIT/ML
SOLUTION INTRAVENOUS
Status: DISCONTINUED
Start: 2023-03-27 | End: 2023-03-27 | Stop reason: HOSPADM

## 2023-03-27 RX ORDER — HEPARIN SODIUM,PORCINE 10 UNIT/ML
5 VIAL (ML) INTRAVENOUS
Status: CANCELLED | OUTPATIENT
Start: 2023-03-27

## 2023-03-27 RX ORDER — HEPARIN SODIUM (PORCINE) LOCK FLUSH IV SOLN 100 UNIT/ML 100 UNIT/ML
5 SOLUTION INTRAVENOUS
Status: CANCELLED | OUTPATIENT
Start: 2023-03-27

## 2023-03-27 RX ADMIN — DIATRIZOATE MEGLUMINE AND DIATRIZOATE SODIUM 480 ML: 660; 100 SOLUTION ORAL; RECTAL at 10:56

## 2023-03-27 RX ADMIN — Medication 5 ML: at 15:27

## 2023-03-27 ASSESSMENT — PAIN SCALES - GENERAL: PAINLEVEL: MODERATE PAIN (5)

## 2023-03-27 NOTE — TELEPHONE ENCOUNTER
RN Care Coordinator: Dory Bynum    Surgery is scheduled with Dr. Magdaleno   Date: 5/3   Location: [facility]  Scheduled per: next available date      H&P to be completed by PAC clinic on 4/12, as well as LABS     Post-op: 5/17, in person visit with Karla Birmingham NP, and then again on 6/1 with Dr. Magdaleno.    Patient will receive a phone call from pre-admission nurses 1-2 days prior to surgery with arrival and start time.     Spoke with the patient and was able to confirm all scheduled information.      Patient questions/concerns: N/A       Surgery packet to be provided by the RNCC during appointment    __    Aminata Barreto, on 3/27/2023 on 12:11 PM  P: [phone]

## 2023-03-27 NOTE — LETTER
3/27/2023         RE: Kalin Shepard  [address]  [facility] [address]        Dear Colleague,    Thank you for referring your patient, Kalin Shepard, to the [facility] [place]. Please see a copy of my visit note below.    [facility] Hematology and Oncology Outpatient Progress Note    Patient: Kalin Shepard  MRN: [id number]  Date of Service: Mar 27, 2023          Reason for Visit    1. Stage IV colon cancer (peritoneal mets)    Primary Oncologist: Dr. [place]      Assessment/Plan  1. Stage IV colon cancer (peritoneal mets)  L[place]ton completed 12 cycles of induction/neoadjuvant chemo through Nov, 2022 with an excellent response.  He, therefore, underwent resection of his primary sigmoid/rectal tumor 6 weeks ago. This was grossly removed with negative margins and no nodes involved.     His restaging CT showed no mets or evident residual peritoneal mets.     No symptoms of clinical progression.   He continues struggling with appetite/weight which has been ongoing.     Labs: CBC WNL. Ca 10.6, normal albumin and LFTs. Creatinine 0.84.    Plan:  -Will continue surveillance alone at this time  -Will required close follow-up given high risk for recurrence given peritoneal involvement at time of diagnosis.  -Repeat CT and labs (with CEA) in 2-3 mths with return visit.  Plan labs with CEA/exam every 3 mths and CT every 6 mths.  -Will keep port in place for now. Flush every 4-6 weeks.     2.   Chemo-induced peripheral neuropathy (gr 1)  This is improving. Not painful. He's off gabapentin. Residual gr 1 numbness. May improve further over next 2-3 mths.    3.   Cachexia  Ongoing.   Continues Remeron 15 mg.   Weight is down after surgery, but stabilizing    Plan:  -Continue Remeron to 15 mg  -Work on small/frequent meals/snacks. Supplement diet.     4.   Mild hypercalcemia  Not on any supplements.  Could be a bit on dehydrated side.     Plan:  -Hydrate well  -Recheck 4-6 weeks to  ensure stable/improved    ______________________________________________________________________________    History of Present Illness/ Interval History    Mr. Kalin Shepard  is a 72 year old who was diagnosed with sigmoid colon cancer with peritoneal mets a year ago. He underwent 12 cycles of chemo - FOLFOX + bevacizumab; kranthi was omitted around SBO and Oxaliplatin dose-reduced/later omitted for peripheral neuropathy. At completion of chemo in November, restaging showed excellent response with new clear mets. He, therefore, underwent resection 6 weeks ago of the residual primary tumor with negative margins and no nodes involved. He had a loop ileostomy placed and take-down is planned in early May.      He's been doing pretty well.   Still struggles with appetite and weight loss. He has lost weight after his surgery, but believes this is now stabilizing.   Denies any nausea or vomiting.  Denies any abdominal pain.  His incision has completely healed.  No signs of infection.  Bowels regular via ostomy. No blood in stools.     Mild persistent numbness in fingertips. No pain. No interference with function. Has improved some since off chemo. He stopped gabapentin.       ECOG Performance    1      Oncology History/Treatment  Diagnosis/Stage:   3/26/2022: IV sigmoid colon cancer to peritoneum  -Presented with abdominal pain secondary to bowel obstruction.  CT scan showed mass in sigmoid colon with upstream small bowel and colonic obstruction.  Underwent ostomy placement.    -Peritoneal biopsy positive for adenocarcinoma.    -No evidence of other distant metastasis based on PET scan.  -BRAF V600E positive.  K-aldo and NRAS negative.  Proficient MMR.    8/24/2022 open laparotomy for SBO, lysis of adhesions. No evidence of any malignancy intra-abdominally noted.     2/8/2023 post esther-adjuvant chemo. Surgical resection primary sigmoid/rectal tumor path: 3 cm residual mod-poorly diff adenocarcinoma invading through wall and  involving adjacent adherent perirectal fibroadipose tissue. Margins negative. No lymphovascular nor perineural invasion. No tumor deposits. 0/33 LN involved.       Treatment:  Induction/neoadjuvant chemotherapy started for potentially resectable metachronous peritoneal metastasis    4/26/2022 - 11/2022: FOLFOX and bevacizumab x 12 cycles (Amrita stopped after cycle 7; oxali stopped after cycle 11). At  -cycle 7 delayed for neutropenia. Neulasta added  -cycle 8 delayed for hospitalization with SBO (favoring ileus/adhesions, no evidence of malignancy). Post-op course complicated by peritonitis requiring antibiotics. Temp TPN, then cleared for oral intake.   -Bevacizumab omitted with cycle 8 to allow for healing postmalarial. Resumed with cycle 9.  -cycle 9 oxilaplatin dose-reduced 20% for gr 1-2 neuropathy  -cycle 11 oxaliplatin dose-reduced another 10-15% (33% total from original) for gr 1-2 neuropathy  -cycle 12 infusional 5FU only    2/8/2023: exploratory lap, low anterior resection, takedown loop colostomy, diverting loop ileostomy and flex sigmoidoscopy    Planning for loop ileostomy reversal with CRS (5/3/23)      Physical Exam    GENERAL: Alert and oriented to time place and person. Seated comfortably. In no distress. Thin. Wife accompanies.  HEAD: Atraumatic and normocephalic.   EYES: MARA, EOMI. No erythema. No icterus.  LYMPH NODES: No palpable supraclavicular, cervical lymphadenopathy.  CHEST: clear to auscultation bilaterally. Resonant to percussion throughout bilaterally. Symmetrical breath movements bilaterally.  CVS: RRR  ABDOMEN: Soft. Not tender. Not distended. No palpable hepatomegaly or splenomegaly. No other mass palpable. Bowel sounds present. Ostomy bag in place.  EXTREMITIES: Warm. No peripheral edema. Firm vein without erythema/edema right proximal forearm.   SKIN: no rash, or bruising or purpura.   NEURO: No gross deficit noted. Non-antalgic gait.      Lab Results    Recent Results (from the  past 168 hour(s))   Comprehensive metabolic panel   Result Value Ref Range    Sodium 140 136 - 145 mmol/L    Potassium 5.1 3.4 - 5.3 mmol/L    Chloride 103 98 - 107 mmol/L    Carbon Dioxide (CO2) 27 22 - 29 mmol/L    Anion Gap 10 7 - 15 mmol/L    Urea Nitrogen 42.2 (H) 8.0 - 23.0 mg/dL    Creatinine 0.84 0.67 - 1.17 mg/dL    Calcium 10.6 (H) 8.8 - 10.2 mg/dL    Glucose 91 70 - 99 mg/dL    Alkaline Phosphatase 55 40 - 129 U/L    AST 19 10 - 50 U/L    ALT 20 10 - 50 U/L    Protein Total 8.1 6.4 - 8.3 g/dL    Albumin 4.8 3.5 - 5.2 g/dL    Bilirubin Total 0.4 <=1.2 mg/dL    GFR Estimate >90 >60 mL/min/1.73m2   CBC with platelets and differential   Result Value Ref Range    WBC Count 7.9 4.0 - 11.0 10e3/uL    RBC Count 5.12 4.40 - 5.90 10e6/uL    Hemoglobin 15.6 13.3 - 17.7 g/dL    Hematocrit 48.4 40.0 - 53.0 %    MCV 95 78 - 100 fL    MCH 30.5 26.5 - 33.0 pg    MCHC 32.2 31.5 - 36.5 g/dL    RDW 13.9 10.0 - 15.0 %    Platelet Count 223 150 - 450 10e3/uL    % Neutrophils 55 %    % Lymphocytes 34 %    % Monocytes 8 %    % Eosinophils 2 %    % Basophils 1 %    % Immature Granulocytes 0 %    NRBCs per 100 WBC 0 <1 /100    Absolute Neutrophils 4.3 1.6 - 8.3 10e3/uL    Absolute Lymphocytes 2.7 0.8 - 5.3 10e3/uL    Absolute Monocytes 0.6 0.0 - 1.3 10e3/uL    Absolute Eosinophils 0.1 0.0 - 0.7 10e3/uL    Absolute Basophils 0.1 0.0 - 0.2 10e3/uL    Absolute Immature Granulocytes 0.0 <=0.4 10e3/uL    Absolute NRBCs 0.0 10e3/uL       Imaging    XR Colon Water Soluble Diagnostic    Result Date: 3/27/2023  COLON WATER SOLUBLE DIAGNOSTIC 3/27/2023 10:56 AM HISTORY: Post LAR, evaluate anastomosis. Status post ileostomy (H). Adenocarcinoma of sigmoid colon (H). COMPARISON: CT chest, abdomen and pelvis from 11/27/2022 and 8/26/2022. Rectal MRI from 11/27/2022. TECHNIQUE: A standard single contrast enema was performed with Gastrografin. FLUOROSCOPY TIME: 3.6 minutes NUMBER OF IMAGES: 16 FINDINGS/    IMPRESSION: Initially, the procedure  "was attempted without inflating the balloon. Unfortunately, contrast quickly exited the rectum without adequate opacification of the rectum or rectosigmoid anastomosis. Subsequently, the balloon was slowly and partially inflated in the very low rectum, which was well tolerated by the patient. Following partial inflation of the balloon, the majority of the colon was opacified with contrast to the level of the mid to low ascending colon. The rectosigmoid anastomosis appears as expected without evidence to suggest extravasation of contrast. No colonic stricture, mass, or dilation is seen. Post void pictures demonstrate mild residual contrast material within the colon. No evidence of contrast extravasation on post void imaging.  YAN TURK MD   SYSTEM ID:  [id number]      Billing  Total time 40 minutes, to include face to face visit, review of EMR, ordering, documentation and coordination of care on date of service    Signed by: Olga Lidia Ya NP      Oncology Rooming Note    March 27, 2023 2:48 PM   Kalin Shepard is a 72 year old male who presents for:    Chief Complaint   Patient presents with     Oncology Clinic Visit     Cancer of rectosigmoid (colon) - Labs and provider visit     Initial Vitals: /73 (BP Location: Right arm, Patient Position: Sitting, Cuff Size: Adult Regular)   Pulse 83   Temp 98.1  F (36.7  C) (Tympanic)   Resp 12   Ht 1.803 m (5' 11\")   Wt 60.8 kg (134 lb)   SpO2 98%   BMI 18.69 kg/m   Estimated body mass index is 18.69 kg/m  as calculated from the following:    Height as of this encounter: 1.803 m (5' 11\").    Weight as of this encounter: 60.8 kg (134 lb). Body surface area is 1.75 meters squared.  Moderate Pain (5) Comment: Data Unavailable   No LMP for male patient.  Allergies reviewed: Yes  Medications reviewed: Yes    Medications: Medication refills not needed today.  Pharmacy name entered into Nourish:    [id number] - [place], [place] - [address] [facility] [address]  [place], [place] - [address] [place] [address]    Clinical concerns:  None      Sara Almanzar, [facility]                Again, thank you for allowing me to participate in the care of your patient.        Sincerely,        Olga Lidia Ya, NP

## 2023-03-27 NOTE — PROGRESS NOTES
[facility] Hematology and Oncology Outpatient Progress Note    Patient: Kalin Shepard  MRN: [id number]  Date of Service: Mar 27, 2023          Reason for Visit    1. Stage IV colon cancer (peritoneal mets)    Primary Oncologist: Dr. [place]      Assessment/Plan  1. Stage IV colon cancer (peritoneal mets)  L[place]ton completed 12 cycles of induction/neoadjuvant chemo through Nov, 2022 with an excellent response.  He, therefore, underwent resection of his primary sigmoid/rectal tumor 6 weeks ago. This was grossly removed with negative margins and no nodes involved.     His restaging CT showed no mets or evident residual peritoneal mets.     No symptoms of clinical progression.   He continues struggling with appetite/weight which has been ongoing.     Labs: CBC WNL. Ca 10.6, normal albumin and LFTs. Creatinine 0.84.    Plan:  -Will continue surveillance alone at this time  -Will required close follow-up given high risk for recurrence given peritoneal involvement at time of diagnosis.  -Repeat CT and labs (with CEA) in 2-3 mths with return visit.  Plan labs with CEA/exam every 3 mths and CT every 6 mths.  -Will keep port in place for now. Flush every 4-6 weeks.     2.   Chemo-induced peripheral neuropathy (gr 1)  This is improving. Not painful. He's off gabapentin. Residual gr 1 numbness. May improve further over next 2-3 mths.    3.   Cachexia  Ongoing.   Continues Remeron 15 mg.   Weight is down after surgery, but stabilizing    Plan:  -Continue Remeron to 15 mg  -Work on small/frequent meals/snacks. Supplement diet.     4.   Mild hypercalcemia  Not on any supplements.  Could be a bit on dehydrated side.     Plan:  -Hydrate well  -Recheck 4-6 weeks to ensure stable/improved    ______________________________________________________________________________    History of Present Illness/ Interval History    Mr. Kalin Shepard  is a 72 year old who was diagnosed with sigmoid colon cancer with peritoneal mets a  year ago. He underwent 12 cycles of chemo - FOLFOX + bevacizumab; kranthi was omitted around SBO and Oxaliplatin dose-reduced/later omitted for peripheral neuropathy. At completion of chemo in November, restaging showed excellent response with new clear mets. He, therefore, underwent resection 6 weeks ago of the residual primary tumor with negative margins and no nodes involved. He had a loop ileostomy placed and take-down is planned in early May.      He's been doing pretty well.   Still struggles with appetite and weight loss. He has lost weight after his surgery, but believes this is now stabilizing.   Denies any nausea or vomiting.  Denies any abdominal pain.  His incision has completely healed.  No signs of infection.  Bowels regular via ostomy. No blood in stools.     Mild persistent numbness in fingertips. No pain. No interference with function. Has improved some since off chemo. He stopped gabapentin.       ECOG Performance    1      Oncology History/Treatment  Diagnosis/Stage:   3/26/2022: IV sigmoid colon cancer to peritoneum  -Presented with abdominal pain secondary to bowel obstruction.  CT scan showed mass in sigmoid colon with upstream small bowel and colonic obstruction.  Underwent ostomy placement.    -Peritoneal biopsy positive for adenocarcinoma.    -No evidence of other distant metastasis based on PET scan.  -BRAF V600E positive.  K-aldo and NRAS negative.  Proficient MMR.    8/24/2022 open laparotomy for SBO, lysis of adhesions. No evidence of any malignancy intra-abdominally noted.     2/8/2023 post esther-adjuvant chemo. Surgical resection primary sigmoid/rectal tumor path: 3 cm residual mod-poorly diff adenocarcinoma invading through wall and involving adjacent adherent perirectal fibroadipose tissue. Margins negative. No lymphovascular nor perineural invasion. No tumor deposits. 0/33 LN involved.       Treatment:  Induction/neoadjuvant chemotherapy started for potentially resectable metachronous  peritoneal metastasis    4/26/2022 - 11/2022: FOLFOX and bevacizumab x 12 cycles (Amrita stopped after cycle 7; oxali stopped after cycle 11). At  -cycle 7 delayed for neutropenia. Neulasta added  -cycle 8 delayed for hospitalization with SBO (favoring ileus/adhesions, no evidence of malignancy). Post-op course complicated by peritonitis requiring antibiotics. Temp TPN, then cleared for oral intake.   -Bevacizumab omitted with cycle 8 to allow for healing postmalarial. Resumed with cycle 9.  -cycle 9 oxilaplatin dose-reduced 20% for gr 1-2 neuropathy  -cycle 11 oxaliplatin dose-reduced another 10-15% (33% total from original) for gr 1-2 neuropathy  -cycle 12 infusional 5FU only    2/8/2023: exploratory lap, low anterior resection, takedown loop colostomy, diverting loop ileostomy and flex sigmoidoscopy    Planning for loop ileostomy reversal with CRS (5/3/23)      Physical Exam    GENERAL: Alert and oriented to time place and person. Seated comfortably. In no distress. Thin. Wife accompanies.  HEAD: Atraumatic and normocephalic.   EYES: MARA, EOMI. No erythema. No icterus.  LYMPH NODES: No palpable supraclavicular, cervical lymphadenopathy.  CHEST: clear to auscultation bilaterally. Resonant to percussion throughout bilaterally. Symmetrical breath movements bilaterally.  CVS: RRR  ABDOMEN: Soft. Not tender. Not distended. No palpable hepatomegaly or splenomegaly. No other mass palpable. Bowel sounds present. Ostomy bag in place.  EXTREMITIES: Warm. No peripheral edema. Firm vein without erythema/edema right proximal forearm.   SKIN: no rash, or bruising or purpura.   NEURO: No gross deficit noted. Non-antalgic gait.      Lab Results    Recent Results (from the past 168 hour(s))   Comprehensive metabolic panel   Result Value Ref Range    Sodium 140 136 - 145 mmol/L    Potassium 5.1 3.4 - 5.3 mmol/L    Chloride 103 98 - 107 mmol/L    Carbon Dioxide (CO2) 27 22 - 29 mmol/L    Anion Gap 10 7 - 15 mmol/L    Urea Nitrogen  42.2 (H) 8.0 - 23.0 mg/dL    Creatinine 0.84 0.67 - 1.17 mg/dL    Calcium 10.6 (H) 8.8 - 10.2 mg/dL    Glucose 91 70 - 99 mg/dL    Alkaline Phosphatase 55 40 - 129 U/L    AST 19 10 - 50 U/L    ALT 20 10 - 50 U/L    Protein Total 8.1 6.4 - 8.3 g/dL    Albumin 4.8 3.5 - 5.2 g/dL    Bilirubin Total 0.4 <=1.2 mg/dL    GFR Estimate >90 >60 mL/min/1.73m2   CBC with platelets and differential   Result Value Ref Range    WBC Count 7.9 4.0 - 11.0 10e3/uL    RBC Count 5.12 4.40 - 5.90 10e6/uL    Hemoglobin 15.6 13.3 - 17.7 g/dL    Hematocrit 48.4 40.0 - 53.0 %    MCV 95 78 - 100 fL    MCH 30.5 26.5 - 33.0 pg    MCHC 32.2 31.5 - 36.5 g/dL    RDW 13.9 10.0 - 15.0 %    Platelet Count 223 150 - 450 10e3/uL    % Neutrophils 55 %    % Lymphocytes 34 %    % Monocytes 8 %    % Eosinophils 2 %    % Basophils 1 %    % Immature Granulocytes 0 %    NRBCs per 100 WBC 0 <1 /100    Absolute Neutrophils 4.3 1.6 - 8.3 10e3/uL    Absolute Lymphocytes 2.7 0.8 - 5.3 10e3/uL    Absolute Monocytes 0.6 0.0 - 1.3 10e3/uL    Absolute Eosinophils 0.1 0.0 - 0.7 10e3/uL    Absolute Basophils 0.1 0.0 - 0.2 10e3/uL    Absolute Immature Granulocytes 0.0 <=0.4 10e3/uL    Absolute NRBCs 0.0 10e3/uL       Imaging    XR Colon Water Soluble Diagnostic    Result Date: 3/27/2023  COLON WATER SOLUBLE DIAGNOSTIC 3/27/2023 10:56 AM HISTORY: Post LAR, evaluate anastomosis. Status post ileostomy (H). Adenocarcinoma of sigmoid colon (H). COMPARISON: CT chest, abdomen and pelvis from 11/27/2022 and 8/26/2022. Rectal MRI from 11/27/2022. TECHNIQUE: A standard single contrast enema was performed with Gastrografin. FLUOROSCOPY TIME: 3.6 minutes NUMBER OF IMAGES: 16 FINDINGS/    IMPRESSION: Initially, the procedure was attempted without inflating the balloon. Unfortunately, contrast quickly exited the rectum without adequate opacification of the rectum or rectosigmoid anastomosis. Subsequently, the balloon was slowly and partially inflated in the very low rectum, which was  well tolerated by the patient. Following partial inflation of the balloon, the majority of the colon was opacified with contrast to the level of the mid to low ascending colon. The rectosigmoid anastomosis appears as expected without evidence to suggest extravasation of contrast. No colonic stricture, mass, or dilation is seen. Post void pictures demonstrate mild residual contrast material within the colon. No evidence of contrast extravasation on post void imaging.  YAN TURK MD   SYSTEM ID:  [id number]      Billing  Total time 40 minutes, to include face to face visit, review of EMR, ordering, documentation and coordination of care on date of service    Signed by: Olga Lidia Ya NP

## 2023-03-27 NOTE — PROGRESS NOTES
"Oncology Rooming Note    March 27, 2023 2:48 PM   Kalin Shpeard is a 72 year old male who presents for:    Chief Complaint   Patient presents with     Oncology Clinic Visit     Cancer of rectosigmoid (colon) - Labs and provider visit     Initial Vitals: /73 (BP Location: Right arm, Patient Position: Sitting, Cuff Size: Adult Regular)   Pulse 83   Temp 98.1  F (36.7  C) (Tympanic)   Resp 12   Ht 1.803 m (5' 11\")   Wt 60.8 kg (134 lb)   SpO2 98%   BMI 18.69 kg/m   Estimated body mass index is 18.69 kg/m  as calculated from the following:    Height as of this encounter: 1.803 m (5' 11\").    Weight as of this encounter: 60.8 kg (134 lb). Body surface area is 1.75 meters squared.  Moderate Pain (5) Comment: Data Unavailable   No LMP for male patient.  Allergies reviewed: Yes  Medications reviewed: Yes    Medications: Medication refills not needed today.  Pharmacy name entered into [facility]:    [id number] - [place], [place] - [address]  [place], [place] - [address] [place] [address]    Clinical concerns:  None      Sara Almanzar CMA            "

## 2023-03-28 NOTE — TELEPHONE ENCOUNTER
FUTURE VISIT INFORMATION      SURGERY INFORMATION:    Date: 5/3/23    Location: [facility] or    Surgeon:  Riley Magdaleno MD    Anesthesia Type:  general    Procedure: CLOSURE, ILEOSTOMY, OPEN     Consult: 3/16/23    RECORDS REQUESTED FROM:       Primary Care Provider: Segundo Bentley MD- [facility]    Most recent EKG+ Tracing: 3/26/22

## 2023-04-05 DIAGNOSIS — Z93.2 S/P ILEOSTOMY (H): ICD-10-CM

## 2023-04-05 DIAGNOSIS — R19.8 HIGH OUTPUT ILEOSTOMY (H): ICD-10-CM

## 2023-04-05 DIAGNOSIS — Z93.2 HIGH OUTPUT ILEOSTOMY (H): ICD-10-CM

## 2023-04-05 RX ORDER — LOPERAMIDE HCL 2 MG
2 CAPSULE ORAL 3 TIMES DAILY
Qty: 120 CAPSULE | Refills: 4 | Status: ON HOLD | OUTPATIENT
Start: 2023-04-05 | End: 2023-05-05

## 2023-04-10 ENCOUNTER — MEDICAL CORRESPONDENCE (OUTPATIENT)
Dept: HEALTH INFORMATION MANAGEMENT | Facility: CLINIC | Age: 73
End: 2023-04-10
Payer: COMMERCIAL

## 2023-04-11 DIAGNOSIS — C19 CANCER OF RECTOSIGMOID (COLON) (H): ICD-10-CM

## 2023-04-11 RX ORDER — METHOCARBAMOL 500 MG/1
500 TABLET, FILM COATED ORAL 3 TIMES DAILY
Qty: 90 TABLET | Refills: 0 | Status: ON HOLD | OUTPATIENT
Start: 2023-04-11 | End: 2023-05-05

## 2023-04-11 RX ORDER — GABAPENTIN 100 MG/1
100 CAPSULE ORAL 3 TIMES DAILY
Qty: 90 CAPSULE | Refills: 0 | Status: SHIPPED | OUTPATIENT
Start: 2023-04-11 | End: 2023-04-13

## 2023-04-13 ENCOUNTER — PRE VISIT (OUTPATIENT)
Dept: SURGERY | Facility: CLINIC | Age: 73
End: 2023-04-13

## 2023-04-13 ENCOUNTER — OFFICE VISIT (OUTPATIENT)
Dept: SURGERY | Facility: CLINIC | Age: 73
End: 2023-04-13
Payer: COMMERCIAL

## 2023-04-13 ENCOUNTER — ANESTHESIA EVENT (OUTPATIENT)
Dept: SURGERY | Facility: CLINIC | Age: 73
End: 2023-04-13

## 2023-04-13 ENCOUNTER — LAB (OUTPATIENT)
Dept: LAB | Facility: CLINIC | Age: 73
End: 2023-04-13
Payer: COMMERCIAL

## 2023-04-13 VITALS
WEIGHT: 131 LBS | SYSTOLIC BLOOD PRESSURE: 107 MMHG | BODY MASS INDEX: 18.34 KG/M2 | HEIGHT: 71 IN | DIASTOLIC BLOOD PRESSURE: 79 MMHG | TEMPERATURE: 97.9 F | OXYGEN SATURATION: 100 % | HEART RATE: 94 BPM | RESPIRATION RATE: 16 BRPM

## 2023-04-13 DIAGNOSIS — E83.52 HYPERCALCEMIA: ICD-10-CM

## 2023-04-13 DIAGNOSIS — C18.7 ADENOCARCINOMA OF SIGMOID COLON (H): ICD-10-CM

## 2023-04-13 DIAGNOSIS — Z93.2 S/P ILEOSTOMY (H): ICD-10-CM

## 2023-04-13 DIAGNOSIS — Z09 FOLLOW-UP EXAMINATION AFTER COLORECTAL SURGERY: ICD-10-CM

## 2023-04-13 DIAGNOSIS — R79.1 ABNORMAL COAGULATION PROFILE: ICD-10-CM

## 2023-04-13 DIAGNOSIS — Z01.818 PRE-OP EVALUATION: Primary | ICD-10-CM

## 2023-04-13 LAB
ALBUMIN SERPL BCG-MCNC: 5 G/DL (ref 3.5–5.2)
ALP SERPL-CCNC: 59 U/L (ref 40–129)
ALT SERPL W P-5'-P-CCNC: 19 U/L (ref 10–50)
ANION GAP SERPL CALCULATED.3IONS-SCNC: 10 MMOL/L (ref 7–15)
APTT PPP: 28 SECONDS (ref 22–38)
AST SERPL W P-5'-P-CCNC: 21 U/L (ref 10–50)
BASOPHILS # BLD AUTO: 0.1 10E3/UL (ref 0–0.2)
BASOPHILS NFR BLD AUTO: 1 %
BILIRUB SERPL-MCNC: 0.3 MG/DL
BUN SERPL-MCNC: 29.2 MG/DL (ref 8–23)
CA-I BLD-MCNC: 5.4 MG/DL (ref 4.4–5.2)
CALCIUM SERPL-MCNC: 10.6 MG/DL (ref 8.8–10.2)
CHLORIDE SERPL-SCNC: 105 MMOL/L (ref 98–107)
CREAT SERPL-MCNC: 0.92 MG/DL (ref 0.67–1.17)
DEPRECATED HCO3 PLAS-SCNC: 25 MMOL/L (ref 22–29)
EOSINOPHIL # BLD AUTO: 0.2 10E3/UL (ref 0–0.7)
EOSINOPHIL NFR BLD AUTO: 3 %
ERYTHROCYTE [DISTWIDTH] IN BLOOD BY AUTOMATED COUNT: 14.8 % (ref 10–15)
GFR SERPL CREATININE-BSD FRML MDRD: 88 ML/MIN/1.73M2
GLUCOSE SERPL-MCNC: 83 MG/DL (ref 70–99)
HCT VFR BLD AUTO: 51.7 % (ref 40–53)
HGB BLD-MCNC: 16.4 G/DL (ref 13.3–17.7)
IMM GRANULOCYTES # BLD: 0 10E3/UL
IMM GRANULOCYTES NFR BLD: 0 %
INR PPP: 0.88 (ref 0.85–1.15)
LYMPHOCYTES # BLD AUTO: 2.3 10E3/UL (ref 0.8–5.3)
LYMPHOCYTES NFR BLD AUTO: 32 %
MCH RBC QN AUTO: 30 PG (ref 26.5–33)
MCHC RBC AUTO-ENTMCNC: 31.7 G/DL (ref 31.5–36.5)
MCV RBC AUTO: 95 FL (ref 78–100)
MONOCYTES # BLD AUTO: 0.6 10E3/UL (ref 0–1.3)
MONOCYTES NFR BLD AUTO: 8 %
NEUTROPHILS # BLD AUTO: 4 10E3/UL (ref 1.6–8.3)
NEUTROPHILS NFR BLD AUTO: 56 %
NRBC # BLD AUTO: 0 10E3/UL
NRBC BLD AUTO-RTO: 0 /100
PLATELET # BLD AUTO: 217 10E3/UL (ref 150–450)
POTASSIUM SERPL-SCNC: 5.6 MMOL/L (ref 3.4–5.3)
PROT SERPL-MCNC: 8.6 G/DL (ref 6.4–8.3)
RBC # BLD AUTO: 5.46 10E6/UL (ref 4.4–5.9)
SODIUM SERPL-SCNC: 140 MMOL/L (ref 136–145)
WBC # BLD AUTO: 7.1 10E3/UL (ref 4–11)

## 2023-04-13 PROCEDURE — 36415 COLL VENOUS BLD VENIPUNCTURE: CPT | Performed by: PATHOLOGY

## 2023-04-13 PROCEDURE — 85025 COMPLETE CBC W/AUTO DIFF WBC: CPT | Performed by: PATHOLOGY

## 2023-04-13 PROCEDURE — 82330 ASSAY OF CALCIUM: CPT | Performed by: PATHOLOGY

## 2023-04-13 PROCEDURE — 85730 THROMBOPLASTIN TIME PARTIAL: CPT | Performed by: PATHOLOGY

## 2023-04-13 PROCEDURE — 85610 PROTHROMBIN TIME: CPT | Performed by: PATHOLOGY

## 2023-04-13 PROCEDURE — 84134 ASSAY OF PREALBUMIN: CPT | Mod: 90 | Performed by: PATHOLOGY

## 2023-04-13 PROCEDURE — 99214 OFFICE O/P EST MOD 30 MIN: CPT | Mod: 24 | Performed by: PHYSICIAN ASSISTANT

## 2023-04-13 PROCEDURE — 99000 SPECIMEN HANDLING OFFICE-LAB: CPT | Performed by: PATHOLOGY

## 2023-04-13 PROCEDURE — 80053 COMPREHEN METABOLIC PANEL: CPT | Performed by: PATHOLOGY

## 2023-04-13 ASSESSMENT — ENCOUNTER SYMPTOMS
ORTHOPNEA: 0
SEIZURES: 0

## 2023-04-13 ASSESSMENT — PAIN SCALES - GENERAL: PAINLEVEL: MILD PAIN (3)

## 2023-04-13 ASSESSMENT — LIFESTYLE VARIABLES: TOBACCO_USE: 1

## 2023-04-13 ASSESSMENT — COPD QUESTIONNAIRES
CAT_SEVERITY: MILD
COPD: 1

## 2023-04-13 NOTE — H&P
Pre-Operative H & P     CC:  Preoperative exam to assess for increased cardiopulmonary risk while undergoing surgery and anesthesia.    Date of Encounter: 4/13/2023  Primary Care Physician:  Jordyn Contreras [facility]     Reason for visit:   Encounter Diagnoses   Name Primary?     Pre-op evaluation Yes     S/P ileostomy (H)      Adenocarcinoma of sigmoid colon (H)        HPI  Kalin Shepard is a 72 year old male who presents for pre-operative H & P in preparation for  Procedure Information     Date/Time: 5/3/2023 1:40 PM     Procedure: Closure, ileostomy, open    Anesthesia type: General    Pre-op diagnosis: S/p ileostomy; Adenocarcinoma of sigmoid colon; Follow-up examination after colorectal surgery; Abnormal coagulation profile    Location: [facility]    Providers: Riley Magdaleno MD        Patient is being evaluated for comorbid conditions of tobacco use, post-herpetic neuralgia, colostomy status and alcohol dependence in remission.     The patient underwent emergent exploratory laparotomy, small bowel resection, and loop descending colostomy in March 2022 followed by 12 rounds of chemotherapy for rectosigmoid cancer. A follow-up MRI showed residual tumor and he is now s/p exploratory laparotomy, lysis of adhesions, low anterior resection, takedown of loop colostomy, diverting loop ileostomy, and flexible sigmoidoscopy on 2/8/2023. He was seen in follow-up with Dr. Magdaleno on 3/16/2023 and was noted to be doing well. A plan was made at that visit to proceed with ileostomy reversal as scheduled above.      History is obtained from the patient, his wife, and chart review    Hx of abnormal bleeding or anti-platelet use: None      Past Medical History  Past Medical History:   Diagnosis Date     Cancer (H)        Past Surgical History  Past Surgical History:   Procedure Laterality Date     COLECTOMY LEFT N/A 2/8/2023    Procedure:  Exploratory Laparotomy, lysis of adhesions, Low Anterior Resection, take down of loop colostomy,  DIVERTING LOOP ILEOSTOMY;  Surgeon: Riley Magdaleno MD;  Location: [facility] OR     COMBINED CYSTOSCOPY, INSERT STENT URETER(S) Bilateral 2/8/2023    Procedure: CYSTOSCOPY, WITH URETERAL STENT INSERTION [[id number]];  Surgeon: Ulises Basilio MD;  Location: [facility] OR     INSERT PORT VASCULAR ACCESS Right 4/21/2022    Procedure: INSERTION, VASCULAR ACCESS PORT;  Surgeon: Marianne Schroeder MD;  Location: [facility]     INSERT PORT VASCULAR ACCESS Left 6/2/2022    Procedure: INSERTION, VASCULAR ACCESS PORT;  Surgeon: Phong Finch MD;  Location: [facility] OR     IR CHEST PORT PLACEMENT > 5 YRS OF AGE  6/2/2022     LAPAROTOMY EXPLORATORY N/A 3/26/2022    Procedure: exploratory laparotomy, small bowel resection, colostomy creation;  Surgeon: Riley Magdaleno MD;  Location: [facility] OR     LAPAROTOMY, LYSIS ADHESIONS, COMBINED N/A 8/24/2022    Procedure: LAPAROTOMY, EXPLORATORY, WITH LYSIS OF ADHESIONS;  Surgeon: Segundo Hill MD;  Location: [facility]     PICC INSERTION - DOUBLE LUMEN Left 03/28/2022    left medial brachial 5 fr dl picc 49 cm     SIGMOIDOSCOPY FLEXIBLE N/A 3/26/2022    Procedure: Sigmoidoscopy flexible;  Surgeon: Riley Magdaleno MD;  Location: [facility] OR     SIGMOIDOSCOPY FLEXIBLE N/A 2/8/2023    Procedure: Sigmoidoscopy flexible;  Surgeon: Riley Magdaleno MD;  Location: [facility] OR       Prior to Admission Medications  Current Outpatient Medications   Medication Sig Dispense Refill     acetaminophen (TYLENOL) 500 MG tablet Take 2 tablets (1,000 mg) by mouth 4 times daily 56 tablet 0     loperamide (IMODIUM) 2 MG capsule Take 1 capsule (2 mg) by mouth 3 times daily 120 capsule 4     methocarbamol (ROBAXIN) 500 MG tablet Take 1 tablet (500 mg) by mouth 3 times daily for 30 days 90 tablet 0     mirtazapine (REMERON) 15 MG tablet Take 1 tablet (15 mg) by mouth At Bedtime 90 tablet 3     multivitamin (CENTRUM SILVER) tablet  "Take 1 tablet by mouth every morning 30 tablet      omeprazole (PRILOSEC) 20 MG DR capsule Take 1 capsule (20 mg) by mouth 2 times daily 60 capsule 3     heparin 100 UNIT/ML SOLN injection 5-10 mLs by Intracatheter route every 28 days (Patient not taking: Reported on 4/13/2023)       Ostomy Supplies MISC 1 each Every Mon, Wed, Fri Morning Hollister1 piece flat fecal with filter #6601 20 pouches per month   2\" barrier ring #4044 (1 per pouch)   Adapt powder #7906   No sting film barrier # 2695   Adapt odor eliminator and lubricant 236ml bottle # 69523   M-9 Spray room deodorizer #6332     1 each 11     psyllium (METAMUCIL/KONSYL) Packet Take 1 packet by mouth 2 times daily (Patient not taking: Reported on 3/27/2023) 60 packet 0       Allergies  No Known Allergies    Social History  Social History     Socioeconomic History     Marital status:      Spouse name: Not on file     Number of children: 3     Years of education: Not on file     Highest education level: Not on file   Occupational History     Occupation: retired   Tobacco Use     Smoking status: Every Day     Packs/day: 0.50     Years: 50.00     Pack years: 25.00     Types: Cigarettes     Smokeless tobacco: Never   Vaping Use     Vaping status: Never Used   Substance and Sexual Activity     Alcohol use: Not Currently     Drug use: Never     Sexual activity: Not on file   Other Topics Concern     Not on file   Social History Narrative     Not on file     Social Determinants of Health     Financial Resource Strain: Not on file   Food Insecurity: Not on file   Transportation Needs: Not on file   Physical Activity: Not on file   Stress: Not on file   Social Connections: Not on file   Intimate Partner Violence: Not on file   Housing Stability: Not on file       Family History  Family History   Problem Relation Age of Onset     No Known Problems Mother      Prostate Cancer Father      Colon Cancer Father      Lymphoma Father      Anesthesia Reaction No family " hx of      Thrombosis No family hx of        Review of Systems  The complete review of systems is negative other than noted in the HPI or here.   Anesthesia Evaluation   Pt has had prior anesthetic. Type: General and MAC.    No history of anesthetic complications       ROS/MED HX  ENT/Pulmonary:     (+) tobacco use, Current use, mild,  COPD,  (-) LAN risk factors and recent URI   Neurologic: Comment: Post-herpetic neuralgia T3 right side    (+) peripheral neuropathy, - bilateral hands.  (-) no seizures and no CVA   Cardiovascular:     (+) -----Previous cardiac testing   Echo: Date: Results:    Stress Test: Date: Results:    ECG Reviewed: Date: 3/27/2022 Results:  Sinus rhythm with Premature atrial complexes   Rightward axis   Abnormal ECG    No previous ECGs available  Cath: Date: Results:   (-) hypertension, JARAMILLO, orthopnea/PND and irregular heartbeat/palpitations   METS/Exercise Tolerance: 4 - Raking leaves, gardening Comment: Limited due to pain at ostomy site. Participates in heavy housework such as vacuuming, steaming floors.   Hematologic:  - neg hematologic  ROS  (-) history of blood clots and history of blood transfusion   Musculoskeletal:  - neg musculoskeletal ROS     GI/Hepatic: Comment: Colostomy in place, history of SBO. Has been experiencing fairly high ostomy output.    (+) GERD, Asymptomatic on medication,  (-) liver disease   Renal/Genitourinary: Comment: Urinary frequency and urgency - never diagnosed with BPH.  Denies any retention symptoms.    (-) renal disease   Endo:  - neg endo ROS  (-) Type II DM and thyroid disease   Psychiatric/Substance Use:     (+) alcohol abuse     Infectious Disease:  - neg infectious disease ROS  (-) Recent Fever   Malignancy: Comment: Adenocarcinoma of sigmoid colon  (+) Malignancy, History of GI.GI CA  Active status post Surgery and Chemo.        Other:  - neg other ROS          /79 (BP Location: Right arm, Patient Position: Sitting, Cuff Size: Adult Regular)    "Pulse 94   Temp 97.9  F (36.6  C) (Oral)   Resp 16   Ht 1.803 m (5' 11\")   Wt 59.4 kg (131 lb)   SpO2 100%   BMI 18.27 kg/m      Physical Exam   Constitutional: No apparent distress and appears stated age.  Eyes: Pupils equal, round and reactive to light, extra ocular muscles intact, sclera clear, conjunctiva normal.  HENT: Normocephalic, oral pharynx with moist mucus membranes, poor dentition, missing several teeth. No goiter appreciated.   Respiratory: Clear to auscultation bilaterally, no crackles or wheezing.  Cardiovascular: Regular rate and rhythm, normal S1 and S2, and no murmur noted.  Carotids +2, no bruits. No edema. Palpable pulses to radial  DP and PT arteries.   GI: Normal bowel sounds, soft, non-distended, non-tender, ileostomy present.  Lymph/Hematologic: No cervical lymphadenopathy and no supraclavicular lymphadenopathy.  Genitourinary:  Deferred  Skin: Warm and dry.  No rashes on visible skin.   Musculoskeletal: Full ROM of neck. There is no redness, warmth, or swelling of exposed joints. Gross motor strength is normal.    Neurologic: Awake, alert, oriented to name, place and time. Cranial nerves II-XII are grossly intact. Gait is normal.   Neuropsychiatric: Calm, cooperative. Normal affect.     Prior Labs/Diagnostic Studies   All labs and imaging personally reviewed    Latest Reference Range & Units 03/27/23 14:16   Sodium 136 - 145 mmol/L 140   Potassium 3.4 - 5.3 mmol/L 5.1   Chloride 98 - 107 mmol/L 103   Carbon Dioxide (CO2) 22 - 29 mmol/L 27   Urea Nitrogen 8.0 - 23.0 mg/dL 42.2 (H)   Creatinine 0.67 - 1.17 mg/dL 0.84   GFR Estimate >60 mL/min/1.73m2 >90   Calcium 8.8 - 10.2 mg/dL 10.6 (H)   Anion Gap 7 - 15 mmol/L 10   Albumin 3.5 - 5.2 g/dL 4.8   Protein Total 6.4 - 8.3 g/dL 8.1   Alkaline Phosphatase 40 - 129 U/L 55   ALT 10 - 50 U/L 20   AST 10 - 50 U/L 19   Bilirubin Total <=1.2 mg/dL 0.4   Glucose 70 - 99 mg/dL 91   WBC 4.0 - 11.0 10e3/uL 7.9   Hemoglobin 13.3 - 17.7 g/dL 15.6 "   Hematocrit 40.0 - 53.0 % 48.4   Platelet Count 150 - 450 10e3/uL 223   RBC Count 4.40 - 5.90 10e6/uL 5.12   MCV 78 - 100 fL 95   MCH 26.5 - 33.0 pg 30.5   MCHC 31.5 - 36.5 g/dL 32.2   RDW 10.0 - 15.0 % 13.9   % Neutrophils % 55   % Lymphocytes % 34   % Monocytes % 8   % Eosinophils % 2   % Basophils % 1   Absolute Basophils 0.0 - 0.2 10e3/uL 0.1   Absolute Eosinophils 0.0 - 0.7 10e3/uL 0.1   Absolute Immature Granulocytes <=0.4 10e3/uL 0.0   Absolute Lymphocytes 0.8 - 5.3 10e3/uL 2.7   Absolute Monocytes 0.0 - 1.3 10e3/uL 0.6   % Immature Granulocytes % 0   Absolute Neutrophils 1.6 - 8.3 10e3/uL 4.3   Absolute NRBCs 10e3/uL 0.0   NRBCs per 100 WBC <1 /100 0   (H): Data is abnormally high    EKG/ stress test - if available please see in ROS above   No results found.    XR Colon Water Soluble 3/27/2023   FINDINGS   IMPRESSION: Initially, the procedure was attempted without inflating the balloon. Unfortunately, contrast quickly exited the rectum without adequate opacification of the rectum or rectosigmoid anastomosis. Subsequently, the balloon was slowly and partially inflated in the very low rectum, which was well tolerated by the patient. Following partial inflation of the balloon, the majority of the colon was opacified with contrast to the level of the mid to low ascending colon. The rectosigmoid anastomosis appears as expected without evidence to suggest extravasation of contrast. No colonic stricture, mass, or dilation is seen. Post void pictures demonstrate mild residual contrast material within the colon. No evidence of contrast extravasation on post void imaging.       The patient's records and results personally reviewed by this provider.     Outside records reviewed from: Care Everywhere    LAB/DIAGNOSTIC STUDIES TODAY:    INR, PTT, Prealbumin, CBC, CMP ordered by surgeon.    Assessment  Kalin Shepard is a 72 year old male seen as a PAC referral for risk assessment and optimization for  "anesthesia.    Plan/Recommendations  Pt will be optimized for the proposed procedure.  See below for details on the assessment, risk, and preoperative recommendations    NEUROLOGY  - No history of TIA, CVA or seizure  - Chemotherapy induced neuropathy bilateral hands  - History of T3 right-sided post-herpetic neuralgia. Not currently on medications.    -Post Op delirium risk factors:  Age    ENT  - No current airway concerns.  Will need to be reassessed day of surgery.  Mallampati: I  TM: > 3   - Missing several teeth    CARDIAC  - No history of CAD, Hypertension and Afib    - METS (Metabolic Equivalents)  Patient performs 4 or more METS exercise without symptoms            Total Score: 0      RCRI-Very low risk: Class 1 0.4% complication rate            Total Score: 0        PULMONARY    LAN Low Risk            Total Score: 2    LAN: Over 50 ys old    LAN: Male      - COPD  Well controlled. Not on inhalers or O2. Lungs CTA on exam today.    - Tobacco History    History   Smoking Status     Every Day     Packs/day: 0.50     Years: 50.00     Types: Cigarettes   Smokeless Tobacco     Never       GI  - GERD  Controlled on medications: Proton Pump Inhibitor   - Adenocarcinoma of sigmoid colon/Ileostomy in place: surgery as scheduled above.      PONV Low Risk  Total Score: 1           1 AN PONV: Intended Post Op Opioids        /RENAL  - Baseline Creatinine  0.84    ENDOCRINE    - BMI: Estimated body mass index is 18.27 kg/m  as calculated from the following:    Height as of this encounter: 1.803 m (5' 11\").    Weight as of this encounter: 59.4 kg (131 lb).  Healthy Weight (BMI 18.5-24.9)  - No history of Diabetes Mellitus    HEME  VTE High Risk 3%            Total Score: 8    VTE: Greater than 59 yrs old    VTE: Male    VTE: Current cancer      - No history of abnormal bleeding or antiplatelet use.      MSK  Patient is NOT Frail            Total Score: 1    Frailty: Weight loss 10 lbs or greater          PSYCH  - " History of alcohol dependence in remission since 2009    Different anesthesia methods/types have been discussed with the patient, but they are aware that the final plan will be decided by the assigned anesthesia provider on the date of service.  Patient was discussed with Dr. Patricia    The patient is optimized for their procedure. AVS with information on surgery time/arrival time, meds and NPO status given by nursing staff. No further diagnostic testing indicated.      On the day of service:     Prep time: 12 minutes  Visit time: 15 minutes  Documentation time: 10 minutes  ------------------------------------------  Total time: 37 minutes      Shoshana Pizano PA-C  Preoperative Assessment Center  [facility]  Clinic and Surgery Center  Phone: [phone]  Fax: [phone]

## 2023-04-13 NOTE — PATIENT INSTRUCTIONS
Preparing for Your Surgery      Name:  Kalin Shepard   MRN:  [id number]   :  1950   Today's Date:  2023       Arriving for surgery:  Surgery date:  5/3/23  Arrival time:  11.40AM     Surgeries and procedures: Adult patients can have 2 visitors all through the surgery process.     Visiting hours: 8 a.m. to 8:30 p.m.     Hospital: Adult patients and children under age 18 can have 4 visitor at a time     No visitors under the age of 5 are allowed for hospital patients.  Double occupancy rooms: Patients can have only two visitors at a time.     Patients with disabilities: Can have a support person with them (family member, service provider     Or someone well informed about their needs) plus the allowed number of visitors     Patients confirmed or suspected to have symptoms of COVID 19 or flu:     No visitors allowed for adult patients.   Children (under age 18) can have 1 named visitor.     People who are sick or showing symptoms of COVID 19 or flu:    Are not allowed to visit patients--we can only make exceptions in special situations.       Please follow these guidelines for your visit:   Arrive wearing a mask over your mouth and nose; we will give you a medical mask to wear    If you arrive wearing a cloth mask.   Keep it on during your entire visit, even when in patient's room.   If you don't wear a mask we'll ask you to leave.     Clean your hands with alcohol hand . Do this when you arrive at and leave the building and patient room,    And again after you touch your mask or anything in the room.     You can t visit if you have a fever, cough, shortness of breath, muscle aches, headaches, sore throat    Or diarrhea      Stay 6 feet away from others during your visit and between visits     Go directly to and from the room you are visiting.     Stay in the patient s room during your visit. Limit going to other places in the hospital as much as possible     Leave bags and jackets at home or  in the car.     For everyone s health, please don t come and go during your visit. That includes for smoking   during your visit.     Please come to:     [facility] [address]  [address] [place]  [address]    -   Parking is available in the Patient Visitor Ramp on [facility].     -   When entering the hospital you will be asked COVID screening questions, you will then be directed to Registration.  Registration will direct you to the 3rd floor Surgery waiting room.     -   Please ask if you need an escort or a wheelchair to the Surgery Waiting Room.  Preop number- [phone] ?     What can I eat or drink?  -  You may eat and drink normally up to 8 hours prior to arrival time. (Until 3.40AM)  -  You may have clear liquids until 2 hours prior to arrival time. (Until 9.40AM)    Examples of clear liquids:  Water  Clear broth  Juices (apple, white grape, white cranberry  and cider) without pulp  Noncarbonated, powder based beverages  (lemonade and Avtar-Aid)  Sodas (Sprite, 7-Up, ginger ale and seltzer)  Coffee or tea (without milk or cream)  Gatorade    -  No Alcohol for at least 24 hours before surgery.     Which medicines can I take?    Hold Aspirin for 7 days before surgery.   Hold Multivitamins for 7 days before surgery. (Centrum silver)  Hold Supplements for 7 days before surgery.  Hold Ibuprofen (Advil, Motrin) for 1 day(s) before surgery--unless otherwise directed by surgeon.  Hold Naproxen (Aleve) for 4 days before surgery.    -  DO NOT take these medications the day of surgery:      -  PLEASE TAKE these medications the day of surgery:  Tylenol (as needed), Loperamide (as needed), Robaxin (as needed), Prilosec.    How do I prepare myself?  - Please take 2 showers (one the night prior to surgery and one the morning of surgery) using Scrubcare or Hibiclens soap.    Use this soap only from the neck to your toes.     Leave the soap on  your skin for one minute--then rinse thoroughly.      You may use your own shampoo and conditioner. No other hair products.   - Please remove all jewelry and body piercings.  - No lotions, deodorants or fragrance.  - No makeup or fingernail polish.   - Bring your ID and insurance card.    -If you have a Deep Brain Stimulator, Spinal Cord Stimulator, or any Neuro Stimulator device---you must bring the remote control to the hospital.      ALL PATIENTS GOING HOME THE SAME DAY OF SURGERY ARE REQUIRED TO HAVE A RESPONSIBLE ADULT TO DRIVE AND BE IN ATTENDANCE WITH THEM FOR 24 HOURS FOLLOWING SURGERY.    Covid testing policy as of 12/06/2022  Your surgeon will notify and schedule you for a COVID test if one is needed before surgery--please direct any questions or COVID symptoms to your surgeon      Questions or Concerns:    - For any questions regarding the day of surgery or your hospital stay, please contact the Pre Admission Nursing Office at [phone].       - If you have health changes between today and your surgery, please call your surgeon.       - For questions after surgery, please call your surgeons office.       OPTIMAL RECOVERY AFTER SURGERY        Begin hydrating yourself by drinking at least 8-10 glasses of clear liquids for 24 hours before surgery:      Suggested clear liquids:   Water    Clear Juices   Clear Broth   Non- carbonated beverages    (Crystal Light or Avtar Aid)   Sodas    (Sprite, 7 up, ginger ale, seltzer)   Gatorade              Drink clear liquids up until 4 hours before your surgery.       We would like you to purchase a drink such as Gatorade or Ensure Clear (not the milkshake type).  Drink this before bedtime and the morning of surgery drink between 8-10 ounces or until you feel hydrated.       Keeping well hydrated leads to your veins being plump, you wake up faster, and you are less likely to be nauseated. Start drinking water as soon as you can after surgery and advance to clear  liquids and food as tolerated.  IV fluids contain salt, drinking fluids will minimize the amount of IV fluids you need and decrease the amount of salt you get.      The most common reason for the patient to be readmitted is dehydration. Staying hydrated after you go home from the hospital is very important.  Ensure or Ensure Clear are good options to keep you hydrated.

## 2023-04-14 LAB — PREALB SERPL IA-MCNC: 38 MG/DL (ref 15–45)

## 2023-04-20 ENCOUNTER — TEAM CONFERENCE (OUTPATIENT)
Dept: SURGERY | Facility: CLINIC | Age: 73
End: 2023-04-20
Payer: COMMERCIAL

## 2023-04-20 NOTE — CONFIDENTIAL NOTE
COLON AND RECTAL SURGERY HUDDLE:    Patient was reviewed in preporation for their surgery the following was reviewed and has been completed:    Surgeon: Dr. Riley Magdaleno    Surgery & Date: 5/3 CLOSURE, ILEOSTOMY, OPEN      Last MD Note: reviewed    Anesthesia Type: General    Other Providers: No    PAC: Yes    WOC: No    Labs: Yes    Bowel Prep: N/A No Prep    Packet: Yes    Imaging: No    Post-Op Appointments: Yes    COVID: No    Pre op soap: Yes Questions about shower: NA  Is patient on TPN?: No  If yes, I contacted the TPN pharmacist by paging the  pharmacy at [phone] or calling [phone]. I also contacted Kate SALAMANCA with inpatient colon and rectal team.     Pre op call complete: Yes

## 2023-05-02 ENCOUNTER — ANESTHESIA EVENT (OUTPATIENT)
Dept: SURGERY | Facility: CLINIC | Age: 73
DRG: 330 | End: 2023-05-02
Payer: COMMERCIAL

## 2023-05-02 ASSESSMENT — LIFESTYLE VARIABLES: TOBACCO_USE: 1

## 2023-05-02 ASSESSMENT — ENCOUNTER SYMPTOMS
ORTHOPNEA: 0
SEIZURES: 0

## 2023-05-02 ASSESSMENT — COPD QUESTIONNAIRES
COPD: 1
CAT_SEVERITY: MILD

## 2023-05-02 NOTE — ANESTHESIA PREPROCEDURE EVALUATION
Anesthesia Pre-Procedure Evaluation    Patient: Kalin Shepard   MRN: [id number] : 1950        Procedure : Procedure(s):  CLOSURE, ILEOSTOMY, OPEN           Past Medical History:   Diagnosis Date     Cancer (H)       Past Surgical History:   Procedure Laterality Date     COLECTOMY LEFT N/A 2023    Procedure: Exploratory Laparotomy, lysis of adhesions, Low Anterior Resection, take down of loop colostomy,  DIVERTING LOOP ILEOSTOMY;  Surgeon: Riley Magdaleno MD;  Location: [facility]     COMBINED CYSTOSCOPY, INSERT STENT URETER(S) Bilateral 2023    Procedure: CYSTOSCOPY, WITH URETERAL STENT INSERTION [[id number]];  Surgeon: Ulises Basilio MD;  Location: [facility]     INSERT PORT VASCULAR ACCESS Right 2022    Procedure: INSERTION, VASCULAR ACCESS PORT;  Surgeon: Marianne Schroeder MD;  Location: [facility]     INSERT PORT VASCULAR ACCESS Left 2022    Procedure: INSERTION, VASCULAR ACCESS PORT;  Surgeon: Phong Finch MD;  Location: [facility] OR     IR CHEST PORT PLACEMENT > 5 YRS OF AGE  2022     LAPAROTOMY EXPLORATORY N/A 3/26/2022    Procedure: exploratory laparotomy, small bowel resection, colostomy creation;  Surgeon: Riley Magdaleno MD;  Location: [facility]     LAPAROTOMY, LYSIS ADHESIONS, COMBINED N/A 2022    Procedure: LAPAROTOMY, EXPLORATORY, WITH LYSIS OF ADHESIONS;  Surgeon: Segundo Hill MD;  Location: [facility]     PICC INSERTION - DOUBLE LUMEN Left 2022    left medial brachial 5 fr dl picc 49 cm     SIGMOIDOSCOPY FLEXIBLE N/A 3/26/2022    Procedure: Sigmoidoscopy flexible;  Surgeon: Riley Magdaleno MD;  Location: [facility]     SIGMOIDOSCOPY FLEXIBLE N/A 2023    Procedure: Sigmoidoscopy flexible;  Surgeon: Riley Magdaleno MD;  Location: [facility]      No Known Allergies   Social History     Tobacco Use     Smoking status: Every Day     Packs/day: 0.50     Years: 50.00     Pack years: 25.00     Types: Cigarettes     Smokeless tobacco: Never   Vaping Use     Vaping  status: Never Used   Substance Use Topics     Alcohol use: Not Currently      Wt Readings from Last 1 Encounters:   04/13/23 59.4 kg (131 lb)        Anesthesia Evaluation   Pt has had prior anesthetic. Type: General and MAC.    No history of anesthetic complications       ROS/MED HX  ENT/Pulmonary:     (+) tobacco use, Current use, mild,  COPD,  (-) LAN risk factors and recent URI   Neurologic: Comment: Post-herpetic neuralgia T3 right side    (+) peripheral neuropathy, - bilateral hands.  (-) no seizures and no CVA   Cardiovascular:     (+) -----Previous cardiac testing   Echo: Date: Results:    Stress Test: Date: Results:    ECG Reviewed: Date: 3/27/2022 Results:  Sinus rhythm with Premature atrial complexes   Rightward axis   Abnormal ECG    No previous ECGs available  Cath: Date: Results:   (-) hypertension, JARAMILLO, orthopnea/PND and irregular heartbeat/palpitations   METS/Exercise Tolerance: 4 - Raking leaves, gardening Comment: Limited due to pain at ostomy site. Participates in heavy housework such as vacuuming, steaming floors.   Hematologic:  - neg hematologic  ROS  (-) history of blood clots and history of blood transfusion   Musculoskeletal:  - neg musculoskeletal ROS     GI/Hepatic: Comment: Colostomy in place, history of SBO. Has been experiencing fairly high ostomy output.    (+) GERD, Asymptomatic on medication,  (-) liver disease   Renal/Genitourinary: Comment: Urinary frequency and urgency - never diagnosed with BPH.  Denies any retention symptoms.    (-) renal disease   Endo:  - neg endo ROS  (-) Type II DM and thyroid disease   Psychiatric/Substance Use:     (+) alcohol abuse     Infectious Disease:  - neg infectious disease ROS  (-) Recent Fever   Malignancy: Comment: Adenocarcinoma of sigmoid colon  (+) Malignancy, History of GI.GI CA  Active status post Surgery and Chemo.        Other:  - neg other ROS          Physical Exam    Airway        Mallampati: II   TM distance: > 3 FB   Neck ROM: limited    Mouth opening: > 3 cm    Respiratory Devices and Support         Dental       (+) Multiple visibly decayed, broken teeth    B=Bridge, C=Chipped, L=Loose, M=Missing    Cardiovascular          Rhythm and rate: regular and bradycardia     Pulmonary               Other findings: Has only one tooth on upper jaw and 6 teeth on lower    OUTSIDE LABS:  CBC:   Lab Results   Component Value Date    WBC 7.1 04/13/2023    WBC 7.9 03/27/2023    HGB 16.4 04/13/2023    HGB 15.6 03/27/2023    HCT 51.7 04/13/2023    HCT 48.4 03/27/2023     04/13/2023     03/27/2023     BMP:   Lab Results   Component Value Date     04/13/2023     03/27/2023    POTASSIUM 5.6 (H) 04/13/2023    POTASSIUM 5.1 03/27/2023    CHLORIDE 105 04/13/2023    CHLORIDE 103 03/27/2023    CO2 25 04/13/2023    CO2 27 03/27/2023    BUN 29.2 (H) 04/13/2023    BUN 42.2 (H) 03/27/2023    CR 0.92 04/13/2023    CR 0.84 03/27/2023    GLC 83 04/13/2023    GLC 91 03/27/2023     COAGS:   Lab Results   Component Value Date    PTT 28 04/13/2023    INR 0.88 04/13/2023     POC: No results found for: BGM, HCG, HCGS  HEPATIC:   Lab Results   Component Value Date    ALBUMIN 5.0 04/13/2023    PROTTOTAL 8.6 (H) 04/13/2023    ALT 19 04/13/2023    AST 21 04/13/2023    ALKPHOS 59 04/13/2023    BILITOTAL 0.3 04/13/2023     OTHER:   Lab Results   Component Value Date    LACT 1.5 08/21/2022    ROXIE 10.6 (H) 04/13/2023    PHOS 3.6 02/14/2023    MAG 2.0 03/06/2023    LIPASE 95 08/21/2022       Anesthesia Plan    ASA Status:  3   NPO Status:  NPO Appropriate    Anesthesia Type: General.     - Airway: LMA   Induction: Intravenous.   Maintenance: Balanced.   Techniques and Equipment:     - Lines/Monitors: BIS, 2nd IV     - Blood: T&S     Consents    Anesthesia Plan(s) and associated risks, benefits, and realistic alternatives discussed. Questions answered and patient/representative(s) expressed understanding.     - Discussed: Risks, Benefits and Alternatives for BOTH  SEDATION and the PROCEDURE were discussed     - Discussed with:  Patient    Use of blood products discussed: Yes.     - Discussed with: Patient.     - Consented: consented to blood products            Reason for refusal: other.     Postoperative Care    Pain management: Multi-modal analgesia.   PONV prophylaxis: Ondansetron (or other 5HT-3), Dexamethasone or Solumedrol     Comments:                Susan Jackson MD

## 2023-05-03 ENCOUNTER — HOSPITAL ENCOUNTER (INPATIENT)
Facility: CLINIC | Age: 73
LOS: 4 days | Discharge: HOME | DRG: 330 | End: 2023-05-07
Attending: SURGERY | Admitting: SURGERY
Payer: COMMERCIAL

## 2023-05-03 ENCOUNTER — ANESTHESIA (OUTPATIENT)
Dept: SURGERY | Facility: CLINIC | Age: 73
DRG: 330 | End: 2023-05-03
Payer: COMMERCIAL

## 2023-05-03 DIAGNOSIS — G89.18 ACUTE POST-OPERATIVE PAIN: Primary | ICD-10-CM

## 2023-05-03 PROBLEM — Z93.2 ILEOSTOMY STATUS (H): Status: ACTIVE | Noted: 2023-05-03

## 2023-05-03 LAB
ANION GAP SERPL CALCULATED.3IONS-SCNC: 12 MMOL/L (ref 7–15)
BUN SERPL-MCNC: 40.7 MG/DL (ref 8–23)
CALCIUM SERPL-MCNC: 9.7 MG/DL (ref 8.8–10.2)
CHLORIDE SERPL-SCNC: 106 MMOL/L (ref 98–107)
CREAT SERPL-MCNC: 0.79 MG/DL (ref 0.67–1.17)
CREAT SERPL-MCNC: 0.91 MG/DL (ref 0.67–1.17)
DEPRECATED HCO3 PLAS-SCNC: 20 MMOL/L (ref 22–29)
ERYTHROCYTE [DISTWIDTH] IN BLOOD BY AUTOMATED COUNT: 15.9 % (ref 10–15)
GFR SERPL CREATININE-BSD FRML MDRD: 89 ML/MIN/1.73M2
GFR SERPL CREATININE-BSD FRML MDRD: >90 ML/MIN/1.73M2
GLUCOSE BLD-MCNC: 72 MG/DL (ref 70–99)
GLUCOSE SERPL-MCNC: 84 MG/DL (ref 70–99)
HCT VFR BLD AUTO: 37.7 % (ref 40–53)
HGB BLD-MCNC: 11.8 G/DL (ref 13.3–17.7)
HOLD SPECIMEN: NORMAL
MCH RBC QN AUTO: 30.2 PG (ref 26.5–33)
MCHC RBC AUTO-ENTMCNC: 31.3 G/DL (ref 31.5–36.5)
MCV RBC AUTO: 96 FL (ref 78–100)
PLATELET # BLD AUTO: 182 10E3/UL (ref 150–450)
POTASSIUM SERPL-SCNC: 4.8 MMOL/L (ref 3.4–5.3)
RBC # BLD AUTO: 3.91 10E6/UL (ref 4.4–5.9)
SARS-COV-2 RNA RESP QL NAA+PROBE: NEGATIVE
SODIUM SERPL-SCNC: 138 MMOL/L (ref 136–145)
WBC # BLD AUTO: 12.1 10E3/UL (ref 4–11)

## 2023-05-03 PROCEDURE — 85027 COMPLETE CBC AUTOMATED: CPT

## 2023-05-03 PROCEDURE — 120N000002 HC R&B MED SURG/OB UMMC

## 2023-05-03 PROCEDURE — 250N000011 HC RX IP 250 OP 636: Performed by: STUDENT IN AN ORGANIZED HEALTH CARE EDUCATION/TRAINING PROGRAM

## 2023-05-03 PROCEDURE — 44625 REPAIR BOWEL OPENING: CPT | Mod: 58 | Performed by: SURGERY

## 2023-05-03 PROCEDURE — 250N000013 HC RX MED GY IP 250 OP 250 PS 637: Performed by: SURGERY

## 2023-05-03 PROCEDURE — 710N000010 HC RECOVERY PHASE 1, LEVEL 2, PER MIN: Performed by: SURGERY

## 2023-05-03 PROCEDURE — U0005 INFEC AGEN DETEC AMPLI PROBE: HCPCS | Performed by: SURGERY

## 2023-05-03 PROCEDURE — 88304 TISSUE EXAM BY PATHOLOGIST: CPT | Mod: 26 | Performed by: PATHOLOGY

## 2023-05-03 PROCEDURE — 272N000001 HC OR GENERAL SUPPLY STERILE: Performed by: SURGERY

## 2023-05-03 PROCEDURE — 250N000011 HC RX IP 250 OP 636: Performed by: NURSE ANESTHETIST, CERTIFIED REGISTERED

## 2023-05-03 PROCEDURE — 258N000003 HC RX IP 258 OP 636: Performed by: SURGERY

## 2023-05-03 PROCEDURE — 250N000011 HC RX IP 250 OP 636

## 2023-05-03 PROCEDURE — 82565 ASSAY OF CREATININE: CPT | Performed by: SURGERY

## 2023-05-03 PROCEDURE — 250N000009 HC RX 250: Performed by: NURSE ANESTHETIST, CERTIFIED REGISTERED

## 2023-05-03 PROCEDURE — 88304 TISSUE EXAM BY PATHOLOGIST: CPT | Mod: TC | Performed by: SURGERY

## 2023-05-03 PROCEDURE — 250N000025 HC SEVOFLURANE, PER MIN: Performed by: SURGERY

## 2023-05-03 PROCEDURE — 36415 COLL VENOUS BLD VENIPUNCTURE: CPT | Performed by: SURGERY

## 2023-05-03 PROCEDURE — 250N000011 HC RX IP 250 OP 636: Performed by: SURGERY

## 2023-05-03 PROCEDURE — 82947 ASSAY GLUCOSE BLOOD QUANT: CPT | Performed by: SURGERY

## 2023-05-03 PROCEDURE — 999N000141 HC STATISTIC PRE-PROCEDURE NURSING ASSESSMENT: Performed by: SURGERY

## 2023-05-03 PROCEDURE — 80048 BASIC METABOLIC PNL TOTAL CA: CPT | Performed by: STUDENT IN AN ORGANIZED HEALTH CARE EDUCATION/TRAINING PROGRAM

## 2023-05-03 PROCEDURE — 258N000003 HC RX IP 258 OP 636: Performed by: NURSE ANESTHETIST, CERTIFIED REGISTERED

## 2023-05-03 PROCEDURE — 36415 COLL VENOUS BLD VENIPUNCTURE: CPT | Performed by: STUDENT IN AN ORGANIZED HEALTH CARE EDUCATION/TRAINING PROGRAM

## 2023-05-03 PROCEDURE — 360N000077 HC SURGERY LEVEL 4, PER MIN: Performed by: SURGERY

## 2023-05-03 PROCEDURE — 370N000017 HC ANESTHESIA TECHNICAL FEE, PER MIN: Performed by: SURGERY

## 2023-05-03 PROCEDURE — 0DBB0ZZ EXCISION OF ILEUM, OPEN APPROACH: ICD-10-PCS | Performed by: SURGERY

## 2023-05-03 PROCEDURE — C9290 INJ, BUPIVACAINE LIPOSOME: HCPCS

## 2023-05-03 RX ORDER — OXYCODONE HYDROCHLORIDE 5 MG/1
5 TABLET ORAL EVERY 4 HOURS PRN
Status: DISCONTINUED | OUTPATIENT
Start: 2023-05-03 | End: 2023-05-07 | Stop reason: HOSPADM

## 2023-05-03 RX ORDER — CEFAZOLIN SODIUM/WATER 2 G/20 ML
2 SYRINGE (ML) INTRAVENOUS SEE ADMIN INSTRUCTIONS
Status: DISCONTINUED | OUTPATIENT
Start: 2023-05-03 | End: 2023-05-03

## 2023-05-03 RX ORDER — HYDROMORPHONE HCL IN WATER/PF 6 MG/30 ML
0.4 PATIENT CONTROLLED ANALGESIA SYRINGE INTRAVENOUS
Status: DISCONTINUED | OUTPATIENT
Start: 2023-05-03 | End: 2023-05-07

## 2023-05-03 RX ORDER — NALOXONE HYDROCHLORIDE 0.4 MG/ML
0.4 INJECTION, SOLUTION INTRAMUSCULAR; INTRAVENOUS; SUBCUTANEOUS
Status: DISCONTINUED | OUTPATIENT
Start: 2023-05-03 | End: 2023-05-07 | Stop reason: HOSPADM

## 2023-05-03 RX ORDER — FENTANYL CITRATE 50 UG/ML
INJECTION, SOLUTION INTRAMUSCULAR; INTRAVENOUS PRN
Status: DISCONTINUED | OUTPATIENT
Start: 2023-05-03 | End: 2023-05-03

## 2023-05-03 RX ORDER — ONDANSETRON 2 MG/ML
INJECTION INTRAMUSCULAR; INTRAVENOUS PRN
Status: DISCONTINUED | OUTPATIENT
Start: 2023-05-03 | End: 2023-05-03

## 2023-05-03 RX ORDER — LABETALOL HYDROCHLORIDE 5 MG/ML
10 INJECTION, SOLUTION INTRAVENOUS
Status: DISCONTINUED | OUTPATIENT
Start: 2023-05-03 | End: 2023-05-03 | Stop reason: HOSPADM

## 2023-05-03 RX ORDER — NALOXONE HYDROCHLORIDE 0.4 MG/ML
0.2 INJECTION, SOLUTION INTRAMUSCULAR; INTRAVENOUS; SUBCUTANEOUS
Status: DISCONTINUED | OUTPATIENT
Start: 2023-05-03 | End: 2023-05-07 | Stop reason: HOSPADM

## 2023-05-03 RX ORDER — FLUMAZENIL 0.1 MG/ML
0.2 INJECTION, SOLUTION INTRAVENOUS
Status: DISCONTINUED | OUTPATIENT
Start: 2023-05-03 | End: 2023-05-03

## 2023-05-03 RX ORDER — OXYCODONE HYDROCHLORIDE 10 MG/1
10 TABLET ORAL EVERY 4 HOURS PRN
Status: DISCONTINUED | OUTPATIENT
Start: 2023-05-03 | End: 2023-05-07

## 2023-05-03 RX ORDER — PROPOFOL 10 MG/ML
INJECTION, EMULSION INTRAVENOUS PRN
Status: DISCONTINUED | OUTPATIENT
Start: 2023-05-03 | End: 2023-05-03

## 2023-05-03 RX ORDER — GABAPENTIN 100 MG/1
100 CAPSULE ORAL AT BEDTIME
Status: DISCONTINUED | OUTPATIENT
Start: 2023-05-03 | End: 2023-05-07 | Stop reason: HOSPADM

## 2023-05-03 RX ORDER — LIDOCAINE 40 MG/G
CREAM TOPICAL
Status: DISCONTINUED | OUTPATIENT
Start: 2023-05-03 | End: 2023-05-07 | Stop reason: HOSPADM

## 2023-05-03 RX ORDER — FENTANYL CITRATE 50 UG/ML
25 INJECTION, SOLUTION INTRAMUSCULAR; INTRAVENOUS EVERY 5 MIN PRN
Status: DISCONTINUED | OUTPATIENT
Start: 2023-05-03 | End: 2023-05-03 | Stop reason: HOSPADM

## 2023-05-03 RX ORDER — ACETAMINOPHEN 325 MG/1
975 TABLET ORAL ONCE
Status: DISCONTINUED | OUTPATIENT
Start: 2023-05-03 | End: 2023-05-03

## 2023-05-03 RX ORDER — FENTANYL CITRATE 50 UG/ML
25-50 INJECTION, SOLUTION INTRAMUSCULAR; INTRAVENOUS
Status: DISCONTINUED | OUTPATIENT
Start: 2023-05-03 | End: 2023-05-03

## 2023-05-03 RX ORDER — HEPARIN SODIUM (PORCINE) LOCK FLUSH IV SOLN 100 UNIT/ML 100 UNIT/ML
5-10 SOLUTION INTRAVENOUS
Status: DISCONTINUED | OUTPATIENT
Start: 2023-05-03 | End: 2023-05-07 | Stop reason: HOSPADM

## 2023-05-03 RX ORDER — LIDOCAINE HYDROCHLORIDE 20 MG/ML
INJECTION, SOLUTION INFILTRATION; PERINEURAL PRN
Status: DISCONTINUED | OUTPATIENT
Start: 2023-05-03 | End: 2023-05-03

## 2023-05-03 RX ORDER — LIDOCAINE 40 MG/G
CREAM TOPICAL
Status: DISCONTINUED | OUTPATIENT
Start: 2023-05-03 | End: 2023-05-03

## 2023-05-03 RX ORDER — HYDRALAZINE HYDROCHLORIDE 20 MG/ML
2.5-5 INJECTION INTRAMUSCULAR; INTRAVENOUS EVERY 10 MIN PRN
Status: DISCONTINUED | OUTPATIENT
Start: 2023-05-03 | End: 2023-05-03 | Stop reason: HOSPADM

## 2023-05-03 RX ORDER — FENTANYL CITRATE 50 UG/ML
50 INJECTION, SOLUTION INTRAMUSCULAR; INTRAVENOUS EVERY 5 MIN PRN
Status: DISCONTINUED | OUTPATIENT
Start: 2023-05-03 | End: 2023-05-03 | Stop reason: HOSPADM

## 2023-05-03 RX ORDER — ONDANSETRON 2 MG/ML
4 INJECTION INTRAMUSCULAR; INTRAVENOUS EVERY 30 MIN PRN
Status: DISCONTINUED | OUTPATIENT
Start: 2023-05-03 | End: 2023-05-03 | Stop reason: HOSPADM

## 2023-05-03 RX ORDER — GLYCOPYRROLATE 0.2 MG/ML
INJECTION, SOLUTION INTRAMUSCULAR; INTRAVENOUS PRN
Status: DISCONTINUED | OUTPATIENT
Start: 2023-05-03 | End: 2023-05-03

## 2023-05-03 RX ORDER — HYDROMORPHONE HCL IN WATER/PF 6 MG/30 ML
0.2 PATIENT CONTROLLED ANALGESIA SYRINGE INTRAVENOUS
Status: DISCONTINUED | OUTPATIENT
Start: 2023-05-03 | End: 2023-05-07

## 2023-05-03 RX ORDER — ACETAMINOPHEN 500 MG
1000 TABLET ORAL EVERY 6 HOURS
Status: DISCONTINUED | OUTPATIENT
Start: 2023-05-03 | End: 2023-05-07 | Stop reason: HOSPADM

## 2023-05-03 RX ORDER — ONDANSETRON 4 MG/1
4 TABLET, ORALLY DISINTEGRATING ORAL EVERY 6 HOURS PRN
Status: DISCONTINUED | OUTPATIENT
Start: 2023-05-03 | End: 2023-05-07 | Stop reason: HOSPADM

## 2023-05-03 RX ORDER — ENOXAPARIN SODIUM 100 MG/ML
40 INJECTION SUBCUTANEOUS
Status: COMPLETED | OUTPATIENT
Start: 2023-05-03 | End: 2023-05-03

## 2023-05-03 RX ORDER — ONDANSETRON 4 MG/1
4 TABLET, ORALLY DISINTEGRATING ORAL EVERY 30 MIN PRN
Status: DISCONTINUED | OUTPATIENT
Start: 2023-05-03 | End: 2023-05-03 | Stop reason: HOSPADM

## 2023-05-03 RX ORDER — SODIUM CHLORIDE, SODIUM LACTATE, POTASSIUM CHLORIDE, CALCIUM CHLORIDE 600; 310; 30; 20 MG/100ML; MG/100ML; MG/100ML; MG/100ML
INJECTION, SOLUTION INTRAVENOUS CONTINUOUS
Status: DISCONTINUED | OUTPATIENT
Start: 2023-05-03 | End: 2023-05-03 | Stop reason: HOSPADM

## 2023-05-03 RX ORDER — MIRTAZAPINE 15 MG/1
15 TABLET, FILM COATED ORAL AT BEDTIME
Status: DISCONTINUED | OUTPATIENT
Start: 2023-05-03 | End: 2023-05-07 | Stop reason: HOSPADM

## 2023-05-03 RX ORDER — BUPIVACAINE HYDROCHLORIDE 2.5 MG/ML
INJECTION, SOLUTION EPIDURAL; INFILTRATION; INTRACAUDAL
Status: COMPLETED | OUTPATIENT
Start: 2023-05-03 | End: 2023-05-03

## 2023-05-03 RX ORDER — HEPARIN SODIUM,PORCINE 10 UNIT/ML
5-10 VIAL (ML) INTRAVENOUS
Status: DISCONTINUED | OUTPATIENT
Start: 2023-05-03 | End: 2023-05-07 | Stop reason: HOSPADM

## 2023-05-03 RX ORDER — HEPARIN SODIUM,PORCINE 10 UNIT/ML
5-10 VIAL (ML) INTRAVENOUS EVERY 24 HOURS
Status: DISCONTINUED | OUTPATIENT
Start: 2023-05-03 | End: 2023-05-07 | Stop reason: HOSPADM

## 2023-05-03 RX ORDER — HYDROMORPHONE HYDROCHLORIDE 1 MG/ML
0.2 INJECTION, SOLUTION INTRAMUSCULAR; INTRAVENOUS; SUBCUTANEOUS EVERY 5 MIN PRN
Status: DISCONTINUED | OUTPATIENT
Start: 2023-05-03 | End: 2023-05-03 | Stop reason: HOSPADM

## 2023-05-03 RX ORDER — VASOPRESSIN IN 0.9 % NACL 2 UNIT/2ML
SYRINGE (ML) INTRAVENOUS PRN
Status: DISCONTINUED | OUTPATIENT
Start: 2023-05-03 | End: 2023-05-03

## 2023-05-03 RX ORDER — ONDANSETRON 2 MG/ML
4 INJECTION INTRAMUSCULAR; INTRAVENOUS ONCE
Status: DISCONTINUED | OUTPATIENT
Start: 2023-05-03 | End: 2023-05-03

## 2023-05-03 RX ORDER — HYDROMORPHONE HYDROCHLORIDE 1 MG/ML
0.4 INJECTION, SOLUTION INTRAMUSCULAR; INTRAVENOUS; SUBCUTANEOUS EVERY 5 MIN PRN
Status: DISCONTINUED | OUTPATIENT
Start: 2023-05-03 | End: 2023-05-03 | Stop reason: HOSPADM

## 2023-05-03 RX ORDER — SODIUM CHLORIDE, SODIUM LACTATE, POTASSIUM CHLORIDE, CALCIUM CHLORIDE 600; 310; 30; 20 MG/100ML; MG/100ML; MG/100ML; MG/100ML
INJECTION, SOLUTION INTRAVENOUS CONTINUOUS PRN
Status: DISCONTINUED | OUTPATIENT
Start: 2023-05-03 | End: 2023-05-03

## 2023-05-03 RX ORDER — CEFAZOLIN SODIUM/WATER 2 G/20 ML
2 SYRINGE (ML) INTRAVENOUS
Status: COMPLETED | OUTPATIENT
Start: 2023-05-03 | End: 2023-05-03

## 2023-05-03 RX ORDER — ONDANSETRON 2 MG/ML
4 INJECTION INTRAMUSCULAR; INTRAVENOUS EVERY 6 HOURS PRN
Status: DISCONTINUED | OUTPATIENT
Start: 2023-05-03 | End: 2023-05-07 | Stop reason: HOSPADM

## 2023-05-03 RX ORDER — SODIUM CHLORIDE, SODIUM LACTATE, POTASSIUM CHLORIDE, CALCIUM CHLORIDE 600; 310; 30; 20 MG/100ML; MG/100ML; MG/100ML; MG/100ML
INJECTION, SOLUTION INTRAVENOUS CONTINUOUS
Status: DISCONTINUED | OUTPATIENT
Start: 2023-05-03 | End: 2023-05-03

## 2023-05-03 RX ORDER — GABAPENTIN 300 MG/1
300 CAPSULE ORAL
Status: COMPLETED | OUTPATIENT
Start: 2023-05-03 | End: 2023-05-03

## 2023-05-03 RX ORDER — ENOXAPARIN SODIUM 100 MG/ML
40 INJECTION SUBCUTANEOUS EVERY 24 HOURS
Status: DISCONTINUED | OUTPATIENT
Start: 2023-05-04 | End: 2023-05-07 | Stop reason: HOSPADM

## 2023-05-03 RX ORDER — METHOCARBAMOL 500 MG/1
500 TABLET, FILM COATED ORAL 4 TIMES DAILY
Status: DISCONTINUED | OUTPATIENT
Start: 2023-05-03 | End: 2023-05-07

## 2023-05-03 RX ORDER — SODIUM CHLORIDE, SODIUM LACTATE, POTASSIUM CHLORIDE, CALCIUM CHLORIDE 600; 310; 30; 20 MG/100ML; MG/100ML; MG/100ML; MG/100ML
INJECTION, SOLUTION INTRAVENOUS CONTINUOUS
Status: DISCONTINUED | OUTPATIENT
Start: 2023-05-03 | End: 2023-05-05

## 2023-05-03 RX ORDER — METRONIDAZOLE 500 MG/100ML
500 INJECTION, SOLUTION INTRAVENOUS
Status: COMPLETED | OUTPATIENT
Start: 2023-05-03 | End: 2023-05-03

## 2023-05-03 RX ADMIN — SODIUM CHLORIDE, POTASSIUM CHLORIDE, SODIUM LACTATE AND CALCIUM CHLORIDE: 600; 310; 30; 20 INJECTION, SOLUTION INTRAVENOUS at 18:45

## 2023-05-03 RX ADMIN — SUGAMMADEX 200 MG: 100 INJECTION, SOLUTION INTRAVENOUS at 17:20

## 2023-05-03 RX ADMIN — OXYCODONE HYDROCHLORIDE 10 MG: 10 TABLET ORAL at 21:18

## 2023-05-03 RX ADMIN — NOREPINEPHRINE BITARTRATE 6.4 MCG: 1 INJECTION, SOLUTION, CONCENTRATE INTRAVENOUS at 15:40

## 2023-05-03 RX ADMIN — NOREPINEPHRINE BITARTRATE 12.8 MCG: 1 INJECTION, SOLUTION, CONCENTRATE INTRAVENOUS at 16:02

## 2023-05-03 RX ADMIN — ONDANSETRON 4 MG: 2 INJECTION INTRAMUSCULAR; INTRAVENOUS at 18:00

## 2023-05-03 RX ADMIN — SODIUM CHLORIDE, POTASSIUM CHLORIDE, SODIUM LACTATE AND CALCIUM CHLORIDE: 600; 310; 30; 20 INJECTION, SOLUTION INTRAVENOUS at 16:23

## 2023-05-03 RX ADMIN — GLYCOPYRROLATE 0.2 MG: 0.2 INJECTION, SOLUTION INTRAMUSCULAR; INTRAVENOUS at 15:36

## 2023-05-03 RX ADMIN — ONDANSETRON 4 MG: 4 TABLET, ORALLY DISINTEGRATING ORAL at 21:18

## 2023-05-03 RX ADMIN — Medication 30 MG: at 15:39

## 2023-05-03 RX ADMIN — Medication 20 MG: at 16:10

## 2023-05-03 RX ADMIN — NOREPINEPHRINE BITARTRATE 6.4 MCG: 1 INJECTION, SOLUTION, CONCENTRATE INTRAVENOUS at 15:49

## 2023-05-03 RX ADMIN — BUPIVACAINE 20 ML: 13.3 INJECTION, SUSPENSION, LIPOSOMAL INFILTRATION at 13:50

## 2023-05-03 RX ADMIN — NOREPINEPHRINE BITARTRATE 6.4 MCG: 1 INJECTION, SOLUTION, CONCENTRATE INTRAVENOUS at 15:45

## 2023-05-03 RX ADMIN — FENTANYL CITRATE 50 MCG: 50 INJECTION, SOLUTION INTRAMUSCULAR; INTRAVENOUS at 16:10

## 2023-05-03 RX ADMIN — GABAPENTIN 300 MG: 300 CAPSULE ORAL at 12:53

## 2023-05-03 RX ADMIN — Medication 10 MG: at 16:54

## 2023-05-03 RX ADMIN — GLYCOPYRROLATE 0.2 MG: 0.2 INJECTION, SOLUTION INTRAMUSCULAR; INTRAVENOUS at 16:26

## 2023-05-03 RX ADMIN — ONDANSETRON 4 MG: 2 INJECTION INTRAMUSCULAR; INTRAVENOUS at 16:48

## 2023-05-03 RX ADMIN — PROPOFOL 20 MG: 10 INJECTION, EMULSION INTRAVENOUS at 15:39

## 2023-05-03 RX ADMIN — GABAPENTIN 100 MG: 100 CAPSULE ORAL at 21:19

## 2023-05-03 RX ADMIN — Medication 2 G: at 15:35

## 2023-05-03 RX ADMIN — NOREPINEPHRINE BITARTRATE 6.4 MCG: 1 INJECTION, SOLUTION, CONCENTRATE INTRAVENOUS at 16:35

## 2023-05-03 RX ADMIN — FENTANYL CITRATE 50 MCG: 50 INJECTION, SOLUTION INTRAMUSCULAR; INTRAVENOUS at 18:05

## 2023-05-03 RX ADMIN — NOREPINEPHRINE BITARTRATE 6.4 MCG: 1 INJECTION, SOLUTION, CONCENTRATE INTRAVENOUS at 16:17

## 2023-05-03 RX ADMIN — SODIUM CHLORIDE, POTASSIUM CHLORIDE, SODIUM LACTATE AND CALCIUM CHLORIDE: 600; 310; 30; 20 INJECTION, SOLUTION INTRAVENOUS at 15:29

## 2023-05-03 RX ADMIN — LIDOCAINE HYDROCHLORIDE 40 MG: 20 INJECTION, SOLUTION INFILTRATION; PERINEURAL at 15:38

## 2023-05-03 RX ADMIN — ACETAMINOPHEN 1000 MG: 500 TABLET ORAL at 21:18

## 2023-05-03 RX ADMIN — METRONIDAZOLE 500 MG: 500 INJECTION, SOLUTION INTRAVENOUS at 13:25

## 2023-05-03 RX ADMIN — OMEPRAZOLE 20 MG: 20 CAPSULE, DELAYED RELEASE ORAL at 21:39

## 2023-05-03 RX ADMIN — MIRTAZAPINE 15 MG: 15 TABLET, FILM COATED ORAL at 21:19

## 2023-05-03 RX ADMIN — Medication 0.5 UNITS: at 16:23

## 2023-05-03 RX ADMIN — BUPIVACAINE HYDROCHLORIDE 20 ML: 2.5 INJECTION, SOLUTION EPIDURAL; INFILTRATION; INTRACAUDAL; PERINEURAL at 13:50

## 2023-05-03 RX ADMIN — METHOCARBAMOL 500 MG: 500 TABLET ORAL at 21:18

## 2023-05-03 RX ADMIN — FENTANYL CITRATE 50 MCG: 50 INJECTION, SOLUTION INTRAMUSCULAR; INTRAVENOUS at 18:00

## 2023-05-03 RX ADMIN — ENOXAPARIN SODIUM 40 MG: 40 INJECTION SUBCUTANEOUS at 12:50

## 2023-05-03 RX ADMIN — PROPOFOL 60 MG: 10 INJECTION, EMULSION INTRAVENOUS at 15:38

## 2023-05-03 RX ADMIN — Medication 10 MG: at 16:28

## 2023-05-03 RX ADMIN — SODIUM CHLORIDE, POTASSIUM CHLORIDE, SODIUM LACTATE AND CALCIUM CHLORIDE: 600; 310; 30; 20 INJECTION, SOLUTION INTRAVENOUS at 15:47

## 2023-05-03 RX ADMIN — FENTANYL CITRATE 50 MCG: 50 INJECTION, SOLUTION INTRAMUSCULAR; INTRAVENOUS at 13:40

## 2023-05-03 ASSESSMENT — ACTIVITIES OF DAILY LIVING (ADL)
ADLS_ACUITY_SCORE: 20
ADLS_ACUITY_SCORE: 22
ADLS_ACUITY_SCORE: 20

## 2023-05-03 NOTE — OP NOTE
[facility] Colorectal Surgery Operative Report  May 3, 2023    PREOPERATIVE DIAGNOSIS:  1. Loop ileostomy status.  2. Upper rectal distal sigmoid cancer s/p chemotherapy and definitive resection    POSTOPERATIVE DIAGNOSIS:   1. Loop ileostomy status.  2. Upper rectal distal sigmoid cancer s/p chemotherapy and definitive resection    PROCEDURE:  1. Loop ileostomy takedown.  2.  Small bowel, stapled, side-to-side anastomosis.  3.  Repair of abdominal wall at ileostomy site.    ANESTHESIA: General endotracheal anesthesia plus bilateral TAP blocks local anesthesia.    SURGEON:  Riley Magdaleno M.D.    ASSISTANT(S): Nancy Pearce, PGY3; Katie Sam, PGY1    INDICATIONS FOR PROCEDURE  Kalin Shepard is a 72 year old male who previously presented with an obstructing large upper rectal and distal sigmoid cancer. I performed an emergent diverting loop ileostomy. He underwent chemotherapy and had an excellent response, followed by definitive resection with diversion with loop ileostomy. He now presents for ileostomy reversal. I thoroughly discussed the risks, benefits, and alternatives of operative treatment with the patient and he agreed to proceed.    General risks related to abdominal surgery were reviewed with the patient. These include, but are not limited to, death, myocardial infarction, pneumonia, urinary tract infection, deep venous thrombosis with or without pulmonary embolus, abdominal infection from bowel injury or abscess, fistula, anastomotic leak that may require reoperation and stoma, bowel obstruction, wound infection, and bleeding.    OPERATIVE PROCEDURE: After obtaining informed consent, the patient was brought to the operating room and placed in the supine position. Appropriate preoperative mechanical and chemical deep venous thrombosis prophylaxis, as well as preoperative prophylactic parenteral antibiotics were given. General endotracheal anesthesia was gently induced. Bilateral lower extremity pneumatic  "compression devices were applied and all pressure points were cushioned. A Castro cathter was inserted. The abdomen was then preped and draped in the standard sterile fashion. After a \"time-out\" was performed, a circumferential incision was made at the mucocutaneous junction of the ileostomy. A lone-star surgical retractor was placed for adequate exposure. The subcutaneous tissue was carefully dissected off the terminal ileum down to the level of the fascia. The terminal ileum was then  from the abdominal wall fascia and muscle using sharp dissection. The peritoneal cavity was then entered and any additional adhesions from the ileum to the parietal peritoneum were carefully taken down. After obtaining sufficient bowel length to perform an anastomosis, the ileostomy edges were inverted with sharp dissection. Both segments of terminal ileum were aligned, making sure to not incorporate mesentery or adjacent tissues, and a stapled side-to-side functional end-to-end ileo-ileal anastomosis was made with a ALYX 75 surgical stapler (blue load). The anastomosis was evaluated through the common enterotomy and no bleeding was visualized. After this, a TA  90 surgical stapler (blue load) was used to transect the remaining colon and terminal ileum, making sure the ALYX staple lines were not aligned. The specimen, including the loop ileostomy edges was sent to pathology. The anastomosis appeared to be well vascularized, widely patent, and without tension or torsion. The TA staple line was reinforced with interrupted 3-0 PDS at sites of bleeding. The crotch of the ALYX staple line was reinforced with 2 separate 3-0 PDS sutures.    The bowel was then returned to the peritoneal cavity. The peritoneal cavity was irrigated with water and suctioned out. There was no evidence of bleeding or bowel injury. Instrument, sponge, and needle counts were all correct, as reported to me. The right lower quadrant abdominal wall defect was re " approximated with multiple 0 PDS figure-of-eight sutures. The wound was irrigated and dried, and hemostasis was corroborated. The dermis was loosely re-approximated in a circular fashion with a running 2-0 Vicryl pursestring suture. The wound was packed with betadine soaked kerlix. A sterile dressing was applied. The patient tolerated the procedure well.    COMPLICATIONS: none, immediately.    ESTIMATED BLOOD LOSS: 20 mL.    REPLACEMENT:     - Crystalloid: 2000 mL.     - Colloid: 0 mL.     - Blood products: 0 .    URINE OUTPUT: 150 mL    SPECIMEN(S): Ileostomy.    OPERATIVE COUNT: Complete.    DRAINS/TUBES: Castro catheter was removed at the end of the case.    OPERATIVE FINDINGS:   1. Stapled side-to-side functional end to end ileo-ileal anastomosis.  2. Size of defect: 4 cm x 3 cm x 2 cm      Riley Magdaleno MD  Division of Colon and Rectal Surgery  [facility]  [phone]      CC:  [facility] Surgery billing.

## 2023-05-03 NOTE — PHARMACY-ADMISSION MEDICATION HISTORY
"Pharmacist Admission Medication History    Admission medication history is complete. The information provided in this note is only as accurate as the sources available at the time of the update.    Medication reconciliation/reorder completed by provider prior to medication history? Yes    Information Source(s): Pre-op RN assessment, Dispense history      Medication History Completed By: Asuncion Chan [facility] 5/3/2023 6:26 PM    Prior to Admission medications    Medication Sig Last Dose Taking? Auth Provider Long Term End Date   acetaminophen (TYLENOL) 500 MG tablet Take 2 tablets (1,000 mg) by mouth 4 times daily 5/3/2023 at 0800 Yes Riley Magdaleno MD     heparin 100 UNIT/ML SOLN injection 5-10 mLs by Intracatheter route every 28 days Unknown Yes Truong Valentine MD     loperamide (IMODIUM) 2 MG capsule Take 1 capsule (2 mg) by mouth 3 times daily 5/3/2023 at 0800 Yes Riley Magdaleno MD     methocarbamol (ROBAXIN) 500 MG tablet Take 1 tablet (500 mg) by mouth 3 times daily for 30 days 5/3/2023 at 0800 Yes Riley Magdaleno MD  5/11/23   mirtazapine (REMERON) 15 MG tablet Take 1 tablet (15 mg) by mouth At Bedtime 5/2/2023 at 2300 Yes Riley Magdaleno MD Yes    multivitamin (CENTRUM SILVER) tablet Take 1 tablet by mouth every morning Past Week Yes Segundo Bentley MD     omeprazole (PRILOSEC) 20 MG DR capsule Take 1 capsule (20 mg) by mouth 2 times daily 5/3/2023 at 0800 Yes Olga Lidia Ya, BARBARA     Ostomy Supplies MISC 1 each Every Mon, Wed, Fri Morning Hollister1 piece flat fecal with filter #3322 20 pouches per month   2\" barrier ring #0717 (1 per pouch)   Adapt powder #0338   No sting film barrier # 6641   Adapt odor eliminator and lubricant 236ml bottle # 22232   M-9 Spray room deodorizer #5367     Unknown Yes Riley Magdaleno MD     psyllium (METAMUCIL/KONSYL) Packet Take 1 packet by mouth 2 times daily Unknown Yes Doris Mccloud PA-C          "

## 2023-05-03 NOTE — ANESTHESIA CARE TRANSFER NOTE
Patient: Kalin Shepard    Procedure: Procedure(s):  CLOSURE, ILEOSTOMY, OPEN        Diagnosis: S/P ileostomy (H) [Z93.2]  Adenocarcinoma of sigmoid colon (H) [C18.7]  Follow-up examination after colorectal surgery [Z09]  Abnormal coagulation profile [R79.1]  Diagnosis Additional Information: No value filed.    Anesthesia Type:   General     Note:    Oropharynx: oropharynx clear of all foreign objects and spontaneously breathing  Level of Consciousness: awake and drowsy  Oxygen Supplementation: face mask  Level of Supplemental Oxygen (L/min / FiO2): 4  Independent Airway: airway patency satisfactory and stable  Dentition: dentition unchanged  Vital Signs Stable: post-procedure vital signs reviewed and stable  Report to RN Given: handoff report given  Patient transferred to: PACU    Handoff Report: Identifed the Patient, Identified the Reponsible Provider, Reviewed the pertinent medical history, Discussed the surgical course, Reviewed Intra-OP anesthesia mangement and issues during anesthesia, Set expectations for post-procedure period and Allowed opportunity for questions and acknowledgement of understanding      Vitals:  Vitals Value Taken Time   /50 05/03/23 1732   Temp     Pulse 64 05/03/23 1736   Resp 14    SpO2 100 % 05/03/23 1736   Vitals shown include unvalidated device data.    Electronically Signed By: JULIUS Carlton CRNA  May 3, 2023  5:38 PM

## 2023-05-03 NOTE — ANESTHESIA PROCEDURE NOTES
Airway       Patient location during procedure: OR       Procedure Start/Stop Times: 5/3/2023 3:43 PM  Staff -        CRNA: Nicolas Oro APRN CRNA       Performed By: CRNA  Consent for Airway        Urgency: elective  Indications and Patient Condition       Indications for airway management: april-procedural       Induction type:intravenous       Mask difficulty assessment: 1 - vent by mask    Final Airway Details       Final airway type: endotracheal airway       Successful airway: ETT - single  Endotracheal Airway Details        ETT size (mm): 7.5       Cuffed: yes       Successful intubation technique: direct laryngoscopy       DL Blade Type: March 2       Grade View of Cords: 1       Adjucts: stylet       Position: Right       Measured from: lips       Secured at (cm): 22       Bite block used: None    Post intubation assessment        Placement verified by: capnometry, equal breath sounds and chest rise        Number of attempts at approach: 1       Number of other approaches attempted: 0       Secured with: pink tape       Ease of procedure: easy       Dentition: Intact and Unchanged    Medication(s) Administered   Medication Administration Time: 5/3/2023 3:43 PM

## 2023-05-03 NOTE — ANESTHESIA PROCEDURE NOTES
TAP Procedure Note    Pre-Procedure   Staff -        Anesthesiologist:  Uriel Martinez MD       Resident/Fellow: Remi Dale MD       Performed By: resident and with residents       Procedure performed by resident/fellow/CRNA in presence of a teaching physician.         Location: pre-op       Pre-Anesthestic Checklist: patient identified, IV checked, site marked, risks and benefits discussed, informed consent, monitors and equipment checked, pre-op evaluation, at physician/surgeon's request and post-op pain management  Timeout:       Correct Patient: Yes        Correct Procedure: Yes        Correct Site: Yes        Correct Position: Yes        Correct Laterality: Yes        Site Marked: Yes  Procedure Documentation  Procedure: TAP       Diagnosis: POSTOP PAIN       Laterality: bilateral       Patient Position: supine       Patient Prep/Sterile Barriers: sterile gloves, mask       Skin prep: Chloraprep       Needle Gauge: 21.        Needle Length (millimeters): 110        Ultrasound guided       1. Ultrasound was used to identify targeted nerve, plexus, vascular marker, or fascial plane and place a needle adjacent to it in real-time.       2. Ultrasound was used to visualize the spread of anesthetic in close proximity to the above referenced structure.       3. A permanent image is entered into the patient's record.    Assessment/Narrative         The placement was negative for: blood aspirated, painful injection and site bleeding       Paresthesias: No.       Bolus given via needle. no blood aspirated via catheter.        Secured via.        Insertion/Infusion Method: Single Shot       Complications: none    Medication(s) Administered   Bupivacaine 0.25% PF (Infiltration) - Infiltration   20 mL - 5/3/2023 1:50:00 PM  Bupivacaine liposome (Exparel) 1.3% LA inj susp (Infiltration) - Infiltration   20 mL - 5/3/2023 1:50:00 PM   Comments:  Routine bilateral transversus abdominis plane block without complications.  "Patient tolerated well.      FOR [facility]) ONLY:   Pain Team Contact information: please page the Pain Team Via [facility]. Search \"Pain\". During daytime hours, please page the attending first. At night please page the resident first.      "

## 2023-05-03 NOTE — BRIEF OP NOTE
[facility]    Brief Operative Note    Pre-operative diagnosis: S/P ileostomy (H) [Z93.2]  Adenocarcinoma of sigmoid colon (H) [C18.7]  Follow-up examination after colorectal surgery [Z09]  Abnormal coagulation profile [R79.1]  Post-operative diagnosis Same as pre-operative diagnosis    Procedure: Procedure(s):  CLOSURE, ILEOSTOMY, OPEN   Surgeon: Surgeon(s) and Role:     * Riley Magdaleno MD - Primary     * Delia Pearce MD - Assisting     * Katie Sam MD - Resident - Assisting  Anesthesia: General with Block   Estimated Blood Loss: 20 mL  IVF: 2000 mL  UOP: 150 mL     Drains: None  Specimens:   ID Type Source Tests Collected by Time Destination   1 : ileostomy Tissue Other SURGICAL PATHOLOGY EXAM Riley Magdaleno MD 5/3/2023  4:44 PM      Findings:   1. Side to side functional end to end anastomosis, widely patent and healthy.  Complications: None.  Implants:  None      Riley Magdaleno MD  Division of Colon and Rectal Surgery  [facility]  [phone]

## 2023-05-03 NOTE — OR NURSING
Pre-op block performed without complications.  VSS.  Pt tolerated well.  Will continue to monitor.

## 2023-05-04 ENCOUNTER — APPOINTMENT (OUTPATIENT)
Dept: OCCUPATIONAL THERAPY | Facility: CLINIC | Age: 73
DRG: 330 | End: 2023-05-04
Attending: SURGERY
Payer: COMMERCIAL

## 2023-05-04 LAB
ANION GAP SERPL CALCULATED.3IONS-SCNC: 7 MMOL/L (ref 7–15)
BUN SERPL-MCNC: 27.8 MG/DL (ref 8–23)
CALCIUM SERPL-MCNC: 6.9 MG/DL (ref 8.8–10.2)
CHLORIDE SERPL-SCNC: 114 MMOL/L (ref 98–107)
CREAT SERPL-MCNC: 0.69 MG/DL (ref 0.67–1.17)
DEPRECATED HCO3 PLAS-SCNC: 19 MMOL/L (ref 22–29)
ERYTHROCYTE [DISTWIDTH] IN BLOOD BY AUTOMATED COUNT: 16 % (ref 10–15)
GFR SERPL CREATININE-BSD FRML MDRD: >90 ML/MIN/1.73M2
GLUCOSE BLDC GLUCOMTR-MCNC: 83 MG/DL (ref 70–99)
GLUCOSE SERPL-MCNC: 68 MG/DL (ref 70–99)
HCT VFR BLD AUTO: 36.1 % (ref 40–53)
HGB BLD-MCNC: 11.2 G/DL (ref 13.3–17.7)
MAGNESIUM SERPL-MCNC: 1.5 MG/DL (ref 1.7–2.3)
MCH RBC QN AUTO: 30.2 PG (ref 26.5–33)
MCHC RBC AUTO-ENTMCNC: 31 G/DL (ref 31.5–36.5)
MCV RBC AUTO: 97 FL (ref 78–100)
PLATELET # BLD AUTO: 175 10E3/UL (ref 150–450)
POTASSIUM SERPL-SCNC: 3.6 MMOL/L (ref 3.4–5.3)
RBC # BLD AUTO: 3.71 10E6/UL (ref 4.4–5.9)
SODIUM SERPL-SCNC: 140 MMOL/L (ref 136–145)
WBC # BLD AUTO: 10.9 10E3/UL (ref 4–11)

## 2023-05-04 PROCEDURE — 250N000011 HC RX IP 250 OP 636: Performed by: SURGERY

## 2023-05-04 PROCEDURE — 258N000003 HC RX IP 258 OP 636: Performed by: PHYSICIAN ASSISTANT

## 2023-05-04 PROCEDURE — 250N000013 HC RX MED GY IP 250 OP 250 PS 637: Performed by: SURGERY

## 2023-05-04 PROCEDURE — 83735 ASSAY OF MAGNESIUM: CPT | Performed by: SURGERY

## 2023-05-04 PROCEDURE — 258N000003 HC RX IP 258 OP 636: Performed by: SURGERY

## 2023-05-04 PROCEDURE — 80048 BASIC METABOLIC PNL TOTAL CA: CPT | Performed by: SURGERY

## 2023-05-04 PROCEDURE — 85027 COMPLETE CBC AUTOMATED: CPT | Performed by: SURGERY

## 2023-05-04 PROCEDURE — 97530 THERAPEUTIC ACTIVITIES: CPT | Mod: GO | Performed by: OCCUPATIONAL THERAPIST

## 2023-05-04 PROCEDURE — 97165 OT EVAL LOW COMPLEX 30 MIN: CPT | Mod: GO | Performed by: OCCUPATIONAL THERAPIST

## 2023-05-04 PROCEDURE — 250N000011 HC RX IP 250 OP 636: Performed by: PHYSICIAN ASSISTANT

## 2023-05-04 PROCEDURE — 999N000147 HC STATISTIC PT IP EVAL DEFER

## 2023-05-04 PROCEDURE — 120N000002 HC R&B MED SURG/OB UMMC

## 2023-05-04 PROCEDURE — 250N000013 HC RX MED GY IP 250 OP 250 PS 637

## 2023-05-04 RX ORDER — SIMETHICONE 80 MG
80 TABLET,CHEWABLE ORAL ONCE
Status: COMPLETED | OUTPATIENT
Start: 2023-05-04 | End: 2023-05-04

## 2023-05-04 RX ORDER — CALCIUM CARBONATE 500 MG/1
500 TABLET, CHEWABLE ORAL DAILY PRN
Status: DISCONTINUED | OUTPATIENT
Start: 2023-05-04 | End: 2023-05-07 | Stop reason: HOSPADM

## 2023-05-04 RX ADMIN — OMEPRAZOLE 20 MG: 20 CAPSULE, DELAYED RELEASE ORAL at 18:07

## 2023-05-04 RX ADMIN — METHOCARBAMOL 500 MG: 500 TABLET ORAL at 08:17

## 2023-05-04 RX ADMIN — METHOCARBAMOL 500 MG: 500 TABLET ORAL at 16:11

## 2023-05-04 RX ADMIN — MAGNESIUM SULFATE HEPTAHYDRATE 3 G: 500 INJECTION, SOLUTION INTRAMUSCULAR; INTRAVENOUS at 09:53

## 2023-05-04 RX ADMIN — ACETAMINOPHEN 1000 MG: 500 TABLET ORAL at 14:21

## 2023-05-04 RX ADMIN — ACETAMINOPHEN 1000 MG: 500 TABLET ORAL at 02:58

## 2023-05-04 RX ADMIN — OXYCODONE HYDROCHLORIDE 5 MG: 5 TABLET ORAL at 12:57

## 2023-05-04 RX ADMIN — OMEPRAZOLE 20 MG: 20 CAPSULE, DELAYED RELEASE ORAL at 08:17

## 2023-05-04 RX ADMIN — ACETAMINOPHEN 1000 MG: 500 TABLET ORAL at 08:17

## 2023-05-04 RX ADMIN — ENOXAPARIN SODIUM 40 MG: 40 INJECTION SUBCUTANEOUS at 10:32

## 2023-05-04 RX ADMIN — METHOCARBAMOL 500 MG: 500 TABLET ORAL at 20:27

## 2023-05-04 RX ADMIN — MIRTAZAPINE 15 MG: 15 TABLET, FILM COATED ORAL at 21:40

## 2023-05-04 RX ADMIN — SIMETHICONE 80 MG: 80 TABLET, CHEWABLE ORAL at 18:32

## 2023-05-04 RX ADMIN — SODIUM CHLORIDE, POTASSIUM CHLORIDE, SODIUM LACTATE AND CALCIUM CHLORIDE: 600; 310; 30; 20 INJECTION, SOLUTION INTRAVENOUS at 00:16

## 2023-05-04 RX ADMIN — GABAPENTIN 100 MG: 100 CAPSULE ORAL at 21:40

## 2023-05-04 RX ADMIN — ACETAMINOPHEN 1000 MG: 500 TABLET ORAL at 21:40

## 2023-05-04 RX ADMIN — OXYCODONE HYDROCHLORIDE 10 MG: 10 TABLET ORAL at 02:58

## 2023-05-04 ASSESSMENT — ACTIVITIES OF DAILY LIVING (ADL)
PREVIOUS_RESPONSIBILITIES: MEAL PREP;HOUSEKEEPING;LAUNDRY;SHOPPING;YARDWORK;MEDICATION MANAGEMENT;FINANCES;DRIVING
ADLS_ACUITY_SCORE: 24
DEPENDENT_IADLS:: INDEPENDENT
ADLS_ACUITY_SCORE: 24
ADLS_ACUITY_SCORE: 22

## 2023-05-04 NOTE — PROGRESS NOTES
Post Op Check  05/04/2023    Pt reports pain well controlled. He has noticed red discharge seeping through part of his bandage (and Surgery was paged by nursing about this). Denies SOB, chest pain, or dizziness. Has not gotten out of bed. Denies nausea/vomiting.  mL since OR.      /62, HR 65, RR 16, afebrile, 99% on room air.  Gen: Adult male, alert and interactive, A&O x3, NAD.  Chest: Breathing non-labored on room air. CTAB.  Abdomen: Soft, appropriately tender, mildly distended. No guarding or rebound tenderness. Left abdominal former ileostomy site with dark serosanguineous shadowing beneath bandage and about 1/4 of surface area stricken through full-thickness. Drainage on the blankets appears more serous than sanguineous.  Extremities: warm and well perfused.    A/P: Kalin Shepard is a 72 year old male now POD#0 s/p diverting loop ileostomy reversal. Obtained a stat hemoglobin which was 11.8 from 16.4 3 weeks prior to surgery. Follow-up hemoglobin check ordered for 4 hours later. Given the patient's vital signs were stable without tachycardia or hypotension, and the hemoglobin was 11.8, less likely to be acute brisk blood loss. Continue plan of care per primary team. Please call with any questions.    Jaclyn Foreman M.D.  General Surgery Resident PGY1  [facility]

## 2023-05-04 NOTE — PLAN OF CARE
[id number]  VSS on RA. A&Ox4. Reports pain in abdomen that worsens with movement, PRN oxycodone 5mg given x1. Mag 1.5, replacement given. Transitioned to full liquid diet. Chest port infusing LR @ 75mL/hr. Incisional site dressing CDI. Pt urinating small amounts. Bladder scanned per orders, showed 261. Pt has not had a BM since admission. Provider notified about scan and lack of BM, asked for bowel meds. Awaiting response at end of shift, passed on to next RN. Continue w/ POC.

## 2023-05-04 NOTE — PROVIDER NOTIFICATION
"Paged Colorectal Surgery: \"Can you come look at patients surgical dressing to see if you want to change it or reinforce it? Thank you.\"  "

## 2023-05-04 NOTE — PROGRESS NOTES
Nursing Focus: Admission    D: Patient admitted/transferred from PACU via Stretcher Transport for Post OP Monitoring.      I: Upon arrival to the unit patient was oriented to room, unit, and call light. Patient s height, weight, and vital signs were obtained. Allergies reviewed and allergy band applied. MD notified of patient s arrival on the unit. Adult AVS completed. Head to toe assessment completed. Education assessment completed. Care plan initiated.     A: Vital signs stable upon admission. Patient rates pain at 7/10. Two RN skin assessment completed Yes. Second RN was Leslie. Significant Skin Findings include generalized bruising, abdominal surgical wound (UTV due to wound dressing with moderate drainage) . [facility] Nurse Consult Ordered No. Bed Algorithm can be found in PCS flow sheets (Support Surface Algorithm) and on IP [facility] NURSE RESOURCE TAB, was this used during this assessment? No. Was a pulsate mattress ordered No.     P: Continue to monitor patient and intervene as needed. Continue with plan of care. Notify MD with any concerns or changes in patient status.

## 2023-05-04 NOTE — ANESTHESIA POSTPROCEDURE EVALUATION
Patient: Kalin Shepard    Procedure: Procedure(s):  CLOSURE, ILEOSTOMY, OPEN        Anesthesia Type:  General    Note:  Disposition: Outpatient   Postop Pain Control: Uneventful            Sign Out: Well controlled pain   PONV: No   Neuro/Psych: Uneventful            Sign Out: Acceptable/Baseline neuro status   Airway/Respiratory: Uneventful            Sign Out: Acceptable/Baseline resp. status   CV/Hemodynamics: Uneventful            Sign Out: Acceptable CV status; No obvious hypovolemia; No obvious fluid overload   Other NRE: NONE   DID A NON-ROUTINE EVENT OCCUR? No           Last vitals:  Vitals Value Taken Time   BP 83/56 05/03/23 1950   Temp 36.4  C (97.6  F) 05/03/23 1745   Pulse 61 05/03/23 1951   Resp 15 05/03/23 1745   SpO2 98 % 05/03/23 1951   Vitals shown include unvalidated device data.    Electronically Signed By: Chet Ferguson MD  May 4, 2023  3:33 PM

## 2023-05-04 NOTE — PROGRESS NOTES
"Colorectal Surgery Progress Note  [facility]  POD#1      Subjective:  Pain ok.  No gas/stool.  Tolerated a little water.  No nausea.      Vitals:  Vitals:    05/03/23 1915 05/03/23 2007 05/03/23 2359 05/04/23 0403   BP: 99/66 103/59 101/62 97/60   BP Location:  Right arm Left arm Left arm   Cuff Size:       Pulse: 64 53 64 70   Resp:  18 13 14   Temp:  98.3  F (36.8  C) 98.3  F (36.8  C) 97.6  F (36.4  C)   TempSrc:  Oral Oral Oral   SpO2: 98% 99% 99% 97%   Weight:  58 kg (127 lb 13.9 oz)     Height:  1.803 m (5' 11\")       I/O:  I/O last 3 completed shifts:  In: 2540 [P.O.:240; I.V.:2300]  Out: 220 [Urine:200; Blood:20]    Physical Exam:  Gen: AAOx3, NAD  Pulm: Non-labored breathing  Abd: Soft, minimally distended, appropriately tender, no guarding   Old stoma site - prior dressings saturated.  But new dressing dry.   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  Now s/p loop ileostomy take down on 5/3.       Neuro/Pain: tylenol, oxycodone, IV dilaudid,   CV: no acute concerns at this time  PULM: encourage IS.  GI/FEN:   - CLD  - IVF @ 50  - ambulate  : monitor UOP  Heme: no acute concerns at this time  ID: no acute concerns at this time  Endocrine: no acute concerns at this time    Activity: " as tolerated.  Ppx: lovenox ppx  Dispo: pending return of bowel function.  Possible discharge in 1-2 days.       Doris Mccloud PA-C ..................5/4/2023   7:22 AM  Colon and Rectal Surgery    Patient was seen and discussed with Dr. Pearce    The above plan of care was performed and communicated to me by Dr. Elizabeth    I spent 35 minute face-to-face or coordinating care of Kalin Shepard. Over 50% of our time on the unit was spent counseling the patient and/or coordinating care as documented in the assessment and plan.     [id number] post op hospital visit

## 2023-05-04 NOTE — CONSULTS
Care Management Initial Consult    General Information  Assessment completed with: Patient,    Primary Care Provider verified and updated as needed: Yes   Readmission within the last 30 days:     Advance Care Planning: Advance Care Planning Reviewed: no concerns identified          Communication Assessment  Patient's communication style: spoken language (English or Bilingual)    Hearing Difficulty or Deaf: no   Wear Glasses or Blind: yes    Cognitive  Cognitive/Neuro/Behavioral: WDL                      Living Environment:   People in home: wife, 2 children (15 and 16 YO)     Current living Arrangements: house      Able to return to prior arrangements: yes       Family/Social Support:  Care provided by: self, spouse/significant other  Provides care for:    Marital Status:   Description of Support System: Supportive, Involved         Current Resources:   Patient receiving home care services: No  Community Resources: None  Equipment currently used at home: shower chair  Supplies currently used at home: None    Employment/Financial:  Employment Status: retired (but plans to work part time this summer.)     Financial Concerns: No concerns identified     Functional Status:  Prior to admission patient needed assistance:   Dependent ADLs:: Independent  Dependent IADLs:: Independent           Values/Beliefs:  Spiritual, Cultural Beliefs, Denominational Practices, Values that affect care:  (not discussed)               Additional Information:  S/p loop ileostomy takedown. Discharge pending return of bowel function. Therapy recommends home. Assessment completed with patient, see details above. He does not anticipate any needs at discharge. Family will provide ride home.       Guadalupe Fritz RN, [place]  7D Care Coordinator  Pager: [phone]

## 2023-05-04 NOTE — PROVIDER NOTIFICATION
"Paged colorectal surgery: \"Can there be a PRN heparin order placed for 7515-01 for his port? Thank you\"  "

## 2023-05-04 NOTE — PLAN OF CARE
Physical Therapy: Orders received. Chart reviewed and discussed with care team.? Physical Therapy not indicated due to per OT, pt SBA with mobility and no acute PT needs identified .? Defer discharge recommendations to OT and care team.? Will complete orders.

## 2023-05-04 NOTE — PLAN OF CARE
"[id number]    /59 (BP Location: Right arm)   Pulse 53   Temp 98.3  F (36.8  C) (Oral)   Resp 18   Ht 1.803 m (5' 11\")   Wt 58 kg (127 lb 13.9 oz)   SpO2 99%   BMI 17.83 kg/m      AVSS on RA. Patient arrived at 2015 after ileostomy reversal procedure. Patient had complaints of pain and nausea, given 10mg of Oxycodone x1 and 4m Zofran x1. Denied SOB. SBA with activity. No PO intake this shift. No urination or BM this shift. Continue with POC    "

## 2023-05-04 NOTE — PROVIDER NOTIFICATION
"Paged Colorectal Surgery: \"Please call me regarding orders for patient in room 995-01.  Eileen SNYDER #[id number]\"  "

## 2023-05-04 NOTE — PROGRESS NOTES
"   05/04/23 1045   Appointment Info   Signing Clinician's Name / Credentials (OT) Galina Baker, OTR/L   Living Environment   People in Home spouse   Current Living Arrangements house   Home Accessibility no concerns   Transportation Anticipated family or friend will provide   Living Environment Comments Pt lives with his wife and multiple pets (dogs, cats, and one bottle-fed lamb). House has no access concerns.   Self-Care   Usual Activity Tolerance good   Current Activity Tolerance moderate   Regular Exercise   (Pt is active with yardwork. Wants to start core strengthening routine.)   Equipment Currently Used at Home shower chair   Fall history within last six months no   Activity/Exercise/Self-Care Comment Pt IND w/ ADLs, has shower chair, but does not use. No AD for mobility.   Instrumental Activities of Daily Living (IADL)   Previous Responsibilities meal prep;housekeeping;laundry;shopping;yardwork;medication management;finances;driving   IADL Comments Pt IND w/ IADLs, shares responsibility w/ his wife.   General Information   Onset of Illness/Injury or Date of Surgery 05/03/23   Referring Physician Riley Magdaleno MD   Patient/Family Therapy Goal Statement (OT) To regain strength, to return to PLOF.   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  Now s/p loop ileostomy take down on 5/3.\"   Existing Precautions/Restrictions abdominal   Left Upper Extremity (Weight-bearing Status) partial weight-bearing (PWB)  (10 lb limit per abdominal precautions)   Right Upper Extremity (Weight-bearing Status) partial weight-bearing (PWB)  (10 lb limit per abdominal precautions)   Left Lower Extremity (Weight-bearing Status) full weight-bearing (FWB)   Right " Lower Extremity (Weight-bearing Status) full weight-bearing (FWB)   General Observations and Info Activity order: Up w/ assist   Cognitive Status Examination   Orientation Status orientation to person, place and time   Cognitive Status Comments No acute concerns. Pt recalling majority of abdominal precautions following prior surgery.   Visual Perception   Visual Impairment/Limitations corrective lenses for reading   Sensory   Sensory Quick Adds sensation intact   Pain Assessment   Patient Currently in Pain No  (Pt reporting soreness)   Posture   Posture not impaired   Range of Motion Comprehensive   General Range of Motion no range of motion deficits identified   Strength Comprehensive (MMT)   General Manual Muscle Testing (MMT) Assessment no strength deficits identified   Muscle Tone Assessment   Muscle Tone Quick Adds No deficits were identified   Coordination   Upper Extremity Coordination No deficits were identified   Bed Mobility   Bed Mobility supine-sit;sit-supine   Supine-Sit [place] (Bed Mobility) modified independence   Assistive Device (Bed Mobility) bed rails   Comment (Bed Mobility) Pt recalling/using log roll technique   Transfers   Transfers sit-stand transfer   Sit-Stand Transfer   Sit-Stand [place] (Transfers) independent   Balance   Balance Comments Pt initially mildy unsteady, likely due to pt hasn't walked since surgery.   Activities of Daily Living   BADL Assessment/Intervention bathing;lower body dressing;grooming;toileting   Bathing Assessment/Intervention   [place] Level (Bathing) modified independence   Comment, (Bathing) per clinical judgment   Assistive Devices (Bathing) shower chair   Lower Body Dressing Assessment/Training   Comment, (Lower Body Dressing) per clinical judgment. Pt reports will have assist from wife, if needed.   [place] Level (Lower Body Dressing) modified independence   Grooming Assessment/Training   [place] Level (Grooming) modified  independence   Comment, (Grooming) per clinical judgment   Toileting   Comment, (Toileting) per clinical judgment   [place] Level (Toileting) modified independence   Clinical Impression   Criteria for Skilled Therapeutic Interventions Met (OT) Yes, treatment indicated  (1x eval/treat)   OT Diagnosis Impaired IADL precautions, post-surgical precautions during ADL/IADLs   OT Problem List-Impairments impacting ADL problems related to;post-surgical precautions   Assessment of Occupational Performance 1-3 Performance Deficits   Identified Performance Deficits meal prep, housekeeping, yardwork   Planned Therapy Interventions (OT) IADL retraining;home program guidelines   Clinical Decision Making Complexity (OT) low complexity   Risk & Benefits of therapy have been explained evaluation/treatment results reviewed;care plan/treatment goals reviewed;risks/benefits reviewed;current/potential barriers reviewed;participants voiced agreement with care plan;participants included;patient   Clinical Impression Comments Pt presents at/near ADL baseline, limited by post-surgical precautions. Pt to benefit from 1x OT eval/treat to reinforce education in abdominal precautions w/ application to ADL/IADLs for improved pt safety.   OT Total Evaluation Time   OT Eval, Low Complexity Minutes (88557) 5   OT Goals   Therapy Frequency (OT) One time eval and treatment   OT Predicted Duration/Target Date for Goal Attainment 05/04/23   OT Goals Lower Body Dressing;Transfers;OT Goal 1   OT: Lower Body Dressing Modified independent   OT: Transfer Modified independent  (shower transfer)   OT: Goal 1 Pt will IND verbalize abdominal precautions and describe application to x3 ADL/IADLs to promote IND and safety.   Interventions   Interventions Quick Adds Therapeutic Activity   Therapeutic Activities   Therapeutic Activity Minutes (54635) 12   Symptoms noted during/after treatment fatigue   Treatment Detail/Skilled Intervention Following initial  evaluation, OT reinforced education in abdominal precautions, emphasizing importance of adhering to precautions during IADLs that require pushing/pulling, including yardwork and gardening and petcare. Educated pt in modications to IADL routines to maintain precautions and promote IND. OT faciliated ambulation to promote endurance for IADLs. Pt ambulating x400 ft w/ SBA and IV pole. OT educated pt in importance of grading/pacing activity, benefits of short bouts of activity to promote strength w/in current activity tolerance. Encouraged pt to ambulate 4x/day, and to call RN for next walk 2/2 pt being very mildly unsteady and to assist w/ lines. Pt verbalized understanding of all education. IND teach back of precautions, describing application to ADL/IADLs. Pt reporting no concerns for ADL/IADL performance at home, will have assist from wife. Pt in agreement w/ plan to complete OT orders.   OT Discharge Planning   OT Plan 1x eval/treat   OT Discharge Recommendation (DC Rec) home with assist   OT Rationale for DC Rec Pt at/near ADL baseline, will have good assist from wife.   OT Brief overview of current status SBA, please encourage ambulation 4x/day   Total Session Time   Timed Code Treatment Minutes 12   Total Session Time (sum of timed and untimed services) 17         Occupational Therapy Discharge Summary    Reason for therapy discharge:    All goals and outcomes met, no further needs identified.    Progress towards therapy goal(s). See goals on Care Plan in [facility] electronic health record for goal details.  Goals met. Pt IND verbalizing abdominal precautions and demonstrating good adherence during ADLs and mobility. Pt w/ good support from family.    Therapy recommendation(s):    Continue home exercise program.  Have assist for heavier IADLs.

## 2023-05-04 NOTE — PROVIDER NOTIFICATION
"Paged colorectal surgery: \"Please call to discuss orders regarding patient in room 7D 514.   Eileen SNYDER #[id number]\"  "

## 2023-05-04 NOTE — PLAN OF CARE
"Goal Outcome Evaluation:    4006-2921    BP 97/60 (BP Location: Left arm)   Pulse 70   Temp 97.6  F (36.4  C) (Oral)   Resp 14   Ht 1.803 m (5' 11\")   Wt 58 kg (127 lb 13.9 oz)   SpO2 97%   BMI 17.83 kg/m      A&O x4, stable on RA, afebrile. c/o of incisional site pain, managed with PRN Oxy and scheduled tylenol. Chest port infusing LR @ 50 ml/hr.Tolerating clears without nausea. Ambulated with SBA to the bathroom. No BM, voiding spontaneously via urinal. Incisional site dressing reinforce x1 this shift  Plan: No acute change this shift. Continued w/poc      "

## 2023-05-04 NOTE — PROVIDER NOTIFICATION
"Paed Colorectal Surgery: \"Can there be clarification on the every 4 hours blood glucose checks? Thanks\"  "

## 2023-05-04 NOTE — PROVIDER NOTIFICATION
"Paged Colorectal Surgery: \"Patient is on unit now. Admission/Tx orders released. Would you like post procedure orders released as well?  Eileen SNYDER #[id number]\"  "

## 2023-05-05 LAB
ANION GAP SERPL CALCULATED.3IONS-SCNC: 8 MMOL/L (ref 7–15)
BUN SERPL-MCNC: 18.2 MG/DL (ref 8–23)
CALCIUM SERPL-MCNC: 8.1 MG/DL (ref 8.8–10.2)
CHLORIDE SERPL-SCNC: 104 MMOL/L (ref 98–107)
CREAT SERPL-MCNC: 0.76 MG/DL (ref 0.67–1.17)
DEPRECATED HCO3 PLAS-SCNC: 24 MMOL/L (ref 22–29)
ERYTHROCYTE [DISTWIDTH] IN BLOOD BY AUTOMATED COUNT: 15.9 % (ref 10–15)
GFR SERPL CREATININE-BSD FRML MDRD: >90 ML/MIN/1.73M2
GLUCOSE SERPL-MCNC: 92 MG/DL (ref 70–99)
HCT VFR BLD AUTO: 27.9 % (ref 40–53)
HGB BLD-MCNC: 8.7 G/DL (ref 13.3–17.7)
HGB BLD-MCNC: 8.9 G/DL (ref 13.3–17.7)
MAGNESIUM SERPL-MCNC: 2 MG/DL (ref 1.7–2.3)
MCH RBC QN AUTO: 30.4 PG (ref 26.5–33)
MCHC RBC AUTO-ENTMCNC: 31.2 G/DL (ref 31.5–36.5)
MCV RBC AUTO: 98 FL (ref 78–100)
PHOSPHATE SERPL-MCNC: 2.2 MG/DL (ref 2.5–4.5)
PLATELET # BLD AUTO: 159 10E3/UL (ref 150–450)
POTASSIUM SERPL-SCNC: 4.2 MMOL/L (ref 3.4–5.3)
RBC # BLD AUTO: 2.86 10E6/UL (ref 4.4–5.9)
SODIUM SERPL-SCNC: 136 MMOL/L (ref 136–145)
WBC # BLD AUTO: 8 10E3/UL (ref 4–11)

## 2023-05-05 PROCEDURE — 250N000011 HC RX IP 250 OP 636

## 2023-05-05 PROCEDURE — 83735 ASSAY OF MAGNESIUM: CPT | Performed by: SURGERY

## 2023-05-05 PROCEDURE — 120N000002 HC R&B MED SURG/OB UMMC

## 2023-05-05 PROCEDURE — 80048 BASIC METABOLIC PNL TOTAL CA: CPT | Performed by: STUDENT IN AN ORGANIZED HEALTH CARE EDUCATION/TRAINING PROGRAM

## 2023-05-05 PROCEDURE — 85018 HEMOGLOBIN: CPT | Performed by: PHYSICIAN ASSISTANT

## 2023-05-05 PROCEDURE — 258N000003 HC RX IP 258 OP 636: Performed by: SURGERY

## 2023-05-05 PROCEDURE — 258N000003 HC RX IP 258 OP 636: Performed by: PHYSICIAN ASSISTANT

## 2023-05-05 PROCEDURE — 250N000013 HC RX MED GY IP 250 OP 250 PS 637: Performed by: SURGERY

## 2023-05-05 PROCEDURE — 250N000009 HC RX 250: Performed by: PHYSICIAN ASSISTANT

## 2023-05-05 PROCEDURE — 84100 ASSAY OF PHOSPHORUS: CPT | Performed by: STUDENT IN AN ORGANIZED HEALTH CARE EDUCATION/TRAINING PROGRAM

## 2023-05-05 PROCEDURE — 85014 HEMATOCRIT: CPT | Performed by: STUDENT IN AN ORGANIZED HEALTH CARE EDUCATION/TRAINING PROGRAM

## 2023-05-05 RX ORDER — ACETAMINOPHEN 500 MG
1000 TABLET ORAL EVERY 6 HOURS
Qty: 100 TABLET | Refills: 0 | Status: SHIPPED | OUTPATIENT
Start: 2023-05-07 | End: 2024-06-17

## 2023-05-05 RX ORDER — DEXTROSE MONOHYDRATE, SODIUM CHLORIDE, AND POTASSIUM CHLORIDE 50; 1.49; 4.5 G/1000ML; G/1000ML; G/1000ML
INJECTION, SOLUTION INTRAVENOUS CONTINUOUS
Status: DISCONTINUED | OUTPATIENT
Start: 2023-05-05 | End: 2023-05-06

## 2023-05-05 RX ORDER — GABAPENTIN 100 MG/1
100 CAPSULE ORAL AT BEDTIME
Qty: 14 CAPSULE | Refills: 0 | Status: SHIPPED | OUTPATIENT
Start: 2023-05-07

## 2023-05-05 RX ORDER — METHOCARBAMOL 500 MG/1
500 TABLET, FILM COATED ORAL 4 TIMES DAILY
Qty: 40 TABLET | Refills: 0 | Status: ON HOLD | OUTPATIENT
Start: 2023-05-07 | End: 2023-05-20

## 2023-05-05 RX ORDER — OXYCODONE HYDROCHLORIDE 5 MG/1
5-10 TABLET ORAL EVERY 6 HOURS PRN
Qty: 18 TABLET | Refills: 0 | Status: ON HOLD | OUTPATIENT
Start: 2023-05-06 | End: 2023-05-20

## 2023-05-05 RX ADMIN — OXYCODONE HYDROCHLORIDE 10 MG: 10 TABLET ORAL at 21:36

## 2023-05-05 RX ADMIN — OMEPRAZOLE 20 MG: 20 CAPSULE, DELAYED RELEASE ORAL at 09:51

## 2023-05-05 RX ADMIN — ACETAMINOPHEN 1000 MG: 500 TABLET ORAL at 05:08

## 2023-05-05 RX ADMIN — GABAPENTIN 100 MG: 100 CAPSULE ORAL at 21:36

## 2023-05-05 RX ADMIN — METHOCARBAMOL 500 MG: 500 TABLET ORAL at 16:34

## 2023-05-05 RX ADMIN — POTASSIUM CHLORIDE, DEXTROSE MONOHYDRATE AND SODIUM CHLORIDE: 150; 5; 450 INJECTION, SOLUTION INTRAVENOUS at 17:53

## 2023-05-05 RX ADMIN — ACETAMINOPHEN 1000 MG: 500 TABLET ORAL at 16:37

## 2023-05-05 RX ADMIN — Medication 5 ML: at 05:26

## 2023-05-05 RX ADMIN — SODIUM CHLORIDE, POTASSIUM CHLORIDE, SODIUM LACTATE AND CALCIUM CHLORIDE: 600; 310; 30; 20 INJECTION, SOLUTION INTRAVENOUS at 05:11

## 2023-05-05 RX ADMIN — METHOCARBAMOL 500 MG: 500 TABLET ORAL at 20:10

## 2023-05-05 RX ADMIN — OMEPRAZOLE 20 MG: 20 CAPSULE, DELAYED RELEASE ORAL at 20:09

## 2023-05-05 RX ADMIN — OXYCODONE HYDROCHLORIDE 5 MG: 5 TABLET ORAL at 16:37

## 2023-05-05 RX ADMIN — ACETAMINOPHEN 1000 MG: 500 TABLET ORAL at 21:36

## 2023-05-05 RX ADMIN — METHOCARBAMOL 500 MG: 500 TABLET ORAL at 12:17

## 2023-05-05 RX ADMIN — METHOCARBAMOL 500 MG: 500 TABLET ORAL at 09:51

## 2023-05-05 RX ADMIN — MIRTAZAPINE 15 MG: 15 TABLET, FILM COATED ORAL at 21:36

## 2023-05-05 RX ADMIN — POTASSIUM PHOSPHATE, MONOBASIC AND POTASSIUM PHOSPHATE, DIBASIC 15 MMOL: 224; 236 INJECTION, SOLUTION, CONCENTRATE INTRAVENOUS at 09:41

## 2023-05-05 RX ADMIN — ACETAMINOPHEN 1000 MG: 500 TABLET ORAL at 09:51

## 2023-05-05 ASSESSMENT — ACTIVITIES OF DAILY LIVING (ADL)
ADLS_ACUITY_SCORE: 23
ADLS_ACUITY_SCORE: 22
ADLS_ACUITY_SCORE: 24
ADLS_ACUITY_SCORE: 23
ADLS_ACUITY_SCORE: 22
ADLS_ACUITY_SCORE: 22
ADLS_ACUITY_SCORE: 23
ADLS_ACUITY_SCORE: 22
ADLS_ACUITY_SCORE: 26
ADLS_ACUITY_SCORE: 26
ADLS_ACUITY_SCORE: 23
ADLS_ACUITY_SCORE: 22

## 2023-05-05 NOTE — PROGRESS NOTES
Colorectal Surgery Progress Note  [facility]  POD#2      Subjective:  Pain ok.  No gas/stool.  Some nausea.       Vitals:  Vitals:    05/04/23 1815 05/04/23 2047 05/05/23 0100 05/05/23 0447   BP:  133/71 100/55 106/60   BP Location:  Right arm Right arm Right arm   Cuff Size:   Adult Regular    Pulse:  81 70 67   Resp:  20 16 16   Temp:  97.8  F (36.6  C) 98.1  F (36.7  C) 98.3  F (36.8  C)   TempSrc:  Oral Oral Oral   SpO2:  94% 94% 91%   Weight: 60.2 kg (132 lb 12.8 oz)      Height:         I/O:  I/O last 3 completed shifts:  In: 1135.42 [I.V.:1135.42]  Out: 910 [Urine:910]    Physical Exam:  Gen: AAOx3, NAD  Pulm: Non-labored breathing  Abd: Soft, mild to moderately distended, appropriately tender, no guarding   Old stoma site - prior dressings saturated.  Changed this morning.   Ext:  Warm and well-perfused    BMP  Recent Labs   Lab 05/05/23  0526 05/04/23  0443 05/04/23  0304 05/03/23  2246 05/03/23  2130 05/03/23  1358     --  140  --   --  138   POTASSIUM 4.2  --  3.6  --   --  4.8   CHLORIDE 104  --  114*  --   --  106   CO2 24  --  19*  --   --  20*   BUN 18.2  --  27.8*  --   --  40.7*   CR 0.76  --  0.69  --  0.79 0.91   GLC 92 83 68* 72  --  84   MAG 2.0  --  1.5*  --   --   --    PHOS 2.2*  --   --   --   --   --      CBC  Recent Labs   Lab 05/05/23  0613 05/04/23  0431 05/03/23  2242   WBC 8.0 10.9 12.1*   HGB 8.7* 11.2* 11.8*   HCT 27.9* 36.1* 37.7*    175 182         ASSESSMENT: This is a 72 year old male PMH 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  Now s/p loop ileostomy take down on 5/3.     Neuro/Pain: tylenol, oxycodone, IV dilaudid,   CV: no acute concerns at this time  PULM: encourage IS.  GI/FEN:   - CLD  - IVF @ 75  - ambulate  - replace phos today  : monitor UOP  Heme: Hgb 8.7 from 11s from  14-15s, multifactorial acute blood loss anemia.    - recheck Hgb tomorrow  ID: no acute concerns at this time  Endocrine: no acute concerns at this time    Activity: as tolerated.  Ppx: lovenox ppx - hold today for decreasing Hgb.    Dispo: pending return of bowel function.  Possible discharge in 1-2 days.       Doris Mccloud PA-C ..................5/5/2023   7:10 AM  Colon and Rectal Surgery    Patient was seen and discussed with Dr. Pearce    The above plan of care was performed and communicated to me by Dr. Magdaleno    I spent 35 minute face-to-face or coordinating care of Kalin Shepard. Over 50% of our time on the unit was spent counseling the patient and/or coordinating care as documented in the assessment and plan.     [id number] post op hospital visit

## 2023-05-05 NOTE — DISCHARGE SUMMARY
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 Loop ileostomy takedown.  2.  Small bowel, stapled, side-to-side anastomosis.  3.  Repair of abdominal wall at ileostomy site.     ANESTHESIA: General endotracheal anesthesia plus bilateral TAP blocks local anesthesia.          Consultations:   OCCUPATIONAL THERAPY ADULT IP CONSULT  PHYSICAL THERAPY ADULT IP CONSULT  CARE MANAGEMENT / SOCIAL WORK IP CONSULT         Imaging Studies:     Results for orders placed or performed during the hospital encounter of 03/27/23   XR Colon Water Soluble Diagnostic    Narrative    COLON WATER SOLUBLE DIAGNOSTIC 3/27/2023 10:56 AM    HISTORY: Post LAR, evaluate anastomosis. Status post ileostomy (H).  Adenocarcinoma of sigmoid colon (H).    COMPARISON: CT chest, abdomen and pelvis from 11/27/2022 and  8/26/2022. Rectal MRI from 11/27/2022.    TECHNIQUE: A standard single contrast enema was performed with  Gastrografin.    FLUOROSCOPY TIME: 3.6 minutes    NUMBER OF IMAGES:  "16    FINDINGS/    Impression    IMPRESSION: Initially, the procedure was attempted without inflating  the balloon. Unfortunately, contrast quickly exited the rectum without  adequate opacification of the rectum or rectosigmoid anastomosis.  Subsequently, the balloon was slowly and partially inflated in the  very low rectum, which was well tolerated by the patient. Following  partial inflation of the balloon, the majority of the colon was  opacified with contrast to the level of the mid to low ascending  colon. The rectosigmoid anastomosis appears as expected without  evidence to suggest extravasation of contrast. No colonic stricture,  mass, or dilation is seen. Post void pictures demonstrate mild  residual contrast material within the colon. No evidence of contrast  extravasation on post void imaging.      YAN TURK MD         SYSTEM ID:  [id number]            Medications Prior to Admission:     Medications Prior to Admission   Medication Sig Dispense Refill Last Dose     heparin 100 UNIT/ML SOLN injection 5-10 mLs by Intracatheter route every 28 days   Unknown     mirtazapine (REMERON) 15 MG tablet Take 1 tablet (15 mg) by mouth At Bedtime 90 tablet 3 5/2/2023 at 2300     multivitamin (CENTRUM SILVER) tablet Take 1 tablet by mouth every morning 30 tablet  Past Week     omeprazole (PRILOSEC) 20 MG DR capsule Take 1 capsule (20 mg) by mouth 2 times daily 60 capsule 3 5/3/2023 at 0800     Ostomy Supplies MISC 1 each Every Mon, Wed, Fri Morning Hollister1 piece flat fecal with filter #7335 20 pouches per month   2\" barrier ring #2681 (1 per pouch)   Adapt powder #7906   No sting film barrier # 1387   Adapt odor eliminator and lubricant 236ml bottle # 01427   M-9 Spray room deodorizer #0004     1 each 11 Unknown     [DISCONTINUED] acetaminophen (TYLENOL) 500 MG tablet Take 2 tablets (1,000 mg) by mouth 4 times daily 56 tablet 0 5/3/2023 at 0800     [DISCONTINUED] loperamide (IMODIUM) 2 MG capsule Take 1 capsule " (2 mg) by mouth 3 times daily 120 capsule 4 5/3/2023 at 0800     [DISCONTINUED] methocarbamol (ROBAXIN) 500 MG tablet Take 1 tablet (500 mg) by mouth 3 times daily for 30 days 90 tablet 0 5/3/2023 at 0800     [DISCONTINUED] psyllium (METAMUCIL/KONSYL) Packet Take 1 packet by mouth 2 times daily 60 packet 0 Unknown            Discharge Medications:     Current Discharge Medication List      START taking these medications    Details   gabapentin (NEURONTIN) 100 MG capsule Take 1 capsule (100 mg) by mouth At Bedtime  Qty: 14 capsule, Refills: 0    Associated Diagnoses: Acute post-operative pain      oxyCODONE (ROXICODONE) 5 MG tablet Take 1-2 tablets (5-10 mg) by mouth every 6 hours as needed for moderate to severe pain  Qty: 18 tablet, Refills: 0    Associated Diagnoses: Acute post-operative pain         CONTINUE these medications which have CHANGED    Details   acetaminophen (TYLENOL) 500 MG tablet Take 2 tablets (1,000 mg) by mouth every 6 hours  Qty: 100 tablet, Refills: 0    Associated Diagnoses: Acute post-operative pain      methocarbamol (ROBAXIN) 500 MG tablet Take 1 tablet (500 mg) by mouth 4 times daily  Qty: 40 tablet, Refills: 0    Associated Diagnoses: Acute post-operative pain         CONTINUE these medications which have NOT CHANGED    Details   heparin 100 UNIT/ML SOLN injection 5-10 mLs by Intracatheter route every 28 days      mirtazapine (REMERON) 15 MG tablet Take 1 tablet (15 mg) by mouth At Bedtime  Qty: 90 tablet, Refills: 3    Associated Diagnoses: Adenocarcinoma of sigmoid colon (H); Poor appetite      multivitamin (CENTRUM SILVER) tablet Take 1 tablet by mouth every morning  Qty: 30 tablet      omeprazole (PRILOSEC) 20 MG DR capsule Take 1 capsule (20 mg) by mouth 2 times daily  Qty: 60 capsule, Refills: 3    Associated Diagnoses: Gastroesophageal reflux disease without esophagitis      Ostomy Supplies MISC 1 each Every Mon, Wed, Fri Morning Hollister1 piece flat fecal with filter #8522 17  "pouches per month   2\" barrier ring #7805 (1 per pouch)   Adapt powder #7906   No sting film barrier # 7035   Adapt odor eliminator and lubricant 236ml bottle # 35683   M-9 Spray room deodorizer #8948      Qty: 1 each, Refills: 11    Associated Diagnoses: S/P colostomy (H)         STOP taking these medications       loperamide (IMODIUM) 2 MG capsule Comments:   Reason for Stopping:         psyllium (METAMUCIL/KONSYL) Packet Comments:   Reason for Stopping:                      Brief History of Illness:   This is a 72 year old male PMH upper rectal distal sigmoid cancer with a large bowel obstruction s/p emergent exploratory laparotomy, small bowel resection, w/ loop descending colostomy in 3/2022 s/p chemo.  Now s/p exploratory laparotomy, lysis of adhesions, low anterior resection, take down loop colostomy w/ diverting loop ileostomy in 2/2023.  Now s/p loop ileostomy take down on 5/3/2023.           Hospital Course:   Post-operatively pt was gently fluid resuscitated.  Castro was removed and pt was able to void.  Pt had eventual return of bowel function and was able to tolerate small amounts of a regular diet.     Pt was seen by PT/OT and deemed appropriate for discharge home.  Post-operative pain was controlled with scheduled tylenol, gabapentin, robaxin and prn oxycodone.     Patient is to follow up in the Colon and Rectal Surgery Clinic in 2-3 week with Karla Birmingham NP or Shea Sanchez PA-C and then with Dr. Magdaleno in 4-5 weeks after.          Day of Discharge Physical Exam:   Blood pressure 99/56, pulse 67, temperature 98.3  F (36.8  C), temperature source Oral, resp. rate 20, height 1.803 m (5' 11\"), weight 60.5 kg (133 lb 4.8 oz), SpO2 95 %.    Gen: AAOx3, NAD  Pulm: Non-labored breathing  Abd: Soft, appropriately tender, no guarding/rebound   Old ostomy site wound - packed with serosang drainage  Ext:  Warm and well-perfused         Final Pathology Result:   Pending at time of discharge        "    Discharge Instructions and Follow-Up:     Discharge Procedure Orders   Reason for your hospital stay   Order Comments: 5/3/2023:   PROCEDURE:  1.  Loop ileostomy takedown.  2.  Small bowel, stapled, side-to-side anastomosis.  3.  Repair of abdominal wall at ileostomy site.     ANESTHESIA: General endotracheal anesthesia plus bilateral TAP blocks local anesthesia.     Activity   Order Comments: Your activity upon discharge:   ACTIVITY  -No lifting, pushing, pulling greater than 10 lbs and no strenuous exercise for 6 weeks   -No driving while on narcotic analgesics (i.e. Percocet, oxycodone, Vicodin)  -No driving until you are able to fully twist to both sides or slam on brakes quickly and without any pain  -We encourage walking at least 4-5 times per day     Order Specific Question Answer Comments   Is discharge order? Yes      Adult [facility] Follow-up and recommended labs and tests   Order Comments: WOUND CARE  -Inspect your wounds daily for signs of infection (increased redness, drainage, pain)  -Keep your wound clean and dry  -You may shower, but do not soak in tub or pool  - Cover your old ostomy site wound with gauze and change dressing twice daily and more as needed for drainage/saturation/showering.     NOTIFY  Please contact Rachel Hunt RN or Dory Bynum RN at [phone] for problems after discharge such as:  -Temperature > 101F, chills, rigors, dizziness  -Redness around or purulent drainage from wound  -Inability to tolerate diet, nausea or vomiting  -You stop passing gas, develop significant bloating, abdominal pain  -Have blood in stools/vomit  -Have severe diarrhea/constipation  -Any other questions or concerns.  - At nights (after 4:30pm), on weekends, or if urgent, call [phone] and ask the  to speak with the on-call Colorectal Surgery resident or fellow      Medication Instructions  Some of your medications may have changed. Please take only prescribed and resumed medications      FOLLOW-UP  1.  You will need to follow-up with Karla Birmingham NP in the Colon and Rectal Surgery clinic on 5/17 and then with CRS Staff: Dr. Riley Magdaleno on 6/1.  Please contact our Nurses (phone # [phone]) if you have not heard from our clinic in 3 business days afer discharge to schedule a follow-up appointment.      Appointments on [place] and/or [facility] (with [facility] or [employer] provider or service). Call [phone] if you haven't heard regarding these appointments within 7 days of discharge.     Diet   Order Comments: Follow this diet upon discharge:   DIET  -Regular Diet  -We recommend eating slowly, chewing thoroughly, eating small frequent meals throughout the day  -Stay well hydrated.     Order Specific Question Answer Comments   Is discharge order? Yes             Home Health Care:     Not needed           Discharge Disposition:     Discharged to home      Condition at discharge: Stable      Katie Sam MD  General Surgery, PGY-1

## 2023-05-05 NOTE — PROVIDER NOTIFICATION
"Paged colorectal surgery  :\" Patient states not having passed gas or having bm. Stomach is still firm  Eileen Reilly #[id number]\"  "

## 2023-05-05 NOTE — PLAN OF CARE
D/I: Pt is alert and oriented x 4, vitally stable on room air. Calm, pleasant, and cooperative with cares. No flatus, No BM, abdomen soft and less bloated, NPO status maintained. Bowel sound hypoactive. Pain is 4/10, pt declined intervention. Denied nausea. Voiding in small amounts in urinal. Post void bladder scanned showed 250ml. R chest port infusing LR @ 75 ml/hr. Mag 1.5, replaced and pending recheck this morning. LLQ dressing @ closed ileostomy site CDI. Up in room with SBA.   Plan: Will continue to monitor GI status, assess and manage pain, monitor urine output, continue to maintain NPO status per order, assess response to intervention, update MDs with changes and concerns and follow plan of cares.     Problem: Plan of Care - These are the overarching goals to be used throughout the patient stay.    Goal: Plan of Care Review  Description: The Plan of Care Review/Shift note should be completed every shift.  The Outcome Evaluation is a brief statement about your assessment that the patient is improving, declining, or no change.  This information will be displayed automatically on your shift note.  Flowsheets (Taken 5/5/2023 0246)  Outcome Evaluation: No flatus, No BM, abdomen soft and less bloated, NPO status maintained. Pain is 4/10, pt declined intervention  Plan of Care Reviewed With: patient  Overall Patient Progress: no change       Goal Outcome Evaluation:      Plan of Care Reviewed With: patient    Overall Patient Progress: no changeOverall Patient Progress: no change    Outcome Evaluation: No flatus, No BM, abdomen soft and less bloated, NPO status maintained. Pain is 4/10, pt declined intervention

## 2023-05-05 NOTE — CARE PLAN
"Goal Outcome Evaluation:   Time: 9538-0846    Blood pressure 99/56, pulse 67, temperature 98.3  F (36.8  C), temperature source Oral, resp. rate 20, height 1.803 m (5' 11\"), weight 60.5 kg (133 lb 4.8 oz), SpO2 95 %.      Pt alert and oriented x4, pleasant and cooperative. Previous shift noted oliguria, pt has been voiding urine spontaneously this shift. Reports abd pain, 5/10. Scheduled acetaminophen administered, denies PRNs for pain. NPO except for medications. Continuous LRs infusing. One time Sodium Phosphorus order  administered this shift. LS COA bilaterally. Denies SOB. Denies gas/stool this shift. Timed hbg drawn at noon. HGB result 8.9. No further concerns this shift. Nursing will continue to monitor.   "

## 2023-05-05 NOTE — PLAN OF CARE
"8685-7129    /71 (BP Location: Right arm)   Pulse 81   Temp 97.8  F (36.6  C) (Oral)   Resp 20   Ht 1.803 m (5' 11\")   Wt 60.2 kg (132 lb 12.8 oz)   SpO2 94%   BMI 18.52 kg/m      AVSS on RA. Patient had complaints of abdominal pressure with abdomen being firm to the touch. Provider notified and placed patient NPO overnight after assesing. Mild nausea but denied interventions. Chest port infusing LR at 75mL/hr. SBA with activity, ambulated hallways twice. Fair PO fluid intake before NPO. No difficulties urinating small amounts during shift, bladder scan showed 247mL. No BM since admission, providers advised against interventions for facilitating BM. Continue with POC.     "

## 2023-05-05 NOTE — PLAN OF CARE
"Goal Outcome Evaluation:    1500 - 1930:   BP 99/56 (BP Location: Right arm)   Pulse 67   Temp 98.3  F (36.8  C) (Oral)   Resp 20   Ht 1.803 m (5' 11\")   Wt 60.5 kg (133 lb 4.8 oz)   SpO2 95%   BMI 18.59 kg/m        A&O x 4, AVSS ex soft BP 90's/50's.  Generalized abdominal pain managed with scheduled tylenol, robaxin & gave oxycodone 5 mg x 1.  Left abdomen incision/surgical site dressing c/d/i, was changed AM by surgical team.  MIVF D5 & 1/2 NS + KCL infusing at 60 ml/hr via port.  UAL, walked in hallway x 1 this evening, adequate UOP, no stool nor gas passing, + bowel sound.  Continue with poc.....                        "

## 2023-05-06 LAB
HGB BLD-MCNC: 9.1 G/DL (ref 13.3–17.7)
MAGNESIUM SERPL-MCNC: 1.9 MG/DL (ref 1.7–2.3)
PHOSPHATE SERPL-MCNC: 2.1 MG/DL (ref 2.5–4.5)
PLATELET # BLD AUTO: 175 10E3/UL (ref 150–450)

## 2023-05-06 PROCEDURE — 250N000011 HC RX IP 250 OP 636: Performed by: SURGERY

## 2023-05-06 PROCEDURE — 258N000003 HC RX IP 258 OP 636: Performed by: SURGERY

## 2023-05-06 PROCEDURE — 250N000013 HC RX MED GY IP 250 OP 250 PS 637: Performed by: SURGERY

## 2023-05-06 PROCEDURE — 250N000011 HC RX IP 250 OP 636

## 2023-05-06 PROCEDURE — 83735 ASSAY OF MAGNESIUM: CPT | Performed by: PHYSICIAN ASSISTANT

## 2023-05-06 PROCEDURE — 120N000002 HC R&B MED SURG/OB UMMC

## 2023-05-06 PROCEDURE — 258N000003 HC RX IP 258 OP 636

## 2023-05-06 PROCEDURE — 250N000009 HC RX 250

## 2023-05-06 PROCEDURE — 84100 ASSAY OF PHOSPHORUS: CPT | Performed by: PHYSICIAN ASSISTANT

## 2023-05-06 PROCEDURE — 85018 HEMOGLOBIN: CPT | Performed by: PHYSICIAN ASSISTANT

## 2023-05-06 PROCEDURE — 36591 DRAW BLOOD OFF VENOUS DEVICE: CPT | Performed by: PHYSICIAN ASSISTANT

## 2023-05-06 PROCEDURE — 85049 AUTOMATED PLATELET COUNT: CPT | Performed by: SURGERY

## 2023-05-06 RX ADMIN — OXYCODONE HYDROCHLORIDE 5 MG: 5 TABLET ORAL at 05:09

## 2023-05-06 RX ADMIN — POTASSIUM CHLORIDE, DEXTROSE MONOHYDRATE AND SODIUM CHLORIDE: 150; 5; 450 INJECTION, SOLUTION INTRAVENOUS at 05:26

## 2023-05-06 RX ADMIN — ACETAMINOPHEN 1000 MG: 500 TABLET ORAL at 05:08

## 2023-05-06 RX ADMIN — OMEPRAZOLE 20 MG: 20 CAPSULE, DELAYED RELEASE ORAL at 21:00

## 2023-05-06 RX ADMIN — OXYCODONE HYDROCHLORIDE 5 MG: 5 TABLET ORAL at 14:58

## 2023-05-06 RX ADMIN — OXYCODONE HYDROCHLORIDE 5 MG: 5 TABLET ORAL at 20:59

## 2023-05-06 RX ADMIN — METHOCARBAMOL 500 MG: 500 TABLET ORAL at 23:14

## 2023-05-06 RX ADMIN — POTASSIUM PHOSPHATE, MONOBASIC AND POTASSIUM PHOSPHATE, DIBASIC 15 MMOL: 224; 236 INJECTION, SOLUTION, CONCENTRATE INTRAVENOUS at 12:58

## 2023-05-06 RX ADMIN — ENOXAPARIN SODIUM 40 MG: 40 INJECTION SUBCUTANEOUS at 11:21

## 2023-05-06 RX ADMIN — METHOCARBAMOL 500 MG: 500 TABLET ORAL at 13:02

## 2023-05-06 RX ADMIN — ACETAMINOPHEN 1000 MG: 500 TABLET ORAL at 17:11

## 2023-05-06 RX ADMIN — METHOCARBAMOL 500 MG: 500 TABLET ORAL at 17:11

## 2023-05-06 RX ADMIN — MIRTAZAPINE 15 MG: 15 TABLET, FILM COATED ORAL at 21:00

## 2023-05-06 RX ADMIN — Medication 5 ML: at 09:04

## 2023-05-06 RX ADMIN — ACETAMINOPHEN 1000 MG: 500 TABLET ORAL at 11:20

## 2023-05-06 RX ADMIN — GABAPENTIN 100 MG: 100 CAPSULE ORAL at 21:00

## 2023-05-06 RX ADMIN — ACETAMINOPHEN 1000 MG: 500 TABLET ORAL at 23:13

## 2023-05-06 RX ADMIN — METHOCARBAMOL 500 MG: 500 TABLET ORAL at 08:58

## 2023-05-06 RX ADMIN — OMEPRAZOLE 20 MG: 20 CAPSULE, DELAYED RELEASE ORAL at 08:58

## 2023-05-06 RX ADMIN — Medication 5 ML: at 17:11

## 2023-05-06 ASSESSMENT — ACTIVITIES OF DAILY LIVING (ADL)
ADLS_ACUITY_SCORE: 23
ADLS_ACUITY_SCORE: 27
ADLS_ACUITY_SCORE: 23
ADLS_ACUITY_SCORE: 27
ADLS_ACUITY_SCORE: 23
ADLS_ACUITY_SCORE: 27
ADLS_ACUITY_SCORE: 23
ADLS_ACUITY_SCORE: 27

## 2023-05-06 NOTE — PLAN OF CARE
Goal Outcome Evaluation: POD3 of an ileostomy takedown. A&Ox4, VSS on room air. Pain is controlled with tylenol & robaxin. Abd soft, tender. Takedown site CDI. Diet advanced to regular, Attempted regular diet late today. Phos 2.1. replacing. Recheck in the morning.     Plan: possible discharge tomorrow.

## 2023-05-06 NOTE — PROGRESS NOTES
Colorectal Surgery Progress Note  May 6, 2023    S: +ROBF, doing well, less distended. Good appetite.    O: Temp:  [97  F (36.1  C)-98.9  F (37.2  C)] 97.8  F (36.6  C)  Pulse:  [59-68] 68  Resp:  [16-20] 16  BP: ()/(54-67) 128/66  SpO2:  [95 %-100 %] 100 %  Gen: NAD  Resp: NLB  CV: RRR  Abd: soft, less distended, ostomy site clean with dressing changed at bedside.  Ext: wwp    A/P: 71 yo M now POD 3 s/p ileostomy reversal, remains on a stable postop course.  -Continue multimodal pain meds  -Diet as tolerated, discontinue IVF.  -OK to resume dvt ppx, OOB, IS.  -Plan for discharge tomorrow.      Riley Magdaleno MD  Division of Colon and Rectal Surgery  [facility]  [phone]

## 2023-05-06 NOTE — PROGRESS NOTES
Colorectal Surgery Progress Note  [facility]  POD#3      Subjective:  Doing much better today, started passing gas and has had BMs. Abdomen less bloated - tolerated CLD yesterday. Ambulated yesterday.       Vitals:  Vitals:    05/05/23 2216 05/06/23 0400 05/06/23 0521 05/06/23 0732   BP: 97/54 (!) 141/67 128/66    BP Location: Right arm Right arm Right arm    Cuff Size:  Adult Regular Adult Regular    Pulse: 66 68 68    Resp: 18 16 16    Temp: 98.9  F (37.2  C) 97  F (36.1  C)  97.8  F (36.6  C)   TempSrc: Temporal Oral  Temporal   SpO2: 100% 98% 100%    Weight:       Height:         I/O:  I/O last 3 completed shifts:  In: 1277 [I.V.:1277]  Out: 1385 [Urine:1385]    Physical Exam:  Gen: AAOx3, NAD  Pulm: Non-labored breathing  Abd: Soft, mild to moderately distended, appropriately tender, no guarding   Old stoma site - changed this morning. Good granulation tissue, healing well.  Ext:  Warm and well-perfused    BMP  Recent Labs   Lab 05/06/23  0728 05/05/23  0526 05/04/23  0443 05/04/23  0304 05/03/23  2246 05/03/23  2130 05/03/23  1358   NA  --  136  --  140  --   --  138   POTASSIUM  --  4.2  --  3.6  --   --  4.8   CHLORIDE  --  104  --  114*  --   --  106   CO2  --  24  --  19*  --   --  20*   BUN  --  18.2  --  27.8*  --   --  40.7*   CR  --  0.76  --  0.69  --  0.79 0.91   GLC  --  92 83 68* 72  --  84   MAG 1.9 2.0  --  1.5*  --   --   --    PHOS 2.1* 2.2*  --   --   --   --   --      CBC  Recent Labs   Lab 05/06/23  0728 05/05/23  1206 05/05/23  0613 05/04/23  0431 05/03/23  2242   WBC  --   --  8.0 10.9 12.1*   HGB 9.1* 8.9* 8.7* 11.2* 11.8*   HCT  --   --  27.9* 36.1* 37.7*     --  159 175 182         ASSESSMENT: This is a 72 year old male PMH 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  Now s/p loop ileostomy take  down on 5/3.     Neuro/Pain: tylenol, oxycodone, IV dilaudid  CV: no acute concerns at this time  PULM: encourage IS.  GI/FEN:   - Adv to reg diet  - hep lock ivf  - ambulate  : monitor UOP  Heme: Hgb stable today - will restart pLovenox.  ID: no acute concerns at this time  Endocrine: no acute concerns at this time  Skin: Ok to stop packing ostomy takedown site. Just place dry sterile dressing on top.    Activity: as tolerated.  Dispo: Possible discharge home tomorrow vs Monday.     Patient seen and assessed on rounds with Dr Rasta Pearce MD  Colon and Rectal Surgery Fellow

## 2023-05-06 NOTE — PLAN OF CARE
D/I: Pt is alert and oriented x 4, vitally stable on room air. Calm, pleasant, and cooperative with cares. Passing gas and has had 2 small form stools since midnight. BS+. Pain is 3-6/10, controlled with Oxycodone and tylenol. Denied nausea. Voiding spontaneously and adequately in urinal. D5 1/2NS +20 KCl @ 60 ml/hr. Hgb stable @8.9 at midnight. Pending another recheck this morning. LLQ dressing @ closed ileostomy site CDI. Up in room with SBA.   Plan: Will continue to monitor GI status, assess and manage pain, monitor I/O, assess response to intervention, update MDs with changes and concerns and follow plan of cares.

## 2023-05-07 VITALS
WEIGHT: 133.3 LBS | RESPIRATION RATE: 16 BRPM | BODY MASS INDEX: 18.66 KG/M2 | HEART RATE: 61 BPM | DIASTOLIC BLOOD PRESSURE: 59 MMHG | TEMPERATURE: 98.2 F | HEIGHT: 71 IN | SYSTOLIC BLOOD PRESSURE: 101 MMHG | OXYGEN SATURATION: 98 %

## 2023-05-07 LAB
HOLD SPECIMEN: NORMAL
PHOSPHATE SERPL-MCNC: 2.9 MG/DL (ref 2.5–4.5)

## 2023-05-07 PROCEDURE — 36591 DRAW BLOOD OFF VENOUS DEVICE: CPT

## 2023-05-07 PROCEDURE — 84100 ASSAY OF PHOSPHORUS: CPT

## 2023-05-07 PROCEDURE — 250N000013 HC RX MED GY IP 250 OP 250 PS 637: Performed by: SURGERY

## 2023-05-07 PROCEDURE — 250N000011 HC RX IP 250 OP 636: Performed by: SURGERY

## 2023-05-07 PROCEDURE — 250N000011 HC RX IP 250 OP 636

## 2023-05-07 RX ORDER — METHOCARBAMOL 500 MG/1
500 TABLET, FILM COATED ORAL 3 TIMES DAILY
Status: DISCONTINUED | OUTPATIENT
Start: 2023-05-07 | End: 2023-05-07 | Stop reason: HOSPADM

## 2023-05-07 RX ADMIN — ACETAMINOPHEN 1000 MG: 500 TABLET ORAL at 04:25

## 2023-05-07 RX ADMIN — OMEPRAZOLE 20 MG: 20 CAPSULE, DELAYED RELEASE ORAL at 08:10

## 2023-05-07 RX ADMIN — ENOXAPARIN SODIUM 40 MG: 40 INJECTION SUBCUTANEOUS at 10:29

## 2023-05-07 RX ADMIN — Medication 5 ML: at 10:30

## 2023-05-07 RX ADMIN — Medication 5 ML: at 07:23

## 2023-05-07 RX ADMIN — ACETAMINOPHEN 1000 MG: 500 TABLET ORAL at 10:31

## 2023-05-07 RX ADMIN — OXYCODONE HYDROCHLORIDE 5 MG: 5 TABLET ORAL at 04:24

## 2023-05-07 ASSESSMENT — ACTIVITIES OF DAILY LIVING (ADL)
ADLS_ACUITY_SCORE: 27

## 2023-05-07 NOTE — PLAN OF CARE
"Goal Outcome Evaluation: Progressing    /58 (BP Location: Right arm)   Pulse 68   Temp 99.1  F (37.3  C) (Temporal)   Resp 16   Ht 1.803 m (5' 11\")   Wt 60.5 kg (133 lb 4.8 oz)   SpO2 96%   BMI 18.59 kg/m      Writer assumed care starting 2300.    Pt had adequate rest periods. PRN med for pain given. VS remained stable. Surgical site remained dry and intact. He walked in the hallway several times. No signs of acute distress. For possible discharge today (5/7/23). RN will continue current plan of care.                        "

## 2023-05-07 NOTE — PLAN OF CARE
Goal Outcome Evaluation:      Plan of Care Reviewed With: patient        Patient will be discharging this morning to home. Patient states his wife can be here around noon. Patient denies pain, VSS- will continue to monitor

## 2023-05-07 NOTE — PLAN OF CARE
Pt a&o x 4. VSS. On regular diet. Independent with activity. Chest port HL. Reports a pain of 7. Prn oxycodone given. Pt is resting comfortably. Recheck phospate in Am. Plan to discharge home tomorrow. Will Cont. POC.

## 2023-05-08 ENCOUNTER — PATIENT OUTREACH (OUTPATIENT)
Dept: CARE COORDINATION | Facility: CLINIC | Age: 73
End: 2023-05-08
Payer: COMMERCIAL

## 2023-05-08 LAB
PATH REPORT.COMMENTS IMP SPEC: NORMAL
PATH REPORT.COMMENTS IMP SPEC: NORMAL
PATH REPORT.FINAL DX SPEC: NORMAL
PATH REPORT.GROSS SPEC: NORMAL
PATH REPORT.MICROSCOPIC SPEC OTHER STN: NORMAL
PATH REPORT.RELEVANT HX SPEC: NORMAL
PHOTO IMAGE: NORMAL

## 2023-05-08 NOTE — PROGRESS NOTES
"Clinic Care Coordination Contact  [facility]: Post-Discharge Note  SITUATION                                                      Admission:    Admission Date: 05/03/23   Reason for Admission: S/P ileostomy  Discharge:   Discharge Date: 05/07/23  Discharge Diagnosis: S/P ileostomy    BACKGROUND                                                      Per hospital discharge summary and inpatient provider notes:Post-operatively pt was gently fluid resuscitated.  Castro was removed and pt was able to void.  Pt had eventual return of bowel function and was able to tolerate small amounts of a regular diet.      Pt was seen by PT/OT and deemed appropriate for discharge home.  Post-operative pain was controlled with scheduled tylenol, gabapentin, robaxin and prn oxycodone.      Patient is to follow up in the Colon and Rectal Surgery Clinic in 2-3 week with Karla Birmingham NP or Shea Sanchez PA-C and then with Dr. Magdaleno in 4-5 weeks after      ASSESSMENT           Discharge Assessment  How are you doing now that you are home?: \" I am doing good so far \"  How are your symptoms? (Red Flag symptoms escalate to triage hotline per guidelines): Improved  Do you feel your condition is stable enough to be safe at home until your provider visit?: Yes  Does the patient have their discharge instructions? : Yes  Does the patient have questions regarding their discharge instructions? : No  Were you started on any new medications or were there changes to any of your previous medications? : Yes  Does the patient have all of their medications?: Yes  Do you have questions regarding any of your medications? : No  Do you have all of your needed medical supplies or equipment (DME)?  (i.e. oxygen tank, CPAP, cane, etc.): Yes  Discharge follow-up appointment scheduled within 14 calendar days? : Yes  Discharge Follow Up Appointment Date: 05/17/23  Discharge Follow Up Appointment Scheduled with?: Specialty Care Provider    Post-op " ([facility] Only)  If the patient had a surgery or procedure, do they have any questions for a nurse?: No             PLAN                                                      Outpatient Plan: follow-up with Karla Pompa NP in the Colon and Rectal Surgery clinic on 5/17 and then with CRS Staff: Dr. Riley Magdaleno on 6/1.  Please contact our Nurses (phone # [phone]) if you have not heard from our clinic in 3 business days afer discharge to schedule a follow-up appointment.    Future Appointments   Date Time Provider Department Center   5/17/2023 10:15 AM Karla Pompa APRN [facility]   6/1/2023 10:00 AM Riley Magdaleno MD [facility]         For any urgent concerns, please contact our 24 hour nurse triage line: [phone] ([phone])         Patricia Mccarthy

## 2023-05-09 ENCOUNTER — PATIENT OUTREACH (OUTPATIENT)
Dept: SURGERY | Facility: CLINIC | Age: 73
End: 2023-05-09
Payer: COMMERCIAL

## 2023-05-09 NOTE — CONFIDENTIAL NOTE
Post Op Note    Called to check on patient postoperatively after hospital discharge.   Patient is s/p ileostomy takedown with Dr. Riley Magdaleno. Admitted 5/3 and discharged on 5/7/23      Pain is well controlled with oxycodone (TID) and tylenol, robaxin.     Patient is eating and drinking normally. Patient is on a regular diet.  Encouraged patient to drink 8-10 glasses of water a day.     Patient is passing flats, is having frequent, small and formed, bowel movements.    Patient is voiding normally and urine is light in color.    Patient is not set up with home care.     Patient does not require supplies.    Patient's pathology was not reviewed with them.     Patient Denies nausea and vomiting.    Patient Denies any fevers or chills.    Patient's incision is CDI. Patient reminded NOT to remove any dressings over their incisions.     Patient is on a activity restriction. Lifting 10 pounds for 6 weeks.     Patient Denies needing any forms completed.     Follow up is set up with Shea Sanchez PA-C on 5/17 and with Dr. Riley Magdaleno on 6/1.   Encouraged the patient to contact the clinic in the meantime with any fevers, chills, nausea, vomiting, increased colostomy output, no colostomy output, dizziness, lightheadedness, uncontrolled pain, changes to the incisions, or with any questions or concerns.    Prior ileostomy site packing removed and patient instructed to cover with gauze until drainage discontinues. Very little drainage reported.     Patient's questions were answered to their stated satisfaction and they are in agreement with this plan.    LILLIAN Connors [phone]  [facility]  [facility]

## 2023-05-15 ENCOUNTER — TELEPHONE (OUTPATIENT)
Dept: SURGERY | Facility: CLINIC | Age: 73
End: 2023-05-15
Payer: COMMERCIAL

## 2023-05-15 NOTE — TELEPHONE ENCOUNTER
"S/p ileostomy takedown on 5/3/2023 with Dr. Magdaleno. He states over the weekend he developed painful \"tight\" abdominal distention. Passing gas. Last BM yesterday morning. He has only been doing clear liquids over the weekend due to distention. Denies nausea, vomiting, fevers, chills. He has a post op with Karla Birmingham NP on Wednesday. He is wondering if he can do anything for the distention at home till Wednesday.     Discussed with Dr. Magdaleno. Start miralax. I will call him tomorrow to check in on him to see if he has any improvement in symptoms. If no improvement or worsening plan for CT to eval for SBO. Pt agrees with plan.           "

## 2023-05-15 NOTE — TELEPHONE ENCOUNTER
[facility] Call Center    Phone Message    May a detailed message be left on voicemail: yes     Reason for Call: Other:     Pt is requesting a call back ASAP to discuss the bloating, abdominal discomfort and distention, and gas they've been experiencing for the past 3 days.     Action Taken: Message routed to:  [facility] ([facility] AILIN JEAN    Travel Screening: Not Applicable

## 2023-05-16 ENCOUNTER — PATIENT OUTREACH (OUTPATIENT)
Dept: SURGERY | Facility: CLINIC | Age: 73
End: 2023-05-16
Payer: COMMERCIAL

## 2023-05-16 NOTE — PROGRESS NOTES
Layton started daily miralax and is feeling much better today. He is less distended and had a BM yesterday and today. Continue daily miralax and see Karla Birmingham NP tomorrow.

## 2023-05-17 ENCOUNTER — ANCILLARY PROCEDURE (OUTPATIENT)
Dept: CT IMAGING | Facility: CLINIC | Age: 73
End: 2023-05-17
Attending: NURSE PRACTITIONER
Payer: COMMERCIAL

## 2023-05-17 ENCOUNTER — LAB (OUTPATIENT)
Dept: LAB | Facility: CLINIC | Age: 73
End: 2023-05-17
Payer: COMMERCIAL

## 2023-05-17 ENCOUNTER — HOSPITAL ENCOUNTER (INPATIENT)
Facility: CLINIC | Age: 73
LOS: 3 days | Discharge: HOME | DRG: 389 | End: 2023-05-20
Attending: EMERGENCY MEDICINE | Admitting: SURGERY
Payer: COMMERCIAL

## 2023-05-17 ENCOUNTER — OFFICE VISIT (OUTPATIENT)
Dept: SURGERY | Facility: CLINIC | Age: 73
End: 2023-05-17
Payer: COMMERCIAL

## 2023-05-17 ENCOUNTER — APPOINTMENT (OUTPATIENT)
Dept: GENERAL RADIOLOGY | Facility: CLINIC | Age: 73
DRG: 389 | End: 2023-05-17
Attending: SURGERY
Payer: COMMERCIAL

## 2023-05-17 VITALS
OXYGEN SATURATION: 100 % | BODY MASS INDEX: 17.81 KG/M2 | SYSTOLIC BLOOD PRESSURE: 143 MMHG | HEIGHT: 71 IN | DIASTOLIC BLOOD PRESSURE: 78 MMHG | WEIGHT: 127.2 LBS | HEART RATE: 72 BPM

## 2023-05-17 DIAGNOSIS — C18.7 ADENOCARCINOMA OF SIGMOID COLON (H): ICD-10-CM

## 2023-05-17 DIAGNOSIS — Z09 FOLLOW-UP EXAMINATION AFTER COLORECTAL SURGERY: ICD-10-CM

## 2023-05-17 DIAGNOSIS — K56.609 SMALL BOWEL OBSTRUCTION (H): ICD-10-CM

## 2023-05-17 DIAGNOSIS — R14.0 ABDOMINAL DISTENTION: ICD-10-CM

## 2023-05-17 DIAGNOSIS — R63.0 POOR APPETITE: ICD-10-CM

## 2023-05-17 DIAGNOSIS — G89.18 ACUTE POST-OPERATIVE PAIN: ICD-10-CM

## 2023-05-17 DIAGNOSIS — R10.84 ABDOMINAL PAIN, GENERALIZED: ICD-10-CM

## 2023-05-17 DIAGNOSIS — R10.84 ABDOMINAL PAIN, GENERALIZED: Primary | ICD-10-CM

## 2023-05-17 LAB
ANION GAP SERPL CALCULATED.3IONS-SCNC: 11 MMOL/L (ref 7–15)
BASOPHILS # BLD AUTO: 0.1 10E3/UL (ref 0–0.2)
BASOPHILS NFR BLD AUTO: 1 %
BUN SERPL-MCNC: 18 MG/DL (ref 8–23)
CALCIUM SERPL-MCNC: 9.3 MG/DL (ref 8.8–10.2)
CHLORIDE SERPL-SCNC: 104 MMOL/L (ref 98–107)
CREAT SERPL-MCNC: 0.69 MG/DL (ref 0.67–1.17)
DEPRECATED HCO3 PLAS-SCNC: 24 MMOL/L (ref 22–29)
EOSINOPHIL # BLD AUTO: 0.2 10E3/UL (ref 0–0.7)
EOSINOPHIL NFR BLD AUTO: 1 %
ERYTHROCYTE [DISTWIDTH] IN BLOOD BY AUTOMATED COUNT: 18.5 % (ref 10–15)
GFR SERPL CREATININE-BSD FRML MDRD: >90 ML/MIN/1.73M2
GLUCOSE SERPL-MCNC: 91 MG/DL (ref 70–99)
HCT VFR BLD AUTO: 39.4 % (ref 40–53)
HGB BLD-MCNC: 12.6 G/DL (ref 13.3–17.7)
IMM GRANULOCYTES # BLD: 0 10E3/UL
IMM GRANULOCYTES NFR BLD: 0 %
LYMPHOCYTES # BLD AUTO: 2.3 10E3/UL (ref 0.8–5.3)
LYMPHOCYTES NFR BLD AUTO: 22 %
MCH RBC QN AUTO: 30.6 PG (ref 26.5–33)
MCHC RBC AUTO-ENTMCNC: 32 G/DL (ref 31.5–36.5)
MCV RBC AUTO: 96 FL (ref 78–100)
MONOCYTES # BLD AUTO: 1.1 10E3/UL (ref 0–1.3)
MONOCYTES NFR BLD AUTO: 11 %
NEUTROPHILS # BLD AUTO: 7 10E3/UL (ref 1.6–8.3)
NEUTROPHILS NFR BLD AUTO: 65 %
NRBC # BLD AUTO: 0 10E3/UL
NRBC BLD AUTO-RTO: 0 /100
PLATELET # BLD AUTO: 552 10E3/UL (ref 150–450)
POTASSIUM SERPL-SCNC: 5.3 MMOL/L (ref 3.4–5.3)
RBC # BLD AUTO: 4.12 10E6/UL (ref 4.4–5.9)
SARS-COV-2 RNA RESP QL NAA+PROBE: NEGATIVE
SODIUM SERPL-SCNC: 139 MMOL/L (ref 136–145)
WBC # BLD AUTO: 10.7 10E3/UL (ref 4–11)

## 2023-05-17 PROCEDURE — 999N000065 XR ABDOMEN PORT 1 VIEW

## 2023-05-17 PROCEDURE — 99285 EMERGENCY DEPT VISIT HI MDM: CPT | Performed by: EMERGENCY MEDICINE

## 2023-05-17 PROCEDURE — 250N000011 HC RX IP 250 OP 636

## 2023-05-17 PROCEDURE — 36415 COLL VENOUS BLD VENIPUNCTURE: CPT | Performed by: PATHOLOGY

## 2023-05-17 PROCEDURE — 74018 RADEX ABDOMEN 1 VIEW: CPT | Mod: 26 | Performed by: STUDENT IN AN ORGANIZED HEALTH CARE EDUCATION/TRAINING PROGRAM

## 2023-05-17 PROCEDURE — 80048 BASIC METABOLIC PNL TOTAL CA: CPT | Performed by: PATHOLOGY

## 2023-05-17 PROCEDURE — 87635 SARS-COV-2 COVID-19 AMP PRB: CPT

## 2023-05-17 PROCEDURE — 99285 EMERGENCY DEPT VISIT HI MDM: CPT | Mod: 25

## 2023-05-17 PROCEDURE — 120N000002 HC R&B MED SURG/OB UMMC

## 2023-05-17 PROCEDURE — 99024 POSTOP FOLLOW-UP VISIT: CPT | Performed by: NURSE PRACTITIONER

## 2023-05-17 PROCEDURE — 258N000003 HC RX IP 258 OP 636

## 2023-05-17 PROCEDURE — 250N000013 HC RX MED GY IP 250 OP 250 PS 637

## 2023-05-17 PROCEDURE — 74177 CT ABD & PELVIS W/CONTRAST: CPT | Mod: GC | Performed by: STUDENT IN AN ORGANIZED HEALTH CARE EDUCATION/TRAINING PROGRAM

## 2023-05-17 PROCEDURE — 85025 COMPLETE CBC W/AUTO DIFF WBC: CPT | Performed by: PATHOLOGY

## 2023-05-17 RX ORDER — IOPAMIDOL 755 MG/ML
71 INJECTION, SOLUTION INTRAVASCULAR ONCE
Status: COMPLETED | OUTPATIENT
Start: 2023-05-17 | End: 2023-05-17

## 2023-05-17 RX ORDER — GABAPENTIN 100 MG/1
100 CAPSULE ORAL AT BEDTIME
Status: DISCONTINUED | OUTPATIENT
Start: 2023-05-17 | End: 2023-05-19

## 2023-05-17 RX ORDER — ACETAMINOPHEN 325 MG/1
975 TABLET ORAL 3 TIMES DAILY
Status: DISCONTINUED | OUTPATIENT
Start: 2023-05-17 | End: 2023-05-19

## 2023-05-17 RX ORDER — PROCHLORPERAZINE 25 MG
12.5 SUPPOSITORY, RECTAL RECTAL EVERY 12 HOURS PRN
Status: DISCONTINUED | OUTPATIENT
Start: 2023-05-17 | End: 2023-05-20 | Stop reason: HOSPADM

## 2023-05-17 RX ORDER — ONDANSETRON 2 MG/ML
4 INJECTION INTRAMUSCULAR; INTRAVENOUS EVERY 6 HOURS PRN
Status: DISCONTINUED | OUTPATIENT
Start: 2023-05-17 | End: 2023-05-20 | Stop reason: HOSPADM

## 2023-05-17 RX ORDER — GABAPENTIN 100 MG/1
100 CAPSULE ORAL AT BEDTIME
Status: DISCONTINUED | OUTPATIENT
Start: 2023-05-17 | End: 2023-05-17

## 2023-05-17 RX ORDER — LIDOCAINE 40 MG/G
CREAM TOPICAL
Status: DISCONTINUED | OUTPATIENT
Start: 2023-05-17 | End: 2023-05-20 | Stop reason: HOSPADM

## 2023-05-17 RX ORDER — HYDROMORPHONE HYDROCHLORIDE 1 MG/ML
0.5 INJECTION, SOLUTION INTRAMUSCULAR; INTRAVENOUS; SUBCUTANEOUS
Status: DISCONTINUED | OUTPATIENT
Start: 2023-05-17 | End: 2023-05-17

## 2023-05-17 RX ORDER — PROCHLORPERAZINE MALEATE 5 MG
5 TABLET ORAL EVERY 6 HOURS PRN
Status: DISCONTINUED | OUTPATIENT
Start: 2023-05-17 | End: 2023-05-20 | Stop reason: HOSPADM

## 2023-05-17 RX ORDER — OXYCODONE HCL 5 MG/5 ML
5-10 SOLUTION, ORAL ORAL EVERY 6 HOURS PRN
Status: DISCONTINUED | OUTPATIENT
Start: 2023-05-17 | End: 2023-05-19

## 2023-05-17 RX ORDER — ACETAMINOPHEN 325 MG/1
975 TABLET ORAL 3 TIMES DAILY
Status: DISCONTINUED | OUTPATIENT
Start: 2023-05-17 | End: 2023-05-17

## 2023-05-17 RX ORDER — HYDROMORPHONE HCL IN WATER/PF 6 MG/30 ML
.2-.4 PATIENT CONTROLLED ANALGESIA SYRINGE INTRAVENOUS EVERY 6 HOURS PRN
Status: DISCONTINUED | OUTPATIENT
Start: 2023-05-17 | End: 2023-05-17

## 2023-05-17 RX ORDER — ENOXAPARIN SODIUM 100 MG/ML
40 INJECTION SUBCUTANEOUS EVERY 24 HOURS
Status: DISCONTINUED | OUTPATIENT
Start: 2023-05-17 | End: 2023-05-17

## 2023-05-17 RX ORDER — ACETAMINOPHEN 325 MG/1
650 TABLET ORAL EVERY 4 HOURS PRN
Status: DISCONTINUED | OUTPATIENT
Start: 2023-05-17 | End: 2023-05-17

## 2023-05-17 RX ORDER — ONDANSETRON 4 MG/1
4 TABLET, ORALLY DISINTEGRATING ORAL EVERY 6 HOURS PRN
Status: DISCONTINUED | OUTPATIENT
Start: 2023-05-17 | End: 2023-05-20 | Stop reason: HOSPADM

## 2023-05-17 RX ORDER — HEPARIN SODIUM (PORCINE) LOCK FLUSH IV SOLN 100 UNIT/ML 100 UNIT/ML
500 SOLUTION INTRAVENOUS ONCE
Status: COMPLETED | OUTPATIENT
Start: 2023-05-17 | End: 2023-05-17

## 2023-05-17 RX ORDER — SODIUM CHLORIDE, SODIUM LACTATE, POTASSIUM CHLORIDE, CALCIUM CHLORIDE 600; 310; 30; 20 MG/100ML; MG/100ML; MG/100ML; MG/100ML
INJECTION, SOLUTION INTRAVENOUS CONTINUOUS
Status: DISCONTINUED | OUTPATIENT
Start: 2023-05-17 | End: 2023-05-19

## 2023-05-17 RX ADMIN — DIATRIZOATE MEGLUMINE AND DIATRIZOATE SODIUM 75 ML: 660; 100 SOLUTION ORAL; RECTAL at 22:42

## 2023-05-17 RX ADMIN — OXYCODONE HYDROCHLORIDE 5 MG: 5 SOLUTION ORAL at 19:18

## 2023-05-17 RX ADMIN — ACETAMINOPHEN 975 MG: 325 TABLET ORAL at 19:18

## 2023-05-17 RX ADMIN — GABAPENTIN 100 MG: 100 CAPSULE ORAL at 22:36

## 2023-05-17 RX ADMIN — HYDROMORPHONE HYDROCHLORIDE 0.2 MG: 0.2 INJECTION, SOLUTION INTRAMUSCULAR; INTRAVENOUS; SUBCUTANEOUS at 16:34

## 2023-05-17 RX ADMIN — HEPARIN SODIUM (PORCINE) LOCK FLUSH IV SOLN 100 UNIT/ML 500 UNITS: 100 SOLUTION at 12:35

## 2023-05-17 RX ADMIN — SODIUM CHLORIDE, POTASSIUM CHLORIDE, SODIUM LACTATE AND CALCIUM CHLORIDE: 600; 310; 30; 20 INJECTION, SOLUTION INTRAVENOUS at 16:48

## 2023-05-17 RX ADMIN — IOPAMIDOL 71 ML: 755 INJECTION, SOLUTION INTRAVASCULAR at 12:29

## 2023-05-17 ASSESSMENT — ACTIVITIES OF DAILY LIVING (ADL)
ADLS_ACUITY_SCORE: 33
ADLS_ACUITY_SCORE: 35

## 2023-05-17 ASSESSMENT — PAIN SCALES - GENERAL: PAINLEVEL: SEVERE PAIN (7)

## 2023-05-17 NOTE — ED PROVIDER NOTES
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  He had presented to Colorectal Surgery clinic with abdominal distention and pain that has been ongoing for the past week.  He underwent CT of the abdomen and pelvis with preliminary results notable for hyperdense fluid density in the adjacent to site of anastomosis, diffuse dilation of small bowel concerning for adynamic ileus or low-grade/partial SBO.  Patient was sent here for further evaluation, treatment, admission for NG tube.  Here patient notes continued pain throughout his abdomen.  No other symptoms noted.    No other symptoms noted.      PAST MEDICAL HISTORY:   Past Medical History:   Diagnosis Date     Cancer (H)        PAST SURGICAL HISTORY:   Past Surgical History:   Procedure Laterality Date     COLECTOMY LEFT N/A 2/8/2023    Procedure: Exploratory Laparotomy, lysis of adhesions, Low Anterior Resection, take down of loop colostomy,  DIVERTING LOOP ILEOSTOMY;  Surgeon: Riley Magdaleno MD;  Location: [facility] OR     COMBINED CYSTOSCOPY, INSERT STENT URETER(S) Bilateral 2/8/2023    Procedure: CYSTOSCOPY, WITH URETERAL STENT INSERTION [[id number]];  Surgeon: Ulises Basilio MD;  Location: [facility]     INSERT PORT VASCULAR ACCESS Right 4/21/2022    Procedure: INSERTION, VASCULAR ACCESS PORT;  Surgeon: Marianne Schroeder MD;  Location: [facility]     INSERT PORT VASCULAR ACCESS  Left 6/2/2022    Procedure: INSERTION, VASCULAR ACCESS PORT;  Surgeon: Phong Finch MD;  Location: [facility] OR     IR CHEST PORT PLACEMENT > 5 YRS OF AGE  6/2/2022     LAPAROTOMY EXPLORATORY N/A 3/26/2022    Procedure: exploratory laparotomy, small bowel resection, colostomy creation;  Surgeon: Riley Magdaleno MD;  [facility]     LAPAROTOMY, LYSIS ADHESIONS, COMBINED N/A 8/24/2022    Procedure: LAPAROTOMY, EXPLORATORY, WITH LYSIS OF ADHESIONS;  Surgeon: Segundo Hill MD;  Location: [facility]     PICC INSERTION - DOUBLE LUMEN Left 03/28/2022    left medial brachial 5 fr dl picc 49 cm     SIGMOIDOSCOPY FLEXIBLE N/A 3/26/2022    Procedure: Sigmoidoscopy flexible;  Surgeon: Riley Magdaleno MD;  [facility]     SIGMOIDOSCOPY FLEXIBLE N/A 2/8/2023    Procedure: Sigmoidoscopy flexible;  Surgeon: Riley Magdaleno MD;  [facility]     TAKEDOWN ILEOSTOMY N/A 5/3/2023    Procedure: CLOSURE, ILEOSTOMY, OPEN ;  Surgeon: Riley Magdaleno MD;  [facility]       Past medical history, past surgical history, medications, and allergies were reviewed with the patient. Additional pertinent items: None    FAMILY HISTORY:   Family History   Problem Relation Age of Onset     No Known Problems Mother      Prostate Cancer Father      Colon Cancer Father      Lymphoma Father      Anesthesia Reaction No family hx of      Thrombosis No family hx of        SOCIAL HISTORY:   Social History     Tobacco Use     Smoking status: Every Day     Packs/day: 0.50     Years: 50.00     Pack years: 25.00     Types: Cigarettes     Smokeless tobacco: Never   Vaping Use     Vaping status: Never Used   Substance Use Topics     Alcohol use: Not Currently     Social history was reviewed with the patient. Additional pertinent items: None    Patient's Medications   New Prescriptions    No medications on file   Previous Medications    ACETAMINOPHEN (TYLENOL) 500 MG TABLET    Take 2 tablets (1,000 mg) by mouth every 6 hours     "GABAPENTIN (NEURONTIN) 100 MG CAPSULE    Take 1 capsule (100 mg) by mouth At Bedtime    HEPARIN 100 UNIT/ML SOLN INJECTION    5-10 mLs by Intracatheter route every 28 days    METHOCARBAMOL (ROBAXIN) 500 MG TABLET    Take 1 tablet (500 mg) by mouth 4 times daily    MIRTAZAPINE (REMERON) 15 MG TABLET    Take 1 tablet (15 mg) by mouth At Bedtime    MULTIVITAMIN (CENTRUM SILVER) TABLET    Take 1 tablet by mouth every morning    OMEPRAZOLE (PRILOSEC) 20 MG DR CAPSULE    Take 1 capsule (20 mg) by mouth 2 times daily    OXYCODONE (ROXICODONE) 5 MG TABLET    Take 1-2 tablets (5-10 mg) by mouth every 6 hours as needed for moderate to severe pain   Modified Medications    No medications on file   Discontinued Medications    No medications on file        No Known Allergies    A complete review of systems was performed with pertinent positives and negatives noted in the HPI, and all other systems negative.    Physical Exam   BP: 124/87  Pulse: 76  Temp: 97.7  F (36.5  C)  Resp: 18  Height: 180.3 cm (5' 11\")  Weight: 57.6 kg (127 lb)  SpO2: 99 %      Physical Exam  Vitals and nursing note reviewed.   Constitutional:       General: He is not in acute distress.     Appearance: He is well-developed. He is not diaphoretic.   HENT:      Head: Normocephalic and atraumatic.      Mouth/Throat:      Pharynx: No oropharyngeal exudate.   Eyes:      General: No scleral icterus.        Right eye: No discharge.         Left eye: No discharge.      Pupils: Pupils are equal, round, and reactive to light.   Cardiovascular:      Rate and Rhythm: Normal rate and regular rhythm.      Heart sounds: Normal heart sounds. No murmur heard.     No friction rub. No gallop.   Pulmonary:      Effort: Pulmonary effort is normal. No respiratory distress.      Breath sounds: Normal breath sounds. No wheezing.   Chest:      Chest wall: No tenderness.   Abdominal:      General: Bowel sounds are normal. There is distension.      Palpations: Abdomen is soft.      " Tenderness: There is abdominal tenderness.   Musculoskeletal:         General: No tenderness or deformity. Normal range of motion.      Cervical back: Normal range of motion and neck supple.   Skin:     General: Skin is warm and dry.      Coloration: Skin is not pale.      Findings: No erythema or rash.   Neurological:      Mental Status: He is alert and oriented to person, place, and time.      Cranial Nerves: No cranial nerve deficit.         ED Course        Procedures             Critical Care time was 30 minutes for this patient excluding procedures.       Labs Ordered and Resulted from Time of ED Arrival to Time of ED Departure - No data to display         Assessments & Plan (with Medical Decision Making)   This is a 72-year-old male with previous ileostomy was taken up presents from Colorectal Clinic with small bowel obstruction.  Symptoms began 1 week ago.  Patient was found to have small bowel obstruction on outpatient imaging and was sent here for NG tube and admission.  Here patient has generalized abdominal tenderness.  NG tube was started.  Discussed case with Colorectal surgery will admit to their service.    I have reviewed the nursing notes.    I have reviewed the findings, diagnosis, plan and need for follow up with the patient.    New Prescriptions    No medications on file       Final diagnoses:   None     Lang Pacheco DO    5/17/2023   [facility] EMERGENCY DEPARTMENT       Lang Pacheco, DO  05/17/23 1952

## 2023-05-17 NOTE — NURSING NOTE
"Chief Complaint   Patient presents with     Surgical Followup     DOS 5/3/23       Vitals:    05/17/23 1018   BP: (!) 143/78   BP Location: Left arm   Patient Position: Sitting   Cuff Size: Adult Small   Pulse: 72   SpO2: 100%   Weight: 135 lb   Height: 5' 11\"       Body mass index is 18.83 kg/m .     Almas Stanley, EMT- P    " 2022    Current Outpatient Medications   Medication Sig Dispense Refill    escitalopram (LEXAPRO) 20 MG tablet Take 1 tablet by mouth daily 90 tablet 0    ALPRAZolam (XANAX) 0.5 MG tablet Take 1 tablet by mouth nightly as needed for Sleep or Anxiety for up to 30 days. 30 tablet 0    empagliflozin (JARDIANCE) 10 MG tablet Take 1 tablet by mouth daily 90 tablet 1    atorvastatin (LIPITOR) 20 MG tablet Take 1 tablet by mouth daily 90 tablet 1    amLODIPine (NORVASC) 5 MG tablet Take 1 tablet by mouth daily 90 tablet 1    Semaglutide, 1 MG/DOSE, (OZEMPIC, 1 MG/DOSE,) 2 MG/1.5ML SOPN Inject 1 mg into the skin once a week 4 pen 3    EPINEPHrine (EPIPEN 2-ZAC) 0.3 MG/0.3ML SOAJ injection Inject 0.3 mg into the muscle as needed Use as directed for allergic reaction      glucose blood VI test strips (ASCENSIA AUTODISC VI;ONE TOUCH ULTRA TEST VI) strip 1 each by In Vitro route daily As needed. 100 each 3    Blood Glucose Monitoring Suppl W/DEVICE KIT 1 Device by Does not apply route daily 1 kit 0     No current facility-administered medications for this visit. Bhavani Bhandari (: 1968 IS A 46 y.o. female ,Established patient, here for eval of Anxiety (2-week check)    slepin not great   Up at 1:30- 2 am  Tries to go back to sleep  Days are better  3-4 days a week 7-4 works days  [employer]    Dog had 8 # tumor removed          ASSESSMENT / PLAN      1. Elevated cholesterol  The 10-year ASCVD risk score (Eliot Tobar, et al., 2013) is: 3.8%    Values used to calculate the score:      Age: 46 years      Sex: Female      Is Non- : No      Diabetic: Yes      Tobacco smoker: No      Systolic Blood Pressure: 512 mmHg      Is BP treated: Yes      HDL Cholesterol: 61 mg/dL      Total Cholesterol: 178 mg/dL      2.  Type 2 diabetes mellitus without complication, without long-term current use of insulin (HCC)  Lab Results   Component Value Date LABA1C 6.4 02/02/2022     No results found for: EAG  Continue eating well and increase exercise    3. Essential hypertension  Vitals:    04/04/22 0948   BP: 130/70   Pulse:    Resp:    Temp:    SpO2:          4. Stress and adjustment reaction  Is in counseling    not really discussing thngs  He is leving outside of the house  He has reached out to his daughters  Zita elizondo consider marriage buthe is not seeming to be interested in this      5. Screening for colon cancer    - POCT Fit Test; Future    6. Anxiety  See above  Doing well  Increasing lexapro to 20mg as she is tillhaving trouble  [facility]  Hard to turn her brain off  - escitalopram (LEXAPRO) 20 MG tablet; Take 1 tablet by mouth daily  Dispense: 90 tablet; Refill: 0      SUBJECTIVE    Review of Systems   Constitutional: Negative for activity change, appetite change, fatigue and unexpected weight change. HENT: Negative for congestion. Eyes: Negative. Negative for visual disturbance. Respiratory: Negative for cough, chest tightness, shortness of breath and wheezing. Cardiovascular: Negative for chest pain and palpitations. Gastrointestinal: Negative. Endocrine: Negative. Genitourinary: Negative. Musculoskeletal: Negative. Skin: Negative for pallor, rash and wound. Allergic/Immunologic: Negative. Neurological: Negative. Negative for syncope. Hematological: Negative. Psychiatric/Behavioral: Negative. OBJECTIVE    Wt Readings from Last 3 Encounters:   04/04/22 201 lb 6.4 oz (91.4 kg)   03/16/22 201 lb (91.2 kg)   02/02/22 214 lb (97.1 kg)       Vitals:    04/04/22 0948   BP: 130/70   Pulse:    Resp:    Temp:    SpO2:        Physical Exam  Vitals reviewed. Cardiovascular:      Pulses: Normal pulses.    Pulmonary:      Effort: Pulmonary effort is normal.   Psychiatric:         Attention and Perception: Attention normal.         Mood and Affect: Mood and affect normal.         Speech: Speech normal. Behavior: Behavior normal.         Thought Content: Thought content normal.         Cognition and Memory: Cognition normal.         Judgment: Judgment normal.           Return in about 3 months (around 7/4/2022). An electronic signature was used to authenticate this note.     Gabriele Sharp, COLT    4/4/2022

## 2023-05-17 NOTE — PROGRESS NOTES
Colon and Rectal Surgery Postoperative Clinic Note     Referring provider:  No referring provider defined for this encounter.       RE: Kalin Shepard  : 1950  REGGIE: 2023      Kalin Shepard is a 72 year old male now s/p DLI closure on 5/3/23.    Interval history: Admitted briefly for postop bowel obstruction which resolved. Now still with intermittent obstruction, weight has stabilized.      Assessment/Plan:  72 year old male with rectosigmoid cancer s/p definitive resection, DLI closure most recently on 5/3/23, still intermittent obstructive symptoms.  -Will prescribe one week of oxy to help get over the pain hump, and he will update us at the end of next week. Advised to increase protein intake and calorie counts. If not better, will repeat CT with PO contrast and labs.    PLEASE SEE NOTE BELOW FOR PHYSICAL EXAMINATION, REVIEW OF SYSTEMS, AND OTHER HISTORY.    Riley Magdaleno MD  Division of Colon and Rectal Surgery   [facility]  [phone]    -------------------------------------------------------------------------------------------------------------------    Medical history:  Past Medical History:   Diagnosis Date     Cancer (H)        Surgical history:  Past Surgical History:   Procedure Laterality Date     COLECTOMY LEFT N/A 2023    Procedure: Exploratory Laparotomy, lysis of adhesions, Low Anterior Resection, take down of loop colostomy,  DIVERTING LOOP ILEOSTOMY;  Surgeon: Riley Magdaleno MD;  Location: [facility]     COMBINED CYSTOSCOPY, INSERT STENT URETER(S) Bilateral 2023    Procedure: CYSTOSCOPY, WITH URETERAL STENT INSERTION [[id number]];  Surgeon: Ulises Basilio MD;  [facility]     INSERT PORT VASCULAR ACCESS Right 2022    Procedure: INSERTION, VASCULAR ACCESS PORT;  Surgeon: Marianne Schroeder MD;  Location: [facility]     INSERT PORT VASCULAR ACCESS Left 2022    Procedure: INSERTION, VASCULAR ACCESS PORT;  Surgeon: Phong Finch MD;   Location: [facility] OR     IR CHEST PORT PLACEMENT > 5 YRS OF AGE  6/2/2022     LAPAROTOMY EXPLORATORY N/A 3/26/2022    Procedure: exploratory laparotomy, small bowel resection, colostomy creation;  Surgeon: Riley Magdaleno MD;  [facility]     LAPAROTOMY, LYSIS ADHESIONS, COMBINED N/A 8/24/2022    Procedure: LAPAROTOMY, EXPLORATORY, WITH LYSIS OF ADHESIONS;  Surgeon: Segundo Hill MD;  Location: [facility]     PICC INSERTION - DOUBLE LUMEN Left 03/28/2022    left medial brachial 5 fr dl picc 49 cm     SIGMOIDOSCOPY FLEXIBLE N/A 3/26/2022    Procedure: Sigmoidoscopy flexible;  Surgeon: Riley Magdaleno MD;  [facility]     SIGMOIDOSCOPY FLEXIBLE N/A 2/8/2023    Procedure: Sigmoidoscopy flexible;  Surgeon: Riley Magdaleno MD;  [facility]     TAKEDOWN ILEOSTOMY N/A 5/3/2023    Procedure: CLOSURE, ILEOSTOMY, OPEN ;  Surgeon: Riley Magdaleno MD;  [facility]       Problem list:  Patient Active Problem List    Diagnosis Date Noted     Ileostomy status (H) 05/03/2023     Priority: Medium     High output ileostomy (H) 02/14/2023     Priority: Medium     Cancer of rectosigmoid (colon) (H) 02/08/2023     Priority: Medium     Chemotherapy-induced peripheral neuropathy (H) 09/26/2022     Priority: Medium     Peritonitis, acute generalized (H) 08/29/2022     Priority: Medium     SBO (small bowel obstruction) (H) 08/21/2022     Priority: Medium     Cancer cachexia (H) 08/21/2022     Priority: Medium     Esophageal reflux 08/21/2022     Priority: Medium     Chemotherapy-induced neutropenia (H) 08/02/2022     Priority: Medium     Dehydration 07/19/2022     Priority: Medium     Cancer associated pain 06/20/2022     Priority: Medium     Oxycodone 5mg bid.  2 month supply given 6/20/22       Adenocarcinoma of sigmoid colon (H) 04/13/2022     Priority: Medium     Colon adenocarcinoma (H) 04/11/2022     Priority: Medium     Peritoneal involvement.  Resection of obstruction, small intestine adherent to mass.   Unable to resect sigmoid mass, extensive involvement.         Colostomy present (H) 04/11/2022     Priority: Medium     Cachexia (H) 04/11/2022     Priority: Medium       Medications:  Current Outpatient Medications   Medication Sig Dispense Refill     acetaminophen (TYLENOL) 500 MG tablet Take 2 tablets (1,000 mg) by mouth every 6 hours 100 tablet 0     gabapentin (NEURONTIN) 100 MG capsule Take 1 capsule (100 mg) by mouth At Bedtime 14 capsule 0     heparin 100 UNIT/ML SOLN injection 5-10 mLs by Intracatheter route every 28 days       methocarbamol (ROBAXIN) 500 MG tablet Take 1 tablet (500 mg) by mouth 4 times daily 40 tablet 0     mirtazapine (REMERON) 15 MG tablet Take 1 tablet (15 mg) by mouth At Bedtime 90 tablet 3     multivitamin (CENTRUM SILVER) tablet Take 1 tablet by mouth every morning 30 tablet      omeprazole (PRILOSEC) 20 MG DR capsule Take 1 capsule (20 mg) by mouth 2 times daily 60 capsule 3     oxyCODONE (ROXICODONE) 5 MG tablet Take 1-2 tablets (5-10 mg) by mouth every 6 hours as needed for moderate to severe pain 18 tablet 0       Allergies:  No Known Allergies    Family history:  Family History   Problem Relation Age of Onset     No Known Problems Mother      Prostate Cancer Father      Colon Cancer Father      Lymphoma Father      Anesthesia Reaction No family hx of      Thrombosis No family hx of        Social history:  Social History     Socioeconomic History     Marital status:      Spouse name: Not on file     Number of children: 3     Years of education: Not on file     Highest education level: Not on file   Occupational History     Occupation: retired   Tobacco Use     Smoking status: Every Day     Packs/day: 0.50     Years: 50.00     Pack years: 25.00     Types: Cigarettes     Smokeless tobacco: Never   Vaping Use     Vaping status: Never Used   Substance and Sexual Activity     Alcohol use: Not Currently     Drug use: Never     Sexual activity: Not on file   Other Topics Concern      Not on file   Social History Narrative     Not on file     Social Determinants of Health     Financial Resource Strain: Not on file   Food Insecurity: Not on file   Transportation Needs: Not on file   Physical Activity: Not on file   Stress: Not on file   Social Connections: Not on file   Intimate Partner Violence: Not on file   Housing Stability: Not on file       Physical Examination:  /69 (BP Location: Left arm, Patient Position: Sitting, Cuff Size: Adult Regular)   Pulse 62   SpO2 99%   General: NAD  Abdomen: soft, mildly distended, incisions clean and healthy  Extremities: wwp  Perianal external examination:  deferred

## 2023-05-17 NOTE — ED TRIAGE NOTES
Pt sent over from colorectal clinic for admission for bowel obstruction. Pt has ileostomy take down approx 2 weeks ago. Pt reports abd pain for past week. Denies nausea or vomiting at this time.      Triage Assessment     Row Name 05/17/23 1418       Triage Assessment (Adult)    Airway WDL WDL       Respiratory WDL    Respiratory WDL WDL       Skin Circulation/Temperature WDL    Skin Circulation/Temperature WDL WDL       Cardiac WDL    Cardiac WDL WDL       Peripheral/Neurovascular WDL    Peripheral Neurovascular WDL WDL       Cognitive/Neuro/Behavioral WDL    Cognitive/Neuro/Behavioral WDL WDL

## 2023-05-17 NOTE — PROGRESS NOTES
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  He underwent chemotherapy with an excellent response followed by definitive resection with diverting loop ileostomy.  He is now status post loop ileostomy takedown, small bowel, stapled, side-to-side anastomosis, and repair of abdominal wall at ileostomy site on 5/3/2023 with Dr. Magdaleno.    Final Diagnosis   A.  ILEOSTOMY:  - Skin with reactive changes and attached small intestine, consistent with ileostomy     Interval history: Layton has felt very distended in the past few days. He was initially having some small formed bowel movements but began getting very distended. Started taking Miralax and is now having some loose stools but distention has not improved. He cannot eat due to the distention and states that he is \"at his wit's end\". No nausea or vomiting. No fevers or chills.    Physical Examination: Exam was chaperoned by Almas Stanley, EMT-P   BP (!) 143/78 (BP Location: Left arm, Patient Position: Sitting, Cuff Size: Adult Small)   Pulse 72   Ht 5' 11\"   Wt 127 lb 3.2 oz   SpO2 100%   BMI 17.74 kg/m    General: alert, oriented, in no acute distress, sitting comfortably  HEENT: mucous membranes moist  Abdomen: distended, tympanic, tender on palpation; takedown site with good granulation tissue present and no erythema    Assessment/Plan:  72 year old male status post loop ileostomy takedown, small bowel, stapled, side-to-side anastomosis, and repair of abdominal wall at ileostomy site on 5/3/2023 with Dr. Magdaleno. He is having a lot of distention despite passing some loose stools and flatus. Concerned about obstruction. Discussed with Dr. Magdaleno and will get labs and a CT today with likely admission. He states that he feels like he needs to be " admitted and cannot tolerate the pain at home anymore.     Medical history:  Past Medical History:   Diagnosis Date     Cancer (H)        Surgical history:  Past Surgical History:   Procedure Laterality Date     COLECTOMY LEFT N/A 2/8/2023    Procedure: Exploratory Laparotomy, lysis of adhesions, Low Anterior Resection, take down of loop colostomy,  DIVERTING LOOP ILEOSTOMY;  Surgeon: Riley Magdaleno MD;  Location: [facility]     COMBINED CYSTOSCOPY, INSERT STENT URETER(S) Bilateral 2/8/2023    Procedure: CYSTOSCOPY, WITH URETERAL STENT INSERTION [[id number]];  Surgeon: Ulises Basilio MD;  Location: [facility]     INSERT PORT VASCULAR ACCESS Right 4/21/2022    Procedure: INSERTION, VASCULAR ACCESS PORT;  Surgeon: Marianne Schroeder MD;  Location: [facility]     INSERT PORT VASCULAR ACCESS Left 6/2/2022    Procedure: INSERTION, VASCULAR ACCESS PORT;  Surgeon: Phong Finch MD;  Location: [facility] OR     IR CHEST PORT PLACEMENT > 5 YRS OF AGE  6/2/2022     LAPAROTOMY EXPLORATORY N/A 3/26/2022    Procedure: exploratory laparotomy, small bowel resection, colostomy creation;  Surgeon: Riley Magdaleno MD;  Location: [facility]     LAPAROTOMY, LYSIS ADHESIONS, COMBINED N/A 8/24/2022    Procedure: LAPAROTOMY, EXPLORATORY, WITH LYSIS OF ADHESIONS;  Surgeon: Segundo Hill MD;  Location: [facility]     PICC INSERTION - DOUBLE LUMEN Left 03/28/2022    left medial brachial 5 fr dl picc 49 cm     SIGMOIDOSCOPY FLEXIBLE N/A 3/26/2022    Procedure: Sigmoidoscopy flexible;  Surgeon: Riley Magdaleno MD;  Location: [facility]     SIGMOIDOSCOPY FLEXIBLE N/A 2/8/2023    Procedure: Sigmoidoscopy flexible;  Surgeon: Riley Magdaleno MD;  Location: [facility]     TAKEDOWN ILEOSTOMY N/A 5/3/2023    Procedure: CLOSURE, ILEOSTOMY, OPEN ;  Surgeon: Riley Magdaleno MD;  Location: [facility]       Problem list:  Patient Active Problem List    Diagnosis Date Noted     Ileostomy status (H) 05/03/2023     Priority: Medium     High output ileostomy (H) 02/14/2023      Priority: Medium     Cancer of rectosigmoid (colon) (H) 02/08/2023     Priority: Medium     Chemotherapy-induced peripheral neuropathy (H) 09/26/2022     Priority: Medium     Peritonitis, acute generalized (H) 08/29/2022     Priority: Medium     SBO (small bowel obstruction) (H) 08/21/2022     Priority: Medium     Cancer cachexia (H) 08/21/2022     Priority: Medium     Esophageal reflux 08/21/2022     Priority: Medium     Chemotherapy-induced neutropenia (H) 08/02/2022     Priority: Medium     Dehydration 07/19/2022     Priority: Medium     Cancer associated pain 06/20/2022     Priority: Medium     Oxycodone 5mg bid.  2 month supply given 6/20/22       Adenocarcinoma of sigmoid colon (H) 04/13/2022     Priority: Medium     Colon adenocarcinoma (H) 04/11/2022     Priority: Medium     Peritoneal involvement.  Resection of obstruction, small intestine adherent to mass.  Unable to resect sigmoid mass, extensive involvement.         Colostomy present (H) 04/11/2022     Priority: Medium     Cachexia (H) 04/11/2022     Priority: Medium       Medications:  Current Outpatient Medications   Medication Sig Dispense Refill     acetaminophen (TYLENOL) 500 MG tablet Take 2 tablets (1,000 mg) by mouth every 6 hours 100 tablet 0     gabapentin (NEURONTIN) 100 MG capsule Take 1 capsule (100 mg) by mouth At Bedtime 14 capsule 0     heparin 100 UNIT/ML SOLN injection 5-10 mLs by Intracatheter route every 28 days       methocarbamol (ROBAXIN) 500 MG tablet Take 1 tablet (500 mg) by mouth 4 times daily 40 tablet 0     mirtazapine (REMERON) 15 MG tablet Take 1 tablet (15 mg) by mouth At Bedtime 90 tablet 3     multivitamin (CENTRUM SILVER) tablet Take 1 tablet by mouth every morning 30 tablet      omeprazole (PRILOSEC) 20 MG DR capsule Take 1 capsule (20 mg) by mouth 2 times daily 60 capsule 3     oxyCODONE (ROXICODONE) 5 MG tablet Take 1-2 tablets (5-10 mg) by mouth every 6 hours as needed for moderate to severe pain 18 tablet 0  "      Allergies:  No Known Allergies    Family history:  Family History   Problem Relation Age of Onset     No Known Problems Mother      Prostate Cancer Father      Colon Cancer Father      Lymphoma Father      Anesthesia Reaction No family hx of      Thrombosis No family hx of        Social history:  Social History     Tobacco Use     Smoking status: Every Day     Packs/day: 0.50     Years: 50.00     Pack years: 25.00     Types: Cigarettes     Smokeless tobacco: Never   Vaping Use     Vaping status: Never Used   Substance Use Topics     Alcohol use: Not Currently     Marital status: .    Nursing Notes:   Almas Stanley, EMT  5/17/2023 10:21 AM  Signed  Chief Complaint   Patient presents with     Surgical Followup     DOS 5/3/23       Vitals:    05/17/23 1018   BP: (!) 143/78   BP Location: Left arm   Patient Position: Sitting   Cuff Size: Adult Small   Pulse: 72   SpO2: 100%   Weight: 135 lb   Height: 5' 11\"       Body mass index is 18.83 kg/m .     Almas Stanley, EMT- P         30 minutes spent on the date of the encounter doing chart review, history and exam, documentation and further activities as noted above.   This is a postop visit.    JULIUS Arthur, NP-C  Colon and Rectal Surgery  [facility]    This note was created using speech recognition software and may contain unintended word substitutions.      "

## 2023-05-17 NOTE — H&P
COLORECTAL SURGERY   ADMISSION HISTORY & PHYSICAL    Kalin Shepard MRN# [id number]   YOB: 1950 Age: 72 year old     Date of Admission: 05/17/23    CHIEF COMPLAINT: Abdominal pain         ASSESSMENT & PLAN:     Kalin Shepard is a 72 year old with PMHx significant for recent DLI takedown (5/3/23) following LAR (2/23) secondary to upper rectal/distal sigmoid colon cancer who presents with 1 week of nausea, abdominal distention, and pain. Labs WNL, however CT-scan completed today with evidence of a fluid collection adjacent to the anastomosis. Presume adynamic ileus vs partial SBO secondary to the adjacent compression. No current concerns for acute bleed. Hemodynamically-stable. Currently continues to pass gas and have bowel movements.     - Admit to CRS under Dr. Magdaleno  - Keep NPO; Start IVF with LR 100cc/hr  - Place NGT  - Labs in AM    Discussed with staff, Dr. Rasta Sam MD  General Surgery, PGY-1         HISTORY OF PRESENT ILLNESS     This is a 72 year old male PMH upper rectal distal sigmoid cancer who presented with a large bowel obstruction requiring an emergent exploratory laparotomy, smallbowel resection, w/ loop descending colostomy in 3/2022.  Pt then received chemo followed by subsequent Exploratory laparotomy, lysis of adhesions, low anterior resection, take down loop colostomy w/ diverting loop ileostomy in 2/2023.  Now more recently s/p loop ileostomy takedown on 5/3/2023 and was discharged home on 5/5/2023.  Pt was seen in clinic today and has had a few days of progressively worsening abdominal distension.  Pt did initially have a few small, formed stools.  Pt then began taking miralax and is now having some loose stools. Patient was evaluated in CRS clinic for his first post-operative visit. During that time, he received basic CBC and chemistries which showed a small hematoma adjacent to the new anastomosis, dilated small bowel loops with air-fluid  levels, and no evidence of a transition point that could correlate with an acute small bowel obstruction.     On my exam, patient states that the distension and abdominal pain has worsened over the last week and is not able to eat.  He expresses having had initial discomfort about 1 week ago that has been worsening progressively. Was feeling well at time of discharge. Has not really had solid foods but is able to drink and keep water down. Urinating without issues. Having loose bowel movements, no blood. Passing gas. Some nausea but no emesis. Denies fevers and chills.          REVIEW OF SYSTEMS     Negative unless noted in HPI       PAST MEDICAL HISTORY     Past Medical History:   Diagnosis Date     Cancer (H)             PAST SURGICAL HISTORY     Past Surgical History:   Procedure Laterality Date     COLECTOMY LEFT N/A 2/8/2023    Procedure: Exploratory Laparotomy, lysis of adhesions, Low Anterior Resection, take down of loop colostomy,  DIVERTING LOOP ILEOSTOMY;  Surgeon: Riley Magdaleno MD;  [facility]     COMBINED CYSTOSCOPY, INSERT STENT URETER(S) Bilateral 2/8/2023    Procedure: CYSTOSCOPY, WITH URETERAL STENT INSERTION [[id number]];  Surgeon: Ulises Basilio MD;  Location[facility]     INSERT PORT VASCULAR ACCESS Right 4/21/2022    Procedure: INSERTION, VASCULAR ACCESS PORT;  Surgeon: Marianne Schroeder MD;  Location: [facility]     INSERT PORT VASCULAR ACCESS Left 6/2/2022    Procedure: INSERTION, VASCULAR ACCESS PORT;  Surgeon: Phong Finch MD;  Location: [facility] OR     IR CHEST PORT PLACEMENT > 5 YRS OF AGE  6/2/2022     LAPAROTOMY EXPLORATORY N/A 3/26/2022    Procedure: exploratory laparotomy, small bowel resection, colostomy creation;  Surgeon: Riley Magdaleno MD;  [facility]     LAPAROTOMY, LYSIS ADHESIONS, COMBINED N/A 8/24/2022    Procedure: LAPAROTOMY, EXPLORATORY, WITH LYSIS OF ADHESIONS;  Surgeon: Segundo Hill MD;  Location: [facility]     PICC INSERTION - DOUBLE LUMEN Left  03/28/2022    left medial brachial 5 fr dl picc 49 cm     SIGMOIDOSCOPY FLEXIBLE N/A 3/26/2022    Procedure: Sigmoidoscopy flexible;  Surgeon: Riley Magdaleno MD;  [facility]     SIGMOIDOSCOPY FLEXIBLE N/A 2/8/2023    Procedure: Sigmoidoscopy flexible;  Surgeon: Riley Magdaleno MD;  Location[facility]     TAKEDOWN ILEOSTOMY N/A 5/3/2023    Procedure: CLOSURE, ILEOSTOMY, OPEN ;  Surgeon: Riley Magdaleno MD;  Location: [facility]            ALLERGIES      No Known Allergies         HOME MEDICATIONS     [COMPLETED] heparin 100 unit/mL injection 500 Units  [COMPLETED] iopamidol (ISOVUE-370) solution 71 mL  [COMPLETED] sodium chloride 0.9 % bag 500mL for CT scan flush use    acetaminophen (TYLENOL) 500 MG tablet, Take 2 tablets (1,000 mg) by mouth every 6 hours  gabapentin (NEURONTIN) 100 MG capsule, Take 1 capsule (100 mg) by mouth At Bedtime  heparin 100 UNIT/ML SOLN injection, 5-10 mLs by Intracatheter route every 28 days  methocarbamol (ROBAXIN) 500 MG tablet, Take 1 tablet (500 mg) by mouth 4 times daily  mirtazapine (REMERON) 15 MG tablet, Take 1 tablet (15 mg) by mouth At Bedtime  multivitamin (CENTRUM SILVER) tablet, Take 1 tablet by mouth every morning  omeprazole (PRILOSEC) 20 MG DR capsule, Take 1 capsule (20 mg) by mouth 2 times daily  oxyCODONE (ROXICODONE) 5 MG tablet, Take 1-2 tablets (5-10 mg) by mouth every 6 hours as needed for moderate to severe pain             SOCIAL HISTORY     Social History     Tobacco Use     Smoking status: Every Day     Packs/day: 0.50     Years: 50.00     Pack years: 25.00     Types: Cigarettes     Smokeless tobacco: Never   Vaping Use     Vaping status: Never Used   Substance Use Topics     Alcohol use: Not Currently     Drug use: Never            FAMILY HISTORY     Family History   Problem Relation Age of Onset     No Known Problems Mother      Prostate Cancer Father      Colon Cancer Father      Lymphoma Father      Anesthesia Reaction No family hx of      Thrombosis No  "family hx of             PHYSICAL EXAM     /87   Pulse 76   Temp 97.7  F (36.5  C) (Oral)   Resp 18   Ht 1.803 m (5' 11\")   Wt 57.6 kg (127 lb)   SpO2 99%   BMI 17.71 kg/m       General: NAD, AAOx3, Thin   Pulm: NLB on RA  Cardio: RRR on radial puls  Abdomen: Compressible, Mildly tender to palpation, Distended and tympanitic, No guarding or peritonitis   Extremities: WWP, Moving independently, Without edema  Neuro: No gross deficits noted    LABS:  Recent Labs   Lab 05/17/23  1211   WBC 10.7   RBC 4.12*   HGB 12.6*   HCT 39.4*   MCV 96   MCH 30.6   MCHC 32.0   RDW 18.5*   *       Recent Labs   Lab 05/17/23  1348      POTASSIUM 5.3   CHLORIDE 104   CO2 24   BUN 18.0   CR 0.69   GLC 91   ROXIE 9.3       No results for input(s): AST, ALT, ALKPHOS, BILITOTAL, BILIDIRECT, ALBUMIN, INR in the last 168 hours.    Invalid input(s): PROTEINTOT    IMAGING:  CT Abdomen Pelvis w Contrast    Result Date: 5/17/2023  EXAMINATION: CT ABDOMEN PELVIS W CONTRAST, 5/17/2023 12:36 PM INDICATION: recent s/p ileostomy takedown with increased abd pain and distention; Abdominal pain, generalized; Abdominal distention; Follow-up examination after colorectal surgery COMPARISON STUDY: CT CAP 11/27/2027 TECHNIQUE: CT scan of the abdomen and pelvis was performed on multidetector CT scanner using volumetric acquisition technique and images were reconstructed in multiple planes with variable thickness and reviewed on dedicated workstations. CONTRAST: Isovue 370 71cc injected CT scan radiation dose is optimized to minimum requisite dose using automated dose modulation techniques. FINDINGS: Lower thorax: Similar-appearing subsegmental atelectasis in the subpleural right lower lobe (series 3, image 63). No pleural effusion or consolidation. Liver: No mass. No intrahepatic biliary ductal dilation. Biliary System: No calcified gallstone is. No extrahepatic biliary ductal dilation. Pancreas: No mass or pancreatic ductal dilation. " Adrenal glands: No mass or nodules Spleen: Normal. Kidneys: No suspicious mass, obstructing calculus or hydronephrosis. 2.2 cm simple cyst in the left upper pole. Gastrointestinal tract : Postoperative changes of low anterior bowel resection with interval ileostomy takedown. There are multiple dilated small bowel loops with air-fluid levels. There is a transition point in the left lower quadrant series 2 image [id number]. Mesentery/peritoneum/retroperitoneum: There is an rim-enhancing heterogeneous fluid collection with locule of air in the left lower quadrant anterior to the ileal anastomosis measuring up to 11.1 cm in cranial caudal dimension. Small volume perihepatic ascites and small amount of free fluid in the pelvis. Lymph nodes: Nodular appearing hypodensity is seen along the upper retroperitoneum, similar to prior, of uncertain clinical significance. Vasculature: Tortuous aorta. Patent major abdominal vasculature. Atherosclerotic calcification of the aorta with stable saccular infrarenal aneurysmal dilation.. Pelvis: Urinary bladder is normal.  Prostate is enlarged. Osseous structures: No aggressive or acute osseous lesion.   Soft tissues: Within normal limits.     IMPRESSION: 1. Postoperative changes of ileostomy takedown, extraluminal relatively circumscribed hyperdense fluid density in the left lower quadrant adjacent to the site of anastomosis, with foci of air density, associated with adjacent peritoneal thickening, more likely favored to represent hematoma; anastomosis leak cannot be entirely excluded and if clinical concern CT with oral contrast may be performed. 2. Diffuse dilation of small bowel loops with presence of air density distally in the distal small bowel and large bowel with few under distended loops of small bowel in the pelvis, overall favor adynamic ileus over low-grade/partial SBO. No pneumatosis or mesenteric gas. 3. Additional findings similar to prior CT from 11/27/2022. [Urgent Result:  Dilated small bowel loops and collection adjacent to the anastomosis] Finding was identified on 5/17/2023 1:13 PM. Karla Welch was contacted by Dr. King at 5/17/2023 1:22 PM and verbalized understanding of the urgent finding.

## 2023-05-17 NOTE — LETTER
"2023       RE: Kalin Sheprad  [address] [facility] [address]     Dear Colleague,    Thank you for referring your patient, Kalin Shepard, to the [facility] COLON AND RECTAL SURGERY CLINIC [place] at [facility]. Please see a copy of my visit note below.    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  He underwent chemotherapy with an excellent response followed by definitive resection with diverting loop ileostomy.  He is now status post loop ileostomy takedown, small bowel, stapled, side-to-side anastomosis, and repair of abdominal wall at ileostomy site on 5/3/2023 with Dr. Magdaleno.    Final Diagnosis   A.  ILEOSTOMY:  - Skin with reactive changes and attached small intestine, consistent with ileostomy     Interval history: Layton has felt very distended in the past few days. He was initially having some small formed bowel movements but began getting very distended. Started taking Miralax and is now having some loose stools but distention has not improved. He cannot eat due to the distention and states that he is \"at his wit's end\". No nausea or vomiting. No fevers or chills.    Physical Examination: Exam was chaperoned by Almas Stanley, EMT-P   BP (!) 143/78 (BP Location: Left arm, Patient Position: Sitting, Cuff Size: Adult Small)   Pulse 72   Ht 5' 11\"   Wt 127 lb 3.2 oz   SpO2 100%   BMI 17.74 kg/m    General: alert, oriented, in no acute distress, sitting comfortably  HEENT: mucous membranes moist  Abdomen: distended, tympanic, tender on palpation; takedown site with good granulation tissue present and no erythema    Assessment/Plan:  72 year old male status post loop ileostomy takedown, small bowel, stapled, " side-to-side anastomosis, and repair of abdominal wall at ileostomy site on 5/3/2023 with Dr. Magdaleno. He is having a lot of distention despite passing some loose stools and flatus. Concerned about obstruction. Discussed with Dr. Magdaleno and will get labs and a CT today with likely admission. He states that he feels like he needs to be admitted and cannot tolerate the pain at home anymore.     Medical history:  Past Medical History:   Diagnosis Date    Cancer (H)        Surgical history:  Past Surgical History:   Procedure Laterality Date    COLECTOMY LEFT N/A 2/8/2023    Procedure: Exploratory Laparotomy, lysis of adhesions, Low Anterior Resection, take down of loop colostomy,  DIVERTING LOOP ILEOSTOMY;  Surgeon: Riley Magdaleno MD;  Location: [facility] OR    COMBINED CYSTOSCOPY, INSERT STENT URETER(S) Bilateral 2/8/2023    Procedure: CYSTOSCOPY, WITH URETERAL STENT INSERTION [[id number]];  Surgeon: Ulises Basilio MD;  Location: [facility] OR    INSERT PORT VASCULAR ACCESS Right 4/21/2022    Procedure: INSERTION, VASCULAR ACCESS PORT;  Surgeon: Marianne Schroeder MD;  Location: [facility]    INSERT PORT VASCULAR ACCESS Left 6/2/2022    Procedure: INSERTION, VASCULAR ACCESS PORT;  Surgeon: Phong Finch MD;  Location: [facility] OR    IR CHEST PORT PLACEMENT > 5 YRS OF AGE  6/2/2022    LAPAROTOMY EXPLORATORY N/A 3/26/2022    Procedure: exploratory laparotomy, small bowel resection, colostomy creation;  Surgeon: Riley Magdaleno MD;  Location: [facility] OR    LAPAROTOMY, LYSIS ADHESIONS, COMBINED N/A 8/24/2022    Procedure: LAPAROTOMY, EXPLORATORY, WITH LYSIS OF ADHESIONS;  Surgeon: Segundo Hill MD;  Location: [facility]    PICC INSERTION - DOUBLE LUMEN Left 03/28/2022    left medial brachial 5 fr dl picc 49 cm    SIGMOIDOSCOPY FLEXIBLE N/A 3/26/2022    Procedure: Sigmoidoscopy flexible;  Surgeon: Riley Magdaleno MD;  Location: [facility]    SIGMOIDOSCOPY FLEXIBLE N/A 2/8/2023    Procedure: Sigmoidoscopy flexible;  Surgeon:  Riley Magdaleno MD;  Location: [facility]    TAKEDOWN ILEOSTOMY N/A 5/3/2023    Procedure: CLOSURE, ILEOSTOMY, OPEN ;  Surgeon: Riley Magdaleno MD;  Location: [facility] OR       Problem list:  Patient Active Problem List    Diagnosis Date Noted    Ileostomy status (H) 05/03/2023     Priority: Medium    High output ileostomy (H) 02/14/2023     Priority: Medium    Cancer of rectosigmoid (colon) (H) 02/08/2023     Priority: Medium    Chemotherapy-induced peripheral neuropathy (H) 09/26/2022     Priority: Medium    Peritonitis, acute generalized (H) 08/29/2022     Priority: Medium    SBO (small bowel obstruction) (H) 08/21/2022     Priority: Medium    Cancer cachexia (H) 08/21/2022     Priority: Medium    Esophageal reflux 08/21/2022     Priority: Medium    Chemotherapy-induced neutropenia (H) 08/02/2022     Priority: Medium    Dehydration 07/19/2022     Priority: Medium    Cancer associated pain 06/20/2022     Priority: Medium     Oxycodone 5mg bid.  2 month supply given 6/20/22      Adenocarcinoma of sigmoid colon (H) 04/13/2022     Priority: Medium    Colon adenocarcinoma (H) 04/11/2022     Priority: Medium     Peritoneal involvement.  Resection of obstruction, small intestine adherent to mass.  Unable to resect sigmoid mass, extensive involvement.        Colostomy present (H) 04/11/2022     Priority: Medium    Cachexia (H) 04/11/2022     Priority: Medium       Medications:  Current Outpatient Medications   Medication Sig Dispense Refill    acetaminophen (TYLENOL) 500 MG tablet Take 2 tablets (1,000 mg) by mouth every 6 hours 100 tablet 0    gabapentin (NEURONTIN) 100 MG capsule Take 1 capsule (100 mg) by mouth At Bedtime 14 capsule 0    heparin 100 UNIT/ML SOLN injection 5-10 mLs by Intracatheter route every 28 days      methocarbamol (ROBAXIN) 500 MG tablet Take 1 tablet (500 mg) by mouth 4 times daily 40 tablet 0    mirtazapine (REMERON) 15 MG tablet Take 1 tablet (15 mg) by mouth At Bedtime 90 tablet 3    multivitamin  "(CENTRUM SILVER) tablet Take 1 tablet by mouth every morning 30 tablet     omeprazole (PRILOSEC) 20 MG DR capsule Take 1 capsule (20 mg) by mouth 2 times daily 60 capsule 3    oxyCODONE (ROXICODONE) 5 MG tablet Take 1-2 tablets (5-10 mg) by mouth every 6 hours as needed for moderate to severe pain 18 tablet 0       Allergies:  No Known Allergies    Family history:  Family History   Problem Relation Age of Onset    No Known Problems Mother     Prostate Cancer Father     Colon Cancer Father     Lymphoma Father     Anesthesia Reaction No family hx of     Thrombosis No family hx of        Social history:  Social History     Tobacco Use    Smoking status: Every Day     Packs/day: 0.50     Years: 50.00     Pack years: 25.00     Types: Cigarettes    Smokeless tobacco: Never   Vaping Use    Vaping status: Never Used   Substance Use Topics    Alcohol use: Not Currently     Marital status: .    Nursing Notes:   Almas Stanley, EMT  5/17/2023 10:21 AM  Signed  Chief Complaint   Patient presents with    Surgical Followup     DOS 5/3/23       Vitals:    05/17/23 1018   BP: (!) 143/78   BP Location: Left arm   Patient Position: Sitting   Cuff Size: Adult Small   Pulse: 72   SpO2: 100%   Weight: 135 lb   Height: 5' 11\"       Body mass index is 18.83 kg/m .     Almas Stanley, EMT- P    30 minutes spent on the date of the encounter doing chart review, history and exam, documentation and further activities as noted above.   This is a postop visit.      This note was created using speech recognition software and may contain unintended word substitutions.          Again, thank you for allowing me to participate in the care of your patient.      Sincerely,    JULIUS Hunter CNP      "

## 2023-05-18 ENCOUNTER — APPOINTMENT (OUTPATIENT)
Dept: GENERAL RADIOLOGY | Facility: CLINIC | Age: 73
DRG: 389 | End: 2023-05-18
Payer: COMMERCIAL

## 2023-05-18 LAB
ANION GAP SERPL CALCULATED.3IONS-SCNC: 14 MMOL/L (ref 7–15)
BUN SERPL-MCNC: 22 MG/DL (ref 8–23)
CALCIUM SERPL-MCNC: 9 MG/DL (ref 8.8–10.2)
CHLORIDE SERPL-SCNC: 104 MMOL/L (ref 98–107)
CREAT SERPL-MCNC: 0.65 MG/DL (ref 0.67–1.17)
DEPRECATED HCO3 PLAS-SCNC: 25 MMOL/L (ref 22–29)
ERYTHROCYTE [DISTWIDTH] IN BLOOD BY AUTOMATED COUNT: 18.5 % (ref 10–15)
GFR SERPL CREATININE-BSD FRML MDRD: >90 ML/MIN/1.73M2
GLUCOSE BLDC GLUCOMTR-MCNC: 103 MG/DL (ref 70–99)
GLUCOSE BLDC GLUCOMTR-MCNC: 119 MG/DL (ref 70–99)
GLUCOSE BLDC GLUCOMTR-MCNC: 141 MG/DL (ref 70–99)
GLUCOSE BLDC GLUCOMTR-MCNC: 65 MG/DL (ref 70–99)
GLUCOSE BLDC GLUCOMTR-MCNC: 70 MG/DL (ref 70–99)
GLUCOSE BLDC GLUCOMTR-MCNC: 82 MG/DL (ref 70–99)
GLUCOSE BLDC GLUCOMTR-MCNC: 92 MG/DL (ref 70–99)
GLUCOSE SERPL-MCNC: 69 MG/DL (ref 70–99)
HCT VFR BLD AUTO: 37 % (ref 40–53)
HGB BLD-MCNC: 11.4 G/DL (ref 13.3–17.7)
MAGNESIUM SERPL-MCNC: 2.1 MG/DL (ref 1.7–2.3)
MCH RBC QN AUTO: 30.6 PG (ref 26.5–33)
MCHC RBC AUTO-ENTMCNC: 30.8 G/DL (ref 31.5–36.5)
MCV RBC AUTO: 100 FL (ref 78–100)
PHOSPHATE SERPL-MCNC: 3 MG/DL (ref 2.5–4.5)
PLATELET # BLD AUTO: 525 10E3/UL (ref 150–450)
POTASSIUM SERPL-SCNC: 3.5 MMOL/L (ref 3.4–5.3)
RADIOLOGIST FLAGS: ABNORMAL
RBC # BLD AUTO: 3.72 10E6/UL (ref 4.4–5.9)
SODIUM SERPL-SCNC: 143 MMOL/L (ref 136–145)
WBC # BLD AUTO: 8.5 10E3/UL (ref 4–11)

## 2023-05-18 PROCEDURE — 36415 COLL VENOUS BLD VENIPUNCTURE: CPT

## 2023-05-18 PROCEDURE — 74018 RADEX ABDOMEN 1 VIEW: CPT

## 2023-05-18 PROCEDURE — 74018 RADEX ABDOMEN 1 VIEW: CPT | Mod: 26 | Performed by: RADIOLOGY

## 2023-05-18 PROCEDURE — 258N000003 HC RX IP 258 OP 636

## 2023-05-18 PROCEDURE — 84100 ASSAY OF PHOSPHORUS: CPT

## 2023-05-18 PROCEDURE — 250N000013 HC RX MED GY IP 250 OP 250 PS 637

## 2023-05-18 PROCEDURE — 120N000002 HC R&B MED SURG/OB UMMC

## 2023-05-18 PROCEDURE — 82962 GLUCOSE BLOOD TEST: CPT

## 2023-05-18 PROCEDURE — 80048 BASIC METABOLIC PNL TOTAL CA: CPT

## 2023-05-18 PROCEDURE — 250N000011 HC RX IP 250 OP 636: Performed by: PHYSICIAN ASSISTANT

## 2023-05-18 PROCEDURE — 85027 COMPLETE CBC AUTOMATED: CPT

## 2023-05-18 PROCEDURE — 83735 ASSAY OF MAGNESIUM: CPT

## 2023-05-18 RX ORDER — NALOXONE HYDROCHLORIDE 0.4 MG/ML
0.2 INJECTION, SOLUTION INTRAMUSCULAR; INTRAVENOUS; SUBCUTANEOUS
Status: DISCONTINUED | OUTPATIENT
Start: 2023-05-18 | End: 2023-05-20 | Stop reason: HOSPADM

## 2023-05-18 RX ORDER — NALOXONE HYDROCHLORIDE 0.4 MG/ML
0.4 INJECTION, SOLUTION INTRAMUSCULAR; INTRAVENOUS; SUBCUTANEOUS
Status: DISCONTINUED | OUTPATIENT
Start: 2023-05-18 | End: 2023-05-20 | Stop reason: HOSPADM

## 2023-05-18 RX ORDER — POTASSIUM CHLORIDE 7.45 MG/ML
10 INJECTION INTRAVENOUS ONCE
Status: DISCONTINUED | OUTPATIENT
Start: 2023-05-18 | End: 2023-05-18

## 2023-05-18 RX ORDER — POTASSIUM CHLORIDE 7.45 MG/ML
10 INJECTION INTRAVENOUS
Status: COMPLETED | OUTPATIENT
Start: 2023-05-18 | End: 2023-05-18

## 2023-05-18 RX ADMIN — SODIUM CHLORIDE, POTASSIUM CHLORIDE, SODIUM LACTATE AND CALCIUM CHLORIDE: 600; 310; 30; 20 INJECTION, SOLUTION INTRAVENOUS at 13:33

## 2023-05-18 RX ADMIN — SODIUM CHLORIDE, POTASSIUM CHLORIDE, SODIUM LACTATE AND CALCIUM CHLORIDE: 600; 310; 30; 20 INJECTION, SOLUTION INTRAVENOUS at 02:58

## 2023-05-18 RX ADMIN — ACETAMINOPHEN 975 MG: 325 TABLET ORAL at 19:55

## 2023-05-18 RX ADMIN — ACETAMINOPHEN 975 MG: 325 TABLET ORAL at 08:26

## 2023-05-18 RX ADMIN — OXYCODONE HYDROCHLORIDE 5 MG: 5 SOLUTION ORAL at 22:09

## 2023-05-18 RX ADMIN — GABAPENTIN 100 MG: 100 CAPSULE ORAL at 22:09

## 2023-05-18 RX ADMIN — POTASSIUM CHLORIDE 10 MEQ: 7.46 INJECTION, SOLUTION INTRAVENOUS at 16:43

## 2023-05-18 RX ADMIN — POTASSIUM CHLORIDE 10 MEQ: 7.46 INJECTION, SOLUTION INTRAVENOUS at 13:34

## 2023-05-18 ASSESSMENT — ACTIVITIES OF DAILY LIVING (ADL)
ADLS_ACUITY_SCORE: 35
ADLS_ACUITY_SCORE: 37
ADLS_ACUITY_SCORE: 35
ADLS_ACUITY_SCORE: 37
ADLS_ACUITY_SCORE: 35

## 2023-05-18 NOTE — PLAN OF CARE
" Paged the team, Imaging was done, \"they didn't see any contrast,look like went through the bowel, he had 3x loose bowel earlier, they are asking if you want to redo the contrast again\"..    "

## 2023-05-18 NOTE — PLAN OF CARE
"Assumed cares at [phone]     Status: Bowel Obstruction  Neuro:  AOX4  GI/: x3 loose bowel, managed.  Resp: Not in distress, sating at RA  Mobility: SBA  Cardiac: Denies chest pain during assessment  Lines/Drains: NGT Tube rt nostril at 69 cm in low suction intermittent  Pain: 5/10 abd pain, manageable    VS: /73 (BP Location: Left arm, Patient Position: Semi-Bean's)   Pulse 70   Temp 97.2  F (36.2  C) (Oral)   Resp 18   Ht 1.803 m (5' 11\")   Wt 57.6 kg (127 lb)   SpO2 95%   BMI 17.71 kg/m        Plan of Care:   -NPO  -Images done  "

## 2023-05-18 NOTE — PROGRESS NOTES
"Shift: [id number]  VS: /77 (BP Location: Left arm)   Pulse 67   Temp 97.2  F (36.2  C) (Oral)   Resp 18   Ht 1.803 m (5' 11\")   Wt 57.6 kg (127 lb)   SpO2 97%   BMI 17.71 kg/m      Pain: Denies pain  Neuro: AOx4  Cardiac: WDL  Respiratory: WDL  Diet/Appetite: Clear liquid  /GI: WDL  LDAs: NGT removed, R. PIV  Skin: WDL  Activity: Up adlib, ind  Pertinent Labs/Lab Collection:  q4h  "

## 2023-05-18 NOTE — PROGRESS NOTES
Colorectal Surgery Progress Note  [facility]  POD#1      Subjective:  Passed several liquid stools maybe 6-7, straight water and variable amounts.  Still some bloating, but improved from prior.      Vitals:  Vitals:    05/17/23 1634 05/17/23 2000 05/18/23 0610 05/18/23 0800   BP: 128/78 126/72 122/73 123/76   BP Location:   Left arm Left arm   Patient Position:   Semi-Bean's    Pulse: 69 72 70 78   Resp:  18 18 18   Temp:  97.8  F (36.6  C) 97.2  F (36.2  C)    TempSrc:  Oral Oral    SpO2: 99% 100% 95% 95%   Weight:       Height:         I/O:  No intake/output data recorded.    Physical Exam:  Gen: AAOx3, NAD  Pulm: Non-labored breathing  Abd: Soft, mildly distended, appropriately tender, no guarding/rebound   Old ostomy site - dressed  Ext:  Warm and well-perfused    BMP  Recent Labs   Lab 05/18/23  0817 05/18/23  0624 05/17/23  1348   NA  --  143 139   POTASSIUM  --  3.5 5.3   CHLORIDE  --  104 104   CO2  --  25 24   BUN  --  22.0 18.0   CR  --  0.65* 0.69   GLC 70 69* 91   MAG  --  2.1  --    PHOS  --  3.0  --      CBC  Recent Labs   Lab 05/18/23  0624 05/17/23  1211   WBC 8.5 10.7   HGB 11.4* 12.6*   HCT 37.0* 39.4*   * 552*         ASSESSMENT: This is a 72 year old male PMH upper rectal-distal sigmoid cancer c/b LBO requiring emergent SB resection & loop descending colostomy 3/22, c/p chemo, followed by LAR, take down of colostomy w/ DLI in 2/2023.  More recently s/p DLI take down on 5/3 and discharged 5/5.  Readmitted with abdominal pain, distension, decreased BMs but is still passing some small stools and little gas.  Labs WNL.  CT w/ fluid collection adjacent to anastomosis.  Presumed ileus vs partial SBO 2/2 to compression from adj fluid.     GG Challenge via NGT - hyperdense contrast material predominantly in small bowel 8 hr after contrast administration. Though small amount may be in RLQ within the ascending colon.  Mildly dilated air-filled loops small bowel.     Unclear if pt received all contrast.   Pt has since passed several stools.     Neuro/Pain: tylenol, gabapentin, oxy  CV: no acute concerns at this time  PULM: encourage IS.  GI/FEN:   - OK to remove NGT today  - Start CLD w/ nutritional supplement as tolerated  - continue IVF for today  - strict in and outs  : no acute concerns at this time  Heme: no acute concerns at this time  ID: no acute concerns at this time.  Monitor.   Endocrine: no acute concerns at this time    Activity: as tolerated.  Ppx: ambulate  Dispo: 1-2 days pending bowel function & PO intake.  Likely will need to continue a FLD on discharge for a few days.       Doris Mccloud PA-C ..................5/18/2023   8:57 AM  Colon and Rectal Surgery    Patient was seen and discussed with Fellow Dr. Jimenez    The above plan of care was performed and communicated to me by Dr. Magdaleno    I spent 35 minute face-to-face or coordinating care of Kalin Shepard. Over 50% of our time on the unit was spent counseling the patient and/or coordinating care as documented in the assessment and plan.

## 2023-05-19 LAB
ANION GAP SERPL CALCULATED.3IONS-SCNC: 11 MMOL/L (ref 7–15)
BUN SERPL-MCNC: 11.7 MG/DL (ref 8–23)
CALCIUM SERPL-MCNC: 8.5 MG/DL (ref 8.8–10.2)
CHLORIDE SERPL-SCNC: 110 MMOL/L (ref 98–107)
CREAT SERPL-MCNC: 0.61 MG/DL (ref 0.67–1.17)
DEPRECATED HCO3 PLAS-SCNC: 25 MMOL/L (ref 22–29)
ERYTHROCYTE [DISTWIDTH] IN BLOOD BY AUTOMATED COUNT: 18.4 % (ref 10–15)
GFR SERPL CREATININE-BSD FRML MDRD: >90 ML/MIN/1.73M2
GLUCOSE BLDC GLUCOMTR-MCNC: 101 MG/DL (ref 70–99)
GLUCOSE BLDC GLUCOMTR-MCNC: 111 MG/DL (ref 70–99)
GLUCOSE BLDC GLUCOMTR-MCNC: 120 MG/DL (ref 70–99)
GLUCOSE BLDC GLUCOMTR-MCNC: 84 MG/DL (ref 70–99)
GLUCOSE BLDC GLUCOMTR-MCNC: 93 MG/DL (ref 70–99)
GLUCOSE BLDC GLUCOMTR-MCNC: 97 MG/DL (ref 70–99)
GLUCOSE SERPL-MCNC: 98 MG/DL (ref 70–99)
HCT VFR BLD AUTO: 37.1 % (ref 40–53)
HGB BLD-MCNC: 11.7 G/DL (ref 13.3–17.7)
MAGNESIUM SERPL-MCNC: 2 MG/DL (ref 1.7–2.3)
MCH RBC QN AUTO: 30.9 PG (ref 26.5–33)
MCHC RBC AUTO-ENTMCNC: 31.5 G/DL (ref 31.5–36.5)
MCV RBC AUTO: 98 FL (ref 78–100)
PHOSPHATE SERPL-MCNC: 2.3 MG/DL (ref 2.5–4.5)
PLATELET # BLD AUTO: 515 10E3/UL (ref 150–450)
POTASSIUM SERPL-SCNC: 3.7 MMOL/L (ref 3.4–5.3)
RBC # BLD AUTO: 3.79 10E6/UL (ref 4.4–5.9)
SODIUM SERPL-SCNC: 146 MMOL/L (ref 136–145)
WBC # BLD AUTO: 8.7 10E3/UL (ref 4–11)

## 2023-05-19 PROCEDURE — 250N000011 HC RX IP 250 OP 636

## 2023-05-19 PROCEDURE — 120N000002 HC R&B MED SURG/OB UMMC

## 2023-05-19 PROCEDURE — 250N000013 HC RX MED GY IP 250 OP 250 PS 637: Performed by: SURGERY

## 2023-05-19 PROCEDURE — 85027 COMPLETE CBC AUTOMATED: CPT

## 2023-05-19 PROCEDURE — 250N000013 HC RX MED GY IP 250 OP 250 PS 637

## 2023-05-19 PROCEDURE — 258N000003 HC RX IP 258 OP 636: Performed by: SURGERY

## 2023-05-19 PROCEDURE — 82310 ASSAY OF CALCIUM: CPT

## 2023-05-19 PROCEDURE — 84100 ASSAY OF PHOSPHORUS: CPT

## 2023-05-19 PROCEDURE — 82962 GLUCOSE BLOOD TEST: CPT

## 2023-05-19 PROCEDURE — 83735 ASSAY OF MAGNESIUM: CPT

## 2023-05-19 PROCEDURE — 36415 COLL VENOUS BLD VENIPUNCTURE: CPT

## 2023-05-19 RX ORDER — POLYETHYLENE GLYCOL 3350 17 G/17G
17 POWDER, FOR SOLUTION ORAL DAILY
Status: DISCONTINUED | OUTPATIENT
Start: 2023-05-19 | End: 2023-05-20 | Stop reason: HOSPADM

## 2023-05-19 RX ORDER — GABAPENTIN 100 MG/1
100 CAPSULE ORAL AT BEDTIME
Status: DISCONTINUED | OUTPATIENT
Start: 2023-05-19 | End: 2023-05-20 | Stop reason: HOSPADM

## 2023-05-19 RX ORDER — POLYETHYLENE GLYCOL 3350 17 G/17G
1 POWDER, FOR SOLUTION ORAL DAILY
COMMUNITY

## 2023-05-19 RX ORDER — HEPARIN SODIUM,PORCINE 10 UNIT/ML
5 VIAL (ML) INTRAVENOUS
Status: DISCONTINUED | OUTPATIENT
Start: 2023-05-19 | End: 2023-05-20 | Stop reason: HOSPADM

## 2023-05-19 RX ORDER — OXYCODONE HCL 5 MG/5 ML
5-10 SOLUTION, ORAL ORAL EVERY 6 HOURS PRN
Status: DISCONTINUED | OUTPATIENT
Start: 2023-05-19 | End: 2023-05-19

## 2023-05-19 RX ORDER — OXYCODONE HYDROCHLORIDE 5 MG/1
5-10 TABLET ORAL EVERY 6 HOURS PRN
Status: DISCONTINUED | OUTPATIENT
Start: 2023-05-19 | End: 2023-05-20 | Stop reason: HOSPADM

## 2023-05-19 RX ORDER — ACETAMINOPHEN 325 MG/1
975 TABLET ORAL 3 TIMES DAILY
Status: DISCONTINUED | OUTPATIENT
Start: 2023-05-19 | End: 2023-05-20 | Stop reason: HOSPADM

## 2023-05-19 RX ADMIN — OXYCODONE HYDROCHLORIDE 5 MG: 5 TABLET ORAL at 22:23

## 2023-05-19 RX ADMIN — SODIUM CHLORIDE, POTASSIUM CHLORIDE, SODIUM LACTATE AND CALCIUM CHLORIDE: 600; 310; 30; 20 INJECTION, SOLUTION INTRAVENOUS at 05:37

## 2023-05-19 RX ADMIN — ACETAMINOPHEN 975 MG: 325 TABLET ORAL at 19:39

## 2023-05-19 RX ADMIN — POLYETHYLENE GLYCOL 3350 17 G: 17 POWDER, FOR SOLUTION ORAL at 14:39

## 2023-05-19 RX ADMIN — HEPARIN, PORCINE (PF) 10 UNIT/ML INTRAVENOUS SYRINGE 5 ML: at 22:23

## 2023-05-19 RX ADMIN — ACETAMINOPHEN 975 MG: 325 TABLET ORAL at 14:38

## 2023-05-19 RX ADMIN — POTASSIUM & SODIUM PHOSPHATES POWDER PACK 280-160-250 MG 1 PACKET: 280-160-250 PACK at 19:39

## 2023-05-19 RX ADMIN — OXYCODONE HYDROCHLORIDE 5 MG: 5 SOLUTION ORAL at 08:25

## 2023-05-19 RX ADMIN — POTASSIUM & SODIUM PHOSPHATES POWDER PACK 280-160-250 MG 1 PACKET: 280-160-250 PACK at 15:48

## 2023-05-19 RX ADMIN — OXYCODONE HYDROCHLORIDE 5 MG: 5 SOLUTION ORAL at 14:43

## 2023-05-19 RX ADMIN — POTASSIUM & SODIUM PHOSPHATES POWDER PACK 280-160-250 MG 1 PACKET: 280-160-250 PACK at 11:51

## 2023-05-19 RX ADMIN — GABAPENTIN 100 MG: 100 CAPSULE ORAL at 22:23

## 2023-05-19 ASSESSMENT — ACTIVITIES OF DAILY LIVING (ADL)
ADLS_ACUITY_SCORE: 37

## 2023-05-19 NOTE — PROGRESS NOTES
Admitted/transferred from: ER  2 RN full skin assessment completed by Bella GARCÍA and Marcial PEARSON RN   Skin assessment finding: Blanchable redness on both heels and right mid upper back. Left abdomen ileostomy takedown site- premapore dressing intact. Midline abdomen incision healed - IRENE. Left chest port a cath saline locked   Interventions/actions: Will continue to monitor.

## 2023-05-19 NOTE — PROGRESS NOTES
Colorectal Surgery Progress Note  [facility]    Subjective: NAEON. Per charting, patient supposedly had emesis x5 yesterday evening, however he denies this when asked. Also denies nausea. Tolerating CLD. Having ongoing loose stools, improving. Did not ambulate much yesterday.     Vitals:  Vitals:    05/18/23 0800 05/18/23 1600 05/18/23 1948 05/19/23 0538   BP: 123/76 120/77 134/82 107/74   BP Location: Left arm Left arm Left arm    Patient Position:       Pulse: 78 67 67 60   Resp: 18 18 18   Temp:   98.2  F (36.8  C) 97.8  F (36.6  C)   TempSrc:   Oral Oral   SpO2: 95% 97% 97% 98%   Weight:       Height:         I/O:  No intake/output data recorded.    Physical Exam:  Gen: AAOx3, NAD  Pulm: Non-labored breathing  Abd: Soft, mildly distended, appropriately tender, no guarding/rebound   Old ostomy site - dressed  Ext:  Warm and well-perfused    BMP  Recent Labs   Lab 05/19/23  0540 05/19/23  0214 05/18/23  2212 05/18/23  1958 05/18/23  0817 05/18/23  0624 05/17/23  1348   NA  --   --   --   --   --  143 139   POTASSIUM  --   --   --   --   --  3.5 5.3   CHLORIDE  --   --   --   --   --  104 104   CO2  --   --   --   --   --  25 24   BUN  --   --   --   --   --  22.0 18.0   CR  --   --   --   --   --  0.65* 0.69   GLC 93 84 103* 119*   < > 69* 91   MAG  --   --   --   --   --  2.1  --    PHOS  --   --   --   --   --  3.0  --     < > = values in this interval not displayed.     CBC  Recent Labs   Lab 05/18/23  0624 05/17/23  1211   WBC 8.5 10.7   HGB 11.4* 12.6*   HCT 37.0* 39.4*   * 552*         ASSESSMENT: This is a 72 year old male PMH upper rectal-distal sigmoid cancer c/b LBO requiring emergent SB resection & loop descending colostomy 3/22, c/p chemo, followed by LAR, take down of colostomy w/ DLI in 2/2023.  More recently s/p DLI take down on 5/3 and discharged 5/5.  Readmitted with abdominal pain, distension, decreased BMs but is still passing some small stools and little  gas.  Labs WNL.  CT w/ fluid collection adjacent to anastomosis.  Presumed ileus vs partial SBO 2/2 to compression from adj fluid.     GG Challenge via NGT - hyperdense contrast material predominantly in small bowel 8 hr after contrast administration. Though small amount may be in RLQ within the ascending colon.  Mildly dilated air-filled loops small bowel.    Unclear if pt received all contrast.   Pt has since passed several stools.     Neuro/Pain: tylenol, gabapentin, oxy  CV: no acute concerns at this time  PULM: encourage IS.  GI/FEN:   - Advance to FLD w/ nutritional supplement as tolerated  - Discontinue IVF today  - Continue monitoring strict I/Os  : no acute concerns at this time  Heme: no acute concerns at this time  ID: no acute concerns at this time.  Monitor.   Endocrine: no acute concerns at this time    Activity: as tolerated; ambulate QID  Ppx: ambulate  Dispo: Could likely discharge later today versus tomorrow pending tolerance of diet advancement    Patient to be discussed with CRS staff, Dr. Rasta Sam MD  General Surgery, PGY-1

## 2023-05-19 NOTE — DISCHARGE SUMMARY
[facility]  Discharge Summary  Colon and Rectal Surgery     Kalin Shepard MRN# [id number]   YOB: 1950 Age: 72 year old     Date of Admission:  5/17/2023  Date of Discharge::  5/20/23  Admitting Physician:  Riley Magdaleno MD  Discharge Physician:  Riley Magdaleno MD  Primary Care Physician:        Segundo Bentley          Admission Diagnoses:   Small bowel obstruction (H) [K56.609]   Underweight (BMI <18)         Discharge Diagnosis:   Small bowel obstruction (H) [K56.609]  Underweight (BMI <18)           Procedures:   None          Consultations:   None         Imaging Studies:     Results for orders placed or performed during the hospital encounter of 05/17/23   XR Abdomen Port 1 View    Narrative    Exam: XR ABDOMEN PORT 1 VIEW, 5/17/2023 6:17 PM    Indication: NGtube verification    Comparison: CT abdomen same day    Findings:   NG tube tip is in the distal thoracic esophagus 5 cm above the  diaphragm. Gaseous distended bowel in the upper abdomen. No  pneumatosis or portal venous gas. Lung bases are clear. No acute  osseous abnormality.      Impression    Impression: NG tube tip is in the distal thoracic esophagus  approximately 5 cm above the diaphragm. Recommend advancing.    I have personally reviewed the examination and initial interpretation  and I agree with the findings.    TRACI ZALDIVAR MD         SYSTEM ID:  [id number]   XR Gastrografin Challenge    Narrative    Exam: XR GASTROGRAFIN CHALLENGE, 5/18/2023 6:15 AM    Indication: Small Bowel Obstruction    Comparison: Radiographs and CT from 5/17/2023    Findings:   Portable AP supine view of the abdomen 8 hours after administration of  75 mL of Gastrografin. Enteric tube tip and sidehole project over the  stomach. Scant hyperdense contrast material predominantly in the small  bowel, greatest in the pelvis but also likely left upper quadrant. A  small amount of contrast projecting over the  right lower quadrant may  be within the ascending colon. Continued mildly dilated air-filled  loops of small bowel in the left upper quadrant. Contrast in the  urinary bladder, likely from prior CT. No pneumatosis or portal venous  gas. Lung bases are clear.      Impression    Impression: Hyperdense contrast material predominantly in the small  bowel 8 hours after contrast administration, though a small amount  contrast projecting over the right lower quadrant may be within the  ascending colon. Continued mildly dilated air-filled loops of small  bowel greatest in the left upper quadrant with mild improvement  suspected since 5/17/2023 CT. Recommend continued imaging follow-up.    I have personally reviewed the examination and initial interpretation  and I agree with the findings.    DANIELE SUNG DO         SYSTEM ID:  [id number]   XR Abdomen Port 1 View    Narrative    EXAMINATION:  XR ABDOMEN PORT 1 VIEW 5/17/2023 7:29 PM     COMPARISON: Single abdominal radiograph at 5:41 PM. Same-day CT  abdomen and pelvis at 12:34 PM.    HISTORY: Ngtube verification    TECHNIQUE: Frontal view of the abdomen.    FINDINGS: Interval advancement of nasogastric tube sidehole projecting  superior to the gastroesophageal junction, tip projecting over the  gastric body. Partially visualized dilatation of small bowel loops,  better visualized on same-day CT  abdomen and pelvis. No abnormal soft  tissue densities.  No free air, pneumatosis or portal venous gas. The  lung bases are unremarkable.      Impression    IMPRESSION: Interval advancement of nasogastric tube with sidehole  projecting superior to the gastroesophageal junction, recommend  advancement.    I have personally reviewed the examination and initial interpretation  and I agree with the findings.    TRACI ZALDIVAR MD         SYSTEM ID:  [id number]   XR Abdomen Port 1 View    Narrative    EXAMINATION:  XR ABDOMEN PORT 1 VIEW 5/17/2023 7:30 PM     COMPARISON: Same day  abdominal radiograph at 6:38 PM, same-day CT  abdomen and pelvis at 12:34 PM    HISTORY: NGTube verification    TECHNIQUE: Frontal view of the abdomen.    FINDINGS: Interval advancement of nasogastric tube with tip in  sidehole projecting over the gastric body. Partially visualized  dilatation of small bowel loops, better visualized on same-day CT   abdomen and pelvis. No abnormal soft tissue densities.  No free air,  pneumatosis or portal venous gas. The lung bases are unremarkable.      Impression    IMPRESSION: Nasogastric tube tip and sidehole projecting over the  gastric body in appropriate position.    I have personally reviewed the examination and initial interpretation  and I agree with the findings.    TRACI ZALDIVAR MD         SYSTEM ID:  [id number]              Medications Prior to Admission:     Medications Prior to Admission   Medication Sig Dispense Refill Last Dose     acetaminophen (TYLENOL) 500 MG tablet Take 2 tablets (1,000 mg) by mouth every 6 hours (Patient taking differently: Take 1,000 mg by mouth 3 times daily) 100 tablet 0 Past Week at unknown     gabapentin (NEURONTIN) 100 MG capsule Take 1 capsule (100 mg) by mouth At Bedtime 14 capsule 0 5/16/2023 at PM     heparin 100 UNIT/ML SOLN injection 5-10 mLs by Intracatheter route every 28 days   Past Month     methocarbamol (ROBAXIN) 500 MG tablet Take 1 tablet (500 mg) by mouth 4 times daily (Patient taking differently: Take 500 mg by mouth 3 times daily) 40 tablet 0 5/17/2023 at AM     mirtazapine (REMERON) 15 MG tablet Take 1 tablet (15 mg) by mouth At Bedtime 90 tablet 3 5/16/2023 at PM     multivitamin (CENTRUM SILVER) tablet Take 1 tablet by mouth every morning 30 tablet  5/16/2023 at AM     oxyCODONE (ROXICODONE) 5 MG tablet Take 1-2 tablets (5-10 mg) by mouth every 6 hours as needed for moderate to severe pain 18 tablet 0 Past Month at unknown     polyethylene glycol (MIRALAX) 17 g packet Take 1 packet by mouth daily   Past Week at  unknown     omeprazole (PRILOSEC) 20 MG DR capsule Take 1 capsule (20 mg) by mouth 2 times daily 60 capsule 3 5/17/2023 at AM          Discharge Medications:     Current Discharge Medication List      CONTINUE these medications which have NOT CHANGED    Details   acetaminophen (TYLENOL) 500 MG tablet Take 2 tablets (1,000 mg) by mouth every 6 hours  Qty: 100 tablet, Refills: 0    Associated Diagnoses: Acute post-operative pain      gabapentin (NEURONTIN) 100 MG capsule Take 1 capsule (100 mg) by mouth At Bedtime  Qty: 14 capsule, Refills: 0    Associated Diagnoses: Acute post-operative pain      heparin 100 UNIT/ML SOLN injection 5-10 mLs by Intracatheter route every 28 days      methocarbamol (ROBAXIN) 500 MG tablet Take 1 tablet (500 mg) by mouth 4 times daily  Qty: 40 tablet, Refills: 0    Associated Diagnoses: Acute post-operative pain      mirtazapine (REMERON) 15 MG tablet Take 1 tablet (15 mg) by mouth At Bedtime  Qty: 90 tablet, Refills: 3    Associated Diagnoses: Adenocarcinoma of sigmoid colon (H); Poor appetite      multivitamin (CENTRUM SILVER) tablet Take 1 tablet by mouth every morning  Qty: 30 tablet      oxyCODONE (ROXICODONE) 5 MG tablet Take 1-2 tablets (5-10 mg) by mouth every 6 hours as needed for moderate to severe pain  Qty: 18 tablet, Refills: 0    Associated Diagnoses: Acute post-operative pain      polyethylene glycol (MIRALAX) 17 g packet Take 1 packet by mouth daily      omeprazole (PRILOSEC) 20 MG DR capsule Take 1 capsule (20 mg) by mouth 2 times daily  Qty: 60 capsule, Refills: 3    Associated Diagnoses: Gastroesophageal reflux disease without esophagitis                   Brief History of Illness:   Kalin Shepard is a 72 year old with PMHx significant for recent DLI takedown (5/3/23) following LAR (2/23) secondary to upper rectal/distal sigmoid colon cancer who presented with 1 week of nausea, abdominal distention, and pain. CT scan showed small bowel obstruction with evidence of  "a small fluid collection adjacent to the patient's prior takedown ileostomy anastomosis.            Hospital Course:   He underwent NG decompression followed by gastrografin challenge. Upon administering gastrografin, patient had evidence of multiple loose bowel movements. His abdominal XR showed scattered bits of contrast in both the small bowel and ascending colon, likely representing that the majority of contrast had passed through the colon.     On HD2 his NG tube was removed and he was initiated on a diet. He tolerated his clear liquids with continued antegrade bowel function. He was eventually advanced to low residue. He was voiding, ambulating, and had good pain control with minimal PO medications. He had some residual distention that slowly resolved. He was advised to go slow on diet advancement upon returning home and instructed to call or present to the emergency department if there were new changes in his abdominal pain. He was in agreement with the plan and accordingly discharged to home on 5/20.     Patient is to follow up in the Colon and Rectal Surgery Clinic as previously scheduled.         Day of Discharge Physical Exam:   Blood pressure 108/70, pulse 63, temperature 97.6  F (36.4  C), temperature source Temporal, resp. rate 19, height 1.803 m (5' 11\"), weight 57.6 kg (127 lb), SpO2 96 %.    Gen: AAOx3, NAD  Pulm: Non-labored breathing  Abd: Soft, minimally tender, no guarding, some minimal residual distention particularly RLQ   Midline incision well healed, ostomy takedown site healing appropriately  Ext:  Warm and well-perfused         Final Pathology Result:   No pathology submitted           Discharge Instructions and Follow-Up:     Discharge Procedure Orders   Reason for your hospital stay   Order Comments: Small bowel obstruction     Activity   Order Comments: Your activity upon discharge: activity as tolerated. If your abdomen hurts, don't do those activities. No driving while taking narcotic " pain medications.     Order Specific Question Answer Comments   Is discharge order? Yes      Discharge Instructions   Order Comments: Discharge Instructions  Activity  - No activity restrictions. If an activity hurts, please refrain from doing it.  - Do not operate a motor vehicle while taking narcotic pain medications    Incisions  - You do not have any new incisions. Monitor your old incision for any new redness, pain, or drainage.     Medications  - Do not take any additional Tylenol (acetaminophen) while using a narcotic pain medication which includes acetaminophen  - Do not take more than 4,000mg of acetaminophen in any 24 hour period, as this can cause liver damage  - Wean yourself off of narcotic pain medications    Follow-Up:  - Call your primary care provider to touch base regarding your recent admission.    - Call or return sooner than your regularly scheduled visit if you develop any of the following: fever >101.5, uncontrolled pain, uncontrolled nausea or vomiting, as well as increased redness, swelling, or drainage from your wound.   -  A nurse from the Colorectal Clinic will contact you after your discharge from the hospital.  If you have questions or to schedule a follow up appointment please call the Colorectal Clinic at [phone].  Call [phone] and ask to speak with the Surgery resident on call if you are having troubles in the evenings, at night, or on the weekend.     Diet   Order Comments: Follow this diet upon discharge: Low Residue Diet. Follow this diet for at least the next 4 weeks with slow introduction of solid foods. You should stay on the low residue diet long-term to potentially avoid issues with further bowel obstructions.     Order Specific Question Answer Comments   Is discharge order? Yes             Home Health Care:     Not needed           Discharge Disposition:     Discharged to home      Condition at discharge: Stable    Star Smart MD    Division  of Colon & Rectal Surgery  Department of Surgery  [facility]  [phone]       [] : results reviewed

## 2023-05-19 NOTE — PLAN OF CARE
Goal Outcome Evaluation:    Temp: 97.4  F (36.3  C) Temp src: Temporal BP: 120/81 Pulse: 68   Resp: 18 SpO2: 97 % O2 Device: None (Room air)      Pt arrived from ER ~ 11:30am. A &Ox4. VSS. RA. Pt reports he had 3 small loose BM's in the ER this morning before transferring to . No BM since arriving to floor. Tolerating Low Fiber Diet. No c/o nausea. C/O abdominal pain pain 6/10. Requesting oxycodone- 5mg given at 2:45pm. 2 RN skin check completed. Left chest port saline locked. Up in room independently

## 2023-05-19 NOTE — PLAN OF CARE
"Shift: 5278-3217    VS: /74   Pulse 60   Temp 97.8  F (36.6  C) (Oral)   Resp 18   Ht 1.803 m (5' 11\")   Wt 57.6 kg (127 lb)   SpO2 98%   BMI 17.71 kg/m       Pain: Pt c/o of abdominal pain, give PRN tylenol and oxycodone with relieved.   Neuro: A&Ox4   Respiratory: Not in distress, sating at RA  Diet/Appetite: On clear liquid   /GI: Pt report 4  loose stool, voided without difficulty. NG tube removed 5/18/23.   LDA's: Left chest Port A Cath with LR at 50mL/hr.   Skin: Dry and intact  Activity: Stand by assist   Pertinent labs: Am labs     Plan:  -Continue IVF   -Strict I/Os   -On CLD w/ nutritional supplement as tolerated   "

## 2023-05-19 NOTE — PHARMACY-ADMISSION MEDICATION HISTORY
Pharmacist Admission Medication History    Admission medication history is complete. The information provided in this note is only as accurate as the sources available at the time of the update.    Medication reconciliation/reorder completed by provider prior to medication history? Yes    Information Source(s): Patient via in-person    Pertinent Information:   - patient reports not finding much benefit from regular Tylenol and Robaxin use  - patient reports not having taken oxycodone for a week or more (no refills prescribed)    Changes made to PTA medication list:    Added:   o Polyethylene glycol (Miralax) 17 g daily    Deleted: None    Changed:   o Tylenol 1000 mg every 6 hours ---> 1000 mg three times daily (patient reported)  o Methocarbamol 500 mg 4 times daily --> 3 times daily (patient reported)    Medication Affordability: No issues reported       Allergies reviewed with patient and updates made in EHR: yes (NKDA)    Medication History Completed By: David Benoit RPH 5/19/2023 1:11 PM    Prior to Admission medications    Medication Sig Last Dose Taking? Auth Provider Long Term End Date   acetaminophen (TYLENOL) 500 MG tablet Take 2 tablets (1,000 mg) by mouth every 6 hours  Patient taking differently: Take 1,000 mg by mouth 3 times daily Past Week at unknown Yes Doris Mccloud PA-C No    gabapentin (NEURONTIN) 100 MG capsule Take 1 capsule (100 mg) by mouth At Bedtime 5/16/2023 at PM Yes Doris Mccloud PA-C Yes    heparin 100 UNIT/ML SOLN injection 5-10 mLs by Intracatheter route every 28 days Past Month Yes Truong Valentine MD     methocarbamol (ROBAXIN) 500 MG tablet Take 1 tablet (500 mg) by mouth 4 times daily  Patient taking differently: Take 500 mg by mouth 3 times daily 5/17/2023 at AM Yes Doris Mccloud PA-C No    mirtazapine (REMERON) 15 MG tablet Take 1 tablet (15 mg) by mouth At Bedtime 5/16/2023 at PM Yes Riley Magdaleno MD Yes    multivitamin (CENTRUM  SILVER) tablet Take 1 tablet by mouth every morning 5/16/2023 at AM Yes Segundo Bentley MD     oxyCODONE (ROXICODONE) 5 MG tablet Take 1-2 tablets (5-10 mg) by mouth every 6 hours as needed for moderate to severe pain Past Month at unknown Yes Doris Mccloud PA-C     polyethylene glycol (MIRALAX) 17 g packet Take 1 packet by mouth daily Past Week at unknown Yes Unknown, Entered By History No    omeprazole (PRILOSEC) 20 MG DR capsule Take 1 capsule (20 mg) by mouth 2 times daily 5/17/2023 at AM  Olga Lidia Ya, NP

## 2023-05-20 VITALS
HEIGHT: 71 IN | OXYGEN SATURATION: 96 % | RESPIRATION RATE: 19 BRPM | HEART RATE: 63 BPM | SYSTOLIC BLOOD PRESSURE: 108 MMHG | DIASTOLIC BLOOD PRESSURE: 70 MMHG | WEIGHT: 127 LBS | BODY MASS INDEX: 17.78 KG/M2 | TEMPERATURE: 97.6 F

## 2023-05-20 LAB
GLUCOSE BLDC GLUCOMTR-MCNC: 87 MG/DL (ref 70–99)
GLUCOSE BLDC GLUCOMTR-MCNC: 92 MG/DL (ref 70–99)
GLUCOSE BLDC GLUCOMTR-MCNC: 95 MG/DL (ref 70–99)
GLUCOSE BLDC GLUCOMTR-MCNC: 96 MG/DL (ref 70–99)

## 2023-05-20 PROCEDURE — 250N000013 HC RX MED GY IP 250 OP 250 PS 637

## 2023-05-20 PROCEDURE — 250N000013 HC RX MED GY IP 250 OP 250 PS 637: Performed by: SURGERY

## 2023-05-20 PROCEDURE — 250N000011 HC RX IP 250 OP 636

## 2023-05-20 RX ORDER — OXYCODONE HYDROCHLORIDE 5 MG/1
5-10 TABLET ORAL EVERY 6 HOURS PRN
Qty: 18 TABLET | Refills: 0 | Status: SHIPPED | OUTPATIENT
Start: 2023-05-20 | End: 2023-06-01

## 2023-05-20 RX ORDER — HEPARIN SODIUM (PORCINE) LOCK FLUSH IV SOLN 100 UNIT/ML 100 UNIT/ML
5-10 SOLUTION INTRAVENOUS
Status: DISCONTINUED | OUTPATIENT
Start: 2023-05-20 | End: 2023-05-20 | Stop reason: HOSPADM

## 2023-05-20 RX ORDER — METHOCARBAMOL 500 MG/1
500 TABLET, FILM COATED ORAL 4 TIMES DAILY
Qty: 40 TABLET | Refills: 0 | Status: SHIPPED | OUTPATIENT
Start: 2023-05-20

## 2023-05-20 RX ADMIN — OXYCODONE HYDROCHLORIDE 5 MG: 5 TABLET ORAL at 07:37

## 2023-05-20 RX ADMIN — POTASSIUM & SODIUM PHOSPHATES POWDER PACK 280-160-250 MG 1 PACKET: 280-160-250 PACK at 07:37

## 2023-05-20 RX ADMIN — ACETAMINOPHEN 975 MG: 325 TABLET ORAL at 07:37

## 2023-05-20 RX ADMIN — POLYETHYLENE GLYCOL 3350 17 G: 17 POWDER, FOR SOLUTION ORAL at 07:37

## 2023-05-20 RX ADMIN — Medication 5 ML: at 13:53

## 2023-05-20 RX ADMIN — POTASSIUM & SODIUM PHOSPHATES POWDER PACK 280-160-250 MG 1 PACKET: 280-160-250 PACK at 11:13

## 2023-05-20 ASSESSMENT — ACTIVITIES OF DAILY LIVING (ADL)
ADLS_ACUITY_SCORE: 37

## 2023-05-20 NOTE — PLAN OF CARE
"Goal Outcome Evaluation:  Pt afebrile, vitals stable. Pt rated his abd pain as a 4 describing it as \"tension across my belly\" and \"achy\". Pt refused any pain medication for it. Pt able to sleep at long intervals between cares. Abd dist, semifirm, +gas,+bs, denied n/v. Midline incision healing. Ileostomy takedown dressing site D/I. Lungs clear, dim in bases. Port heplocked. BS check 0200=96. UAL in room voiding w/o difficulty. Pt probable discharge today.                         "

## 2023-05-20 NOTE — DISCHARGE SUMMARY
DISCHARGE NOTE    Patient discharged to home at 2:03 PM via ambulation. Port was deacessed. Accompanied by spouse and staff. Discharge instructions reviewed with patient and spouse, opportunity offered to ask questions. Prescriptions sent to patients preferred pharmacy. All belongings sent with patient.    Rupa Finley RN

## 2023-05-20 NOTE — PLAN OF CARE
Goal Outcome Evaluation:    Patient is A&O x 4, denies pain, no nausea. Tolerating low fiber diet, good PO intake. Pt is voiding and ambulating without difficulty. Reports positive flatus and +BM. Chest port patent. Pt is independent with cares. Plan is to discharge home tomorrow.

## 2023-05-22 ENCOUNTER — PATIENT OUTREACH (OUTPATIENT)
Dept: CARE COORDINATION | Facility: CLINIC | Age: 73
End: 2023-05-22
Payer: COMMERCIAL

## 2023-05-22 NOTE — PROGRESS NOTES
"Clinic Care Coordination Contact  [facility]: Post-Discharge Note  SITUATION                                                      Admission:    Admission Date: 05/17/23   Reason for Admission: Small bowel obstruction  Discharge:   Discharge Date: 05/20/23  Discharge Diagnosis: Small bowel obstruction    BACKGROUND                                                      Per hospital discharge summary and inpatient provider notes:    Kalin Shepard is a 72 year old with PMHx significant for recent DLI takedown (5/3/23) following LAR (2/23) secondary to upper rectal/distal sigmoid colon cancer who presented with 1 week of nausea, abdominal distention, and pain. CT scan showed small bowel obstruction with evidence of a small fluid collection adjacent to the patient's prior takedown ileostomy anastomosis.     ASSESSMENT           Discharge Assessment  How are you doing now that you are home?: \"I'm doing pretty good, instructions were pretty clear\"  How are your symptoms? (Red Flag symptoms escalate to triage hotline per guidelines): Improved  Do you feel your condition is stable enough to be safe at home until your provider visit?: Yes  Does the patient have their discharge instructions? : Yes  Does the patient have questions regarding their discharge instructions? : No  Were you started on any new medications or were there changes to any of your previous medications? : Yes  Does the patient have all of their medications?: Yes  Do you have questions regarding any of your medications? : No  Do you have all of your needed medical supplies or equipment (DME)?  (i.e. oxygen tank, CPAP, cane, etc.): Yes  Discharge follow-up appointment scheduled within 14 calendar days? : Yes  Discharge Follow Up Appointment Date: 06/01/23  Discharge Follow Up Appointment Scheduled with?: Specialty Care Provider                  PLAN                                                      Outpatient Plan:  Return Patient with Riley" MD Rasta, MD  Thursday Jun 1, 2023 9:45 AM    Future Appointments   Date Time Provider Department Center   6/1/2023 10:00 AM Riley Magdaleno MD [facility]         For any urgent concerns, please contact our 24 hour nurse triage line: [phone] ([phone])         Carie Riley  [facility] Worker  [facility]  [phone]

## 2023-05-24 ENCOUNTER — MYC MEDICAL ADVICE (OUTPATIENT)
Dept: SURGERY | Facility: CLINIC | Age: 73
End: 2023-05-24
Payer: COMMERCIAL

## 2023-05-24 NOTE — CONFIDENTIAL NOTE
Sent upcoming appointment reminder via emoteShare.     Appt date/time: 6/1/2023 at 10:00 am  Provider: Dr. Riley Magdaleno  Appt type: Post-op    Kalpana England CMA

## 2023-06-01 ENCOUNTER — OFFICE VISIT (OUTPATIENT)
Dept: SURGERY | Facility: CLINIC | Age: 73
End: 2023-06-01
Payer: COMMERCIAL

## 2023-06-01 VITALS — HEART RATE: 62 BPM | SYSTOLIC BLOOD PRESSURE: 112 MMHG | OXYGEN SATURATION: 99 % | DIASTOLIC BLOOD PRESSURE: 69 MMHG

## 2023-06-01 DIAGNOSIS — Z93.3 S/P COLOSTOMY (H): Primary | ICD-10-CM

## 2023-06-01 PROCEDURE — 99024 POSTOP FOLLOW-UP VISIT: CPT | Performed by: SURGERY

## 2023-06-01 RX ORDER — OXYCODONE HYDROCHLORIDE 5 MG/1
5 TABLET ORAL EVERY 8 HOURS PRN
Qty: 21 TABLET | Refills: 0 | Status: SHIPPED | OUTPATIENT
Start: 2023-06-01 | End: 2023-06-04

## 2023-06-01 ASSESSMENT — PAIN SCALES - GENERAL: PAINLEVEL: SEVERE PAIN (6)

## 2023-06-01 NOTE — LETTER
2023       RE: Kalin Shepard  [address] [facility] [address]     Dear Colleague,    Thank you for referring your patient, Kalin Shepard, to the [facility] COLON AND RECTAL SURGERY CLINIC [place] at [facility]. Please see a copy of my visit note below.    Colon and Rectal Surgery Postoperative Clinic Note     Referring provider:  No referring provider defined for this encounter.       RE: Kalin Shepard  : 1950  REGGIE: 2023      Kalin Shepard is a 72 year old male now s/p DLI closure on 5/3/23.    Interval history: Admitted briefly for postop bowel obstruction which resolved. Now still with intermittent obstruction, weight has stabilized.      Assessment/Plan:  72 year old male with rectosigmoid cancer s/p definitive resection, DLI closure most recently on 5/3/23, still intermittent obstructive symptoms.  -Will prescribe one week of oxy to help get over the pain hump, and he will update us at the end of next week. Advised to increase protein intake and calorie counts. If not better, will repeat CT with PO contrast and labs.    PLEASE SEE NOTE BELOW FOR PHYSICAL EXAMINATION, REVIEW OF SYSTEMS, AND OTHER HISTORY.    Riley Magdaleno MD  Division of Colon and Rectal Surgery   [facility]  [phone]    -------------------------------------------------------------------------------------------------------------------    Medical history:  Past Medical History:   Diagnosis Date    Cancer (H)        Surgical history:  Past Surgical History:   Procedure Laterality Date    COLECTOMY LEFT N/A 2023    Procedure: Exploratory Laparotomy, lysis of adhesions, Low Anterior Resection, take down of loop colostomy,  DIVERTING LOOP ILEOSTOMY;  Surgeon: Riley Magdaleno MD;  Location: [facility] OR    COMBINED CYSTOSCOPY, INSERT STENT URETER(S) Bilateral 2023    Procedure: CYSTOSCOPY, WITH URETERAL STENT INSERTION  [[id number]];  Surgeon: Ulises Basilio MD;  Location: [facility] OR    INSERT PORT VASCULAR ACCESS Right 4/21/2022    Procedure: INSERTION, VASCULAR ACCESS PORT;  Surgeon: Marianne Schroeder MD;  Location: [facility]    INSERT PORT VASCULAR ACCESS Left 6/2/2022    Procedure: INSERTION, VASCULAR ACCESS PORT;  Surgeon: Phong Finch MD;  Location: [facility] OR    IR CHEST PORT PLACEMENT > 5 YRS OF AGE  6/2/2022    LAPAROTOMY EXPLORATORY N/A 3/26/2022    Procedure: exploratory laparotomy, small bowel resection, colostomy creation;  Surgeon: Riley Magdaleno MD;  Location: [facility]    LAPAROTOMY, LYSIS ADHESIONS, COMBINED N/A 8/24/2022    Procedure: LAPAROTOMY, EXPLORATORY, WITH LYSIS OF ADHESIONS;  Surgeon: Segundo Hill MD;  Location: [facility]    PICC INSERTION - DOUBLE LUMEN Left 03/28/2022    left medial brachial 5 fr dl picc 49 cm    SIGMOIDOSCOPY FLEXIBLE N/A 3/26/2022    Procedure: Sigmoidoscopy flexible;  Surgeon: Riley Magdaleno MD;  Location: [facility]    SIGMOIDOSCOPY FLEXIBLE N/A 2/8/2023    Procedure: Sigmoidoscopy flexible;  Surgeon: Riley Magdaleno MD;  Location: [facility] OR    TAKEDOWN ILEOSTOMY N/A 5/3/2023    Procedure: CLOSURE, ILEOSTOMY, OPEN ;  Surgeon: Riley Magdaleno MD;  Location: [facility] OR       Problem list:  Patient Active Problem List    Diagnosis Date Noted    Ileostomy status (H) 05/03/2023     Priority: Medium    High output ileostomy (H) 02/14/2023     Priority: Medium    Cancer of rectosigmoid (colon) (H) 02/08/2023     Priority: Medium    Chemotherapy-induced peripheral neuropathy (H) 09/26/2022     Priority: Medium    Peritonitis, acute generalized (H) 08/29/2022     Priority: Medium    SBO (small bowel obstruction) (H) 08/21/2022     Priority: Medium    Cancer cachexia (H) 08/21/2022     Priority: Medium    Esophageal reflux 08/21/2022     Priority: Medium    Chemotherapy-induced neutropenia (H) 08/02/2022     Priority: Medium    Dehydration 07/19/2022     Priority: Medium    Cancer associated  pain 06/20/2022     Priority: Medium     Oxycodone 5mg bid.  2 month supply given 6/20/22      Adenocarcinoma of sigmoid colon (H) 04/13/2022     Priority: Medium    Colon adenocarcinoma (H) 04/11/2022     Priority: Medium     Peritoneal involvement.  Resection of obstruction, small intestine adherent to mass.  Unable to resect sigmoid mass, extensive involvement.        Colostomy present (H) 04/11/2022     Priority: Medium    Cachexia (H) 04/11/2022     Priority: Medium       Medications:  Current Outpatient Medications   Medication Sig Dispense Refill    acetaminophen (TYLENOL) 500 MG tablet Take 2 tablets (1,000 mg) by mouth every 6 hours 100 tablet 0    gabapentin (NEURONTIN) 100 MG capsule Take 1 capsule (100 mg) by mouth At Bedtime 14 capsule 0    heparin 100 UNIT/ML SOLN injection 5-10 mLs by Intracatheter route every 28 days      methocarbamol (ROBAXIN) 500 MG tablet Take 1 tablet (500 mg) by mouth 4 times daily 40 tablet 0    mirtazapine (REMERON) 15 MG tablet Take 1 tablet (15 mg) by mouth At Bedtime 90 tablet 3    multivitamin (CENTRUM SILVER) tablet Take 1 tablet by mouth every morning 30 tablet     omeprazole (PRILOSEC) 20 MG DR capsule Take 1 capsule (20 mg) by mouth 2 times daily 60 capsule 3    oxyCODONE (ROXICODONE) 5 MG tablet Take 1-2 tablets (5-10 mg) by mouth every 6 hours as needed for moderate to severe pain 18 tablet 0       Allergies:  No Known Allergies    Family history:  Family History   Problem Relation Age of Onset    No Known Problems Mother     Prostate Cancer Father     Colon Cancer Father     Lymphoma Father     Anesthesia Reaction No family hx of     Thrombosis No family hx of        Social history:  Social History     Socioeconomic History    Marital status:      Spouse name: Not on file    Number of children: 3    Years of education: Not on file    Highest education level: Not on file   Occupational History    Occupation: retired   Tobacco Use    Smoking status: Every Day      Packs/day: 0.50     Years: 50.00     Pack years: 25.00     Types: Cigarettes    Smokeless tobacco: Never   Vaping Use    Vaping status: Never Used   Substance and Sexual Activity    Alcohol use: Not Currently    Drug use: Never    Sexual activity: Not on file   Other Topics Concern    Not on file   Social History Narrative    Not on file     Social Determinants of Health     Financial Resource Strain: Not on file   Food Insecurity: Not on file   Transportation Needs: Not on file   Physical Activity: Not on file   Stress: Not on file   Social Connections: Not on file   Intimate Partner Violence: Not on file   Housing Stability: Not on file       Physical Examination:  /69 (BP Location: Left arm, Patient Position: Sitting, Cuff Size: Adult Regular)   Pulse 62   SpO2 99%   General: NAD  Abdomen: soft, mildly distended, incisions clean and healthy  Extremities: wwp  Perianal external examination:  deferred

## 2023-06-01 NOTE — NURSING NOTE
Chief Complaint   Patient presents with     Follow Up       Vitals:    06/01/23 1017   BP: 112/69   BP Location: Left arm   Patient Position: Sitting   Cuff Size: Adult Regular   Pulse: 62   SpO2: 99%       There is no height or weight on file to calculate BMI.    Dougie Giraldo EMT-P

## 2023-06-19 DIAGNOSIS — Z93.2 S/P ILEOSTOMY (H): Primary | ICD-10-CM

## 2023-06-19 RX ORDER — OXYCODONE HYDROCHLORIDE 5 MG/1
5 TABLET ORAL EVERY 6 HOURS PRN
Qty: 10 TABLET | Refills: 0 | Status: SHIPPED | OUTPATIENT
Start: 2023-06-19 | End: 2023-06-22

## 2023-06-20 ENCOUNTER — HOME INFUSION (PRE-WILLOW HOME INFUSION) (OUTPATIENT)
Dept: PHARMACY | Facility: CLINIC | Age: 73
End: 2023-06-20
Payer: COMMERCIAL

## 2023-06-20 NOTE — PROGRESS NOTES
This is a recent snapshot of the patient's [facility] medical record.  For current drug dose and complete information and questions, call [phone] or In Basket pool, [facility] ([id number])  CSN Number:  [id number]

## 2023-06-26 ENCOUNTER — LAB (OUTPATIENT)
Dept: INFUSION THERAPY | Facility: CLINIC | Age: 73
End: 2023-06-26
Attending: NURSE PRACTITIONER
Payer: COMMERCIAL

## 2023-06-26 DIAGNOSIS — Z93.2 HIGH OUTPUT ILEOSTOMY (H): ICD-10-CM

## 2023-06-26 DIAGNOSIS — C78.6 METASTASIS TO PERITONEUM (H): ICD-10-CM

## 2023-06-26 DIAGNOSIS — R19.8 HIGH OUTPUT ILEOSTOMY (H): ICD-10-CM

## 2023-06-26 DIAGNOSIS — C19 CANCER OF RECTOSIGMOID (COLON) (H): Primary | ICD-10-CM

## 2023-06-26 LAB
ALBUMIN SERPL BCG-MCNC: 4.1 G/DL (ref 3.5–5.2)
ALP SERPL-CCNC: 48 U/L (ref 40–129)
ALT SERPL W P-5'-P-CCNC: 30 U/L (ref 0–70)
ANION GAP SERPL CALCULATED.3IONS-SCNC: 7 MMOL/L (ref 7–15)
AST SERPL W P-5'-P-CCNC: 26 U/L (ref 0–45)
BASOPHILS # BLD AUTO: 0.1 10E3/UL (ref 0–0.2)
BASOPHILS NFR BLD AUTO: 1 %
BILIRUB SERPL-MCNC: 0.3 MG/DL
BUN SERPL-MCNC: 20.7 MG/DL (ref 8–23)
CALCIUM SERPL-MCNC: 9.2 MG/DL (ref 8.8–10.2)
CEA SERPL-MCNC: 4 NG/ML
CHLORIDE SERPL-SCNC: 109 MMOL/L (ref 98–107)
CREAT SERPL-MCNC: 0.63 MG/DL (ref 0.67–1.17)
DEPRECATED HCO3 PLAS-SCNC: 26 MMOL/L (ref 22–29)
EOSINOPHIL # BLD AUTO: 0.2 10E3/UL (ref 0–0.7)
EOSINOPHIL NFR BLD AUTO: 3 %
ERYTHROCYTE [DISTWIDTH] IN BLOOD BY AUTOMATED COUNT: 16.8 % (ref 10–15)
GFR SERPL CREATININE-BSD FRML MDRD: >90 ML/MIN/1.73M2
GLUCOSE SERPL-MCNC: 91 MG/DL (ref 70–99)
HCT VFR BLD AUTO: 41.3 % (ref 40–53)
HGB BLD-MCNC: 13.2 G/DL (ref 13.3–17.7)
IMM GRANULOCYTES # BLD: 0 10E3/UL
IMM GRANULOCYTES NFR BLD: 0 %
LYMPHOCYTES # BLD AUTO: 2.5 10E3/UL (ref 0.8–5.3)
LYMPHOCYTES NFR BLD AUTO: 33 %
MCH RBC QN AUTO: 31.3 PG (ref 26.5–33)
MCHC RBC AUTO-ENTMCNC: 32 G/DL (ref 31.5–36.5)
MCV RBC AUTO: 98 FL (ref 78–100)
MONOCYTES # BLD AUTO: 0.8 10E3/UL (ref 0–1.3)
MONOCYTES NFR BLD AUTO: 10 %
NEUTROPHILS # BLD AUTO: 4 10E3/UL (ref 1.6–8.3)
NEUTROPHILS NFR BLD AUTO: 53 %
NRBC # BLD AUTO: 0 10E3/UL
NRBC BLD AUTO-RTO: 0 /100
PLATELET # BLD AUTO: 216 10E3/UL (ref 150–450)
POTASSIUM SERPL-SCNC: 4 MMOL/L (ref 3.4–5.3)
PROT SERPL-MCNC: 6.5 G/DL (ref 6.4–8.3)
RBC # BLD AUTO: 4.22 10E6/UL (ref 4.4–5.9)
SODIUM SERPL-SCNC: 142 MMOL/L (ref 136–145)
WBC # BLD AUTO: 7.5 10E3/UL (ref 4–11)

## 2023-06-26 PROCEDURE — 80053 COMPREHEN METABOLIC PANEL: CPT | Performed by: NURSE PRACTITIONER

## 2023-06-26 PROCEDURE — 36591 DRAW BLOOD OFF VENOUS DEVICE: CPT

## 2023-06-26 PROCEDURE — 250N000011 HC RX IP 250 OP 636: Mod: JZ

## 2023-06-26 PROCEDURE — 82378 CARCINOEMBRYONIC ANTIGEN: CPT | Performed by: NURSE PRACTITIONER

## 2023-06-26 PROCEDURE — 85025 COMPLETE CBC W/AUTO DIFF WBC: CPT | Performed by: NURSE PRACTITIONER

## 2023-06-26 RX ORDER — HEPARIN SODIUM (PORCINE) LOCK FLUSH IV SOLN 100 UNIT/ML 100 UNIT/ML
5 SOLUTION INTRAVENOUS
Status: DISCONTINUED | OUTPATIENT
Start: 2023-06-26 | End: 2023-06-26 | Stop reason: HOSPADM

## 2023-06-26 RX ORDER — HEPARIN SODIUM,PORCINE 10 UNIT/ML
5 VIAL (ML) INTRAVENOUS
Status: CANCELLED | OUTPATIENT
Start: 2023-06-26

## 2023-06-26 RX ORDER — HEPARIN SODIUM (PORCINE) LOCK FLUSH IV SOLN 100 UNIT/ML 100 UNIT/ML
SOLUTION INTRAVENOUS
Status: COMPLETED
Start: 2023-06-26 | End: 2023-06-26

## 2023-06-26 RX ORDER — HEPARIN SODIUM (PORCINE) LOCK FLUSH IV SOLN 100 UNIT/ML 100 UNIT/ML
5 SOLUTION INTRAVENOUS
Status: CANCELLED | OUTPATIENT
Start: 2023-06-26

## 2023-06-26 RX ADMIN — Medication 5 ML: at 10:59

## 2023-06-26 RX ADMIN — HEPARIN SODIUM (PORCINE) LOCK FLUSH IV SOLN 100 UNIT/ML 5 ML: 100 SOLUTION at 10:59

## 2023-06-26 NOTE — PROGRESS NOTES
Port accessed for lab draw. Good blood return noted. Labs drawn without difficulty.    Tracie Rodríguez RN on 6/26/2023 at 11:08 AM

## 2023-06-28 DIAGNOSIS — C18.7 ADENOCARCINOMA OF SIGMOID COLON (H): Primary | ICD-10-CM

## 2023-08-14 ENCOUNTER — HOSPITAL ENCOUNTER (OUTPATIENT)
Dept: CT IMAGING | Facility: CLINIC | Age: 73
Discharge: HOME | End: 2023-08-14
Attending: INTERNAL MEDICINE | Admitting: INTERNAL MEDICINE
Payer: COMMERCIAL

## 2023-08-14 ENCOUNTER — LAB (OUTPATIENT)
Dept: INFUSION THERAPY | Facility: CLINIC | Age: 73
End: 2023-08-14
Attending: INTERNAL MEDICINE
Payer: COMMERCIAL

## 2023-08-14 DIAGNOSIS — C18.7 ADENOCARCINOMA OF SIGMOID COLON (H): ICD-10-CM

## 2023-08-14 LAB
ALBUMIN SERPL BCG-MCNC: 4.4 G/DL (ref 3.5–5.2)
ALP SERPL-CCNC: 42 U/L (ref 40–129)
ALT SERPL W P-5'-P-CCNC: 20 U/L (ref 0–70)
ANION GAP SERPL CALCULATED.3IONS-SCNC: 11 MMOL/L (ref 7–15)
AST SERPL W P-5'-P-CCNC: 22 U/L (ref 0–45)
BASOPHILS # BLD AUTO: 0.1 10E3/UL (ref 0–0.2)
BASOPHILS NFR BLD AUTO: 1 %
BILIRUB SERPL-MCNC: 0.2 MG/DL
BUN SERPL-MCNC: 24.5 MG/DL (ref 8–23)
CALCIUM SERPL-MCNC: 9.7 MG/DL (ref 8.8–10.2)
CEA SERPL-MCNC: 3.9 NG/ML
CHLORIDE SERPL-SCNC: 107 MMOL/L (ref 98–107)
CREAT SERPL-MCNC: 0.62 MG/DL (ref 0.67–1.17)
DEPRECATED HCO3 PLAS-SCNC: 24 MMOL/L (ref 22–29)
EOSINOPHIL # BLD AUTO: 0.2 10E3/UL (ref 0–0.7)
EOSINOPHIL NFR BLD AUTO: 3 %
ERYTHROCYTE [DISTWIDTH] IN BLOOD BY AUTOMATED COUNT: 15 % (ref 10–15)
GFR SERPL CREATININE-BSD FRML MDRD: >90 ML/MIN/1.73M2
GLUCOSE SERPL-MCNC: 141 MG/DL (ref 70–99)
HCT VFR BLD AUTO: 44.2 % (ref 40–53)
HGB BLD-MCNC: 13.9 G/DL (ref 13.3–17.7)
IMM GRANULOCYTES # BLD: 0 10E3/UL
IMM GRANULOCYTES NFR BLD: 0 %
LYMPHOCYTES # BLD AUTO: 2.1 10E3/UL (ref 0.8–5.3)
LYMPHOCYTES NFR BLD AUTO: 28 %
MCH RBC QN AUTO: 31 PG (ref 26.5–33)
MCHC RBC AUTO-ENTMCNC: 31.4 G/DL (ref 31.5–36.5)
MCV RBC AUTO: 99 FL (ref 78–100)
MONOCYTES # BLD AUTO: 0.7 10E3/UL (ref 0–1.3)
MONOCYTES NFR BLD AUTO: 9 %
NEUTROPHILS # BLD AUTO: 4.4 10E3/UL (ref 1.6–8.3)
NEUTROPHILS NFR BLD AUTO: 59 %
NRBC # BLD AUTO: 0 10E3/UL
NRBC BLD AUTO-RTO: 0 /100
PLATELET # BLD AUTO: 217 10E3/UL (ref 150–450)
POTASSIUM SERPL-SCNC: 4.2 MMOL/L (ref 3.4–5.3)
PROT SERPL-MCNC: 6.8 G/DL (ref 6.4–8.3)
RBC # BLD AUTO: 4.48 10E6/UL (ref 4.4–5.9)
SODIUM SERPL-SCNC: 142 MMOL/L (ref 136–145)
WBC # BLD AUTO: 7.6 10E3/UL (ref 4–11)

## 2023-08-14 PROCEDURE — 74177 CT ABD & PELVIS W/CONTRAST: CPT

## 2023-08-14 PROCEDURE — 80053 COMPREHEN METABOLIC PANEL: CPT | Performed by: INTERNAL MEDICINE

## 2023-08-14 PROCEDURE — 250N000011 HC RX IP 250 OP 636: Mod: JZ | Performed by: INTERNAL MEDICINE

## 2023-08-14 PROCEDURE — 250N000009 HC RX 250: Performed by: RADIOLOGY

## 2023-08-14 PROCEDURE — 85025 COMPLETE CBC W/AUTO DIFF WBC: CPT | Performed by: INTERNAL MEDICINE

## 2023-08-14 PROCEDURE — 82378 CARCINOEMBRYONIC ANTIGEN: CPT | Performed by: INTERNAL MEDICINE

## 2023-08-14 PROCEDURE — 250N000011 HC RX IP 250 OP 636: Performed by: RADIOLOGY

## 2023-08-14 PROCEDURE — 36591 DRAW BLOOD OFF VENOUS DEVICE: CPT

## 2023-08-14 RX ORDER — IOPAMIDOL 755 MG/ML
63 INJECTION, SOLUTION INTRAVASCULAR ONCE
Status: COMPLETED | OUTPATIENT
Start: 2023-08-14 | End: 2023-08-14

## 2023-08-14 RX ORDER — HEPARIN SODIUM (PORCINE) LOCK FLUSH IV SOLN 100 UNIT/ML 100 UNIT/ML
500 SOLUTION INTRAVENOUS ONCE
Status: COMPLETED | OUTPATIENT
Start: 2023-08-14 | End: 2023-08-14

## 2023-08-14 RX ADMIN — IOPAMIDOL 63 ML: 755 INJECTION, SOLUTION INTRAVENOUS at 10:28

## 2023-08-14 RX ADMIN — SODIUM CHLORIDE 56 ML: 9 INJECTION, SOLUTION INTRAVENOUS at 10:29

## 2023-08-14 RX ADMIN — Medication 500 UNITS: at 10:37

## 2023-08-14 NOTE — PROGRESS NOTES
Infusion Nursing Note:  Kalin Shepard presents today for port labs/access for CT scan.    Patient seen by provider today: No   present during visit today: Not Applicable.    Note: N/A.    Intravenous Access:  Implanted Port.    Treatment Conditions:  Not Applicable.    Post Infusion Assessment:  Blood return noted pre and post infusion.  Site patent and intact, free from redness, edema or discomfort.  No evidence of extravasations.  Access discontinued by imaging staff    Discharge Plan:   Discharge instructions reviewed with: Patient.  Patient and/or family verbalized understanding of discharge instructions and all questions answered.  AVS to patient via [employer].  Patient will return 8/15/2023 for next appointment.   Patient discharged in stable condition accompanied by: self.  Departure Mode: Ambulatory.    Rachel Akbar RN

## 2023-08-15 ENCOUNTER — ONCOLOGY VISIT (OUTPATIENT)
Dept: ONCOLOGY | Facility: CLINIC | Age: 73
End: 2023-08-15
Attending: INTERNAL MEDICINE
Payer: COMMERCIAL

## 2023-08-15 VITALS
OXYGEN SATURATION: 97 % | TEMPERATURE: 98.2 F | RESPIRATION RATE: 12 BRPM | WEIGHT: 130 LBS | DIASTOLIC BLOOD PRESSURE: 76 MMHG | SYSTOLIC BLOOD PRESSURE: 118 MMHG | HEART RATE: 83 BPM | BODY MASS INDEX: 18.2 KG/M2 | HEIGHT: 71 IN

## 2023-08-15 DIAGNOSIS — C78.6 METASTASIS TO PERITONEUM (H): ICD-10-CM

## 2023-08-15 DIAGNOSIS — C18.7 ADENOCARCINOMA OF SIGMOID COLON (H): Primary | ICD-10-CM

## 2023-08-15 PROCEDURE — G0463 HOSPITAL OUTPT CLINIC VISIT: HCPCS | Performed by: INTERNAL MEDICINE

## 2023-08-15 PROCEDURE — 99214 OFFICE O/P EST MOD 30 MIN: CPT | Performed by: INTERNAL MEDICINE

## 2023-08-15 ASSESSMENT — PAIN SCALES - GENERAL: PAINLEVEL: NO PAIN (0)

## 2023-08-15 NOTE — PROGRESS NOTES
"Oncology Rooming Note    August 15, 2023 11:31 AM   Kalin Shepard is a 72 year old male who presents for:    Chief Complaint   Patient presents with    Oncology Clinic Visit     Cancer of rectosigmoid (colon) - Provider visit only     Initial Vitals: /76 (BP Location: Right arm, Patient Position: Sitting, Cuff Size: Adult Regular)   Pulse 83   Temp 98.2  F (36.8  C) (Tympanic)   Resp 12   Ht 1.803 m (5' 11\")   Wt 59 kg (130 lb)   SpO2 97%   BMI 18.13 kg/m   Estimated body mass index is 18.13 kg/m  as calculated from the following:    Height as of this encounter: 1.803 m (5' 11\").    Weight as of this encounter: 59 kg (130 lb). Body surface area is 1.72 meters squared.  No Pain (0) Comment: Data Unavailable   No LMP for male patient.  Allergies reviewed: Yes  Medications reviewed: Yes    Medications: Medication refills not needed today.  Pharmacy name entered into [facility]: [facility] [place], [place] - [address] [place] [address]    Clinical concerns:  None      Sara Almanzar CMA            "

## 2023-08-15 NOTE — LETTER
8/15/2023         RE: Kalin Shepard  [address]  [facility] [address]        Dear Colleague,    Thank you for referring your patient, Kalin Shepard, to the [facility]. Please see a copy of my visit note below.    [facility] Hematology and Oncology Outpatient Progress Note    Patient: Kalin Shepard  MRN: [id number]  Date of Service: Aug 15, 2023          Reason for Visit    Stage IV colon cancer (peritoneal mets)    Primary Oncologist: Dr. Colorado      Assessment/Plan  Stage IV colon cancer (peritoneal mets)  L[place]ton completed 12 cycles of induction/neoadjuvant chemo through Nov, 2022 with an excellent radiographical response.    He, therefore, underwent resection of his primary sigmoid/rectal tumor 6 weeks ago. This was grossly removed with negative margins and no nodes involved.  He did have significant residual tumor without any treatment effect.  ypT4b.  No evidence of damon metastasis however he had a previously biopsy-proven peritoneal metastasis.  Omentectomy showed no evidence of any malignancy.    His restaging CT showed no mets or evident residual peritoneal mets.     Underwent ileostomy reversal in May.  Has recovered pretty well from surgery.  Here with repeat labs and CT scan.  Labs from yesterday reviewed today.  CEA is pretty stable at 3.9.  Serum chemistry is mostly within normal limits.  LFTs are normal.  CBC shows hemoglobin of 13.9.  Normal platelet count and white counts.  CT scan images from yesterday reviewed by me today.  It shows no evidence of any metastatic disease.  He has subcentimeter bilateral lung nodules which are pretty stable.  There is postsurgical changes in the rectosigmoid colon.  No peritoneal deposits.  Overall no evidence of any disease progression currently.  We will continue to monitor him with periodic labs physical exam and imaging studies.    I will see him back in 4 months with repeat labs and CT scan.    2.    Chemo-induced peripheral neuropathy (gr 1)  Mild and grade 1.  Not on any medication right now.    3.   Cachexia  He has been gaining weight.  About 11 pounds since his surgery.  Has discontinued Remeron.    ______________________________________________________________________________    History of Present Illness/ Interval History    Mr. Kalin Shepard  is a 72 year old who was diagnosed with sigmoid colon cancer with peritoneal mets a year ago. He underwent 12 cycles of chemo - FOLFOX + bevacizumab; kranthi was omitted around SBO and Oxaliplatin dose-reduced/later omitted for peripheral neuropathy.     At completion of chemo in November, restaging showed good response with no clear mets. He, therefore, underwent resection of the residual primary tumor with negative margins and no nodes involved. He had a loop ileostomy placed and take-down in early May.    Doing reasonably well since last visit.  Post low anterior resection he had some bowel obstruction symptoms but this resolved.  Ileostomy was taken down in May.    He is here with repeat CT scan.      ECOG Performance    1      Oncology History/Treatment  Diagnosis/Stage:   3/26/2022: IV sigmoid colon cancer to peritoneum  -Presented with abdominal pain secondary to bowel obstruction.  CT scan showed mass in sigmoid colon with upstream small bowel and colonic obstruction.  Underwent ostomy placement.    -Peritoneal biopsy positive for adenocarcinoma.    -No evidence of other distant metastasis based on PET scan.  -BRAF V600E positive.  K-aldo and NRAS negative.  Proficient MMR.    8/24/2022 open laparotomy for SBO, lysis of adhesions. No evidence of any malignancy intra-abdominally noted.     2/8/2023 post esther-adjuvant chemo. Surgical resection primary sigmoid/rectal tumor path: 3 cm residual mod-poorly diff adenocarcinoma invading through wall and involving adjacent adherent perirectal fibroadipose tissue. Margins negative. No lymphovascular nor perineural  invasion. No tumor deposits. 0/33 LN involved.       Treatment:  Induction/neoadjuvant chemotherapy started for potentially resectable metachronous peritoneal metastasis    4/26/2022 - 11/2022: FOLFOX and bevacizumab x 12 cycles (Amrita stopped after cycle 7; oxali stopped after cycle 11). At  -cycle 7 delayed for neutropenia. Neulasta added  -cycle 8 delayed for hospitalization with SBO (favoring ileus/adhesions, no evidence of malignancy). Post-op course complicated by peritonitis requiring antibiotics. Temp TPN, then cleared for oral intake.   -Bevacizumab omitted with cycle 8 to allow for healing postmalarial. Resumed with cycle 9.  -cycle 9 oxilaplatin dose-reduced 20% for gr 1-2 neuropathy  -cycle 11 oxaliplatin dose-reduced another 10-15% (33% total from original) for gr 1-2 neuropathy  -cycle 12 infusional 5FU only    2/8/2023: exploratory lap, low anterior resection, takedown loop colostomy, diverting loop ileostomy and flex sigmoidoscopy    Surgical path;  Adenocarcinoma, moderate to poorly differentiated, invading through the wall to involve the adjacent adherent perirectal fibroadipose tissue  -Tumor size: 3 cm in greatest dimension  -Resection margins free of involvement  -No evidence of lymphovascular or perineural invasion  -Treatment effect: Poor response (score 3)  -No tumor deposits identified  -Thirty-three benign lymph nodes (0/33)  -Sections of vas deferens with no evidence of involvement by adenocarcinoma.    Ileostomy reversal with CRS (5/3/23)      Physical Exam    GENERAL: Alert and oriented to time place and person. Seated comfortably. In no distress. Thin. Wife accompanies.  HEAD: Atraumatic and normocephalic.   EYES: MARA, EOMI. No erythema. No icterus.  LYMPH NODES: No palpable supraclavicular, cervical lymphadenopathy.  CHEST: clear to auscultation bilaterally.   CVS: RRR  ABDOMEN: Soft. Not tender. Not distended.  Surgical scars have healed well.  Expected nodularity in some of the scars  especially on the ileostomy takedown scar.  EXTREMITIES: Warm. No peripheral edema. Firm vein without erythema/edema right proximal forearm.   SKIN: Multiple skin bruises noted on his forearms.  Neuro: Alert and oriented x3      Lab Results    Recent Results (from the past 168 hour(s))   CEA   Result Value Ref Range    CEA 3.9 ng/mL   Comprehensive metabolic panel (BMP + Alb, Alk Phos, ALT, AST, Total. Bili, TP)   Result Value Ref Range    Sodium 142 136 - 145 mmol/L    Potassium 4.2 3.4 - 5.3 mmol/L    Chloride 107 98 - 107 mmol/L    Carbon Dioxide (CO2) 24 22 - 29 mmol/L    Anion Gap 11 7 - 15 mmol/L    Urea Nitrogen 24.5 (H) 8.0 - 23.0 mg/dL    Creatinine 0.62 (L) 0.67 - 1.17 mg/dL    Calcium 9.7 8.8 - 10.2 mg/dL    Glucose 141 (H) 70 - 99 mg/dL    Alkaline Phosphatase 42 40 - 129 U/L    AST 22 0 - 45 U/L    ALT 20 0 - 70 U/L    Protein Total 6.8 6.4 - 8.3 g/dL    Albumin 4.4 3.5 - 5.2 g/dL    Bilirubin Total 0.2 <=1.2 mg/dL    GFR Estimate >90 >60 mL/min/1.73m2   CBC with platelets and differential   Result Value Ref Range    WBC Count 7.6 4.0 - 11.0 10e3/uL    RBC Count 4.48 4.40 - 5.90 10e6/uL    Hemoglobin 13.9 13.3 - 17.7 g/dL    Hematocrit 44.2 40.0 - 53.0 %    MCV 99 78 - 100 fL    MCH 31.0 26.5 - 33.0 pg    MCHC 31.4 (L) 31.5 - 36.5 g/dL    RDW 15.0 10.0 - 15.0 %    Platelet Count 217 150 - 450 10e3/uL    % Neutrophils 59 %    % Lymphocytes 28 %    % Monocytes 9 %    % Eosinophils 3 %    % Basophils 1 %    % Immature Granulocytes 0 %    NRBCs per 100 WBC 0 <1 /100    Absolute Neutrophils 4.4 1.6 - 8.3 10e3/uL    Absolute Lymphocytes 2.1 0.8 - 5.3 10e3/uL    Absolute Monocytes 0.7 0.0 - 1.3 10e3/uL    Absolute Eosinophils 0.2 0.0 - 0.7 10e3/uL    Absolute Basophils 0.1 0.0 - 0.2 10e3/uL    Absolute Immature Granulocytes 0.0 <=0.4 10e3/uL    Absolute NRBCs 0.0 10e3/uL       Imaging    CT Chest/Abdomen/Pelvis w Contrast    Result Date: 8/14/2023  CT CHEST/ABDOMEN/PELVIS WITH CONTRAST August 14, 2023 10:47 AM  CLINICAL HISTORY: Follow up colon cancer post treatment/surgery. Adenocarcinoma of sigmoid colon (H). TECHNIQUE: CT scan of the chest, abdomen, and pelvis was performed following injection of IV contrast. Multiplanar reformats were obtained. Dose reduction techniques were used. CONTRAST: 63mL Isovue-370. COMPARISON: 5/17/2023, 11/27/2022. FINDINGS: LUNGS AND PLEURA: A few tiny 4 mm and smaller calcified and noncalcified pulmonary nodules are similar to comparison and are of doubtful significance. Largest example is seen in the anterior right upper lobe (3-106). No new or enlarging pulmonary nodules. Mild suspected atelectasis and/or scarring in the posteromedial right lower lobe. No signs of pneumonia. No pleural fluid. Central airways are patent. Emphysematous changes in the lungs are noted. MEDIASTINUM/AXILLAE: A left chest wall Port-A-Cath is present with the tip in the SVC. Thoracic aorta is normal in course and caliber. Moderate thoracic aortic atherosclerosis is present. Mild coronary artery atherosclerosis is noted. Heart size is within normal limits. No pericardial effusion. No enlarged thoracic lymph nodes. HEPATOBILIARY: Normal. PANCREAS: Normal. SPLEEN: Normal. ADRENAL GLANDS: Normal. KIDNEYS/BLADDER: A simple appearing unilocular left renal cortical cyst is noted, which does not require follow-up. No solid enhancing renal mass. No hydronephrosis. No urinary bladder wall thickening. BOWEL: Postsurgical changes of the rectosigmoid junction is noted. There is also postsurgical change involving a small bowel loop in the anterior left lower quadrant. Large and small bowel loops are nonobstructed and noninflamed. No signs of abscess or drainable fluid collection. No ascites. PELVIC ORGANS: Prostate gland is enlarged measuring 6 cm in diameter. ADDITIONAL FINDINGS: Severe atherosclerosis of the abdominal aorta and iliac arteries is seen. There is tortuosity of the infrarenal abdominal aorta and mild ectasia  "measuring up to 2.9 cm in AP dimension. There is also ectasia of the common iliac arteries measuring 1.8 cm in diameter. MUSCULOSKELETAL: A similar appearing nonaggressive small lytic lesion involving the lateral aspect of the left fourth rib is unchanged and suggestive of a benign cyst. A smoothly marginated nonaggressive appearing sclerotic lesion of the left scapula is unchanged as well and is suggestive of a bone island. A tiny suspected bone island involving the L3 vertebral body is unchanged as well. No suspicious-appearing lytic or blastic osseous lesions. No acute osseous abnormality.     IMPRESSION: 1.  No findings suggestive of metastatic disease. 2.  Postsurgical changes of the rectosigmoid colon. 3.  Similar-appearing tiny scarlike pulmonary nodules. Recommend attention on surveillance imaging. 4.  Additional nonacute findings as above. YAN TURK MD   SYSTEM ID:  [id number]       Billing  Total time 30 minutes, to include face to face visit, review of EMR, ordering, documentation and coordination of care on date of service    Signed by:   Pamela Colorado MD  Hematology and Medical Oncology  [facility]      Oncology Rooming Note    August 15, 2023 11:31 AM   Kalin Shepard is a 72 year old male who presents for:    Chief Complaint   Patient presents with     Oncology Clinic Visit     Cancer of rectosigmoid (colon) - Provider visit only     Initial Vitals: /76 (BP Location: Right arm, Patient Position: Sitting, Cuff Size: Adult Regular)   Pulse 83   Temp 98.2  F (36.8  C) (Tympanic)   Resp 12   Ht 1.803 m (5' 11\")   Wt 59 kg (130 lb)   SpO2 97%   BMI 18.13 kg/m   Estimated body mass index is 18.13 kg/m  as calculated from the following:    Height as of this encounter: 1.803 m (5' 11\").    Weight as of this encounter: 59 kg (130 lb). Body surface area is 1.72 meters squared.  No Pain (0) Comment: Data Unavailable   No LMP for male patient.  Allergies " reviewed: Yes  Medications reviewed: Yes    Medications: Medication refills not needed today.  Pharmacy name entered into [facility]: [facility], [place] - [address] [place] [address]    Clinical concerns:  None      Sara Almanzar CMA              Again, thank you for allowing me to participate in the care of your patient.        Sincerely,        Pamela Colorado MD

## 2023-08-15 NOTE — PROGRESS NOTES
[facility] Hematology and Oncology Outpatient Progress Note    Patient: Kalin Shepard  MRN: [id number]  Date of Service: Aug 15, 2023          Reason for Visit    Stage IV colon cancer (peritoneal mets)    Primary Oncologist: Dr. Colorado      Assessment/Plan  Stage IV colon cancer (peritoneal mets)  Layton completed 12 cycles of induction/neoadjuvant chemo through Nov, 2022 with an excellent radiographical response.    He, therefore, underwent resection of his primary sigmoid/rectal tumor 6 weeks ago. This was grossly removed with negative margins and no nodes involved.  He did have significant residual tumor without any treatment effect.  ypT4b.  No evidence of damon metastasis however he had a previously biopsy-proven peritoneal metastasis.  Omentectomy showed no evidence of any malignancy.    His restaging CT showed no mets or evident residual peritoneal mets.     Underwent ileostomy reversal in May.  Has recovered pretty well from surgery.  Here with repeat labs and CT scan.  Labs from yesterday reviewed today.  CEA is pretty stable at 3.9.  Serum chemistry is mostly within normal limits.  LFTs are normal.  CBC shows hemoglobin of 13.9.  Normal platelet count and white counts.  CT scan images from yesterday reviewed by me today.  It shows no evidence of any metastatic disease.  He has subcentimeter bilateral lung nodules which are pretty stable.  There is postsurgical changes in the rectosigmoid colon.  No peritoneal deposits.  Overall no evidence of any disease progression currently.  We will continue to monitor him with periodic labs physical exam and imaging studies.    I will see him back in 4 months with repeat labs and CT scan.    2.   Chemo-induced peripheral neuropathy (gr 1)  Mild and grade 1.  Not on any medication right now.    3.   Cachexia  He has been gaining weight.  About 11 pounds since his surgery.  Has discontinued  Remeron.    ______________________________________________________________________________    History of Present Illness/ Interval History    Mr. Kalin Shepard  is a 72 year old who was diagnosed with sigmoid colon cancer with peritoneal mets a year ago. He underwent 12 cycles of chemo - FOLFOX + bevacizumab; kranthi was omitted around SBO and Oxaliplatin dose-reduced/later omitted for peripheral neuropathy.     At completion of chemo in November, restaging showed good response with no clear mets. He, therefore, underwent resection of the residual primary tumor with negative margins and no nodes involved. He had a loop ileostomy placed and take-down in early May.    Doing reasonably well since last visit.  Post low anterior resection he had some bowel obstruction symptoms but this resolved.  Ileostomy was taken down in May.    He is here with repeat CT scan.      ECOG Performance    1      Oncology History/Treatment  Diagnosis/Stage:   3/26/2022: IV sigmoid colon cancer to peritoneum  -Presented with abdominal pain secondary to bowel obstruction.  CT scan showed mass in sigmoid colon with upstream small bowel and colonic obstruction.  Underwent ostomy placement.    -Peritoneal biopsy positive for adenocarcinoma.    -No evidence of other distant metastasis based on PET scan.  -BRAF V600E positive.  K-aldo and NRAS negative.  Proficient MMR.    8/24/2022 open laparotomy for SBO, lysis of adhesions. No evidence of any malignancy intra-abdominally noted.     2/8/2023 post esther-adjuvant chemo. Surgical resection primary sigmoid/rectal tumor path: 3 cm residual mod-poorly diff adenocarcinoma invading through wall and involving adjacent adherent perirectal fibroadipose tissue. Margins negative. No lymphovascular nor perineural invasion. No tumor deposits. 0/33 LN involved.       Treatment:  Induction/neoadjuvant chemotherapy started for potentially resectable metachronous peritoneal metastasis    4/26/2022 - 11/2022: FOLFOX  and bevacizumab x 12 cycles (Amrita stopped after cycle 7; oxali stopped after cycle 11). At  -cycle 7 delayed for neutropenia. Neulasta added  -cycle 8 delayed for hospitalization with SBO (favoring ileus/adhesions, no evidence of malignancy). Post-op course complicated by peritonitis requiring antibiotics. Temp TPN, then cleared for oral intake.   -Bevacizumab omitted with cycle 8 to allow for healing postmalarial. Resumed with cycle 9.  -cycle 9 oxilaplatin dose-reduced 20% for gr 1-2 neuropathy  -cycle 11 oxaliplatin dose-reduced another 10-15% (33% total from original) for gr 1-2 neuropathy  -cycle 12 infusional 5FU only    2/8/2023: exploratory lap, low anterior resection, takedown loop colostomy, diverting loop ileostomy and flex sigmoidoscopy    Surgical path;  Adenocarcinoma, moderate to poorly differentiated, invading through the wall to involve the adjacent adherent perirectal fibroadipose tissue  -Tumor size: 3 cm in greatest dimension  -Resection margins free of involvement  -No evidence of lymphovascular or perineural invasion  -Treatment effect: Poor response (score 3)  -No tumor deposits identified  -Thirty-three benign lymph nodes (0/33)  -Sections of vas deferens with no evidence of involvement by adenocarcinoma.    Ileostomy reversal with CRS (5/3/23)      Physical Exam    GENERAL: Alert and oriented to time place and person. Seated comfortably. In no distress. Thin. Wife accompanies.  HEAD: Atraumatic and normocephalic.   EYES: MARA, EOMI. No erythema. No icterus.  LYMPH NODES: No palpable supraclavicular, cervical lymphadenopathy.  CHEST: clear to auscultation bilaterally.   CVS: RRR  ABDOMEN: Soft. Not tender. Not distended.  Surgical scars have healed well.  Expected nodularity in some of the scars especially on the ileostomy takedown scar.  EXTREMITIES: Warm. No peripheral edema. Firm vein without erythema/edema right proximal forearm.   SKIN: Multiple skin bruises noted on his  forearms.  Neuro: Alert and oriented x3      Lab Results    Recent Results (from the past 168 hour(s))   CEA   Result Value Ref Range    CEA 3.9 ng/mL   Comprehensive metabolic panel (BMP + Alb, Alk Phos, ALT, AST, Total. Bili, TP)   Result Value Ref Range    Sodium 142 136 - 145 mmol/L    Potassium 4.2 3.4 - 5.3 mmol/L    Chloride 107 98 - 107 mmol/L    Carbon Dioxide (CO2) 24 22 - 29 mmol/L    Anion Gap 11 7 - 15 mmol/L    Urea Nitrogen 24.5 (H) 8.0 - 23.0 mg/dL    Creatinine 0.62 (L) 0.67 - 1.17 mg/dL    Calcium 9.7 8.8 - 10.2 mg/dL    Glucose 141 (H) 70 - 99 mg/dL    Alkaline Phosphatase 42 40 - 129 U/L    AST 22 0 - 45 U/L    ALT 20 0 - 70 U/L    Protein Total 6.8 6.4 - 8.3 g/dL    Albumin 4.4 3.5 - 5.2 g/dL    Bilirubin Total 0.2 <=1.2 mg/dL    GFR Estimate >90 >60 mL/min/1.73m2   CBC with platelets and differential   Result Value Ref Range    WBC Count 7.6 4.0 - 11.0 10e3/uL    RBC Count 4.48 4.40 - 5.90 10e6/uL    Hemoglobin 13.9 13.3 - 17.7 g/dL    Hematocrit 44.2 40.0 - 53.0 %    MCV 99 78 - 100 fL    MCH 31.0 26.5 - 33.0 pg    MCHC 31.4 (L) 31.5 - 36.5 g/dL    RDW 15.0 10.0 - 15.0 %    Platelet Count 217 150 - 450 10e3/uL    % Neutrophils 59 %    % Lymphocytes 28 %    % Monocytes 9 %    % Eosinophils 3 %    % Basophils 1 %    % Immature Granulocytes 0 %    NRBCs per 100 WBC 0 <1 /100    Absolute Neutrophils 4.4 1.6 - 8.3 10e3/uL    Absolute Lymphocytes 2.1 0.8 - 5.3 10e3/uL    Absolute Monocytes 0.7 0.0 - 1.3 10e3/uL    Absolute Eosinophils 0.2 0.0 - 0.7 10e3/uL    Absolute Basophils 0.1 0.0 - 0.2 10e3/uL    Absolute Immature Granulocytes 0.0 <=0.4 10e3/uL    Absolute NRBCs 0.0 10e3/uL       Imaging    CT Chest/Abdomen/Pelvis w Contrast    Result Date: 8/14/2023  CT CHEST/ABDOMEN/PELVIS WITH CONTRAST August 14, 2023 10:47 AM CLINICAL HISTORY: Follow up colon cancer post treatment/surgery. Adenocarcinoma of sigmoid colon (H). TECHNIQUE: CT scan of the chest, abdomen, and pelvis was performed following  injection of IV contrast. Multiplanar reformats were obtained. Dose reduction techniques were used. CONTRAST: 63mL Isovue-370. COMPARISON: 5/17/2023, 11/27/2022. FINDINGS: LUNGS AND PLEURA: A few tiny 4 mm and smaller calcified and noncalcified pulmonary nodules are similar to comparison and are of doubtful significance. Largest example is seen in the anterior right upper lobe (3-106). No new or enlarging pulmonary nodules. Mild suspected atelectasis and/or scarring in the posteromedial right lower lobe. No signs of pneumonia. No pleural fluid. Central airways are patent. Emphysematous changes in the lungs are noted. MEDIASTINUM/AXILLAE: A left chest wall Port-A-Cath is present with the tip in the SVC. Thoracic aorta is normal in course and caliber. Moderate thoracic aortic atherosclerosis is present. Mild coronary artery atherosclerosis is noted. Heart size is within normal limits. No pericardial effusion. No enlarged thoracic lymph nodes. HEPATOBILIARY: Normal. PANCREAS: Normal. SPLEEN: Normal. ADRENAL GLANDS: Normal. KIDNEYS/BLADDER: A simple appearing unilocular left renal cortical cyst is noted, which does not require follow-up. No solid enhancing renal mass. No hydronephrosis. No urinary bladder wall thickening. BOWEL: Postsurgical changes of the rectosigmoid junction is noted. There is also postsurgical change involving a small bowel loop in the anterior left lower quadrant. Large and small bowel loops are nonobstructed and noninflamed. No signs of abscess or drainable fluid collection. No ascites. PELVIC ORGANS: Prostate gland is enlarged measuring 6 cm in diameter. ADDITIONAL FINDINGS: Severe atherosclerosis of the abdominal aorta and iliac arteries is seen. There is tortuosity of the infrarenal abdominal aorta and mild ectasia measuring up to 2.9 cm in AP dimension. There is also ectasia of the common iliac arteries measuring 1.8 cm in diameter. MUSCULOSKELETAL: A similar appearing nonaggressive small  lytic lesion involving the lateral aspect of the left fourth rib is unchanged and suggestive of a benign cyst. A smoothly marginated nonaggressive appearing sclerotic lesion of the left scapula is unchanged as well and is suggestive of a bone island. A tiny suspected bone island involving the L3 vertebral body is unchanged as well. No suspicious-appearing lytic or blastic osseous lesions. No acute osseous abnormality.     IMPRESSION: 1.  No findings suggestive of metastatic disease. 2.  Postsurgical changes of the rectosigmoid colon. 3.  Similar-appearing tiny scarlike pulmonary nodules. Recommend attention on surveillance imaging. 4.  Additional nonacute findings as above. YAN TURK MD   SYSTEM ID:  [id number]       Billing  Total time 30 minutes, to include face to face visit, review of EMR, ordering, documentation and coordination of care on date of service    Signed by:   aPmela Colorado MD  Hematology and Medical Oncology  [facility]

## 2023-09-03 ENCOUNTER — HEALTH MAINTENANCE LETTER (OUTPATIENT)
Age: 73
End: 2023-09-03

## 2023-10-21 NOTE — PROGRESS NOTES
Discharge Summary/Instructions after an Endoscopic Procedure  Patient Name: Tonie Tejada  Patient MRN: [id number]  Patient YOB: 1940  Saturday, October 21, 2023  Jasper Davis MD  Dear patient,  As a result of recent federal legislation (The Federal Cures Act), you may   receive lab or pathology results from your procedure in your [facility]   account before your physician is able to contact you. Your physician or   their representative will relay the results to you with their   recommendations at their soonest availability.  Thank you,  RESTRICTIONS:  During your procedure today, you received medications for sedation.  These   medications may affect your judgment, balance and coordination.  Therefore,   for 24 hours, you have the following restrictions:   - DO NOT drive a car, operate machinery, make legal/financial decisions,   sign important papers or drink alcohol.    ACTIVITY:  Today: no heavy lifting, straining or running due to procedural   sedation/anesthesia.  The following day: return to full activity including work.  DIET:  Eat and drink normally unless instructed otherwise.     TREATMENT FOR COMMON SIDE EFFECTS:  - Mild abdominal pain, nausea, belching, bloating or excessive gas:  rest,   eat lightly and use a heating pad.  - Sore Throat: treat with throat lozenges and/or gargle with warm salt   water.  - Because air was used during the procedure, expelling large amounts of air   from your rectum or belching is normal.  - If a bowel prep was taken, you may not have a bowel movement for 1-3 days.    This is normal.  SYMPTOMS TO WATCH FOR AND REPORT TO YOUR PHYSICIAN:  1. Abdominal pain or bloating, other than gas cramps.  2. Chest pain.  3. Back pain.  4. Signs of infection such as: chills or fever occurring within 24 hours   after the procedure.  5. Rectal bleeding, which would show as bright red, maroon, or black stools.   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   - CEA 2.1.   - MMR IHC is normal               - MSI pending though low probably to be high given normal MMR.   - Will need PET scan (ordered for outpatient)        Requested Appointment with Dr Colorado in [facility]. Scheduled for 4/5 but now being adjusted to later this week as patient will stay admitted tonight.    Oncology will sign off at this time. Please do not hesitate to reach out with any questions.      Thank you!  Jocelyne Dutta PA-C ([place])  Hematology/Oncology  Pager: #[phone]       especially if   hemorrhoids are present.)  6. Vomiting.  7. Weakness or dizziness.  GO DIRECTLY TO THE NEAREST EMERGENCY ROOM IF YOU HAVE ANY OF THE FOLLOWING:      Difficulty breathing              Chills and/or fever over 101 F   Persistent vomiting and/or vomiting blood   Severe abdominal pain   Severe chest pain   Black, tarry stools   Bleeding- more than one tablespoon   Any other symptom or condition that you feel may need urgent attention  Your doctor recommends these additional instructions:  If any biopsies were taken, your doctors clinic will contact you in 1 to 2   weeks with any results.  - Return patient to [facility] for ongoing care.   - Resume regular diet today.   - Continue present medications.   - Await pathology results.   - The findings and recommendations were discussed with the patient.  For questions, problems or results please call your physician - Jasper Davis MD at Work:  [phone].  [facility] PHONE NUMBER: [phone]  IF A COMPLICATION OR EMERGENCY SITUATION ARISES AND YOU ARE UNABLE TO REACH   YOUR PHYSICIAN - GO DIRECTLY TO THE EMERGENCY ROOM.  MD Jasper Dawkins MD  10/21/2023 12:15:59 PM  This report has been verified and signed electronically.  Dear patient,  As a result of recent federal legislation (The Federal Cures Act), you may   receive lab or pathology results from your procedure in your [facility]   account before your physician is able to contact you. Your physician or   their representative will relay the results to you with their   recommendations at their soonest availability.  Thank you,  [employer]

## 2023-10-23 ENCOUNTER — INFUSION THERAPY VISIT (OUTPATIENT)
Dept: INFUSION THERAPY | Facility: CLINIC | Age: 73
End: 2023-10-23
Attending: NURSE PRACTITIONER
Payer: COMMERCIAL

## 2023-10-23 DIAGNOSIS — Z93.2 HIGH OUTPUT ILEOSTOMY (H): Primary | ICD-10-CM

## 2023-10-23 DIAGNOSIS — R19.8 HIGH OUTPUT ILEOSTOMY (H): Primary | ICD-10-CM

## 2023-10-23 PROCEDURE — 250N000011 HC RX IP 250 OP 636: Mod: JZ

## 2023-10-23 PROCEDURE — 96523 IRRIG DRUG DELIVERY DEVICE: CPT

## 2023-10-23 RX ORDER — HEPARIN SODIUM,PORCINE 10 UNIT/ML
5 VIAL (ML) INTRAVENOUS
Status: CANCELLED | OUTPATIENT
Start: 2023-10-23

## 2023-10-23 RX ORDER — HEPARIN SODIUM,PORCINE 10 UNIT/ML
5 VIAL (ML) INTRAVENOUS
Status: DISCONTINUED | OUTPATIENT
Start: 2023-10-23 | End: 2023-10-23 | Stop reason: HOSPADM

## 2023-10-23 RX ORDER — HEPARIN SODIUM (PORCINE) LOCK FLUSH IV SOLN 100 UNIT/ML 100 UNIT/ML
SOLUTION INTRAVENOUS
Status: COMPLETED
Start: 2023-10-23 | End: 2023-10-23

## 2023-10-23 RX ADMIN — Medication 500 UNITS: at 13:48

## 2023-10-23 NOTE — PROGRESS NOTES
Port accessed and good blood return noted.  Port flushed per [facility] protocol and then deaccessed.  Pt tolerated well.    Michelle Kirby RN

## 2023-12-06 ENCOUNTER — HOSPITAL ENCOUNTER (OUTPATIENT)
Dept: CT IMAGING | Facility: CLINIC | Age: 73
Discharge: HOME | End: 2023-12-06
Attending: INTERNAL MEDICINE | Admitting: INTERNAL MEDICINE
Payer: COMMERCIAL

## 2023-12-06 ENCOUNTER — LAB (OUTPATIENT)
Dept: INFUSION THERAPY | Facility: CLINIC | Age: 73
End: 2023-12-06
Attending: INTERNAL MEDICINE
Payer: COMMERCIAL

## 2023-12-06 DIAGNOSIS — C18.7 ADENOCARCINOMA OF SIGMOID COLON (H): ICD-10-CM

## 2023-12-06 DIAGNOSIS — C78.6 METASTASIS TO PERITONEUM (H): ICD-10-CM

## 2023-12-06 LAB
ALBUMIN SERPL BCG-MCNC: 4.2 G/DL (ref 3.5–5.2)
ALP SERPL-CCNC: 44 U/L (ref 40–150)
ALT SERPL W P-5'-P-CCNC: 24 U/L (ref 0–70)
ANION GAP SERPL CALCULATED.3IONS-SCNC: 9 MMOL/L (ref 7–15)
AST SERPL W P-5'-P-CCNC: 24 U/L (ref 0–45)
BASOPHILS # BLD AUTO: 0.1 10E3/UL (ref 0–0.2)
BASOPHILS NFR BLD AUTO: 1 %
BILIRUB SERPL-MCNC: 0.3 MG/DL
BUN SERPL-MCNC: 27.6 MG/DL (ref 8–23)
CALCIUM SERPL-MCNC: 9.7 MG/DL (ref 8.8–10.2)
CEA SERPL-MCNC: 3.9 NG/ML
CHLORIDE SERPL-SCNC: 105 MMOL/L (ref 98–107)
CREAT SERPL-MCNC: 0.73 MG/DL (ref 0.67–1.17)
DEPRECATED HCO3 PLAS-SCNC: 26 MMOL/L (ref 22–29)
EGFRCR SERPLBLD CKD-EPI 2021: >90 ML/MIN/1.73M2
EOSINOPHIL # BLD AUTO: 0.3 10E3/UL (ref 0–0.7)
EOSINOPHIL NFR BLD AUTO: 4 %
ERYTHROCYTE [DISTWIDTH] IN BLOOD BY AUTOMATED COUNT: 14.3 % (ref 10–15)
GLUCOSE SERPL-MCNC: 94 MG/DL (ref 70–99)
HCT VFR BLD AUTO: 45 % (ref 40–53)
HGB BLD-MCNC: 14.7 G/DL (ref 13.3–17.7)
HOLD SPECIMEN: NORMAL
IMM GRANULOCYTES # BLD: 0 10E3/UL
IMM GRANULOCYTES NFR BLD: 0 %
LYMPHOCYTES # BLD AUTO: 2.7 10E3/UL (ref 0.8–5.3)
LYMPHOCYTES NFR BLD AUTO: 39 %
MCH RBC QN AUTO: 32.2 PG (ref 26.5–33)
MCHC RBC AUTO-ENTMCNC: 32.7 G/DL (ref 31.5–36.5)
MCV RBC AUTO: 99 FL (ref 78–100)
MONOCYTES # BLD AUTO: 0.9 10E3/UL (ref 0–1.3)
MONOCYTES NFR BLD AUTO: 13 %
NEUTROPHILS # BLD AUTO: 3.1 10E3/UL (ref 1.6–8.3)
NEUTROPHILS NFR BLD AUTO: 43 %
NRBC # BLD AUTO: 0 10E3/UL
NRBC BLD AUTO-RTO: 0 /100
PLATELET # BLD AUTO: 207 10E3/UL (ref 150–450)
POTASSIUM SERPL-SCNC: 4.6 MMOL/L (ref 3.4–5.3)
PROT SERPL-MCNC: 6.8 G/DL (ref 6.4–8.3)
RBC # BLD AUTO: 4.56 10E6/UL (ref 4.4–5.9)
SODIUM SERPL-SCNC: 140 MMOL/L (ref 135–145)
WBC # BLD AUTO: 7 10E3/UL (ref 4–11)

## 2023-12-06 PROCEDURE — 250N000011 HC RX IP 250 OP 636: Performed by: INTERNAL MEDICINE

## 2023-12-06 PROCEDURE — 80053 COMPREHEN METABOLIC PANEL: CPT | Performed by: INTERNAL MEDICINE

## 2023-12-06 PROCEDURE — 85025 COMPLETE CBC W/AUTO DIFF WBC: CPT | Performed by: INTERNAL MEDICINE

## 2023-12-06 PROCEDURE — 36591 DRAW BLOOD OFF VENOUS DEVICE: CPT

## 2023-12-06 PROCEDURE — 74177 CT ABD & PELVIS W/CONTRAST: CPT

## 2023-12-06 PROCEDURE — 250N000011 HC RX IP 250 OP 636: Mod: JZ | Performed by: RADIOLOGY

## 2023-12-06 PROCEDURE — 250N000009 HC RX 250: Performed by: INTERNAL MEDICINE

## 2023-12-06 PROCEDURE — 82378 CARCINOEMBRYONIC ANTIGEN: CPT | Performed by: INTERNAL MEDICINE

## 2023-12-06 RX ORDER — IOPAMIDOL 755 MG/ML
64 INJECTION, SOLUTION INTRAVASCULAR ONCE
Status: COMPLETED | OUTPATIENT
Start: 2023-12-06 | End: 2023-12-06

## 2023-12-06 RX ORDER — HEPARIN SODIUM (PORCINE) LOCK FLUSH IV SOLN 100 UNIT/ML 100 UNIT/ML
5 SOLUTION INTRAVENOUS ONCE
Status: COMPLETED | OUTPATIENT
Start: 2023-12-06 | End: 2023-12-06

## 2023-12-06 RX ADMIN — Medication 5 ML: at 10:27

## 2023-12-06 RX ADMIN — SODIUM CHLORIDE 56 ML: 9 INJECTION, SOLUTION INTRAVENOUS at 10:13

## 2023-12-06 RX ADMIN — IOPAMIDOL 64 ML: 755 INJECTION, SOLUTION INTRAVENOUS at 10:12

## 2023-12-14 ENCOUNTER — VIRTUAL VISIT (OUTPATIENT)
Dept: ONCOLOGY | Facility: CLINIC | Age: 73
End: 2023-12-14
Attending: INTERNAL MEDICINE
Payer: COMMERCIAL

## 2023-12-14 VITALS — WEIGHT: 140 LBS | BODY MASS INDEX: 19.6 KG/M2 | HEIGHT: 71 IN

## 2023-12-14 DIAGNOSIS — T45.1X5A CHEMOTHERAPY-INDUCED PERIPHERAL NEUROPATHY (H): ICD-10-CM

## 2023-12-14 DIAGNOSIS — G62.0 CHEMOTHERAPY-INDUCED PERIPHERAL NEUROPATHY (H): ICD-10-CM

## 2023-12-14 DIAGNOSIS — C18.7 ADENOCARCINOMA OF SIGMOID COLON (H): Primary | ICD-10-CM

## 2023-12-14 PROCEDURE — 99214 OFFICE O/P EST MOD 30 MIN: CPT | Mod: 95 | Performed by: INTERNAL MEDICINE

## 2023-12-14 ASSESSMENT — PAIN SCALES - GENERAL: PAINLEVEL: NO PAIN (0)

## 2023-12-14 NOTE — LETTER
12/14/2023         RE: Kalin Shepard  [address]  [facility] [address]        Dear Colleague,    Thank you for referring your patient, Kalin Shepard, to the [facility]. Please see a copy of my visit note below.      Video-Visit Details    Type of service:  Video Visit    Video Start Time:  9:14 AM  Video End Time:  9:20 AM    Originating Location (pt. Location): Home in [place]    Distant Location (provider location):  [place]    Platform used for Video Visit: [facility] Hematology and Oncology Outpatient Progress Note    Patient: Kalin Shepard  MRN: [id number]  Date of Service: Dec 14, 2023          Reason for Visit    Stage IV colon cancer (peritoneal mets)    Primary Oncologist: Dr. Colorado      Assessment/Plan  Stage IV colon cancer (peritoneal mets)  Layton completed 12 cycles of induction/neoadjuvant chemo through Nov, 2022 with an excellent radiographical response.    He, therefore, underwent resection of his primary sigmoid/rectal tumor in February 2023. This was grossly removed with negative margins and no nodes involved.  He did have significant residual tumor without any treatment effect.  ypT4b.  No evidence of damon metastasis however he had a previously biopsy-proven peritoneal metastasis.  Omentectomy showed no evidence of any malignancy.    Continues to do well without any new issues.  Labs from last week reviewed today.  CEA is normal at 3.9.  Serum chemistry is normal.  CBC is completely normal.  Hemoglobin 14.7.  I personally reviewed his CT scan images and report and independently interpreted the results.  CT showing no evidence of any new or progressive metastatic disease.  He continues to have stable subcentimeter pulmonary nodules.  Also has an indeterminate right hilar lymph node.  Stable subcentimeter hepatic hypodensities which on previous MRI have been shown to be cysts.  Overall no evidence of any disease  progression.  Will continue surveillance with parotic labs and physical exam.  Will see him back in 3 months again with repeat labs.  Plan is to do a CT in 6 months.    2.   Chemo-induced peripheral neuropathy (gr 1)  Mild and grade 1.  Can write better.    3.   Cachexia  Has gained some weight.  Currently ranging 140.  Not getting any further.  Oral intake seems to be adequate.  Asked him to continue to work on this.    ______________________________________________________________________________    History of Present Illness/ Interval History    Mr. Kalin Shepard  is a 72 year old who was diagnosed with sigmoid colon cancer with peritoneal mets in March 2022. He underwent 12 cycles of chemo - FOLFOX + bevacizumab; kranthi was omitted around SBO and Oxaliplatin dose-reduced/later omitted for peripheral neuropathy.     At completion of chemo in November 2022, restaging showed good response with no clear mets. He, therefore, underwent resection of the residual primary tumor in early 2023 with negative margins and no nodes involved. He had a loop ileostomy placed and take-down in early May 2023.    Continues to do well.  Has some peripheral neuropathy symptoms which are stable.  No new issues.  Here with repeat CT scan and labs.  This is a video visit.      ECOG Performance    1      Oncology History/Treatment  Diagnosis/Stage:   3/26/2022: IV sigmoid colon cancer to peritoneum  -Presented with abdominal pain secondary to bowel obstruction.  CT scan showed mass in sigmoid colon with upstream small bowel and colonic obstruction.  Underwent ostomy placement.    -Peritoneal biopsy positive for adenocarcinoma.    -No evidence of other distant metastasis based on PET scan.  -BRAF V600E positive.  K-aldo and NRAS negative.  Proficient MMR.    8/24/2022 open laparotomy for SBO, lysis of adhesions. No evidence of any malignancy intra-abdominally noted.     2/8/2023 post esther-adjuvant chemo. Surgical resection primary  sigmoid/rectal tumor path: 3 cm residual mod-poorly diff adenocarcinoma invading through wall and involving adjacent adherent perirectal fibroadipose tissue. Margins negative. No lymphovascular nor perineural invasion. No tumor deposits. 0/33 LN involved.       Treatment:  Induction/neoadjuvant chemotherapy started for potentially resectable metachronous peritoneal metastasis    4/26/2022 - 11/2022: FOLFOX and bevacizumab x 12 cycles (Amrita stopped after cycle 7; oxali stopped after cycle 11). At  -cycle 7 delayed for neutropenia. Neulasta added  -cycle 8 delayed for hospitalization with SBO (favoring ileus/adhesions, no evidence of malignancy). Post-op course complicated by peritonitis requiring antibiotics. Temp TPN, then cleared for oral intake.   -Bevacizumab omitted with cycle 8 to allow for healing postmalarial. Resumed with cycle 9.  -cycle 9 oxilaplatin dose-reduced 20% for gr 1-2 neuropathy  -cycle 11 oxaliplatin dose-reduced another 10-15% (33% total from original) for gr 1-2 neuropathy  -cycle 12 infusional 5FU only    2/8/2023: exploratory lap, low anterior resection, takedown loop colostomy, diverting loop ileostomy and flex sigmoidoscopy    Surgical path;  Adenocarcinoma, moderate to poorly differentiated, invading through the wall to involve the adjacent adherent perirectal fibroadipose tissue  -Tumor size: 3 cm in greatest dimension  -Resection margins free of involvement  -No evidence of lymphovascular or perineural invasion  -Treatment effect: Poor response (score 3)  -No tumor deposits identified  -Thirty-three benign lymph nodes (0/33)  -Sections of vas deferens with no evidence of involvement by adenocarcinoma.    Ileostomy reversal with CRS (5/3/23)      Physical Exam    GENERAL: Alert and oriented to time place and person. Seated comfortably. In no distress.      Lab Results    No results found for this or any previous visit (from the past 168 hour(s)).      Imaging    CT Chest/Abdomen/Pelvis w  Contrast    Result Date: 12/6/2023  CT CHEST/ABDOMEN/PELVIS W CONTRAST 12/6/2023 10:32 AM CLINICAL HISTORY: Adenocarcinoma of sigmoid colon (H); Metastasis to peritoneum (H) TECHNIQUE: CT scan of the chest, abdomen, and pelvis was performed following injection of IV contrast. Multiplanar reformats were obtained. Dose reduction techniques were used. CONTRAST: 64mL of Isovue 370 COMPARISON: CT 8/14/2023 of 5/17/2023 November 27, 2022 FINDINGS: LUNGS AND PLEURA: Nonspecific bilateral bronchial wall thickening. Central bronchiectasis noted. Centrilobular emphysema noted. Scattered areas of pleural/parenchymal scarring in both lungs. Stable noncalcified nodule in the left lower lobe measuring 2 mm series 4-200. Stable probable subpleural scarring in the anterior right upper lobe measuring 4 mm series 4-116. No new or enlarging noncalcified pulmonary nodules. MEDIASTINUM/AXILLAE: Thoracic aorta is normal in caliber for age with moderate mixed atheromatous plaque. Left IJ chest port with distal tip in the mid SVC. Stable mildly enlarged right hilar node measuring 16 x 19 mm series 3-96. No new axillary, mediastinal or hilar lymphadenopathy. HEPATOBILIARY: There are a few stable subcentimeter hypodensities in both lobes of the liver: Hepatic segment 4A series 3-164, Central segment 4A series 3-176, segment 6 series 3-191, segment 5 image 201. No new focal hepatic lesion. Gallbladder unremarkable without calcific cholelithiasis or biliary ductal dilatation. PANCREAS: Normal. SPLEEN: Normal. ADRENAL GLANDS: Normal. KIDNEYS/BLADDER: Symmetric enhancement in both kidneys. No hydronephrosis. Simple left renal cyst that does not require additional follow-up. There are a few additional subcentimeter renal hypodensities that are too small to characterize though without suspicious features and do not require additional follow-up. Nondistended and unopacified urinary bladder is unremarkable apart from broad-based protrusion due to an  enlarged prostate at the base. BOWEL: There are postsurgical changes of the lower quadrant mid small bowel with mild stable dilatation measuring up to 3.8 cm and most consistent with chronic post surgical dilatation. Otherwise, no evidence for bowel obstruction. Moderate retained colonic stool. Distal colectomy and anastomosis. No suspicious mass at the anastomotic site. PELVIC ORGANS: Enlarge prostate indenting the base of the urinary bladder. Other: Infrarenal abdominal aortic saccular aneurysm measuring up to 3.1 cm. Severe aortoiliac mixed atheromatous plaque. No abdominal or pelvic lymphadenopathy. No free fluid or pneumoperitoneum. MUSCULOSKELETAL: Stable dense sclerotic lesion of the inferior aspect of the left scapula. Stable lytic lesion without cortical destruction in the posterolateral left fourth rib. Stable sclerotic lesion of L3 vertebral body most consistent with benign bone island.     IMPRESSION: 1.  No evidence for new or progressive metastatic disease in the chest, abdomen or pelvis. 2.  Few stable subcentimeter pulmonary nodules. 3.  Stable mildly large right hilar node, indeterminate. 4.  A few stable subcentimeter hepatic hypodensities. 5.  Prostate enlargement. CARRILLO ORTEGA MD   SYSTEM ID:  [id number]         Signed by:   Pamela Colorado MD  Hematology and Medical Oncology  [facility]      Again, thank you for allowing me to participate in the care of your patient.        Sincerely,        Pamela Colorado MD

## 2023-12-14 NOTE — LETTER
12/14/2023         RE: Kalin Shepard  [address]  [facility] [address]        Dear Colleague,    Thank you for referring your patient, Kalin Shepard, to the [facility]. Please see a copy of my visit note below.      Video-Visit Details    Type of service:  Video Visit    Video Start Time:  9:14 AM  Video End Time:  9:20 AM    Originating Location (pt. Location): Home in [place]    Distant Location (provider location):  [place]    Platform used for Video Visit: [facility] Hematology and Oncology Outpatient Progress Note    Patient: Kalin Shepard  MRN: [id number]  Date of Service: Dec 14, 2023          Reason for Visit    Stage IV colon cancer (peritoneal mets)    Primary Oncologist: Dr. Colorado      Assessment/Plan  Stage IV colon cancer (peritoneal mets)  Layton completed 12 cycles of induction/neoadjuvant chemo through Nov, 2022 with an excellent radiographical response.    He, therefore, underwent resection of his primary sigmoid/rectal tumor in February 2023. This was grossly removed with negative margins and no nodes involved.  He did have significant residual tumor without any treatment effect.  ypT4b.  No evidence of damon metastasis however he had a previously biopsy-proven peritoneal metastasis.  Omentectomy showed no evidence of any malignancy.    Continues to do well without any new issues.  Labs from last week reviewed today.  CEA is normal at 3.9.  Serum chemistry is normal.  CBC is completely normal.  Hemoglobin 14.7.  I personally reviewed his CT scan images and report and independently interpreted the results.  CT showing no evidence of any new or progressive metastatic disease.  He continues to have stable subcentimeter pulmonary nodules.  Also has an indeterminate right hilar lymph node.  Stable subcentimeter hepatic hypodensities which on previous MRI have been shown to be cysts.  Overall no evidence of any disease  progression.  Will continue surveillance with parotic labs and physical exam.  Will see him back in 3 months again with repeat labs.  Plan is to do a CT in 6 months.    2.   Chemo-induced peripheral neuropathy (gr 1)  Mild and grade 1.  Can write better.    3.   Cachexia  Has gained some weight.  Currently ranging 140.  Not getting any further.  Oral intake seems to be adequate.  Asked him to continue to work on this.    ______________________________________________________________________________    History of Present Illness/ Interval History    Mr. Kalin Shepadr  is a 72 year old who was diagnosed with sigmoid colon cancer with peritoneal mets in March 2022. He underwent 12 cycles of chemo - FOLFOX + bevacizumab; kranthi was omitted around SBO and Oxaliplatin dose-reduced/later omitted for peripheral neuropathy.     At completion of chemo in November 2022, restaging showed good response with no clear mets. He, therefore, underwent resection of the residual primary tumor in early 2023 with negative margins and no nodes involved. He had a loop ileostomy placed and take-down in early May 2023.    Continues to do well.  Has some peripheral neuropathy symptoms which are stable.  No new issues.  Here with repeat CT scan and labs.  This is a video visit.      ECOG Performance    1      Oncology History/Treatment  Diagnosis/Stage:   3/26/2022: IV sigmoid colon cancer to peritoneum  -Presented with abdominal pain secondary to bowel obstruction.  CT scan showed mass in sigmoid colon with upstream small bowel and colonic obstruction.  Underwent ostomy placement.    -Peritoneal biopsy positive for adenocarcinoma.    -No evidence of other distant metastasis based on PET scan.  -BRAF V600E positive.  K-aldo and NRAS negative.  Proficient MMR.    8/24/2022 open laparotomy for SBO, lysis of adhesions. No evidence of any malignancy intra-abdominally noted.     2/8/2023 post esther-adjuvant chemo. Surgical resection primary  sigmoid/rectal tumor path: 3 cm residual mod-poorly diff adenocarcinoma invading through wall and involving adjacent adherent perirectal fibroadipose tissue. Margins negative. No lymphovascular nor perineural invasion. No tumor deposits. 0/33 LN involved.       Treatment:  Induction/neoadjuvant chemotherapy started for potentially resectable metachronous peritoneal metastasis    4/26/2022 - 11/2022: FOLFOX and bevacizumab x 12 cycles (Amrita stopped after cycle 7; oxali stopped after cycle 11). At  -cycle 7 delayed for neutropenia. Neulasta added  -cycle 8 delayed for hospitalization with SBO (favoring ileus/adhesions, no evidence of malignancy). Post-op course complicated by peritonitis requiring antibiotics. Temp TPN, then cleared for oral intake.   -Bevacizumab omitted with cycle 8 to allow for healing postmalarial. Resumed with cycle 9.  -cycle 9 oxilaplatin dose-reduced 20% for gr 1-2 neuropathy  -cycle 11 oxaliplatin dose-reduced another 10-15% (33% total from original) for gr 1-2 neuropathy  -cycle 12 infusional 5FU only    2/8/2023: exploratory lap, low anterior resection, takedown loop colostomy, diverting loop ileostomy and flex sigmoidoscopy    Surgical path;  Adenocarcinoma, moderate to poorly differentiated, invading through the wall to involve the adjacent adherent perirectal fibroadipose tissue  -Tumor size: 3 cm in greatest dimension  -Resection margins free of involvement  -No evidence of lymphovascular or perineural invasion  -Treatment effect: Poor response (score 3)  -No tumor deposits identified  -Thirty-three benign lymph nodes (0/33)  -Sections of vas deferens with no evidence of involvement by adenocarcinoma.    Ileostomy reversal with CRS (5/3/23)      Physical Exam    GENERAL: Alert and oriented to time place and person. Seated comfortably. In no distress.      Lab Results    No results found for this or any previous visit (from the past 168 hour(s)).      Imaging    CT Chest/Abdomen/Pelvis w  Contrast    Result Date: 12/6/2023  CT CHEST/ABDOMEN/PELVIS W CONTRAST 12/6/2023 10:32 AM CLINICAL HISTORY: Adenocarcinoma of sigmoid colon (H); Metastasis to peritoneum (H) TECHNIQUE: CT scan of the chest, abdomen, and pelvis was performed following injection of IV contrast. Multiplanar reformats were obtained. Dose reduction techniques were used. CONTRAST: 64mL of Isovue 370 COMPARISON: CT 8/14/2023 of 5/17/2023 November 27, 2022 FINDINGS: LUNGS AND PLEURA: Nonspecific bilateral bronchial wall thickening. Central bronchiectasis noted. Centrilobular emphysema noted. Scattered areas of pleural/parenchymal scarring in both lungs. Stable noncalcified nodule in the left lower lobe measuring 2 mm series 4-200. Stable probable subpleural scarring in the anterior right upper lobe measuring 4 mm series 4-116. No new or enlarging noncalcified pulmonary nodules. MEDIASTINUM/AXILLAE: Thoracic aorta is normal in caliber for age with moderate mixed atheromatous plaque. Left IJ chest port with distal tip in the mid SVC. Stable mildly enlarged right hilar node measuring 16 x 19 mm series 3-96. No new axillary, mediastinal or hilar lymphadenopathy. HEPATOBILIARY: There are a few stable subcentimeter hypodensities in both lobes of the liver: Hepatic segment 4A series 3-164, Central segment 4A series 3-176, segment 6 series 3-191, segment 5 image 201. No new focal hepatic lesion. Gallbladder unremarkable without calcific cholelithiasis or biliary ductal dilatation. PANCREAS: Normal. SPLEEN: Normal. ADRENAL GLANDS: Normal. KIDNEYS/BLADDER: Symmetric enhancement in both kidneys. No hydronephrosis. Simple left renal cyst that does not require additional follow-up. There are a few additional subcentimeter renal hypodensities that are too small to characterize though without suspicious features and do not require additional follow-up. Nondistended and unopacified urinary bladder is unremarkable apart from broad-based protrusion due to an  enlarged prostate at the base. BOWEL: There are postsurgical changes of the lower quadrant mid small bowel with mild stable dilatation measuring up to 3.8 cm and most consistent with chronic post surgical dilatation. Otherwise, no evidence for bowel obstruction. Moderate retained colonic stool. Distal colectomy and anastomosis. No suspicious mass at the anastomotic site. PELVIC ORGANS: Enlarge prostate indenting the base of the urinary bladder. Other: Infrarenal abdominal aortic saccular aneurysm measuring up to 3.1 cm. Severe aortoiliac mixed atheromatous plaque. No abdominal or pelvic lymphadenopathy. No free fluid or pneumoperitoneum. MUSCULOSKELETAL: Stable dense sclerotic lesion of the inferior aspect of the left scapula. Stable lytic lesion without cortical destruction in the posterolateral left fourth rib. Stable sclerotic lesion of L3 vertebral body most consistent with benign bone island.     IMPRESSION: 1.  No evidence for new or progressive metastatic disease in the chest, abdomen or pelvis. 2.  Few stable subcentimeter pulmonary nodules. 3.  Stable mildly large right hilar node, indeterminate. 4.  A few stable subcentimeter hepatic hypodensities. 5.  Prostate enlargement. CARRILLO ORTEGA MD   SYSTEM ID:  [id number]         Signed by:   Pamela Colorado MD  Hematology and Medical Oncology  [facility]      Again, thank you for allowing me to participate in the care of your patient.        Sincerely,        Pamela Colorado MD

## 2023-12-14 NOTE — NURSING NOTE
Is the patient currently in the state of [place]? YES    Visit mode:VIDEO    If the visit is dropped, the patient can be reconnected by: VIDEO VISIT: Send to e-mail at: [email or web address]    Will anyone else be joining the visit? NO  (If patient encounters technical issues they should call [phone]    How would you like to obtain your AVS? MyChart    Are changes needed to the allergy or medication list? No    Reason for visit: RECHECK    Heather DEL TORO

## 2023-12-14 NOTE — PROGRESS NOTES
Video-Visit Details    Type of service:  Video Visit    Video Start Time:  9:14 AM  Video End Time:  9:20 AM    Originating Location (pt. Location): Home in [place]    Distant Location (provider location):  Home/[place]    Platform used for Video Visit: [facility] Hematology and Oncology Outpatient Progress Note    Patient: Kalin Shepard  MRN: [id number]  Date of Service: Dec 14, 2023          Reason for Visit    Stage IV colon cancer (peritoneal mets)    Primary Oncologist: Dr. Colorado      Assessment/Plan  Stage IV colon cancer (peritoneal mets)  Layton completed 12 cycles of induction/neoadjuvant chemo through Nov, 2022 with an excellent radiographical response.    He, therefore, underwent resection of his primary sigmoid/rectal tumor in February 2023. This was grossly removed with negative margins and no nodes involved.  He did have significant residual tumor without any treatment effect.  ypT4b.  No evidence of damon metastasis however he had a previously biopsy-proven peritoneal metastasis.  Omentectomy showed no evidence of any malignancy.    Continues to do well without any new issues.  Labs from last week reviewed today.  CEA is normal at 3.9.  Serum chemistry is normal.  CBC is completely normal.  Hemoglobin 14.7.  I personally reviewed his CT scan images and report and independently interpreted the results.  CT showing no evidence of any new or progressive metastatic disease.  He continues to have stable subcentimeter pulmonary nodules.  Also has an indeterminate right hilar lymph node.  Stable subcentimeter hepatic hypodensities which on previous MRI have been shown to be cysts.  Overall no evidence of any disease progression.  Will continue surveillance with parotic labs and physical exam.  Will see him back in 3 months again with repeat labs.  Plan is to do a CT in 6 months.    2.   Chemo-induced peripheral neuropathy (gr 1)  Mild and grade 1.  Can write better.    3.    Cachexia  Has gained some weight.  Currently ranging 140.  Not getting any further.  Oral intake seems to be adequate.  Asked him to continue to work on this.    ______________________________________________________________________________    History of Present Illness/ Interval History    Mr. Kalin Shepard  is a 72 year old who was diagnosed with sigmoid colon cancer with peritoneal mets in March 2022. He underwent 12 cycles of chemo - FOLFOX + bevacizumab; kranthi was omitted around SBO and Oxaliplatin dose-reduced/later omitted for peripheral neuropathy.     At completion of chemo in November 2022, restaging showed good response with no clear mets. He, therefore, underwent resection of the residual primary tumor in early 2023 with negative margins and no nodes involved. He had a loop ileostomy placed and take-down in early May 2023.    Continues to do well.  Has some peripheral neuropathy symptoms which are stable.  No new issues.  Here with repeat CT scan and labs.  This is a video visit.      ECOG Performance    1      Oncology History/Treatment  Diagnosis/Stage:   3/26/2022: IV sigmoid colon cancer to peritoneum  -Presented with abdominal pain secondary to bowel obstruction.  CT scan showed mass in sigmoid colon with upstream small bowel and colonic obstruction.  Underwent ostomy placement.    -Peritoneal biopsy positive for adenocarcinoma.    -No evidence of other distant metastasis based on PET scan.  -BRAF V600E positive.  K-aldo and NRAS negative.  Proficient MMR.    8/24/2022 open laparotomy for SBO, lysis of adhesions. No evidence of any malignancy intra-abdominally noted.     2/8/2023 post esther-adjuvant chemo. Surgical resection primary sigmoid/rectal tumor path: 3 cm residual mod-poorly diff adenocarcinoma invading through wall and involving adjacent adherent perirectal fibroadipose tissue. Margins negative. No lymphovascular nor perineural invasion. No tumor deposits. 0/33 LN involved.        Treatment:  Induction/neoadjuvant chemotherapy started for potentially resectable metachronous peritoneal metastasis    4/26/2022 - 11/2022: FOLFOX and bevacizumab x 12 cycles (Amrita stopped after cycle 7; oxali stopped after cycle 11). At  -cycle 7 delayed for neutropenia. Neulasta added  -cycle 8 delayed for hospitalization with SBO (favoring ileus/adhesions, no evidence of malignancy). Post-op course complicated by peritonitis requiring antibiotics. Temp TPN, then cleared for oral intake.   -Bevacizumab omitted with cycle 8 to allow for healing postmalarial. Resumed with cycle 9.  -cycle 9 oxilaplatin dose-reduced 20% for gr 1-2 neuropathy  -cycle 11 oxaliplatin dose-reduced another 10-15% (33% total from original) for gr 1-2 neuropathy  -cycle 12 infusional 5FU only    2/8/2023: exploratory lap, low anterior resection, takedown loop colostomy, diverting loop ileostomy and flex sigmoidoscopy    Surgical path;  Adenocarcinoma, moderate to poorly differentiated, invading through the wall to involve the adjacent adherent perirectal fibroadipose tissue  -Tumor size: 3 cm in greatest dimension  -Resection margins free of involvement  -No evidence of lymphovascular or perineural invasion  -Treatment effect: Poor response (score 3)  -No tumor deposits identified  -Thirty-three benign lymph nodes (0/33)  -Sections of vas deferens with no evidence of involvement by adenocarcinoma.    Ileostomy reversal with CRS (5/3/23)      Physical Exam    GENERAL: Alert and oriented to time place and person. Seated comfortably. In no distress.      Lab Results    No results found for this or any previous visit (from the past 168 hour(s)).      Imaging    CT Chest/Abdomen/Pelvis w Contrast    Result Date: 12/6/2023  CT CHEST/ABDOMEN/PELVIS W CONTRAST 12/6/2023 10:32 AM CLINICAL HISTORY: Adenocarcinoma of sigmoid colon (H); Metastasis to peritoneum (H) TECHNIQUE: CT scan of the chest, abdomen, and pelvis was performed following  injection of IV contrast. Multiplanar reformats were obtained. Dose reduction techniques were used. CONTRAST: 64mL of Isovue 370 COMPARISON: CT 8/14/2023 of 5/17/2023 November 27, 2022 FINDINGS: LUNGS AND PLEURA: Nonspecific bilateral bronchial wall thickening. Central bronchiectasis noted. Centrilobular emphysema noted. Scattered areas of pleural/parenchymal scarring in both lungs. Stable noncalcified nodule in the left lower lobe measuring 2 mm series 4-200. Stable probable subpleural scarring in the anterior right upper lobe measuring 4 mm series 4-116. No new or enlarging noncalcified pulmonary nodules. MEDIASTINUM/AXILLAE: Thoracic aorta is normal in caliber for age with moderate mixed atheromatous plaque. Left IJ chest port with distal tip in the mid SVC. Stable mildly enlarged right hilar node measuring 16 x 19 mm series 3-96. No new axillary, mediastinal or hilar lymphadenopathy. HEPATOBILIARY: There are a few stable subcentimeter hypodensities in both lobes of the liver: Hepatic segment 4A series 3-164, Central segment 4A series 3-176, segment 6 series 3-191, segment 5 image 201. No new focal hepatic lesion. Gallbladder unremarkable without calcific cholelithiasis or biliary ductal dilatation. PANCREAS: Normal. SPLEEN: Normal. ADRENAL GLANDS: Normal. KIDNEYS/BLADDER: Symmetric enhancement in both kidneys. No hydronephrosis. Simple left renal cyst that does not require additional follow-up. There are a few additional subcentimeter renal hypodensities that are too small to characterize though without suspicious features and do not require additional follow-up. Nondistended and unopacified urinary bladder is unremarkable apart from broad-based protrusion due to an enlarged prostate at the base. BOWEL: There are postsurgical changes of the lower quadrant mid small bowel with mild stable dilatation measuring up to 3.8 cm and most consistent with chronic post surgical dilatation. Otherwise, no evidence for bowel  obstruction. Moderate retained colonic stool. Distal colectomy and anastomosis. No suspicious mass at the anastomotic site. PELVIC ORGANS: Enlarge prostate indenting the base of the urinary bladder. Other: Infrarenal abdominal aortic saccular aneurysm measuring up to 3.1 cm. Severe aortoiliac mixed atheromatous plaque. No abdominal or pelvic lymphadenopathy. No free fluid or pneumoperitoneum. MUSCULOSKELETAL: Stable dense sclerotic lesion of the inferior aspect of the left scapula. Stable lytic lesion without cortical destruction in the posterolateral left fourth rib. Stable sclerotic lesion of L3 vertebral body most consistent with benign bone island.     IMPRESSION: 1.  No evidence for new or progressive metastatic disease in the chest, abdomen or pelvis. 2.  Few stable subcentimeter pulmonary nodules. 3.  Stable mildly large right hilar node, indeterminate. 4.  A few stable subcentimeter hepatic hypodensities. 5.  Prostate enlargement. CARRILLO ORTEGA MD   SYSTEM ID:  [id number]         Signed by:   Pamela Colorado MD  Hematology and Medical Oncology  [facility]

## 2023-12-14 NOTE — PROGRESS NOTES
"Virtual Visit Details    Type of service:  Video Visit   Video Start Time: {video visit start/end time for provider to select:266471}  Video End Time:{video visit start/end time for provider to select:134957}    Originating Location (pt. Location): {video visit patient location:736561::\"Home\"}  {PROVIDER LOCATION On-site should be selected for visits conducted from your clinic location or adjoining [facility] hospital, academic office, or other nearby [facility] building. Off-site should be selected for all other provider locations, including home:125252}  Distant Location (provider location):  {virtual location provider:666329}  Platform used for Video Visit: {Virtual Visit Platforms:841773::\"DeskMetrics\"}    "

## 2024-02-14 ENCOUNTER — INFUSION THERAPY VISIT (OUTPATIENT)
Dept: INFUSION THERAPY | Facility: CLINIC | Age: 74
End: 2024-02-14
Attending: INTERNAL MEDICINE
Payer: COMMERCIAL

## 2024-02-14 DIAGNOSIS — E86.0 DEHYDRATION: Primary | ICD-10-CM

## 2024-02-14 PROCEDURE — 250N000011 HC RX IP 250 OP 636: Performed by: INTERNAL MEDICINE

## 2024-02-14 PROCEDURE — 96523 IRRIG DRUG DELIVERY DEVICE: CPT

## 2024-02-14 RX ORDER — HEPARIN SODIUM,PORCINE 10 UNIT/ML
5-20 VIAL (ML) INTRAVENOUS DAILY PRN
Status: CANCELLED | OUTPATIENT
Start: 2024-02-14

## 2024-02-14 RX ORDER — HEPARIN SODIUM (PORCINE) LOCK FLUSH IV SOLN 100 UNIT/ML 100 UNIT/ML
5 SOLUTION INTRAVENOUS
Status: CANCELLED | OUTPATIENT
Start: 2024-02-14

## 2024-02-14 RX ORDER — HEPARIN SODIUM (PORCINE) LOCK FLUSH IV SOLN 100 UNIT/ML 100 UNIT/ML
5 SOLUTION INTRAVENOUS
Status: DISCONTINUED | OUTPATIENT
Start: 2024-02-14 | End: 2024-02-14 | Stop reason: HOSPADM

## 2024-02-14 RX ADMIN — Medication 5 ML: at 11:41

## 2024-03-20 ENCOUNTER — LAB (OUTPATIENT)
Dept: INFUSION THERAPY | Facility: CLINIC | Age: 74
End: 2024-03-20
Attending: INTERNAL MEDICINE
Payer: COMMERCIAL

## 2024-03-20 DIAGNOSIS — C18.7 ADENOCARCINOMA OF SIGMOID COLON (H): ICD-10-CM

## 2024-03-20 DIAGNOSIS — E86.0 DEHYDRATION: Primary | ICD-10-CM

## 2024-03-20 LAB
ALBUMIN SERPL BCG-MCNC: 4.3 G/DL (ref 3.5–5.2)
ALP SERPL-CCNC: 52 U/L (ref 40–150)
ALT SERPL W P-5'-P-CCNC: 15 U/L (ref 0–70)
ANION GAP SERPL CALCULATED.3IONS-SCNC: 11 MMOL/L (ref 7–15)
AST SERPL W P-5'-P-CCNC: 20 U/L (ref 0–45)
BASOPHILS # BLD AUTO: 0.1 10E3/UL (ref 0–0.2)
BASOPHILS NFR BLD AUTO: 1 %
BILIRUB SERPL-MCNC: 0.3 MG/DL
BUN SERPL-MCNC: 26.5 MG/DL (ref 8–23)
CALCIUM SERPL-MCNC: 9.3 MG/DL (ref 8.8–10.2)
CEA SERPL-MCNC: 3.6 NG/ML
CHLORIDE SERPL-SCNC: 106 MMOL/L (ref 98–107)
CREAT SERPL-MCNC: 0.91 MG/DL (ref 0.67–1.17)
DEPRECATED HCO3 PLAS-SCNC: 23 MMOL/L (ref 22–29)
EGFRCR SERPLBLD CKD-EPI 2021: 88 ML/MIN/1.73M2
EOSINOPHIL # BLD AUTO: 0.3 10E3/UL (ref 0–0.7)
EOSINOPHIL NFR BLD AUTO: 3 %
ERYTHROCYTE [DISTWIDTH] IN BLOOD BY AUTOMATED COUNT: 14 % (ref 10–15)
GLUCOSE SERPL-MCNC: 103 MG/DL (ref 70–99)
HCT VFR BLD AUTO: 47.9 % (ref 40–53)
HGB BLD-MCNC: 15.9 G/DL (ref 13.3–17.7)
IMM GRANULOCYTES # BLD: 0 10E3/UL
IMM GRANULOCYTES NFR BLD: 0 %
LYMPHOCYTES # BLD AUTO: 3.1 10E3/UL (ref 0.8–5.3)
LYMPHOCYTES NFR BLD AUTO: 35 %
MCH RBC QN AUTO: 32.3 PG (ref 26.5–33)
MCHC RBC AUTO-ENTMCNC: 33.2 G/DL (ref 31.5–36.5)
MCV RBC AUTO: 97 FL (ref 78–100)
MONOCYTES # BLD AUTO: 0.9 10E3/UL (ref 0–1.3)
MONOCYTES NFR BLD AUTO: 11 %
NEUTROPHILS # BLD AUTO: 4.3 10E3/UL (ref 1.6–8.3)
NEUTROPHILS NFR BLD AUTO: 50 %
NRBC # BLD AUTO: 0 10E3/UL
NRBC BLD AUTO-RTO: 0 /100
PLATELET # BLD AUTO: 214 10E3/UL (ref 150–450)
POTASSIUM SERPL-SCNC: 4.5 MMOL/L (ref 3.4–5.3)
PROT SERPL-MCNC: 7 G/DL (ref 6.4–8.3)
RBC # BLD AUTO: 4.93 10E6/UL (ref 4.4–5.9)
SODIUM SERPL-SCNC: 140 MMOL/L (ref 135–145)
WBC # BLD AUTO: 8.7 10E3/UL (ref 4–11)

## 2024-03-20 PROCEDURE — 82378 CARCINOEMBRYONIC ANTIGEN: CPT | Performed by: INTERNAL MEDICINE

## 2024-03-20 PROCEDURE — 36591 DRAW BLOOD OFF VENOUS DEVICE: CPT

## 2024-03-20 PROCEDURE — 85025 COMPLETE CBC W/AUTO DIFF WBC: CPT | Performed by: INTERNAL MEDICINE

## 2024-03-20 PROCEDURE — 80053 COMPREHEN METABOLIC PANEL: CPT | Performed by: INTERNAL MEDICINE

## 2024-03-20 PROCEDURE — 250N000011 HC RX IP 250 OP 636: Performed by: INTERNAL MEDICINE

## 2024-03-20 RX ORDER — HEPARIN SODIUM,PORCINE 10 UNIT/ML
5-20 VIAL (ML) INTRAVENOUS DAILY PRN
Status: CANCELLED | OUTPATIENT
Start: 2024-03-20

## 2024-03-20 RX ORDER — HEPARIN SODIUM (PORCINE) LOCK FLUSH IV SOLN 100 UNIT/ML 100 UNIT/ML
5 SOLUTION INTRAVENOUS
Status: CANCELLED | OUTPATIENT
Start: 2024-03-20

## 2024-03-20 RX ORDER — HEPARIN SODIUM (PORCINE) LOCK FLUSH IV SOLN 100 UNIT/ML 100 UNIT/ML
5 SOLUTION INTRAVENOUS
Status: DISCONTINUED | OUTPATIENT
Start: 2024-03-20 | End: 2024-03-20 | Stop reason: HOSPADM

## 2024-03-20 RX ADMIN — Medication 5 ML: at 09:09

## 2024-04-19 NOTE — PROGRESS NOTES
[facility] Hematology and Oncology Outpatient Progress Note    Patient: Kalin Shepard  MRN: [id number]  Date of Service: Apr 22, 2024          Reason for Visit    Stage IV colon cancer (peritoneal mets)    Primary Oncologist: Dr. Colorado      Assessment/Plan  Stage IV colon cancer (peritoneal mets)  Layton completed 12 cycles of induction/neoadjuvant chemo through Nov, 2022 with an excellent radiographical response.    He, therefore, underwent resection of his primary sigmoid/rectal tumor in February 2023. This was grossly removed with negative margins and no nodes involved.  He did have significant residual tumor without any treatment effect.  ypT4b.  No evidence of damon metastasis however he had a previously biopsy-proven peritoneal metastasis.  Omentectomy showed no evidence of any residual malignancy.    CT 12/2023 showed no evident recurrence/mets. Stable indeterminate pulmonary nodules and hepatic hypodensities noted.     Clinically, he has no concerns for recurrence.  Labwork : CEA 3.6, CMP WNL, CBC WNL.    Plan:  -Return in 3 months with lab and CT chest/abd/pelvis with Dr. Colorado  -Plan surveillance lab/exam every 3 months and CT every 6 months x first 3 yrs, then can elongate a bit if no recurrence through that time  -Every 6-8 week port flushes while in place. Plan to keep port in place until he's 2 yrs from his resection if no recurrence.     2.   Chemo-induced peripheral neuropathy (gr 1)  Persistent numbness in fingers and toes, no pain. Has some continued difficulty writing and playing his guitar. No falls.     3.   Cachexia  Appetite good, he has gained some weight.  Currently weight is up a bit more. Working on more calories and may restart supplementing with nutritional drinks as he is more active this summer.     4.   Nicotine dependence  Smoking currently. Not interested in quitting at this time, understands benefits in quitting.      ______________________________________________________________________________    History of Present Illness/ Interval History    Mr. Kalin Shepard is a 73 year old who was diagnosed with sigmoid colon cancer with peritoneal mets in March 2022. He underwent 12 cycles of chemo - FOLFOX + bevacizumab; kranthi was omitted for SBO and Oxaliplatin dose-reduced/later omitted for peripheral neuropathy.     At completion of chemo in November 2022, restaging showed good response with no clear mets. He, therefore, underwent resection of the residual primary tumor in early 2023 with negative margins and no nodes involved. He had a loop ileostomy placed and take-down in early May 2023. He continues on surveillance. Returns for 3-mth visit with labs.    Continues to do well.    Has some peripheral neuropathy symptoms which are stable; numbness in fingers and toes without pain and some interference with certain activities (writing, playing guitar).    No abd pain, bowel changes, new sites of pain.      ECOG Performance    0      Oncology History/Treatment  Diagnosis/Stage:   3/26/2022: IV sigmoid colon cancer to peritoneum  -Presented with abdominal pain secondary to bowel obstruction.  CT scan showed mass in sigmoid colon with upstream small bowel and colonic obstruction.  Underwent ostomy placement.    -Peritoneal biopsy positive for adenocarcinoma.    -No evidence of other distant metastasis based on PET scan.  -BRAF V600E positive.  K-aldo and NRAS negative.  Proficient MMR.    8/24/2022 open laparotomy for SBO, lysis of adhesions. No evidence of any malignancy intra-abdominally noted.     2/8/2023 post esther-adjuvant chemo. Surgical resection primary sigmoid/rectal tumor path:   Adenocarcinoma, moderate to poorly differentiated, invading through the wall to involve the adjacent adherent perirectal fibroadipose tissue  -Tumor size: 3 cm in greatest dimension  -Resection margins free of involvement  -No evidence of  lymphovascular or perineural invasion  -Treatment effect: Poor response (score 3)  -No tumor deposits identified  -Thirty-three benign lymph nodes (0/33)  -Sections of vas deferens with no evidence of involvement by adenocarcinoma.      Treatment:  Induction/neoadjuvant chemotherapy started for potentially resectable metachronous peritoneal metastasis    4/26/2022 - 11/2022: FOLFOX and bevacizumab x 12 cycles (Amrita stopped after cycle 7; oxali stopped after cycle 11).   -cycle 7 delayed for neutropenia. Neulasta added  -cycle 8 delayed for hospitalization with SBO (favoring ileus/adhesions, no evidence of malignancy). Post-op course complicated by peritonitis requiring antibiotics. Temp TPN, then cleared for oral intake.   -Bevacizumab omitted with cycle 8 to allow for healing postmalarial. Resumed with cycle 9.  -cycle 9 oxilaplatin dose-reduced 20% for gr 1-2 neuropathy  -cycle 11 oxaliplatin dose-reduced another 10-15% (33% total from original) for gr 1-2 neuropathy  -cycle 12 infusional 5FU only    2/8/2023: exploratory lap, low anterior resection, takedown loop colostomy, diverting loop ileostomy and flex sigmoidoscopy    Ileostomy reversal with CRS (5/3/23)      Physical Exam    GENERAL: Alert and oriented to time place and person. Seated comfortably. In no distress. Wife accompanies.   HEENT: No icterus  Lymph: No palpable cervical, axillary, inguinal nodes.  Lungs: Clear bilaterally  Heart: RRR  Abd: Soft, non-tender, no hepatomegaly. Mid-line incision.   Extremities: No edema  Neuro: Non-focal    Lab Results    No results found for this or any previous visit (from the past 168 hour(s)).    CBC, CMP, CEA reviewed    Imaging    No results found.    Total time 35 minutes, to include face to face visit, review of EMR, ordering, documentation and coordination of care on date of service.    complexity modifier for longitudinal care.     Signed by:   Olga Lidia Ya NP

## 2024-04-22 ENCOUNTER — INFUSION THERAPY VISIT (OUTPATIENT)
Dept: INFUSION THERAPY | Facility: CLINIC | Age: 74
End: 2024-04-22
Attending: INTERNAL MEDICINE
Payer: COMMERCIAL

## 2024-04-22 ENCOUNTER — ONCOLOGY VISIT (OUTPATIENT)
Dept: ONCOLOGY | Facility: CLINIC | Age: 74
End: 2024-04-22
Attending: INTERNAL MEDICINE
Payer: COMMERCIAL

## 2024-04-22 VITALS
TEMPERATURE: 97.6 F | WEIGHT: 143.3 LBS | SYSTOLIC BLOOD PRESSURE: 141 MMHG | HEART RATE: 71 BPM | HEIGHT: 71 IN | DIASTOLIC BLOOD PRESSURE: 92 MMHG | BODY MASS INDEX: 20.06 KG/M2 | OXYGEN SATURATION: 98 %

## 2024-04-22 DIAGNOSIS — T45.1X5A CHEMOTHERAPY-INDUCED PERIPHERAL NEUROPATHY (H): ICD-10-CM

## 2024-04-22 DIAGNOSIS — C78.6 METASTASIS TO PERITONEUM (H): ICD-10-CM

## 2024-04-22 DIAGNOSIS — E86.0 DEHYDRATION: Primary | ICD-10-CM

## 2024-04-22 DIAGNOSIS — G62.0 CHEMOTHERAPY-INDUCED PERIPHERAL NEUROPATHY (H): ICD-10-CM

## 2024-04-22 DIAGNOSIS — C18.7 ADENOCARCINOMA OF SIGMOID COLON (H): Primary | ICD-10-CM

## 2024-04-22 PROCEDURE — 250N000011 HC RX IP 250 OP 636: Performed by: INTERNAL MEDICINE

## 2024-04-22 PROCEDURE — G0463 HOSPITAL OUTPT CLINIC VISIT: HCPCS | Performed by: NURSE PRACTITIONER

## 2024-04-22 PROCEDURE — G2211 COMPLEX E/M VISIT ADD ON: HCPCS | Performed by: NURSE PRACTITIONER

## 2024-04-22 PROCEDURE — 99214 OFFICE O/P EST MOD 30 MIN: CPT | Performed by: NURSE PRACTITIONER

## 2024-04-22 RX ORDER — HEPARIN SODIUM (PORCINE) LOCK FLUSH IV SOLN 100 UNIT/ML 100 UNIT/ML
5 SOLUTION INTRAVENOUS
Status: CANCELLED | OUTPATIENT
Start: 2024-04-22

## 2024-04-22 RX ORDER — HEPARIN SODIUM (PORCINE) LOCK FLUSH IV SOLN 100 UNIT/ML 100 UNIT/ML
5 SOLUTION INTRAVENOUS
Status: DISCONTINUED | OUTPATIENT
Start: 2024-04-22 | End: 2024-04-22 | Stop reason: HOSPADM

## 2024-04-22 RX ORDER — HEPARIN SODIUM,PORCINE 10 UNIT/ML
5-20 VIAL (ML) INTRAVENOUS DAILY PRN
Status: CANCELLED | OUTPATIENT
Start: 2024-04-22

## 2024-04-22 RX ADMIN — Medication 5 ML: at 09:30

## 2024-04-22 NOTE — PROGRESS NOTES
Infusion Nursing Note:  Kalin Shepard presents today for port flush.    Patient seen by provider today: Yes: Olga Lidia Ya   present during visit today: Not Applicable.    Note: N/A.    Intravenous Access:  Implanted Port.    Treatment Conditions:  Not Applicable.    Post Infusion Assessment:  Blood return noted pre.  Site patent and intact, free from redness, edema or discomfort.  Access discontinued per protocol.     Discharge Plan:   Patient discharged in stable condition accompanied by: self.  Departure Mode: Ambulatory.      Zen Murphy RN

## 2024-04-22 NOTE — LETTER
4/22/2024         RE: Kalin Shepard  [address]  [facility] [address]        Dear Colleague,    Thank you for referring your patient, Kalin Shepard, to the [facility]. Please see a copy of my visit note below.    [facility] Hematology and Oncology Outpatient Progress Note    Patient: Kalin Shepard  MRN: [id number]  Date of Service: Apr 22, 2024          Reason for Visit    Stage IV colon cancer (peritoneal mets)    Primary Oncologist: Dr. Colorado      Assessment/Plan  Stage IV colon cancer (peritoneal mets)  Layton completed 12 cycles of induction/neoadjuvant chemo through Nov, 2022 with an excellent radiographical response.    He, therefore, underwent resection of his primary sigmoid/rectal tumor in February 2023. This was grossly removed with negative margins and no nodes involved.  He did have significant residual tumor without any treatment effect.  ypT4b.  No evidence of damon metastasis however he had a previously biopsy-proven peritoneal metastasis.  Omentectomy showed no evidence of any residual malignancy.    CT 12/2023 showed no evident recurrence/mets. Stable indeterminate pulmonary nodules and hepatic hypodensities noted.     Clinically, he has no concerns for recurrence.  Labwork : CEA 3.6, CMP WNL, CBC WNL.    Plan:  -Return in 3 months with lab and CT chest/abd/pelvis with Dr. Colorado  -Plan surveillance lab/exam every 3 months and CT every 6 months x first 3 yrs, then can elongate a bit if no recurrence through that time  -Every 6-8 week port flushes while in place. Plan to keep port in place until he's 2 yrs from his resection if no recurrence.     2.   Chemo-induced peripheral neuropathy (gr 1)  Persistent numbness in fingers and toes, no pain. Has some continued difficulty writing and playing his guitar. No falls.     3.   Cachexia  Appetite good, he has gained some weight.  Currently weight is up a bit more. Working on more calories and  may restart supplementing with nutritional drinks as he is more active this summer.     4.   Nicotine dependence  Smoking currently. Not interested in quitting at this time, understands benefits in quitting.     ______________________________________________________________________________    History of Present Illness/ Interval History    Mr. Kalin Shepard is a 73 year old who was diagnosed with sigmoid colon cancer with peritoneal mets in March 2022. He underwent 12 cycles of chemo - FOLFOX + bevacizumab; kranthi was omitted for SBO and Oxaliplatin dose-reduced/later omitted for peripheral neuropathy.     At completion of chemo in November 2022, restaging showed good response with no clear mets. He, therefore, underwent resection of the residual primary tumor in early 2023 with negative margins and no nodes involved. He had a loop ileostomy placed and take-down in early May 2023. He continues on surveillance. Returns for 3-mth visit with labs.    Continues to do well.    Has some peripheral neuropathy symptoms which are stable; numbness in fingers and toes without pain and some interference with certain activities (writing, playing guitar).    No abd pain, bowel changes, new sites of pain.      ECOG Performance    0      Oncology History/Treatment  Diagnosis/Stage:   3/26/2022: IV sigmoid colon cancer to peritoneum  -Presented with abdominal pain secondary to bowel obstruction.  CT scan showed mass in sigmoid colon with upstream small bowel and colonic obstruction.  Underwent ostomy placement.    -Peritoneal biopsy positive for adenocarcinoma.    -No evidence of other distant metastasis based on PET scan.  -BRAF V600E positive.  K-aldo and NRAS negative.  Proficient MMR.    8/24/2022 open laparotomy for SBO, lysis of adhesions. No evidence of any malignancy intra-abdominally noted.     2/8/2023 post esther-adjuvant chemo. Surgical resection primary sigmoid/rectal tumor path:   Adenocarcinoma, moderate to poorly  differentiated, invading through the wall to involve the adjacent adherent perirectal fibroadipose tissue  -Tumor size: 3 cm in greatest dimension  -Resection margins free of involvement  -No evidence of lymphovascular or perineural invasion  -Treatment effect: Poor response (score 3)  -No tumor deposits identified  -Thirty-three benign lymph nodes (0/33)  -Sections of vas deferens with no evidence of involvement by adenocarcinoma.      Treatment:  Induction/neoadjuvant chemotherapy started for potentially resectable metachronous peritoneal metastasis    4/26/2022 - 11/2022: FOLFOX and bevacizumab x 12 cycles (Amrita stopped after cycle 7; oxali stopped after cycle 11).   -cycle 7 delayed for neutropenia. Neulasta added  -cycle 8 delayed for hospitalization with SBO (favoring ileus/adhesions, no evidence of malignancy). Post-op course complicated by peritonitis requiring antibiotics. Temp TPN, then cleared for oral intake.   -Bevacizumab omitted with cycle 8 to allow for healing postmalarial. Resumed with cycle 9.  -cycle 9 oxilaplatin dose-reduced 20% for gr 1-2 neuropathy  -cycle 11 oxaliplatin dose-reduced another 10-15% (33% total from original) for gr 1-2 neuropathy  -cycle 12 infusional 5FU only    2/8/2023: exploratory lap, low anterior resection, takedown loop colostomy, diverting loop ileostomy and flex sigmoidoscopy    Ileostomy reversal with CRS (5/3/23)      Physical Exam    GENERAL: Alert and oriented to time place and person. Seated comfortably. In no distress. Wife accompanies.   HEENT: No icterus  Lymph: No palpable cervical, axillary, inguinal nodes.  Lungs: Clear bilaterally  Heart: RRR  Abd: Soft, non-tender, no hepatomegaly. Mid-line incision.   Extremities: No edema  Neuro: Non-focal    Lab Results    No results found for this or any previous visit (from the past 168 hour(s)).    CBC, CMP, CEA reviewed    Imaging    No results found.    Total time 35 minutes, to include face to face visit, review  "of EMR, ordering, documentation and coordination of care on date of service.    complexity modifier for longitudinal care.     Signed by:   Olga Lidia Ya NP      Oncology Rooming Note    April 22, 2024 8:54 AM   Kalin Shepard is a 73 year old male who presents for:    No chief complaint on file.    Initial Vitals: There were no vitals taken for this visit. Estimated body mass index is 19.53 kg/m  as calculated from the following:    Height as of 12/14/23: 1.803 m (5' 11\").    Weight as of 12/14/23: 63.5 kg (140 lb). There is no height or weight on file to calculate BSA.  Data Unavailable Comment: Data Unavailable   No LMP for male patient.  Allergies reviewed: Yes  Medications reviewed: Yes    Medications: Medication refills not needed today.  Pharmacy name entered into Official Limited Virtual: [facility], [place] - [address] [place] [address]    Frailty Screening:   Is the patient here for a new oncology consult visit in cancer care? 2. No      Clinical concerns: None today.       Kat Augustin MA                Again, thank you for allowing me to participate in the care of your patient.        Sincerely,        Olga Lidia Ya NP  "

## 2024-04-22 NOTE — PROGRESS NOTES
"Oncology Rooming Note    April 22, 2024 8:54 AM   Kalin Shepard is a 73 year old male who presents for:    No chief complaint on file.    Initial Vitals: There were no vitals taken for this visit. Estimated body mass index is 19.53 kg/m  as calculated from the following:    Height as of 12/14/23: 1.803 m (5' 11\").    Weight as of 12/14/23: 63.5 kg (140 lb). There is no height or weight on file to calculate BSA.  Data Unavailable Comment: Data Unavailable   No LMP for male patient.  Allergies reviewed: Yes  Medications reviewed: Yes    Medications: Medication refills not needed today.  Pharmacy name entered into [facility]: [place], [place] - [address] [place] [address]    Frailty Screening:   Is the patient here for a new oncology consult visit in cancer care? 2. No      Clinical concerns: None today.       Kat Augustin MA              "

## 2024-06-03 ENCOUNTER — INFUSION THERAPY VISIT (OUTPATIENT)
Dept: INFUSION THERAPY | Facility: CLINIC | Age: 74
End: 2024-06-03
Attending: INTERNAL MEDICINE
Payer: COMMERCIAL

## 2024-06-03 DIAGNOSIS — E86.0 DEHYDRATION: Primary | ICD-10-CM

## 2024-06-03 PROCEDURE — 250N000011 HC RX IP 250 OP 636: Performed by: INTERNAL MEDICINE

## 2024-06-03 RX ORDER — HEPARIN SODIUM,PORCINE 10 UNIT/ML
5-20 VIAL (ML) INTRAVENOUS DAILY PRN
Status: CANCELLED | OUTPATIENT
Start: 2024-06-03

## 2024-06-03 RX ORDER — HEPARIN SODIUM (PORCINE) LOCK FLUSH IV SOLN 100 UNIT/ML 100 UNIT/ML
5 SOLUTION INTRAVENOUS
Status: CANCELLED | OUTPATIENT
Start: 2024-06-03

## 2024-06-03 RX ORDER — HEPARIN SODIUM (PORCINE) LOCK FLUSH IV SOLN 100 UNIT/ML 100 UNIT/ML
5 SOLUTION INTRAVENOUS
Status: DISCONTINUED | OUTPATIENT
Start: 2024-06-03 | End: 2024-06-03 | Stop reason: HOSPADM

## 2024-06-03 RX ADMIN — Medication 5 ML: at 08:48

## 2024-06-17 ENCOUNTER — OFFICE VISIT (OUTPATIENT)
Dept: FAMILY MEDICINE | Facility: CLINIC | Age: 74
End: 2024-06-17
Payer: COMMERCIAL

## 2024-06-17 VITALS
TEMPERATURE: 98.6 F | HEART RATE: 68 BPM | SYSTOLIC BLOOD PRESSURE: 118 MMHG | OXYGEN SATURATION: 96 % | BODY MASS INDEX: 20.16 KG/M2 | DIASTOLIC BLOOD PRESSURE: 84 MMHG | RESPIRATION RATE: 16 BRPM | WEIGHT: 144 LBS | HEIGHT: 71 IN

## 2024-06-17 DIAGNOSIS — C18.7 ADENOCARCINOMA OF SIGMOID COLON (H): ICD-10-CM

## 2024-06-17 DIAGNOSIS — T45.1X5A CHEMOTHERAPY-INDUCED PERIPHERAL NEUROPATHY (H): ICD-10-CM

## 2024-06-17 DIAGNOSIS — G62.0 CHEMOTHERAPY-INDUCED PERIPHERAL NEUROPATHY (H): ICD-10-CM

## 2024-06-17 DIAGNOSIS — Z00.00 MEDICARE ANNUAL WELLNESS VISIT, SUBSEQUENT: Primary | ICD-10-CM

## 2024-06-17 DIAGNOSIS — Z23 HIGH PRIORITY FOR 2019-NCOV VACCINE: ICD-10-CM

## 2024-06-17 PROBLEM — K56.609 SBO (SMALL BOWEL OBSTRUCTION) (H): Status: RESOLVED | Noted: 2022-08-21 | Resolved: 2024-06-17

## 2024-06-17 PROBLEM — K65.0 PERITONITIS, ACUTE GENERALIZED (H): Status: RESOLVED | Noted: 2022-08-29 | Resolved: 2024-06-17

## 2024-06-17 PROBLEM — C19: Status: RESOLVED | Noted: 2023-02-08 | Resolved: 2024-06-17

## 2024-06-17 PROBLEM — R64 CACHEXIA (H): Status: RESOLVED | Noted: 2022-04-11 | Resolved: 2024-06-17

## 2024-06-17 PROBLEM — Z93.3 COLOSTOMY PRESENT (H): Status: RESOLVED | Noted: 2022-04-11 | Resolved: 2024-06-17

## 2024-06-17 PROBLEM — R64 CANCER CACHEXIA (H): Status: RESOLVED | Noted: 2022-08-21 | Resolved: 2024-06-17

## 2024-06-17 PROBLEM — Z93.2 ILEOSTOMY STATUS (H): Status: RESOLVED | Noted: 2023-05-03 | Resolved: 2024-06-17

## 2024-06-17 PROBLEM — C18.9 COLON ADENOCARCINOMA (H): Status: RESOLVED | Noted: 2022-04-11 | Resolved: 2024-06-17

## 2024-06-17 PROBLEM — G89.3 CANCER ASSOCIATED PAIN: Status: RESOLVED | Noted: 2022-06-20 | Resolved: 2024-06-17

## 2024-06-17 PROBLEM — K56.609 SMALL BOWEL OBSTRUCTION (H): Status: RESOLVED | Noted: 2023-05-17 | Resolved: 2024-06-17

## 2024-06-17 PROCEDURE — 99213 OFFICE O/P EST LOW 20 MIN: CPT | Mod: 25 | Performed by: FAMILY MEDICINE

## 2024-06-17 PROCEDURE — G0438 PPPS, INITIAL VISIT: HCPCS | Performed by: FAMILY MEDICINE

## 2024-06-17 RX ORDER — RESPIRATORY SYNCYTIAL VIRUS VACCINE 120MCG/0.5
0.5 KIT INTRAMUSCULAR ONCE
Qty: 1 EACH | Refills: 0 | Status: CANCELLED | OUTPATIENT
Start: 2024-06-17 | End: 2024-06-17

## 2024-06-17 SDOH — HEALTH STABILITY: PHYSICAL HEALTH: ON AVERAGE, HOW MANY DAYS PER WEEK DO YOU ENGAGE IN MODERATE TO STRENUOUS EXERCISE (LIKE A BRISK WALK)?: 5 DAYS

## 2024-06-17 SDOH — HEALTH STABILITY: PHYSICAL HEALTH: ON AVERAGE, HOW MANY MINUTES DO YOU ENGAGE IN EXERCISE AT THIS LEVEL?: 30 MIN

## 2024-06-17 ASSESSMENT — PAIN SCALES - GENERAL: PAINLEVEL: NO PAIN (0)

## 2024-06-17 ASSESSMENT — SOCIAL DETERMINANTS OF HEALTH (SDOH): HOW OFTEN DO YOU GET TOGETHER WITH FRIENDS OR RELATIVES?: ONCE A WEEK

## 2024-06-17 NOTE — PROGRESS NOTES
Preventive Care Visit  [facility]  Segundo Bentley MD, Family Medicine  Jun 17, 2024      Assessment & Plan   Wellness visit  Metastatic colon cancer, in remission  Post chemo neuropathy, stable     Doing well.  See dentist for poor dentition.  He knows    Doesn't want to try other meds for neuropathy      Continue surveillance follow-up with oncology per their recommendations    No labs due today.        Nicotine/Tobacco Cessation  He reports that he has been smoking cigarettes. He has a 25 pack-year smoking history. He has never used smokeless tobacco.  Nicotine/Tobacco Cessation Plan  Not interested in quitting right now        Counseling  Appropriate preventive services were discussed with this patient, including applicable screening as appropriate for fall prevention, nutrition, physical activity, Tobacco-use cessation, weight loss and cognition.  Checklist reviewing preventive services available has been given to the patient.  Reviewed patient's diet, addressing concerns and/or questions.   The patient was instructed to see the dentist every 6 months.   Information on urinary incontinence and treatment options given to patient.           Ambrose Traylor is a 73 year old, presenting for the following:  Wellness Visit and Dental Problem (Teeth falling out, does not see a dentist )    Colon cancer, metastatic.  In remission after chemo and surgery. Some neuropathy after chemo, some balance issues chronically now      Poor dentition.  Is going to see dentist.        6/17/2024     8:19 AM   Additional Questions   Roomed by adri   Accompanied by self       Health Care Directive  Patient does not have a Health Care Directive or Living Will:     HPI        6/17/2024   General Health   How would you rate your overall physical health? (!) FAIR   Feel stress (tense, anxious, or unable to sleep) Not at all         6/17/2024   Nutrition   Diet: Other   If other, please elaborate: higher  calorie         6/17/2024   Exercise   Days per week of moderate/strenous exercise 5 days   Average minutes spent exercising at this level 30 min         6/17/2024   Social Factors   Frequency of gathering with friends or relatives Once a week   Worry food won't last until get money to buy more No   Food not last or not have enough money for food? No   Do you have housing?  Yes   Are you worried about losing your housing? No   Lack of transportation? No   Unable to get utilities (heat,electricity)? No         6/17/2024   Fall Risk   Fallen 2 or more times in the past year? No    No   Trouble with walking or balance? No    Yes          6/17/2024   Activities of Daily Living- Home Safety   Needs help with the following daily activites None of the above   Safety concerns in the home None of the above         6/17/2024   Dental   Dentist two times every year? (!) NO         6/17/2024   Hearing Screening   Hearing concerns? None of the above         6/17/2024   Driving Risk Screening   Patient/family members have concerns about driving No         6/17/2024   General Alertness/Fatigue Screening   Have you been more tired than usual lately? No         6/17/2024   Urinary Incontinence Screening   Bothered by leaking urine in past 6 months Yes         6/17/2024   TB Screening   Were you born outside of the [place]? No         Today's PHQ-2 Score:       6/17/2024     8:14 AM   PHQ-2 ( 1999 [employer])   Q1: Little interest or pleasure in doing things 0   Q2: Feeling down, depressed or hopeless 0   PHQ-2 Score 0   Q1: Little interest or pleasure in doing things Not at all   Q2: Feeling down, depressed or hopeless Not at all   PHQ-2 Score 0           6/17/2024   Substance Use   If I could quit smoking, I would Somewhat agree   I want to quit somking, worry about health affects Somewhat agree   Willing to make a plan to quit smoking Neutral   Willing to cut down before quitting Somewhat agree   Alcohol more than 3/day or more than 7/wk  Not Applicable   Do you have a current opioid prescription? No   How severe/bad is pain from 1 to 10? 4/10   Do you use any other substances recreationally? No     Social History     Tobacco Use    Smoking status: Every Day     Current packs/day: 0.50     Average packs/day: 0.5 packs/day for 50.0 years (25.0 ttl pk-yrs)     Types: Cigarettes    Smokeless tobacco: Never   Vaping Use    Vaping status: Never Used   Substance Use Topics    Alcohol use: Not Currently    Drug use: Never     ASCVD Risk   The ASCVD Risk score (Mayela FARNSWORTH, et al., 2019) failed to calculate for the following reasons:    Cannot find a previous HDL lab    Cannot find a previous total cholesterol lab      Reviewed and updated as needed this visit by Provider                 Current providers sharing in care for this patient include:  Patient Care Team:  Segundo Bentley MD as PCP - General (Family Medicine)  Idania Munson PA-C as Physician Assistant  Danna Collnis RN as Specialty Care Coordinator (Hematology & Oncology)  Pamela Colorado MD as MD (Medical Oncology)  Riley Magdaleno MD as Assigned Surgical Provider  Pamela Colorado MD as Assigned Cancer Care Provider  Segundo Bentley MD as Assigned PCP    The following health maintenance items are reviewed in Epic and correct as of today:  Health Maintenance   Topic Date Due    ANNUAL REVIEW OF  ORDERS  Never done    ADVANCE CARE PLANNING  Never done    Pneumococcal Vaccine: 65+ Years (1 of 2 - PCV) Never done    HEPATITIS C SCREENING  Never done    ZOSTER IMMUNIZATION (1 of 2) Never done    DTAP/TDAP/TD IMMUNIZATION (1 - Tdap) Never done    LIPID  Never done    RSV VACCINE (Pregnancy & 60+) (1 - 1-dose 60+ series) Never done    MEDICARE ANNUAL WELLNESS VISIT  Never done    COVID-19 Vaccine (4 - 2023-24 season) 09/01/2023    NICOTINE/TOBACCO CESSATION COUNSELING Q 1 YR  09/02/2023    INFLUENZA VACCINE (Season Ended) 09/01/2024    LUNG CANCER SCREENING  12/06/2024     "FALL RISK ASSESSMENT  06/17/2025    GLUCOSE  03/20/2027    COLORECTAL CANCER SCREENING  02/08/2028    PHQ-2 (once per calendar year)  Completed    AORTIC ANEURYSM SCREENING (SYSTEM ASSIGNED)  Completed    IPV IMMUNIZATION  Aged Out    HPV IMMUNIZATION  Aged Out    MENINGITIS IMMUNIZATION  Aged Out    RSV MONOCLONAL ANTIBODY  Aged Out            Objective    Exam  /84   Pulse 68   Temp 98.6  F (37  C) (Tympanic)   Resp 16   Ht 1.803 m (5' 11\")   Wt 65.3 kg (144 lb)   SpO2 96%   BMI 20.08 kg/m     Estimated body mass index is 20.08 kg/m  as calculated from the following:    Height as of this encounter: 1.803 m (5' 11\").    Weight as of this encounter: 65.3 kg (144 lb).    Physical Exam  GEN: Alert and oriented, in no acute distress  CV: RRR, no murmur  RESP: lungs clear bilaterally, good effort  EXT: no edema or lesions noted in lower extremities  Neck: neck supple without mass or lymphadenopathy  Poor dentition, no infection          6/17/2024   Mini Cog   Clock Draw Score 2 Normal   3 Item Recall 3 objects recalled   Mini Cog Total Score 5              Signed Electronically by: Segundo Bentley MD    "

## 2024-06-18 ENCOUNTER — PATIENT OUTREACH (OUTPATIENT)
Dept: GASTROENTEROLOGY | Facility: CLINIC | Age: 74
End: 2024-06-18
Payer: COMMERCIAL

## 2024-07-16 ENCOUNTER — LAB (OUTPATIENT)
Dept: INFUSION THERAPY | Facility: CLINIC | Age: 74
End: 2024-07-16
Attending: NURSE PRACTITIONER
Payer: COMMERCIAL

## 2024-07-16 ENCOUNTER — HOSPITAL ENCOUNTER (OUTPATIENT)
Dept: CT IMAGING | Facility: CLINIC | Age: 74
Discharge: HOME | End: 2024-07-16
Attending: NURSE PRACTITIONER | Admitting: NURSE PRACTITIONER
Payer: COMMERCIAL

## 2024-07-16 DIAGNOSIS — C78.6 METASTASIS TO PERITONEUM (H): ICD-10-CM

## 2024-07-16 DIAGNOSIS — C18.7 ADENOCARCINOMA OF SIGMOID COLON (H): ICD-10-CM

## 2024-07-16 LAB
ALBUMIN SERPL BCG-MCNC: 4.3 G/DL (ref 3.5–5.2)
ALP SERPL-CCNC: 48 U/L (ref 40–150)
ALT SERPL W P-5'-P-CCNC: 17 U/L (ref 0–70)
ANION GAP SERPL CALCULATED.3IONS-SCNC: 10 MMOL/L (ref 7–15)
AST SERPL W P-5'-P-CCNC: 22 U/L (ref 0–45)
BASOPHILS # BLD AUTO: 0.1 10E3/UL (ref 0–0.2)
BASOPHILS NFR BLD AUTO: 2 %
BILIRUB SERPL-MCNC: 0.3 MG/DL
BUN SERPL-MCNC: 21.2 MG/DL (ref 8–23)
CALCIUM SERPL-MCNC: 9.6 MG/DL (ref 8.8–10.2)
CEA SERPL-MCNC: 4.3 NG/ML
CHLORIDE SERPL-SCNC: 106 MMOL/L (ref 98–107)
CREAT BLD-MCNC: 0.9 MG/DL (ref 0.7–1.3)
CREAT SERPL-MCNC: 0.85 MG/DL (ref 0.67–1.17)
EGFRCR SERPLBLD CKD-EPI 2021: >60 ML/MIN/1.73M2
EGFRCR SERPLBLD CKD-EPI 2021: >90 ML/MIN/1.73M2
EOSINOPHIL # BLD AUTO: 0.3 10E3/UL (ref 0–0.7)
EOSINOPHIL NFR BLD AUTO: 4 %
ERYTHROCYTE [DISTWIDTH] IN BLOOD BY AUTOMATED COUNT: 14.1 % (ref 10–15)
GLUCOSE SERPL-MCNC: 100 MG/DL (ref 70–99)
HCO3 SERPL-SCNC: 25 MMOL/L (ref 22–29)
HCT VFR BLD AUTO: 48.1 % (ref 40–53)
HGB BLD-MCNC: 16.4 G/DL (ref 13.3–17.7)
IMM GRANULOCYTES # BLD: 0 10E3/UL
IMM GRANULOCYTES NFR BLD: 0 %
LYMPHOCYTES # BLD AUTO: 2.6 10E3/UL (ref 0.8–5.3)
LYMPHOCYTES NFR BLD AUTO: 35 %
MCH RBC QN AUTO: 32.6 PG (ref 26.5–33)
MCHC RBC AUTO-ENTMCNC: 34.1 G/DL (ref 31.5–36.5)
MCV RBC AUTO: 96 FL (ref 78–100)
MONOCYTES # BLD AUTO: 0.8 10E3/UL (ref 0–1.3)
MONOCYTES NFR BLD AUTO: 10 %
NEUTROPHILS # BLD AUTO: 3.8 10E3/UL (ref 1.6–8.3)
NEUTROPHILS NFR BLD AUTO: 50 %
NRBC # BLD AUTO: 0 10E3/UL
NRBC BLD AUTO-RTO: 0 /100
PLATELET # BLD AUTO: 203 10E3/UL (ref 150–450)
POTASSIUM SERPL-SCNC: 4.7 MMOL/L (ref 3.4–5.3)
PROT SERPL-MCNC: 7.1 G/DL (ref 6.4–8.3)
RBC # BLD AUTO: 5.03 10E6/UL (ref 4.4–5.9)
SODIUM SERPL-SCNC: 141 MMOL/L (ref 135–145)
WBC # BLD AUTO: 7.6 10E3/UL (ref 4–11)

## 2024-07-16 PROCEDURE — 250N000011 HC RX IP 250 OP 636: Performed by: NURSE PRACTITIONER

## 2024-07-16 PROCEDURE — 250N000011 HC RX IP 250 OP 636: Performed by: RADIOLOGY

## 2024-07-16 PROCEDURE — 82565 ASSAY OF CREATININE: CPT

## 2024-07-16 PROCEDURE — 36591 DRAW BLOOD OFF VENOUS DEVICE: CPT

## 2024-07-16 PROCEDURE — 82378 CARCINOEMBRYONIC ANTIGEN: CPT | Performed by: NURSE PRACTITIONER

## 2024-07-16 PROCEDURE — 71260 CT THORAX DX C+: CPT

## 2024-07-16 PROCEDURE — 80053 COMPREHEN METABOLIC PANEL: CPT | Performed by: NURSE PRACTITIONER

## 2024-07-16 PROCEDURE — 85025 COMPLETE CBC W/AUTO DIFF WBC: CPT | Performed by: NURSE PRACTITIONER

## 2024-07-16 PROCEDURE — 250N000009 HC RX 250: Performed by: RADIOLOGY

## 2024-07-16 RX ORDER — HEPARIN SODIUM (PORCINE) LOCK FLUSH IV SOLN 100 UNIT/ML 100 UNIT/ML
5 SOLUTION INTRAVENOUS ONCE
Status: COMPLETED | OUTPATIENT
Start: 2024-07-16 | End: 2024-07-16

## 2024-07-16 RX ORDER — IOPAMIDOL 755 MG/ML
70 INJECTION, SOLUTION INTRAVASCULAR ONCE
Status: COMPLETED | OUTPATIENT
Start: 2024-07-16 | End: 2024-07-16

## 2024-07-16 RX ADMIN — SODIUM CHLORIDE 58 ML: 9 INJECTION, SOLUTION INTRAVENOUS at 10:19

## 2024-07-16 RX ADMIN — IOPAMIDOL 70 ML: 755 INJECTION, SOLUTION INTRAVENOUS at 10:19

## 2024-07-16 RX ADMIN — Medication 5 ML: at 10:29

## 2024-07-16 NOTE — PROGRESS NOTES
PAC labs drawn and sent. Port accessed without difficulty. Good blood return noted. Needle left in place for CT.    Todd RN

## 2024-07-23 ENCOUNTER — ONCOLOGY VISIT (OUTPATIENT)
Dept: ONCOLOGY | Facility: CLINIC | Age: 74
End: 2024-07-23
Attending: INTERNAL MEDICINE
Payer: COMMERCIAL

## 2024-07-23 VITALS
RESPIRATION RATE: 12 BRPM | BODY MASS INDEX: 20.18 KG/M2 | SYSTOLIC BLOOD PRESSURE: 145 MMHG | HEART RATE: 51 BPM | OXYGEN SATURATION: 98 % | WEIGHT: 144.7 LBS | TEMPERATURE: 97.3 F | DIASTOLIC BLOOD PRESSURE: 90 MMHG

## 2024-07-23 DIAGNOSIS — G62.0 CHEMOTHERAPY-INDUCED PERIPHERAL NEUROPATHY (H): ICD-10-CM

## 2024-07-23 DIAGNOSIS — T45.1X5A CHEMOTHERAPY-INDUCED PERIPHERAL NEUROPATHY (H): ICD-10-CM

## 2024-07-23 DIAGNOSIS — C18.7 ADENOCARCINOMA OF SIGMOID COLON (H): Primary | ICD-10-CM

## 2024-07-23 PROCEDURE — G2211 COMPLEX E/M VISIT ADD ON: HCPCS | Performed by: INTERNAL MEDICINE

## 2024-07-23 PROCEDURE — G0463 HOSPITAL OUTPT CLINIC VISIT: HCPCS | Performed by: INTERNAL MEDICINE

## 2024-07-23 PROCEDURE — 99214 OFFICE O/P EST MOD 30 MIN: CPT | Performed by: INTERNAL MEDICINE

## 2024-07-23 ASSESSMENT — PAIN SCALES - GENERAL: PAINLEVEL: NO PAIN (0)

## 2024-07-23 NOTE — PROGRESS NOTES
[facility] Hematology and Oncology Outpatient Progress Note    Patient: Kalin Shepard  MRN: [id number]  Date of Service: Jul 23, 2024          Reason for Visit    Stage IV colon cancer (peritoneal mets)    Primary Oncologist: Dr. Colorado      Assessment/Plan  Stage IV colon cancer (peritoneal mets)  Layton completed 12 cycles of induction/neoadjuvant chemo through Nov, 2022 with an excellent radiographical response.    He, therefore, underwent resection of his primary sigmoid/rectal tumor in February 2023. This was grossly removed with negative margins and no nodes involved.  He did have significant residual tumor without any treatment effect.  ypT4b.  No evidence of damon metastasis however he had a previously biopsy-proven peritoneal metastasis.  Omentectomy showed no evidence of any residual malignancy.    He is currently on surveillance.  Here with repeat labs and CT scan.  Labs show stable CEA at 4.3. CBC within normal limits.  LFTs are normal.  Normal creatinine.  CT scan shows no evidence of any metastatic disease or local recurrence.  He does have stable but mildly enlarged right hilar lymph node which was previously thought to be reactive.  Overall no clinical or lab evidence of any disease progression.  Will continue to monitor him closely.  This November it will be 2 years since he finished chemotherapy.  Will continue 6 monthly labs and CT scan for the next year or so and then will switch to once a year CT scans.    2.   Chemo-induced peripheral neuropathy (gr 1)  Persistent numbness in fingers and toes, no pain.     3.   Cachexia  Weight is better    4.   Nicotine dependence  Smoking currently. Not interested in quitting at this time, understands benefits in quitting.     ______________________________________________________________________________    History of Present Illness/ Interval History    Mr. Kalin Shepard is a 73 year old who was diagnosed with sigmoid colon cancer with  peritoneal mets in March 2022. He underwent 12 cycles of chemo - FOLFOX + bevacizumab; kranthi was omitted for SBO and Oxaliplatin dose-reduced/later omitted for peripheral neuropathy.     At completion of chemo in November 2022, restaging showed good response with no clear mets. He, therefore, underwent resection of the residual primary tumor in early 2023 with negative margins and no nodes involved. He had a loop ileostomy placed and take-down in early May 2023. He continues on surveillance. Returns for 3-mth visit with labs and CT scan.    Doing well.  Continues to have grade 1-2 peripheral neuropathy.  No worsening.  No new issues since last visit.  No abdominal pain.  Has gained some weight.  Good appetite.  Continues to smoke.      ECOG Performance    0      Oncology History/Treatment  Diagnosis/Stage:   3/26/2022: IV sigmoid colon cancer to peritoneum  -Presented with abdominal pain secondary to bowel obstruction.  CT scan showed mass in sigmoid colon with upstream small bowel and colonic obstruction.  Underwent ostomy placement.    -Peritoneal biopsy positive for adenocarcinoma.    -No evidence of other distant metastasis based on PET scan.  -BRAF V600E positive.  K-aldo and NRAS negative.  Proficient MMR.    8/24/2022 open laparotomy for SBO, lysis of adhesions. No evidence of any malignancy intra-abdominally noted.     2/8/2023 post esther-adjuvant chemo. Surgical resection primary sigmoid/rectal tumor path:   Adenocarcinoma, moderate to poorly differentiated, invading through the wall to involve the adjacent adherent perirectal fibroadipose tissue  -Tumor size: 3 cm in greatest dimension  -Resection margins free of involvement  -No evidence of lymphovascular or perineural invasion  -Treatment effect: Poor response (score 3)  -No tumor deposits identified  -Thirty-three benign lymph nodes (0/33)  -Sections of vas deferens with no evidence of involvement by adenocarcinoma.      Treatment:  Induction/neoadjuvant  chemotherapy started for potentially resectable metachronous peritoneal metastasis    4/26/2022 - 11/2022: FOLFOX and bevacizumab x 12 cycles (Amrita stopped after cycle 7; oxali stopped after cycle 11).   -cycle 7 delayed for neutropenia. Neulasta added  -cycle 8 delayed for hospitalization with SBO (favoring ileus/adhesions, no evidence of malignancy). Post-op course complicated by peritonitis requiring antibiotics. Temp TPN, then cleared for oral intake.   -Bevacizumab omitted with cycle 8 to allow for healing postmalarial. Resumed with cycle 9.  -cycle 9 oxilaplatin dose-reduced 20% for gr 1-2 neuropathy  -cycle 11 oxaliplatin dose-reduced another 10-15% (33% total from original) for gr 1-2 neuropathy  -cycle 12 infusional 5FU only    2/8/2023: exploratory lap, low anterior resection, takedown loop colostomy, diverting loop ileostomy and flex sigmoidoscopy    Ileostomy reversal with CRS (5/3/23)      Physical Exam    GENERAL: Alert and oriented to time place and person. Seated comfortably. In no distress. Wife accompanies.   HEENT: No icterus  Lymph: No palpable cervical, axillary, inguinal nodes.  Lungs: Clear bilaterally  Heart: RRR  Abd: Soft, non-tender, no hepatomegaly. Mid-line incision.   Extremities: No edema  Neuro: Non-focal    Lab Results    No results found for this or any previous visit (from the past 168 hour(s)).      CBC, CMP, CEA reviewed    Imaging    CT Chest/Abdomen/Pelvis w Contrast    Result Date: 7/16/2024  CT CHEST/ABDOMEN/PELVIS WITH CONTRAST July 16, 2024 10:45 AM CLINICAL HISTORY: History of colon cancer, surveillance. Adenocarcinoma of sigmoid colon (H). 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 TECHNIQUE: CT scan of the chest, abdomen, and pelvis was performed following injection of IV contrast. Multiplanar reformats were obtained. Dose reduction techniques were used. CONTRAST: 70 mL Isovue 370. COMPARISON: CTs, most recently CT of the chest, abdomen and pelvis dated 12/6/2023. Abdominal MRI dated  5/4/2022. FINDINGS: LUNGS AND PLEURA: Mild apical predominant centrilobular emphysematous changes. A few areas of mild linear atelectasis/scarring. No consolidation or acute infiltrate. No pleural effusion or pneumothorax. Few small pulmonary nodules are unchanged since at least 8/26/2022, and should be benign. For example, 3 mm scarlike nodule in the anterior right upper lobe (series 4, image 104) and 3 mm nodule in the superior segment of the left lower lobe (series 4, image 190). A few punctate calcified granulomas. MEDIASTINUM/AXILLAE: Left IJ Port-A-Cath tip terminates in the mid SVC. Cardiac size is within normal limits. No pericardial effusion. Normal caliber thoracic aorta. Stable 1.2 cm short axis diameter right hilar lymph node. CORONARY ARTERY CALCIFICATION: Mild. HEPATOBILIARY: Subcentimeter hypodensities are too small to characterize, but are stable and should be benign. These likely represent small cysts or hemangiomas. No biliary duct dilatation or calcified gallstones. PANCREAS: Normal. SPLEEN: Normal. ADRENAL GLANDS: Normal. KIDNEYS/BLADDER: Left upper pole cortical cyst, and bilateral subcentimeter presumed cysts, which do not require follow-up. Otherwise normal. BOWEL: Postoperative changes to the rectosigmoid colon with colorectal anastomosis. Small bowel resection with anastomosis in the left lower quadrant. The bowel is nondistended without inflammatory change. No evidence for appendicitis. LYMPH NODES: Normal. VASCULATURE: Atherosclerosis. Mild aneurysmal dilatation of the infrarenal abdominal aorta, measuring 3 cm in diameter. PELVIC ORGANS: Mildly enlarged prostate. ADDITIONAL FINDINGS: No ascites or fluid collections. MUSCULOSKELETAL: Diffuse osteopenia. Mild degenerative changes. No aggressive osseous lesions.     IMPRESSION: 1.  No CT evidence of recurrent or metastatic disease in the chest, abdomen or pelvis. 2.  Stable mildly enlarged right hilar lymph node, likely reactive. 3.  Mild  emphysematous changes. 4.  Infrarenal abdominal aortic aneurysm, measuring 3 cm in diameter. 5.  Mildly enlarged prostate. FAUSTINO MELISSA MD   SYSTEM ID:  [id number]     I have personally reviewed the CT scan images and report and independently interpreted the results to my ability.      The longitudinal plan of care for the diagnosis(es)/condition(s) as documented were addressed during this visit. Due to the added complexity in care, I will continue to support Layton in the subsequent management and with ongoing continuity of care.      Signed by:   Pamela Colorado MD  Hematology and Medical Oncology  [facility]

## 2024-07-23 NOTE — LETTER
7/23/2024      Kalin Shepard  [address]  [facility] [address]      Dear Colleague,    Thank you for referring your patient, Kalin Shepard, to the [facility] [place]. Please see a copy of my visit note below.    [facility] Hematology and Oncology Outpatient Progress Note    Patient: Kalin Shepard  MRN: [id number]  Date of Service: Jul 23, 2024          Reason for Visit    Stage IV colon cancer (peritoneal mets)    Primary Oncologist: Dr. Colorado      Assessment/Plan  Stage IV colon cancer (peritoneal mets)  Layton completed 12 cycles of induction/neoadjuvant chemo through Nov, 2022 with an excellent radiographical response.    He, therefore, underwent resection of his primary sigmoid/rectal tumor in February 2023. This was grossly removed with negative margins and no nodes involved.  He did have significant residual tumor without any treatment effect.  ypT4b.  No evidence of damon metastasis however he had a previously biopsy-proven peritoneal metastasis.  Omentectomy showed no evidence of any residual malignancy.    He is currently on surveillance.  Here with repeat labs and CT scan.  Labs show stable CEA at 4.3. CBC within normal limits.  LFTs are normal.  Normal creatinine.  CT scan shows no evidence of any metastatic disease or local recurrence.  He does have stable but mildly enlarged right hilar lymph node which was previously thought to be reactive.  Overall no clinical or lab evidence of any disease progression.  Will continue to monitor him closely.  This November it will be 2 years since he finished chemotherapy.  Will continue 6 monthly labs and CT scan for the next year or so and then will switch to once a year CT scans.    2.   Chemo-induced peripheral neuropathy (gr 1)  Persistent numbness in fingers and toes, no pain.     3.   Cachexia  Weight is better    4.   Nicotine dependence  Smoking currently. Not interested in quitting at this time, understands  benefits in quitting.     ______________________________________________________________________________    History of Present Illness/ Interval History    Mr. Kalin Shepard is a 73 year old who was diagnosed with sigmoid colon cancer with peritoneal mets in March 2022. He underwent 12 cycles of chemo - FOLFOX + bevacizumab; kranthi was omitted for SBO and Oxaliplatin dose-reduced/later omitted for peripheral neuropathy.     At completion of chemo in November 2022, restaging showed good response with no clear mets. He, therefore, underwent resection of the residual primary tumor in early 2023 with negative margins and no nodes involved. He had a loop ileostomy placed and take-down in early May 2023. He continues on surveillance. Returns for 3-mth visit with labs and CT scan.    Doing well.  Continues to have grade 1-2 peripheral neuropathy.  No worsening.  No new issues since last visit.  No abdominal pain.  Has gained some weight.  Good appetite.  Continues to smoke.      ECOG Performance    0      Oncology History/Treatment  Diagnosis/Stage:   3/26/2022: IV sigmoid colon cancer to peritoneum  -Presented with abdominal pain secondary to bowel obstruction.  CT scan showed mass in sigmoid colon with upstream small bowel and colonic obstruction.  Underwent ostomy placement.    -Peritoneal biopsy positive for adenocarcinoma.    -No evidence of other distant metastasis based on PET scan.  -BRAF V600E positive.  K-aldo and NRAS negative.  Proficient MMR.    8/24/2022 open laparotomy for SBO, lysis of adhesions. No evidence of any malignancy intra-abdominally noted.     2/8/2023 post esther-adjuvant chemo. Surgical resection primary sigmoid/rectal tumor path:   Adenocarcinoma, moderate to poorly differentiated, invading through the wall to involve the adjacent adherent perirectal fibroadipose tissue  -Tumor size: 3 cm in greatest dimension  -Resection margins free of involvement  -No evidence of lymphovascular or perineural  invasion  -Treatment effect: Poor response (score 3)  -No tumor deposits identified  -Thirty-three benign lymph nodes (0/33)  -Sections of vas deferens with no evidence of involvement by adenocarcinoma.      Treatment:  Induction/neoadjuvant chemotherapy started for potentially resectable metachronous peritoneal metastasis    4/26/2022 - 11/2022: FOLFOX and bevacizumab x 12 cycles (Amrita stopped after cycle 7; oxali stopped after cycle 11).   -cycle 7 delayed for neutropenia. Neulasta added  -cycle 8 delayed for hospitalization with SBO (favoring ileus/adhesions, no evidence of malignancy). Post-op course complicated by peritonitis requiring antibiotics. Temp TPN, then cleared for oral intake.   -Bevacizumab omitted with cycle 8 to allow for healing postmalarial. Resumed with cycle 9.  -cycle 9 oxilaplatin dose-reduced 20% for gr 1-2 neuropathy  -cycle 11 oxaliplatin dose-reduced another 10-15% (33% total from original) for gr 1-2 neuropathy  -cycle 12 infusional 5FU only    2/8/2023: exploratory lap, low anterior resection, takedown loop colostomy, diverting loop ileostomy and flex sigmoidoscopy    Ileostomy reversal with CRS (5/3/23)      Physical Exam    GENERAL: Alert and oriented to time place and person. Seated comfortably. In no distress. Wife accompanies.   HEENT: No icterus  Lymph: No palpable cervical, axillary, inguinal nodes.  Lungs: Clear bilaterally  Heart: RRR  Abd: Soft, non-tender, no hepatomegaly. Mid-line incision.   Extremities: No edema  Neuro: Non-focal    Lab Results    No results found for this or any previous visit (from the past 168 hour(s)).      CBC, CMP, CEA reviewed    Imaging    CT Chest/Abdomen/Pelvis w Contrast    Result Date: 7/16/2024  CT CHEST/ABDOMEN/PELVIS WITH CONTRAST July 16, 2024 10:45 AM CLINICAL HISTORY: History of colon cancer, surveillance. Adenocarcinoma of sigmoid colon (H). 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 TECHNIQUE: CT scan of the chest, abdomen, and pelvis was performed  following injection of IV contrast. Multiplanar reformats were obtained. Dose reduction techniques were used. CONTRAST: 70 mL Isovue 370. COMPARISON: CTs, most recently CT of the chest, abdomen and pelvis dated 12/6/2023. Abdominal MRI dated 5/4/2022. FINDINGS: LUNGS AND PLEURA: Mild apical predominant centrilobular emphysematous changes. A few areas of mild linear atelectasis/scarring. No consolidation or acute infiltrate. No pleural effusion or pneumothorax. Few small pulmonary nodules are unchanged since at least 8/26/2022, and should be benign. For example, 3 mm scarlike nodule in the anterior right upper lobe (series 4, image 104) and 3 mm nodule in the superior segment of the left lower lobe (series 4, image 190). A few punctate calcified granulomas. MEDIASTINUM/AXILLAE: Left IJ Port-A-Cath tip terminates in the mid SVC. Cardiac size is within normal limits. No pericardial effusion. Normal caliber thoracic aorta. Stable 1.2 cm short axis diameter right hilar lymph node. CORONARY ARTERY CALCIFICATION: Mild. HEPATOBILIARY: Subcentimeter hypodensities are too small to characterize, but are stable and should be benign. These likely represent small cysts or hemangiomas. No biliary duct dilatation or calcified gallstones. PANCREAS: Normal. SPLEEN: Normal. ADRENAL GLANDS: Normal. KIDNEYS/BLADDER: Left upper pole cortical cyst, and bilateral subcentimeter presumed cysts, which do not require follow-up. Otherwise normal. BOWEL: Postoperative changes to the rectosigmoid colon with colorectal anastomosis. Small bowel resection with anastomosis in the left lower quadrant. The bowel is nondistended without inflammatory change. No evidence for appendicitis. LYMPH NODES: Normal. VASCULATURE: Atherosclerosis. Mild aneurysmal dilatation of the infrarenal abdominal aorta, measuring 3 cm in diameter. PELVIC ORGANS: Mildly enlarged prostate. ADDITIONAL FINDINGS: No ascites or fluid collections. MUSCULOSKELETAL: Diffuse  "osteopenia. Mild degenerative changes. No aggressive osseous lesions.     IMPRESSION: 1.  No CT evidence of recurrent or metastatic disease in the chest, abdomen or pelvis. 2.  Stable mildly enlarged right hilar lymph node, likely reactive. 3.  Mild emphysematous changes. 4.  Infrarenal abdominal aortic aneurysm, measuring 3 cm in diameter. 5.  Mildly enlarged prostate. FAUSTINO MELISSA MD   SYSTEM ID:  [id number]     I have personally reviewed the CT scan images and report and independently interpreted the results to my ability.      The longitudinal plan of care for the diagnosis(es)/condition(s) as documented were addressed during this visit. Due to the added complexity in care, I will continue to support Layton in the subsequent management and with ongoing continuity of care.      Signed by:   Pamela Colorado MD  Hematology and Medical Oncology  [facility]        Oncology Rooming Note    July 23, 2024 11:37 AM   Kalin Shepard is a 73 year old male who presents for:    Chief Complaint   Patient presents with     Oncology Clinic Visit     Primary Diagnosis Adenocarcinoma of sigmoid colon - provider visit only     Initial Vitals: BP (!) 145/90 (BP Location: Right arm, Patient Position: Sitting, Cuff Size: Adult Regular)   Pulse 51   Temp 97.3  F (36.3  C) (Tympanic)   Resp 12   Wt 65.6 kg (144 lb 11.2 oz)   SpO2 98%   BMI 20.18 kg/m   Estimated body mass index is 20.18 kg/m  as calculated from the following:    Height as of 6/17/24: 1.803 m (5' 11\").    Weight as of this encounter: 65.6 kg (144 lb 11.2 oz). Body surface area is 1.81 meters squared.  No Pain (0) Comment: Data Unavailable   No LMP for male patient.  Allergies reviewed: Yes  Medications reviewed: Yes    Medications: Medication refills not needed today.  Pharmacy name entered into [employer]: [facility] [place], [place] - [address] [place] [address]    Frailty Screening:   Is the patient here for a new oncology " consult visit in cancer care? 2. No      Clinical concerns: None today.       Kat Augustin MA                Again, thank you for allowing me to participate in the care of your patient.        Sincerely,        Pamela Colorado MD

## 2024-07-23 NOTE — PROGRESS NOTES
"Oncology Rooming Note    July 23, 2024 11:37 AM   Kalin Shepard is a 73 year old male who presents for:    Chief Complaint   Patient presents with    Oncology Clinic Visit     Primary Diagnosis Adenocarcinoma of sigmoid colon - provider visit only     Initial Vitals: BP (!) 145/90 (BP Location: Right arm, Patient Position: Sitting, Cuff Size: Adult Regular)   Pulse 51   Temp 97.3  F (36.3  C) (Tympanic)   Resp 12   Wt 65.6 kg (144 lb 11.2 oz)   SpO2 98%   BMI 20.18 kg/m   Estimated body mass index is 20.18 kg/m  as calculated from the following:    Height as of 6/17/24: 1.803 m (5' 11\").    Weight as of this encounter: 65.6 kg (144 lb 11.2 oz). Body surface area is 1.81 meters squared.  No Pain (0) Comment: Data Unavailable   No LMP for male patient.  Allergies reviewed: Yes  Medications reviewed: Yes    Medications: Medication refills not needed today.  Pharmacy name entered into [facility]: [facility] [place], [place] - [address] [place] [address]    Frailty Screening:   Is the patient here for a new oncology consult visit in cancer care? 2. No      Clinical concerns: None today.       Kat Augustin MA              "

## 2024-08-12 ENCOUNTER — PATIENT OUTREACH (OUTPATIENT)
Dept: ONCOLOGY | Facility: CLINIC | Age: 74
End: 2024-08-12
Payer: COMMERCIAL

## 2024-08-12 NOTE — PROGRESS NOTES
[facility]: Cancer Care                                                                                          Enrollment status: maintenance  Follow up scheduled: 1/21/25    Signature:  YEIMY PARK RN

## 2024-09-23 ENCOUNTER — INFUSION THERAPY VISIT (OUTPATIENT)
Dept: INFUSION THERAPY | Facility: CLINIC | Age: 74
End: 2024-09-23
Attending: INTERNAL MEDICINE
Payer: COMMERCIAL

## 2024-09-23 DIAGNOSIS — E86.0 DEHYDRATION: Primary | ICD-10-CM

## 2024-09-23 PROCEDURE — 250N000011 HC RX IP 250 OP 636: Performed by: INTERNAL MEDICINE

## 2024-09-23 PROCEDURE — 96523 IRRIG DRUG DELIVERY DEVICE: CPT

## 2024-09-23 RX ORDER — HEPARIN SODIUM,PORCINE 10 UNIT/ML
5-20 VIAL (ML) INTRAVENOUS DAILY PRN
Status: DISCONTINUED | OUTPATIENT
Start: 2024-09-23 | End: 2024-09-23 | Stop reason: HOSPADM

## 2024-09-23 RX ORDER — HEPARIN SODIUM (PORCINE) LOCK FLUSH IV SOLN 100 UNIT/ML 100 UNIT/ML
5 SOLUTION INTRAVENOUS
OUTPATIENT
Start: 2024-09-23

## 2024-09-23 RX ORDER — HEPARIN SODIUM (PORCINE) LOCK FLUSH IV SOLN 100 UNIT/ML 100 UNIT/ML
5 SOLUTION INTRAVENOUS
Status: DISCONTINUED | OUTPATIENT
Start: 2024-09-23 | End: 2024-09-23 | Stop reason: HOSPADM

## 2024-09-23 RX ORDER — HEPARIN SODIUM,PORCINE 10 UNIT/ML
5-20 VIAL (ML) INTRAVENOUS DAILY PRN
OUTPATIENT
Start: 2024-09-23

## 2024-09-23 RX ADMIN — Medication 5 ML: at 08:26

## 2024-09-23 NOTE — PROGRESS NOTES
Port flushed well, no blood return noted despite several NS flushes and position changes.  Pt did not want to Instill Alteplase today due to time involved.  Next port flush scheduled in 2 months.  Suggested he make an appt for next month and allow time for Alteplase.Rafia Infante RN

## 2024-10-30 ENCOUNTER — HOSPITAL ENCOUNTER (EMERGENCY)
Facility: CLINIC | Age: 74
Discharge: HOME | End: 2024-10-30
Attending: EMERGENCY MEDICINE | Admitting: EMERGENCY MEDICINE
Payer: COMMERCIAL

## 2024-10-30 ENCOUNTER — NURSE TRIAGE (OUTPATIENT)
Dept: FAMILY MEDICINE | Facility: CLINIC | Age: 74
End: 2024-10-30
Payer: COMMERCIAL

## 2024-10-30 VITALS
HEIGHT: 71 IN | OXYGEN SATURATION: 97 % | TEMPERATURE: 98 F | BODY MASS INDEX: 20.5 KG/M2 | SYSTOLIC BLOOD PRESSURE: 109 MMHG | DIASTOLIC BLOOD PRESSURE: 81 MMHG | HEART RATE: 82 BPM | RESPIRATION RATE: 20 BRPM | WEIGHT: 146.4 LBS

## 2024-10-30 DIAGNOSIS — R33.8 ACUTE URINARY RETENTION: ICD-10-CM

## 2024-10-30 DIAGNOSIS — K59.00 CONSTIPATION, UNSPECIFIED CONSTIPATION TYPE: ICD-10-CM

## 2024-10-30 LAB
ALBUMIN SERPL BCG-MCNC: 4.3 G/DL (ref 3.5–5.2)
ALBUMIN UR-MCNC: NEGATIVE MG/DL
ALP SERPL-CCNC: 50 U/L (ref 40–150)
ALT SERPL W P-5'-P-CCNC: 17 U/L (ref 0–70)
ANION GAP SERPL CALCULATED.3IONS-SCNC: 10 MMOL/L (ref 7–15)
APPEARANCE UR: CLEAR
AST SERPL W P-5'-P-CCNC: 25 U/L (ref 0–45)
BASOPHILS # BLD AUTO: 0.1 10E3/UL (ref 0–0.2)
BASOPHILS NFR BLD AUTO: 1 %
BILIRUB SERPL-MCNC: 0.5 MG/DL
BILIRUB UR QL STRIP: NEGATIVE
BUN SERPL-MCNC: 16.5 MG/DL (ref 8–23)
CALCIUM SERPL-MCNC: 9.6 MG/DL (ref 8.8–10.4)
CHLORIDE SERPL-SCNC: 104 MMOL/L (ref 98–107)
COLOR UR AUTO: YELLOW
CREAT SERPL-MCNC: 0.98 MG/DL (ref 0.67–1.17)
EGFRCR SERPLBLD CKD-EPI 2021: 80 ML/MIN/1.73M2
EOSINOPHIL # BLD AUTO: 0 10E3/UL (ref 0–0.7)
EOSINOPHIL NFR BLD AUTO: 0 %
ERYTHROCYTE [DISTWIDTH] IN BLOOD BY AUTOMATED COUNT: 13.8 % (ref 10–15)
GLUCOSE SERPL-MCNC: 112 MG/DL (ref 70–99)
GLUCOSE UR STRIP-MCNC: NEGATIVE MG/DL
HCO3 SERPL-SCNC: 24 MMOL/L (ref 22–29)
HCT VFR BLD AUTO: 49.3 % (ref 40–53)
HGB BLD-MCNC: 17 G/DL (ref 13.3–17.7)
HGB UR QL STRIP: ABNORMAL
IMM GRANULOCYTES # BLD: 0 10E3/UL
IMM GRANULOCYTES NFR BLD: 0 %
KETONES UR STRIP-MCNC: NEGATIVE MG/DL
LEUKOCYTE ESTERASE UR QL STRIP: NEGATIVE
LYMPHOCYTES # BLD AUTO: 1.6 10E3/UL (ref 0.8–5.3)
LYMPHOCYTES NFR BLD AUTO: 16 %
MCH RBC QN AUTO: 32.9 PG (ref 26.5–33)
MCHC RBC AUTO-ENTMCNC: 34.5 G/DL (ref 31.5–36.5)
MCV RBC AUTO: 96 FL (ref 78–100)
MONOCYTES # BLD AUTO: 0.9 10E3/UL (ref 0–1.3)
MONOCYTES NFR BLD AUTO: 9 %
NEUTROPHILS # BLD AUTO: 7.4 10E3/UL (ref 1.6–8.3)
NEUTROPHILS NFR BLD AUTO: 74 %
NITRATE UR QL: NEGATIVE
NRBC # BLD AUTO: 0 10E3/UL
NRBC BLD AUTO-RTO: 0 /100
PH UR STRIP: 7 [PH] (ref 5–7)
PLATELET # BLD AUTO: 198 10E3/UL (ref 150–450)
POTASSIUM SERPL-SCNC: 4.7 MMOL/L (ref 3.4–5.3)
PROT SERPL-MCNC: 7.2 G/DL (ref 6.4–8.3)
RBC # BLD AUTO: 5.16 10E6/UL (ref 4.4–5.9)
RBC URINE: 4 /HPF
SODIUM SERPL-SCNC: 138 MMOL/L (ref 135–145)
SP GR UR STRIP: 1.01 (ref 1–1.03)
UROBILINOGEN UR STRIP-MCNC: NORMAL MG/DL
WBC # BLD AUTO: 10 10E3/UL (ref 4–11)
WBC URINE: 1 /HPF

## 2024-10-30 PROCEDURE — 51702 INSERT TEMP BLADDER CATH: CPT | Performed by: EMERGENCY MEDICINE

## 2024-10-30 PROCEDURE — 99284 EMERGENCY DEPT VISIT MOD MDM: CPT | Performed by: EMERGENCY MEDICINE

## 2024-10-30 PROCEDURE — 80053 COMPREHEN METABOLIC PANEL: CPT | Performed by: EMERGENCY MEDICINE

## 2024-10-30 PROCEDURE — 36415 COLL VENOUS BLD VENIPUNCTURE: CPT | Performed by: EMERGENCY MEDICINE

## 2024-10-30 PROCEDURE — 99284 EMERGENCY DEPT VISIT MOD MDM: CPT | Mod: 25 | Performed by: EMERGENCY MEDICINE

## 2024-10-30 PROCEDURE — 81001 URINALYSIS AUTO W/SCOPE: CPT | Performed by: EMERGENCY MEDICINE

## 2024-10-30 PROCEDURE — 85004 AUTOMATED DIFF WBC COUNT: CPT | Performed by: EMERGENCY MEDICINE

## 2024-10-30 RX ORDER — TAMSULOSIN HYDROCHLORIDE 0.4 MG/1
0.4 CAPSULE ORAL DAILY
Qty: 10 CAPSULE | Refills: 0 | Status: SHIPPED | OUTPATIENT
Start: 2024-10-30 | End: 2024-11-09

## 2024-10-30 RX ORDER — LOSARTAN POTASSIUM 25 MG/1
25 TABLET ORAL DAILY
Status: DISCONTINUED | OUTPATIENT
Start: 2024-10-30 | End: 2024-10-30 | Stop reason: HOSPADM

## 2024-10-30 RX ORDER — SENNA AND DOCUSATE SODIUM 50; 8.6 MG/1; MG/1
1 TABLET, FILM COATED ORAL AT BEDTIME
Qty: 7 TABLET | Refills: 0 | Status: SHIPPED | OUTPATIENT
Start: 2024-10-30 | End: 2024-11-06

## 2024-10-30 ASSESSMENT — COLUMBIA-SUICIDE SEVERITY RATING SCALE - C-SSRS
2. HAVE YOU ACTUALLY HAD ANY THOUGHTS OF KILLING YOURSELF IN THE PAST MONTH?: NO
1. IN THE PAST MONTH, HAVE YOU WISHED YOU WERE DEAD OR WISHED YOU COULD GO TO SLEEP AND NOT WAKE UP?: NO
6. HAVE YOU EVER DONE ANYTHING, STARTED TO DO ANYTHING, OR PREPARED TO DO ANYTHING TO END YOUR LIFE?: NO

## 2024-10-30 ASSESSMENT — ACTIVITIES OF DAILY LIVING (ADL)
ADLS_ACUITY_SCORE: 0
ADLS_ACUITY_SCORE: 0

## 2024-10-30 NOTE — TELEPHONE ENCOUNTER
"Reason for Disposition   [1] Unable to urinate (or only a few drops) > 4 hours AND [2] bladder feels very full (e.g., palpable bladder or strong urge to urinate)    Answer Assessment - Initial Assessment Questions  1. SYMPTOM: \"What's the main symptom you're concerned about?\" (e.g., frequency, incontinence)      Not urinating, dribble only  2. ONSET: \"When did the urinary problem start?\"      3 days ago  3. PAIN: \"Is there any pain?\" If Yes, ask: \"How bad is it?\" (Scale: 1-10; mild, moderate, severe)      Yes, distension  4. CAUSE: \"What do you think is causing the symptoms?\"      Not sure  5. OTHER SYMPTOMS: \"Do you have any other symptoms?\" (e.g., blood in urine, fever, flank pain, pain with urination)      Distension, constipation, last BM 3 days ago formed  6. PREGNANCY: \"Is there any chance you are pregnant?\" \"When was your last menstrual period?\"      na    Protocols used: Urinary Symptoms-A-AH    "

## 2024-10-30 NOTE — DISCHARGE INSTRUCTIONS
Take Flomax daily to help with urinary symptoms.  A referral for urology has been placed usually but calls to schedule an appointment.  Catheter will likely be in place for approximately 1 week.    May take Senokot daily to help soften stools.  MiraLAX will also be helpful.  May use a Fleet enema which you can purchase at your local pharmacy as well.    Return to emergency department if catheter stops draining, unable to produce bowel movement, or other new or concerning symptoms.

## 2024-10-30 NOTE — TELEPHONE ENCOUNTER
"Patient has not urinated for 24 hours, only \"dribbles\". Bladder does feel distended and painful. No BM for 3 days. Was formed then, states he is constipated. Advised to go to emergency room.   Patient states he will go now.      Joan Berkowitz RN on 10/30/2024 at 8:33 AM    "

## 2024-10-30 NOTE — ED TRIAGE NOTES
Per patient report last BM 3 days ago, tried miralax and fleets enema last night with no relief.  Urinary retention started about 24H ago.     Triage Assessment (Adult)       Row Name 10/30/24 0913          Triage Assessment    Airway WDL WDL        Respiratory WDL    Respiratory WDL WDL        Skin Circulation/Temperature WDL    Skin Circulation/Temperature WDL WDL        Cardiac WDL    Cardiac WDL WDL        Peripheral/Neurovascular WDL    Peripheral Neurovascular WDL WDL        Cognitive/Neuro/Behavioral WDL    Cognitive/Neuro/Behavioral WDL WDL

## 2024-10-30 NOTE — ED PROVIDER NOTES
History     Chief Complaint   Patient presents with    Constipation    Urinary Retention     Per patient report last BM 3 days ago, tried miralax and fleets enema last night with no relief.  Urinary retention started about 24H ago.       HPI  Kalin Shepard is a 74 year old male with history notable for adenocarcinoma of sigmoid colon w/ peritoneal mets status post ileostomy takedown, esophageal reflux, who presents with 3 days of constipation and 1 day of inability to urinate.  Says he never had difficulty with urinary retention.  Thinks his prostate may be a little enlarged.  Says he has issues with constipation about once per month but usually only last for a day or so.  Has taken MiraLAX for this.  Was in usual/3 days and then very little urine output since yesterday.  Has been passing gas.  No nausea or vomiting.  No dysuria urgency.  No fevers or chills.    The patient's PMHx, Surgical Hx, Allergies, and Medications were all reviewed with the patient.    Allergies:  No Known Allergies    Problem List:    Patient Active Problem List    Diagnosis Date Noted    High output ileostomy (H) 02/14/2023     Priority: Medium    Chemotherapy-induced peripheral neuropathy (H) 09/26/2022     Priority: Medium    Esophageal reflux 08/21/2022     Priority: Medium    Chemotherapy-induced neutropenia (H) 08/02/2022     Priority: Medium    Dehydration 07/19/2022     Priority: Medium    Adenocarcinoma of sigmoid colon (H) 04/13/2022     Priority: Medium     Peritoneal involvement.  Resection of obstruction, small intestine adherent to mass.  Unable to resect sigmoid mass, extensive involvement.            Past Medical History:    Past Medical History:   Diagnosis Date    Cancer (H)        Past Surgical History:    Past Surgical History:   Procedure Laterality Date    COLECTOMY LEFT N/A 2/8/2023    Procedure: Exploratory Laparotomy, lysis of adhesions, Low Anterior Resection, take down of loop colostomy,  DIVERTING LOOP  ILEOSTOMY;  Surgeon: Riley Magdaleno MD;  Location: [facility] OR    COMBINED CYSTOSCOPY, INSERT STENT URETER(S) Bilateral 2/8/2023    Procedure: CYSTOSCOPY, WITH URETERAL STENT INSERTION [[id number]];  Surgeon: Ulises Basilio MD;  Location: [facility] OR    INSERT PORT VASCULAR ACCESS Right 4/21/2022    Procedure: INSERTION, VASCULAR ACCESS PORT;  Surgeon: Marianne Schroeder MD;  Location: [facility]    INSERT PORT VASCULAR ACCESS Left 6/2/2022    Procedure: INSERTION, VASCULAR ACCESS PORT;  Surgeon: Phong Finch MD;  Location: [facility] OR    IR CHEST PORT PLACEMENT > 5 YRS OF AGE  6/2/2022    LAPAROTOMY EXPLORATORY N/A 3/26/2022    Procedure: exploratory laparotomy, small bowel resection, colostomy creation;  Surgeon: Riley Magdaleno MD;  Location: [facility] OR    LAPAROTOMY, LYSIS ADHESIONS, COMBINED N/A 8/24/2022    Procedure: LAPAROTOMY, EXPLORATORY, WITH LYSIS OF ADHESIONS;  Surgeon: Segundo Hill MD;  Location: [facility]    PICC INSERTION - DOUBLE LUMEN Left 03/28/2022    left medial brachial 5 fr dl picc 49 cm    SIGMOIDOSCOPY FLEXIBLE N/A 3/26/2022    Procedure: Sigmoidoscopy flexible;  Surgeon: Riley Magdaleno MD;  Location: [facility] OR    SIGMOIDOSCOPY FLEXIBLE N/A 2/8/2023    Procedure: Sigmoidoscopy flexible;  Surgeon: Riley Magdaleno MD;  Location: [facility] OR    TAKEDOWN ILEOSTOMY N/A 5/3/2023    Procedure: CLOSURE, ILEOSTOMY, OPEN ;  Surgeon: Riley Magdaleno MD;  Location: [facility] OR       Family History:    Family History   Problem Relation Age of Onset    No Known Problems Mother     Prostate Cancer Father     Colon Cancer Father     Lymphoma Father     Anesthesia Reaction No family hx of     Thrombosis No family hx of        Social History:  Marital Status:   [2]  Social History     Tobacco Use    Smoking status: Every Day     Current packs/day: 0.50     Average packs/day: 0.5 packs/day for 50.0 years (25.0 ttl pk-yrs)     Types: Cigarettes    Smokeless tobacco: Never   Vaping Use    Vaping status: Never Used  "  Substance Use Topics    Alcohol use: Not Currently    Drug use: Never        Medications:    SENNA-docusate sodium (SENNA S) 8.6-50 MG tablet  tamsulosin (FLOMAX) 0.4 MG capsule  gabapentin (NEURONTIN) 100 MG capsule  heparin 100 UNIT/ML SOLN injection  methocarbamol (ROBAXIN) 500 MG tablet  mirtazapine (REMERON) 15 MG tablet  multivitamin (CENTRUM SILVER) tablet  omeprazole (PRILOSEC) 20 MG DR capsule  polyethylene glycol (MIRALAX) 17 g packet          Review of Systems  Pertinent positives and negatives mentioned in HPI    Physical Exam   BP: (!) 144/96  Pulse: 79  Temp: 97.8  F (36.6  C)  Resp: 20  Height: 180.3 cm (5' 11\")  Weight: 66.4 kg (146 lb 6.4 oz)  SpO2: 96 %    GEN: Awake, alert, and cooperative. No acute distress.  CV : Regular rate and rhythm.  PULM: Normal effort. No wheezes, rales, or rhonchi bilaterally.  ABD: lower abdomen is firm and mildly distended. No tenderness to upper abdomen. No rebound or guarding.          ED Course        Procedures                 Critical Care time:  none              Results for orders placed or performed during the hospital encounter of 10/30/24 (from the past 24 hours)   CBC with platelets differential    Narrative    The following orders were created for panel order CBC with platelets differential.  Procedure                               Abnormality         Status                     ---------                               -----------         ------                     CBC with platelets and d...[[id number]]                      Final result                 Please view results for these tests on the individual orders.   Comprehensive metabolic panel   Result Value Ref Range    Sodium 138 135 - 145 mmol/L    Potassium 4.7 3.4 - 5.3 mmol/L    Carbon Dioxide (CO2) 24 22 - 29 mmol/L    Anion Gap 10 7 - 15 mmol/L    Urea Nitrogen 16.5 8.0 - 23.0 mg/dL    Creatinine 0.98 0.67 - 1.17 mg/dL    GFR Estimate 80 >60 mL/min/1.73m2    Calcium 9.6 8.8 - 10.4 mg/dL    Chloride " 104 98 - 107 mmol/L    Glucose 112 (H) 70 - 99 mg/dL    Alkaline Phosphatase 50 40 - 150 U/L    AST 25 0 - 45 U/L    ALT 17 0 - 70 U/L    Protein Total 7.2 6.4 - 8.3 g/dL    Albumin 4.3 3.5 - 5.2 g/dL    Bilirubin Total 0.5 <=1.2 mg/dL   UA with Microscopic reflex to Culture    Specimen: Urine, Castro Catheter   Result Value Ref Range    Color Urine Yellow Colorless, Straw, Light Yellow, Yellow    Appearance Urine Clear Clear    Glucose Urine Negative Negative mg/dL    Bilirubin Urine Negative Negative    Ketones Urine Negative Negative mg/dL    Specific Gravity Urine 1.010 1.003 - 1.035    Blood Urine Small (A) Negative    pH Urine 7.0 5.0 - 7.0    Protein Albumin Urine Negative Negative mg/dL    Urobilinogen Urine Normal Normal, 2.0 mg/dL    Nitrite Urine Negative Negative    Leukocyte Esterase Urine Negative Negative    RBC Urine 4 (H) <=2 /HPF    WBC Urine 1 <=5 /HPF    Narrative    Urine Culture not indicated   CBC with platelets and differential   Result Value Ref Range    WBC Count 10.0 4.0 - 11.0 10e3/uL    RBC Count 5.16 4.40 - 5.90 10e6/uL    Hemoglobin 17.0 13.3 - 17.7 g/dL    Hematocrit 49.3 40.0 - 53.0 %    MCV 96 78 - 100 fL    MCH 32.9 26.5 - 33.0 pg    MCHC 34.5 31.5 - 36.5 g/dL    RDW 13.8 10.0 - 15.0 %    Platelet Count 198 150 - 450 10e3/uL    % Neutrophils 74 %    % Lymphocytes 16 %    % Monocytes 9 %    % Eosinophils 0 %    % Basophils 1 %    % Immature Granulocytes 0 %    NRBCs per 100 WBC 0 <1 /100    Absolute Neutrophils 7.4 1.6 - 8.3 10e3/uL    Absolute Lymphocytes 1.6 0.8 - 5.3 10e3/uL    Absolute Monocytes 0.9 0.0 - 1.3 10e3/uL    Absolute Eosinophils 0.0 0.0 - 0.7 10e3/uL    Absolute Basophils 0.1 0.0 - 0.2 10e3/uL    Absolute Immature Granulocytes 0.0 <=0.4 10e3/uL    Absolute NRBCs 0.0 10e3/uL       Medications   losartan (COZAAR) tablet 25 mg (has no administration in time range)       Assessments & Plan (with Medical Decision Making)   74 year old male with past medical history  adenocarcinoma of sigmoid colon with peritoneal mets status post ileostomy takedown with 3 days of constipation and 1 day of urinary retention.  Is mild tenderness and distention of his lower abdomen.  Point-of-care ultrasound with grossly distended bladder.  Castro catheter placed and 1 L drained.  Catheter is flowing.  He feels much improved.  Will start on tamsulosin and have follow-up with urology for office trial of void.  Urinalysis without signs of infection.  Normal creatinine.  CBC normal.      Abdominal distention and tenderness resolved on reevaluation after placement of Castro catheter..  Given his history could be something more sinister causing his constipation.  I have low suspicion for overt obstruction given that he has been passing gas and is reassuring abdominal exam without distention after decompression of bladder.  Did offer CT imaging.  He is getting CTs every 6 months as an outpatient.  Had 1 in July and has one scheduled for January.  He would like to defer at this time.  I think this is a reasonable choice.  Rx for Senokot sent to his pharmacy.  Can continue with MiraLAX and may do Fleet enema if needed.  Follow-up and ED return precautions discussed.         I have reviewed the nursing notes.         New Prescriptions    SENNA-DOCUSATE SODIUM (SENNA S) 8.6-50 MG TABLET    Take 1 tablet by mouth at bedtime for 7 days.    TAMSULOSIN (FLOMAX) 0.4 MG CAPSULE    Take 1 capsule (0.4 mg) by mouth daily for 10 doses.       Final diagnoses:   Acute urinary retention   Constipation, unspecified constipation type     Maciej Lane MD        10/30/2024   [facility] EMERGENCY DEPT    Disclaimer: This note consists of words and symbols derived from keyboarding and dictation using voice recognition software.  As a result, there may be errors that have gone undetected.  Please consider this when interpreting information found in this note.               Maciej Lane MD  10/30/24  1125

## 2024-11-05 ENCOUNTER — TELEPHONE (OUTPATIENT)
Dept: UROLOGY | Facility: CLINIC | Age: 74
End: 2024-11-05
Payer: COMMERCIAL

## 2024-11-05 NOTE — TELEPHONE ENCOUNTER
"[facility]    Phone Message    May a detailed message be left on voicemail: yes     Reason for Call: Appointment Intake    Referring Provider Name: Maciej Lane  Diagnosis and/or Symptoms: Jo Catheter removed \"this week\"    He got a message from PCP to see urologist to get his jo removed. First available wasn't until February. Please call pt to schedule. Thank you!        Action Taken: Message routed to:  [facility] [place] [facility]    Travel Screening: Not Applicable     Date of Service:                                                                     "

## 2024-11-06 ENCOUNTER — TELEPHONE (OUTPATIENT)
Dept: UROLOGY | Facility: CLINIC | Age: 74
End: 2024-11-06

## 2024-11-06 ENCOUNTER — ALLIED HEALTH/NURSE VISIT (OUTPATIENT)
Dept: UROLOGY | Facility: CLINIC | Age: 74
End: 2024-11-06
Payer: COMMERCIAL

## 2024-11-06 DIAGNOSIS — Z79.2 PROPHYLACTIC ANTIBIOTIC: Primary | ICD-10-CM

## 2024-11-06 PROCEDURE — 99207 PR NO CHARGE NURSE ONLY: CPT

## 2024-11-06 RX ORDER — CIPROFLOXACIN 250 MG/1
250 TABLET, FILM COATED ORAL ONCE
Status: COMPLETED | OUTPATIENT
Start: 2024-11-06 | End: 2024-11-06

## 2024-11-06 RX ADMIN — CIPROFLOXACIN 250 MG: 250 TABLET, FILM COATED ORAL at 12:42

## 2024-11-06 NOTE — TELEPHONE ENCOUNTER
Outgoing call: Spoke with the patient, coming in shortly to TOV. Spoke with Alistair whom is busy today and cannot accomodate a visit today. Talked to Lizet Ibarra PA-C who okayed a virtual visit next week to follow-up. Pt educated on TOV and agrees to the plan.     -HH

## 2024-11-06 NOTE — TELEPHONE ENCOUNTER
Need to contact patient to setup his follow-up appointment. Pt wife became ill during the teaching process of CIC and was unable to schedule the follow-up appointment.     Needs next week with Lizet Ibarra PA-C, video/telephone visit on Wednesday.     Nisa Aquino RN on 11/6/2024 at 12:08 PM

## 2024-11-06 NOTE — PROGRESS NOTES
No chief complaint on file.      Patient Active Problem List   Diagnosis    Adenocarcinoma of sigmoid colon (H)    Dehydration    Chemotherapy-induced neutropenia (H)    Esophageal reflux    Chemotherapy-induced peripheral neuropathy (H)    High output ileostomy (H)       No Known Allergies    Current Outpatient Medications   Medication Sig Dispense Refill    gabapentin (NEURONTIN) 100 MG capsule Take 1 capsule (100 mg) by mouth At Bedtime 14 capsule 0    heparin 100 UNIT/ML SOLN injection Inject 5-10 mLs every 28 days      methocarbamol (ROBAXIN) 500 MG tablet Take 1 tablet (500 mg) by mouth 4 times daily 40 tablet 0    mirtazapine (REMERON) 15 MG tablet Take 1 tablet (15 mg) by mouth At Bedtime 90 tablet 3    multivitamin (CENTRUM SILVER) tablet Take 1 tablet by mouth every morning 30 tablet     omeprazole (PRILOSEC) 20 MG DR capsule Take 1 capsule (20 mg) by mouth 2 times daily 60 capsule 3    polyethylene glycol (MIRALAX) 17 g packet Take 1 packet by mouth daily      SENNA-docusate sodium (SENNA S) 8.6-50 MG tablet Take 1 tablet by mouth at bedtime for 7 days. 7 tablet 0    tamsulosin (FLOMAX) 0.4 MG capsule Take 1 capsule (0.4 mg) by mouth daily for 10 doses. 10 capsule 0       Social History     Tobacco Use    Smoking status: Every Day     Current packs/day: 0.50     Average packs/day: 0.5 packs/day for 50.0 years (25.0 ttl pk-yrs)     Types: Cigarettes    Smokeless tobacco: Never   Vaping Use    Vaping status: Never Used   Substance Use Topics    Alcohol use: Not Currently    Drug use: Never       Kalin Shepard comes into clinic today at the request of Lizet Ibarra PA-C for TOV.    Patient diagnosis: Urinary Retention    This service provided today was under the direct supervision of Dr. Sainz, who was available if needed.    Kalin Shepard presented today for a trial of void.  Approximately 250 mL of normal saline instilled into bladder via catheter.  Patient stated he had urge to urinate and  catheter was removed without difficulty.  Patient was given a bedside urinal to measure urine output.  Patient voided approximately 150 mL of clear urine.   mL.    Informed provider and plan is to teach CIC. Gave patient informative pamphlet, supplies, and did a teach back with the patient and his wife.    Patient's wife was unwell and had loss of consciousness. Rapid response was called. Wife's HR was down to 52 at one point, unable to read BP at first attempt, diaphoretic, sonorous breathing. Wife was unconscious for over 2+ minutes and did have some eye fluttering and a couple of twitches. When she did regain consciousness she had fixed gaze straight up to her eyebrows. She remained unresponsive to RN's voice and when  spoke with elevated tone she did come to. Pt vitals were taken and given to the rapid response ED staff.     Patient came back to clinic to get his cipro   250 mg.    Patient did tolerate procedure well.    Teaching done with patient verbally as where to call or go if pain, fever, or unable to urinate post catheter removal.    Nisa Aquino RN  11/6/2024  10:48 AM

## 2024-11-12 NOTE — CONFIDENTIAL NOTE
Chief Complaint:   Urinary retention         History of Present Illness:   Kalin Shepard is a 74 year old male with a history of colon cancer who presents for evaluation of urinary retention.    The patient presented to the ED on 10/30/2024 with constipation and urinary retention. An indwelling catheter was placed and 1 L of urine was drained from the bladder.      A TOV was performed on 11/06/2024. 250 mL NS was instilled in the bladder. He was able to void 150 mL and 150 mL remained by bladder scan. He was taught CIC.     He has self-catheterized about five times in the last week. Initially, he was getting small catheterized volumes, but then he ran out of his tamsulosin, and catheterized volumes increased.          Past Medical History:     Past Medical History:   Diagnosis Date    Cancer (H)             Past Surgical History:     Past Surgical History:   Procedure Laterality Date    COLECTOMY LEFT N/A 2/8/2023    Procedure: Exploratory Laparotomy, lysis of adhesions, Low Anterior Resection, take down of loop colostomy,  DIVERTING LOOP ILEOSTOMY;  Surgeon: Riley Magdaleno MD;  Location: [facility] OR    COMBINED CYSTOSCOPY, INSERT STENT URETER(S) Bilateral 2/8/2023    Procedure: CYSTOSCOPY, WITH URETERAL STENT INSERTION [[id number]];  Surgeon: Ulises Basilio MD;  Location: [facility] OR    INSERT PORT VASCULAR ACCESS Right 4/21/2022    Procedure: INSERTION, VASCULAR ACCESS PORT;  Surgeon: Marianne Schroeder MD;  Location: [facility]    INSERT PORT VASCULAR ACCESS Left 6/2/2022    Procedure: INSERTION, VASCULAR ACCESS PORT;  Surgeon: Phong Finch MD;  Location: [facility] OR    IR CHEST PORT PLACEMENT > 5 YRS OF AGE  6/2/2022    LAPAROTOMY EXPLORATORY N/A 3/26/2022    Procedure: exploratory laparotomy, small bowel resection, colostomy creation;  Surgeon: Riley Magdaleno MD;  Location: [facility] OR    LAPAROTOMY, LYSIS ADHESIONS, COMBINED N/A 8/24/2022    Procedure: LAPAROTOMY, EXPLORATORY, WITH LYSIS OF ADHESIONS;   Surgeon: Segundo Hill MD;  Location: [facility]    PICC INSERTION - DOUBLE LUMEN Left 03/28/2022    left medial brachial 5 fr dl picc 49 cm    SIGMOIDOSCOPY FLEXIBLE N/A 3/26/2022    Procedure: Sigmoidoscopy flexible;  Surgeon: Riley Magdaleno MD;  Location: [facility]    SIGMOIDOSCOPY FLEXIBLE N/A 2/8/2023    Procedure: Sigmoidoscopy flexible;  Surgeon: Riley Magdaleno MD;  Location: [facility]    TAKEDOWN ILEOSTOMY N/A 5/3/2023    Procedure: CLOSURE, ILEOSTOMY, OPEN ;  Surgeon: Riley Magdaleno MD;  Location: [facility] OR            Medications     Current Outpatient Medications   Medication Sig Dispense Refill    gabapentin (NEURONTIN) 100 MG capsule Take 1 capsule (100 mg) by mouth At Bedtime 14 capsule 0    heparin 100 UNIT/ML SOLN injection Inject 5-10 mLs every 28 days      methocarbamol (ROBAXIN) 500 MG tablet Take 1 tablet (500 mg) by mouth 4 times daily 40 tablet 0    mirtazapine (REMERON) 15 MG tablet Take 1 tablet (15 mg) by mouth At Bedtime 90 tablet 3    multivitamin (CENTRUM SILVER) tablet Take 1 tablet by mouth every morning 30 tablet     omeprazole (PRILOSEC) 20 MG DR capsule Take 1 capsule (20 mg) by mouth 2 times daily 60 capsule 3    polyethylene glycol (MIRALAX) 17 g packet Take 1 packet by mouth daily       No current facility-administered medications for this visit.            Allergies:   Patient has no known allergies.         Review of Systems:  From intake questionnaire   Negative 14 system review except as noted on HPI, nurse's note.         Physical Exam:   Patient is a 74 year old male evaluated via video visit.       Labs and Pathology:    I personally reviewed all applicable laboratory data and went over findings with patient  Significant for:    CBC RESULTS:  Recent Labs   Lab Test 10/30/24  1030 07/16/24  0935 03/20/24  0907 12/06/23  0923   WBC 10.0 7.6 8.7 7.0   HGB 17.0 16.4 15.9 14.7    203 214 207        BMP RESULTS:  Recent Labs   Lab Test 10/30/24  1030 07/16/24  1013  07/16/24  0935 03/20/24  0907 12/06/23  0923     --  141 140 140   POTASSIUM 4.7  --  4.7 4.5 4.6   CHLORIDE 104  --  106 106 105   CO2 24  --  25 23 26   ANIONGAP 10  --  10 11 9   *  --  100* 103* 94   BUN 16.5  --  21.2 26.5* 27.6*   CR 0.98 0.9 0.85 0.91 0.73   GFRESTIMATED 80 >60 >90 88 >90   ROXIE 9.6  --  9.6 9.3 9.7       UA RESULTS:   Recent Labs   Lab Test 10/30/24  1030 01/27/23  1220   SG 1.010 1.018   URINEPH 7.0 5.5   NITRITE Negative Negative   RBCU 4*  --    WBCU 1  --          Imaging:    I personally reviewed all applicable imaging and went over findings with patient.  Significant for:    Results for orders placed or performed during the hospital encounter of 07/16/24   CT Chest/Abdomen/Pelvis w Contrast    Narrative    CT CHEST/ABDOMEN/PELVIS WITH CONTRAST July 16, 2024 10:45 AM    CLINICAL HISTORY: History of colon cancer, surveillance.  Adenocarcinoma of sigmoid colon (H). 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    TECHNIQUE: CT scan of the chest, abdomen, and pelvis was performed  following injection of IV contrast. Multiplanar reformats were  obtained. Dose reduction techniques were used.   CONTRAST: 70 mL Isovue 370.    COMPARISON: CTs, most recently CT of the chest, abdomen and pelvis  dated 12/6/2023. Abdominal MRI dated 5/4/2022.    FINDINGS:   LUNGS AND PLEURA: Mild apical predominant centrilobular emphysematous  changes. A few areas of mild linear atelectasis/scarring. No  consolidation or acute infiltrate. No pleural effusion or  pneumothorax.    Few small pulmonary nodules are unchanged since at least 8/26/2022,  and should be benign. For example, 3 mm scarlike nodule in the  anterior right upper lobe (series 4, image 104) and 3 mm nodule in the  superior segment of the left lower lobe (series 4, image 190). A few  punctate calcified granulomas.    MEDIASTINUM/AXILLAE: Left IJ Port-A-Cath tip terminates in the mid  SVC. Cardiac size is within normal limits. No pericardial  effusion.  Normal caliber thoracic aorta. Stable 1.2 cm short axis diameter right  hilar lymph node.    CORONARY ARTERY CALCIFICATION: Mild.    HEPATOBILIARY: Subcentimeter hypodensities are too small to  characterize, but are stable and should be benign. These likely  represent small cysts or hemangiomas. No biliary duct dilatation or  calcified gallstones.    PANCREAS: Normal.    SPLEEN: Normal.    ADRENAL GLANDS: Normal.    KIDNEYS/BLADDER: Left upper pole cortical cyst, and bilateral  subcentimeter presumed cysts, which do not require follow-up.  Otherwise normal.    BOWEL: Postoperative changes to the rectosigmoid colon with colorectal  anastomosis. Small bowel resection with anastomosis in the left lower  quadrant. The bowel is nondistended without inflammatory change. No  evidence for appendicitis.    LYMPH NODES: Normal.    VASCULATURE: Atherosclerosis. Mild aneurysmal dilatation of the  infrarenal abdominal aorta, measuring 3 cm in diameter.    PELVIC ORGANS: Mildly enlarged prostate.    ADDITIONAL FINDINGS: No ascites or fluid collections.    MUSCULOSKELETAL: Diffuse osteopenia. Mild degenerative changes. No  aggressive osseous lesions.      Impression    IMPRESSION:  1.  No CT evidence of recurrent or metastatic disease in the chest,  abdomen or pelvis.  2.  Stable mildly enlarged right hilar lymph node, likely reactive.  3.  Mild emphysematous changes.  4.  Infrarenal abdominal aortic aneurysm, measuring 3 cm in diameter.  5.  Mildly enlarged prostate.    FAUSTINO MELISSA MD         SYSTEM ID:  [id number]            Assessment and Plan:     Assessment: 74 year old male seen in evaluation for urinary retention. The patient presented to the ED on 10/30/2024 with constipation and urinary retention. An indwelling catheter was placed and 1 L of urine was drained from the bladder.  He was taught CIC last week. He has self-catheterized about five times in the last week. Initially, he was getting small catheterized  volumes, but then he ran out of his tamsulosin, and catheterized volumes increased.     We discussed BPH as the likely cause of his urinary symptoms. We reviewed treatment options including observation, alpha blockers, 5-alpha reductase inhibitors, and bladder outlet surgery. The patient elected to restart tamsulosin as that seemed to be working well before.     Plan:  Restart tamsulosin 0.4 mg daily.   PSA with next blood draw.   Continue CIC as needed.   Follow up in three months, sooner if concerns.     NORMA RAINEY PA-C  Department of Urology

## 2024-11-13 ENCOUNTER — VIRTUAL VISIT (OUTPATIENT)
Dept: UROLOGY | Facility: CLINIC | Age: 74
End: 2024-11-13
Payer: COMMERCIAL

## 2024-11-13 DIAGNOSIS — R39.12 BENIGN PROSTATIC HYPERPLASIA WITH WEAK URINARY STREAM: Primary | ICD-10-CM

## 2024-11-13 DIAGNOSIS — Z12.5 SCREENING FOR PROSTATE CANCER: ICD-10-CM

## 2024-11-13 DIAGNOSIS — N40.1 BENIGN PROSTATIC HYPERPLASIA WITH WEAK URINARY STREAM: Primary | ICD-10-CM

## 2024-11-13 PROCEDURE — 99203 OFFICE O/P NEW LOW 30 MIN: CPT | Mod: 95 | Performed by: STUDENT IN AN ORGANIZED HEALTH CARE EDUCATION/TRAINING PROGRAM

## 2024-11-13 RX ORDER — TAMSULOSIN HYDROCHLORIDE 0.4 MG/1
0.4 CAPSULE ORAL DAILY
Qty: 90 CAPSULE | Refills: 3 | Status: SHIPPED | OUTPATIENT
Start: 2024-11-13

## 2024-11-13 RX ORDER — SENNOSIDES 8.6 MG
1 TABLET ORAL DAILY
COMMUNITY

## 2024-11-13 NOTE — PROGRESS NOTES
Kalin Shepard is a 74 year old year old who is being evaluated via a billable video visit.      How would you like to obtain your AVS? [email or web address]  If the video visit is dropped, the invitation should be resent by: Text to cell phone: [phone]  Will anyone else be joining your video visit? No    Video-Visit Details    Type of service:  Video Visit   Originating Location (pt. Location): Home    Distant Location (provider location):  Off-site  Platform used for Video Visit: Martha

## 2024-11-25 ENCOUNTER — APPOINTMENT (OUTPATIENT)
Dept: LAB | Facility: CLINIC | Age: 74
End: 2024-11-25
Payer: COMMERCIAL

## 2024-11-25 ENCOUNTER — INFUSION THERAPY VISIT (OUTPATIENT)
Dept: INFUSION THERAPY | Facility: CLINIC | Age: 74
End: 2024-11-25
Attending: INTERNAL MEDICINE
Payer: COMMERCIAL

## 2024-11-25 DIAGNOSIS — E86.0 DEHYDRATION: Primary | ICD-10-CM

## 2024-11-25 DIAGNOSIS — Z12.5 SCREENING FOR PROSTATE CANCER: ICD-10-CM

## 2024-11-25 LAB
CEA SERPL-MCNC: 3.9 NG/ML
PSA SERPL DL<=0.01 NG/ML-MCNC: 8.36 NG/ML (ref 0–6.5)

## 2024-11-25 PROCEDURE — 36591 DRAW BLOOD OFF VENOUS DEVICE: CPT | Performed by: INTERNAL MEDICINE

## 2024-11-25 PROCEDURE — 96374 THER/PROPH/DIAG INJ IV PUSH: CPT

## 2024-11-25 PROCEDURE — G0103 PSA SCREENING: HCPCS | Performed by: STUDENT IN AN ORGANIZED HEALTH CARE EDUCATION/TRAINING PROGRAM

## 2024-11-25 PROCEDURE — 250N000011 HC RX IP 250 OP 636: Performed by: INTERNAL MEDICINE

## 2024-11-25 PROCEDURE — 82378 CARCINOEMBRYONIC ANTIGEN: CPT | Performed by: INTERNAL MEDICINE

## 2024-11-25 RX ORDER — HEPARIN SODIUM (PORCINE) LOCK FLUSH IV SOLN 100 UNIT/ML 100 UNIT/ML
5 SOLUTION INTRAVENOUS
Status: DISCONTINUED | OUTPATIENT
Start: 2024-11-25 | End: 2024-11-25 | Stop reason: HOSPADM

## 2024-11-25 RX ORDER — HEPARIN SODIUM,PORCINE 10 UNIT/ML
5-20 VIAL (ML) INTRAVENOUS DAILY PRN
OUTPATIENT
Start: 2024-11-25

## 2024-11-25 RX ORDER — HEPARIN SODIUM (PORCINE) LOCK FLUSH IV SOLN 100 UNIT/ML 100 UNIT/ML
5 SOLUTION INTRAVENOUS
OUTPATIENT
Start: 2024-11-25

## 2024-11-25 RX ADMIN — Medication 5 ML: at 09:36

## 2024-11-25 RX ADMIN — ALTEPLASE 2 MG: 2.2 INJECTION, POWDER, LYOPHILIZED, FOR SOLUTION INTRAVENOUS at 08:31

## 2024-11-25 NOTE — PROGRESS NOTES
Port accessed and no blood return noted.  Alteplase left in x 1 hour with no blood return.  Pt unable to stay past the one hour today so he is aware he may end up being here longer during his next port flush should he not have a blood return.  Labs drawn peripherally today by our lab.  Port flushed per [facility] protocol and deaccessed, pt tolerated well.    Michelle Kirby RN

## 2025-01-22 ENCOUNTER — INFUSION THERAPY VISIT (OUTPATIENT)
Dept: INFUSION THERAPY | Facility: CLINIC | Age: 75
End: 2025-01-22
Attending: INTERNAL MEDICINE
Payer: COMMERCIAL

## 2025-01-22 ENCOUNTER — HOSPITAL ENCOUNTER (OUTPATIENT)
Dept: GENERAL RADIOLOGY | Facility: CLINIC | Age: 75
Discharge: HOME | End: 2025-01-22
Attending: INTERNAL MEDICINE
Payer: COMMERCIAL

## 2025-01-22 ENCOUNTER — HOSPITAL ENCOUNTER (OUTPATIENT)
Dept: CT IMAGING | Facility: CLINIC | Age: 75
Discharge: HOME | End: 2025-01-22
Attending: INTERNAL MEDICINE
Payer: COMMERCIAL

## 2025-01-22 DIAGNOSIS — C18.7 ADENOCARCINOMA OF SIGMOID COLON (H): ICD-10-CM

## 2025-01-22 DIAGNOSIS — C18.7 ADENOCARCINOMA OF SIGMOID COLON (H): Primary | ICD-10-CM

## 2025-01-22 DIAGNOSIS — R97.20 ELEVATED PROSTATE SPECIFIC ANTIGEN (PSA): ICD-10-CM

## 2025-01-22 DIAGNOSIS — E86.0 DEHYDRATION: Primary | ICD-10-CM

## 2025-01-22 DIAGNOSIS — R97.20 ELEVATED PROSTATE SPECIFIC ANTIGEN (PSA): Primary | ICD-10-CM

## 2025-01-22 LAB
ALBUMIN SERPL BCG-MCNC: 4.5 G/DL (ref 3.5–5.2)
ALP SERPL-CCNC: 51 U/L (ref 40–150)
ALT SERPL W P-5'-P-CCNC: 17 U/L (ref 0–70)
ANION GAP SERPL CALCULATED.3IONS-SCNC: 14 MMOL/L (ref 7–15)
AST SERPL W P-5'-P-CCNC: 24 U/L (ref 0–45)
BASOPHILS # BLD AUTO: 0.1 10E3/UL (ref 0–0.2)
BASOPHILS NFR BLD AUTO: 1 %
BILIRUB SERPL-MCNC: 0.4 MG/DL
BUN SERPL-MCNC: 26.7 MG/DL (ref 8–23)
CALCIUM SERPL-MCNC: 9.7 MG/DL (ref 8.8–10.4)
CEA SERPL-MCNC: 4.8 NG/ML
CHLORIDE SERPL-SCNC: 103 MMOL/L (ref 98–107)
CREAT SERPL-MCNC: 0.88 MG/DL (ref 0.67–1.17)
EGFRCR SERPLBLD CKD-EPI 2021: 90 ML/MIN/1.73M2
EOSINOPHIL # BLD AUTO: 0.4 10E3/UL (ref 0–0.7)
EOSINOPHIL NFR BLD AUTO: 3 %
ERYTHROCYTE [DISTWIDTH] IN BLOOD BY AUTOMATED COUNT: 14.4 % (ref 10–15)
GLUCOSE SERPL-MCNC: 102 MG/DL (ref 70–99)
HCO3 SERPL-SCNC: 23 MMOL/L (ref 22–29)
HCT VFR BLD AUTO: 49.2 % (ref 40–53)
HGB BLD-MCNC: 16.3 G/DL (ref 13.3–17.7)
IMM GRANULOCYTES # BLD: 0 10E3/UL
IMM GRANULOCYTES NFR BLD: 0 %
LYMPHOCYTES # BLD AUTO: 3.1 10E3/UL (ref 0.8–5.3)
LYMPHOCYTES NFR BLD AUTO: 30 %
MCH RBC QN AUTO: 31.8 PG (ref 26.5–33)
MCHC RBC AUTO-ENTMCNC: 33.1 G/DL (ref 31.5–36.5)
MCV RBC AUTO: 96 FL (ref 78–100)
MONOCYTES # BLD AUTO: 1.1 10E3/UL (ref 0–1.3)
MONOCYTES NFR BLD AUTO: 11 %
NEUTROPHILS # BLD AUTO: 5.8 10E3/UL (ref 1.6–8.3)
NEUTROPHILS NFR BLD AUTO: 55 %
NRBC # BLD AUTO: 0 10E3/UL
NRBC BLD AUTO-RTO: 0 /100
PLATELET # BLD AUTO: 210 10E3/UL (ref 150–450)
POTASSIUM SERPL-SCNC: 4.2 MMOL/L (ref 3.4–5.3)
PROT SERPL-MCNC: 7.6 G/DL (ref 6.4–8.3)
PSA SERPL DL<=0.01 NG/ML-MCNC: 10.8 NG/ML (ref 0–6.5)
RBC # BLD AUTO: 5.12 10E6/UL (ref 4.4–5.9)
SODIUM SERPL-SCNC: 140 MMOL/L (ref 135–145)
WBC # BLD AUTO: 10.5 10E3/UL (ref 4–11)

## 2025-01-22 PROCEDURE — 82310 ASSAY OF CALCIUM: CPT | Performed by: INTERNAL MEDICINE

## 2025-01-22 PROCEDURE — 82378 CARCINOEMBRYONIC ANTIGEN: CPT | Performed by: INTERNAL MEDICINE

## 2025-01-22 PROCEDURE — 76000 FLUOROSCOPY <1 HR PHYS/QHP: CPT

## 2025-01-22 PROCEDURE — 36591 DRAW BLOOD OFF VENOUS DEVICE: CPT

## 2025-01-22 PROCEDURE — 85004 AUTOMATED DIFF WBC COUNT: CPT | Performed by: INTERNAL MEDICINE

## 2025-01-22 PROCEDURE — 85014 HEMATOCRIT: CPT | Performed by: INTERNAL MEDICINE

## 2025-01-22 PROCEDURE — 250N000011 HC RX IP 250 OP 636: Performed by: INTERNAL MEDICINE

## 2025-01-22 PROCEDURE — 71260 CT THORAX DX C+: CPT

## 2025-01-22 PROCEDURE — 250N000011 HC RX IP 250 OP 636: Performed by: RADIOLOGY

## 2025-01-22 PROCEDURE — 36593 DECLOT VASCULAR DEVICE: CPT

## 2025-01-22 PROCEDURE — 84153 ASSAY OF PSA TOTAL: CPT | Performed by: INTERNAL MEDICINE

## 2025-01-22 PROCEDURE — 250N000009 HC RX 250: Performed by: RADIOLOGY

## 2025-01-22 PROCEDURE — 84155 ASSAY OF PROTEIN SERUM: CPT | Performed by: INTERNAL MEDICINE

## 2025-01-22 RX ORDER — HEPARIN SODIUM (PORCINE) LOCK FLUSH IV SOLN 100 UNIT/ML 100 UNIT/ML
5 SOLUTION INTRAVENOUS
OUTPATIENT
Start: 2025-01-22

## 2025-01-22 RX ORDER — HEPARIN SODIUM,PORCINE 10 UNIT/ML
5-20 VIAL (ML) INTRAVENOUS DAILY PRN
OUTPATIENT
Start: 2025-01-22

## 2025-01-22 RX ORDER — IOPAMIDOL 755 MG/ML
71 INJECTION, SOLUTION INTRAVASCULAR ONCE
Status: COMPLETED | OUTPATIENT
Start: 2025-01-22 | End: 2025-01-22

## 2025-01-22 RX ORDER — HEPARIN SODIUM (PORCINE) LOCK FLUSH IV SOLN 100 UNIT/ML 100 UNIT/ML
3 SOLUTION INTRAVENOUS ONCE
Status: ACTIVE | OUTPATIENT
Start: 2025-01-22

## 2025-01-22 RX ORDER — HEPARIN SODIUM (PORCINE) LOCK FLUSH IV SOLN 100 UNIT/ML 100 UNIT/ML
5 SOLUTION INTRAVENOUS
Status: DISCONTINUED | OUTPATIENT
Start: 2025-01-22 | End: 2025-01-22 | Stop reason: HOSPADM

## 2025-01-22 RX ORDER — IOPAMIDOL 612 MG/ML
15 INJECTION, SOLUTION INTRATHECAL ONCE
Status: COMPLETED | OUTPATIENT
Start: 2025-01-22 | End: 2025-01-22

## 2025-01-22 RX ADMIN — Medication 5 ML: at 11:55

## 2025-01-22 RX ADMIN — ALTEPLASE 2 MG: 2.2 INJECTION, POWDER, LYOPHILIZED, FOR SOLUTION INTRAVENOUS at 09:05

## 2025-01-22 RX ADMIN — IOPAMIDOL 15 ML: 612 INJECTION, SOLUTION INTRATHECAL at 11:35

## 2025-01-22 RX ADMIN — SODIUM CHLORIDE 58 ML: 9 INJECTION, SOLUTION INTRAVENOUS at 09:54

## 2025-01-22 RX ADMIN — IOPAMIDOL 71 ML: 755 INJECTION, SOLUTION INTRAVENOUS at 09:54

## 2025-01-22 NOTE — PROGRESS NOTES
Port accessed with a power port needle. Flushed with ease, no blood return noted. Pt states there has been no blood return the last several times it's been accessed. IV placed an 18g IV in R AC and kamila labs. TPA instilled in port line and pt sent to CT.     1022: Unable to obtain a blood return after TPA instillation. Order for a dye study is being placed.     1030: Pt sent to imaging for a dye study.     1155: Pt returned from dye study, reported the tip is clotted. Secure chat sent to care team to discuss option with pt.     Todd SNYDER

## 2025-01-30 ENCOUNTER — VIRTUAL VISIT (OUTPATIENT)
Dept: ONCOLOGY | Facility: CLINIC | Age: 75
End: 2025-01-30
Attending: INTERNAL MEDICINE
Payer: COMMERCIAL

## 2025-01-30 DIAGNOSIS — N40.1 BENIGN PROSTATIC HYPERPLASIA WITH WEAK URINARY STREAM: ICD-10-CM

## 2025-01-30 DIAGNOSIS — C18.7 ADENOCARCINOMA OF SIGMOID COLON (H): ICD-10-CM

## 2025-01-30 DIAGNOSIS — K59.00 CONSTIPATION, UNSPECIFIED CONSTIPATION TYPE: Primary | ICD-10-CM

## 2025-01-30 DIAGNOSIS — R39.12 BENIGN PROSTATIC HYPERPLASIA WITH WEAK URINARY STREAM: ICD-10-CM

## 2025-01-30 RX ORDER — TAMSULOSIN HYDROCHLORIDE 0.4 MG/1
0.4 CAPSULE ORAL DAILY
Qty: 90 CAPSULE | Refills: 3 | Status: SHIPPED | OUTPATIENT
Start: 2025-01-30

## 2025-01-30 RX ORDER — SENNOSIDES 8.6 MG
1 TABLET ORAL DAILY
Qty: 60 TABLET | Refills: 1 | Status: SHIPPED | OUTPATIENT
Start: 2025-01-30

## 2025-01-30 NOTE — LETTER
1/30/2025      Kalin Shepard  [address]  [facility] [address]      Dear Colleague,    Thank you for referring your patient, Kalin Shepard, to the [facility]. Please see a copy of my visit note below.    Virtual Visit Details    Type of service:  Video Visit   Video Start Time:  11:32 AM  Video End Time: 11:39 AM    Originating Location (pt. Location): Home    Distant Location (provider location):  Off-site  Platform used for Video Visit: [facility] Hematology and Oncology Outpatient Progress Note    Patient: Kalin Shepard  MRN: [id number]  Date of Service: Jan 30, 2025          Reason for Visit    Stage IV colon cancer (peritoneal mets)    Primary Oncologist: Dr. Colorado      Assessment/Plan  Stage IV colon cancer (peritoneal mets)  Layton completed 12 cycles of induction/neoadjuvant chemo through Nov, 2022 with an excellent radiographical response.    He, therefore, underwent resection of his primary sigmoid/rectal tumor in February 2023. This was grossly removed with negative margins and no nodes involved.  He did have significant residual tumor without any treatment effect.  ypT4b.  No evidence of damon metastasis however he had a previously biopsy-proven peritoneal metastasis.  Omentectomy showed no evidence of any residual malignancy.    He is currently on surveillance.  He is more than 2 years out from chemotherapy and about 2 years from surgery.  So far no evidence of any disease recurrence.  Recently had a CT scan.  I reviewed the CT scan images and report personally and independently interpreted results.  CT is not suggestive of any disease recurrence.  He has a stable looking right hilar lymph node.  No evidence of any lung nodules.  No intra-abdominal adenopathy.  No liver lesions.  He does appear to have an enlarged prostate which indents the bladder and there is diffuse bladder wall thickening.  This explains the elevated PSA and his  recent urinary retention issue.  He has follow-up with urology.  From a colon cancer standpoint so far no evidence of any disease recurrence we will continue our surveillance.  Previously I was contemplating switching him to yearly CT but considering that he had stage IV disease to begin with I am planning on doing CT and labs every 6 months for now.  He is agreeable to the plan.    Port has stopped working.  So recommend getting it removed.  There is fibrin sheath at the tip which is impeding the flow.    2.   Chemo-induced peripheral neuropathy (gr 1)  This is stable.    3.   Cachexia  Weight is better    4.   Nicotine dependence  Smoking currently. Not interested in quitting at this time, understands benefits in quitting.     ______________________________________________________________________________    History of Present Illness/ Interval History    Mr. Kalin Shepard is a 73 year old who was diagnosed with sigmoid colon cancer with peritoneal mets in March 2022. He underwent 12 cycles of chemo - FOLFOX + bevacizumab; kranthi was omitted for SBO and Oxaliplatin dose-reduced/later omitted for peripheral neuropathy.     At completion of chemo in November 2022, restaging showed good response with no clear mets. He, therefore, underwent resection of the residual primary tumor in early 2023 with negative margins and no nodes involved. He had a loop ileostomy placed and take-down in early May 2023. He continues on surveillance.     Is done fairly well so far.  He is 2 years out from chemotherapy and surgery.  So far no evidence of any disease recurrence.  He was hospitalized a couple of months ago with urinary retention.  Now has elevated PSA level and CT evidence of prostatic enlargement.  Has follow-ups with urology.  But otherwise denies any new issues today.  He recently had repeat CT scan and labs and this video visit is to discuss the results.      ECOG Performance    0      Oncology  History/Treatment  Diagnosis/Stage:   3/26/2022: IV sigmoid colon cancer to peritoneum  -Presented with abdominal pain secondary to bowel obstruction.  CT scan showed mass in sigmoid colon with upstream small bowel and colonic obstruction.  Underwent ostomy placement.    -Peritoneal biopsy positive for adenocarcinoma.    -No evidence of other distant metastasis based on PET scan.  -BRAF V600E positive.  K-aldo and NRAS negative.  Proficient MMR.    8/24/2022 open laparotomy for SBO, lysis of adhesions. No evidence of any malignancy intra-abdominally noted.     2/8/2023 post esther-adjuvant chemo. Surgical resection primary sigmoid/rectal tumor path:   Adenocarcinoma, moderate to poorly differentiated, invading through the wall to involve the adjacent adherent perirectal fibroadipose tissue  -Tumor size: 3 cm in greatest dimension  -Resection margins free of involvement  -No evidence of lymphovascular or perineural invasion  -Treatment effect: Poor response (score 3)  -No tumor deposits identified  -Thirty-three benign lymph nodes (0/33)  -Sections of vas deferens with no evidence of involvement by adenocarcinoma.      Treatment:  Induction/neoadjuvant chemotherapy started for potentially resectable metachronous peritoneal metastasis    4/26/2022 - 11/2022: FOLFOX and bevacizumab x 12 cycles (Amrita stopped after cycle 7; oxali stopped after cycle 11).   -cycle 7 delayed for neutropenia. Neulasta added  -cycle 8 delayed for hospitalization with SBO (favoring ileus/adhesions, no evidence of malignancy). Post-op course complicated by peritonitis requiring antibiotics. Temp TPN, then cleared for oral intake.   -Bevacizumab omitted with cycle 8 to allow for healing postmalarial. Resumed with cycle 9.  -cycle 9 oxilaplatin dose-reduced 20% for gr 1-2 neuropathy  -cycle 11 oxaliplatin dose-reduced another 10-15% (33% total from original) for gr 1-2 neuropathy  -cycle 12 infusional 5FU only    2/8/2023: exploratory lap, low  anterior resection, takedown loop colostomy, diverting loop ileostomy and flex sigmoidoscopy    Ileostomy reversal with CRS (5/3/23)      Physical Exam    GENERAL: Alert and oriented to time place and person. Seated comfortably. In no distress. Wife accompanies.   HEENT: No icterus  Lymph: No palpable cervical, axillary, inguinal nodes.  Lungs: Clear bilaterally  Heart: RRR  Abd: Soft, non-tender, no hepatomegaly. Mid-line incision.   Extremities: No edema  Neuro: Non-focal    Lab Results    No results found for this or any previous visit (from the past week).      CBC, CMP, CEA reviewed    Imaging    CT Chest/Abdomen/Pelvis w Contrast    Result Date: 1/22/2025  EXAM: CT CHEST/ABDOMEN/PELVIS W CONTRAST LOCATION: [facility] DATE: 1/22/2025 INDICATION: Adenocarcinoma of sigmoid colon (H). COMPARISON: 07/16/2024, 12/06/2023, and 08/14/2023. TECHNIQUE: CT scan of the chest, abdomen, and pelvis was performed following injection of IV contrast. Multiplanar reformats were obtained. Dose reduction techniques were used. CONTRAST: 71 mL Isovue 370. FINDINGS: LUNGS AND PLEURA: Stable linear scarring in the posterior right lower lobe. Mild bronchiectasis in both lower lobes. No infiltrate or effusion. No evidence for nodularity. MEDIASTINUM/AXILLAE: Left chest port is present. No axillary adenopathy. Prominent, but still normal-sized mediastinal lymph nodes are unchanged. Stable mildly enlarged right hilar lymph node. No pericardial effusion. The ascending aorta is borderline dilated at 3.9 cm. CORONARY ARTERY CALCIFICATION: Mild. HEPATOBILIARY: No focal lesions. The portal vein is patent. PANCREAS: Normal. SPLEEN: Normal. ADRENAL GLANDS: Normal. KIDNEYS/BLADDER: No enhancing mass or hydronephrosis on either side. Diffuse bladder wall thickening is noted without focal mass. This could represent cystitis. BOWEL: No evidence for bowel obstruction. No colonic wall thickening or inflammatory change.  Rectosigmoid resection and anastomosis is noted. LYMPH NODES: No adenopathy through the abdomen and pelvis. VASCULATURE: Tortuosity and mild aneurysmal dilation is noted of the mid abdominal aorta measuring up to 3.1 cm. Extensive atherosclerotic calcification is noted. PELVIC ORGANS: Enlarged prostate indents the underside of the bladder. Diffuse bladder wall thickening is noted without focal mass. This may represent cystitis. No free fluid or adenopathy in the pelvis. MUSCULOSKELETAL: Stable sclerotic focus in the inferior left scapula may represent a bone island.     IMPRESSION: 1.  No good evidence for metastatic disease through the chest, abdomen, and pelvis. 2.  Stable mildly enlarged right hilar lymph node. 3.  Stable infrarenal AAA measuring up to 3.1 cm. 4.  Diffuse bladder wall thickening without focal mass. This may represent cystitis. 5.  Enlarged prostate indents the underside of the bladder.    XR Chest/Heart Fluoro    Result Date: 1/22/2025  EXAM: Port injection with chest fluoroscopy to evaluate port patency dated 01/22/2025. INDICATION: Inability to draw back from left chest port. COMPARISON: None. FINDINGS: The left chest port was accessed. A small amount of Isovue-300 was injected. Fluoroscopy at the tip of the catheter shows contrast flows backward from the tip along the catheter before reversing and flowing distally. This indicates a fibrin sheath impeding flow of contrast. The port was flushed before the patient left.     IMPRESSION: Fibrin sheath at the tip of the catheter.     I have personally reviewed the CT scan images and report and independently interpreted the results to my ability.    The longitudinal plan of care for the diagnosis(es)/condition(s) as documented were addressed during this visit. Due to the added complexity in care, I will continue to support Layton in the subsequent management and with ongoing continuity of care.        Signed by:   Pamela Colorado MD            Again, thank you for allowing me to participate in the care of your patient.        Sincerely,        Pamela Colorado MD    Electronically signed

## 2025-01-30 NOTE — PROGRESS NOTES
Virtual Visit Details    Type of service:  Video Visit   Video Start Time:  11:32 AM  Video End Time: 11:39 AM    Originating Location (pt. Location): Home    Distant Location (provider location):  Off-site  Platform used for Video Visit: [facility] Hematology and Oncology Outpatient Progress Note    Patient: Kalin Shepard  MRN: [id number]  Date of Service: Jan 30, 2025          Reason for Visit    Stage IV colon cancer (peritoneal mets)    Primary Oncologist: Dr. Colorado      Assessment/Plan  Stage IV colon cancer (peritoneal mets)  Layton completed 12 cycles of induction/neoadjuvant chemo through Nov, 2022 with an excellent radiographical response.    He, therefore, underwent resection of his primary sigmoid/rectal tumor in February 2023. This was grossly removed with negative margins and no nodes involved.  He did have significant residual tumor without any treatment effect.  ypT4b.  No evidence of damon metastasis however he had a previously biopsy-proven peritoneal metastasis.  Omentectomy showed no evidence of any residual malignancy.    He is currently on surveillance.  He is more than 2 years out from chemotherapy and about 2 years from surgery.  So far no evidence of any disease recurrence.  Recently had a CT scan.  I reviewed the CT scan images and report personally and independently interpreted results.  CT is not suggestive of any disease recurrence.  He has a stable looking right hilar lymph node.  No evidence of any lung nodules.  No intra-abdominal adenopathy.  No liver lesions.  He does appear to have an enlarged prostate which indents the bladder and there is diffuse bladder wall thickening.  This explains the elevated PSA and his recent urinary retention issue.  He has follow-up with urology.  From a colon cancer standpoint so far no evidence of any disease recurrence we will continue our surveillance.  Previously I was contemplating switching him to yearly CT but considering  that he had stage IV disease to begin with I am planning on doing CT and labs every 6 months for now.  He is agreeable to the plan.    Port has stopped working.  So recommend getting it removed.  There is fibrin sheath at the tip which is impeding the flow.    2.   Chemo-induced peripheral neuropathy (gr 1)  This is stable.    3.   Cachexia  Weight is better    4.   Nicotine dependence  Smoking currently. Not interested in quitting at this time, understands benefits in quitting.     ______________________________________________________________________________    History of Present Illness/ Interval History    Mr. Kalin Shepard is a 73 year old who was diagnosed with sigmoid colon cancer with peritoneal mets in March 2022. He underwent 12 cycles of chemo - FOLFOX + bevacizumab; kranthi was omitted for SBO and Oxaliplatin dose-reduced/later omitted for peripheral neuropathy.     At completion of chemo in November 2022, restaging showed good response with no clear mets. He, therefore, underwent resection of the residual primary tumor in early 2023 with negative margins and no nodes involved. He had a loop ileostomy placed and take-down in early May 2023. He continues on surveillance.     Is done fairly well so far.  He is 2 years out from chemotherapy and surgery.  So far no evidence of any disease recurrence.  He was hospitalized a couple of months ago with urinary retention.  Now has elevated PSA level and CT evidence of prostatic enlargement.  Has follow-ups with urology.  But otherwise denies any new issues today.  He recently had repeat CT scan and labs and this video visit is to discuss the results.      ECOG Performance    0      Oncology History/Treatment  Diagnosis/Stage:   3/26/2022: IV sigmoid colon cancer to peritoneum  -Presented with abdominal pain secondary to bowel obstruction.  CT scan showed mass in sigmoid colon with upstream small bowel and colonic obstruction.  Underwent ostomy placement.     -Peritoneal biopsy positive for adenocarcinoma.    -No evidence of other distant metastasis based on PET scan.  -BRAF V600E positive.  K-aldo and NRAS negative.  Proficient MMR.    8/24/2022 open laparotomy for SBO, lysis of adhesions. No evidence of any malignancy intra-abdominally noted.     2/8/2023 post esther-adjuvant chemo. Surgical resection primary sigmoid/rectal tumor path:   Adenocarcinoma, moderate to poorly differentiated, invading through the wall to involve the adjacent adherent perirectal fibroadipose tissue  -Tumor size: 3 cm in greatest dimension  -Resection margins free of involvement  -No evidence of lymphovascular or perineural invasion  -Treatment effect: Poor response (score 3)  -No tumor deposits identified  -Thirty-three benign lymph nodes (0/33)  -Sections of vas deferens with no evidence of involvement by adenocarcinoma.      Treatment:  Induction/neoadjuvant chemotherapy started for potentially resectable metachronous peritoneal metastasis    4/26/2022 - 11/2022: FOLFOX and bevacizumab x 12 cycles (Amrita stopped after cycle 7; oxali stopped after cycle 11).   -cycle 7 delayed for neutropenia. Neulasta added  -cycle 8 delayed for hospitalization with SBO (favoring ileus/adhesions, no evidence of malignancy). Post-op course complicated by peritonitis requiring antibiotics. Temp TPN, then cleared for oral intake.   -Bevacizumab omitted with cycle 8 to allow for healing postmalarial. Resumed with cycle 9.  -cycle 9 oxilaplatin dose-reduced 20% for gr 1-2 neuropathy  -cycle 11 oxaliplatin dose-reduced another 10-15% (33% total from original) for gr 1-2 neuropathy  -cycle 12 infusional 5FU only    2/8/2023: exploratory lap, low anterior resection, takedown loop colostomy, diverting loop ileostomy and flex sigmoidoscopy    Ileostomy reversal with CRS (5/3/23)      Physical Exam    GENERAL: Alert and oriented to time place and person. Seated comfortably. In no distress. Wife accompanies.   HEENT: No  icterus  Lymph: No palpable cervical, axillary, inguinal nodes.  Lungs: Clear bilaterally  Heart: RRR  Abd: Soft, non-tender, no hepatomegaly. Mid-line incision.   Extremities: No edema  Neuro: Non-focal    Lab Results    No results found for this or any previous visit (from the past week).      CBC, CMP, CEA reviewed    Imaging    CT Chest/Abdomen/Pelvis w Contrast    Result Date: 1/22/2025  EXAM: CT CHEST/ABDOMEN/PELVIS W CONTRAST LOCATION: [facility] DATE: 1/22/2025 INDICATION: Adenocarcinoma of sigmoid colon (H). COMPARISON: 07/16/2024, 12/06/2023, and 08/14/2023. TECHNIQUE: CT scan of the chest, abdomen, and pelvis was performed following injection of IV contrast. Multiplanar reformats were obtained. Dose reduction techniques were used. CONTRAST: 71 mL Isovue 370. FINDINGS: LUNGS AND PLEURA: Stable linear scarring in the posterior right lower lobe. Mild bronchiectasis in both lower lobes. No infiltrate or effusion. No evidence for nodularity. MEDIASTINUM/AXILLAE: Left chest port is present. No axillary adenopathy. Prominent, but still normal-sized mediastinal lymph nodes are unchanged. Stable mildly enlarged right hilar lymph node. No pericardial effusion. The ascending aorta is borderline dilated at 3.9 cm. CORONARY ARTERY CALCIFICATION: Mild. HEPATOBILIARY: No focal lesions. The portal vein is patent. PANCREAS: Normal. SPLEEN: Normal. ADRENAL GLANDS: Normal. KIDNEYS/BLADDER: No enhancing mass or hydronephrosis on either side. Diffuse bladder wall thickening is noted without focal mass. This could represent cystitis. BOWEL: No evidence for bowel obstruction. No colonic wall thickening or inflammatory change. Rectosigmoid resection and anastomosis is noted. LYMPH NODES: No adenopathy through the abdomen and pelvis. VASCULATURE: Tortuosity and mild aneurysmal dilation is noted of the mid abdominal aorta measuring up to 3.1 cm. Extensive atherosclerotic calcification is noted. PELVIC  ORGANS: Enlarged prostate indents the underside of the bladder. Diffuse bladder wall thickening is noted without focal mass. This may represent cystitis. No free fluid or adenopathy in the pelvis. MUSCULOSKELETAL: Stable sclerotic focus in the inferior left scapula may represent a bone island.     IMPRESSION: 1.  No good evidence for metastatic disease through the chest, abdomen, and pelvis. 2.  Stable mildly enlarged right hilar lymph node. 3.  Stable infrarenal AAA measuring up to 3.1 cm. 4.  Diffuse bladder wall thickening without focal mass. This may represent cystitis. 5.  Enlarged prostate indents the underside of the bladder.    XR Chest/Heart Fluoro    Result Date: 1/22/2025  EXAM: Port injection with chest fluoroscopy to evaluate port patency dated 01/22/2025. INDICATION: Inability to draw back from left chest port. COMPARISON: None. FINDINGS: The left chest port was accessed. A small amount of Isovue-300 was injected. Fluoroscopy at the tip of the catheter shows contrast flows backward from the tip along the catheter before reversing and flowing distally. This indicates a fibrin sheath impeding flow of contrast. The port was flushed before the patient left.     IMPRESSION: Fibrin sheath at the tip of the catheter.     I have personally reviewed the CT scan images and report and independently interpreted the results to my ability.    The longitudinal plan of care for the diagnosis(es)/condition(s) as documented were addressed during this visit. Due to the added complexity in care, I will continue to support Layton in the subsequent management and with ongoing continuity of care.        Signed by:   Pamela Colorado MD

## 2025-01-30 NOTE — NURSING NOTE
Current patient location: [address] [facility] [address]    Is the patient currently in the state of [place]? YES    Visit mode: VIDEO    If the visit is dropped, the patient can be reconnected by:VIDEO VISIT: Send to e-mail at: [email or web address]    Will anyone else be joining the visit? NO  (If patient encounters technical issues they should call [phone]    Are changes needed to the allergy or medication list? Pt stated no changes to allergies and Pt stated no med changes    Are refills needed on medications prescribed by this physician? Discuss with provider    Rooming Documentation:  Questionnaire(s) completed    Reason for visit: RECHECK    Jonathan DEL TORO

## 2025-01-30 NOTE — LETTER
1/30/2025      Kalin Shepard  [address]  [facility] [address]      Dear Colleague,    Thank you for referring your patient, Kalin Shepard, to the [facility]. Please see a copy of my visit note below.    Virtual Visit Details    Type of service:  Video Visit   Video Start Time:  11:32 AM  Video End Time: 11:39 AM    Originating Location (pt. Location): Home    Distant Location (provider location):  Off-site  Platform used for Video Visit: [facility] Hematology and Oncology Outpatient Progress Note    Patient: Kalin Shepard  MRN: [id number]  Date of Service: Jan 30, 2025          Reason for Visit    Stage IV colon cancer (peritoneal mets)    Primary Oncologist: Dr. Colorado      Assessment/Plan  Stage IV colon cancer (peritoneal mets)  Layton completed 12 cycles of induction/neoadjuvant chemo through Nov, 2022 with an excellent radiographical response.    He, therefore, underwent resection of his primary sigmoid/rectal tumor in February 2023. This was grossly removed with negative margins and no nodes involved.  He did have significant residual tumor without any treatment effect.  ypT4b.  No evidence of damon metastasis however he had a previously biopsy-proven peritoneal metastasis.  Omentectomy showed no evidence of any residual malignancy.    He is currently on surveillance.  He is more than 2 years out from chemotherapy and about 2 years from surgery.  So far no evidence of any disease recurrence.  Recently had a CT scan.  I reviewed the CT scan images and report personally and independently interpreted results.  CT is not suggestive of any disease recurrence.  He has a stable looking right hilar lymph node.  No evidence of any lung nodules.  No intra-abdominal adenopathy.  No liver lesions.  He does appear to have an enlarged prostate which indents the bladder and there is diffuse bladder wall thickening.  This explains the elevated PSA and his  recent urinary retention issue.  He has follow-up with urology.  From a colon cancer standpoint so far no evidence of any disease recurrence we will continue our surveillance.  Previously I was contemplating switching him to yearly CT but considering that he had stage IV disease to begin with I am planning on doing CT and labs every 6 months for now.  He is agreeable to the plan.    Port has stopped working.  So recommend getting it removed.  There is fibrin sheath at the tip which is impeding the flow.    2.   Chemo-induced peripheral neuropathy (gr 1)  This is stable.    3.   Cachexia  Weight is better    4.   Nicotine dependence  Smoking currently. Not interested in quitting at this time, understands benefits in quitting.     ______________________________________________________________________________    History of Present Illness/ Interval History    Mr. Kalin Shepard is a 73 year old who was diagnosed with sigmoid colon cancer with peritoneal mets in March 2022. He underwent 12 cycles of chemo - FOLFOX + bevacizumab; kranthi was omitted for SBO and Oxaliplatin dose-reduced/later omitted for peripheral neuropathy.     At completion of chemo in November 2022, restaging showed good response with no clear mets. He, therefore, underwent resection of the residual primary tumor in early 2023 with negative margins and no nodes involved. He had a loop ileostomy placed and take-down in early May 2023. He continues on surveillance.     Is done fairly well so far.  He is 2 years out from chemotherapy and surgery.  So far no evidence of any disease recurrence.  He was hospitalized a couple of months ago with urinary retention.  Now has elevated PSA level and CT evidence of prostatic enlargement.  Has follow-ups with urology.  But otherwise denies any new issues today.  He recently had repeat CT scan and labs and this video visit is to discuss the results.      ECOG Performance    0      Oncology  History/Treatment  Diagnosis/Stage:   3/26/2022: IV sigmoid colon cancer to peritoneum  -Presented with abdominal pain secondary to bowel obstruction.  CT scan showed mass in sigmoid colon with upstream small bowel and colonic obstruction.  Underwent ostomy placement.    -Peritoneal biopsy positive for adenocarcinoma.    -No evidence of other distant metastasis based on PET scan.  -BRAF V600E positive.  K-aldo and NRAS negative.  Proficient MMR.    8/24/2022 open laparotomy for SBO, lysis of adhesions. No evidence of any malignancy intra-abdominally noted.     2/8/2023 post esther-adjuvant chemo. Surgical resection primary sigmoid/rectal tumor path:   Adenocarcinoma, moderate to poorly differentiated, invading through the wall to involve the adjacent adherent perirectal fibroadipose tissue  -Tumor size: 3 cm in greatest dimension  -Resection margins free of involvement  -No evidence of lymphovascular or perineural invasion  -Treatment effect: Poor response (score 3)  -No tumor deposits identified  -Thirty-three benign lymph nodes (0/33)  -Sections of vas deferens with no evidence of involvement by adenocarcinoma.      Treatment:  Induction/neoadjuvant chemotherapy started for potentially resectable metachronous peritoneal metastasis    4/26/2022 - 11/2022: FOLFOX and bevacizumab x 12 cycles (Amrita stopped after cycle 7; oxali stopped after cycle 11).   -cycle 7 delayed for neutropenia. Neulasta added  -cycle 8 delayed for hospitalization with SBO (favoring ileus/adhesions, no evidence of malignancy). Post-op course complicated by peritonitis requiring antibiotics. Temp TPN, then cleared for oral intake.   -Bevacizumab omitted with cycle 8 to allow for healing postmalarial. Resumed with cycle 9.  -cycle 9 oxilaplatin dose-reduced 20% for gr 1-2 neuropathy  -cycle 11 oxaliplatin dose-reduced another 10-15% (33% total from original) for gr 1-2 neuropathy  -cycle 12 infusional 5FU only    2/8/2023: exploratory lap, low  anterior resection, takedown loop colostomy, diverting loop ileostomy and flex sigmoidoscopy    Ileostomy reversal with CRS (5/3/23)      Physical Exam    GENERAL: Alert and oriented to time place and person. Seated comfortably. In no distress. Wife accompanies.   HEENT: No icterus  Lymph: No palpable cervical, axillary, inguinal nodes.  Lungs: Clear bilaterally  Heart: RRR  Abd: Soft, non-tender, no hepatomegaly. Mid-line incision.   Extremities: No edema  Neuro: Non-focal    Lab Results    No results found for this or any previous visit (from the past week).      CBC, CMP, CEA reviewed    Imaging    CT Chest/Abdomen/Pelvis w Contrast    Result Date: 1/22/2025  EXAM: CT CHEST/ABDOMEN/PELVIS W CONTRAST LOCATION: [facility] DATE: 1/22/2025 INDICATION: Adenocarcinoma of sigmoid colon (H). COMPARISON: 07/16/2024, 12/06/2023, and 08/14/2023. TECHNIQUE: CT scan of the chest, abdomen, and pelvis was performed following injection of IV contrast. Multiplanar reformats were obtained. Dose reduction techniques were used. CONTRAST: 71 mL Isovue 370. FINDINGS: LUNGS AND PLEURA: Stable linear scarring in the posterior right lower lobe. Mild bronchiectasis in both lower lobes. No infiltrate or effusion. No evidence for nodularity. MEDIASTINUM/AXILLAE: Left chest port is present. No axillary adenopathy. Prominent, but still normal-sized mediastinal lymph nodes are unchanged. Stable mildly enlarged right hilar lymph node. No pericardial effusion. The ascending aorta is borderline dilated at 3.9 cm. CORONARY ARTERY CALCIFICATION: Mild. HEPATOBILIARY: No focal lesions. The portal vein is patent. PANCREAS: Normal. SPLEEN: Normal. ADRENAL GLANDS: Normal. KIDNEYS/BLADDER: No enhancing mass or hydronephrosis on either side. Diffuse bladder wall thickening is noted without focal mass. This could represent cystitis. BOWEL: No evidence for bowel obstruction. No colonic wall thickening or inflammatory change.  Rectosigmoid resection and anastomosis is noted. LYMPH NODES: No adenopathy through the abdomen and pelvis. VASCULATURE: Tortuosity and mild aneurysmal dilation is noted of the mid abdominal aorta measuring up to 3.1 cm. Extensive atherosclerotic calcification is noted. PELVIC ORGANS: Enlarged prostate indents the underside of the bladder. Diffuse bladder wall thickening is noted without focal mass. This may represent cystitis. No free fluid or adenopathy in the pelvis. MUSCULOSKELETAL: Stable sclerotic focus in the inferior left scapula may represent a bone island.     IMPRESSION: 1.  No good evidence for metastatic disease through the chest, abdomen, and pelvis. 2.  Stable mildly enlarged right hilar lymph node. 3.  Stable infrarenal AAA measuring up to 3.1 cm. 4.  Diffuse bladder wall thickening without focal mass. This may represent cystitis. 5.  Enlarged prostate indents the underside of the bladder.    XR Chest/Heart Fluoro    Result Date: 1/22/2025  EXAM: Port injection with chest fluoroscopy to evaluate port patency dated 01/22/2025. INDICATION: Inability to draw back from left chest port. COMPARISON: None. FINDINGS: The left chest port was accessed. A small amount of Isovue-300 was injected. Fluoroscopy at the tip of the catheter shows contrast flows backward from the tip along the catheter before reversing and flowing distally. This indicates a fibrin sheath impeding flow of contrast. The port was flushed before the patient left.     IMPRESSION: Fibrin sheath at the tip of the catheter.     I have personally reviewed the CT scan images and report and independently interpreted the results to my ability.    The longitudinal plan of care for the diagnosis(es)/condition(s) as documented were addressed during this visit. Due to the added complexity in care, I will continue to support Layton in the subsequent management and with ongoing continuity of care.        Signed by:   Pamela Colorado MD            Again, thank you for allowing me to participate in the care of your patient.        Sincerely,        Pamela Colorado MD    Electronically signed

## 2025-02-10 ENCOUNTER — HOSPITAL ENCOUNTER (OUTPATIENT)
Dept: MRI IMAGING | Facility: CLINIC | Age: 75
Discharge: HOME | End: 2025-02-10
Attending: STUDENT IN AN ORGANIZED HEALTH CARE EDUCATION/TRAINING PROGRAM | Admitting: STUDENT IN AN ORGANIZED HEALTH CARE EDUCATION/TRAINING PROGRAM
Payer: COMMERCIAL

## 2025-02-10 ENCOUNTER — MYC MEDICAL ADVICE (OUTPATIENT)
Dept: INTERVENTIONAL RADIOLOGY/VASCULAR | Facility: CLINIC | Age: 75
End: 2025-02-10

## 2025-02-10 DIAGNOSIS — R97.20 ELEVATED PROSTATE SPECIFIC ANTIGEN (PSA): ICD-10-CM

## 2025-02-10 PROCEDURE — 72197 MRI PELVIS W/O & W/DYE: CPT | Mod: 26 | Performed by: RADIOLOGY

## 2025-02-10 PROCEDURE — 72197 MRI PELVIS W/O & W/DYE: CPT

## 2025-02-10 PROCEDURE — 258N000003 HC RX IP 258 OP 636: Performed by: STUDENT IN AN ORGANIZED HEALTH CARE EDUCATION/TRAINING PROGRAM

## 2025-02-10 PROCEDURE — A9585 GADOBUTROL INJECTION: HCPCS | Performed by: STUDENT IN AN ORGANIZED HEALTH CARE EDUCATION/TRAINING PROGRAM

## 2025-02-10 PROCEDURE — 255N000002 HC RX 255 OP 636: Performed by: STUDENT IN AN ORGANIZED HEALTH CARE EDUCATION/TRAINING PROGRAM

## 2025-02-10 RX ORDER — GADOBUTROL 604.72 MG/ML
6.5 INJECTION INTRAVENOUS ONCE
Status: COMPLETED | OUTPATIENT
Start: 2025-02-10 | End: 2025-02-10

## 2025-02-10 RX ADMIN — GADOBUTROL 6.5 ML: 604.72 INJECTION INTRAVENOUS at 10:52

## 2025-02-10 RX ADMIN — SODIUM CHLORIDE 50 ML: 9 INJECTION, SOLUTION INTRAVENOUS at 11:03

## 2025-02-11 DIAGNOSIS — R97.20 ELEVATED PROSTATE SPECIFIC ANTIGEN (PSA): Primary | ICD-10-CM

## 2025-03-07 NOTE — PROGRESS NOTES
Interventional Radiology - Pre-Procedure Note:  [facility]  03/10/2025     Procedure Requested: Port Removal, left sided  Requested by: Pamela Colorado MD     History and Physical Reviewed: H&P documented within 30 days (by Lee Ann Hall DNP on 3/7/25).  I have personally reviewed the patient's medical history and have updated the medical record as necessary.      Brief HPI: Kalin Shepard is a 74 year old male with history of stage IV colon cancer (peritoneal mets), s/p left internal jugular port placement 6/2/2022. Now completed chemotherapy. Presenting for port removal.       NPO: Midnight.  ANTICOAGULANTS: None.  ANTIBIOTICS: None indicated per SIR guidelines.    ALLERGIES  No Known Allergies      Past Medical History:   Diagnosis Date    Cancer (H)         LABS:  INR   Date Value Ref Range Status   04/13/2023 0.88 0.85 - 1.15 Final      Hemoglobin   Date Value Ref Range Status   01/22/2025 16.3 13.3 - 17.7 g/dL Final     Platelet Count   Date Value Ref Range Status   01/22/2025 210 150 - 450 10e3/uL Final     Creatinine   Date Value Ref Range Status   01/22/2025 0.88 0.67 - 1.17 mg/dL Final     Potassium   Date Value Ref Range Status   01/22/2025 4.2 3.4 - 5.3 mmol/L Final   08/29/2022 3.5 3.4 - 5.3 mmol/L Final         EXAM:  /80   Pulse 71   Temp 98.1  F (36.7  C) (Oral)   Resp 20   SpO2 96%   General: Stable. In no acute distress.    Neuro: Alert and oriented x 3. No focal deficits.  Psych: Appropriate mood and affect. Linear/coherent thought process.   Resp: Normal respirations. Unlabored breathing. Lungs clear to auscultation bilaterally.  Cardio: S1S2, regular rate and rhythm, without murmur, clicks or rubs  Skin: Warm and dry. Left sided port site without excoriations, ecchymosis, erythema, lesions, open sores or drainage.      Pre-Sedation Assessment:  Mallampati Airway Classification:  II - Faucial pillars and soft palate may be seen,  but uvula is masked by the base of the tongue  Previous reaction to anesthesia/sedation:  No  Sedation plan based on assessment: Moderate (conscious) sedation  ASA Classification: Class 3 - SEVERE SYSTEMIC DISEASE, DEFINITE FUNCTIONAL LIMITATIONS.   Code Status: Full Code intra procedure, per discussion with patient.       ASSESSMENT/PLAN:   #Hx of colon cancer, complete with treatment.    Left sided port removal with sedation.    Procedural education reviewed with patient/family in detail including, but not limited to risks, benefits and alternatives with understanding verbalized by patient/family.      Total time spent on the date of the encounter: 35 minutes.    JULIUS Guzman CNP  Interventional Radiology

## 2025-03-10 ENCOUNTER — HOSPITAL ENCOUNTER (OUTPATIENT)
Dept: INTERVENTIONAL RADIOLOGY/VASCULAR | Facility: HOSPITAL | Age: 75
Discharge: HOME | End: 2025-03-10
Attending: INTERNAL MEDICINE | Admitting: RADIOLOGY
Payer: COMMERCIAL

## 2025-03-10 VITALS
DIASTOLIC BLOOD PRESSURE: 69 MMHG | HEART RATE: 53 BPM | SYSTOLIC BLOOD PRESSURE: 124 MMHG | OXYGEN SATURATION: 94 % | TEMPERATURE: 97.6 F | RESPIRATION RATE: 18 BRPM

## 2025-03-10 DIAGNOSIS — C18.7 ADENOCARCINOMA OF SIGMOID COLON (H): ICD-10-CM

## 2025-03-10 PROCEDURE — 99152 MOD SED SAME PHYS/QHP 5/>YRS: CPT

## 2025-03-10 PROCEDURE — 250N000011 HC RX IP 250 OP 636: Performed by: NURSE PRACTITIONER

## 2025-03-10 PROCEDURE — 250N000011 HC RX IP 250 OP 636: Performed by: RADIOLOGY

## 2025-03-10 PROCEDURE — 250N000009 HC RX 250: Performed by: RADIOLOGY

## 2025-03-10 RX ORDER — NALOXONE HYDROCHLORIDE 0.4 MG/ML
0.2 INJECTION, SOLUTION INTRAMUSCULAR; INTRAVENOUS; SUBCUTANEOUS
Status: DISCONTINUED | OUTPATIENT
Start: 2025-03-10 | End: 2025-03-11 | Stop reason: HOSPADM

## 2025-03-10 RX ORDER — NALOXONE HYDROCHLORIDE 0.4 MG/ML
0.4 INJECTION, SOLUTION INTRAMUSCULAR; INTRAVENOUS; SUBCUTANEOUS
Status: DISCONTINUED | OUTPATIENT
Start: 2025-03-10 | End: 2025-03-11 | Stop reason: HOSPADM

## 2025-03-10 RX ORDER — FLUMAZENIL 0.1 MG/ML
0.2 INJECTION, SOLUTION INTRAVENOUS
Status: DISCONTINUED | OUTPATIENT
Start: 2025-03-10 | End: 2025-03-11 | Stop reason: HOSPADM

## 2025-03-10 RX ORDER — ONDANSETRON 2 MG/ML
4 INJECTION INTRAMUSCULAR; INTRAVENOUS
Status: DISCONTINUED | OUTPATIENT
Start: 2025-03-10 | End: 2025-03-11 | Stop reason: HOSPADM

## 2025-03-10 RX ORDER — FENTANYL CITRATE 50 UG/ML
25-50 INJECTION, SOLUTION INTRAMUSCULAR; INTRAVENOUS EVERY 5 MIN PRN
Status: DISCONTINUED | OUTPATIENT
Start: 2025-03-10 | End: 2025-03-11 | Stop reason: HOSPADM

## 2025-03-10 RX ORDER — LIDOCAINE HYDROCHLORIDE AND EPINEPHRINE 10; 10 MG/ML; UG/ML
INJECTION, SOLUTION INFILTRATION; PERINEURAL PRN
Status: COMPLETED | OUTPATIENT
Start: 2025-03-10 | End: 2025-03-10

## 2025-03-10 RX ADMIN — MIDAZOLAM HYDROCHLORIDE 1 MG: 1 INJECTION, SOLUTION INTRAMUSCULAR; INTRAVENOUS at 13:40

## 2025-03-10 RX ADMIN — MIDAZOLAM HYDROCHLORIDE 1 MG: 1 INJECTION, SOLUTION INTRAMUSCULAR; INTRAVENOUS at 13:55

## 2025-03-10 RX ADMIN — FENTANYL CITRATE 50 MCG: 50 INJECTION, SOLUTION INTRAMUSCULAR; INTRAVENOUS at 13:42

## 2025-03-10 RX ADMIN — LIDOCAINE HYDROCHLORIDE 10 ML: 10 INJECTION, SOLUTION INFILTRATION; PERINEURAL at 13:57

## 2025-03-10 RX ADMIN — FENTANYL CITRATE 50 MCG: 50 INJECTION, SOLUTION INTRAMUSCULAR; INTRAVENOUS at 13:57

## 2025-03-10 RX ADMIN — LIDOCAINE HYDROCHLORIDE AND EPINEPHRINE 10 ML: 10; 10 INJECTION, SOLUTION INFILTRATION; PERINEURAL at 13:56

## 2025-03-10 NOTE — IR NOTE
Patient Name: Kalin Shepard  Medical Record Number: [id number]  Today's Date: 3/10/2025    Procedure: port placement  Proceduralist: Dr Adame    Procedure Start: 1354  Procedure end: 1410  Sedation medications administered: 2 mg midazolam and 100 mcg fentanyl   Sedation time: 12 minutes

## 2025-03-10 NOTE — PRE-PROCEDURE
GENERAL PRE-PROCEDURE:   Procedure:  Port removal  Date/Time:  3/10/2025 1:04 PM    Written consent obtained?: Yes    Risks and benefits: Risks, benefits and alternatives were discussed    Consent given by:  Patient  Patient states understanding of procedure being performed: Yes    Patient's understanding of procedure matches consent: Yes    Procedure consent matches procedure scheduled: Yes    Expected level of sedation:  Moderate  Appropriately NPO:  Yes  ASA Class:  2  Mallampati  :  Grade 2- soft palate, base of uvula, tonsillar pillars, and portion of posterior pharyngeal wall visible  Lungs:  Lungs clear with good breath sounds bilaterally  Heart:  Normal heart sounds and rate  History & Physical reviewed:  History and physical reviewed and no updates needed  Statement of review:  I have reviewed the lab findings, diagnostic data, medications, and the plan for sedation

## 2025-03-10 NOTE — PROCEDURES
RADIOLOGY POST PROCEDURE NOTE WITH SEDATION  Patient name: Kalin Shepard  MRN: [id number]  : 1950    Pre-procedure diagnosis: Colon cancer  Post-procedure diagnosis: Same    Procedure Date/Time: March 10, 2025  2:17 PM    Procedure: Removal of Port-a-Cath  Estimated blood loss: None  Sedation: Moderate sedation was employed. The patient was monitored by a nurse at all times during the procedure under my direct supervision.    Specimen(s) collected with description: Intact port-a-cath including intravascular catheter    I determined this patient to be an appropriate candidate for the planned sedation and procedure and reassessed the patient IMMEDIATELY PRIOR to sedation and procedure.     The patient tolerated the procedure well with no immediate complications.    Significant findings: Uncomplicated removal of port-a-cath.    See imaging dictation for procedural details.    Provider name: Lang Adame MD  Assistant(s):None

## 2025-03-10 NOTE — DISCHARGE INSTRUCTIONS
Port Removal Discharge Instructions:  You had your port-a-catheter removed today. Please follow the below instructions following your port removal:    Care Instructions:  - Avoid tub baths, Jacuzzi and pool soaks for 10 days.  - You may shower beginning tomorrow. Do not scrub site until well healed; pat dry.  - If you experience significant bleeding at site, apply pressure with hands above the clavicle bone, sit upright.    Seek medical assistance for any of the following:   - Uncontrolled bleeding.  - You have a fever (greater than 101 F (38.3C)).  - Purulent (yellow/green/foul smelling) drainage from previous catheter insertion site.  - Increasing redness at previous catheter insertion site.  - Increasing pain at previous catheter insertion site.  - Increasing swelling at previous catheter insertion site.    Contact [facility] IR RN Line at [phone] with questions or concerns.

## 2025-05-19 ENCOUNTER — PATIENT OUTREACH (OUTPATIENT)
Dept: CARE COORDINATION | Facility: CLINIC | Age: 75
End: 2025-05-19
Payer: COMMERCIAL

## 2025-06-02 ENCOUNTER — PATIENT OUTREACH (OUTPATIENT)
Dept: CARE COORDINATION | Facility: CLINIC | Age: 75
End: 2025-06-02
Payer: COMMERCIAL

## 2025-06-23 ENCOUNTER — OFFICE VISIT (OUTPATIENT)
Dept: FAMILY MEDICINE | Facility: CLINIC | Age: 75
End: 2025-06-23
Payer: COMMERCIAL

## 2025-06-23 VITALS
DIASTOLIC BLOOD PRESSURE: 66 MMHG | WEIGHT: 140.8 LBS | TEMPERATURE: 98.6 F | HEIGHT: 71 IN | OXYGEN SATURATION: 97 % | SYSTOLIC BLOOD PRESSURE: 92 MMHG | BODY MASS INDEX: 19.71 KG/M2 | HEART RATE: 101 BPM | RESPIRATION RATE: 16 BRPM

## 2025-06-23 DIAGNOSIS — J40 BRONCHITIS: ICD-10-CM

## 2025-06-23 DIAGNOSIS — Z00.00 MEDICARE ANNUAL WELLNESS VISIT, SUBSEQUENT: Primary | ICD-10-CM

## 2025-06-23 DIAGNOSIS — Z23 HIGH PRIORITY FOR 2019-NCOV VACCINE: ICD-10-CM

## 2025-06-23 DIAGNOSIS — F17.200 TOBACCO DEPENDENCY: ICD-10-CM

## 2025-06-23 DIAGNOSIS — R39.12 BENIGN PROSTATIC HYPERPLASIA WITH WEAK URINARY STREAM: ICD-10-CM

## 2025-06-23 DIAGNOSIS — N40.1 BENIGN PROSTATIC HYPERPLASIA WITH WEAK URINARY STREAM: ICD-10-CM

## 2025-06-23 DIAGNOSIS — C18.7 ADENOCARCINOMA OF SIGMOID COLON (H): ICD-10-CM

## 2025-06-23 PROBLEM — Z93.2 HIGH OUTPUT ILEOSTOMY (H): Status: RESOLVED | Noted: 2023-02-14 | Resolved: 2025-06-23

## 2025-06-23 PROBLEM — K21.9 ESOPHAGEAL REFLUX: Status: RESOLVED | Noted: 2022-08-21 | Resolved: 2025-06-23

## 2025-06-23 PROBLEM — D70.1 CHEMOTHERAPY-INDUCED NEUTROPENIA: Status: RESOLVED | Noted: 2022-08-02 | Resolved: 2025-06-23

## 2025-06-23 PROBLEM — R19.8 HIGH OUTPUT ILEOSTOMY (H): Status: RESOLVED | Noted: 2023-02-14 | Resolved: 2025-06-23

## 2025-06-23 PROBLEM — E86.0 DEHYDRATION: Status: RESOLVED | Noted: 2022-07-19 | Resolved: 2025-06-23

## 2025-06-23 PROBLEM — T45.1X5A CHEMOTHERAPY-INDUCED NEUTROPENIA: Status: RESOLVED | Noted: 2022-08-02 | Resolved: 2025-06-23

## 2025-06-23 PROCEDURE — 3074F SYST BP LT 130 MM HG: CPT | Performed by: FAMILY MEDICINE

## 2025-06-23 PROCEDURE — 99214 OFFICE O/P EST MOD 30 MIN: CPT | Performed by: FAMILY MEDICINE

## 2025-06-23 PROCEDURE — 90480 ADMN SARSCOV2 VAC 1/ONLY CMP: CPT | Performed by: FAMILY MEDICINE

## 2025-06-23 PROCEDURE — G0439 PPPS, SUBSEQ VISIT: HCPCS | Mod: 25 | Performed by: FAMILY MEDICINE

## 2025-06-23 PROCEDURE — 3078F DIAST BP <80 MM HG: CPT | Performed by: FAMILY MEDICINE

## 2025-06-23 PROCEDURE — 1125F AMNT PAIN NOTED PAIN PRSNT: CPT | Performed by: FAMILY MEDICINE

## 2025-06-23 PROCEDURE — 91320 SARSCV2 VAC 30MCG TRS-SUC IM: CPT | Performed by: FAMILY MEDICINE

## 2025-06-23 RX ORDER — TAMSULOSIN HYDROCHLORIDE 0.4 MG/1
0.4 CAPSULE ORAL DAILY
Qty: 90 CAPSULE | Refills: 3 | Status: SHIPPED | OUTPATIENT
Start: 2025-06-23

## 2025-06-23 RX ORDER — ALBUTEROL SULFATE 90 UG/1
2 INHALANT RESPIRATORY (INHALATION) EVERY 6 HOURS PRN
Qty: 18 G | Refills: 3 | Status: SHIPPED | OUTPATIENT
Start: 2025-06-23

## 2025-06-23 SDOH — HEALTH STABILITY: PHYSICAL HEALTH: ON AVERAGE, HOW MANY MINUTES DO YOU ENGAGE IN EXERCISE AT THIS LEVEL?: 20 MIN

## 2025-06-23 SDOH — HEALTH STABILITY: PHYSICAL HEALTH: ON AVERAGE, HOW MANY DAYS PER WEEK DO YOU ENGAGE IN MODERATE TO STRENUOUS EXERCISE (LIKE A BRISK WALK)?: 4 DAYS

## 2025-06-23 ASSESSMENT — PAIN SCALES - GENERAL: PAINLEVEL_OUTOF10: SEVERE PAIN (7)

## 2025-06-23 ASSESSMENT — SOCIAL DETERMINANTS OF HEALTH (SDOH): HOW OFTEN DO YOU GET TOGETHER WITH FRIENDS OR RELATIVES?: TWICE A WEEK

## 2025-06-23 NOTE — PROGRESS NOTES
Preventive Care Visit  [facility]  Segundo Bentley MD, Family Medicine  Jun 23, 2025      Assessment & Plan   Wellness exam  Adenocarcinoma colon, in remission  BPH, stable on meds  Bronchitis, occasional flare, stable  Tobacco use, ongoing    Wanted some albuterol to have around, helps when wheezing.    Fills on flomax  No labs due today          Nicotine/Tobacco Cessation  He reports that he has been smoking cigarettes. He has a 25 pack-year smoking history. He has never used smokeless tobacco.  Nicotine/Tobacco Cessation Plan  Not ready      Counseling  Appropriate preventive services were addressed with this patient via screening, questionnaire, or discussion as appropriate for fall prevention, nutrition, physical activity, Tobacco-use cessation, social engagement, weight loss and cognition.  Checklist reviewing preventive services available has been given to the patient.  Reviewed patient's diet, addressing concerns and/or questions.   The patient was instructed to see the dentist every 6 months.   Discussed possible causes of fatigue. Information on urinary incontinence and treatment options given to patient.             Ambrose Traylor is a 74 year old, presenting for the following:  Wellness Visit, Imm/Inj (Covid ), and Imm/Inj (COVID-19 VACCINE)  Adenocarcinoma colon, in remission, hx chemo, previous ileostomy, taken down a year or so ago.  Doing well  BPH, stable on meds, fills on flomax   Bronchitis, occasional flare, stable, albuterol helps  Tobacco use: ongoing 1/2 ppd.         6/23/2025    10:12 AM   Additional Questions   Roomed by Jesse ADAM CMA   Accompanied by self            Advance Care Planning    Discussed advance care planning with patient; informed AVS has link to [facility].        6/23/2025   General Health   How would you rate your overall physical health? (!) FAIR   Feel stress (tense, anxious, or unable to sleep) Only a little   (!) STRESS CONCERN       6/23/2025   Nutrition   Diet: Regular (no restrictions)         6/23/2025   Exercise   Days per week of moderate/strenous exercise 4 days   Average minutes spent exercising at this level 20 min         6/23/2025   Social Factors   Frequency of gathering with friends or relatives Twice a week   Worry food won't last until get money to buy more No   Food not last or not have enough money for food? No   Do you have housing? (Housing is defined as stable permanent housing and does not include staying outside in a car, in a tent, in an abandoned building, in an overnight shelter, or couch-surfing.) Yes   Are you worried about losing your housing? No   Lack of transportation? Yes   Unable to get utilities (heat,electricity)? No    (!) TRANSPORTATION CONCERN PRESENT      6/23/2025   Fall Risk   Fallen 2 or more times in the past year? No   Trouble with walking or balance? No          6/23/2025   Activities of Daily Living- Home Safety   Needs help with the following daily activites None of the above   Safety concerns in the home None of the above         6/23/2025   Dental   Dentist two times every year? (!) NO         6/23/2025   Hearing Screening   Hearing concerns? None of the above         6/23/2025   Driving Risk Screening   Patient/family members have concerns about driving No         6/23/2025   General Alertness/Fatigue Screening   Have you been more tired than usual lately? (!) YES         6/23/2025   Urinary Incontinence Screening   Bothered by leaking urine in past 6 months Yes         Today's PHQ-2 Score:       6/23/2025    10:09 AM   PHQ-2 ( 1999 [employer])   Q1: Little interest or pleasure in doing things 0   Q2: Feeling down, depressed or hopeless 0   PHQ-2 Score 0    Q1: Little interest or pleasure in doing things Not at all   Q2: Feeling down, depressed or hopeless Not at all   PHQ-2 Score 0       Patient-reported           6/23/2025   Substance Use   Alcohol more than 3/day or more than 7/wk Not Applicable    Do you have a current opioid prescription? No   How severe/bad is pain from 1 to 10? 7/10   Do you use any other substances recreationally? No     Social History     Tobacco Use    Smoking status: Every Day     Current packs/day: 0.50     Average packs/day: 0.5 packs/day for 50.0 years (25.0 ttl pk-yrs)     Types: Cigarettes    Smokeless tobacco: Never   Vaping Use    Vaping status: Never Used   Substance Use Topics    Alcohol use: Not Currently    Drug use: Never       ASCVD Risk   The ASCVD Risk score (Mayela FARNSWORTH, et al., 2019) failed to calculate for the following reasons:    Cannot find a previous HDL lab    Cannot find a previous total cholesterol lab        Reviewed and updated as needed this visit by Provider   Tobacco  Allergies  Meds  Problems  Med Hx  Surg Hx  Fam Hx     Sexual Activity            Current providers sharing in care for this patient include:  Patient Care Team:  Segundo Bentley MD as PCP - General (Family Medicine)  Idania Harrell PA-C as Physician Assistant  Danna Collins RN as Specialty Care Coordinator (Hematology & Oncology)  Pamela Colorado MD as MD (Medical Oncology)  Segundo Bentley MD as Assigned PCP  Pamela Colorado MD as Assigned Cancer Care Provider  Lizet Ibarra PA-C as Assigned Surgical Provider    The following health maintenance items are reviewed in Epic and correct as of today:  Health Maintenance   Topic Date Due    ANNUAL REVIEW OF  ORDERS  Never done    HEPATITIS C SCREENING  Never done    PNEUMOCOCCAL VACCINE 50+ YEARS (1 of 2 - PCV) Never done    ZOSTER VACCINE (1 of 2) Never done    DTAP/TDAP/TD VACCINE (1 - Tdap) Never done    LIPID  Never done    RSV VACCINE (1 - 1-dose 75+ series) Never done    NICOTINE/TOBACCO CESSATION COUNSELING Q 1 YR  06/17/2025    INFLUENZA VACCINE (Season Ended) 09/01/2025    COVID-19 VACCINE (5 - Moderna risk 2024-25 season) 12/23/2025    LUNG CANCER SCREENING  01/22/2026    MEDICARE ANNUAL  "WELLNESS VISIT  06/23/2026    FALL RISK ASSESSMENT  06/23/2026    DIABETES SCREENING  01/22/2028    COLORECTAL CANCER SCREENING  02/08/2028    ADVANCE CARE PLANNING  03/07/2030    PHQ-2 (once per calendar year)  Completed    AORTIC ANEURYSM SCREENING (SYSTEM ASSIGNED)  Completed    HPV VACCINE  Aged Out    MENINGITIS VACCINE  Aged Out            Objective    Exam  BP 92/66 (BP Location: Right arm, Patient Position: Sitting, Cuff Size: Adult Regular)   Pulse 101   Temp 98.6  F (37  C) (Tympanic)   Resp 16   Ht 1.797 m (5' 10.75\")   Wt 63.9 kg (140 lb 12.8 oz)   SpO2 97%   BMI 19.78 kg/m     Estimated body mass index is 19.78 kg/m  as calculated from the following:    Height as of this encounter: 1.797 m (5' 10.75\").    Weight as of this encounter: 63.9 kg (140 lb 12.8 oz).    Physical Exam  Gen: alert and oriented, in no acute distress, affect within normal limits  CV: RRR, no murmur  Lungs: clear bilaterally with good effort  Abd: nontender, no mass  Ext: no edema or lesions   Neuro: moving all extremities, gait normal, no focal deficts noted          6/23/2025   Mini Cog   Clock Draw Score 2 Normal   3 Item Recall 3 objects recalled   Mini Cog Total Score 5            Signed Electronically by: Segundo Bentley MD    "

## 2025-07-23 ENCOUNTER — LAB (OUTPATIENT)
Dept: LAB | Facility: CLINIC | Age: 75
End: 2025-07-23
Attending: INTERNAL MEDICINE
Payer: COMMERCIAL

## 2025-07-23 ENCOUNTER — HOSPITAL ENCOUNTER (OUTPATIENT)
Dept: CT IMAGING | Facility: CLINIC | Age: 75
Discharge: HOME | End: 2025-07-23
Attending: INTERNAL MEDICINE
Payer: COMMERCIAL

## 2025-07-23 DIAGNOSIS — C18.7 ADENOCARCINOMA OF SIGMOID COLON (H): ICD-10-CM

## 2025-07-23 LAB
ALBUMIN SERPL BCG-MCNC: 4.3 G/DL (ref 3.5–5.2)
ALP SERPL-CCNC: 44 U/L (ref 40–150)
ALT SERPL W P-5'-P-CCNC: 17 U/L (ref 0–70)
ANION GAP SERPL CALCULATED.3IONS-SCNC: 12 MMOL/L (ref 7–15)
AST SERPL W P-5'-P-CCNC: 24 U/L (ref 0–45)
BASOPHILS # BLD AUTO: 0.1 10E3/UL (ref 0–0.2)
BASOPHILS NFR BLD AUTO: 1 %
BILIRUB SERPL-MCNC: 0.3 MG/DL
BUN SERPL-MCNC: 19.7 MG/DL (ref 8–23)
CALCIUM SERPL-MCNC: 9.6 MG/DL (ref 8.8–10.4)
CEA SERPL-MCNC: 5 NG/ML
CHLORIDE SERPL-SCNC: 105 MMOL/L (ref 98–107)
CREAT SERPL-MCNC: 0.91 MG/DL (ref 0.67–1.17)
EGFRCR SERPLBLD CKD-EPI 2021: 88 ML/MIN/1.73M2
EOSINOPHIL # BLD AUTO: 0.3 10E3/UL (ref 0–0.7)
EOSINOPHIL NFR BLD AUTO: 4 %
ERYTHROCYTE [DISTWIDTH] IN BLOOD BY AUTOMATED COUNT: 14.2 % (ref 10–15)
GLUCOSE SERPL-MCNC: 107 MG/DL (ref 70–99)
HCO3 SERPL-SCNC: 24 MMOL/L (ref 22–29)
HCT VFR BLD AUTO: 46.9 % (ref 40–53)
HGB BLD-MCNC: 15.3 G/DL (ref 13.3–17.7)
IMM GRANULOCYTES # BLD: 0 10E3/UL
IMM GRANULOCYTES NFR BLD: 0 %
LYMPHOCYTES # BLD AUTO: 2.8 10E3/UL (ref 0.8–5.3)
LYMPHOCYTES NFR BLD AUTO: 37 %
MCH RBC QN AUTO: 31.8 PG (ref 26.5–33)
MCHC RBC AUTO-ENTMCNC: 32.6 G/DL (ref 31.5–36.5)
MCV RBC AUTO: 98 FL (ref 78–100)
MONOCYTES # BLD AUTO: 0.8 10E3/UL (ref 0–1.3)
MONOCYTES NFR BLD AUTO: 10 %
NEUTROPHILS # BLD AUTO: 3.6 10E3/UL (ref 1.6–8.3)
NEUTROPHILS NFR BLD AUTO: 48 %
NRBC # BLD AUTO: 0 10E3/UL
NRBC BLD AUTO-RTO: 0 /100
PLATELET # BLD AUTO: 189 10E3/UL (ref 150–450)
POTASSIUM SERPL-SCNC: 4.6 MMOL/L (ref 3.4–5.3)
PROT SERPL-MCNC: 6.9 G/DL (ref 6.4–8.3)
RBC # BLD AUTO: 4.81 10E6/UL (ref 4.4–5.9)
SODIUM SERPL-SCNC: 141 MMOL/L (ref 135–145)
WBC # BLD AUTO: 7.6 10E3/UL (ref 4–11)

## 2025-07-23 PROCEDURE — 36415 COLL VENOUS BLD VENIPUNCTURE: CPT

## 2025-07-23 PROCEDURE — 71260 CT THORAX DX C+: CPT

## 2025-07-23 PROCEDURE — 250N000009 HC RX 250: Performed by: RADIOLOGY

## 2025-07-23 PROCEDURE — 85004 AUTOMATED DIFF WBC COUNT: CPT

## 2025-07-23 PROCEDURE — 250N000011 HC RX IP 250 OP 636: Performed by: RADIOLOGY

## 2025-07-23 PROCEDURE — 82378 CARCINOEMBRYONIC ANTIGEN: CPT

## 2025-07-23 PROCEDURE — 82310 ASSAY OF CALCIUM: CPT

## 2025-07-23 RX ORDER — IOPAMIDOL 755 MG/ML
69 INJECTION, SOLUTION INTRAVASCULAR ONCE
Status: COMPLETED | OUTPATIENT
Start: 2025-07-23 | End: 2025-07-23

## 2025-07-23 RX ADMIN — SODIUM CHLORIDE 57 ML: 9 INJECTION, SOLUTION INTRAVENOUS at 09:49

## 2025-07-23 RX ADMIN — IOPAMIDOL 69 ML: 755 INJECTION, SOLUTION INTRAVENOUS at 09:50

## 2025-07-31 ENCOUNTER — VIRTUAL VISIT (OUTPATIENT)
Dept: ONCOLOGY | Facility: CLINIC | Age: 75
End: 2025-07-31
Attending: INTERNAL MEDICINE
Payer: COMMERCIAL

## 2025-07-31 VITALS — HEIGHT: 71 IN | WEIGHT: 142 LBS | BODY MASS INDEX: 19.88 KG/M2

## 2025-07-31 DIAGNOSIS — T45.1X5A CHEMOTHERAPY-INDUCED PERIPHERAL NEUROPATHY: ICD-10-CM

## 2025-07-31 DIAGNOSIS — F17.200 TOBACCO DEPENDENCY: ICD-10-CM

## 2025-07-31 DIAGNOSIS — G62.0 CHEMOTHERAPY-INDUCED PERIPHERAL NEUROPATHY: ICD-10-CM

## 2025-07-31 DIAGNOSIS — C18.7 ADENOCARCINOMA OF SIGMOID COLON (H): Primary | ICD-10-CM

## 2025-07-31 ASSESSMENT — PAIN SCALES - GENERAL: PAINLEVEL_OUTOF10: NO PAIN (0)

## 2025-07-31 NOTE — NURSING NOTE
Current patient location: [address] [facility] [address]    Is the patient currently in the state of [place]? YES    Visit mode: VIDEO    If the visit is dropped, the patient can be reconnected by:VIDEO VISIT: Send to e-mail at: [email or web address]    Will anyone else be joining the visit? NO  (If patient encounters technical issues they should call [phone]    Are changes needed to the allergy or medication list? Pt confirms medications and allergies are correct, echeck-in completed.     Are refills needed on medications prescribed by this physician? NO    Rooming Documentation:  Patient declined to complete questionnaire(s)    Reason for visit: BOB DEL TORO

## 2025-07-31 NOTE — LETTER
7/31/2025      Kalin Shepard  [address] [facility] [address]      Dear Colleague,    Thank you for referring your patient, Kalin Shepard, to the [facility] CANCER [facility]. Please see a copy of my visit note below.    Virtual Visit Details    Type of service:  Video Visit   Video Start Time: 9:30 AM  Video End Time:9:39 AM    Originating Location (pt. Location): Home    Distant Location (provider location):  Off-site  Platform used for Video Visit: [facility] Hematology and Oncology Outpatient Progress Note    Patient: Kalin Shepard  MRN: [id number]  Date of Service: Jul 31, 2025          Reason for Visit    Stage IV colon cancer (peritoneal mets)    Primary Oncologist: Dr. Colorado      Assessment/Plan  Stage IV colon cancer (peritoneal mets)  Layton completed 12 cycles of induction/neoadjuvant chemo through Nov, 2022 with an excellent radiographical response.    He, therefore, underwent resection of his primary sigmoid/rectal tumor in February 2023. This was grossly removed with negative margins and no nodes involved.  He did have significant residual tumor without any treatment effect.  ypT4b.  No evidence of damon metastasis however he had a previously biopsy-proven peritoneal metastasis.  Omentectomy showed no evidence of any residual malignancy.    He is currently on surveillance.      He is more than 2-1/2 years out from finishing chemotherapy and surgery.  Approaching 3 years.  So far no evidence of any disease recurrence.  Here with repeat labs and CT scan.    Results  - CT scan: No evidence of disease recurrence or progression; stable lung nodules in the chest; small lymph nodes in the mediastinum; liver appears normal with no new lesions; no new nodules in the colon or abdominal cavity.  - CEA level: 5, within normal limits.  - Liver function tests: Normal.  - Kidney function tests: Normal.  - Hemoglobin and CBC: Normal.  - I reviewed lab results  personally and independently interpreted the results.  - I reviewed CT images and report personally and independently interpreted the results.     Plan  Adenocarcinoma of sigmoid colon:  No evidence of disease recurrence or progression. CT scan shows stable lung nodules and no new lesions in the liver or abdominal cavity. CEA levels are within normal limits. Liver and kidney function tests, as well as hemoglobin and CBC, are normal. It has been almost 3 years since chemotherapy completion and 2.5 years since surgery.    At this time, there is no evidence of recurrence, and the patient has achieved a significant milestone of almost 3 years since completion of chemotherapy, which is associated with a favorable prognosis.    Continue with six-monthly CT scans due to previous peritoneal involvement. Orders for labs and CT in 6 months have been placed. Future consideration of BRAF mutation-targeted therapy if needed.      ______________________________________________________________________________    History of Present Illness/ Interval History    Mr. Kalin Shepard is a 74 year old who was diagnosed with sigmoid colon cancer with peritoneal mets in March 2022. He underwent 12 cycles of chemo - FOLFOX + bevacizumab; kranthi was omitted for SBO and Oxaliplatin dose-reduced/later omitted for peripheral neuropathy.     At completion of chemo in November 2022, restaging showed good response with no clear mets. He, therefore, underwent resection of the residual primary tumor in early 2023 with negative margins and no nodes involved. He had a loop ileostomy placed and take-down in early May 2023. He continues on surveillance.     He reported no significant changes in gastrointestinal symptoms, describing bowel movements as pretty regular with only occasional episodes approaching constipation, but not true constipation. Weight has remained stable at approximately 142 pounds for a long time. No new symptoms or concerns were  expressed.       ECOG Performance    0      Oncology History/Treatment  Diagnosis/Stage:   3/26/2022: IV sigmoid colon cancer to peritoneum  -Presented with abdominal pain secondary to bowel obstruction.  CT scan showed mass in sigmoid colon with upstream small bowel and colonic obstruction.  Underwent ostomy placement.    -Peritoneal biopsy positive for adenocarcinoma.    -No evidence of other distant metastasis based on PET scan.  -BRAF V600E positive.  K-aldo and NRAS negative.  Proficient MMR.    8/24/2022 open laparotomy for SBO, lysis of adhesions. No evidence of any malignancy intra-abdominally noted.     2/8/2023 post esther-adjuvant chemo. Surgical resection primary sigmoid/rectal tumor path:   Adenocarcinoma, moderate to poorly differentiated, invading through the wall to involve the adjacent adherent perirectal fibroadipose tissue  -Tumor size: 3 cm in greatest dimension  -Resection margins free of involvement  -No evidence of lymphovascular or perineural invasion  -Treatment effect: Poor response (score 3)  -No tumor deposits identified  -Thirty-three benign lymph nodes (0/33)  -Sections of vas deferens with no evidence of involvement by adenocarcinoma.      Treatment:  Induction/neoadjuvant chemotherapy started for potentially resectable metachronous peritoneal metastasis    4/26/2022 - 11/2022: FOLFOX and bevacizumab x 12 cycles (Amrita stopped after cycle 7; oxali stopped after cycle 11).   -cycle 7 delayed for neutropenia. Neulasta added  -cycle 8 delayed for hospitalization with SBO (favoring ileus/adhesions, no evidence of malignancy). Post-op course complicated by peritonitis requiring antibiotics. Temp TPN, then cleared for oral intake.   -Bevacizumab omitted with cycle 8 to allow for healing postmalarial. Resumed with cycle 9.  -cycle 9 oxilaplatin dose-reduced 20% for gr 1-2 neuropathy  -cycle 11 oxaliplatin dose-reduced another 10-15% (33% total from original) for gr 1-2 neuropathy  -cycle 12  infusional 5FU only    2/8/2023: exploratory lap, low anterior resection, takedown loop colostomy, diverting loop ileostomy and flex sigmoidoscopy    Ileostomy reversal with CRS (5/3/23)      Physical Exam    GENERAL: Alert and oriented to time place and person. Seated comfortably. In no distress.     Lab Results    No results found for this or any previous visit (from the past week).      CBC, CMP, CEA reviewed    Imaging    CT Chest/Abdomen/Pelvis w Contrast  Result Date: 7/23/2025  EXAM: CT CHEST/ABDOMEN/PELVIS W CONTRAST LOCATION: [facility] DATE: 7/23/2025 INDICATION:  Adenocarcinoma of sigmoid colon (H) COMPARISON: CT CAP 01/22/2025. TECHNIQUE: CT scan of the chest, abdomen, and pelvis was performed following injection of IV contrast. Multiplanar reformats were obtained. Dose reduction techniques were used. CONTRAST: 69 mL Isovue 370 FINDINGS: LUNGS AND PLEURA: Few stable small pulmonary nodules with largest in the right upper lobe measuring 4 mm (series 4, image 106). Chronic mild bronchiectasis and bronchial wall thickening which is greatest in the right lower lobe. Chronic right lower lobe scarring. Mild centrilobular emphysema. No pleural effusion. MEDIASTINUM/AXILLAE: Interval removal of left chest port. Stable mildly enlarged right hilar lymph node measuring 1.3 cm in short axis. No new or progressive thoracic adenopathy. Normal heart size without pericardial effusion. Moderate to heavy burden of  atherosclerotic disease of the aortic arch and descending thoracic aorta. Stable mild dilatation of the ascending aorta measuring up to 3.8 cm in diameter. CORONARY ARTERY CALCIFICATION: Mild. HEPATOBILIARY: Stable subcentimeter hypoattenuating lesion in the right hepatic lobe which is too small to characterize but likely benign. No radiopaque gallstone. No biliary ductal dilatation. PANCREAS: Normal. SPLEEN: Normal. ADRENAL GLANDS: Normal. KIDNEYS/BLADDER: Symmetric renal  enhancement. No hydronephrosis. Unchanged left renal cyst, not requiring specific follow-up. Mild diffuse bladder wall thickening, improved from prior. BOWEL: Stable postsurgical changes of the colon with rectosigmoid anastomosis. No evidence for bowel obstruction or focal inflammation. Moderate colonic stool burden. Normal appendix. LYMPH NODES: No lymphadenopathy in the abdomen or pelvis by CT size criteria. VASCULATURE: Heavy burden of vascular calcifications. Stable 3.1 cm infrarenal abdominal aortic aneurysm. 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 Stable dilatation of the right common iliac artery measuring up to 2.1  cm in diameter. PELVIC ORGANS: Prostatomegaly. MUSCULOSKELETAL: No aggressive osseous lesion.     IMPRESSION: 1.  No evidence of new or progressive metastatic disease in the chest, abdomen, or pelvis. 2.  Stable mildly enlarged right hilar lymph node. 3.  Stable infrarenal abdominal aortic aneurysm measuring 3.1 cm in diameter. 4.  Additional unchanged findings as above.       I have personally reviewed the CT scan images and report and independently interpreted the results to my ability.    The longitudinal plan of care for the diagnosis(es)/condition(s) as documented were addressed during this visit. Due to the added complexity in care, I will continue to support Layton in the subsequent management and with ongoing continuity of care.        Signed by:   Pamela Colorado MD               Again, thank you for allowing me to participate in the care of your patient.        Sincerely,        Pamela Colorado MD    Electronically signed

## 2025-07-31 NOTE — LETTER
7/31/2025      Kalin Shepard  [address] [facility] [address]      Dear Colleague,    Thank you for referring your patient, Kalin Shepard, to the [facility] CANCER [facility]. Please see a copy of my visit note below.    Virtual Visit Details    Type of service:  Video Visit   Video Start Time: 9:30 AM  Video End Time:9:39 AM    Originating Location (pt. Location): Home    Distant Location (provider location):  Off-site  Platform used for Video Visit: [facility] Hematology and Oncology Outpatient Progress Note    Patient: Kalin Shepard  MRN: [id number]  Date of Service: Jul 31, 2025          Reason for Visit    Stage IV colon cancer (peritoneal mets)    Primary Oncologist: Dr. Colorado      Assessment/Plan  Stage IV colon cancer (peritoneal mets)  Layton completed 12 cycles of induction/neoadjuvant chemo through Nov, 2022 with an excellent radiographical response.    He, therefore, underwent resection of his primary sigmoid/rectal tumor in February 2023. This was grossly removed with negative margins and no nodes involved.  He did have significant residual tumor without any treatment effect.  ypT4b.  No evidence of damon metastasis however he had a previously biopsy-proven peritoneal metastasis.  Omentectomy showed no evidence of any residual malignancy.    He is currently on surveillance.      He is more than 2-1/2 years out from finishing chemotherapy and surgery.  Approaching 3 years.  So far no evidence of any disease recurrence.  Here with repeat labs and CT scan.    Results  - CT scan: No evidence of disease recurrence or progression; stable lung nodules in the chest; small lymph nodes in the mediastinum; liver appears normal with no new lesions; no new nodules in the colon or abdominal cavity.  - CEA level: 5, within normal limits.  - Liver function tests: Normal.  - Kidney function tests: Normal.  - Hemoglobin and CBC: Normal.  - I reviewed lab results  personally and independently interpreted the results.  - I reviewed CT images and report personally and independently interpreted the results.     Plan  Adenocarcinoma of sigmoid colon:  No evidence of disease recurrence or progression. CT scan shows stable lung nodules and no new lesions in the liver or abdominal cavity. CEA levels are within normal limits. Liver and kidney function tests, as well as hemoglobin and CBC, are normal. It has been almost 3 years since chemotherapy completion and 2.5 years since surgery.    At this time, there is no evidence of recurrence, and the patient has achieved a significant milestone of almost 3 years since completion of chemotherapy, which is associated with a favorable prognosis.    Continue with six-monthly CT scans due to previous peritoneal involvement. Orders for labs and CT in 6 months have been placed. Future consideration of BRAF mutation-targeted therapy if needed.      ______________________________________________________________________________    History of Present Illness/ Interval History    Mr. Kalin Shepard is a 74 year old who was diagnosed with sigmoid colon cancer with peritoneal mets in March 2022. He underwent 12 cycles of chemo - FOLFOX + bevacizumab; kranthi was omitted for SBO and Oxaliplatin dose-reduced/later omitted for peripheral neuropathy.     At completion of chemo in November 2022, restaging showed good response with no clear mets. He, therefore, underwent resection of the residual primary tumor in early 2023 with negative margins and no nodes involved. He had a loop ileostomy placed and take-down in early May 2023. He continues on surveillance.     He reported no significant changes in gastrointestinal symptoms, describing bowel movements as pretty regular with only occasional episodes approaching constipation, but not true constipation. Weight has remained stable at approximately 142 pounds for a long time. No new symptoms or concerns were  expressed.       ECOG Performance    0      Oncology History/Treatment  Diagnosis/Stage:   3/26/2022: IV sigmoid colon cancer to peritoneum  -Presented with abdominal pain secondary to bowel obstruction.  CT scan showed mass in sigmoid colon with upstream small bowel and colonic obstruction.  Underwent ostomy placement.    -Peritoneal biopsy positive for adenocarcinoma.    -No evidence of other distant metastasis based on PET scan.  -BRAF V600E positive.  K-aldo and NRAS negative.  Proficient MMR.    8/24/2022 open laparotomy for SBO, lysis of adhesions. No evidence of any malignancy intra-abdominally noted.     2/8/2023 post esther-adjuvant chemo. Surgical resection primary sigmoid/rectal tumor path:   Adenocarcinoma, moderate to poorly differentiated, invading through the wall to involve the adjacent adherent perirectal fibroadipose tissue  -Tumor size: 3 cm in greatest dimension  -Resection margins free of involvement  -No evidence of lymphovascular or perineural invasion  -Treatment effect: Poor response (score 3)  -No tumor deposits identified  -Thirty-three benign lymph nodes (0/33)  -Sections of vas deferens with no evidence of involvement by adenocarcinoma.      Treatment:  Induction/neoadjuvant chemotherapy started for potentially resectable metachronous peritoneal metastasis    4/26/2022 - 11/2022: FOLFOX and bevacizumab x 12 cycles (Amrita stopped after cycle 7; oxali stopped after cycle 11).   -cycle 7 delayed for neutropenia. Neulasta added  -cycle 8 delayed for hospitalization with SBO (favoring ileus/adhesions, no evidence of malignancy). Post-op course complicated by peritonitis requiring antibiotics. Temp TPN, then cleared for oral intake.   -Bevacizumab omitted with cycle 8 to allow for healing postmalarial. Resumed with cycle 9.  -cycle 9 oxilaplatin dose-reduced 20% for gr 1-2 neuropathy  -cycle 11 oxaliplatin dose-reduced another 10-15% (33% total from original) for gr 1-2 neuropathy  -cycle 12  infusional 5FU only    2/8/2023: exploratory lap, low anterior resection, takedown loop colostomy, diverting loop ileostomy and flex sigmoidoscopy    Ileostomy reversal with CRS (5/3/23)      Physical Exam    GENERAL: Alert and oriented to time place and person. Seated comfortably. In no distress.     Lab Results    No results found for this or any previous visit (from the past week).      CBC, CMP, CEA reviewed    Imaging    CT Chest/Abdomen/Pelvis w Contrast  Result Date: 7/23/2025  EXAM: CT CHEST/ABDOMEN/PELVIS W CONTRAST LOCATION: [facility] DATE: 7/23/2025 INDICATION:  Adenocarcinoma of sigmoid colon (H) COMPARISON: CT CAP 01/22/2025. TECHNIQUE: CT scan of the chest, abdomen, and pelvis was performed following injection of IV contrast. Multiplanar reformats were obtained. Dose reduction techniques were used. CONTRAST: 69 mL Isovue 370 FINDINGS: LUNGS AND PLEURA: Few stable small pulmonary nodules with largest in the right upper lobe measuring 4 mm (series 4, image 106). Chronic mild bronchiectasis and bronchial wall thickening which is greatest in the right lower lobe. Chronic right lower lobe scarring. Mild centrilobular emphysema. No pleural effusion. MEDIASTINUM/AXILLAE: Interval removal of left chest port. Stable mildly enlarged right hilar lymph node measuring 1.3 cm in short axis. No new or progressive thoracic adenopathy. Normal heart size without pericardial effusion. Moderate to heavy burden of  atherosclerotic disease of the aortic arch and descending thoracic aorta. Stable mild dilatation of the ascending aorta measuring up to 3.8 cm in diameter. CORONARY ARTERY CALCIFICATION: Mild. HEPATOBILIARY: Stable subcentimeter hypoattenuating lesion in the right hepatic lobe which is too small to characterize but likely benign. No radiopaque gallstone. No biliary ductal dilatation. PANCREAS: Normal. SPLEEN: Normal. ADRENAL GLANDS: Normal. KIDNEYS/BLADDER: Symmetric renal  enhancement. No hydronephrosis. Unchanged left renal cyst, not requiring specific follow-up. Mild diffuse bladder wall thickening, improved from prior. BOWEL: Stable postsurgical changes of the colon with rectosigmoid anastomosis. No evidence for bowel obstruction or focal inflammation. Moderate colonic stool burden. Normal appendix. LYMPH NODES: No lymphadenopathy in the abdomen or pelvis by CT size criteria. VASCULATURE: Heavy burden of vascular calcifications. Stable 3.1 cm infrarenal abdominal aortic aneurysm. 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 Stable dilatation of the right common iliac artery measuring up to 2.1  cm in diameter. PELVIC ORGANS: Prostatomegaly. MUSCULOSKELETAL: No aggressive osseous lesion.     IMPRESSION: 1.  No evidence of new or progressive metastatic disease in the chest, abdomen, or pelvis. 2.  Stable mildly enlarged right hilar lymph node. 3.  Stable infrarenal abdominal aortic aneurysm measuring 3.1 cm in diameter. 4.  Additional unchanged findings as above.       I have personally reviewed the CT scan images and report and independently interpreted the results to my ability.    The longitudinal plan of care for the diagnosis(es)/condition(s) as documented were addressed during this visit. Due to the added complexity in care, I will continue to support Layton in the subsequent management and with ongoing continuity of care.        Signed by:   Pamela Colorado MD               Again, thank you for allowing me to participate in the care of your patient.        Sincerely,        Pamela Colorado MD    Electronically signed

## 2025-07-31 NOTE — PROGRESS NOTES
Virtual Visit Details    Type of service:  Video Visit   Video Start Time: 9:30 AM  Video End Time:9:39 AM    Originating Location (pt. Location): Home    Distant Location (provider location):  Off-site  Platform used for Video Visit: [facility]ematology and Oncology Outpatient Progress Note    Patient: Kalin Shepard  MRN: [id number]  Date of Service: Jul 31, 2025          Reason for Visit    Stage IV colon cancer (peritoneal mets)    Primary Oncologist: Dr. [place]      Assessment/Plan  Stage IV colon cancer (peritoneal mets)  Layton completed 12 cycles of induction/neoadjuvant chemo through Nov, 2022 with an excellent radiographical response.    He, therefore, underwent resection of his primary sigmoid/rectal tumor in February 2023. This was grossly removed with negative margins and no nodes involved.  He did have significant residual tumor without any treatment effect.  [id number].  No evidence of damon metastasis however he had a previously biopsy-proven peritoneal metastasis.  Omentectomy showed no evidence of any residual malignancy.    He is currently on surveillance.      He is more than 2-1/2 years out from finishing chemotherapy and surgery.  Approaching 3 years.  So far no evidence of any disease recurrence.  Here with repeat labs and CT scan.    Results  - CT scan: No evidence of disease recurrence or progression; stable lung nodules in the chest; small lymph nodes in the mediastinum; liver appears normal with no new lesions; no new nodules in the colon or abdominal cavity.  - CEA level: 5, within normal limits.  - Liver function tests: Normal.  - Kidney function tests: Normal.  - Hemoglobin and CBC: Normal.  - I reviewed lab results personally and independently interpreted the results.  - I reviewed CT images and report personally and independently interpreted the results.     Plan  Adenocarcinoma of sigmoid colon:  No evidence of disease recurrence or progression. CT scan shows stable  lung nodules and no new lesions in the liver or abdominal cavity. CEA levels are within normal limits. Liver and kidney function tests, as well as hemoglobin and CBC, are normal. It has been almost 3 years since chemotherapy completion and 2.5 years since surgery.    At this time, there is no evidence of recurrence, and the patient has achieved a significant milestone of almost 3 years since completion of chemotherapy, which is associated with a favorable prognosis.    Continue with six-monthly CT scans due to previous peritoneal involvement. Orders for labs and CT in 6 months have been placed. Future consideration of BRAF mutation-targeted therapy if needed.      ______________________________________________________________________________    History of Present Illness/ Interval History    Mr. Kalin Shepard is a 74 year old who was diagnosed with sigmoid colon cancer with peritoneal mets in March 2022. He underwent 12 cycles of chemo - FOLFOX + bevacizumab; kranthi was omitted for SBO and Oxaliplatin dose-reduced/later omitted for peripheral neuropathy.     At completion of chemo in November 2022, restaging showed good response with no clear mets. He, therefore, underwent resection of the residual primary tumor in early 2023 with negative margins and no nodes involved. He had a loop ileostomy placed and take-down in early May 2023. He continues on surveillance.     He reported no significant changes in gastrointestinal symptoms, describing bowel movements as pretty regular with only occasional episodes approaching constipation, but not true constipation. Weight has remained stable at approximately 142 pounds for a long time. No new symptoms or concerns were expressed.       ECOG Performance    0      Oncology History/Treatment  Diagnosis/Stage:   3/26/2022: IV sigmoid colon cancer to peritoneum  -Presented with abdominal pain secondary to bowel obstruction.  CT scan showed mass in sigmoid colon with upstream  small bowel and colonic obstruction.  Underwent ostomy placement.    -Peritoneal biopsy positive for adenocarcinoma.    -No evidence of other distant metastasis based on PET scan.  -BRAF V600E positive.  K-aldo and NRAS negative.  Proficient MMR.    8/24/2022 open laparotomy for SBO, lysis of adhesions. No evidence of any malignancy intra-abdominally noted.     2/8/2023 post esther-adjuvant chemo. Surgical resection primary sigmoid/rectal tumor path:   Adenocarcinoma, moderate to poorly differentiated, invading through the wall to involve the adjacent adherent perirectal fibroadipose tissue  -Tumor size: 3 cm in greatest dimension  -Resection margins free of involvement  -No evidence of lymphovascular or perineural invasion  -Treatment effect: Poor response (score 3)  -No tumor deposits identified  -Thirty-three benign lymph nodes (0/33)  -Sections of vas deferens with no evidence of involvement by adenocarcinoma.      Treatment:  Induction/neoadjuvant chemotherapy started for potentially resectable metachronous peritoneal metastasis    4/26/2022 - 11/2022: FOLFOX and bevacizumab x 12 cycles (Amrita stopped after cycle 7; oxali stopped after cycle 11).   -cycle 7 delayed for neutropenia. Neulasta added  -cycle 8 delayed for hospitalization with SBO (favoring ileus/adhesions, no evidence of malignancy). Post-op course complicated by peritonitis requiring antibiotics. Temp TPN, then cleared for oral intake.   -Bevacizumab omitted with cycle 8 to allow for healing postmalarial. Resumed with cycle 9.  -cycle 9 oxilaplatin dose-reduced 20% for gr 1-2 neuropathy  -cycle 11 oxaliplatin dose-reduced another 10-15% (33% total from original) for gr 1-2 neuropathy  -cycle 12 infusional 5FU only    2/8/2023: exploratory lap, low anterior resection, takedown loop colostomy, diverting loop ileostomy and flex sigmoidoscopy    Ileostomy reversal with CRS (5/3/23)      Physical Exam    GENERAL: Alert and oriented to time place and person.  Seated comfortably. In no distress.     Lab Results    No results found for this or any previous visit (from the past week).      CBC, CMP, CEA reviewed    Imaging    CT Chest/Abdomen/Pelvis w Contrast  Result Date: 7/23/2025  EXAM: CT CHEST/ABDOMEN/PELVIS W CONTRAST LOCATION: [facility] DATE: 7/23/2025 INDICATION:  Adenocarcinoma of sigmoid colon (H) COMPARISON: CT CAP 01/22/2025. TECHNIQUE: CT scan of the chest, abdomen, and pelvis was performed following injection of IV contrast. Multiplanar reformats were obtained. Dose reduction techniques were used. CONTRAST: 69 mL Isovue 370 FINDINGS: LUNGS AND PLEURA: Few stable small pulmonary nodules with largest in the right upper lobe measuring 4 mm (series 4, image 106). Chronic mild bronchiectasis and bronchial wall thickening which is greatest in the right lower lobe. Chronic right lower lobe scarring. Mild centrilobular emphysema. No pleural effusion. MEDIASTINUM/AXILLAE: Interval removal of left chest port. Stable mildly enlarged right hilar lymph node measuring 1.3 cm in short axis. No new or progressive thoracic adenopathy. Normal heart size without pericardial effusion. Moderate to heavy burden of  atherosclerotic disease of the aortic arch and descending thoracic aorta. Stable mild dilatation of the ascending aorta measuring up to 3.8 cm in diameter. CORONARY ARTERY CALCIFICATION: Mild. HEPATOBILIARY: Stable subcentimeter hypoattenuating lesion in the right hepatic lobe which is too small to characterize but likely benign. No radiopaque gallstone. No biliary ductal dilatation. PANCREAS: Normal. SPLEEN: Normal. ADRENAL GLANDS: Normal. KIDNEYS/BLADDER: Symmetric renal enhancement. No hydronephrosis. Unchanged left renal cyst, not requiring specific follow-up. Mild diffuse bladder wall thickening, improved from prior. BOWEL: Stable postsurgical changes of the colon with rectosigmoid anastomosis. No evidence for bowel obstruction or focal  inflammation. Moderate colonic stool burden. Normal appendix. LYMPH NODES: No lymphadenopathy in the abdomen or pelvis by CT size criteria. VASCULATURE: Heavy burden of vascular calcifications. Stable 3.1 cm infrarenal abdominal aortic aneurysm. 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 Stable dilatation of the right common iliac artery measuring up to 2.1  cm in diameter. PELVIC ORGANS: Prostatomegaly. MUSCULOSKELETAL: No aggressive osseous lesion.     IMPRESSION: 1.  No evidence of new or progressive metastatic disease in the chest, abdomen, or pelvis. 2.  Stable mildly enlarged right hilar lymph node. 3.  Stable infrarenal abdominal aortic aneurysm measuring 3.1 cm in diameter. 4.  Additional unchanged findings as above.       I have personally reviewed the CT scan images and report and independently interpreted the results to my ability.    The longitudinal plan of care for the diagnosis(es)/condition(s) as documented were addressed during this visit. Due to the added complexity in care, I will continue to support Layton in the subsequent management and with ongoing continuity of care.        Signed by:   Pamela oClorado MD

## 2025-08-14 DIAGNOSIS — K59.00 CONSTIPATION, UNSPECIFIED CONSTIPATION TYPE: ICD-10-CM

## 2025-08-18 RX ORDER — SENNOSIDES 8.6 MG
1 TABLET ORAL DAILY
Qty: 60 TABLET | Refills: 1 | Status: SHIPPED | OUTPATIENT
Start: 2025-08-18

## (undated) DEVICE — SOL WATER IRRIG 1000ML BOTTLE 2F7114

## (undated) DEVICE — LIGHT HANDLE X2

## (undated) DEVICE — STOCKING SLEEVE COMPRESSION CALF LG

## (undated) DEVICE — SUCTION TIP YANKAUER STR K87

## (undated) DEVICE — PAD CHUX UNDERPAD 23X24" 7136

## (undated) DEVICE — ENDO CAP AND TUBING STERILE FOR ENDOGATOR  100130

## (undated) DEVICE — ESU ELEC BLADE 6" COATED E1450-6

## (undated) DEVICE — STPL SKIN PROXIMATE 35 WIDE PMW35

## (undated) DEVICE — PACK LAP TRANSVERSE STD

## (undated) DEVICE — LINEN TOWEL PACK X30 5481

## (undated) DEVICE — ESU LIGASURE IMPACT OPEN SEALER/DVDR CVD LG JAW LF4418

## (undated) DEVICE — SUCTION MANIFOLD NEPTUNE 2 SYS 4 PORT 0702-020-000

## (undated) DEVICE — SU PDS II 3-0 SH 27" Z316H

## (undated) DEVICE — SU PROLENE 2-0 SHDA 36" 8523H

## (undated) DEVICE — Device

## (undated) DEVICE — DRAPE LEGGINGS CLEAR 8430

## (undated) DEVICE — ENDO FORCEP ENDOJAW BIOPSY 2.8MMX230CM FB-220U

## (undated) DEVICE — LINEN GOWN XLG 5407

## (undated) DEVICE — GOWN LG DISP 9515

## (undated) DEVICE — SU VICRYL 0 UR-6 27" J603H

## (undated) DEVICE — NDL SPINAL 22GA 3.5" QUINCKE 405181

## (undated) DEVICE — SYR 10ML FINGER CONTROL W/O NDL 309695

## (undated) DEVICE — CATH TRAY FOLEY SURESTEP 16FR W/URNE MTR STLK LATEX A303316A

## (undated) DEVICE — STPL RELOAD ECHELON CONTOUR CVD 40MM GREEN GCR40G

## (undated) DEVICE — LINEN TOWEL PACK X6 WHITE 5487

## (undated) DEVICE — ESU PENCIL SMOKE EVAC W/ROCKER SWITCH 0703-047-000

## (undated) DEVICE — SU PDS II 0 TP-1 60" Z991G

## (undated) DEVICE — DRSG KERLIX FLUFFS X5

## (undated) DEVICE — SU UMBILICAL TAPE .125X30" U11T

## (undated) DEVICE — SU VICRYL 3-0 SH 27" UND J416H

## (undated) DEVICE — ESU PENCIL W/COATED BLADE E2450H

## (undated) DEVICE — KIT INTRODUCER FLUENT MICRO 5FRX10CM ECHO TIP KIT-038-04

## (undated) DEVICE — WIPES FOLEY CARE SURESTEP PROVON DFC100

## (undated) DEVICE — CATH URETERAL OPEN END 05FR 70CM 020015

## (undated) DEVICE — KIT CONNECTOR FOR OLYMPUS ENDOSCOPES DEFENDO 100310

## (undated) DEVICE — DRSG GAUZE 4X4" TRAY 6939

## (undated) DEVICE — STOCKING SLEEVE COMPRESSION CALF MED

## (undated) DEVICE — SOL NACL 0.9% IRRIG 1000ML BOTTLE 07138-09

## (undated) DEVICE — DRAIN JACKSON PRATT RESERVOIR 100ML SU130-1305

## (undated) DEVICE — ENDO SYSTEM WATER BOTTLE & TUBING W/CO2 FILTER 00711549

## (undated) DEVICE — SU PDS II 0 CT-1 27" Z340H

## (undated) DEVICE — DRSG ABDOMINAL 07 1/2X8" 7197D

## (undated) DEVICE — SUCTION MANIFOLD NEPTUNE 2 SYS 1 PORT 702-025-000

## (undated) DEVICE — SU VICRYL 3-0 SH 8X18" UND J864D

## (undated) DEVICE — KIT NEW IMAGE COLOSTOMY/ILEOSTOMY 2 3/4" LF 19354

## (undated) DEVICE — DRAIN PENROSE 1/4"X18" LATEX  30416-025

## (undated) DEVICE — SU MONOCRYL 3-0 PS-2 18" UND Y497G

## (undated) DEVICE — STPL LINEAR CUT 75MM TLC75

## (undated) DEVICE — ESU LIGASURE MARYLAND LAPAROSCOPIC SLR/DVDR 5MMX37CM LF1937

## (undated) DEVICE — CATH MALECOT 24FR LATEX 086024

## (undated) DEVICE — BLADE KNIFE SURG 11 371111

## (undated) DEVICE — GLOVE PROTEXIS BLUE W/NEU-THERA 6.0  2D73EB60

## (undated) DEVICE — SYR 10ML LL W/O NDL

## (undated) DEVICE — TUBING SMOKE EVAC PNEUMOCLEAR HIGH FLOW 0620050250

## (undated) DEVICE — PREP CHLORAPREP 26ML TINTED ORANGE  260815

## (undated) DEVICE — DRAPE IOBAN INCISE 23X17" 6650EZ

## (undated) DEVICE — GUIDEWIRE SENSOR DUAL FLEX ANG 0.035"X150CM M0066703010

## (undated) DEVICE — PREP POVIDONE-IODINE 10% SOLUTION 4OZ BOTTLE MDS093944

## (undated) DEVICE — SPONGE LAP 18X18" 1515

## (undated) DEVICE — GLOVE PROTEXIS POWDER FREE SMT 7.5  2D72PT75X

## (undated) DEVICE — NDL 25GA 1.5" 305127

## (undated) DEVICE — GOWN XLG DISP 9545

## (undated) DEVICE — NDL COUNTER 20CT 31142493

## (undated) DEVICE — DRAPE POUCH INSTRUMENT 3 POCKET 1018L

## (undated) DEVICE — SU VICRYL 2-0 REEL 54" UND J286G

## (undated) DEVICE — DRAPE SHEET MED 44X70" 9355

## (undated) DEVICE — STPL SKIN 35W ROTATING HEAD PRW35

## (undated) DEVICE — ADH SKIN CLOSURE PREMIERPRO EXOFIN 1.0ML 3470

## (undated) DEVICE — GLOVE BIOGEL PI MICRO SZ 7.5 48575

## (undated) DEVICE — SOL NACL 0.9% IRRIG 1000ML BOTTLE 2F7124

## (undated) DEVICE — GLOVE PROTEXIS W/NEU-THERA 7.0  2D73TE70

## (undated) DEVICE — DECANTER BAG 2002S

## (undated) DEVICE — DRAIN JACKSON PRATT ROUND SIL 19FR W/TROCAR LF JP-2232

## (undated) DEVICE — SPONGE LAP 18X18" X8435

## (undated) DEVICE — GLOVE PROTEXIS W/NEU-THERA 7.5  2D73TE75

## (undated) DEVICE — BLADE KNIFE SURG 15 371115

## (undated) DEVICE — STPL CIRCULAR 29MM CVD CDH29P

## (undated) DEVICE — SU MONOCRYL 4-0 PS-2 27" UND Y426H

## (undated) DEVICE — GLOVE GAMMEX NEOPRENE ULTRA SZ 7 LF 8514

## (undated) DEVICE — SPONGE RAY-TEC 4X8" 7318

## (undated) DEVICE — SU VICRYL 2-0 TIE 12X18" J905T

## (undated) DEVICE — BLADE CLIPPER 4406

## (undated) DEVICE — DRAPE C-ARM 60X42" 1013

## (undated) DEVICE — STPL LINEAR 90 X 3.5MM TA9035S

## (undated) DEVICE — SUCTION TIP POOLE K770

## (undated) DEVICE — PREP POVIDONE IODINE SOLUTION 10% 4OZ BOTTLE 29906-004

## (undated) DEVICE — PREP CHLORAPREP 26ML TINTED HI-LITE ORANGE 930815

## (undated) DEVICE — PACK CENTRAL LINE INSERTION SAN32CLFCG

## (undated) DEVICE — SU MONOCRYL 4-0 PS-2 18" UND Y496G

## (undated) DEVICE — PACK CYSTO UMMC CUSTOM

## (undated) DEVICE — SYR BULB IRRIG DOVER 60 ML LATEX FREE 67000

## (undated) DEVICE — ESU ELEC BLADE 2.75" COATED/INSULATED E1455

## (undated) DEVICE — DRSG MEDIPORE 3 1/2X13 3/4" 3573

## (undated) DEVICE — ESU LIGASURE IMPACT OPEN SEALER/DVDR CVD LG JAW 36MM LF4318

## (undated) DEVICE — SU VICRYL 3-0 SH 27" J316H

## (undated) DEVICE — TUBING SUCTION MEDI-VAC 1/4"X20' N620A

## (undated) DEVICE — JELLY LUBRICATING SURGILUBE 2OZ TUBE

## (undated) DEVICE — SUCTION SLEEVE NEPTUNE 2 125MM 0703-005-125

## (undated) DEVICE — SU MONOCRYL 4-0 P-3 18" UND Y494G

## (undated) DEVICE — ENDO TROCAR BLUNT TIP KII BALLOON 12X100MM C0R47

## (undated) DEVICE — TUBING SUCTION 10'X3/16" N510

## (undated) DEVICE — BNDG ABDOMINAL BINDER 9X30-45" 79-89070

## (undated) DEVICE — DRSG KERLIX 4 1/2"X4YDS ROLL 6715

## (undated) DEVICE — LINEN TOWEL PACK X5 5464

## (undated) DEVICE — COVER ULTRASOUND PROBE W/GEL FLEXI-FEEL 6"X58" LF  25-FF658

## (undated) DEVICE — GEL ULTRASOUND AQUASONIC 20GM 01-01

## (undated) DEVICE — NDL 22GA 1.5"

## (undated) DEVICE — ESU GROUND PAD ADULT W/CORD E7507

## (undated) DEVICE — DRAPE SHEET REV FOLD 3/4 9349

## (undated) DEVICE — SOL ADH LIQUID BENZOIN SWAB 0.6ML C1544

## (undated) DEVICE — SU VICRYL 2-0 TIE 54" J615H

## (undated) DEVICE — SU VICRYL 2-0 TIE 12X18" UND J111T

## (undated) DEVICE — BLANKET BAIR HUGGER UPPER BODY 42268

## (undated) DEVICE — SU VICRYL 2-0 UR-6 27" J602H

## (undated) DEVICE — CATH TRAY FOLEY 16FR SILICONE 907416

## (undated) DEVICE — SU VICRYL 0 CT-1 36" J946H

## (undated) DEVICE — PACK LAPAROTOMY CUSTOM LAKES

## (undated) DEVICE — SYR 05ML LL W/O NDL

## (undated) DEVICE — SU SILK 0 TIE 6X18" A186H

## (undated) DEVICE — STPL ECHELON CONTOUR CUTTER CVD 40MM GREEN GCS40G

## (undated) DEVICE — BLADE KNIFE SURG 10 371110

## (undated) DEVICE — STPL POWERED ECHELON 45MM PSEE45A

## (undated) DEVICE — SUCTION IRR STRYKERFLOW II W/TIP 250-070-520

## (undated) DEVICE — CONNECTOR MALE TO MALE LL

## (undated) DEVICE — NDL 18GA 1.5" 305196

## (undated) DEVICE — SU ETHILON 3-0 PS-1 18" 1663H

## (undated) DEVICE — SU VICRYL 3-0 TIE 54" UND J285G

## (undated) DEVICE — ESU HOLSTER PLASTIC DISP E2400

## (undated) DEVICE — SU VICRYL 2-0 SH 27" UND J417H

## (undated) DEVICE — STPL RELOAD LINEAR CUT 75MM TCR75

## (undated) DEVICE — KIT PATIENT POSITIONING PIGAZZI LATEX FREE 40580

## (undated) DEVICE — SOL WATER IRRIG 1000ML BOTTLE 07139-09

## (undated) DEVICE — DRAPE IOBAN LG .375X23.5" 6648EZ

## (undated) DEVICE — BASIN SET SINGLE STERILE 13752-624

## (undated) DEVICE — SU SILK 2-0 TIE 12X30" A305H

## (undated) DEVICE — GLOVE BIOGEL PI MICRO SZ 7.0 48570

## (undated) DEVICE — KNIFE HANDLE W/15 BLADE 371615

## (undated) DEVICE — DECANTER VIAL 2006S

## (undated) RX ORDER — FENTANYL CITRATE 50 UG/ML
INJECTION, SOLUTION INTRAMUSCULAR; INTRAVENOUS
Status: DISPENSED
Start: 2022-03-27

## (undated) RX ORDER — ONDANSETRON 2 MG/ML
INJECTION INTRAMUSCULAR; INTRAVENOUS
Status: DISPENSED
Start: 2022-08-23

## (undated) RX ORDER — HEPARIN SODIUM (PORCINE) LOCK FLUSH IV SOLN 100 UNIT/ML 100 UNIT/ML
SOLUTION INTRAVENOUS
Status: DISPENSED
Start: 2023-12-06

## (undated) RX ORDER — FENTANYL CITRATE 50 UG/ML
INJECTION, SOLUTION INTRAMUSCULAR; INTRAVENOUS
Status: DISPENSED
Start: 2022-08-23

## (undated) RX ORDER — FENTANYL CITRATE 50 UG/ML
INJECTION, SOLUTION INTRAMUSCULAR; INTRAVENOUS
Status: DISPENSED
Start: 2022-08-24

## (undated) RX ORDER — GABAPENTIN 300 MG/1
CAPSULE ORAL
Status: DISPENSED
Start: 2023-05-03

## (undated) RX ORDER — ONDANSETRON 2 MG/ML
INJECTION INTRAMUSCULAR; INTRAVENOUS
Status: DISPENSED
Start: 2023-02-08

## (undated) RX ORDER — CEFAZOLIN SODIUM 1 G/3ML
INJECTION, POWDER, FOR SOLUTION INTRAMUSCULAR; INTRAVENOUS
Status: DISPENSED
Start: 2023-02-08

## (undated) RX ORDER — ENOXAPARIN SODIUM 100 MG/ML
INJECTION SUBCUTANEOUS
Status: DISPENSED
Start: 2023-05-03

## (undated) RX ORDER — HEPARIN SODIUM (PORCINE) LOCK FLUSH IV SOLN 100 UNIT/ML 100 UNIT/ML
SOLUTION INTRAVENOUS
Status: DISPENSED
Start: 2023-05-17

## (undated) RX ORDER — FENTANYL CITRATE 50 UG/ML
INJECTION, SOLUTION INTRAMUSCULAR; INTRAVENOUS
Status: DISPENSED
Start: 2023-05-03

## (undated) RX ORDER — LIDOCAINE HYDROCHLORIDE 10 MG/ML
INJECTION, SOLUTION EPIDURAL; INFILTRATION; INTRACAUDAL; PERINEURAL
Status: DISPENSED
Start: 2022-08-23

## (undated) RX ORDER — CEFAZOLIN SODIUM/WATER 2 G/20 ML
SYRINGE (ML) INTRAVENOUS
Status: DISPENSED
Start: 2023-05-03

## (undated) RX ORDER — PROPOFOL 10 MG/ML
INJECTION, EMULSION INTRAVENOUS
Status: DISPENSED
Start: 2023-02-08

## (undated) RX ORDER — ALBUMIN, HUMAN INJ 5% 5 %
SOLUTION INTRAVENOUS
Status: DISPENSED
Start: 2022-03-26

## (undated) RX ORDER — FENTANYL CITRATE 50 UG/ML
INJECTION, SOLUTION INTRAMUSCULAR; INTRAVENOUS
Status: DISPENSED
Start: 2023-02-08

## (undated) RX ORDER — GABAPENTIN 300 MG/1
CAPSULE ORAL
Status: DISPENSED
Start: 2023-02-08

## (undated) RX ORDER — LIDOCAINE HYDROCHLORIDE AND EPINEPHRINE 10; 10 MG/ML; UG/ML
INJECTION, SOLUTION INFILTRATION; PERINEURAL
Status: DISPENSED
Start: 2022-04-21

## (undated) RX ORDER — FENTANYL CITRATE 50 UG/ML
INJECTION, SOLUTION INTRAMUSCULAR; INTRAVENOUS
Status: DISPENSED
Start: 2022-03-26

## (undated) RX ORDER — HEPARIN SODIUM (PORCINE) LOCK FLUSH IV SOLN 100 UNIT/ML 100 UNIT/ML
SOLUTION INTRAVENOUS
Status: DISPENSED
Start: 2023-08-14

## (undated) RX ORDER — FENTANYL CITRATE-0.9 % NACL/PF 10 MCG/ML
PLASTIC BAG, INJECTION (ML) INTRAVENOUS
Status: DISPENSED
Start: 2022-03-27

## (undated) RX ORDER — FENTANYL CITRATE 50 UG/ML
INJECTION, SOLUTION INTRAMUSCULAR; INTRAVENOUS
Status: DISPENSED
Start: 2025-03-10

## (undated) RX ORDER — HEPARIN SODIUM (PORCINE) LOCK FLUSH IV SOLN 100 UNIT/ML 100 UNIT/ML
SOLUTION INTRAVENOUS
Status: DISPENSED
Start: 2025-01-22

## (undated) RX ORDER — CEFAZOLIN SODIUM/WATER 2 G/20 ML
SYRINGE (ML) INTRAVENOUS
Status: DISPENSED
Start: 2022-08-24

## (undated) RX ORDER — HEPARIN SODIUM 5000 [USP'U]/.5ML
INJECTION, SOLUTION INTRAVENOUS; SUBCUTANEOUS
Status: DISPENSED
Start: 2022-08-24

## (undated) RX ORDER — CEFAZOLIN SODIUM/WATER 2 G/20 ML
SYRINGE (ML) INTRAVENOUS
Status: DISPENSED
Start: 2023-02-08

## (undated) RX ORDER — GLYCOPYRROLATE 0.2 MG/ML
INJECTION, SOLUTION INTRAMUSCULAR; INTRAVENOUS
Status: DISPENSED
Start: 2023-05-03

## (undated) RX ORDER — ACETAMINOPHEN 325 MG/1
TABLET ORAL
Status: DISPENSED
Start: 2023-05-03

## (undated) RX ORDER — ESMOLOL HYDROCHLORIDE 10 MG/ML
INJECTION INTRAVENOUS
Status: DISPENSED
Start: 2023-02-08

## (undated) RX ORDER — HYDROMORPHONE HCL IN WATER/PF 6 MG/30 ML
PATIENT CONTROLLED ANALGESIA SYRINGE INTRAVENOUS
Status: DISPENSED
Start: 2023-02-08

## (undated) RX ORDER — DEXAMETHASONE SODIUM PHOSPHATE 4 MG/ML
INJECTION, SOLUTION INTRA-ARTICULAR; INTRALESIONAL; INTRAMUSCULAR; INTRAVENOUS; SOFT TISSUE
Status: DISPENSED
Start: 2022-08-23

## (undated) RX ORDER — PROPOFOL 10 MG/ML
INJECTION, EMULSION INTRAVENOUS
Status: DISPENSED
Start: 2022-04-21

## (undated) RX ORDER — DEXAMETHASONE SODIUM PHOSPHATE 4 MG/ML
INJECTION, SOLUTION INTRA-ARTICULAR; INTRALESIONAL; INTRAMUSCULAR; INTRAVENOUS; SOFT TISSUE
Status: DISPENSED
Start: 2022-08-24

## (undated) RX ORDER — ACETAMINOPHEN 325 MG/1
TABLET ORAL
Status: DISPENSED
Start: 2022-04-21

## (undated) RX ORDER — ONDANSETRON 2 MG/ML
INJECTION INTRAMUSCULAR; INTRAVENOUS
Status: DISPENSED
Start: 2023-05-03

## (undated) RX ORDER — CEFAZOLIN SODIUM 1 G/3ML
INJECTION, POWDER, FOR SOLUTION INTRAMUSCULAR; INTRAVENOUS
Status: DISPENSED
Start: 2022-03-27

## (undated) RX ORDER — METRONIDAZOLE 500 MG/100ML
INJECTION, SOLUTION INTRAVENOUS
Status: DISPENSED
Start: 2023-05-03

## (undated) RX ORDER — GABAPENTIN 100 MG/1
CAPSULE ORAL
Status: DISPENSED
Start: 2022-04-21

## (undated) RX ORDER — CEFAZOLIN SODIUM 1 G/3ML
INJECTION, POWDER, FOR SOLUTION INTRAMUSCULAR; INTRAVENOUS
Status: DISPENSED
Start: 2022-06-02

## (undated) RX ORDER — MAGNESIUM SULFATE HEPTAHYDRATE 40 MG/ML
INJECTION, SOLUTION INTRAVENOUS
Status: DISPENSED
Start: 2022-04-21

## (undated) RX ORDER — FENTANYL CITRATE-0.9 % NACL/PF 10 MCG/ML
PLASTIC BAG, INJECTION (ML) INTRAVENOUS
Status: DISPENSED
Start: 2023-05-03

## (undated) RX ORDER — SODIUM CHLORIDE 9 MG/ML
INJECTION, SOLUTION INTRAVENOUS
Status: DISPENSED
Start: 2022-03-27

## (undated) RX ORDER — LIDOCAINE HYDROCHLORIDE 10 MG/ML
INJECTION, SOLUTION EPIDURAL; INFILTRATION; INTRACAUDAL; PERINEURAL
Status: DISPENSED
Start: 2022-08-24

## (undated) RX ORDER — LIDOCAINE HYDROCHLORIDE 10 MG/ML
INJECTION, SOLUTION INFILTRATION; PERINEURAL
Status: DISPENSED
Start: 2025-03-10

## (undated) RX ORDER — BUPIVACAINE HYDROCHLORIDE AND EPINEPHRINE 2.5; 5 MG/ML; UG/ML
INJECTION, SOLUTION EPIDURAL; INFILTRATION; INTRACAUDAL; PERINEURAL
Status: DISPENSED
Start: 2022-04-21

## (undated) RX ORDER — FENTANYL CITRATE-0.9 % NACL/PF 10 MCG/ML
PLASTIC BAG, INJECTION (ML) INTRAVENOUS
Status: DISPENSED
Start: 2022-08-24

## (undated) RX ORDER — GLYCOPYRROLATE 0.2 MG/ML
INJECTION, SOLUTION INTRAMUSCULAR; INTRAVENOUS
Status: DISPENSED
Start: 2023-02-08

## (undated) RX ORDER — DEXAMETHASONE SODIUM PHOSPHATE 4 MG/ML
INJECTION, SOLUTION INTRA-ARTICULAR; INTRALESIONAL; INTRAMUSCULAR; INTRAVENOUS; SOFT TISSUE
Status: DISPENSED
Start: 2023-02-08

## (undated) RX ORDER — HYDROMORPHONE HCL IN WATER/PF 6 MG/30 ML
PATIENT CONTROLLED ANALGESIA SYRINGE INTRAVENOUS
Status: DISPENSED
Start: 2022-08-24

## (undated) RX ORDER — ENOXAPARIN SODIUM 100 MG/ML
INJECTION SUBCUTANEOUS
Status: DISPENSED
Start: 2023-02-08

## (undated) RX ORDER — PROPOFOL 10 MG/ML
INJECTION, EMULSION INTRAVENOUS
Status: DISPENSED
Start: 2022-08-23

## (undated) RX ORDER — BUPIVACAINE HYDROCHLORIDE 5 MG/ML
INJECTION, SOLUTION EPIDURAL; INTRACAUDAL
Status: DISPENSED
Start: 2022-08-24

## (undated) RX ORDER — METRONIDAZOLE 500 MG/100ML
INJECTION, SOLUTION INTRAVENOUS
Status: DISPENSED
Start: 2023-02-08

## (undated) RX ORDER — FENTANYL CITRATE 50 UG/ML
INJECTION, SOLUTION INTRAMUSCULAR; INTRAVENOUS
Status: DISPENSED
Start: 2022-04-21

## (undated) RX ORDER — HEPARIN SODIUM (PORCINE) LOCK FLUSH IV SOLN 100 UNIT/ML 100 UNIT/ML
SOLUTION INTRAVENOUS
Status: DISPENSED
Start: 2022-06-13

## (undated) RX ORDER — LIDOCAINE HYDROCHLORIDE AND EPINEPHRINE 10; 10 MG/ML; UG/ML
INJECTION, SOLUTION INFILTRATION; PERINEURAL
Status: DISPENSED
Start: 2025-03-10

## (undated) RX ORDER — SODIUM CHLORIDE, SODIUM LACTATE, POTASSIUM CHLORIDE, CALCIUM CHLORIDE 600; 310; 30; 20 MG/100ML; MG/100ML; MG/100ML; MG/100ML
INJECTION, SOLUTION INTRAVENOUS
Status: DISPENSED
Start: 2023-05-03

## (undated) RX ORDER — ONDANSETRON 2 MG/ML
INJECTION INTRAMUSCULAR; INTRAVENOUS
Status: DISPENSED
Start: 2022-08-24

## (undated) RX ORDER — PROPOFOL 10 MG/ML
INJECTION, EMULSION INTRAVENOUS
Status: DISPENSED
Start: 2022-08-24

## (undated) RX ORDER — PROPOFOL 10 MG/ML
INJECTION, EMULSION INTRAVENOUS
Status: DISPENSED
Start: 2023-05-03

## (undated) RX ORDER — HYDROMORPHONE HYDROCHLORIDE 1 MG/ML
INJECTION, SOLUTION INTRAMUSCULAR; INTRAVENOUS; SUBCUTANEOUS
Status: DISPENSED
Start: 2023-02-08

## (undated) RX ORDER — HEPARIN SODIUM (PORCINE) LOCK FLUSH IV SOLN 100 UNIT/ML 100 UNIT/ML
SOLUTION INTRAVENOUS
Status: DISPENSED
Start: 2024-07-16

## (undated) RX ORDER — ACETAMINOPHEN 325 MG/1
TABLET ORAL
Status: DISPENSED
Start: 2023-02-08